# Patient Record
Sex: MALE | Race: BLACK OR AFRICAN AMERICAN | Employment: OTHER | URBAN - METROPOLITAN AREA
[De-identification: names, ages, dates, MRNs, and addresses within clinical notes are randomized per-mention and may not be internally consistent; named-entity substitution may affect disease eponyms.]

---

## 2018-04-16 ENCOUNTER — IP HISTORICAL/CONVERTED ENCOUNTER (OUTPATIENT)
Dept: OTHER | Age: 63
End: 2018-04-16

## 2020-06-20 ENCOUNTER — IP HISTORICAL/CONVERTED ENCOUNTER (OUTPATIENT)
Dept: OTHER | Age: 65
End: 2020-06-20

## 2020-12-12 ENCOUNTER — HOSPITAL ENCOUNTER (OUTPATIENT)
Age: 65
Setting detail: OBSERVATION
Discharge: HOME HEALTH | End: 2020-12-14
Attending: EMERGENCY MEDICINE | Admitting: HOSPITALIST
Payer: MEDICARE

## 2020-12-12 ENCOUNTER — APPOINTMENT (OUTPATIENT)
Dept: CT IMAGING | Age: 65
End: 2020-12-12
Attending: EMERGENCY MEDICINE
Payer: MEDICARE

## 2020-12-12 ENCOUNTER — APPOINTMENT (OUTPATIENT)
Dept: GENERAL RADIOLOGY | Age: 65
End: 2020-12-12
Attending: EMERGENCY MEDICINE
Payer: MEDICARE

## 2020-12-12 DIAGNOSIS — R07.9 ACUTE CHEST PAIN: Primary | ICD-10-CM

## 2020-12-12 PROBLEM — I10 HTN (HYPERTENSION): Status: ACTIVE | Noted: 2020-12-12

## 2020-12-12 PROBLEM — I25.10 CAD (CORONARY ARTERY DISEASE): Status: ACTIVE | Noted: 2020-12-12

## 2020-12-12 LAB
ALBUMIN SERPL-MCNC: 3.2 G/DL (ref 3.5–5)
ALBUMIN/GLOB SERPL: 0.7 {RATIO} (ref 1.1–2.2)
ALP SERPL-CCNC: 100 U/L (ref 45–117)
ALT SERPL-CCNC: 38 U/L (ref 12–78)
ANION GAP SERPL CALC-SCNC: 8 MMOL/L (ref 5–15)
AST SERPL W P-5'-P-CCNC: 19 U/L (ref 15–37)
BASOPHILS # BLD: 0.1 K/UL (ref 0–0.2)
BASOPHILS NFR BLD: 1 % (ref 0–2.5)
BILIRUB SERPL-MCNC: 0.2 MG/DL (ref 0.2–1)
BNP SERPL-MCNC: 520 PG/ML
BUN SERPL-MCNC: 17 MG/DL (ref 6–20)
BUN/CREAT SERPL: 15 (ref 12–20)
CA-I BLD-MCNC: 8.6 MG/DL (ref 8.5–10.1)
CHLORIDE SERPL-SCNC: 104 MMOL/L (ref 97–108)
CO2 SERPL-SCNC: 27 MMOL/L (ref 21–32)
CREAT SERPL-MCNC: 1.1 MG/DL (ref 0.7–1.3)
EOSINOPHIL # BLD: 0.3 K/UL (ref 0–0.7)
EOSINOPHIL NFR BLD: 6 % (ref 0.9–2.9)
ERYTHROCYTE [DISTWIDTH] IN BLOOD BY AUTOMATED COUNT: 15.7 % (ref 11.5–14.5)
GLOBULIN SER CALC-MCNC: 4.3 G/DL (ref 2–4)
GLUCOSE SERPL-MCNC: 113 MG/DL (ref 65–100)
HCT VFR BLD AUTO: 32.8 % (ref 41–53)
HGB BLD-MCNC: 10.4 G/DL (ref 13.5–17.5)
INR PPP: 1.1 (ref 0.9–1.1)
LYMPHOCYTES # BLD: 1.7 K/UL (ref 1–4.8)
LYMPHOCYTES NFR BLD: 31 % (ref 20.5–51.1)
MAGNESIUM SERPL-MCNC: 1.7 MG/DL (ref 1.6–2.4)
MCH RBC QN AUTO: 25.4 PG (ref 31–34)
MCHC RBC AUTO-ENTMCNC: 31.6 G/DL (ref 31–36)
MCV RBC AUTO: 80.4 FL (ref 80–100)
MONOCYTES # BLD: 0.6 K/UL (ref 0.2–2.4)
MONOCYTES NFR BLD: 11 % (ref 1.7–9.3)
NEUTS SEG # BLD: 2.7 K/UL (ref 1.8–7.7)
NEUTS SEG NFR BLD: 51 % (ref 42–75)
NRBC # BLD: 0 K/UL
NRBC BLD-RTO: 10 PER 100 WBC
PLATELET # BLD AUTO: 273 K/UL
PMV BLD AUTO: 7.9 FL (ref 6.5–11.5)
POTASSIUM SERPL-SCNC: 3.4 MMOL/L (ref 3.5–5.1)
PROT SERPL-MCNC: 7.5 G/DL (ref 6.4–8.2)
PROTHROMBIN TIME: 10.4 SEC (ref 9–11.1)
RBC # BLD AUTO: 4.08 M/UL (ref 4.5–5.9)
SODIUM SERPL-SCNC: 139 MMOL/L (ref 136–145)
TROPONIN I SERPL-MCNC: <0.05 NG/ML
TROPONIN I SERPL-MCNC: <0.05 NG/ML
WBC # BLD AUTO: 5.4 K/UL (ref 4.4–11.3)

## 2020-12-12 PROCEDURE — 80053 COMPREHEN METABOLIC PANEL: CPT

## 2020-12-12 PROCEDURE — 99218 HC RM OBSERVATION: CPT

## 2020-12-12 PROCEDURE — 71045 X-RAY EXAM CHEST 1 VIEW: CPT

## 2020-12-12 PROCEDURE — 99284 EMERGENCY DEPT VISIT MOD MDM: CPT

## 2020-12-12 PROCEDURE — 83880 ASSAY OF NATRIURETIC PEPTIDE: CPT

## 2020-12-12 PROCEDURE — 83036 HEMOGLOBIN GLYCOSYLATED A1C: CPT

## 2020-12-12 PROCEDURE — 83735 ASSAY OF MAGNESIUM: CPT

## 2020-12-12 PROCEDURE — 36415 COLL VENOUS BLD VENIPUNCTURE: CPT

## 2020-12-12 PROCEDURE — 96374 THER/PROPH/DIAG INJ IV PUSH: CPT

## 2020-12-12 PROCEDURE — 85610 PROTHROMBIN TIME: CPT

## 2020-12-12 PROCEDURE — 84484 ASSAY OF TROPONIN QUANT: CPT

## 2020-12-12 PROCEDURE — 99285 EMERGENCY DEPT VISIT HI MDM: CPT

## 2020-12-12 PROCEDURE — 74011250636 HC RX REV CODE- 250/636: Performed by: PHYSICIAN ASSISTANT

## 2020-12-12 PROCEDURE — 93005 ELECTROCARDIOGRAM TRACING: CPT

## 2020-12-12 PROCEDURE — 74011250637 HC RX REV CODE- 250/637: Performed by: EMERGENCY MEDICINE

## 2020-12-12 PROCEDURE — 85025 COMPLETE CBC W/AUTO DIFF WBC: CPT

## 2020-12-12 RX ORDER — POLYETHYLENE GLYCOL 3350 17 G/17G
17 POWDER, FOR SOLUTION ORAL DAILY PRN
Status: CANCELLED | OUTPATIENT
Start: 2020-12-12

## 2020-12-12 RX ORDER — SODIUM CHLORIDE 0.9 % (FLUSH) 0.9 %
5-40 SYRINGE (ML) INJECTION EVERY 8 HOURS
Status: CANCELLED | OUTPATIENT
Start: 2020-12-12

## 2020-12-12 RX ORDER — GUAIFENESIN 100 MG/5ML
243 LIQUID (ML) ORAL
Status: COMPLETED | OUTPATIENT
Start: 2020-12-12 | End: 2020-12-12

## 2020-12-12 RX ORDER — HYDRALAZINE HYDROCHLORIDE 20 MG/ML
10 INJECTION INTRAMUSCULAR; INTRAVENOUS
Status: DISCONTINUED | OUTPATIENT
Start: 2020-12-12 | End: 2020-12-14 | Stop reason: HOSPADM

## 2020-12-12 RX ORDER — METFORMIN HYDROCHLORIDE 1000 MG/1
1000 TABLET ORAL 2 TIMES DAILY WITH MEALS
COMMUNITY
End: 2021-09-14 | Stop reason: ALTCHOICE

## 2020-12-12 RX ORDER — TAMSULOSIN HYDROCHLORIDE 0.4 MG/1
0.4 CAPSULE ORAL
COMMUNITY
End: 2021-09-14 | Stop reason: ALTCHOICE

## 2020-12-12 RX ORDER — PROMETHAZINE HYDROCHLORIDE 25 MG/1
12.5 TABLET ORAL
Status: CANCELLED | OUTPATIENT
Start: 2020-12-12

## 2020-12-12 RX ORDER — GLIPIZIDE 10 MG/1
10 TABLET ORAL 2 TIMES DAILY
COMMUNITY
End: 2020-12-14

## 2020-12-12 RX ORDER — MAGNESIUM SULFATE 100 %
4 CRYSTALS MISCELLANEOUS AS NEEDED
Status: DISCONTINUED | OUTPATIENT
Start: 2020-12-12 | End: 2020-12-14 | Stop reason: HOSPADM

## 2020-12-12 RX ORDER — NIFEDIPINE 30 MG/1
30 TABLET, EXTENDED RELEASE ORAL DAILY
Status: DISCONTINUED | OUTPATIENT
Start: 2020-12-13 | End: 2020-12-13

## 2020-12-12 RX ORDER — ACETAMINOPHEN 325 MG/1
650 TABLET ORAL
Status: CANCELLED | OUTPATIENT
Start: 2020-12-12

## 2020-12-12 RX ORDER — ONDANSETRON 4 MG/1
4 TABLET, FILM COATED ORAL
Status: DISCONTINUED | OUTPATIENT
Start: 2020-12-12 | End: 2020-12-12

## 2020-12-12 RX ORDER — ENOXAPARIN SODIUM 100 MG/ML
40 INJECTION SUBCUTANEOUS DAILY
Status: CANCELLED | OUTPATIENT
Start: 2020-12-13

## 2020-12-12 RX ORDER — ISOSORBIDE MONONITRATE 60 MG/1
60 TABLET, EXTENDED RELEASE ORAL DAILY
COMMUNITY
End: 2022-07-11

## 2020-12-12 RX ORDER — ATORVASTATIN CALCIUM 20 MG/1
20 TABLET, FILM COATED ORAL DAILY
COMMUNITY
End: 2021-09-14 | Stop reason: ALTCHOICE

## 2020-12-12 RX ORDER — PANTOPRAZOLE SODIUM 40 MG/1
40 TABLET, DELAYED RELEASE ORAL DAILY
COMMUNITY
End: 2021-04-09 | Stop reason: ALTCHOICE

## 2020-12-12 RX ORDER — CYCLOBENZAPRINE HCL 10 MG
10 TABLET ORAL
Status: DISCONTINUED | OUTPATIENT
Start: 2020-12-12 | End: 2020-12-12

## 2020-12-12 RX ORDER — TRAZODONE HYDROCHLORIDE 50 MG/1
50 TABLET ORAL 2 TIMES DAILY
COMMUNITY
End: 2021-04-09 | Stop reason: ALTCHOICE

## 2020-12-12 RX ORDER — DEXTROSE 50 % IN WATER (D50W) INTRAVENOUS SYRINGE
25-50 AS NEEDED
Status: DISCONTINUED | OUTPATIENT
Start: 2020-12-12 | End: 2020-12-14 | Stop reason: HOSPADM

## 2020-12-12 RX ORDER — INSULIN LISPRO 100 [IU]/ML
INJECTION, SOLUTION INTRAVENOUS; SUBCUTANEOUS
Status: DISCONTINUED | OUTPATIENT
Start: 2020-12-13 | End: 2020-12-14 | Stop reason: HOSPADM

## 2020-12-12 RX ORDER — ACETAMINOPHEN 650 MG/1
650 SUPPOSITORY RECTAL
Status: CANCELLED | OUTPATIENT
Start: 2020-12-12

## 2020-12-12 RX ORDER — ONDANSETRON 2 MG/ML
4 INJECTION INTRAMUSCULAR; INTRAVENOUS
Status: CANCELLED | OUTPATIENT
Start: 2020-12-12

## 2020-12-12 RX ORDER — SODIUM CHLORIDE 0.9 % (FLUSH) 0.9 %
5-40 SYRINGE (ML) INJECTION AS NEEDED
Status: CANCELLED | OUTPATIENT
Start: 2020-12-12

## 2020-12-12 RX ORDER — ACETAMINOPHEN 500 MG
1000 TABLET ORAL
Status: DISCONTINUED | OUTPATIENT
Start: 2020-12-12 | End: 2020-12-12

## 2020-12-12 RX ORDER — FUROSEMIDE 40 MG/1
20 TABLET ORAL DAILY
COMMUNITY
End: 2021-04-09 | Stop reason: ALTCHOICE

## 2020-12-12 RX ADMIN — ASPIRIN 243 MG: 81 TABLET, CHEWABLE ORAL at 15:25

## 2020-12-12 RX ADMIN — HYDRALAZINE HYDROCHLORIDE 10 MG: 20 INJECTION INTRAMUSCULAR; INTRAVENOUS at 18:27

## 2020-12-12 NOTE — H&P
History and Physical    Subjective:     Genoveva Ray is a 77 y.o. male  has a past medical history of CAD (coronary artery disease) and MI (myocardial infarction) ([facility] [address] Patient has an extensive cardiac and coronary artery disease history presented to emergency department with chest pain started 2 hours prior to arrival and was normal described like a burning sensation heartburn with some nausea and dizziness in addition to shortness of breath. Was evaluated in his room reporting that pain had subsided somewhat now but based upon patient's risk factors would be appropriate to admit for observation to rule out myocardial infarction. Stay is 1 midnight. Past Medical History:   Diagnosis Date    CAD (coronary artery disease)     MI (myocardial infarction) ([facility] [address]       History reviewed. No pertinent surgical history. Family History   Problem Relation Age of Onset    Hypertension Mother     Hypertension Father       Social History     Tobacco Use    Smoking status: Not on file   Substance Use Topics    Alcohol use: Not on file       Prior to Admission medications    Medication Sig Start Date End Date Taking? Authorizing Provider   isosorbide mononitrate ER (IMDUR) 60 mg CR tablet Take 60 mg by mouth daily. Yes Prince, MD Imani   atorvastatin (LIPITOR) 20 mg tablet Take 20 mg by mouth daily. Yes Imani Morrison MD   furosemide (Lasix) 40 mg tablet Take 40 mg by mouth daily. Yes Imani Morrison MD   pantoprazole (Protonix) 40 mg tablet Take 40 mg by mouth daily. Yes Imani Morrison MD   traZODone (DESYREL) 50 mg tablet Take 50 mg by mouth two (2) times a day. Yes Imani Morrison MD   metFORMIN (GLUCOPHAGE) 1,000 mg tablet Take 1,000 mg by mouth two (2) times daily (with meals). Yes Imani Morrison MD   glipiZIDE (GLUCOTROL) 10 mg tablet Take 10 mg by mouth two (2) times a day. Yes Imani Morrison MD   tamsulosin (Flomax) 0.4 mg capsule Take 0.4 mg by mouth nightly.    Yes Imani Morrison MD     No Known Allergies       REVIEW OF SYSTEMS:       Total of 12 systems reviewed as follows:       POSITIVE= underlined text  Negative = text not underlined  General:  fever, chills, sweats, generalized weakness, weight loss/gain,      loss of appetite   Eyes:    blurred vision, eye pain, loss of vision, double vision  ENT:    rhinorrhea, pharyngitis   Respiratory:  cough, sputum production, SOB, [place], wheezing, pleuritic pain   Cardiology:   chest pain, palpitations, orthopnea, PND, edema, syncope   Gastrointestinal:  abdominal pain , N/V, diarrhea, dysphagia, constipation, bleeding   Genitourinary:  frequency, urgency, dysuria, hematuria, incontinence   Muskuloskeletal :  arthralgia, myalgia, back pain  Hematology: easy bruising, nose or gum bleeding, lymphadenopathy   Dermatological: rash, ulceration, pruritis, color change / jaundice  Endocrine:   hot flashes or polydipsia   Neurological:  headache, dizziness, confusion, focal weakness, paresthesia,     Speech difficulties, memory loss, gait difficulty  Psychological: Feelings of anxiety, depression, agitation         Objective:   VITALS:    Visit Vitals  BP (!) 168/89   Pulse 81   Temp 98.2 °F (36.8 °C)   Resp 20   Ht 5' 10\" (1.778 m)   Wt 111.1 kg (245 lb)   SpO2 91%   BMI 35.15 kg/m²       PHYSICAL EXAM:    General:    Alert, cooperative, no distress, appears stated age. HEENT: Atraumatic, anicteric sclerae, pink conjunctivae     No oral ulcers, mucosa moist, throat clear, dentition fair  Neck:  Supple, symmetrical,  thyroid: non tender  Lungs:   Clear to auscultation bilaterally. No Wheezing or Rhonchi. No rales. Chest wall:  No tenderness  No Accessory muscle use. Heart:   Regular  rhythm,  No  murmur   No edema  Abdomen:   Soft, non-tender. Not distended. Bowel sounds normal  Extremities: No cyanosis. No clubbing,      Skin turgor normal, Capillary refill normal, Radial dial pulse 2+  Skin:     Not pale. Not Jaundiced  No rashes   Psych:  Good insight.   Not depressed. Not anxious or agitated. Neurologic: EOMs intact. No facial asymmetry. No aphasia or slurred speech. Symmetrical strength,   Sensation grossly intact.  Alert and oriented X 4.     _______________________________________________________________________  Care Plan discussed with:    Comments   Patient X    Family      RN X    Care Manager                    Consultant:      _______________________________________________________________________  Expected  Disposition:   Home with Family X   HH/PT/OT/RN    SNF/LTC    JOSE    ________________________________________________________________________  TOTAL TIME:  48 Minutes    Critical Care Provided     Minutes non procedure based      Comments    X Reviewed previous records   >50% of visit spent in counseling and coordination of care X Discussion with patient and/or family and questions answered       ________________________________________________________________________    Labs:  Recent Results (from the past 24 hour(s))   METABOLIC PANEL, BASIC    Collection Time: 12/13/20  8:50 AM   Result Value Ref Range    Sodium 139 136 - 145 mmol/L    Potassium 3.5 3.5 - 5.1 mmol/L    Chloride 102 97 - 108 mmol/L    CO2 25 21 - 32 mmol/L    Anion gap 12 5 - 15 mmol/L    Glucose 153 (H) 65 - 100 mg/dL    BUN 13 6 - 20 mg/dL    Creatinine 0.85 0.70 - 1.30 mg/dL    BUN/Creatinine ratio 15 12 - 20      GFR est AA >60 >60 ml/min/1.73m2    GFR est non-AA >60 >60 ml/min/1.73m2    Calcium 9.4 8.5 - 10.1 mg/dL   CBC WITH AUTOMATED DIFF    Collection Time: 12/13/20  8:50 AM   Result Value Ref Range    WBC 4.8 4.4 - 11.3 K/uL    RBC 4.40 (L) 4.50 - 5.90 M/uL    HGB 11.1 (L) 13.5 - 17.5 g/dL    HCT 35.1 (L) 41 - 53 %    MCV 79.8 (L) 80 - 100 FL    MCH 25.1 (L) 31 - 34 PG    MCHC 31.5 31.0 - 36.0 g/dL    RDW 15.9 (H) 11.5 - 14.5 %    PLATELET 542 K/uL    MPV 7.6 6.5 - 11.5 FL    NRBC 10.0  WBC    ABSOLUTE NRBC 0.00 K/uL    NEUTROPHILS 53 42 - 75 %    LYMPHOCYTES 30 20.5 - 51.1 %    MONOCYTES 11 (H) 1.7 - 9.3 %    EOSINOPHILS 5 (H) 0.9 - 2.9 %    BASOPHILS 1 0.0 - 2.5 %    ABS. NEUTROPHILS 2.6 1.8 - 7.7 K/UL    ABS. LYMPHOCYTES 1.4 1.0 - 4.8 K/UL    ABS. MONOCYTES 0.5 0.2 - 2.4 K/UL    ABS. EOSINOPHILS 0.2 0.0 - 0.7 K/UL    ABS. BASOPHILS 0.1 0.0 - 0.2 K/UL   MAGNESIUM    Collection Time: 12/13/20  8:50 AM   Result Value Ref Range    Magnesium 1.8 1.6 - 2.4 mg/dL   TSH 3RD GENERATION    Collection Time: 12/13/20  8:50 AM   Result Value Ref Range    TSH 1.31 0.36 - 3.74 uIU/mL   TROPONIN I    Collection Time: 12/13/20  8:50 AM   Result Value Ref Range    Troponin-I, Qt. <0.05 <0.05 ng/mL   GLUCOSE, POC    Collection Time: 12/13/20 11:49 AM   Result Value Ref Range    Glucose (POC) 206 (H) 65 - 100 mg/dL    Performed by [address] [place] [address], POC    Collection Time: 12/13/20  3:43 PM   Result Value Ref Range    Glucose (POC) 177 (H) 65 - 100 mg/dL    Performed by Ramón Miller    TROPONIN I    Collection Time: 12/13/20  4:00 PM   Result Value Ref Range    Troponin-I, Qt. <0.05 <0.05 ng/mL   GLUCOSE, POC    Collection Time: 12/13/20  9:13 PM   Result Value Ref Range    Glucose (POC) 169 (H) 65 - 100 mg/dL    Performed by Kori Stephens        Imaging:  No results found.      Assessment & Plan:       Chest pain  -Patient has history of previous coronary artery disease and CABG and MI  -Initial 2 troponins negative no EKG changes  -We will admit for observation status with trending of troponins with EKG as needed chest pain  -EKG showed no acute changes    Coronary artery disease  -Continue home medication  -New medical management    Hypertension  -Pressure mildly elevated upon admission 170/80  -Resume home medication  -Continue to monitor  -Telemetry    Diabetes mellitus  -Hold Metformin  -Glipizide  -Insulin sliding scale      Code Status: Full    Prophylaxis: SCD and Lovenox 40 mg subcu daily     Electronically Signed : Bebeto Mena, PhD, PA-C, [facility] Medicine [facility]

## 2020-12-12 NOTE — ED PROVIDER NOTES
EMERGENCY DEPARTMENT HISTORY AND PHYSICAL EXAM      Date: 12/12/2020  Patient Name: Melva Corona      History of Presenting Illness     Chief Complaint   Patient presents with    Chest Pain       History Provided By: Patient    HPI: Melva Corona, 77 y.o. male with a past medical history significant hypertension, myocardial infarction and Coronary artery disease presents to the ED with cc of chest pain 2 hours prior to arrival patient described as burning sensation like heartburn associated dizziness nausea shortness of breath    There are no other complaints, changes, or physical findings at this time. PCP: None    Current Facility-Administered Medications   Medication Dose Route Frequency Provider Last Rate Last Dose    acetaminophen (TYLENOL) tablet 1,000 mg  1,000 mg Oral NOW Christin Cobb MD        ondansetron hcl (ZOFRAN) tablet 4 mg  4 mg Oral NOW Christin Cobb MD        cyclobenzaprine (FLEXERIL) tablet 10 mg  10 mg Oral NOW Christin Cobb MD        aspirin chewable tablet 243 mg  243 mg Oral NOW Christin Cobb MD           Past History     Past Medical History:  No past medical history on file. Past Surgical History:  No past surgical history on file. Family History:  No family history on file. Social History:  Social History     Tobacco Use    Smoking status: Not on file   Substance Use Topics    Alcohol use: Not on file    Drug use: Not on file       Allergies: Allergies not on file      Review of Systems     Review of Systems   Constitutional: Negative for chills and fever. HENT: Negative for rhinorrhea and sore throat. Eyes: Negative for discharge and visual disturbance. Respiratory: Positive for shortness of breath. Cardiovascular: Positive for chest pain. Gastrointestinal: Positive for nausea. Negative for abdominal pain. Endocrine: Negative for polydipsia and polyuria. Genitourinary: Negative for dysuria and urgency. Musculoskeletal: Negative for back pain and neck pain. Neurological: Positive for dizziness. Negative for weakness and numbness. Psychiatric/Behavioral: Negative. Physical Exam     Physical Exam  Vitals signs and nursing note reviewed. Constitutional:       Appearance: He is well-developed. He is obese. HENT:      Head: Normocephalic and atraumatic. Nose: Nose normal.      Mouth/Throat:      Mouth: Mucous membranes are moist.      Pharynx: Oropharynx is clear. Eyes:      Extraocular Movements: Extraocular movements intact. Conjunctiva/sclera: Conjunctivae normal.      Pupils: Pupils are equal, round, and reactive to light. Neck:      Musculoskeletal: Normal range of motion and neck supple. Cardiovascular:      Rate and Rhythm: Normal rate and regular rhythm. Heart sounds: Murmur present. Pulmonary:      Effort: Pulmonary effort is normal.      Breath sounds: Normal breath sounds. Abdominal:      General: Bowel sounds are normal.      Palpations: Abdomen is soft. Musculoskeletal: Normal range of motion. Skin:     General: Skin is dry. Capillary Refill: Capillary refill takes less than 2 seconds. Neurological:      General: No focal deficit present. Mental Status: He is alert and oriented to person, place, and time. Psychiatric:         Mood and Affect: Mood normal.         Behavior: Behavior normal.         Lab and Diagnostic Study Results     Labs -   No results found for this or any previous visit (from the past 12 hour(s)). Radiologic Studies -   [unfilled]  CT Results  (Last 48 hours)    None        CXR Results  (Last 48 hours)    None          Medical Decision Making and ED Course   - I am the first and primary provider for this patient AND AM THE PRIMARY PROVIDER OF RECORD. - I reviewed the vital signs, available nursing notes, past medical history, past surgical history, family history and social history. - Initial assessment performed. The patients presenting problems have been discussed, and the staff are in agreement with the care plan formulated and outlined with them. I have encouraged them to ask questions as they arise throughout their visit. Vital Signs-Reviewed the patient's vital signs. Patient Vitals for the past 12 hrs:   Temp Pulse Resp BP SpO2   12/12/20 1453 98.7 °F (37.1 °C) 82 16 (!) 170/80 99 %       Records Reviewed: Nursing Notes    The patient presents with chest pain with a differential diagnosis of  ACS, acute MI, pulmonary edema/CHF and pnuemonia    ED Course:       ED Course as of Dec 12 1804   Sat Dec 12, 2020   1458 Patient c-collar removed and patient was on backboard patient complaining of headache    [SB]   1553 EKG sinus rhythm rate 86 IN interval 147 QT interval 463 normal axis left ventricular hypertrophy    [SB]      ED Course User Index  [SB] Jacquelin Dasilva MD         Provider Notes (Medical Decision Making): MDM           Consultations:       Consultations: - NONE        Procedures and Critical Care       Performed by: Rajendra Downey MD  PROCEDURES:  Procedures               Rajendra Downey MD        Disposition     Disposition: Condition stable and improved  Admitted to Floor Medical Floor the case was discussed with the admitting physician assistant Aniyah Craig        Remove if not discharged  DISCHARGE PLAN:  1. There are no discharge medications for this patient. 2.   Follow-up Information    None       3. Return to ED if worse   4. There are no discharge medications for this patient. Diagnosis     Clinical Impression: No diagnosis found. Attestations:    Rajendra Downey MD    Please note that this dictation was completed with [employer] the computer voice recognition software. Quite often unanticipated grammatical, syntax, homophones, and other interpretive errors are inadvertently transcribed by the computer software. Please disregard these errors. Please excuse any errors that have escaped final proofreading. Thank you.

## 2020-12-12 NOTE — ED TRIAGE NOTES
Started having chest pain and tightness about 2 hours ago. Pain subsided after about an hour. Stated had heart attack with bi-pass in 2018. Denies SOB.  EKG done and given to Dr Sakshi Quinn

## 2020-12-13 LAB
ANION GAP SERPL CALC-SCNC: 12 MMOL/L (ref 5–15)
BASOPHILS # BLD: 0.1 K/UL (ref 0–0.2)
BASOPHILS NFR BLD: 1 % (ref 0–2.5)
BUN SERPL-MCNC: 13 MG/DL (ref 6–20)
BUN/CREAT SERPL: 15 (ref 12–20)
CA-I BLD-MCNC: 9.4 MG/DL (ref 8.5–10.1)
CHLORIDE SERPL-SCNC: 102 MMOL/L (ref 97–108)
CO2 SERPL-SCNC: 25 MMOL/L (ref 21–32)
CREAT SERPL-MCNC: 0.85 MG/DL (ref 0.7–1.3)
EOSINOPHIL # BLD: 0.2 K/UL (ref 0–0.7)
EOSINOPHIL NFR BLD: 5 % (ref 0.9–2.9)
ERYTHROCYTE [DISTWIDTH] IN BLOOD BY AUTOMATED COUNT: 15.9 % (ref 11.5–14.5)
EST. AVERAGE GLUCOSE BLD GHB EST-MCNC: 174 MG/DL
GLUCOSE BLD STRIP.AUTO-MCNC: 138 MG/DL (ref 65–100)
GLUCOSE BLD STRIP.AUTO-MCNC: 169 MG/DL (ref 65–100)
GLUCOSE BLD STRIP.AUTO-MCNC: 177 MG/DL (ref 65–100)
GLUCOSE BLD STRIP.AUTO-MCNC: 206 MG/DL (ref 65–100)
GLUCOSE SERPL-MCNC: 153 MG/DL (ref 65–100)
HBA1C MFR BLD: 7.7 % (ref 4–5.6)
HCT VFR BLD AUTO: 35.1 % (ref 41–53)
HGB BLD-MCNC: 11.1 G/DL (ref 13.5–17.5)
LYMPHOCYTES # BLD: 1.4 K/UL (ref 1–4.8)
LYMPHOCYTES NFR BLD: 30 % (ref 20.5–51.1)
MAGNESIUM SERPL-MCNC: 1.8 MG/DL (ref 1.6–2.4)
MCH RBC QN AUTO: 25.1 PG (ref 31–34)
MCHC RBC AUTO-ENTMCNC: 31.5 G/DL (ref 31–36)
MCV RBC AUTO: 79.8 FL (ref 80–100)
MONOCYTES # BLD: 0.5 K/UL (ref 0.2–2.4)
MONOCYTES NFR BLD: 11 % (ref 1.7–9.3)
NEUTS SEG # BLD: 2.6 K/UL (ref 1.8–7.7)
NEUTS SEG NFR BLD: 53 % (ref 42–75)
NRBC # BLD: 0 K/UL
NRBC BLD-RTO: 10 PER 100 WBC
PERFORMED BY, TECHID: ABNORMAL
PLATELET # BLD AUTO: 276 K/UL
PMV BLD AUTO: 7.6 FL (ref 6.5–11.5)
POTASSIUM SERPL-SCNC: 3.5 MMOL/L (ref 3.5–5.1)
RBC # BLD AUTO: 4.4 M/UL (ref 4.5–5.9)
SODIUM SERPL-SCNC: 139 MMOL/L (ref 136–145)
TROPONIN I SERPL-MCNC: <0.05 NG/ML
TSH SERPL DL<=0.05 MIU/L-ACNC: 1.31 UIU/ML (ref 0.36–3.74)
WBC # BLD AUTO: 4.8 K/UL (ref 4.4–11.3)

## 2020-12-13 PROCEDURE — 99218 HC RM OBSERVATION: CPT

## 2020-12-13 PROCEDURE — 84443 ASSAY THYROID STIM HORMONE: CPT

## 2020-12-13 PROCEDURE — 36415 COLL VENOUS BLD VENIPUNCTURE: CPT

## 2020-12-13 PROCEDURE — 74011250637 HC RX REV CODE- 250/637: Performed by: INTERNAL MEDICINE

## 2020-12-13 PROCEDURE — 82962 GLUCOSE BLOOD TEST: CPT

## 2020-12-13 PROCEDURE — 74011250637 HC RX REV CODE- 250/637: Performed by: PHYSICIAN ASSISTANT

## 2020-12-13 PROCEDURE — 80061 LIPID PANEL: CPT

## 2020-12-13 PROCEDURE — 80048 BASIC METABOLIC PNL TOTAL CA: CPT

## 2020-12-13 PROCEDURE — 93005 ELECTROCARDIOGRAM TRACING: CPT

## 2020-12-13 PROCEDURE — 74011636637 HC RX REV CODE- 636/637: Performed by: PHYSICIAN ASSISTANT

## 2020-12-13 PROCEDURE — 74011250637 HC RX REV CODE- 250/637: Performed by: HOSPITALIST

## 2020-12-13 PROCEDURE — 96375 TX/PRO/DX INJ NEW DRUG ADDON: CPT

## 2020-12-13 PROCEDURE — 96376 TX/PRO/DX INJ SAME DRUG ADON: CPT

## 2020-12-13 PROCEDURE — 74011250636 HC RX REV CODE- 250/636: Performed by: PHYSICIAN ASSISTANT

## 2020-12-13 PROCEDURE — 85025 COMPLETE CBC W/AUTO DIFF WBC: CPT

## 2020-12-13 PROCEDURE — 84484 ASSAY OF TROPONIN QUANT: CPT

## 2020-12-13 PROCEDURE — 74011250636 HC RX REV CODE- 250/636: Performed by: INTERNAL MEDICINE

## 2020-12-13 PROCEDURE — 83735 ASSAY OF MAGNESIUM: CPT

## 2020-12-13 RX ORDER — METFORMIN HYDROCHLORIDE 500 MG/1
1000 TABLET ORAL 2 TIMES DAILY WITH MEALS
Status: DISCONTINUED | OUTPATIENT
Start: 2020-12-13 | End: 2020-12-14 | Stop reason: HOSPADM

## 2020-12-13 RX ORDER — FUROSEMIDE 40 MG/1
40 TABLET ORAL DAILY
Status: DISCONTINUED | OUTPATIENT
Start: 2020-12-13 | End: 2020-12-14 | Stop reason: HOSPADM

## 2020-12-13 RX ORDER — LISINOPRIL 10 MG/1
10 TABLET ORAL DAILY
Status: DISCONTINUED | OUTPATIENT
Start: 2020-12-13 | End: 2020-12-13

## 2020-12-13 RX ORDER — TAMSULOSIN HYDROCHLORIDE 0.4 MG/1
0.4 CAPSULE ORAL
Status: DISCONTINUED | OUTPATIENT
Start: 2020-12-13 | End: 2020-12-14 | Stop reason: HOSPADM

## 2020-12-13 RX ORDER — LISINOPRIL 10 MG/1
10 TABLET ORAL 2 TIMES DAILY
Status: DISCONTINUED | OUTPATIENT
Start: 2020-12-13 | End: 2020-12-14

## 2020-12-13 RX ORDER — NITROGLYCERIN 0.4 MG/1
0.4 TABLET SUBLINGUAL AS NEEDED
Status: DISCONTINUED | OUTPATIENT
Start: 2020-12-13 | End: 2020-12-14 | Stop reason: HOSPADM

## 2020-12-13 RX ORDER — MORPHINE SULFATE 2 MG/ML
2 INJECTION, SOLUTION INTRAMUSCULAR; INTRAVENOUS ONCE
Status: COMPLETED | OUTPATIENT
Start: 2020-12-13 | End: 2020-12-13

## 2020-12-13 RX ORDER — ASPIRIN 81 MG/1
81 TABLET ORAL DAILY
Status: DISCONTINUED | OUTPATIENT
Start: 2020-12-13 | End: 2020-12-14 | Stop reason: HOSPADM

## 2020-12-13 RX ORDER — ISOSORBIDE MONONITRATE 60 MG/1
60 TABLET, EXTENDED RELEASE ORAL DAILY
Status: DISCONTINUED | OUTPATIENT
Start: 2020-12-13 | End: 2020-12-14 | Stop reason: HOSPADM

## 2020-12-13 RX ORDER — PANTOPRAZOLE SODIUM 40 MG/1
40 TABLET, DELAYED RELEASE ORAL DAILY
Status: DISCONTINUED | OUTPATIENT
Start: 2020-12-13 | End: 2020-12-14 | Stop reason: HOSPADM

## 2020-12-13 RX ORDER — ATORVASTATIN CALCIUM 20 MG/1
20 TABLET, FILM COATED ORAL DAILY
Status: DISCONTINUED | OUTPATIENT
Start: 2020-12-13 | End: 2020-12-14 | Stop reason: HOSPADM

## 2020-12-13 RX ORDER — LANOLIN ALCOHOL/MO/W.PET/CERES
400 CREAM (GRAM) TOPICAL 2 TIMES DAILY
Status: DISCONTINUED | OUTPATIENT
Start: 2020-12-13 | End: 2020-12-14 | Stop reason: HOSPADM

## 2020-12-13 RX ORDER — TRAZODONE HYDROCHLORIDE 50 MG/1
50 TABLET ORAL 2 TIMES DAILY
Status: DISCONTINUED | OUTPATIENT
Start: 2020-12-13 | End: 2020-12-14 | Stop reason: HOSPADM

## 2020-12-13 RX ORDER — POTASSIUM CHLORIDE 20 MEQ/1
20 TABLET, EXTENDED RELEASE ORAL 2 TIMES DAILY
Status: DISCONTINUED | OUTPATIENT
Start: 2020-12-13 | End: 2020-12-14 | Stop reason: HOSPADM

## 2020-12-13 RX ORDER — GLIPIZIDE 5 MG/1
10 TABLET ORAL 2 TIMES DAILY
Status: DISCONTINUED | OUTPATIENT
Start: 2020-12-13 | End: 2020-12-14 | Stop reason: HOSPADM

## 2020-12-13 RX ADMIN — ATORVASTATIN CALCIUM 20 MG: 20 TABLET, FILM COATED ORAL at 08:33

## 2020-12-13 RX ADMIN — INSULIN LISPRO 2 UNITS: 100 INJECTION, SOLUTION INTRAVENOUS; SUBCUTANEOUS at 17:22

## 2020-12-13 RX ADMIN — TAMSULOSIN HYDROCHLORIDE 0.4 MG: 0.4 CAPSULE ORAL at 21:36

## 2020-12-13 RX ADMIN — ASPIRIN 81 MG: 81 TABLET, COATED ORAL at 13:42

## 2020-12-13 RX ADMIN — NITROGLYCERIN 0.4 MG: 0.4 TABLET, ORALLY DISINTEGRATING SUBLINGUAL at 04:30

## 2020-12-13 RX ADMIN — NITROGLYCERIN 1 INCH: 20 OINTMENT TOPICAL at 05:05

## 2020-12-13 RX ADMIN — METFORMIN HYDROCHLORIDE 1000 MG: 500 TABLET ORAL at 17:21

## 2020-12-13 RX ADMIN — MAGNESIUM OXIDE 400 MG: 400 TABLET ORAL at 17:21

## 2020-12-13 RX ADMIN — MORPHINE SULFATE 2 MG: 2 INJECTION, SOLUTION INTRAMUSCULAR; INTRAVENOUS at 05:05

## 2020-12-13 RX ADMIN — LISINOPRIL 10 MG: 10 TABLET ORAL at 21:36

## 2020-12-13 RX ADMIN — PANTOPRAZOLE SODIUM 40 MG: 40 TABLET, DELAYED RELEASE ORAL at 08:33

## 2020-12-13 RX ADMIN — HYDRALAZINE HYDROCHLORIDE 10 MG: 20 INJECTION INTRAMUSCULAR; INTRAVENOUS at 15:56

## 2020-12-13 RX ADMIN — INSULIN LISPRO 3 UNITS: 100 INJECTION, SOLUTION INTRAVENOUS; SUBCUTANEOUS at 12:17

## 2020-12-13 RX ADMIN — HYDRALAZINE HYDROCHLORIDE 10 MG: 20 INJECTION INTRAMUSCULAR; INTRAVENOUS at 03:03

## 2020-12-13 RX ADMIN — TRAZODONE HYDROCHLORIDE 50 MG: 50 TABLET ORAL at 17:21

## 2020-12-13 RX ADMIN — FUROSEMIDE 40 MG: 40 TABLET ORAL at 08:33

## 2020-12-13 RX ADMIN — NITROGLYCERIN 0.4 MG: 0.4 TABLET, ORALLY DISINTEGRATING SUBLINGUAL at 04:35

## 2020-12-13 RX ADMIN — NITROGLYCERIN 0.4 MG: 0.4 TABLET, ORALLY DISINTEGRATING SUBLINGUAL at 04:25

## 2020-12-13 RX ADMIN — TRAZODONE HYDROCHLORIDE 50 MG: 50 TABLET ORAL at 08:33

## 2020-12-13 RX ADMIN — MORPHINE SULFATE 2 MG: 2 INJECTION, SOLUTION INTRAMUSCULAR; INTRAVENOUS at 04:26

## 2020-12-13 RX ADMIN — ISOSORBIDE MONONITRATE 60 MG: 60 TABLET, EXTENDED RELEASE ORAL at 08:33

## 2020-12-13 RX ADMIN — LISINOPRIL 10 MG: 10 TABLET ORAL at 13:42

## 2020-12-13 RX ADMIN — POTASSIUM CHLORIDE 20 MEQ: 1500 TABLET, EXTENDED RELEASE ORAL at 17:22

## 2020-12-13 NOTE — ROUTINE PROCESS
Pt alert and oriented x 4. B/p 20/100. Asymptomatic. Hydralazine 10mg iv push given. Repeat b/p 166/89. Call to Physician assistant erika, no new orders. Offers  No c/o pain or discomfort.

## 2020-12-13 NOTE — PROGRESS NOTES
Progress Note  Date:12/13/2020       Room:213/01  Patient Zakiya Oneil     YOB: 1955     Age:65 y.o. Letty Logan is a 77 y.o. male  has a past medical history of CAD (coronary artery disease) and MI (myocardial infarction) ([facility] [address] follows with Ford Medina NP and follows with pcp he states in same clinic but doesn't remember the name.     Patient has an extensive cardiac and coronary artery disease history presented to emergency department with chest pain started 2 hours prior to arrival and was normal described like a burning sensation heartburn with some nausea and dizziness in addition to shortness of breath.   Was evaluated in his room reporting that pain had subsided somewhat now but based upon patient's risk factors would be appropriate to admit for observation to rule out myocardial infarction. Patient seen and evaluated resting calmly in bed no acute distress it was noted around 4 AM patient had similar chest pain stat troponins were done was found to be negative and blood pressure continues to be elevated. Patient states his blood pressure has been slowly increasing he is not a good historian and states he has had a stress test and echo and does follow with [facility] Mino pinzon NP and states he has follow-up with PCP on December 16 and followed up with cardiology in October. We will monitor patient closely as patient still has continued elevated blood pressure and we will cycle serial troponins since most recent chest pain early this morning    Review of Systems   Constitutional: Negative for chills, diaphoresis, fatigue and fever. HENT: Negative for congestion, ear pain, postnasal drip, sinus pain and sore throat. Eyes: Negative for pain, discharge and redness. Respiratory: Negative for cough, shortness of breath and wheezing. Cardiovascular: Negative for chest pain and palpitations.    Gastrointestinal: Negative for abdominal pain, constipation, diarrhea, nausea and vomiting. Genitourinary: Negative for dysuria, flank pain, frequency and urgency. Musculoskeletal: Negative for arthralgias and myalgias. Neurological: Negative for dizziness, weakness and headaches. Psychiatric/Behavioral: Negative for agitation and hallucinations. The patient is not nervous/anxious. Objective           Vitals Last 24 Hours:  Patient Vitals for the past 24 hrs:   Temp Pulse Resp BP SpO2   12/13/20 1146 97 °F (36.1 °C) 81 18 (!) 163/89 95 %   12/13/20 0700 98.1 °F (36.7 °C) 77 20 (!) 175/88    12/13/20 0647 98.1 °F (36.7 °C) 77 20 (!) 175/88 95 %   12/13/20 0440  90  (!) 155/85    12/13/20 0435  79  (!) 156/88    12/13/20 0430  82  (!) 163/86    12/13/20 0425  80  (!) 163/87    12/13/20 0400  75      12/13/20 0336  81  (!) 154/84    12/13/20 0303 97.9 °F (36.6 °C) 75 16 (!) 203/107 99 %   12/12/20 2355  81      12/12/20 2340 98.4 °F (36.9 °C) 76 16 (!) 156/90 98 %   12/12/20 2122 98.5 °F (36.9 °C) 85 16 (!) 177/78 99 %   12/12/20 2000  84      12/12/20 1846  83 16 (!) 157/106 99 %   12/12/20 1827  77  (!) 178/103    12/12/20 1821  76 18 (!) 178/103 97 %   12/12/20 1730  78 21 (!) 185/104 96 %   12/12/20 1630  76 22 (!) 172/99 95 %   12/12/20 1530  90 17 (!) 162/93 92 %   12/12/20 1453 98.7 °F (37.1 °C) 82 16 (!) 170/80 99 %        I/O (24Hr): No intake or output data in the 24 hours ending 12/13/20 1221    Physical Exam:  General: Alert, cooperative, no distress, appears stated age. Head:  Normocephalic, without obvious abnormality, atraumatic. Eyes:  Conjunctivae/corneas clear. Pupils equal, round, reactive to light. Extraocular movements intact. Lungs:  Clear to auscultation bilaterally. no wheeze, rales, crackles, rhonchi   Chest wall: No tenderness or deformity. Heart:  Regular rate and rhythm, S1, S2 normal, no murmur, click, rub or gallop. Abdomen:  Soft, non-tender.  Bowel sounds normal. No masses,  No organomegaly. Extremities: Extremities normal, atraumatic, no cyanosis or edema. Pulses: 2+ and symmetric all extremities. Skin: Skin color, texture, turgor normal. No rashes or lesions  Neurologic: Awake, Alert, oriented. No obvious gross sensory or motor deficits      Medications           Current Facility-Administered Medications   Medication Dose Route Frequency    atorvastatin (LIPITOR) tablet 20 mg  20 mg Oral DAILY    furosemide (LASIX) tablet 40 mg  40 mg Oral DAILY    isosorbide mononitrate ER (IMDUR) tablet 60 mg  60 mg Oral DAILY    [Held by provider] glipiZIDE (GLUCOTROL) tablet 10 mg  10 mg Oral BID    pantoprazole (PROTONIX) tablet 40 mg  40 mg Oral DAILY    tamsulosin (FLOMAX) capsule 0.4 mg  0.4 mg Oral QHS    traZODone (DESYREL) tablet 50 mg  50 mg Oral BID    nitroglycerin (NITROSTAT) tablet 0.4 mg  0.4 mg SubLINGual PRN    metFORMIN (GLUCOPHAGE) tablet 1,000 mg  1,000 mg Oral BID WITH MEALS    lisinopriL (PRINIVIL, ZESTRIL) tablet 10 mg  10 mg Oral DAILY    magnesium oxide (MAG-OX) tablet 400 mg  400 mg Oral BID    potassium chloride (K-DUR, KLOR-CON) SR tablet 20 mEq  20 mEq Oral BID    hydrALAZINE (APRESOLINE) 20 mg/mL injection 10 mg  10 mg IntraVENous Q6H PRN    glucose chewable tablet 16 g  4 Tab Oral PRN    dextrose (D50W) injection syrg 12.5-25 g  25-50 mL IntraVENous PRN    glucagon (GLUCAGEN) injection 1 mg  1 mg IntraMUSCular PRN    insulin lispro (HUMALOG) injection   SubCUTAneous AC&HS         Allergies         Patient has no known allergies.        Labs/Imaging/Diagnostics      Labs:  Recent Results (from the past 48 hour(s))   CBC WITH AUTOMATED DIFF    Collection Time: 12/12/20  3:14 PM   Result Value Ref Range    WBC 5.4 4.4 - 11.3 K/uL    RBC 4.08 (L) 4.50 - 5.90 M/uL    HGB 10.4 (L) 13.5 - 17.5 g/dL    HCT 32.8 (L) 41 - 53 %    MCV 80.4 80 - 100 FL    MCH 25.4 (L) 31 - 34 PG    MCHC 31.6 31.0 - 36.0 g/dL    RDW 15.7 (H) 11.5 - 14.5 %    PLATELET 111 K/uL    MPV 7.9 6.5 - 11.5 FL    NRBC 10.0  WBC    ABSOLUTE NRBC 0.00 K/uL    NEUTROPHILS 51 42 - 75 %    LYMPHOCYTES 31 20.5 - 51.1 %    MONOCYTES 11 (H) 1.7 - 9.3 %    EOSINOPHILS 6 (H) 0.9 - 2.9 %    BASOPHILS 1 0.0 - 2.5 %    ABS. NEUTROPHILS 2.7 1.8 - 7.7 K/UL    ABS. LYMPHOCYTES 1.7 1.0 - 4.8 K/UL    ABS. MONOCYTES 0.6 0.2 - 2.4 K/UL    ABS. EOSINOPHILS 0.3 0.0 - 0.7 K/UL    ABS. BASOPHILS 0.1 0.0 - 0.2 K/UL   PROTHROMBIN TIME + INR    Collection Time: 12/12/20  3:14 PM   Result Value Ref Range    Prothrombin time 10.4 9.0 - 11.1 sec    INR 1.1 0.9 - 1.1     METABOLIC PANEL, COMPREHENSIVE    Collection Time: 12/12/20  3:14 PM   Result Value Ref Range    Sodium 139 136 - 145 mmol/L    Potassium 3.4 (L) 3.5 - 5.1 mmol/L    Chloride 104 97 - 108 mmol/L    CO2 27 21 - 32 mmol/L    Anion gap 8 5 - 15 mmol/L    Glucose 113 (H) 65 - 100 mg/dL    BUN 17 6 - 20 mg/dL    Creatinine 1.10 0.70 - 1.30 mg/dL    BUN/Creatinine ratio 15 12 - 20      GFR est AA >60 >60 ml/min/1.73m2    GFR est non-AA >60 >60 ml/min/1.73m2    Calcium 8.6 8.5 - 10.1 mg/dL    Bilirubin, total 0.2 0.2 - 1.0 mg/dL    AST (SGOT) 19 15 - 37 U/L    ALT (SGPT) 38 12 - 78 U/L    Alk.  phosphatase 100 45 - 117 U/L    Protein, total 7.5 6.4 - 8.2 g/dL    Albumin 3.2 (L) 3.5 - 5.0 g/dL    Globulin 4.3 (H) 2.0 - 4.0 g/dL    A-G Ratio 0.7 (L) 1.1 - 2.2     TROPONIN I    Collection Time: 12/12/20  3:14 PM   Result Value Ref Range    Troponin-I, Qt. <0.05 <0.05 ng/mL   MAGNESIUM    Collection Time: 12/12/20  3:14 PM   Result Value Ref Range    Magnesium 1.7 1.6 - 2.4 mg/dL   BNP    Collection Time: 12/12/20  3:14 PM   Result Value Ref Range    NT pro- (H) <125 pg/mL   TROPONIN I    Collection Time: 12/12/20  5:25 PM   Result Value Ref Range    Troponin-I, Qt. <0.05 <0.05 ng/mL   TROPONIN I    Collection Time: 12/13/20  4:05 AM   Result Value Ref Range    Troponin-I, Qt. <0.05 <0.05 ng/mL   GLUCOSE, POC    Collection Time: 12/13/20  6:50 AM   Result Value Ref Range    Glucose (POC) 138 (H) 65 - 100 mg/dL    Performed by [facility]    METABOLIC PANEL, BASIC    Collection Time: 12/13/20  8:50 AM   Result Value Ref Range    Sodium 139 136 - 145 mmol/L    Potassium 3.5 3.5 - 5.1 mmol/L    Chloride 102 97 - 108 mmol/L    CO2 25 21 - 32 mmol/L    Anion gap 12 5 - 15 mmol/L    Glucose 153 (H) 65 - 100 mg/dL    BUN 13 6 - 20 mg/dL    Creatinine 0.85 0.70 - 1.30 mg/dL    BUN/Creatinine ratio 15 12 - 20      GFR est AA >60 >60 ml/min/1.73m2    GFR est non-AA >60 >60 ml/min/1.73m2    Calcium 9.4 8.5 - 10.1 mg/dL   CBC WITH AUTOMATED DIFF    Collection Time: 12/13/20  8:50 AM   Result Value Ref Range    WBC 4.8 4.4 - 11.3 K/uL    RBC 4.40 (L) 4.50 - 5.90 M/uL    HGB 11.1 (L) 13.5 - 17.5 g/dL    HCT 35.1 (L) 41 - 53 %    MCV 79.8 (L) 80 - 100 FL    MCH 25.1 (L) 31 - 34 PG    MCHC 31.5 31.0 - 36.0 g/dL    RDW 15.9 (H) 11.5 - 14.5 %    PLATELET 446 K/uL    MPV 7.6 6.5 - 11.5 FL    NRBC 10.0  WBC    ABSOLUTE NRBC 0.00 K/uL    NEUTROPHILS 53 42 - 75 %    LYMPHOCYTES 30 20.5 - 51.1 %    MONOCYTES 11 (H) 1.7 - 9.3 %    EOSINOPHILS 5 (H) 0.9 - 2.9 %    BASOPHILS 1 0.0 - 2.5 %    ABS. NEUTROPHILS 2.6 1.8 - 7.7 K/UL    ABS. LYMPHOCYTES 1.4 1.0 - 4.8 K/UL    ABS. MONOCYTES 0.5 0.2 - 2.4 K/UL    ABS. EOSINOPHILS 0.2 0.0 - 0.7 K/UL    ABS. BASOPHILS 0.1 0.0 - 0.2 K/UL   MAGNESIUM    Collection Time: 12/13/20  8:50 AM   Result Value Ref Range    Magnesium 1.8 1.6 - 2.4 mg/dL   TSH 3RD GENERATION    Collection Time: 12/13/20  8:50 AM   Result Value Ref Range    TSH 1.31 0.36 - 3.74 uIU/mL   TROPONIN I    Collection Time: 12/13/20  8:50 AM   Result Value Ref Range    Troponin-I, Qt. <0.05 <0.05 ng/mL   GLUCOSE, POC    Collection Time: 12/13/20 11:49 AM   Result Value Ref Range    Glucose (POC) 206 (H) 65 - 100 mg/dL    Performed by Marielle Shipman         Imaging:  Xr Chest Port    Result Date: 12/12/2020  IMPRESSION: Lordotic positioning.  Stable enlargement of cardiopericardial silhouette with tortuous aorta. Midline sternotomy wires appear intact. No evidence of pulmonary edema, air space pneumonia, or pleural effusion. No evidence of pneumothorax. Assessment//Plan           Problem List:  Hospital Problems  Never Reviewed          Codes Class Noted POA    * (Principal) Chest pain ICD-10-CM: R07.9  ICD-9-CM: 786.50  12/12/2020 Unknown        CAD (coronary artery disease) ICD-10-CM: I25.10  ICD-9-CM: 414.00  12/12/2020 Unknown        HTN (hypertension) ICD-10-CM: I10  ICD-9-CM: 401.9  12/12/2020 Unknown              Chest pain:  -Patient has history of previous coronary artery disease and CABG and MI and follows with Bebeto pinzon NP cardiology  -EKG denies any acute changes  -Troponins have all been negative a <t 0.05  -Early this morning around 4 AM telemedicine was notified about continued chest pain patient received several nitroglycerin sublingual with final resolution of pain. Patient also was noted at the time with elevated blood pressure. -BNP slightly elevated at 520  -We will get cardiology evaluation in a.m.  -Obtain TSH and lipid profile and A1c  -Repeat 2D echo in a.m.  -Continue aspirin 81 mg daily      Coronary artery disease  -She is a poor historian but noted to have history of MI and CABG  -Medications reconciled and only shows Lasix 40 mg and isosorbide and no beta-blockers noted  -Continue current home medications and follow-up recommendations by cardiology     Hypertension  -Blood pressure elevated at 170/80 on arrival  -Monitor every 4 hours  -Resume home medication of Lasix and isosorbide  -12/13: Blood pressure continues to be elevated this morning at 175/88 and home medications started but continues to be elevated at 163/89.   So we will start lisinopril 10 mg oral daily and uptitrate      Diabetes mellitus  -On Metformin and glipizide high-dose twice daily  -Placed on insulin sliding scale with Accu-Cheks before meals and at bedtime  -Restart Metformin 1000 mg oral twice daily but hold glipizide dosing to prevent hypoglycemia  -Obtain hemoglobin A1c    Hypomagnesemia:  -Slightly low 1.7 and will give magnesium oxide 400 mg oral twice daily  -Maintain magnesium greater than 2    Hypokalemia:  -Low at 3.4 and will add 20 mEq KCl oral twice daily  -Continue above 4  -Repeat labs in a.m.    BPH:  -Continue Flomax 0.4 mg oral at bedtime    GERD  -Continue home pantoprazole     Prophylaxis: SCD and Lovenox 40 mg subcu daily         Full code    Spent 30 minutes evaluting and coordinating patient care of which >50% was spent coordinating and counseling.      Electronically signed by Nayeli Aviles MD on 12/13/2020 at 12:21 PM

## 2020-12-13 NOTE — PROGRESS NOTES
[facility]      Patient developed 7/10 chest pain. He received 1 SLNTG and pain went down to a 4. Stat ECG was without ST changes. Plan:  Repeat Troponin. His last one was 10 hours ago. Symptomatic care for now. Will try  Morphine and Nitropaste.         Juancarlos Dowd MD

## 2020-12-13 NOTE — ROUTINE PROCESS
At 0400, patient c/o mid-sternal chest pain, non-radiating, 7/10, patient described as \"like a lump. \"  Notified Dr. Hernandez Gonzalez, new order for stat troponin, with MD to enter further orders. Per MD order, administered morphine and first dose of sublingual nitro at 0425. Total sublingual nitro x3, chest pain improved from 6/10 to 4/10. Notified Dr. Hernandez Gonzalez. New orders for stat EKG, morphine x1 dose and nitro paste and to call MD if pain persists 20 minutes after medications administered. Nitro paste and morphine administered at 0510, will con't to monitor.

## 2020-12-13 NOTE — PROGRESS NOTES
Problem: Falls - Risk of  Goal: *Absence of Falls  Description: Document Adrian Hilario Fall Risk and appropriate interventions in the flowsheet.   Outcome: Progressing Towards Goal  Note: Fall Risk Interventions:            Medication Interventions: Teach patient to arise slowly                   Problem: Patient Education: Go to Patient Education Activity  Goal: Patient/Family Education  Outcome: Progressing Towards Goal     Problem: Patient Education: Go to Patient Education Activity  Goal: Patient/Family Education  Outcome: Progressing Towards Goal     Problem: Unstable angina/NSTEMI: Day of Admission/Day 1  Goal: Activity/Safety  Outcome: Progressing Towards Goal  Goal: Diagnostic Test/Procedures  Outcome: Progressing Towards Goal  Goal: Nutrition/Diet  Outcome: Progressing Towards Goal  Goal: Discharge Planning  Outcome: Progressing Towards Goal  Goal: Treatments/Interventions/Procedures  Outcome: Progressing Towards Goal

## 2020-12-14 ENCOUNTER — APPOINTMENT (OUTPATIENT)
Dept: VASCULAR SURGERY | Age: 65
End: 2020-12-14
Attending: HOSPITALIST
Payer: MEDICARE

## 2020-12-14 VITALS
BODY MASS INDEX: 35.07 KG/M2 | DIASTOLIC BLOOD PRESSURE: 92 MMHG | WEIGHT: 245 LBS | HEART RATE: 79 BPM | HEIGHT: 70 IN | TEMPERATURE: 97.7 F | OXYGEN SATURATION: 95 % | SYSTOLIC BLOOD PRESSURE: 172 MMHG | RESPIRATION RATE: 20 BRPM

## 2020-12-14 LAB
ANION GAP SERPL CALC-SCNC: 10 MMOL/L (ref 5–15)
ATRIAL RATE: 75 BPM
ATRIAL RATE: 86 BPM
ATRIAL RATE: 86 BPM
BUN SERPL-MCNC: 18 MG/DL (ref 6–20)
BUN/CREAT SERPL: 17 (ref 12–20)
CA-I BLD-MCNC: 9 MG/DL (ref 8.5–10.1)
CALCULATED P AXIS, ECG09: 15 DEGREES
CALCULATED P AXIS, ECG09: 23 DEGREES
CALCULATED P AXIS, ECG09: 45 DEGREES
CALCULATED R AXIS, ECG10: 22 DEGREES
CALCULATED R AXIS, ECG10: 6 DEGREES
CALCULATED R AXIS, ECG10: 6 DEGREES
CALCULATED T AXIS, ECG11: 58 DEGREES
CALCULATED T AXIS, ECG11: 58 DEGREES
CALCULATED T AXIS, ECG11: 59 DEGREES
CHLORIDE SERPL-SCNC: 104 MMOL/L (ref 97–108)
CHOLEST SERPL-MCNC: 205 MG/DL
CO2 SERPL-SCNC: 25 MMOL/L (ref 21–32)
CREAT SERPL-MCNC: 1.06 MG/DL (ref 0.7–1.3)
DIAGNOSIS, 93000: NORMAL
ECHO AO ROOT DIAM: 4.2 CM
ECHO AV AREA PEAK VELOCITY: 2.03 CM2
ECHO AV AREA VTI: 2.48 CM2
ECHO AV AREA VTI: 2.52 CM2
ECHO AV AREA/BSA PEAK VELOCITY: 0.9 CM2/M2
ECHO AV MEAN GRADIENT: 11.99 MMHG
ECHO AV MEAN VELOCITY: 162.66 CM/S
ECHO AV PEAK GRADIENT: 21.48 MMHG
ECHO AV PEAK VELOCITY: 231.32 CM/S
ECHO AV VTI: 47.3 CM
ECHO LA VOL 2C: 59.91 ML (ref 18–58)
ECHO LA VOL 4C: 64.45 ML (ref 18–58)
ECHO LA VOL BP: 67.87 ML (ref 18–58)
ECHO LA VOL BP: 81.44 ML (ref 18–58)
ECHO LA VOLUME INDEX A2C: 26.33 ML/M2 (ref 16–28)
ECHO LA VOLUME INDEX A4C: 28.33 ML/M2 (ref 16–28)
ECHO LV EDV A2C: 124.73 ML
ECHO LV EDV BP: 99.28 ML (ref 67–155)
ECHO LV EDV INDEX BP: 43.6 ML/M2
ECHO LV EDV NDEX A2C: 54.8 ML/M2
ECHO LV EJECTION FRACTION A2C: 77 PERCENT
ECHO LV EJECTION FRACTION A4C: 50 PERCENT
ECHO LV EJECTION FRACTION BIPLANE: 65.8 PERCENT (ref 55–100)
ECHO LV ESV A2C: 22.76 ML
ECHO LV ESV BP: 33.99 ML (ref 22–58)
ECHO LV ESV INDEX A2C: 10 ML/M2
ECHO LV ESV INDEX BP: 14.9 ML/M2
ECHO LV INTERNAL DIMENSION DIASTOLIC: 5.12 CM (ref 4.2–5.9)
ECHO LV INTERNAL DIMENSION SYSTOLIC: 3.9 CM
ECHO LV IVSD: 1.56 CM (ref 0.6–1)
ECHO LV MASS 2D: 332.7 G (ref 88–224)
ECHO LV MASS INDEX 2D: 146.2 G/M2 (ref 49–115)
ECHO LV POSTERIOR WALL DIASTOLIC: 1.43 CM (ref 0.6–1)
ECHO LVOT DIAM: 2.62 CM
ECHO LVOT PEAK GRADIENT: 3.41 MMHG
ECHO LVOT PEAK VELOCITY: 92.37 CM/S
ECHO LVOT SV: 74.6 ML
ECHO LVOT SV: 74.6 ML
ECHO LVOT VTI: 22.17 CM
ECHO MV A VELOCITY: 122.22 CM/S
ECHO MV AREA PHT: 6.19 CM2
ECHO MV E DECELERATION TIME (DT): 122.49 MS
ECHO MV E VELOCITY: 66.19 CM/S
ECHO MV E/A RATIO: 0.54
ECHO MV PRESSURE HALF TIME (PHT): 35.52 MS
ECHO RA AREA 4C: 13 CM2
ECHO RV INTERNAL DIMENSION: 2.52 CM
GLUCOSE BLD STRIP.AUTO-MCNC: 125 MG/DL (ref 65–100)
GLUCOSE BLD STRIP.AUTO-MCNC: 150 MG/DL (ref 65–100)
GLUCOSE SERPL-MCNC: 162 MG/DL (ref 65–100)
HDLC SERPL-MCNC: 93 MG/DL
HDLC SERPL: 2.2 {RATIO} (ref 0–5)
LDLC SERPL CALC-MCNC: 97 MG/DL (ref 0–100)
LIPID PROFILE,FLP: ABNORMAL
LVOT MG: 1.98 MMHG
MAGNESIUM SERPL-MCNC: 2 MG/DL (ref 1.6–2.4)
P-R INTERVAL, ECG05: 147 MS
P-R INTERVAL, ECG05: 161 MS
P-R INTERVAL, ECG05: 166 MS
PERFORMED BY, TECHID: ABNORMAL
PERFORMED BY, TECHID: ABNORMAL
POTASSIUM SERPL-SCNC: 3.4 MMOL/L (ref 3.5–5.1)
Q-T INTERVAL, ECG07: 387 MS
Q-T INTERVAL, ECG07: 401 MS
Q-T INTERVAL, ECG07: 420 MS
QRS DURATION, ECG06: 101 MS
QRS DURATION, ECG06: 106 MS
QRS DURATION, ECG06: 99 MS
QTC CALCULATION (BEZET), ECG08: 463 MS
QTC CALCULATION (BEZET), ECG08: 472 MS
QTC CALCULATION (BEZET), ECG08: 473 MS
SODIUM SERPL-SCNC: 139 MMOL/L (ref 136–145)
TRIGL SERPL-MCNC: 75 MG/DL (ref ?–150)
VENTRICULAR RATE, ECG03: 76 BPM
VENTRICULAR RATE, ECG03: 83 BPM
VENTRICULAR RATE, ECG03: 86 BPM
VLDLC SERPL CALC-MCNC: 15 MG/DL

## 2020-12-14 PROCEDURE — 74011250637 HC RX REV CODE- 250/637: Performed by: PHYSICIAN ASSISTANT

## 2020-12-14 PROCEDURE — 83735 ASSAY OF MAGNESIUM: CPT

## 2020-12-14 PROCEDURE — 36415 COLL VENOUS BLD VENIPUNCTURE: CPT

## 2020-12-14 PROCEDURE — 93306 TTE W/DOPPLER COMPLETE: CPT

## 2020-12-14 PROCEDURE — 74011250637 HC RX REV CODE- 250/637: Performed by: HOSPITALIST

## 2020-12-14 PROCEDURE — 82962 GLUCOSE BLOOD TEST: CPT

## 2020-12-14 PROCEDURE — 74011250637 HC RX REV CODE- 250/637: Performed by: NURSE PRACTITIONER

## 2020-12-14 PROCEDURE — 99218 HC RM OBSERVATION: CPT

## 2020-12-14 PROCEDURE — 74011636637 HC RX REV CODE- 636/637: Performed by: PHYSICIAN ASSISTANT

## 2020-12-14 PROCEDURE — 80048 BASIC METABOLIC PNL TOTAL CA: CPT

## 2020-12-14 RX ORDER — POTASSIUM CHLORIDE 20 MEQ/1
20 TABLET, EXTENDED RELEASE ORAL DAILY
Qty: 30 TAB | Refills: 0 | Status: SHIPPED | OUTPATIENT
Start: 2020-12-14 | End: 2021-04-09 | Stop reason: ALTCHOICE

## 2020-12-14 RX ORDER — NIFEDIPINE 30 MG/1
30 TABLET, EXTENDED RELEASE ORAL DAILY
Status: DISCONTINUED | OUTPATIENT
Start: 2020-12-14 | End: 2020-12-14 | Stop reason: HOSPADM

## 2020-12-14 RX ORDER — LISINOPRIL 10 MG/1
10 TABLET ORAL DAILY
Status: DISCONTINUED | OUTPATIENT
Start: 2020-12-15 | End: 2020-12-14 | Stop reason: HOSPADM

## 2020-12-14 RX ORDER — METOPROLOL SUCCINATE 50 MG/1
50 TABLET, EXTENDED RELEASE ORAL
Qty: 60 TAB | Refills: 0 | Status: SHIPPED | OUTPATIENT
Start: 2020-12-14 | End: 2021-09-14 | Stop reason: ALTCHOICE

## 2020-12-14 RX ORDER — LISINOPRIL 20 MG/1
20 TABLET ORAL 2 TIMES DAILY
Status: DISCONTINUED | OUTPATIENT
Start: 2020-12-14 | End: 2020-12-14

## 2020-12-14 RX ORDER — LANOLIN ALCOHOL/MO/W.PET/CERES
400 CREAM (GRAM) TOPICAL DAILY
Qty: 30 TAB | Refills: 0 | Status: SHIPPED | OUTPATIENT
Start: 2020-12-14 | End: 2021-04-09 | Stop reason: ALTCHOICE

## 2020-12-14 RX ORDER — LISINOPRIL 20 MG/1
20 TABLET ORAL
Status: COMPLETED | OUTPATIENT
Start: 2020-12-14 | End: 2020-12-14

## 2020-12-14 RX ORDER — ASPIRIN 81 MG/1
81 TABLET ORAL DAILY
Qty: 30 TAB | Refills: 0 | Status: SHIPPED | OUTPATIENT
Start: 2020-12-15 | End: 2021-01-14

## 2020-12-14 RX ORDER — METOPROLOL SUCCINATE 50 MG/1
50 TABLET, EXTENDED RELEASE ORAL
Status: DISCONTINUED | OUTPATIENT
Start: 2020-12-14 | End: 2020-12-14 | Stop reason: HOSPADM

## 2020-12-14 RX ORDER — LISINOPRIL 10 MG/1
10 TABLET ORAL DAILY
Qty: 30 TAB | Refills: 0 | Status: SHIPPED | OUTPATIENT
Start: 2020-12-15 | End: 2021-01-14

## 2020-12-14 RX ORDER — NIFEDIPINE 30 MG/1
30 TABLET, EXTENDED RELEASE ORAL DAILY
Qty: 30 TAB | Refills: 0 | Status: SHIPPED | OUTPATIENT
Start: 2020-12-15 | End: 2022-06-30 | Stop reason: SDUPTHER

## 2020-12-14 RX ADMIN — METOPROLOL SUCCINATE 50 MG: 50 TABLET, EXTENDED RELEASE ORAL at 12:05

## 2020-12-14 RX ADMIN — PANTOPRAZOLE SODIUM 40 MG: 40 TABLET, DELAYED RELEASE ORAL at 08:45

## 2020-12-14 RX ADMIN — ASPIRIN 81 MG: 81 TABLET, COATED ORAL at 08:45

## 2020-12-14 RX ADMIN — LISINOPRIL 20 MG: 20 TABLET ORAL at 10:37

## 2020-12-14 RX ADMIN — FUROSEMIDE 40 MG: 40 TABLET ORAL at 08:45

## 2020-12-14 RX ADMIN — TRAZODONE HYDROCHLORIDE 50 MG: 50 TABLET ORAL at 08:45

## 2020-12-14 RX ADMIN — ISOSORBIDE MONONITRATE 60 MG: 60 TABLET, EXTENDED RELEASE ORAL at 08:45

## 2020-12-14 RX ADMIN — NIFEDIPINE 30 MG: 30 TABLET, EXTENDED RELEASE ORAL at 12:05

## 2020-12-14 RX ADMIN — INSULIN LISPRO 2 UNITS: 100 INJECTION, SOLUTION INTRAVENOUS; SUBCUTANEOUS at 08:44

## 2020-12-14 RX ADMIN — MAGNESIUM OXIDE 400 MG: 400 TABLET ORAL at 08:45

## 2020-12-14 RX ADMIN — METFORMIN HYDROCHLORIDE 1000 MG: 500 TABLET ORAL at 08:45

## 2020-12-14 RX ADMIN — ATORVASTATIN CALCIUM 20 MG: 20 TABLET, FILM COATED ORAL at 08:45

## 2020-12-14 RX ADMIN — POTASSIUM CHLORIDE 20 MEQ: 1500 TABLET, EXTENDED RELEASE ORAL at 08:45

## 2020-12-14 NOTE — PROGRESS NOTES
Problem: Falls - Risk of  Goal: *Absence of Falls  Description: Document Edmonia Morning Fall Risk and appropriate interventions in the flowsheet.   Outcome: Progressing Towards Goal  Note: Fall Risk Interventions:            Medication Interventions: Teach patient to arise slowly                   Problem: Patient Education: Go to Patient Education Activity  Goal: Patient/Family Education  Outcome: Progressing Towards Goal     Problem: Patient Education: Go to Patient Education Activity  Goal: Patient/Family Education  Outcome: Progressing Towards Goal     Problem: Unstable angina/NSTEMI: Day of Admission/Day 1  Goal: Activity/Safety  Outcome: Progressing Towards Goal  Goal: Diagnostic Test/Procedures  Outcome: Progressing Towards Goal  Goal: Nutrition/Diet  Outcome: Progressing Towards Goal  Goal: Discharge Planning  Outcome: Progressing Towards Goal  Goal: Treatments/Interventions/Procedures  Outcome: Progressing Towards Goal     Problem: Patient Education: Go to Patient Education Activity  Goal: Patient/Family Education  Outcome: Progressing Towards Goal

## 2020-12-14 NOTE — PROGRESS NOTES
Problem: Falls - Risk of  Goal: *Absence of Falls  Description: Document [place] Fall Risk and appropriate interventions in the flowsheet. Outcome: Progressing Towards Goal  Note: Fall Risk Interventions:            Medication Interventions: Teach patient to arise slowly                   Problem: Patient Education: Go to Patient Education Activity  Goal: Patient/Family Education  Outcome: Progressing Towards Goal     Problem: Patient Education: Go to Patient Education Activity  Goal: Patient/Family Education  Outcome: Progressing Towards Goal     Problem: Unstable angina/NSTEMI: Day of Admission/Day 1  Goal: Activity/Safety  Outcome: Progressing Towards Goal  Goal: Diagnostic Test/Procedures  Outcome: Progressing Towards Goal  Goal: Nutrition/Diet  Outcome: Progressing Towards Goal  Goal: Discharge Planning  Outcome: Progressing Towards Goal  Goal: Treatments/Interventions/Procedures  Outcome: Progressing Towards Goal     Problem: Unstable angina/NSTEMI: Day of Admission/Day 1  Goal: Activity/Safety  Outcome: Progressing Towards Goal  Goal: Diagnostic Test/Procedures  Outcome: Progressing Towards Goal  Goal: Nutrition/Diet  Outcome: Progressing Towards Goal  Goal: Discharge Planning  Outcome: Progressing Towards Goal  Goal: Treatments/Interventions/Procedures  Outcome: Progressing Towards Goal     Problem: Diabetes Self-Management  Goal: *Disease process and treatment process  Description: Define diabetes and identify own type of diabetes; list 3 options for treating diabetes. Outcome: Progressing Towards Goal  Goal: *Incorporating nutritional management into lifestyle  Description: Describe effect of type, amount and timing of food on blood glucose; list 3 methods for planning meals. Outcome: Progressing Towards Goal  Goal: *Incorporating physical activity into lifestyle  Description: State effect of exercise on blood glucose levels.   Outcome: Progressing Towards Goal  Goal: *Developing strategies to promote health/change behavior  Description: Define the ABC's of diabetes; identify appropriate screenings, schedule and personal plan for screenings. Outcome: Progressing Towards Goal  Goal: *Using medications safely  Description: State effect of diabetes medications on diabetes; name diabetes medication taking, action and side effects. Outcome: Progressing Towards Goal  Goal: *Monitoring blood glucose, interpreting and using results  Description: Identify recommended blood glucose targets  and personal targets. Outcome: Progressing Towards Goal  Goal: *Prevention, detection, treatment of acute complications  Description: List symptoms of hyper- and hypoglycemia; describe how to treat low blood sugar and actions for lowering  high blood glucose level. Outcome: Progressing Towards Goal  Goal: *Prevention, detection and treatment of chronic complications  Description: Define the natural course of diabetes and describe the relationship of blood glucose levels to long term complications of diabetes.   Outcome: Progressing Towards Goal  Goal: *Developing strategies to address psychosocial issues  Description: Describe feelings about living with diabetes; identify support needed and support network  Outcome: Progressing Towards Goal  Goal: *Insulin pump training  Outcome: Progressing Towards Goal  Goal: *Sick day guidelines  Outcome: Progressing Towards Goal  Goal: *Patient Specific Goal (EDIT GOAL, INSERT TEXT)  Outcome: Progressing Towards Goal     Problem: Patient Education: Go to Patient Education Activity  Goal: Patient/Family Education  Outcome: Progressing Towards Goal

## 2020-12-14 NOTE — CONSULTS
CONSULTATION    REASON FOR CONSULT:  Chest Pain in established [address] Patient    REQUESTING PROVIDER:  Dr. Saintclair Jacobus [facility] HM Team)    CHIEF COMPLAINT:    Chief Complaint   Patient presents with    Chest Pain         HISTORY OF PRESENT ILLNESS:  Angel Alicia is a 77year-old male with past medical history significant for HTN, MI, CAD s/p CABG 2018, DM, GI hemorrhage, and HLD who presented to ED for evaluation of Chest Pain. Patient reported pain started approximately 2 hours prior to arrival to ED mid- sternal burning in nature nonradiating with associated Nausea, SOB, and dizziness with no specific aggravating or alleviating factors that spontaneously resolved on its own. Patient noted to have significantly elevated BP in ED, but otherwise ischemic evaluation was negative. He has ruled out for ACS. He was last seen in office 11/18/2020 and had elevated BP 170s/100s that day and Imdur was increased to 60mg daily. This am patient is agitated and wants to go home. Given attitude and demeanor this am towards providers and medical regimen question compliance with medications @ home. He has no complaints this am.     Records from hospital admission course thus far reviewed. Telemetry Review: SR with occasional PAC      PAST MEDICAL HISTORY:    Past Medical History:   Diagnosis Date    CAD (coronary artery disease)     MI (myocardial infarction) ([facility] [address]          HOME MEDICATIONS:    Prior to Admission Medications   Prescriptions Last Dose Informant Patient Reported? Taking?   atorvastatin (LIPITOR) 20 mg tablet   Yes Yes   Sig: Take 20 mg by mouth daily. furosemide (Lasix) 40 mg tablet   Yes Yes   Sig: Take 40 mg by mouth daily. glipiZIDE (GLUCOTROL) 10 mg tablet   Yes Yes   Sig: Take 10 mg by mouth two (2) times a day. isosorbide mononitrate ER (IMDUR) 60 mg CR tablet   Yes Yes   Sig: Take 60 mg by mouth daily.    metFORMIN (GLUCOPHAGE) 1,000 mg tablet   Yes Yes   Sig: Take 1,000 mg by mouth two (2) times daily (with meals). pantoprazole (Protonix) 40 mg tablet   Yes Yes   Sig: Take 40 mg by mouth daily. tamsulosin (Flomax) 0.4 mg capsule   Yes Yes   Sig: Take 0.4 mg by mouth nightly. traZODone (DESYREL) 50 mg tablet   Yes Yes   Sig: Take 50 mg by mouth two (2) times a day. Facility-Administered Medications: None         ALLERGIES:  No Known Allergies      FAMILY HISTORY:    Family History   Problem Relation Age of Onset    Hypertension Mother     Hypertension Father          SOCIAL HISTORY:    Tobacco: denies  Drugs: denies  ETOH: denies      REVIEW OF SYSTEMS:  Complete review of systems performed, pertinents noted above, all other systems are negative. Patient Vitals for the past 24 hrs:   Temp Pulse Resp BP SpO2   12/14/20 0930    (!) 170/74    12/14/20 0821 98.4 °F (36.9 °C) 85 20 (!) 191/92 95 %   12/14/20 0537 98.2 °F (36.8 °C) 81 20 (!) 168/89 91 %   12/14/20 0400  67      12/14/20 0050 98.5 °F (36.9 °C) 87 20 (!) 163/88 95 %   12/14/20 0000  65      12/13/20 2000  83      12/13/20 1958 97.2 °F (36.2 °C) 92 18 (!) 172/93 92 %   12/13/20 1618    (!) 166/89    12/13/20 1555 97.5 °F (36.4 °C) 79 18 (!) 206/100 95 %   12/13/20 1538 97.5 °F (36.4 °C) 81 18 (!) 195/103 97 %   12/13/20 1146 97 °F (36.1 °C) 81 18 (!) 163/89 95 %       PHYSICAL EXAMINATION:    General: Well nourished, NAD, A&O  HEENT: Normocephalic, PERRL, no drainage, glasses on. Neck: Supple, Trachea midline, No JVD  RESP: CTA bilaterally with symmetrical chest movement. No SOB or distress. On RA. Cardiovascular: RRR no MRG  PVS: No rubor, cyanosis, no edema, Radial, DP, PT pulses equal bilaterally  ABD: obese , soft NT, Normoactive BS  Derm: Warm/Dry/Intact with no lesions, normal turgor  Neuro: A&O PPTS, cranial nerves II- XII grossly intact via interaction with patient. No focal deficits  PSYCH: In appropriate affect/reaction to circumstances. Agitated.        Electrocardiogram performed earlier reviewed, it shows SR, LVH, PAC, no acute ischemia noted    Recent labs results and imaging reviewed.       Recent Results (from the past 24 hour(s))   GLUCOSE, POC    Collection Time: 12/13/20 11:49 AM   Result Value Ref Range    Glucose (POC) 206 (H) 65 - 100 mg/dL    Performed by [facility], POC    Collection Time: 12/13/20  3:43 PM   Result Value Ref Range    Glucose (POC) 177 (H) 65 - 100 mg/dL    Performed by Juan Carlos Almaguer    TROPONIN I    Collection Time: 12/13/20  4:00 PM   Result Value Ref Range    Troponin-I, Qt. <0.05 <0.05 ng/mL   GLUCOSE, POC    Collection Time: 12/13/20  9:13 PM   Result Value Ref Range    Glucose (POC) 169 (H) 65 - 100 mg/dL    Performed by Barbara Mir, POC    Collection Time: 12/14/20  8:17 AM   Result Value Ref Range    Glucose (POC) 150 (H) 65 - 100 mg/dL    Performed by Cassie Pabon    ECHO ADULT COMPLETE    Collection Time: 12/14/20  8:59 AM   Result Value Ref Range    LV ED Vol A2C 124.73 mL    IVSd 1.56 (A) 0.6 - 1.0 cm    LVIDd 5.12 4.2 - 5.9 cm    LVIDs 3.90 cm    LVOT d 2.62 cm    LVPWd 1.43 (A) 0.6 - 1.0 cm    LVOT SV 74.6 mL    LVOT SV 74.6 mL    BP EF 65.8 55 - 100 percent    LV Ejection Fraction MOD 2C 77 percent    LV Ejection Fraction MOD 4C 50 percent    LV ED Vol BP 99.28 67 - 155 mL    LV ES Vol A2C 22.76 mL    LV ES Vol BP 33.99 22 - 58 mL    LVOT Peak Gradient 3.41 mmHg    Left Ventricular Outflow Tract Mean Gradient 1.98 mmHg    LVOT Peak Velocity 92.37 cm/s    LVOT VTI 22.17 cm    RVIDd 2.52 cm    LA Volume 67.87 18 - 58 mL    LA Volume 81.44 18 - 58 mL    LA Vol 2C 59.91 (A) 18 - 58 mL    LA Vol 4C 64.45 (A) 18 - 58 mL    Aortic Valve Area by Continuity of Peak Velocity 2.03 cm2    Aortic Valve Area by Continuity of VTI 2.52 cm2    Aortic Valve Area by Continuity of VTI 2.48 cm2    AoV PG 21.48 mmHg    Aortic Valve Systolic Mean Gradient 45.80 mmHg    Aortic Valve Systolic Peak Velocity 244.92 cm/s    Aortic valve mean velocity 162.66 cm/s    AoV VTI 47.30 cm    MV A Jos 122.22 cm/s    Mitral Valve E Wave Deceleration Time 122.49 ms    MV E Jos 66.19 cm/s    MV E/A 0.54     Mitral Valve Pressure Half-time 35.52 ms    MVA (PHT) 6.19 cm2    Ao Root D 4.20 cm    LV Mass .7 88 - 224 g    LV Mass AL Index 146.2 49 - 115 g/m2    Right Atrial Area 4C 13.0 cm2    LVES Vol Index BP 14.9 mL/m2    LVED Vol Index BP 43.6 mL/m2    LA Vol Index 26.33 16 - 28 ml/m2    LA Vol Index 28.33 16 - 28 ml/m2    LVED Vol Index A2C 54.8 mL/m2    LVES Vol Index A2C 10.0 mL/m2    MIRNA/BSA Pk Jos 0.9 RE7/K3   METABOLIC PANEL, BASIC    Collection Time: 12/14/20 10:25 AM   Result Value Ref Range    Sodium 139 136 - 145 mmol/L    Potassium 3.4 (L) 3.5 - 5.1 mmol/L    Chloride 104 97 - 108 mmol/L    CO2 25 21 - 32 mmol/L    Anion gap 10 5 - 15 mmol/L    Glucose 162 (H) 65 - 100 mg/dL    BUN 18 6 - 20 mg/dL    Creatinine 1.06 0.70 - 1.30 mg/dL    BUN/Creatinine ratio 17 12 - 20      GFR est AA >60 >60 ml/min/1.73m2    GFR est non-AA >60 >60 ml/min/1.73m2    Calcium 9.0 8.5 - 10.1 mg/dL   MAGNESIUM    Collection Time: 12/14/20 10:25 AM   Result Value Ref Range    Magnesium 2.0 1.6 - 2.4 mg/dL       XR Results (maximum last 3): Results from [place] encounter on 12/12/20   XR CHEST PORT    Impression IMPRESSION: Lordotic positioning. Stable enlargement of cardiopericardial  silhouette with tortuous aorta. Midline sternotomy wires appear intact. No  evidence of pulmonary edema, air space pneumonia, or pleural effusion. No  evidence of pneumothorax.            Current Facility-Administered Medications:     lisinopriL (PRINIVIL, ZESTRIL) tablet 20 mg, 20 mg, Oral, BID, Ty Childress MD    atorvastatin (LIPITOR) tablet 20 mg, 20 mg, Oral, DAILY, [place] Papa, PA, 20 mg at 12/14/20 0845    furosemide (LASIX) tablet 40 mg, 40 mg, Oral, DAILY, Daphney Papa, PA, 40 mg at 12/14/20 0845    isosorbide mononitrate ER (IMDUR) tablet 60 mg, 60 mg, Oral, DAILY, ALEX Tineo, 60 mg at 12/14/20 0845    [Held by provider] glipiZIDE (GLUCOTROL) tablet 10 mg, 10 mg, Oral, BID, ALEX Tineo    pantoprazole (PROTONIX) tablet 40 mg, 40 mg, Oral, DAILY, Diane Martell, [address], 40 mg at 12/14/20 0845    tamsulosin (FLOMAX) capsule 0.4 mg, 0.4 mg, Oral, QHS, ALEX Tineo, 0.4 mg at 12/13/20 2136    traZODone (DESYREL) tablet 50 mg, 50 mg, Oral, BID, ALEX Tineo, 50 mg at 12/14/20 0845    nitroglycerin (NITROSTAT) tablet 0.4 mg, 0.4 mg, SubLINGual, PRN, Ludmila Barrett MD, 0.4 mg at 12/13/20 0435    metFORMIN (GLUCOPHAGE) tablet 1,000 mg, 1,000 mg, Oral, BID WITH MEALS, Ty Childress MD, 1,000 mg at 12/14/20 0845    magnesium oxide (MAG-OX) tablet 400 mg, 400 mg, Oral, BID, Ty Childress MD, 400 mg at 12/14/20 0845    potassium chloride (K-DUR, KLOR-CON) SR tablet 20 mEq, 20 mEq, Oral, BID, Ty Childress MD, 20 mEq at 12/14/20 0845    aspirin delayed-release tablet 81 mg, 81 mg, Oral, DAILY, Ty Childress MD, 81 mg at 12/14/20 0845    hydrALAZINE (APRESOLINE) 20 mg/mL injection 10 mg, 10 mg, IntraVENous, Q6H PRN, ALEX Crawford, 10 mg at 12/13/20 1556    glucose chewable tablet 16 g, 4 Tab, Oral, PRN, ALEX Tineo    dextrose (D50W) injection syrg 12.5-25 g, 25-50 mL, IntraVENous, PRN, ALEX Crawford    glucagon (GLUCAGEN) injection 1 mg, 1 mg, IntraMUSCular, PRN, ALEX Tineo    insulin lispro (HUMALOG) injection, , SubCUTAneous, AC&HS, Diane Martell, [address], 2 Units at 12/14/20 9297          Case discussed with collaborating physician Dr. Jaspal Palomino and our impression and recommendations are as follows:   1. Chest Pain - ACS has been ruled by EKG and Troponin criteria. Likely related to elevated BP. Would continue Lasix 40mg PO daily, Imdur 60mg PO daily, and restart home Metoprolol Succinate 50mg PO BID. Will add Nifedipine 30mg PO daily to regimen.  Can continue Lisinopril started by [facility] team, but @ 10mg PO daily. Patient to keep appointment this Wednesday 12/16/2020 @ 12pm with Dr. Jim Swain in [facility]. 2. HTN - BP above goal. Plan as above. 3. HLD - continue statin dosing. 4. CAD - recent NST negative. Continue ASA/BB/Statin. Thank you for involving us in the care of this patient. Please do not hesitate to call if additional questions arise.  If after hours please call [phone]

## 2020-12-14 NOTE — DISCHARGE INSTRUCTIONS
Patient Education        Chest Pain: Care Instructions  Your Care Instructions     There are many things that can cause chest pain. Some are not serious and will get better on their own in a few days. But some kinds of chest pain need more testing and treatment. Your doctor may have recommended a follow-up visit in the next 8 to 12 hours. If you are not getting better, you may need more tests or treatment. Even though your doctor has released you, you still need to watch for any problems. The doctor carefully checked you, but sometimes problems can develop later. If you have new symptoms or if your symptoms do not get better, get medical care right away. If you have worse or different chest pain or pressure that lasts more than 5 minutes or you passed out (lost consciousness), call 911 or seek other emergency help right away. A medical visit is only one step in your treatment. Even if you feel better, you still need to do what your doctor recommends, such as going to all suggested follow-up appointments and taking medicines exactly as directed. This will help you recover and help prevent future problems. How can you care for yourself at home? · Rest until you feel better. · Take your medicine exactly as prescribed. Call your doctor if you think you are having a problem with your medicine. · Do not drive after taking a prescription pain medicine. When should you call for help? Call 911 if:     · You passed out (lost consciousness).     · You have severe difficulty breathing.     · You have symptoms of a heart attack. These may include:  ? Chest pain or pressure, or a strange feeling in your chest.  ? Sweating. ? Shortness of breath. ? Nausea or vomiting. ? Pain, pressure, or a strange feeling in your back, neck, jaw, or upper belly or in one or both shoulders or arms. ? Lightheadedness or sudden weakness. ? A fast or irregular heartbeat.   After you call 911, the  may tell you to chew 1 adult-strength or 2 to 4 low-dose aspirin. Wait for an ambulance. Do not try to drive yourself. Call your doctor today if:     · You have any trouble breathing.     · Your chest pain gets worse.     · You are dizzy or lightheaded, or you feel like you may faint.     · You are not getting better as expected.     · You are having new or different chest pain. Where can you learn more? Go to http://keon-luis.info/  Enter A120 in the search box to learn more about \"Chest Pain: Care Instructions. \"  Current as of: June 26, 2019               Content Version: 12.6  © 0776-7794 [employer]. Care instructions adapted under license by [employer] (which disclaims liability or warranty for this information). If you have questions about a medical condition or this instruction, always ask your healthcare professional. [address] any warranty or liability for your use of this information. Patient Education        High Blood Pressure: Care Instructions  Overview     It's normal for blood pressure to go up and down throughout the day. But if it stays up, you have high blood pressure. Another name for high blood pressure is hypertension. Despite what a lot of people think, high blood pressure usually doesn't cause headaches or make you feel dizzy or lightheaded. It usually has no symptoms. But it does increase your risk of stroke, heart attack, and other problems. You and your doctor will talk about your risks of these problems based on your blood pressure. Your doctor will give you a goal for your blood pressure. Your goal will be based on your health and your age. Lifestyle changes, such as eating healthy and being active, are always important to help lower blood pressure. You might also take medicine to reach your blood pressure goal.  Follow-up care is a key part of your treatment and safety.  Be sure to make and go to all appointments, and call your doctor if you are having problems. It's also a good idea to know your test results and keep a list of the medicines you take. How can you care for yourself at home? Medical treatment  · If you stop taking your medicine, your blood pressure will go back up. You may take one or more types of medicine to lower your blood pressure. Be safe with medicines. Take your medicine exactly as prescribed. Call your doctor if you think you are having a problem with your medicine. · Talk to your doctor before you start taking aspirin every day. Aspirin can help certain people lower their risk of a heart attack or stroke. But taking aspirin isn't right for everyone, because it can cause serious bleeding. · See your doctor regularly. You may need to see the doctor more often at first or until your blood pressure comes down. · If you are taking blood pressure medicine, talk to your doctor before you take decongestants or anti-inflammatory medicine, such as ibuprofen. Some of these medicines can raise blood pressure. · Learn how to check your blood pressure at home. Lifestyle changes  · Stay at a healthy weight. This is especially important if you put on weight around the waist. Losing even 10 pounds can help you lower your blood pressure. · If your doctor recommends it, get more exercise. Walking is a good choice. Bit by bit, increase the amount you walk every day. Try for at least 30 minutes on most days of the week. You also may want to swim, bike, or do other activities. · Avoid or limit alcohol. Talk to your doctor about whether you can drink any alcohol. · Try to limit how much sodium you eat to less than 2,300 milligrams (mg) a day. Your doctor may ask you to try to eat less than 1,500 mg a day. · Eat plenty of fruits (such as bananas and oranges), vegetables, legumes, whole grains, and low-fat dairy products. · Lower the amount of saturated fat in your diet.  Saturated fat is found in animal products such as milk, cheese, and meat. Limiting these foods may help you lose weight and also lower your risk for heart disease. · Do not smoke. Smoking increases your risk for heart attack and stroke. If you need help quitting, talk to your doctor about stop-smoking programs and medicines. These can increase your chances of quitting for good. When should you call for help? Call  911 anytime you think you may need emergency care. This may mean having symptoms that suggest that your blood pressure is causing a serious heart or blood vessel problem. Your blood pressure may be over 180/120. For example, call 911 if:    · You have symptoms of a heart attack. These may include:  ? Chest pain or pressure, or a strange feeling in the chest.  ? Sweating. ? Shortness of breath. ? Nausea or vomiting. ? Pain, pressure, or a strange feeling in the back, neck, jaw, or upper belly or in one or both shoulders or arms. ? Lightheadedness or sudden weakness. ? A fast or irregular heartbeat.     · You have symptoms of a stroke. These may include:  ? Sudden numbness, tingling, weakness, or loss of movement in your face, arm, or leg, especially on only one side of your body. ? Sudden vision changes. ? Sudden trouble speaking. ? Sudden confusion or trouble understanding simple statements. ? Sudden problems with walking or balance. ? A sudden, severe headache that is different from past headaches.     · You have severe back or belly pain. Do not wait until your blood pressure comes down on its own. Get help right away. Call your doctor now or seek immediate care if:    · Your blood pressure is much higher than normal (such as 180/120 or higher), but you don't have symptoms.     · You think high blood pressure is causing symptoms, such as:  ? Severe headache.  ? Blurry vision.    Watch closely for changes in your health, and be sure to contact your doctor if:    · Your blood pressure measures higher than your doctor recommends at least 2 times. That means the top number is higher or the bottom number is higher, or both.     · You think you may be having side effects from your blood pressure medicine. Where can you learn more? Go to http://www.gray.com/  Enter [id number] in the search box to learn more about \"High Blood Pressure: Care Instructions. \"  Current as of: December 16, 2019               Content Version: 12.6  © 5210-9007 [employer]. Care instructions adapted under license by [employer] (which disclaims liability or warranty for this information). If you have questions about a medical condition or this instruction, always ask your healthcare professional. [address] any warranty or liability for your use of this information. Patient Education        Low Sodium Diet (2,000 Milligram): Care Instructions  Your Care Instructions     Too much sodium causes your body to hold on to extra water. This can raise your blood pressure and force your heart and kidneys to work harder. In very serious cases, this could cause you to be put in the hospital. It might even be life-threatening. By limiting sodium, you will feel better and lower your risk of serious problems. The most common source of sodium is salt. People get most of the salt in their diet from canned, prepared, and packaged foods. Fast food and restaurant meals also are very high in sodium. Your doctor will probably limit your sodium to less than 2,000 milligrams (mg) a day. This limit counts all the sodium in prepared and packaged foods and any salt you add to your food. Follow-up care is a key part of your treatment and safety. Be sure to make and go to all appointments, and call your doctor if you are having problems. It's also a good idea to know your test results and keep a list of the medicines you take. How can you care for yourself at home?   Read food labels  · Read labels on cans and food packages. The labels tell you how much sodium is in each serving. Make sure that you look at the serving size. If you eat more than the serving size, you have eaten more sodium. · Food labels also tell you the Percent Daily Value for sodium. Choose products with low Percent Daily Values for sodium. · Be aware that sodium can come in forms other than salt, including monosodium glutamate (MSG), sodium citrate, and sodium bicarbonate (baking soda). MSG is often added to Asian food. When you eat out, you can sometimes ask for food without MSG or added salt. Buy low-sodium foods  · Buy foods that are labeled \"unsalted\" (no salt added), \"sodium-free\" (less than 5 mg of sodium per serving), or \"low-sodium\" (less than 140 mg of sodium per serving). Foods labeled \"reduced-sodium\" and \"light sodium\" may still have too much sodium. Be sure to read the label to see how much sodium you are getting. · Buy fresh vegetables, or frozen vegetables without added sauces. Buy low-sodium versions of canned vegetables, soups, and other canned goods. Prepare low-sodium meals  · Cut back on the amount of salt you use in cooking. This will help you adjust to the taste. Do not add salt after cooking. One teaspoon of salt has about 2,300 mg of sodium. · Take the salt shaker off the table. · Flavor your food with garlic, lemon juice, onion, vinegar, herbs, and spices. Do not use soy sauce, lite soy sauce, steak sauce, onion salt, garlic salt, celery salt, mustard, or ketchup on your food. · Use low-sodium salad dressings, sauces, and ketchup. Or make your own salad dressings and sauces without adding salt. · Use less salt (or none) when recipes call for it. You can often use half the salt a recipe calls for without losing flavor. Other foods such as rice, pasta, and grains do not need added salt. · Rinse canned vegetables, and cook them in fresh water. This removes some--but not all--of the salt.   · Avoid water that is naturally high in sodium or that has been treated with water softeners, which add sodium. Call your local water company to find out the sodium content of your water supply. If you buy bottled water, read the label and choose a sodium-free brand. Avoid high-sodium foods  · Avoid eating:  ? Smoked, cured, salted, and canned meat, fish, and poultry. ? Ham, bhatia, hot dogs, and luncheon meats. ? Regular, hard, and processed cheese and regular peanut butter. ? Crackers with salted tops, and other salted snack foods such as pretzels, chips, and salted popcorn. ? Frozen prepared meals, unless labeled low-sodium. ? Canned and dried soups, broths, and bouillon, unless labeled sodium-free or low-sodium. ? Canned vegetables, unless labeled sodium-free or low-sodium. ? Western Jana fries, pizza, tacos, and other fast foods. ? Pickles, olives, ketchup, and other condiments, especially soy sauce, unless labeled sodium-free or low-sodium. Where can you learn more? Go to http://www.gray.com/  Enter V843 in the search box to learn more about \"Low Sodium Diet (2,000 Milligram): Care Instructions. \"  Current as of: August 22, 2019               Content Version: 12.6  © 0987-2524 [employer]. Care instructions adapted under license by [employer] (which disclaims liability or warranty for this information). If you have questions about a medical condition or this instruction, always ask your healthcare professional. [place] [address] any warranty or liability for your use of this information.

## 2020-12-14 NOTE — ROUTINE PROCESS
Pt alert and oriented x 3. Discharge instructions reviewed and uderstood. Iv removed, no redness or swelling noted. Tele also removed. No c/o pain or discomfort. Discharged home no services.

## 2020-12-14 NOTE — DISCHARGE SUMMARY
Physician Discharge Summary       Patient: Pete Rubio MRN: [id number]     YOB: 1955  Age: 77 y.o. Sex: male    PCP: None    Allergies: Patient has no known allergies. Admit date: 12/12/2020  Admitting Provider: Roma Mcadams MD    Discharge date: 12/14/2020  Discharging Provider: Roma Mcadams MD    * Admission Diagnoses:   Chest pain [R07.9]      * Discharge Diagnoses:    Hospital Problems as of 12/14/2020 Never Reviewed          Codes Class Noted - Resolved POA    * (Principal) Chest pain ICD-10-CM: R07.9  ICD-9-CM: 786.50  12/12/2020 - Present Unknown        CAD (coronary artery disease) ICD-10-CM: I25.10  ICD-9-CM: 414.00  12/12/2020 - Present Unknown        HTN (hypertension) ICD-10-CM: I10  ICD-9-CM: 401.9  12/12/2020 - Present Unknown                * Hospital Course:   Minor Poser a 77 y. o. male  has a past medical history of CAD (coronary artery disease) and MI (myocardial infarction) ([facility] [address]  follows with Mohan Carrasquillo NP and follows with pcp he states in same clinic but doesn't remember the name.     Patient has an extensive cardiac and coronary artery disease history presented to emergency department with chest pain started 2 hours prior to arrival and was normal described like a burning sensation heartburn with some nausea and dizziness in addition to shortness of breath.   Was evaluated in his room reporting that pain had subsided somewhat now but based upon patient's risk factors would be appropriate to admit for observation to rule out myocardial infarction.       Chest pain:  -Patient has history of previous coronary artery disease and CABG and MI and follows with Beverly pinzon NP cardiology  -EKG denies any acute changes  -Troponins have all been negative a <t 0.05  -Early this morning around 4 AM telemedicine was notified about continued chest pain patient received several nitroglycerin sublingual with final resolution of pain.   Patient also was noted at the time with elevated blood pressure. -BNP slightly elevated at 520  -Cardiology evaluation appreciated and patient has follow up already scheduled on 12/16/2020 at 12pm with Dr. Vazquez Montes   -TSH in normal range. a1c and lipid panel is pending   - 2D echo preserved and mild aortic stenosis noted.  offical report pending.   -Continue aspirin 81 mg daily        Coronary artery disease  -She is a poor historian but noted to have history of MI and CABG  -Medications reconciled and only shows Lasix 40 mg and isosorbide and no beta-blockers noted  -Continue current home medications and follow-up recommendations by cardiology     Hypertension  -Blood pressure elevated at 170/80 on arrival  -Monitor every 4 hours  -Resume home medication of Lasix and isosorbide  -1 cardiology evaluated patient and patient was noted to previously be on metoprolol succinate 50 mg oral twice daily and will recommend to restart that continue Lasix 40 mg and current isosorbide 60 mg dose and to start lisinopril 10 mg and nifedipine of 30 mg and will arrange for home health to monitor closely        Diabetes mellitus  -On Metformin and glipizide high-dose twice daily  -Placed on insulin sliding scale with Accu-Cheks before meals and at bedtime  -Restart Metformin 1000 mg oral twice daily but hold glipizide dosing to prevent hypoglycemia  -Obtain hemoglobin A1c still pending  -Discharge will continue patient on his home dose of Metformin but add low normal glucose levels on Accu-Cheks so we will hold and discontinue glipizide on discharge     Hypomagnesemia:  -Slightly low 1.7 and will give magnesium oxide 400 mg oral twice daily  -Maintain magnesium greater than 2  -Discharge will continue patient on magnesium oxide 400 mg oral daily     Hypokalemia:  -Low at 3.4 and will add 20 mEq KCl oral twice daily  -Repeat potassium on 12/14 low at 3.4 and will continue 20 mEq oral KCl daily     BPH:  -Continue Flomax 0.4 mg oral at bedtime     GERD  -Continue home pantoprazole    * Procedures:   * No surgery found *        Consults:   Cardiology:  Case discussed with collaborating physician Dr. Jonel Nunez and our impression and recommendations are as follows:   1. Chest Pain - ACS has been ruled by EKG and Troponin criteria. Likely related to elevated BP. Would continue Lasix 40mg PO daily, Imdur 60mg PO daily, and restart home Metoprolol Succinate 50mg PO BID. Will add Nifedipine 30mg PO daily to regimen. Can continue Lisinopril started by  team, but @ 10mg PO daily. Patient to keep appointment this Wednesday 12/16/2020 @ 12pm with Dr. Jonel Nunez in [facility]. 2. HTN - BP above goal. Plan as above. 3. HLD - continue statin dosing. 4. CAD - recent NST negative. Continue ASA/BB/Statin.      Thank you for involving us in the care of this patient. Please do not hesitate to call if additional questions arise. If after hours please call [phone]       Vitals Last 24 Hours:  Patient Vitals for the past 24 hrs:   Temp Pulse Resp BP SpO2   12/14/20 1140 97.7 °F (36.5 °C) 79 20 (!) 172/92 95 %   12/14/20 0930    (!) 170/74    12/14/20 0821 98.4 °F (36.9 °C) 85 20 (!) 191/92 95 %   12/14/20 0537 98.2 °F (36.8 °C) 81 20 (!) 168/89 91 %   12/14/20 0400  67      12/14/20 0050 98.5 °F (36.9 °C) 87 20 (!) 163/88 95 %   12/14/20 0000  65      12/13/20 2000  83      12/13/20 1958 97.2 °F (36.2 °C) 92 18 (!) 172/93 92 %   12/13/20 1618    (!) 166/89    12/13/20 1555 97.5 °F (36.4 °C) 79 18 (!) 206/100 95 %   12/13/20 1538 97.5 °F (36.4 °C) 81 18 (!) 195/103 97 %        Discharge Exam:  General: Alert, cooperative, no distress, appears stated age. Head:  Normocephalic, without obvious abnormality, atraumatic. Eyes:  Conjunctivae/corneas clear. Pupils equal, round, reactive to light. Extraocular movements intact. Lungs:  Clear to auscultation bilaterally. no wheeze, rales, crackles, rhonchi   Chest wall: No tenderness or deformity. Heart:  Regular rate and rhythm, S1, S2 normal, no murmur, click, rub or gallop. Abdomen:  Soft, non-tender. Bowel sounds normal. No masses,  No organomegaly. Extremities: Extremities normal, atraumatic, no cyanosis or edema. Pulses: 2+ and symmetric all extremities. Skin: Skin color, texture, turgor normal. No rashes or lesions  Neurologic: Awake, Alert, oriented.  No obvious gross sensory or motor deficits    Labs:  Recent Results (from the past 24 hour(s))   GLUCOSE, POC    Collection Time: 12/13/20  3:43 PM   Result Value Ref Range    Glucose (POC) 177 (H) 65 - 100 mg/dL    Performed by [facility]    TROPONIN I    Collection Time: 12/13/20  4:00 PM   Result Value Ref Range    Troponin-I, Qt. <0.05 <0.05 ng/mL   GLUCOSE, POC    Collection Time: 12/13/20  9:13 PM   Result Value Ref Range    Glucose (POC) 169 (H) 65 - 100 mg/dL    Performed by Barbara Mir, POC    Collection Time: 12/14/20  8:17 AM   Result Value Ref Range    Glucose (POC) 150 (H) 65 - 100 mg/dL    Performed by Raman Pennington    ECHO ADULT COMPLETE    Collection Time: 12/14/20  8:59 AM   Result Value Ref Range    LV ED Vol A2C 124.73 mL    IVSd 1.56 (A) 0.6 - 1.0 cm    LVIDd 5.12 4.2 - 5.9 cm    LVIDs 3.90 cm    LVOT d 2.62 cm    LVPWd 1.43 (A) 0.6 - 1.0 cm    LVOT SV 74.6 mL    LVOT SV 74.6 mL    BP EF 65.8 55 - 100 percent    LV Ejection Fraction MOD 2C 77 percent    LV Ejection Fraction MOD 4C 50 percent    LV ED Vol BP 99.28 67 - 155 mL    LV ES Vol A2C 22.76 mL    LV ES Vol BP 33.99 22 - 58 mL    LVOT Peak Gradient 3.41 mmHg    Left Ventricular Outflow Tract Mean Gradient 1.98 mmHg    LVOT Peak Velocity 92.37 cm/s    LVOT VTI 22.17 cm    RVIDd 2.52 cm    LA Volume 67.87 18 - 58 mL    LA Volume 81.44 18 - 58 mL    LA Vol 2C 59.91 (A) 18 - 58 mL    LA Vol 4C 64.45 (A) 18 - 58 mL    Aortic Valve Area by Continuity of Peak Velocity 2.03 cm2    Aortic Valve Area by Continuity of VTI 2.52 cm2    Aortic Valve Area by Continuity of VTI 2.48 cm2    AoV PG 21.48 mmHg    Aortic Valve Systolic Mean Gradient 70.78 mmHg    Aortic Valve Systolic Peak Velocity 163.54 cm/s    Aortic valve mean velocity 162.66 cm/s    AoV VTI 47.30 cm    MV A Jos 122.22 cm/s    Mitral Valve E Wave Deceleration Time 122.49 ms    MV E Jos 66.19 cm/s    MV E/A 0.54     Mitral Valve Pressure Half-time 35.52 ms    MVA (PHT) 6.19 cm2    Ao Root D 4.20 cm    LV Mass .7 88 - 224 g    LV Mass AL Index 146.2 49 - 115 g/m2    Right Atrial Area 4C 13.0 cm2    LVES Vol Index BP 14.9 mL/m2    LVED Vol Index BP 43.6 mL/m2    LA Vol Index 26.33 16 - 28 ml/m2    LA Vol Index 28.33 16 - 28 ml/m2    LVED Vol Index A2C 54.8 mL/m2    LVES Vol Index A2C 10.0 mL/m2    MIRNA/BSA Pk Jos 0.9 UI5/W4   METABOLIC PANEL, BASIC    Collection Time: 12/14/20 10:25 AM   Result Value Ref Range    Sodium 139 136 - 145 mmol/L    Potassium 3.4 (L) 3.5 - 5.1 mmol/L    Chloride 104 97 - 108 mmol/L    CO2 25 21 - 32 mmol/L    Anion gap 10 5 - 15 mmol/L    Glucose 162 (H) 65 - 100 mg/dL    BUN 18 6 - 20 mg/dL    Creatinine 1.06 0.70 - 1.30 mg/dL    BUN/Creatinine ratio 17 12 - 20      GFR est AA >60 >60 ml/min/1.73m2    GFR est non-AA >60 >60 ml/min/1.73m2    Calcium 9.0 8.5 - 10.1 mg/dL   MAGNESIUM    Collection Time: 12/14/20 10:25 AM   Result Value Ref Range    Magnesium 2.0 1.6 - 2.4 mg/dL   GLUCOSE, POC    Collection Time: 12/14/20 11:45 AM   Result Value Ref Range    Glucose (POC) 125 (H) 65 - 100 mg/dL    Performed by Kimber Fung          Imaging:  Xr Chest Port    Result Date: 12/12/2020  IMPRESSION: Lordotic positioning. Stable enlargement of cardiopericardial silhouette with tortuous aorta. Midline sternotomy wires appear intact. No evidence of pulmonary edema, air space pneumonia, or pleural effusion. No evidence of pneumothorax.          * Discharge Condition: good  * [facility] to help educate patient and monitor patient blood pressure Discharge Medications:  Current Discharge Medication List      START taking these medications    Details   aspirin delayed-release 81 mg tablet Take 1 Tab by mouth daily for 30 days. Qty: 30 Tab, Refills: 0      lisinopriL (PRINIVIL, ZESTRIL) 10 mg tablet Take 1 Tab by mouth daily for 30 days. Qty: 30 Tab, Refills: 0      magnesium oxide (MAG-OX) 400 mg tablet Take 1 Tab by mouth daily. Qty: 30 Tab, Refills: 0      metoprolol succinate (TOPROL-XL) 50 mg XL tablet Take 1 Tab by mouth two (2) times daily (after meals). Qty: 60 Tab, Refills: 0      NIFEdipine ER (PROCARDIA XL) 30 mg ER tablet Take 1 Tab by mouth daily. Qty: 30 Tab, Refills: 0      potassium chloride (K-DUR, KLOR-CON) 20 mEq tablet Take 1 Tab by mouth daily. Qty: 30 Tab, Refills: 0         CONTINUE these medications which have NOT CHANGED    Details   isosorbide mononitrate ER (IMDUR) 60 mg CR tablet Take 60 mg by mouth daily. atorvastatin (LIPITOR) 20 mg tablet Take 20 mg by mouth daily. furosemide (Lasix) 40 mg tablet Take 40 mg by mouth daily. pantoprazole (Protonix) 40 mg tablet Take 40 mg by mouth daily. traZODone (DESYREL) 50 mg tablet Take 50 mg by mouth two (2) times a day. metFORMIN (GLUCOPHAGE) 1,000 mg tablet Take 1,000 mg by mouth two (2) times daily (with meals). tamsulosin (Flomax) 0.4 mg capsule Take 0.4 mg by mouth nightly. STOP taking these medications       glipiZIDE (GLUCOTROL) 10 mg tablet Comments:   Reason for Stopping:                 * Follow-up Care/Patient Instructions:   Activity: Activity as tolerated  Diet: Cardiac Diet and Diabetic Diet      Follow-up Information     Follow up With Specialties Details Why Contact Info    Marisa Mi MD  On 12/16/2020 at 12 pm  [facility]   [facility], [address] [place] [address] Ave, NP Physician Assistant In 1 week  [id number] [facility]  [phone]          Spent 30 minutes evaluting and coordinating patient care discharging home with home home health of which >50% was spent coordinating and counseling.      Signed:  Nell Alvarado MD  12/14/2020  12:16 PM

## 2021-02-17 ENCOUNTER — HOSPITAL ENCOUNTER (INPATIENT)
Age: 66
LOS: 5 days | Discharge: OTHER INSTITUTION | DRG: 871 | End: 2021-02-22
Attending: EMERGENCY MEDICINE | Admitting: INTERNAL MEDICINE
Payer: MEDICARE

## 2021-02-17 ENCOUNTER — APPOINTMENT (OUTPATIENT)
Dept: CT IMAGING | Age: 66
DRG: 871 | End: 2021-02-17
Attending: EMERGENCY MEDICINE
Payer: MEDICARE

## 2021-02-17 ENCOUNTER — APPOINTMENT (OUTPATIENT)
Dept: GENERAL RADIOLOGY | Age: 66
DRG: 871 | End: 2021-02-17
Attending: EMERGENCY MEDICINE
Payer: MEDICARE

## 2021-02-17 DIAGNOSIS — J12.82 PNEUMONIA DUE TO COVID-19 VIRUS: Primary | ICD-10-CM

## 2021-02-17 DIAGNOSIS — R06.89 ACUTE RESPIRATORY INSUFFICIENCY: ICD-10-CM

## 2021-02-17 DIAGNOSIS — U07.1 PNEUMONIA DUE TO COVID-19 VIRUS: Primary | ICD-10-CM

## 2021-02-17 LAB
ALBUMIN SERPL-MCNC: 3 G/DL (ref 3.5–5)
ALBUMIN/GLOB SERPL: 0.6 {RATIO} (ref 1.1–2.2)
ALP SERPL-CCNC: 105 U/L (ref 45–117)
ALT SERPL-CCNC: 25 U/L (ref 12–78)
ANION GAP SERPL CALC-SCNC: 14 MMOL/L (ref 5–15)
ARTERIAL PATENCY WRIST A: ABNORMAL
AST SERPL W P-5'-P-CCNC: 23 U/L (ref 15–37)
BASE DEFICIT BLDA-SCNC: 2.1 MMOL/L (ref 0–2)
BASOPHILS # BLD: 0 K/UL (ref 0–0.2)
BASOPHILS NFR BLD: 0 % (ref 0–2.5)
BDY SITE: ABNORMAL
BILIRUB SERPL-MCNC: 0.4 MG/DL (ref 0.2–1)
BUN SERPL-MCNC: 18 MG/DL (ref 6–20)
BUN/CREAT SERPL: 12 (ref 12–20)
CA-I BLD-MCNC: 8.8 MG/DL (ref 8.5–10.1)
CHLORIDE SERPL-SCNC: 98 MMOL/L (ref 97–108)
CO2 SERPL-SCNC: 24 MMOL/L (ref 21–32)
COHGB MFR BLD: 0.1 % (ref 0–5)
COVID-19 RAPID TEST, COVR: DETECTED
CREAT SERPL-MCNC: 1.48 MG/DL (ref 0.7–1.3)
EOSINOPHIL # BLD: 0 K/UL (ref 0–0.7)
EOSINOPHIL NFR BLD: 0 % (ref 0.9–2.9)
ERYTHROCYTE [DISTWIDTH] IN BLOOD BY AUTOMATED COUNT: 15.3 % (ref 11.5–14.5)
FIO2 ON VENT: 36 %
FLUAV AG NPH QL IA: NEGATIVE
FLUBV AG NOSE QL IA: NEGATIVE
GAS FLOW.O2 O2 DELIVERY SYS: 4 L/MIN
GLOBULIN SER CALC-MCNC: 4.9 G/DL (ref 2–4)
GLUCOSE BLD STRIP.AUTO-MCNC: 485 MG/DL (ref 65–100)
GLUCOSE SERPL-MCNC: 441 MG/DL (ref 65–100)
HCO3 BLDA-SCNC: 21 MMOL/L (ref 22–26)
HCT VFR BLD AUTO: 36.3 % (ref 41–53)
HGB BLD OXIMETRY-MCNC: 11.3 G/DL (ref 13.5–17.5)
HGB BLD-MCNC: 11.6 G/DL (ref 13.5–17.5)
LACTATE SERPL-SCNC: 2.5 MMOL/L (ref 0.4–2)
LDH SERPL L TO P-CCNC: 361 U/L (ref 85–241)
LYMPHOCYTES # BLD: 0.8 K/UL (ref 1–4.8)
LYMPHOCYTES NFR BLD: 15 % (ref 20.5–51.1)
MCH RBC QN AUTO: 26.6 PG (ref 31–34)
MCHC RBC AUTO-ENTMCNC: 32.1 G/DL (ref 31–36)
MCV RBC AUTO: 83 FL (ref 80–100)
METHGB MFR BLD: 0.3 % (ref 0–5)
MONOCYTES # BLD: 0.4 K/UL (ref 0.2–2.4)
MONOCYTES NFR BLD: 7 % (ref 1.7–9.3)
NEUTS SEG # BLD: 4.1 K/UL (ref 1.8–7.7)
NEUTS SEG NFR BLD: 78 % (ref 42–75)
NRBC # BLD: 0 K/UL
NRBC BLD-RTO: 0.1 PER 100 WBC
OXYHGB MFR BLD: 94.2 % (ref 95–100)
PCO2 BLDA: 32 MMHG (ref 35–45)
PERFORMED BY, TECHID: ABNORMAL
PH BLDA: 7.44 [PH] (ref 7.35–7.45)
PLATELET # BLD AUTO: 250 K/UL
PMV BLD AUTO: 9 FL (ref 6.5–11.5)
PO2 BLDA: 69 MMHG (ref 70–100)
POTASSIUM SERPL-SCNC: 3.4 MMOL/L (ref 3.5–5.1)
PROT SERPL-MCNC: 7.9 G/DL (ref 6.4–8.2)
RBC # BLD AUTO: 4.37 M/UL (ref 4.5–5.9)
SAO2% DEVICE SAO2% SENSOR NAME: ABNORMAL
SARS-COV-2, COV2: NORMAL
SODIUM SERPL-SCNC: 136 MMOL/L (ref 136–145)
SPECIMEN SITE: ABNORMAL
SPECIMEN SOURCE: ABNORMAL
WBC # BLD AUTO: 5.3 K/UL (ref 4.4–11.3)

## 2021-02-17 PROCEDURE — 96374 THER/PROPH/DIAG INJ IV PUSH: CPT

## 2021-02-17 PROCEDURE — 74011250636 HC RX REV CODE- 250/636: Performed by: NURSE PRACTITIONER

## 2021-02-17 PROCEDURE — 70450 CT HEAD/BRAIN W/O DYE: CPT

## 2021-02-17 PROCEDURE — 87635 SARS-COV-2 COVID-19 AMP PRB: CPT

## 2021-02-17 PROCEDURE — 83605 ASSAY OF LACTIC ACID: CPT

## 2021-02-17 PROCEDURE — 74011250636 HC RX REV CODE- 250/636: Performed by: EMERGENCY MEDICINE

## 2021-02-17 PROCEDURE — 82962 GLUCOSE BLOOD TEST: CPT

## 2021-02-17 PROCEDURE — 71250 CT THORAX DX C-: CPT

## 2021-02-17 PROCEDURE — 87804 INFLUENZA ASSAY W/OPTIC: CPT

## 2021-02-17 PROCEDURE — 71045 X-RAY EXAM CHEST 1 VIEW: CPT

## 2021-02-17 PROCEDURE — 82728 ASSAY OF FERRITIN: CPT

## 2021-02-17 PROCEDURE — 74011000250 HC RX REV CODE- 250: Performed by: NURSE PRACTITIONER

## 2021-02-17 PROCEDURE — 74011636637 HC RX REV CODE- 636/637: Performed by: NURSE PRACTITIONER

## 2021-02-17 PROCEDURE — 36415 COLL VENOUS BLD VENIPUNCTURE: CPT

## 2021-02-17 PROCEDURE — 96375 TX/PRO/DX INJ NEW DRUG ADDON: CPT

## 2021-02-17 PROCEDURE — 87040 BLOOD CULTURE FOR BACTERIA: CPT

## 2021-02-17 PROCEDURE — 82803 BLOOD GASES ANY COMBINATION: CPT

## 2021-02-17 PROCEDURE — 96361 HYDRATE IV INFUSION ADD-ON: CPT

## 2021-02-17 PROCEDURE — XW033E5 INTRODUCTION OF REMDESIVIR ANTI-INFECTIVE INTO PERIPHERAL VEIN, PERCUTANEOUS APPROACH, NEW TECHNOLOGY GROUP 5: ICD-10-PCS | Performed by: NURSE PRACTITIONER

## 2021-02-17 PROCEDURE — 80053 COMPREHEN METABOLIC PANEL: CPT

## 2021-02-17 PROCEDURE — 86141 C-REACTIVE PROTEIN HS: CPT

## 2021-02-17 PROCEDURE — 65270000029 HC RM PRIVATE

## 2021-02-17 PROCEDURE — 74011250637 HC RX REV CODE- 250/637: Performed by: NURSE PRACTITIONER

## 2021-02-17 PROCEDURE — 83615 LACTATE (LD) (LDH) ENZYME: CPT

## 2021-02-17 PROCEDURE — 93005 ELECTROCARDIOGRAM TRACING: CPT

## 2021-02-17 PROCEDURE — 36600 WITHDRAWAL OF ARTERIAL BLOOD: CPT

## 2021-02-17 PROCEDURE — 74011000258 HC RX REV CODE- 258: Performed by: NURSE PRACTITIONER

## 2021-02-17 PROCEDURE — 94640 AIRWAY INHALATION TREATMENT: CPT

## 2021-02-17 PROCEDURE — 3E0333Z INTRODUCTION OF ANTI-INFLAMMATORY INTO PERIPHERAL VEIN, PERCUTANEOUS APPROACH: ICD-10-PCS | Performed by: NURSE PRACTITIONER

## 2021-02-17 PROCEDURE — 74011250637 HC RX REV CODE- 250/637: Performed by: EMERGENCY MEDICINE

## 2021-02-17 PROCEDURE — 85025 COMPLETE CBC W/AUTO DIFF WBC: CPT

## 2021-02-17 PROCEDURE — 99285 EMERGENCY DEPT VISIT HI MDM: CPT

## 2021-02-17 RX ORDER — SODIUM CHLORIDE 9 MG/ML
100 INJECTION, SOLUTION INTRAVENOUS CONTINUOUS
Status: DISCONTINUED | OUTPATIENT
Start: 2021-02-17 | End: 2021-02-18

## 2021-02-17 RX ORDER — ALBUTEROL SULFATE 90 UG/1
2 AEROSOL, METERED RESPIRATORY (INHALATION)
Status: DISCONTINUED | OUTPATIENT
Start: 2021-02-17 | End: 2021-02-22 | Stop reason: HOSPADM

## 2021-02-17 RX ORDER — MAGNESIUM SULFATE 100 %
4 CRYSTALS MISCELLANEOUS AS NEEDED
Status: DISCONTINUED | OUTPATIENT
Start: 2021-02-17 | End: 2021-02-22 | Stop reason: HOSPADM

## 2021-02-17 RX ORDER — ONDANSETRON 2 MG/ML
4 INJECTION INTRAMUSCULAR; INTRAVENOUS
Status: DISCONTINUED | OUTPATIENT
Start: 2021-02-17 | End: 2021-02-22 | Stop reason: HOSPADM

## 2021-02-17 RX ORDER — PANTOPRAZOLE SODIUM 40 MG/1
40 TABLET, DELAYED RELEASE ORAL
Status: DISCONTINUED | OUTPATIENT
Start: 2021-02-18 | End: 2021-02-22 | Stop reason: HOSPADM

## 2021-02-17 RX ORDER — POTASSIUM CHLORIDE 750 MG/1
30 TABLET, FILM COATED, EXTENDED RELEASE ORAL
Status: COMPLETED | OUTPATIENT
Start: 2021-02-17 | End: 2021-02-17

## 2021-02-17 RX ORDER — CALCIUM CARB/MAGNESIUM CARB 311-232MG
5 TABLET ORAL
Status: DISCONTINUED | OUTPATIENT
Start: 2021-02-17 | End: 2021-02-17

## 2021-02-17 RX ORDER — TAMSULOSIN HYDROCHLORIDE 0.4 MG/1
0.4 CAPSULE ORAL
Status: DISCONTINUED | OUTPATIENT
Start: 2021-02-17 | End: 2021-02-22 | Stop reason: HOSPADM

## 2021-02-17 RX ORDER — INSULIN LISPRO 100 [IU]/ML
INJECTION, SOLUTION INTRAVENOUS; SUBCUTANEOUS
Status: DISCONTINUED | OUTPATIENT
Start: 2021-02-17 | End: 2021-02-22 | Stop reason: HOSPADM

## 2021-02-17 RX ORDER — INSULIN LISPRO 100 [IU]/ML
8 INJECTION, SOLUTION INTRAVENOUS; SUBCUTANEOUS ONCE
Status: COMPLETED | OUTPATIENT
Start: 2021-02-17 | End: 2021-02-17

## 2021-02-17 RX ORDER — DEXTROSE 50 % IN WATER (D50W) INTRAVENOUS SYRINGE
25-50 AS NEEDED
Status: DISCONTINUED | OUTPATIENT
Start: 2021-02-17 | End: 2021-02-22 | Stop reason: HOSPADM

## 2021-02-17 RX ORDER — HEPARIN SODIUM 5000 [USP'U]/ML
5000 INJECTION, SOLUTION INTRAVENOUS; SUBCUTANEOUS EVERY 12 HOURS
Status: DISCONTINUED | OUTPATIENT
Start: 2021-02-17 | End: 2021-02-22 | Stop reason: HOSPADM

## 2021-02-17 RX ORDER — TRAZODONE HYDROCHLORIDE 50 MG/1
50 TABLET ORAL 2 TIMES DAILY
Status: DISCONTINUED | OUTPATIENT
Start: 2021-02-17 | End: 2021-02-22 | Stop reason: HOSPADM

## 2021-02-17 RX ORDER — ATORVASTATIN CALCIUM 20 MG/1
20 TABLET, FILM COATED ORAL DAILY
Status: DISCONTINUED | OUTPATIENT
Start: 2021-02-18 | End: 2021-02-22 | Stop reason: HOSPADM

## 2021-02-17 RX ORDER — BUDESONIDE AND FORMOTEROL FUMARATE DIHYDRATE 160; 4.5 UG/1; UG/1
2 AEROSOL RESPIRATORY (INHALATION)
Status: DISCONTINUED | OUTPATIENT
Start: 2021-02-17 | End: 2021-02-22 | Stop reason: HOSPADM

## 2021-02-17 RX ORDER — GUAIFENESIN 600 MG/1
600 TABLET, EXTENDED RELEASE ORAL EVERY 12 HOURS
Status: DISCONTINUED | OUTPATIENT
Start: 2021-02-17 | End: 2021-02-22 | Stop reason: HOSPADM

## 2021-02-17 RX ORDER — LEVOFLOXACIN 5 MG/ML
500 INJECTION, SOLUTION INTRAVENOUS
Status: COMPLETED | OUTPATIENT
Start: 2021-02-17 | End: 2021-02-17

## 2021-02-17 RX ORDER — CALCIUM CARB/MAGNESIUM CARB 311-232MG
5 TABLET ORAL
Status: DISCONTINUED | OUTPATIENT
Start: 2021-02-17 | End: 2021-02-22 | Stop reason: HOSPADM

## 2021-02-17 RX ORDER — ACETAMINOPHEN 325 MG/1
650 TABLET ORAL
Status: DISCONTINUED | OUTPATIENT
Start: 2021-02-17 | End: 2021-02-22 | Stop reason: HOSPADM

## 2021-02-17 RX ORDER — ACETAMINOPHEN 500 MG
1000 TABLET ORAL ONCE
Status: COMPLETED | OUTPATIENT
Start: 2021-02-17 | End: 2021-02-17

## 2021-02-17 RX ORDER — SODIUM CHLORIDE 9 MG/ML
125 INJECTION, SOLUTION INTRAVENOUS ONCE
Status: COMPLETED | OUTPATIENT
Start: 2021-02-17 | End: 2021-02-17

## 2021-02-17 RX ORDER — IBUPROFEN 800 MG/1
800 TABLET ORAL ONCE
Status: COMPLETED | OUTPATIENT
Start: 2021-02-17 | End: 2021-02-17

## 2021-02-17 RX ORDER — LANOLIN ALCOHOL/MO/W.PET/CERES
400 CREAM (GRAM) TOPICAL DAILY
Status: DISCONTINUED | OUTPATIENT
Start: 2021-02-18 | End: 2021-02-22 | Stop reason: HOSPADM

## 2021-02-17 RX ORDER — LISINOPRIL 10 MG/1
10 TABLET ORAL DAILY
COMMUNITY
End: 2021-03-15

## 2021-02-17 RX ORDER — DEXAMETHASONE SODIUM PHOSPHATE 4 MG/ML
6 INJECTION, SOLUTION INTRA-ARTICULAR; INTRALESIONAL; INTRAMUSCULAR; INTRAVENOUS; SOFT TISSUE EVERY 24 HOURS
Status: DISCONTINUED | OUTPATIENT
Start: 2021-02-18 | End: 2021-02-18

## 2021-02-17 RX ADMIN — HEPARIN SODIUM 5000 UNITS: 5000 INJECTION INTRAVENOUS; SUBCUTANEOUS at 21:45

## 2021-02-17 RX ADMIN — GUAIFENESIN 600 MG: 600 TABLET, EXTENDED RELEASE ORAL at 21:45

## 2021-02-17 RX ADMIN — METHYLPREDNISOLONE SODIUM SUCCINATE 125 MG: 125 INJECTION, POWDER, FOR SOLUTION INTRAMUSCULAR; INTRAVENOUS at 15:20

## 2021-02-17 RX ADMIN — Medication 5 MG: at 21:44

## 2021-02-17 RX ADMIN — TAMSULOSIN HYDROCHLORIDE 0.4 MG: 0.4 CAPSULE ORAL at 21:45

## 2021-02-17 RX ADMIN — POTASSIUM CHLORIDE 30 MEQ: 750 TABLET, EXTENDED RELEASE ORAL at 18:00

## 2021-02-17 RX ADMIN — BUDESONIDE AND FORMOTEROL FUMARATE DIHYDRATE 2 PUFF: 160; 4.5 AEROSOL RESPIRATORY (INHALATION) at 20:59

## 2021-02-17 RX ADMIN — SODIUM CHLORIDE 100 ML/HR: 9 INJECTION, SOLUTION INTRAVENOUS at 16:48

## 2021-02-17 RX ADMIN — IBUPROFEN 800 MG: 800 TABLET, FILM COATED ORAL at 15:18

## 2021-02-17 RX ADMIN — REMDESIVIR 200 MG: 100 INJECTION, POWDER, LYOPHILIZED, FOR SOLUTION INTRAVENOUS at 18:05

## 2021-02-17 RX ADMIN — ACETAMINOPHEN 1000 MG: 500 TABLET ORAL at 15:18

## 2021-02-17 RX ADMIN — TRAZODONE HYDROCHLORIDE 50 MG: 50 TABLET ORAL at 17:04

## 2021-02-17 RX ADMIN — INSULIN LISPRO 4 UNITS: 100 INJECTION, SOLUTION INTRAVENOUS; SUBCUTANEOUS at 22:00

## 2021-02-17 RX ADMIN — INSULIN LISPRO 8 UNITS: 100 INJECTION, SOLUTION INTRAVENOUS; SUBCUTANEOUS at 22:25

## 2021-02-17 RX ADMIN — SODIUM CHLORIDE 125 ML/HR: 9 INJECTION, SOLUTION INTRAVENOUS at 15:20

## 2021-02-17 RX ADMIN — LEVOFLOXACIN 500 MG: 5 INJECTION, SOLUTION INTRAVENOUS at 16:47

## 2021-02-17 RX ADMIN — AZITHROMYCIN DIHYDRATE 500 MG: 500 INJECTION, POWDER, LYOPHILIZED, FOR SOLUTION INTRAVENOUS at 15:17

## 2021-02-17 NOTE — ED NOTES
Per PCU, pt is to be transferred up after nightshift. Unable to give report at this time, receiving nurse will arrive shortly.

## 2021-02-17 NOTE — ED TRIAGE NOTES
.  Chief Complaint   Patient presents with    Cough    Headache     Pt presents with slight headache, cough and feeling weak since the weekend. Pt ambulatory to triage w/o difficulty. Pt 84% on room air. Pt VS otherwise stable.  Pt A&Ox4

## 2021-02-17 NOTE — H&P
Hospitalist           History and Physical Note: 2/17/2021      Subjective:     Patient is a 77year old male with history of hypertension, Diabetes type 2, CAD status post CABG  In 2018 who presents to the ER today with complains of SOB associated with fatigue and loss of taste which started about 5 days ago. Chest CT showed  patchy pulmonary airspace disease consistent with Covid-19 pneumonia. Rapid Covid-19 test found to be positive. CT of chest also showed a left renal hyperdense mass which was not seen on comparison ultrasound 4/17/2018. He was hypoxic on room with oxygen saturation of 84% requiring 4L of O2 vis NC. He was also note to by hypotensive with blood pressure of 110/57 and febrile with a temperature of 100.9 . Other significant labs included creatinine of 1.48 and lactic acid level of 2.5. When I saw him he was in no acute distress but did report fatigue with SOB on exertion. He will be admitted for further management.     Problem List:  Patient Active Problem List   Diagnosis Code    Chest pain R07.9    CAD (coronary artery disease) I25.10    HTN (hypertension) I10    Pneumonia due to COVID-19 virus U07.1, J12.82         Medications reviewed  Current Facility-Administered Medications   Medication Dose Route Frequency    levoFLOXacin (LEVAQUIN) 500 mg in D5W IVPB  500 mg IntraVENous NOW    azithromycin (ZITHROMAX) 500 mg in 0.9% sodium chloride 250 mL (VIAL-MATE)  500 mg IntraVENous Q24H    [START ON 2/18/2021] dexamethasone (DECADRON) 4 mg/mL injection 6 mg  6 mg IntraVENous Q24H    0.9% sodium chloride infusion  100 mL/hr IntraVENous CONTINUOUS    [START ON 2/18/2021] cefTRIAXone (ROCEPHIN) 1 g in 0.9% sodium chloride (MBP/ADV) 50 mL MBP  1 g IntraVENous Q24H    [START ON 2/18/2021] atorvastatin (LIPITOR) tablet 20 mg  20 mg Oral DAILY    [START ON 2/18/2021] magnesium oxide (MAG-OX) tablet 400 mg  400 mg Oral DAILY    [START ON 2/18/2021] pantoprazole (PROTONIX) tablet 40 mg  40 mg Oral ACB    tamsulosin (FLOMAX) capsule 0.4 mg  0.4 mg Oral QHS    traZODone (DESYREL) tablet 50 mg  50 mg Oral BID    heparin (porcine) injection 5,000 Units  5,000 Units SubCUTAneous Q12H    budesonide-formoteroL (SYMBICORT) 160-4.5 mcg/actuation HFA inhaler 2 Puff  2 Puff Inhalation BID RT    albuterol (PROVENTIL HFA, VENTOLIN HFA, PROAIR HFA) inhaler 2 Puff  2 Puff Inhalation Q6H PRN    acetaminophen (TYLENOL) tablet 650 mg  650 mg Oral Q6H PRN    ondansetron (ZOFRAN) injection 4 mg  4 mg IntraVENous Q8H PRN    guaiFENesin ER (MUCINEX) tablet 600 mg  600 mg Oral Q12H    glucose chewable tablet 16 g  4 Tab Oral PRN    dextrose (D50W) injection syrg 12.5-25 g  25-50 mL IntraVENous PRN    glucagon (GLUCAGEN) injection 1 mg  1 mg IntraMUSCular PRN    insulin lispro (HUMALOG) injection   SubCUTAneous AC&HS    melatonin (rapid dissolve) tablet 5 mg  5 mg Oral QHS     Current Outpatient Medications   Medication Sig    lisinopriL (PRINIVIL, ZESTRIL) 10 mg tablet Take 10 mg by mouth daily.  magnesium oxide (MAG-OX) 400 mg tablet Take 1 Tab by mouth daily.  metoprolol succinate (TOPROL-XL) 50 mg XL tablet Take 1 Tab by mouth two (2) times daily (after meals).  NIFEdipine ER (PROCARDIA XL) 30 mg ER tablet Take 1 Tab by mouth daily.  potassium chloride (K-DUR, KLOR-CON) 20 mEq tablet Take 1 Tab by mouth daily.  isosorbide mononitrate ER (IMDUR) 60 mg CR tablet Take 60 mg by mouth daily.  atorvastatin (LIPITOR) 20 mg tablet Take 20 mg by mouth daily.  furosemide (Lasix) 40 mg tablet Take 40 mg by mouth daily.  metFORMIN (GLUCOPHAGE) 1,000 mg tablet Take 1,000 mg by mouth two (2) times daily (with meals).  tamsulosin (Flomax) 0.4 mg capsule Take 0.4 mg by mouth nightly.  pantoprazole (Protonix) 40 mg tablet Take 40 mg by mouth daily.  traZODone (DESYREL) 50 mg tablet Take 50 mg by mouth two (2) times a day.        Review of Systems:   Review of Systems   Constitutional: Negative for chills, diaphoresis, fever, malaise/fatigue and weight loss. HENT: Negative for ear discharge, ear pain, hearing loss, nosebleeds, sinus pain and tinnitus. Respiratory: Positive for cough and shortness of breath. Negative for hemoptysis, sputum production and wheezing. Cardiovascular: Negative for chest pain, palpitations, orthopnea, claudication and leg swelling. Gastrointestinal: Negative for abdominal pain, constipation, diarrhea, heartburn, nausea and vomiting. Genitourinary: Negative for dysuria, flank pain, frequency, hematuria and urgency. Musculoskeletal: Negative for back pain, falls, joint pain, myalgias and neck pain. Neurological: Negative for dizziness, tingling, focal weakness, seizures, weakness and headaches. Psychiatric/Behavioral: Negative for depression, hallucinations, memory loss, substance abuse and suicidal ideas. The patient is not nervous/anxious. Objective:   Physical Exam  Constitutional:       General: He is not in acute distress. Appearance: Normal appearance. He is normal weight. HENT:      Head: Normocephalic and atraumatic. Mouth/Throat:      Mouth: Mucous membranes are moist.      Pharynx: Oropharynx is clear. Eyes:      Pupils: Pupils are equal, round, and reactive to light. Neck:      Musculoskeletal: No neck rigidity. Cardiovascular:      Rate and Rhythm: Normal rate and regular rhythm. Heart sounds: No murmur. No friction rub. No gallop. Pulmonary:      Effort: Pulmonary effort is normal. No respiratory distress. Breath sounds: Wheezing present. No rales. Abdominal:      General: Bowel sounds are normal. There is no distension. Palpations: Abdomen is soft. Tenderness: There is no abdominal tenderness. There is no rebound. Musculoskeletal: Normal range of motion. General: No swelling, tenderness, deformity or signs of injury.    Skin:     General: Skin is warm and dry.      Coloration: Skin is not jaundiced. Findings: No bruising, erythema or rash. Neurological:      General: No focal deficit present. Mental Status: He is alert and oriented to person, place, and time. Sensory: No sensory deficit. Motor: No weakness. Gait: Gait normal.   Psychiatric:         Mood and Affect: Mood normal.         Behavior: Behavior normal.         Thought Content: Thought content normal.          Visit Vitals  /77   Pulse 77   Temp 100.1 °F (37.8 °C)   Resp 21   Ht 5' 10\" (1.778 m)   Wt 108.9 kg (240 lb)   SpO2 95%   BMI 34.44 kg/m²          Data Review:       Recent Days:  Recent Labs     02/17/21  1452   WBC 5.3   HGB 11.6*   HCT 36.3*        Recent Labs     02/17/21  1452      K 3.4*   CL 98   CO2 24   *   BUN 18   CREA 1.48*   CA 8.8   ALB 3.0*   TBILI 0.4   ALT 25       Surgical History  CABG -2018    Family History   Problem Relation Age of Onset    Hypertension Mother     Hypertension Father         Past Medical History:   Diagnosis Date    CAD (coronary artery disease)     MI [facility]myocardial infarction) ([facility] [address]         Social History     Tobacco Use    Smoking status: Former Smoker   Substance Use Topics    Alcohol use: Yes    Drug use: Not on file      Assessment/Plan     1. Acute Hypoxic Respiratory Failure  Secondary to Covid-19 Pneumonia  Currently on O2 at 4 liters via NC  CT chest shows bilateral airspace opacities  Continue supplemental O2 and wean off as tolerated  Start Decadron, Zithromax, ProAir, Dulerala    2. Sepsis  Secondary to Covid-19  He mets sepsis criteria with hypotension and Fever  Will get blood cultures  Test for influenza  Start Zithromax, Ceftriaxone and IV fluids    3. Covid-19 pneumonitis  Rapid Covid-19 test is positive today  Continue supplemental oxygen, Decadron, and Zithromax, start Remdesir  Wean off oxygen as tolerated    4.  CAD with history of CABG  Status post CABG in 2018  Due to hypotension will hold Imdur and Metoprolol for now. Consider restarting if blood pressures improve. 5.  Diabetes type 2  Recent A1c was 7.7%  Hold Metformin and start SSI    6. Acute Kidney Injury  Creatinine is 1.48 ad prior was normal  Likely secondary to sepsis, dehydration and Lasix and Lisinopril  Will hold lasix and Lisinopril for now. Start IV fluids for gentle rehydration    7. Left renal hyperdense mass  Incidentally noted on chest CT. This will need to be investigated once respiratory function is stable  He will require outpatient follow up with Oncology for further workup.     CBC, BMP in am  Heparin for DVT prophylaxis  Full code    Mable Bustillo NP

## 2021-02-17 NOTE — ED NOTES
Pt unable to collect urine specimen at this time. Offered pt gown, pt refused and states he may change into one later.

## 2021-02-18 LAB
ALBUMIN SERPL-MCNC: 2.5 G/DL (ref 3.5–5)
ALBUMIN/GLOB SERPL: 0.6 {RATIO} (ref 1.1–2.2)
ALP SERPL-CCNC: 89 U/L (ref 45–117)
ALT SERPL-CCNC: 25 U/L (ref 12–78)
ANION GAP SERPL CALC-SCNC: 11 MMOL/L (ref 5–15)
ANION GAP SERPL CALC-SCNC: 12 MMOL/L (ref 5–15)
AST SERPL W P-5'-P-CCNC: 22 U/L (ref 15–37)
ATRIAL RATE: 79 BPM
BASOPHILS # BLD: 0 K/UL (ref 0–0.2)
BASOPHILS NFR BLD: 0 % (ref 0–2.5)
BILIRUB SERPL-MCNC: 0.3 MG/DL (ref 0.2–1)
BUN SERPL-MCNC: 26 MG/DL (ref 6–20)
BUN SERPL-MCNC: 27 MG/DL (ref 6–20)
BUN/CREAT SERPL: 20 (ref 12–20)
BUN/CREAT SERPL: 21 (ref 12–20)
CA-I BLD-MCNC: 8.1 MG/DL (ref 8.5–10.1)
CA-I BLD-MCNC: 8.2 MG/DL (ref 8.5–10.1)
CALCULATED P AXIS, ECG09: 32 DEGREES
CALCULATED R AXIS, ECG10: 3 DEGREES
CALCULATED T AXIS, ECG11: 116 DEGREES
CHLORIDE SERPL-SCNC: 104 MMOL/L (ref 97–108)
CHLORIDE SERPL-SCNC: 105 MMOL/L (ref 97–108)
CO2 SERPL-SCNC: 23 MMOL/L (ref 21–32)
CO2 SERPL-SCNC: 23 MMOL/L (ref 21–32)
CREAT SERPL-MCNC: 1.26 MG/DL (ref 0.7–1.3)
CREAT SERPL-MCNC: 1.3 MG/DL (ref 0.7–1.3)
CRP SERPL HS-MCNC: >9.5 MG/L
DIAGNOSIS, 93000: NORMAL
EOSINOPHIL # BLD: 0 K/UL (ref 0–0.7)
EOSINOPHIL NFR BLD: 0 % (ref 0.9–2.9)
ERYTHROCYTE [DISTWIDTH] IN BLOOD BY AUTOMATED COUNT: 15.4 % (ref 11.5–14.5)
FERRITIN SERPL-MCNC: 351 NG/ML (ref 26–388)
GLOBULIN SER CALC-MCNC: 4.4 G/DL (ref 2–4)
GLUCOSE BLD STRIP.AUTO-MCNC: 334 MG/DL (ref 65–100)
GLUCOSE BLD STRIP.AUTO-MCNC: 360 MG/DL (ref 65–100)
GLUCOSE BLD STRIP.AUTO-MCNC: 371 MG/DL (ref 65–100)
GLUCOSE BLD STRIP.AUTO-MCNC: 591 MG/DL (ref 65–100)
GLUCOSE SERPL-MCNC: 418 MG/DL (ref 65–100)
GLUCOSE SERPL-MCNC: 421 MG/DL (ref 65–100)
HCT VFR BLD AUTO: 32.8 % (ref 41–53)
HGB BLD-MCNC: 10.4 G/DL (ref 13.5–17.5)
LYMPHOCYTES # BLD: 0.8 K/UL (ref 1–4.8)
LYMPHOCYTES NFR BLD: 20 % (ref 20.5–51.1)
MCH RBC QN AUTO: 26.8 PG (ref 31–34)
MCHC RBC AUTO-ENTMCNC: 31.7 G/DL (ref 31–36)
MCV RBC AUTO: 84.4 FL (ref 80–100)
MONOCYTES # BLD: 0.4 K/UL (ref 0.2–2.4)
MONOCYTES NFR BLD: 10 % (ref 1.7–9.3)
NEUTS SEG # BLD: 2.9 K/UL (ref 1.8–7.7)
NEUTS SEG NFR BLD: 70 % (ref 42–75)
NRBC # BLD: 0 K/UL
NRBC BLD-RTO: 0 PER 100 WBC
P-R INTERVAL, ECG05: 156 MS
PERFORMED BY, TECHID: ABNORMAL
PLATELET # BLD AUTO: 225 K/UL
PMV BLD AUTO: 8.6 FL (ref 6.5–11.5)
POTASSIUM SERPL-SCNC: 4 MMOL/L (ref 3.5–5.1)
POTASSIUM SERPL-SCNC: 4.1 MMOL/L (ref 3.5–5.1)
PROT SERPL-MCNC: 6.9 G/DL (ref 6.4–8.2)
Q-T INTERVAL, ECG07: 370 MS
QRS DURATION, ECG06: 111 MS
QTC CALCULATION (BEZET), ECG08: 422 MS
RBC # BLD AUTO: 3.88 M/UL (ref 4.5–5.9)
SODIUM SERPL-SCNC: 139 MMOL/L (ref 136–145)
SODIUM SERPL-SCNC: 139 MMOL/L (ref 136–145)
VENTRICULAR RATE, ECG03: 78 BPM
WBC # BLD AUTO: 4.1 K/UL (ref 4.4–11.3)

## 2021-02-18 PROCEDURE — 36415 COLL VENOUS BLD VENIPUNCTURE: CPT

## 2021-02-18 PROCEDURE — 94640 AIRWAY INHALATION TREATMENT: CPT

## 2021-02-18 PROCEDURE — 74011636637 HC RX REV CODE- 636/637: Performed by: NURSE PRACTITIONER

## 2021-02-18 PROCEDURE — 94760 N-INVAS EAR/PLS OXIMETRY 1: CPT

## 2021-02-18 PROCEDURE — 74011250637 HC RX REV CODE- 250/637: Performed by: NURSE PRACTITIONER

## 2021-02-18 PROCEDURE — 80053 COMPREHEN METABOLIC PANEL: CPT

## 2021-02-18 PROCEDURE — 80048 BASIC METABOLIC PNL TOTAL CA: CPT

## 2021-02-18 PROCEDURE — 74011250636 HC RX REV CODE- 250/636: Performed by: EMERGENCY MEDICINE

## 2021-02-18 PROCEDURE — 74011250637 HC RX REV CODE- 250/637: Performed by: INTERNAL MEDICINE

## 2021-02-18 PROCEDURE — 85025 COMPLETE CBC W/AUTO DIFF WBC: CPT

## 2021-02-18 PROCEDURE — 74011000258 HC RX REV CODE- 258: Performed by: NURSE PRACTITIONER

## 2021-02-18 PROCEDURE — 74011636637 HC RX REV CODE- 636/637: Performed by: INTERNAL MEDICINE

## 2021-02-18 PROCEDURE — 82962 GLUCOSE BLOOD TEST: CPT

## 2021-02-18 PROCEDURE — 74011250636 HC RX REV CODE- 250/636: Performed by: NURSE PRACTITIONER

## 2021-02-18 PROCEDURE — 77010033678 HC OXYGEN DAILY

## 2021-02-18 PROCEDURE — 74011000250 HC RX REV CODE- 250: Performed by: NURSE PRACTITIONER

## 2021-02-18 PROCEDURE — 65270000029 HC RM PRIVATE

## 2021-02-18 RX ORDER — DEXAMETHASONE SODIUM PHOSPHATE 100 MG/10ML
6 INJECTION INTRAMUSCULAR; INTRAVENOUS EVERY 24 HOURS
Status: DISCONTINUED | OUTPATIENT
Start: 2021-02-19 | End: 2021-02-22 | Stop reason: HOSPADM

## 2021-02-18 RX ORDER — INSULIN GLARGINE 100 [IU]/ML
15 INJECTION, SOLUTION SUBCUTANEOUS 2 TIMES DAILY
Status: DISCONTINUED | OUTPATIENT
Start: 2021-02-18 | End: 2021-02-22 | Stop reason: HOSPADM

## 2021-02-18 RX ORDER — INSULIN LISPRO 100 [IU]/ML
5 INJECTION, SOLUTION INTRAVENOUS; SUBCUTANEOUS
Status: DISCONTINUED | OUTPATIENT
Start: 2021-02-18 | End: 2021-02-22 | Stop reason: HOSPADM

## 2021-02-18 RX ORDER — AMLODIPINE BESYLATE 10 MG/1
10 TABLET ORAL DAILY
Status: DISCONTINUED | OUTPATIENT
Start: 2021-02-18 | End: 2021-02-22 | Stop reason: HOSPADM

## 2021-02-18 RX ADMIN — REMDESIVIR 100 MG: 100 INJECTION, POWDER, LYOPHILIZED, FOR SOLUTION INTRAVENOUS at 11:01

## 2021-02-18 RX ADMIN — ATORVASTATIN CALCIUM 20 MG: 20 TABLET, FILM COATED ORAL at 09:07

## 2021-02-18 RX ADMIN — BUDESONIDE AND FORMOTEROL FUMARATE DIHYDRATE 2 PUFF: 160; 4.5 AEROSOL RESPIRATORY (INHALATION) at 07:52

## 2021-02-18 RX ADMIN — GUAIFENESIN 600 MG: 600 TABLET, EXTENDED RELEASE ORAL at 21:08

## 2021-02-18 RX ADMIN — TAMSULOSIN HYDROCHLORIDE 0.4 MG: 0.4 CAPSULE ORAL at 21:08

## 2021-02-18 RX ADMIN — INSULIN GLARGINE 15 UNITS: 100 INJECTION, SOLUTION SUBCUTANEOUS at 21:30

## 2021-02-18 RX ADMIN — TRAZODONE HYDROCHLORIDE 50 MG: 50 TABLET ORAL at 09:07

## 2021-02-18 RX ADMIN — DEXAMETHASONE SODIUM PHOSPHATE 6 MG: 4 INJECTION INTRA-ARTICULAR; INTRALESIONAL; INTRAMUSCULAR; INTRAVENOUS; SOFT TISSUE at 09:08

## 2021-02-18 RX ADMIN — INSULIN LISPRO 5 UNITS: 100 INJECTION, SOLUTION INTRAVENOUS; SUBCUTANEOUS at 16:23

## 2021-02-18 RX ADMIN — INSULIN LISPRO 4 UNITS: 100 INJECTION, SOLUTION INTRAVENOUS; SUBCUTANEOUS at 21:31

## 2021-02-18 RX ADMIN — AZITHROMYCIN DIHYDRATE 500 MG: 500 INJECTION, POWDER, LYOPHILIZED, FOR SOLUTION INTRAVENOUS at 14:07

## 2021-02-18 RX ADMIN — BUDESONIDE AND FORMOTEROL FUMARATE DIHYDRATE 2 PUFF: 160; 4.5 AEROSOL RESPIRATORY (INHALATION) at 20:35

## 2021-02-18 RX ADMIN — HEPARIN SODIUM 5000 UNITS: 5000 INJECTION INTRAVENOUS; SUBCUTANEOUS at 09:07

## 2021-02-18 RX ADMIN — TRAZODONE HYDROCHLORIDE 50 MG: 50 TABLET ORAL at 17:06

## 2021-02-18 RX ADMIN — INSULIN LISPRO 5 UNITS: 100 INJECTION, SOLUTION INTRAVENOUS; SUBCUTANEOUS at 12:06

## 2021-02-18 RX ADMIN — SODIUM CHLORIDE 100 ML/HR: 9 INJECTION, SOLUTION INTRAVENOUS at 03:30

## 2021-02-18 RX ADMIN — GUAIFENESIN 600 MG: 600 TABLET, EXTENDED RELEASE ORAL at 09:07

## 2021-02-18 RX ADMIN — PANTOPRAZOLE SODIUM 40 MG: 40 TABLET, DELAYED RELEASE ORAL at 09:12

## 2021-02-18 RX ADMIN — INSULIN LISPRO 7 UNITS: 100 INJECTION, SOLUTION INTRAVENOUS; SUBCUTANEOUS at 16:24

## 2021-02-18 RX ADMIN — INSULIN LISPRO 7 UNITS: 100 INJECTION, SOLUTION INTRAVENOUS; SUBCUTANEOUS at 09:07

## 2021-02-18 RX ADMIN — MAGNESIUM OXIDE 400 MG: 400 TABLET ORAL at 09:07

## 2021-02-18 RX ADMIN — Medication 5 MG: at 21:08

## 2021-02-18 RX ADMIN — AMLODIPINE BESYLATE 10 MG: 10 TABLET ORAL at 09:07

## 2021-02-18 RX ADMIN — HEPARIN SODIUM 5000 UNITS: 5000 INJECTION INTRAVENOUS; SUBCUTANEOUS at 21:08

## 2021-02-18 RX ADMIN — CEFTRIAXONE SODIUM 1 G: 1 INJECTION, POWDER, FOR SOLUTION INTRAMUSCULAR; INTRAVENOUS at 09:06

## 2021-02-18 RX ADMIN — INSULIN LISPRO 7 UNITS: 100 INJECTION, SOLUTION INTRAVENOUS; SUBCUTANEOUS at 12:06

## 2021-02-18 RX ADMIN — INSULIN LISPRO 5 UNITS: 100 INJECTION, SOLUTION INTRAVENOUS; SUBCUTANEOUS at 09:11

## 2021-02-18 RX ADMIN — INSULIN GLARGINE 15 UNITS: 100 INJECTION, SOLUTION SUBCUTANEOUS at 09:06

## 2021-02-18 NOTE — PROGRESS NOTES
Received via W/C from ER to room 219. Alert and oriented X3. Resp non-labored. Moved self to bed. O2 on at 4 l/m via N/C. Droplet Isolation maintained due to COVID. Non-productive cough noted. IV NS infusing at 100 cc/hr in (r) forearm without infiltration at site. Admission assessment completed. Oriented to room and surroundings. Explained Isolation precautions. No diabetic reactions noted. Denies C/O at present. No acute distress noted.

## 2021-02-18 NOTE — ROUTINE PROCESS
Bedside shift change report given to deandra tejeda (oncoming nurse) by shania Chamberlain (offgoing nurse). Report included the following information Kardex.

## 2021-02-18 NOTE — ED NOTES
Attempted to contact PCU nurse for report, nurse unavailable at this time. report will be given to oncoming shift.

## 2021-02-18 NOTE — PROGRESS NOTES
Visit attempted for patient in 2 acute care [place] [id number] during rounds. Per current covid-19 isolation protocol in effect, I provided silent support and prayer outside patient room. I called Mr. Cecilia Cristina in his patient room in attempt to provide support but he did not answer the phone. No family members were present. Chaplains will follow up if further referrals are requested. Chaplain Dora Meier M.Div.    can be reached by calling the  at   [facility] [phone]

## 2021-02-18 NOTE — PROGRESS NOTES
Hospitalist Progress Note         Kavita Vidales MD          Daily Progress Note: 2/18/2021      Subjective: The patient is seen for follow  up. 24-year-old gentleman with history of type 2 diabetes coronary artery disease status post CABG in 2018 admitted for shortness of breath and positive COVID-19. Required 4 L nasal oxygen on admission. He is seen in follow-up. Reports significant improvement in breathing overnight.     Problem List:  Problem List as of 2/18/2021 Never Reviewed          Codes Class Noted - Resolved    Pneumonia due to COVID-19 virus ICD-10-CM: U07.1, J12.82  ICD-9-CM: 480.8  2/17/2021 - Present        Chest pain ICD-10-CM: R07.9  ICD-9-CM: 786.50  12/12/2020 - Present        CAD (coronary artery disease) ICD-10-CM: I25.10  ICD-9-CM: 414.00  12/12/2020 - Present        HTN (hypertension) ICD-10-CM: I10  ICD-9-CM: 401.9  12/12/2020 - Present              Medications reviewed  Current Facility-Administered Medications   Medication Dose Route Frequency    insulin glargine (LANTUS) injection 15 Units  15 Units SubCUTAneous BID    insulin lispro (HUMALOG) injection 5 Units  5 Units SubCUTAneous TIDAC    amLODIPine (NORVASC) tablet 10 mg  10 mg Oral DAILY    [START ON 2/19/2021] dexamethasone (DECADRON) 10 mg/mL injection 6 mg  6 mg IntraVENous Q24H    azithromycin (ZITHROMAX) 500 mg in 0.9% sodium chloride 250 mL (VIAL-MATE)  500 mg IntraVENous Q24H    0.9% sodium chloride infusion  100 mL/hr IntraVENous CONTINUOUS    cefTRIAXone (ROCEPHIN) 1 g in 0.9% sodium chloride (MBP/ADV) 50 mL MBP  1 g IntraVENous Q24H    atorvastatin (LIPITOR) tablet 20 mg  20 mg Oral DAILY    magnesium oxide (MAG-OX) tablet 400 mg  400 mg Oral DAILY    pantoprazole (PROTONIX) tablet 40 mg  40 mg Oral ACB    tamsulosin (FLOMAX) capsule 0.4 mg  0.4 mg Oral QHS    traZODone (DESYREL) tablet 50 mg  50 mg Oral BID    heparin (porcine) injection 5,000 Units  5,000 Units SubCUTAneous Q12H    budesonide-formoteroL (SYMBICORT) 160-4.5 mcg/actuation HFA inhaler 2 Puff  2 Puff Inhalation BID RT    albuterol (PROVENTIL HFA, VENTOLIN HFA, PROAIR HFA) inhaler 2 Puff  2 Puff Inhalation Q6H PRN    acetaminophen (TYLENOL) tablet 650 mg  650 mg Oral Q6H PRN    ondansetron (ZOFRAN) injection 4 mg  4 mg IntraVENous Q8H PRN    guaiFENesin ER (MUCINEX) tablet 600 mg  600 mg Oral Q12H    glucose chewable tablet 16 g  4 Tab Oral PRN    dextrose (D50W) injection syrg 12.5-25 g  25-50 mL IntraVENous PRN    glucagon (GLUCAGEN) injection 1 mg  1 mg IntraMUSCular PRN    insulin lispro (HUMALOG) injection   SubCUTAneous AC&HS    melatonin (rapid dissolve) tablet 5 mg  5 mg Oral QHS    remdesivir 100 mg in 0.9% sodium chloride 250 mL IVPB  100 mg IntraVENous Q24H       Review of Systems:   A comprehensive review of systems was negative except for that written in the HPI. Objective:   Physical Exam:     Visit Vitals  BP (!) 171/80   Pulse 68   Temp 97.9 °F (36.6 °C)   Resp 18   Ht 5' 10\" (1.778 m)   Wt 108.9 kg (240 lb)   SpO2 93%   BMI 34.44 kg/m²    O2 Flow Rate (L/min): 4 l/min O2 Device: Nasal cannula    Temp (24hrs), Av.6 °F (37 °C), Min:97.5 °F (36.4 °C), Max:100.9 °F (38.3 °C)    No intake/output data recorded.  1901 -  0700  In: 627 [I.V.:935]  Out: -     General:  Alert, cooperative, no distress, appears stated age. Lungs:   Clear to auscultation bilaterally. Chest wall:  No tenderness or deformity. Heart:  Regular rate and rhythm, S1, S2 normal, no murmur, click, rub or gallop. Abdomen:   Soft, non-tender. Bowel sounds normal. No masses,  No organomegaly. Extremities: Extremities normal, atraumatic, no cyanosis or edema. Pulses: 2+ and symmetric all extremities. Skin: Skin color, texture, turgor normal. No rashes or lesions   Neurologic: CNII-XII intact.  No gross sensory or motor deficits     Data Review:       Recent Days:  Recent Labs 02/18/21  0527 02/17/21  1452   WBC 4.1* 5.3   HGB 10.4* 11.6*   HCT 32.8* 36.3*    250     Recent Labs     02/18/21  0527 02/17/21  1452     139 136   K 4.1  4.0 3.4*     104 98   CO2 23  23 24   *  418* 441*   BUN 26*  27* 18   CREA 1.30  1.26 1.48*   CA 8.2*  8.1* 8.8   ALB 2.5* 3.0*   TBILI 0.3 0.4   ALT 25 25     Recent Labs     02/17/21  1558   PH 7.44   PCO2 32*   PO2 69*   HCO3 21*   FIO2 36.0       24 Hour Results:  Recent Results (from the past 24 hour(s))   CBC WITH AUTOMATED DIFF    Collection Time: 02/17/21  2:52 PM   Result Value Ref Range    WBC 5.3 4.4 - 11.3 K/uL    RBC 4.37 (L) 4.50 - 5.90 M/uL    HGB 11.6 (L) 13.5 - 17.5 g/dL    HCT 36.3 (L) 41 - 53 %    MCV 83.0 80 - 100 FL    MCH 26.6 (L) 31 - 34 PG    MCHC 32.1 31.0 - 36.0 g/dL    RDW 15.3 (H) 11.5 - 14.5 %    PLATELET 073 K/uL    MPV 9.0 6.5 - 11.5 FL    NRBC 0.1  WBC    ABSOLUTE NRBC 0.00 K/uL    NEUTROPHILS 78 (H) 42 - 75 %    LYMPHOCYTES 15 (L) 20.5 - 51.1 %    MONOCYTES 7 1.7 - 9.3 %    EOSINOPHILS 0 (L) 0.9 - 2.9 %    BASOPHILS 0 0.0 - 2.5 %    ABS. NEUTROPHILS 4.1 1.8 - 7.7 K/UL    ABS. LYMPHOCYTES 0.8 (L) 1.0 - 4.8 K/UL    ABS. MONOCYTES 0.4 0.2 - 2.4 K/UL    ABS. EOSINOPHILS 0.0 0.0 - 0.7 K/UL    ABS. BASOPHILS 0.0 0.0 - 0.2 K/UL   METABOLIC PANEL, COMPREHENSIVE    Collection Time: 02/17/21  2:52 PM   Result Value Ref Range    Sodium 136 136 - 145 mmol/L    Potassium 3.4 (L) 3.5 - 5.1 mmol/L    Chloride 98 97 - 108 mmol/L    CO2 24 21 - 32 mmol/L    Anion gap 14 5 - 15 mmol/L    Glucose 441 (H) 65 - 100 mg/dL    BUN 18 6 - 20 mg/dL    Creatinine 1.48 (H) 0.70 - 1.30 mg/dL    BUN/Creatinine ratio 12 12 - 20      GFR est AA 57 (L) >60 ml/min/1.73m2    GFR est non-AA 47 (L) >60 ml/min/1.73m2    Calcium 8.8 8.5 - 10.1 mg/dL    Bilirubin, total 0.4 0.2 - 1.0 mg/dL    AST (SGOT) 23 15 - 37 U/L    ALT (SGPT) 25 12 - 78 U/L    Alk.  phosphatase 105 45 - 117 U/L    Protein, total 7.9 6.4 - 8.2 g/dL Albumin 3.0 (L) 3.5 - 5.0 g/dL    Globulin 4.9 (H) 2.0 - 4.0 g/dL    A-G Ratio 0.6 (L) 1.1 - 2.2     LACTIC ACID    Collection Time: 02/17/21  2:52 PM   Result Value Ref Range    Lactic acid 2.5 (HH) 0.4 - 2.0 mmol/L   CULTURE, BLOOD    Collection Time: 02/17/21  2:52 PM    Specimen: Blood   Result Value Ref Range    Special Requests: No Special Requests      Culture result: No growth after 16 hours     LD    Collection Time: 02/17/21  2:52 PM   Result Value Ref Range     (H) 85 - 241 U/L   SARS-COV-2    Collection Time: 02/17/21  3:16 PM   Result Value Ref Range    SARS-CoV-2 Nasopharyngeal     COVID-19 RAPID TEST    Collection Time: 02/17/21  3:16 PM   Result Value Ref Range    Specimen source Nasopharyngeal      COVID-19 rapid test DETECTED (A) Not Detected     EKG, 12 LEAD, INITIAL    Collection Time: 02/17/21  3:24 PM   Result Value Ref Range    Ventricular Rate 78 BPM    Atrial Rate 79 BPM    P-R Interval 156 ms    QRS Duration 111 ms    Q-T Interval 370 ms    QTC Calculation (Bezet) 422 ms    Calculated P Axis 32 degrees    Calculated R Axis 3 degrees    Calculated T Axis 116 degrees    Diagnosis       Sinus rhythm  Atrial premature complexes  Abnormal R-wave progression, early transition  LVH with IVCD and secondary repol abnrm    Confirmed by [facility] [place] [id number] ([id number]) on 2/18/2021 9:53:19 AM     BLOOD GAS, ARTERIAL    Collection Time: 02/17/21  3:58 PM   Result Value Ref Range    pH 7.44 7.35 - 7.45      PCO2 32 (L) 35 - 45 mmHg    PO2 69 (L) 70 - 100 mmHg    BICARBONATE 21 (L) 22 - 26 mmol/L    BASE DEFICIT 2.1 (H) 0 - 2 mmol/L    O2 METHOD Nasal Cannula      O2 FLOW RATE 4 L/min    FIO2 36.0 %    Sample source Arterial      SITE Right Radial      AQUILES'S TEST PASS      Carboxy-Hgb 0.1 0 - 5 %    Methemoglobin 0.3 0 - 5 %    tHb 11.3 (L) 13.5 - 17.5 g/dL    Oxyhemoglobin 94.2 (L) 95 - 100 %   INFLUENZA A & B AG (RAPID TEST)    Collection Time: 02/17/21  6:45 PM   Result Value Ref Range    Influenza A Antigen Negative Negative      Influenza B Antigen Negative Negative     CULTURE, BLOOD    Collection Time: 02/17/21  8:35 PM    Specimen: Blood   Result Value Ref Range    Special Requests: No Special Requests      Culture result: No growth after 10 hours     GLUCOSE, POC    Collection Time: 02/17/21  9:11 PM   Result Value Ref Range    Glucose (POC) 485 (H) 65 - 100 mg/dL    [facility]    CBC WITH AUTOMATED DIFF    Collection Time: 02/18/21  5:27 AM   Result Value Ref Range    WBC 4.1 (L) 4.4 - 11.3 K/uL    RBC 3.88 (L) 4.50 - 5.90 M/uL    HGB 10.4 (L) 13.5 - 17.5 g/dL    HCT 32.8 (L) 41 - 53 %    MCV 84.4 80 - 100 FL    MCH 26.8 (L) 31 - 34 PG    MCHC 31.7 31.0 - 36.0 g/dL    RDW 15.4 (H) 11.5 - 14.5 %    PLATELET 132 K/uL    MPV 8.6 6.5 - 11.5 FL    NRBC 0.0  WBC    ABSOLUTE NRBC 0.00 K/uL    NEUTROPHILS 70 42 - 75 %    LYMPHOCYTES 20 (L) 20.5 - 51.1 %    MONOCYTES 10 (H) 1.7 - 9.3 %    EOSINOPHILS 0 (L) 0.9 - 2.9 %    BASOPHILS 0 0.0 - 2.5 %    ABS. NEUTROPHILS 2.9 1.8 - 7.7 K/UL    ABS. LYMPHOCYTES 0.8 (L) 1.0 - 4.8 K/UL    ABS. MONOCYTES 0.4 0.2 - 2.4 K/UL    ABS. EOSINOPHILS 0.0 0.0 - 0.7 K/UL    ABS.  BASOPHILS 0.0 0.0 - 0.2 K/UL   METABOLIC PANEL, BASIC    Collection Time: 02/18/21  5:27 AM   Result Value Ref Range    Sodium 139 136 - 145 mmol/L    Potassium 4.0 3.5 - 5.1 mmol/L    Chloride 104 97 - 108 mmol/L    CO2 23 21 - 32 mmol/L    Anion gap 12 5 - 15 mmol/L    Glucose 418 (H) 65 - 100 mg/dL    BUN 27 (H) 6 - 20 mg/dL    Creatinine 1.26 0.70 - 1.30 mg/dL    BUN/Creatinine ratio 21 (H) 12 - 20      GFR est AA >60 >60 ml/min/1.73m2    GFR est non-AA 57 (L) >60 ml/min/1.73m2    Calcium 8.1 (L) 8.5 - 86.3 mg/dL   METABOLIC PANEL, COMPREHENSIVE    Collection Time: 02/18/21  5:27 AM   Result Value Ref Range    Sodium 139 136 - 145 mmol/L    Potassium 4.1 3.5 - 5.1 mmol/L    Chloride 105 97 - 108 mmol/L    CO2 23 21 - 32 mmol/L    Anion gap 11 5 - 15 mmol/L    Glucose 421 (H) 65 - 100 mg/dL    BUN 26 (H) 6 - 20 mg/dL    Creatinine 1.30 0.70 - 1.30 mg/dL    BUN/Creatinine ratio 20 12 - 20      GFR est AA >60 >60 ml/min/1.73m2    GFR est non-AA 55 (L) >60 ml/min/1.73m2    Calcium 8.2 (L) 8.5 - 10.1 mg/dL    Bilirubin, total 0.3 0.2 - 1.0 mg/dL    AST (SGOT) 22 15 - 37 U/L    ALT (SGPT) 25 12 - 78 U/L    Alk. phosphatase 89 45 - 117 U/L    Protein, total 6.9 6.4 - 8.2 g/dL    Albumin 2.5 (L) 3.5 - 5.0 g/dL    Globulin 4.4 (H) 2.0 - 4.0 g/dL    A-G Ratio 0.6 (L) 1.1 - 2.2     GLUCOSE, POC    Collection Time: 02/18/21  7:26 AM   Result Value Ref Range    Glucose (POC) 591 (H) 65 - 100 mg/dL    Performed by Rae Palomo            Assessment/     1. Acute Hypoxic Respiratory Failure  Secondary to Covid-19 Pneumonia  Currently on O2 at 4 liters via NC  CT chest shows bilateral airspace opacities  Continue supplemental O2 and wean off as tolerated  Start Decadron, Zithromax, ProAir, Dulera     2. Sepsis  Secondary to Covid-19  He mets sepsis criteria with hypotension and Fever  Will get blood culture  Continue Zithromax, Ceftriaxone and discontinue IV fluids     3. Covid-19 pneumonitis  Rapid Covid-19 test is positive today  Continue supplemental oxygen, Decadron, and Zithromax, start Remdesir  Wean off oxygen as tolerated     4. CAD with history of CABG  Status post CABG in 2018  Due to hypotension will hold Imdur and Metoprolol for now. Consider restarting if blood pressures improve.     5.  Diabetes type 2  Recent A1c was 7.7%  Hold Metformin and start SSI     6. Acute Kidney Injury  Creatinine is 1.48 ad prior was normal  Likely secondary to sepsis, dehydration and Lasix and Lisinopril  Will hold lasix and Lisinopril for now. Start IV fluids for gentle rehydration    7. Left renal hyperdense mass  Incidentally noted on chest CT.   This will need to be investigated once respiratory function is stable  He will require outpatient follow up with Oncology for further workup.       Plan:      Care Plan discussed with: Patient/Family    Total time spent with patient: 30 minutes.     Mendoza Tobias MD

## 2021-02-18 NOTE — ED NOTES
Patient care and report given to Renea Mckoy LPN. The patient is ambulatory, COVID positive, on O2 via NC at 4 lpm, with NS @ 100 ml/hr. The patient is alert and oriented X 4 with even rise and fall of the chest noted. The patient is refusing to wear a hospital gown and is therefore still in his personal clothing.

## 2021-02-19 LAB
ALBUMIN SERPL-MCNC: 2.4 G/DL (ref 3.5–5)
ALBUMIN/GLOB SERPL: 0.6 {RATIO} (ref 1.1–2.2)
ALP SERPL-CCNC: 86 U/L (ref 45–117)
ALT SERPL-CCNC: 23 U/L (ref 12–78)
ANION GAP SERPL CALC-SCNC: 11 MMOL/L (ref 5–15)
AST SERPL W P-5'-P-CCNC: 25 U/L (ref 15–37)
BASOPHILS # BLD: 0 K/UL (ref 0–0.2)
BASOPHILS NFR BLD: 0 % (ref 0–2.5)
BILIRUB SERPL-MCNC: 0.2 MG/DL (ref 0.2–1)
BUN SERPL-MCNC: 19 MG/DL (ref 6–20)
BUN/CREAT SERPL: 18 (ref 12–20)
CA-I BLD-MCNC: 8.3 MG/DL (ref 8.5–10.1)
CHLORIDE SERPL-SCNC: 107 MMOL/L (ref 97–108)
CO2 SERPL-SCNC: 23 MMOL/L (ref 21–32)
CREAT SERPL-MCNC: 1.04 MG/DL (ref 0.7–1.3)
EOSINOPHIL # BLD: 0 K/UL (ref 0–0.7)
EOSINOPHIL NFR BLD: 0 % (ref 0.9–2.9)
ERYTHROCYTE [DISTWIDTH] IN BLOOD BY AUTOMATED COUNT: 15.3 % (ref 11.5–14.5)
GLOBULIN SER CALC-MCNC: 4.3 G/DL (ref 2–4)
GLUCOSE BLD STRIP.AUTO-MCNC: 239 MG/DL (ref 65–100)
GLUCOSE BLD STRIP.AUTO-MCNC: 287 MG/DL (ref 65–100)
GLUCOSE BLD STRIP.AUTO-MCNC: 287 MG/DL (ref 65–100)
GLUCOSE BLD STRIP.AUTO-MCNC: 302 MG/DL (ref 65–100)
GLUCOSE SERPL-MCNC: 270 MG/DL (ref 65–100)
HCT VFR BLD AUTO: 34.2 % (ref 41–53)
HGB BLD-MCNC: 11 G/DL (ref 13.5–17.5)
LYMPHOCYTES # BLD: 1.3 K/UL (ref 1–4.8)
LYMPHOCYTES NFR BLD: 17 % (ref 20.5–51.1)
MCH RBC QN AUTO: 26.8 PG (ref 31–34)
MCHC RBC AUTO-ENTMCNC: 32.2 G/DL (ref 31–36)
MCV RBC AUTO: 83.4 FL (ref 80–100)
MONOCYTES # BLD: 0.4 K/UL (ref 0.2–2.4)
MONOCYTES NFR BLD: 6 % (ref 1.7–9.3)
NEUTS SEG # BLD: 5.7 K/UL (ref 1.8–7.7)
NEUTS SEG NFR BLD: 77 % (ref 42–75)
NRBC # BLD: 0 K/UL
NRBC BLD-RTO: 0.1 PER 100 WBC
PERFORMED BY, TECHID: ABNORMAL
PLATELET # BLD AUTO: 248 K/UL
PMV BLD AUTO: 8.8 FL (ref 6.5–11.5)
POTASSIUM SERPL-SCNC: 3.5 MMOL/L (ref 3.5–5.1)
PROT SERPL-MCNC: 6.7 G/DL (ref 6.4–8.2)
RBC # BLD AUTO: 4.1 M/UL (ref 4.5–5.9)
SODIUM SERPL-SCNC: 141 MMOL/L (ref 136–145)
WBC # BLD AUTO: 7.4 K/UL (ref 4.4–11.3)

## 2021-02-19 PROCEDURE — 94760 N-INVAS EAR/PLS OXIMETRY 1: CPT

## 2021-02-19 PROCEDURE — 80053 COMPREHEN METABOLIC PANEL: CPT

## 2021-02-19 PROCEDURE — 74011250637 HC RX REV CODE- 250/637: Performed by: NURSE PRACTITIONER

## 2021-02-19 PROCEDURE — 85025 COMPLETE CBC W/AUTO DIFF WBC: CPT

## 2021-02-19 PROCEDURE — 74011636637 HC RX REV CODE- 636/637: Performed by: INTERNAL MEDICINE

## 2021-02-19 PROCEDURE — 94640 AIRWAY INHALATION TREATMENT: CPT

## 2021-02-19 PROCEDURE — 65270000029 HC RM PRIVATE

## 2021-02-19 PROCEDURE — 74011250636 HC RX REV CODE- 250/636: Performed by: INTERNAL MEDICINE

## 2021-02-19 PROCEDURE — 74011250636 HC RX REV CODE- 250/636: Performed by: EMERGENCY MEDICINE

## 2021-02-19 PROCEDURE — 82962 GLUCOSE BLOOD TEST: CPT

## 2021-02-19 PROCEDURE — 77010033678 HC OXYGEN DAILY

## 2021-02-19 PROCEDURE — 74011000250 HC RX REV CODE- 250: Performed by: NURSE PRACTITIONER

## 2021-02-19 PROCEDURE — 74011636637 HC RX REV CODE- 636/637: Performed by: NURSE PRACTITIONER

## 2021-02-19 PROCEDURE — 74011250637 HC RX REV CODE- 250/637: Performed by: INTERNAL MEDICINE

## 2021-02-19 PROCEDURE — 74011250636 HC RX REV CODE- 250/636: Performed by: NURSE PRACTITIONER

## 2021-02-19 PROCEDURE — 74011000258 HC RX REV CODE- 258: Performed by: NURSE PRACTITIONER

## 2021-02-19 RX ORDER — LISINOPRIL 20 MG/1
20 TABLET ORAL DAILY
Status: DISCONTINUED | OUTPATIENT
Start: 2021-02-19 | End: 2021-02-22 | Stop reason: HOSPADM

## 2021-02-19 RX ORDER — AMLODIPINE BESYLATE 10 MG/1
10 TABLET ORAL DAILY
Status: DISCONTINUED | OUTPATIENT
Start: 2021-02-19 | End: 2021-02-19

## 2021-02-19 RX ADMIN — Medication 5 MG: at 21:39

## 2021-02-19 RX ADMIN — INSULIN GLARGINE 15 UNITS: 100 INJECTION, SOLUTION SUBCUTANEOUS at 08:56

## 2021-02-19 RX ADMIN — INSULIN LISPRO 5 UNITS: 100 INJECTION, SOLUTION INTRAVENOUS; SUBCUTANEOUS at 16:40

## 2021-02-19 RX ADMIN — TRAZODONE HYDROCHLORIDE 50 MG: 50 TABLET ORAL at 08:56

## 2021-02-19 RX ADMIN — INSULIN LISPRO 5 UNITS: 100 INJECTION, SOLUTION INTRAVENOUS; SUBCUTANEOUS at 13:20

## 2021-02-19 RX ADMIN — HEPARIN SODIUM 5000 UNITS: 5000 INJECTION INTRAVENOUS; SUBCUTANEOUS at 21:38

## 2021-02-19 RX ADMIN — INSULIN LISPRO 2 UNITS: 100 INJECTION, SOLUTION INTRAVENOUS; SUBCUTANEOUS at 21:51

## 2021-02-19 RX ADMIN — MAGNESIUM OXIDE 400 MG: 400 TABLET ORAL at 08:56

## 2021-02-19 RX ADMIN — LISINOPRIL 20 MG: 20 TABLET ORAL at 08:56

## 2021-02-19 RX ADMIN — GUAIFENESIN 600 MG: 600 TABLET, EXTENDED RELEASE ORAL at 21:39

## 2021-02-19 RX ADMIN — HEPARIN SODIUM 5000 UNITS: 5000 INJECTION INTRAVENOUS; SUBCUTANEOUS at 08:57

## 2021-02-19 RX ADMIN — AMLODIPINE BESYLATE 10 MG: 10 TABLET ORAL at 08:56

## 2021-02-19 RX ADMIN — CEFTRIAXONE SODIUM 1 G: 1 INJECTION, POWDER, FOR SOLUTION INTRAMUSCULAR; INTRAVENOUS at 08:55

## 2021-02-19 RX ADMIN — BUDESONIDE AND FORMOTEROL FUMARATE DIHYDRATE 2 PUFF: 160; 4.5 AEROSOL RESPIRATORY (INHALATION) at 20:14

## 2021-02-19 RX ADMIN — INSULIN LISPRO 5 UNITS: 100 INJECTION, SOLUTION INTRAVENOUS; SUBCUTANEOUS at 08:58

## 2021-02-19 RX ADMIN — PANTOPRAZOLE SODIUM 40 MG: 40 TABLET, DELAYED RELEASE ORAL at 09:01

## 2021-02-19 RX ADMIN — INSULIN LISPRO 5 UNITS: 100 INJECTION, SOLUTION INTRAVENOUS; SUBCUTANEOUS at 08:55

## 2021-02-19 RX ADMIN — TRAZODONE HYDROCHLORIDE 50 MG: 50 TABLET ORAL at 17:12

## 2021-02-19 RX ADMIN — INSULIN LISPRO 7 UNITS: 100 INJECTION, SOLUTION INTRAVENOUS; SUBCUTANEOUS at 13:26

## 2021-02-19 RX ADMIN — INSULIN GLARGINE 15 UNITS: 100 INJECTION, SOLUTION SUBCUTANEOUS at 21:51

## 2021-02-19 RX ADMIN — TAMSULOSIN HYDROCHLORIDE 0.4 MG: 0.4 CAPSULE ORAL at 21:39

## 2021-02-19 RX ADMIN — DEXAMETHASONE SODIUM PHOSPHATE 6 MG: 10 INJECTION INTRAMUSCULAR; INTRAVENOUS at 08:57

## 2021-02-19 RX ADMIN — INSULIN LISPRO 5 UNITS: 100 INJECTION, SOLUTION INTRAVENOUS; SUBCUTANEOUS at 16:39

## 2021-02-19 RX ADMIN — AZITHROMYCIN DIHYDRATE 500 MG: 500 INJECTION, POWDER, LYOPHILIZED, FOR SOLUTION INTRAVENOUS at 13:59

## 2021-02-19 RX ADMIN — ATORVASTATIN CALCIUM 20 MG: 20 TABLET, FILM COATED ORAL at 08:57

## 2021-02-19 RX ADMIN — GUAIFENESIN 600 MG: 600 TABLET, EXTENDED RELEASE ORAL at 08:57

## 2021-02-19 RX ADMIN — REMDESIVIR 100 MG: 100 INJECTION, POWDER, LYOPHILIZED, FOR SOLUTION INTRAVENOUS at 13:20

## 2021-02-19 RX ADMIN — BUDESONIDE AND FORMOTEROL FUMARATE DIHYDRATE 2 PUFF: 160; 4.5 AEROSOL RESPIRATORY (INHALATION) at 08:01

## 2021-02-19 NOTE — PROGRESS NOTES
0700: Assumed care of the patient    0740: VS and BS    0845: Patient resting in bed    1000: pt in bed, sleeping    1145: Nurse spoke with julio about speaking with the patient

## 2021-02-19 NOTE — PROGRESS NOTES
Spiritual Care Assessment/Progress Note  [facility]      NAME: Kang Ruiz      MRN: [id number]  AGE: 77 y.o. SEX: male  [place] Affiliation:    Language: English     2/19/2021     Total Time (in minutes): 6     Spiritual Assessment begun in [facility] [address] through conversation with:         [x]Patient        [] Family    [] Friend(s)        Reason for Consult: Initial/Spiritual assessment, patient floor     Spiritual beliefs: (Please include comment if needed)     [] Identifies with a sri tradition:         [] Supported by a sri community:            [] Claims no spiritual orientation:           [] Seeking spiritual identity:                [] Adheres to an individual form of spirituality:           [x] Not able to assess:                           Identified resources for coping:      [] Prayer                               [] Music                  [] Guided Imagery     [] Family/friends                 [] Pet visits     [] Devotional reading                         [x] Unknown     [] Other:                                               Interventions offered during this visit: (See comments for more details)    Patient Interventions: Other (comment)(Attempt)           Plan of Care:     [] Support spiritual and/or cultural needs    [] Support AMD and/or advance care planning process      [] Support grieving process   [] Coordinate Rites and/or Rituals    [] Coordination with community clergy   [] No spiritual needs identified at this time   [] Detailed Plan of Care below (See Comments)  [] Make referral to Music Therapy  [] Make referral to Pet Therapy     [] Make referral to Addiction services  [] Make referral to [facility]  [] Make referral to Spiritual Care Partner  [] No future visits requested        [x] Follow up upon further referrals     Comments:  During rounding on 2 Acute Care unitt, nurse suggested pt may benefit from pastoral care.   thus called pt, Erlin Venita Justingabriel, for initial spiritual assessment. Pt did not answer his phone. Spiritual care is still available to support upon further referrals. Visited by: Jaqui Pederson.    can be reached by calling the  at [facility]  [phone]

## 2021-02-19 NOTE — PROGRESS NOTES
Problem: Falls - Risk of  Goal: *Absence of Falls  Description: Document Corey Belle Fall Risk and appropriate interventions in the flowsheet.   Outcome: Progressing Towards Goal  Note: Fall Risk Interventions:  Mobility Interventions: Utilize walker, cane, or other assistive device         Medication Interventions: Teach patient to arise slowly                   Problem: Patient Education: Go to Patient Education Activity  Goal: Patient/Family Education  Outcome: Progressing Towards Goal

## 2021-02-19 NOTE — PROGRESS NOTES
Hospitalist Progress Note         Aria Naidu MD          Daily Progress Note: 2/19/2021      Subjective: The patient is seen for follow  up. 13-year-old gentleman with history of type 2 diabetes coronary artery disease status post CABG in 2018 admitted for shortness of breath and positive COVID-19. Required 4 L nasal oxygen on admission. He is seen in follow-up. Reports significant improvement in breathing overnight.   Remains on 4 L nasal oxygen    Problem List:  Problem List as of 2/19/2021 Never Reviewed          Codes Class Noted - Resolved    Pneumonia due to COVID-19 virus ICD-10-CM: U07.1, J12.82  ICD-9-CM: 480.8  2/17/2021 - Present        Chest pain ICD-10-CM: R07.9  ICD-9-CM: 786.50  12/12/2020 - Present        CAD (coronary artery disease) ICD-10-CM: I25.10  ICD-9-CM: 414.00  12/12/2020 - Present        HTN (hypertension) ICD-10-CM: I10  ICD-9-CM: 401.9  12/12/2020 - Present              Medications reviewed  Current Facility-Administered Medications   Medication Dose Route Frequency    lisinopriL (PRINIVIL, ZESTRIL) tablet 20 mg  20 mg Oral DAILY    amLODIPine (NORVASC) tablet 10 mg  10 mg Oral DAILY    insulin glargine (LANTUS) injection 15 Units  15 Units SubCUTAneous BID    insulin lispro (HUMALOG) injection 5 Units  5 Units SubCUTAneous TIDAC    amLODIPine (NORVASC) tablet 10 mg  10 mg Oral DAILY    dexamethasone (DECADRON) 10 mg/mL injection 6 mg  6 mg IntraVENous Q24H    azithromycin (ZITHROMAX) 500 mg in 0.9% sodium chloride 250 mL (VIAL-MATE)  500 mg IntraVENous Q24H    cefTRIAXone (ROCEPHIN) 1 g in 0.9% sodium chloride (MBP/ADV) 50 mL MBP  1 g IntraVENous Q24H    atorvastatin (LIPITOR) tablet 20 mg  20 mg Oral DAILY    magnesium oxide (MAG-OX) tablet 400 mg  400 mg Oral DAILY    pantoprazole (PROTONIX) tablet 40 mg  40 mg Oral ACB    tamsulosin (FLOMAX) capsule 0.4 mg  0.4 mg Oral QHS    traZODone (DESYREL) tablet 50 mg  50 mg Oral BID    heparin (porcine) injection 5,000 Units  5,000 Units SubCUTAneous Q12H    budesonide-formoteroL (SYMBICORT) 160-4.5 mcg/actuation HFA inhaler 2 Puff  2 Puff Inhalation BID RT    albuterol (PROVENTIL HFA, VENTOLIN HFA, PROAIR HFA) inhaler 2 Puff  2 Puff Inhalation Q6H PRN    acetaminophen (TYLENOL) tablet 650 mg  650 mg Oral Q6H PRN    ondansetron (ZOFRAN) injection 4 mg  4 mg IntraVENous Q8H PRN    guaiFENesin ER (MUCINEX) tablet 600 mg  600 mg Oral Q12H    glucose chewable tablet 16 g  4 Tab Oral PRN    dextrose (D50W) injection syrg 12.5-25 g  25-50 mL IntraVENous PRN    glucagon (GLUCAGEN) injection 1 mg  1 mg IntraMUSCular PRN    insulin lispro (HUMALOG) injection   SubCUTAneous AC&HS    melatonin (rapid dissolve) tablet 5 mg  5 mg Oral QHS    remdesivir 100 mg in 0.9% sodium chloride 250 mL IVPB  100 mg IntraVENous Q24H       Review of Systems:   A comprehensive review of systems was negative except for that written in the HPI. Objective:   Physical Exam:     Visit Vitals  BP (!) 195/90   Pulse 70   Temp 98.3 °F (36.8 °C)   Resp 20   Ht 5' 10\" (1.778 m)   Wt 108.9 kg (240 lb)   SpO2 93%   BMI 34.44 kg/m²    O2 Flow Rate (L/min): 4 l/min O2 Device: Nasal cannula    Temp (24hrs), Av.4 °F (36.9 °C), Min:98.3 °F (36.8 °C), Max:98.7 °F (37.1 °C)    No intake/output data recorded.  1901 -  0700  In: 2319 [P.O.:720; I.V.:1589]  Out: 1     General:  Alert, cooperative, no distress, appears stated age. Lungs:   Clear to auscultation bilaterally. Chest wall:  No tenderness or deformity. Heart:  Regular rate and rhythm, S1, S2 normal, no murmur, click, rub or gallop. Abdomen:   Soft, non-tender. Bowel sounds normal. No masses,  No organomegaly. Extremities: Extremities normal, atraumatic, no cyanosis or edema. Pulses: 2+ and symmetric all extremities. Skin: Skin color, texture, turgor normal. No rashes or lesions   Neurologic: CNII-XII intact.  No gross sensory or motor deficits Data Review:       Recent Days:  Recent Labs     02/19/21  0522 02/18/21  0527 02/17/21  1452   WBC 7.4 4.1* 5.3   HGB 11.0* 10.4* 11.6*   HCT 34.2* 32.8* 36.3*    225 250     Recent Labs     02/19/21  0522 02/18/21  0527 02/17/21  1452    139  139 136   K 3.5 4.1  4.0 3.4*    105  104 98   CO2 23 23  23 24   * 421*  418* 441*   BUN 19 26*  27* 18   CREA 1.04 1.30  1.26 1.48*   CA 8.3* 8.2*  8.1* 8.8   ALB 2.4* 2.5* 3.0*   TBILI 0.2 0.3 0.4   ALT 23 25 25     Recent Labs     02/17/21  1558   PH 7.44   PCO2 32*   PO2 69*   HCO3 21*   FIO2 36.0       24 Hour Results:  Recent Results (from the past 24 hour(s))   GLUCOSE, POC    Collection Time: 02/18/21 11:42 AM   Result Value Ref Range    Glucose (POC) 371 (H) 65 - 100 mg/dL    Performed by [facility] [place], POC    Collection Time: 02/18/21  3:20 PM   Result Value Ref Range    Glucose (POC) 360 (H) 65 - 100 mg/dL    Performed by [facility] [place], POC    Collection Time: 02/18/21  9:17 PM   Result Value Ref Range    Glucose (POC) 334 (H) 65 - 100 mg/dL    Performed by MIRZA PENA    METABOLIC PANEL, COMPREHENSIVE    Collection Time: 02/19/21  5:22 AM   Result Value Ref Range    Sodium 141 136 - 145 mmol/L    Potassium 3.5 3.5 - 5.1 mmol/L    Chloride 107 97 - 108 mmol/L    CO2 23 21 - 32 mmol/L    Anion gap 11 5 - 15 mmol/L    Glucose 270 (H) 65 - 100 mg/dL    BUN 19 6 - 20 mg/dL    Creatinine 1.04 0.70 - 1.30 mg/dL    BUN/Creatinine ratio 18 12 - 20      GFR est AA >60 >60 ml/min/1.73m2    GFR est non-AA >60 >60 ml/min/1.73m2    Calcium 8.3 (L) 8.5 - 10.1 mg/dL    Bilirubin, total 0.2 0.2 - 1.0 mg/dL    AST (SGOT) 25 15 - 37 U/L    ALT (SGPT) 23 12 - 78 U/L    Alk.  phosphatase 86 45 - 117 U/L    Protein, total 6.7 6.4 - 8.2 g/dL    Albumin 2.4 (L) 3.5 - 5.0 g/dL    Globulin 4.3 (H) 2.0 - 4.0 g/dL    A-G Ratio 0.6 (L) 1.1 - 2.2     CBC WITH AUTOMATED DIFF    Collection Time: 02/19/21  5:22 AM   Result Value Ref Range    WBC 7.4 4.4 - 11.3 K/uL    RBC 4.10 (L) 4.50 - 5.90 M/uL    HGB 11.0 (L) 13.5 - 17.5 g/dL    HCT 34.2 (L) 41 - 53 %    MCV 83.4 80 - 100 FL    MCH 26.8 (L) 31 - 34 PG    MCHC 32.2 31.0 - 36.0 g/dL    RDW 15.3 (H) 11.5 - 14.5 %    PLATELET 156 K/uL    MPV 8.8 6.5 - 11.5 FL    NRBC 0.1  WBC    ABSOLUTE NRBC 0.00 K/uL    NEUTROPHILS 77 (H) 42 - 75 %    LYMPHOCYTES 17 (L) 20.5 - 51.1 %    MONOCYTES 6 1.7 - 9.3 %    EOSINOPHILS 0 (L) 0.9 - 2.9 %    BASOPHILS 0 0.0 - 2.5 %    ABS. NEUTROPHILS 5.7 1.8 - 7.7 K/UL    ABS. LYMPHOCYTES 1.3 1.0 - 4.8 K/UL    ABS. MONOCYTES 0.4 0.2 - 2.4 K/UL    ABS. EOSINOPHILS 0.0 0.0 - 0.7 K/UL    ABS. BASOPHILS 0.0 0.0 - 0.2 K/UL   GLUCOSE, POC    Collection Time: 02/19/21  7:34 AM   Result Value Ref Range    Glucose (POC) 287 (H) 65 - 100 mg/dL    Performed by [facility]            Assessment/     1. Acute Hypoxic Respiratory Failure  Secondary to Covid-19 Pneumonia  Currently on O2 at 4 liters via NC  CT chest shows bilateral airspace opacities  Continue supplemental O2 and wean off as tolerated  Continue Decadron, Zithromax, ProAir, Dulera     2. Sepsis  Secondary to Covid-19  He met sepsis criteria with hypotension and Fever  Will get blood culture  Continue Zithromax, Ceftriaxone and discontinue IV fluids     3. Covid-19 pneumonitis  Rapid Covid-19 test is positive today  Continue supplemental oxygen, Decadron, and Zithromax, start Remdesir  Wean off oxygen as tolerated     4. CAD with history of CABG  Status post CABG in 2018  Due to hypotension will hold Imdur and Metoprolol for now. Consider restarting if blood pressures improve.     5.  Diabetes type 2  Recent A1c was 7.7%  Hold Metformin and start SSI     6. Acute Kidney Injury  Creatinine is 1.48 ad prior was normal  Likely secondary to sepsis, dehydration and Lasix and Lisinopril  Will hold lasix and Lisinopril for now. Start IV fluids for gentle rehydration    7.   Left renal hyperdense mass  Incidentally noted on chest CT. This will need to be investigated once respiratory function is stable  He will require outpatient follow up with Oncology for further workup.       Plan:      Care Plan discussed with: Patient/Family    Total time spent with patient: 30 minutes.     Mal Woods MD

## 2021-02-19 NOTE — ROUTINE PROCESS
Received resting quietly in bed watching TV. Resp non-labored. Droplet Isolation maintained due to COVID. O2 on at 4 L/M. Saline Lock intact in (L) forearm without infiltration at site. Use cane for unsteady gait. Ambulate to bathroom ad carroll. Denies C/O at present. No acute resp distress noted.

## 2021-02-20 LAB
ALBUMIN SERPL-MCNC: 2.3 G/DL (ref 3.5–5)
ALBUMIN/GLOB SERPL: 0.5 {RATIO} (ref 1.1–2.2)
ALP SERPL-CCNC: 91 U/L (ref 45–117)
ALT SERPL-CCNC: 23 U/L (ref 12–78)
ANION GAP SERPL CALC-SCNC: 12 MMOL/L (ref 5–15)
AST SERPL W P-5'-P-CCNC: 25 U/L (ref 15–37)
BILIRUB SERPL-MCNC: 0.2 MG/DL (ref 0.2–1)
BUN SERPL-MCNC: 16 MG/DL (ref 6–20)
BUN/CREAT SERPL: 16 (ref 12–20)
CA-I BLD-MCNC: 8.4 MG/DL (ref 8.5–10.1)
CHLORIDE SERPL-SCNC: 107 MMOL/L (ref 97–108)
CO2 SERPL-SCNC: 23 MMOL/L (ref 21–32)
CREAT SERPL-MCNC: 0.98 MG/DL (ref 0.7–1.3)
ERYTHROCYTE [DISTWIDTH] IN BLOOD BY AUTOMATED COUNT: 15.6 % (ref 11.5–14.5)
GLOBULIN SER CALC-MCNC: 4.5 G/DL (ref 2–4)
GLUCOSE BLD STRIP.AUTO-MCNC: 134 MG/DL (ref 65–100)
GLUCOSE BLD STRIP.AUTO-MCNC: 206 MG/DL (ref 65–100)
GLUCOSE BLD STRIP.AUTO-MCNC: 213 MG/DL (ref 65–100)
GLUCOSE BLD STRIP.AUTO-MCNC: 234 MG/DL (ref 65–100)
GLUCOSE SERPL-MCNC: 208 MG/DL (ref 65–100)
HCT VFR BLD AUTO: 35.4 % (ref 41–53)
HGB BLD-MCNC: 11.5 G/DL (ref 13.5–17.5)
MCH RBC QN AUTO: 26.9 PG (ref 31–34)
MCHC RBC AUTO-ENTMCNC: 32.5 G/DL (ref 31–36)
MCV RBC AUTO: 82.9 FL (ref 80–100)
NRBC # BLD: 0 K/UL
NRBC BLD-RTO: 0 PER 100 WBC
PERFORMED BY, TECHID: ABNORMAL
PLATELET # BLD AUTO: 268 K/UL
PMV BLD AUTO: 9 FL (ref 6.5–11.5)
POTASSIUM SERPL-SCNC: 3.3 MMOL/L (ref 3.5–5.1)
PROT SERPL-MCNC: 6.8 G/DL (ref 6.4–8.2)
RBC # BLD AUTO: 4.27 M/UL (ref 4.5–5.9)
SODIUM SERPL-SCNC: 142 MMOL/L (ref 136–145)
WBC # BLD AUTO: 6.9 K/UL (ref 4.4–11.3)

## 2021-02-20 PROCEDURE — 74011250637 HC RX REV CODE- 250/637: Performed by: INTERNAL MEDICINE

## 2021-02-20 PROCEDURE — 74011250636 HC RX REV CODE- 250/636: Performed by: NURSE PRACTITIONER

## 2021-02-20 PROCEDURE — 74011250636 HC RX REV CODE- 250/636: Performed by: INTERNAL MEDICINE

## 2021-02-20 PROCEDURE — 65270000029 HC RM PRIVATE

## 2021-02-20 PROCEDURE — 74011636637 HC RX REV CODE- 636/637: Performed by: NURSE PRACTITIONER

## 2021-02-20 PROCEDURE — 36415 COLL VENOUS BLD VENIPUNCTURE: CPT

## 2021-02-20 PROCEDURE — 74011636637 HC RX REV CODE- 636/637: Performed by: INTERNAL MEDICINE

## 2021-02-20 PROCEDURE — 94640 AIRWAY INHALATION TREATMENT: CPT

## 2021-02-20 PROCEDURE — 94760 N-INVAS EAR/PLS OXIMETRY 1: CPT

## 2021-02-20 PROCEDURE — 82962 GLUCOSE BLOOD TEST: CPT

## 2021-02-20 PROCEDURE — 74011000250 HC RX REV CODE- 250: Performed by: NURSE PRACTITIONER

## 2021-02-20 PROCEDURE — 74011250636 HC RX REV CODE- 250/636: Performed by: EMERGENCY MEDICINE

## 2021-02-20 PROCEDURE — 74011000258 HC RX REV CODE- 258: Performed by: NURSE PRACTITIONER

## 2021-02-20 PROCEDURE — 74011250637 HC RX REV CODE- 250/637: Performed by: NURSE PRACTITIONER

## 2021-02-20 PROCEDURE — 77010033678 HC OXYGEN DAILY

## 2021-02-20 PROCEDURE — 80053 COMPREHEN METABOLIC PANEL: CPT

## 2021-02-20 PROCEDURE — 85027 COMPLETE CBC AUTOMATED: CPT

## 2021-02-20 RX ORDER — POTASSIUM CHLORIDE 20 MEQ/1
40 TABLET, EXTENDED RELEASE ORAL 2 TIMES DAILY
Status: COMPLETED | OUTPATIENT
Start: 2021-02-20 | End: 2021-02-20

## 2021-02-20 RX ORDER — METOPROLOL TARTRATE 25 MG/1
25 TABLET, FILM COATED ORAL EVERY 12 HOURS
Status: DISCONTINUED | OUTPATIENT
Start: 2021-02-20 | End: 2021-02-22 | Stop reason: HOSPADM

## 2021-02-20 RX ADMIN — METOPROLOL TARTRATE 25 MG: 25 TABLET, FILM COATED ORAL at 21:21

## 2021-02-20 RX ADMIN — HEPARIN SODIUM 5000 UNITS: 5000 INJECTION INTRAVENOUS; SUBCUTANEOUS at 21:21

## 2021-02-20 RX ADMIN — AMLODIPINE BESYLATE 10 MG: 10 TABLET ORAL at 08:53

## 2021-02-20 RX ADMIN — METOPROLOL TARTRATE 25 MG: 25 TABLET, FILM COATED ORAL at 10:22

## 2021-02-20 RX ADMIN — GUAIFENESIN 600 MG: 600 TABLET, EXTENDED RELEASE ORAL at 08:53

## 2021-02-20 RX ADMIN — INSULIN LISPRO 5 UNITS: 100 INJECTION, SOLUTION INTRAVENOUS; SUBCUTANEOUS at 08:52

## 2021-02-20 RX ADMIN — BUDESONIDE AND FORMOTEROL FUMARATE DIHYDRATE 2 PUFF: 160; 4.5 AEROSOL RESPIRATORY (INHALATION) at 07:51

## 2021-02-20 RX ADMIN — POTASSIUM CHLORIDE 40 MEQ: 1500 TABLET, EXTENDED RELEASE ORAL at 17:18

## 2021-02-20 RX ADMIN — HEPARIN SODIUM 5000 UNITS: 5000 INJECTION INTRAVENOUS; SUBCUTANEOUS at 08:54

## 2021-02-20 RX ADMIN — DEXAMETHASONE SODIUM PHOSPHATE 6 MG: 10 INJECTION INTRAMUSCULAR; INTRAVENOUS at 08:54

## 2021-02-20 RX ADMIN — INSULIN LISPRO 3 UNITS: 100 INJECTION, SOLUTION INTRAVENOUS; SUBCUTANEOUS at 12:20

## 2021-02-20 RX ADMIN — INSULIN LISPRO 3 UNITS: 100 INJECTION, SOLUTION INTRAVENOUS; SUBCUTANEOUS at 08:53

## 2021-02-20 RX ADMIN — INSULIN LISPRO 5 UNITS: 100 INJECTION, SOLUTION INTRAVENOUS; SUBCUTANEOUS at 16:21

## 2021-02-20 RX ADMIN — AZITHROMYCIN DIHYDRATE 500 MG: 500 INJECTION, POWDER, LYOPHILIZED, FOR SOLUTION INTRAVENOUS at 14:11

## 2021-02-20 RX ADMIN — INSULIN GLARGINE 15 UNITS: 100 INJECTION, SOLUTION SUBCUTANEOUS at 08:52

## 2021-02-20 RX ADMIN — INSULIN LISPRO 3 UNITS: 100 INJECTION, SOLUTION INTRAVENOUS; SUBCUTANEOUS at 16:21

## 2021-02-20 RX ADMIN — GUAIFENESIN 600 MG: 600 TABLET, EXTENDED RELEASE ORAL at 21:21

## 2021-02-20 RX ADMIN — POTASSIUM CHLORIDE 40 MEQ: 1500 TABLET, EXTENDED RELEASE ORAL at 08:53

## 2021-02-20 RX ADMIN — INSULIN LISPRO 5 UNITS: 100 INJECTION, SOLUTION INTRAVENOUS; SUBCUTANEOUS at 12:19

## 2021-02-20 RX ADMIN — REMDESIVIR 100 MG: 100 INJECTION, POWDER, LYOPHILIZED, FOR SOLUTION INTRAVENOUS at 11:19

## 2021-02-20 RX ADMIN — Medication 5 MG: at 21:21

## 2021-02-20 RX ADMIN — PANTOPRAZOLE SODIUM 40 MG: 40 TABLET, DELAYED RELEASE ORAL at 09:32

## 2021-02-20 RX ADMIN — TAMSULOSIN HYDROCHLORIDE 0.4 MG: 0.4 CAPSULE ORAL at 21:21

## 2021-02-20 RX ADMIN — LISINOPRIL 20 MG: 20 TABLET ORAL at 08:53

## 2021-02-20 RX ADMIN — ATORVASTATIN CALCIUM 20 MG: 20 TABLET, FILM COATED ORAL at 08:53

## 2021-02-20 RX ADMIN — MAGNESIUM OXIDE 400 MG: 400 TABLET ORAL at 08:53

## 2021-02-20 RX ADMIN — CEFTRIAXONE SODIUM 1 G: 1 INJECTION, POWDER, FOR SOLUTION INTRAMUSCULAR; INTRAVENOUS at 08:54

## 2021-02-20 RX ADMIN — INSULIN GLARGINE 15 UNITS: 100 INJECTION, SOLUTION SUBCUTANEOUS at 21:21

## 2021-02-20 RX ADMIN — TRAZODONE HYDROCHLORIDE 50 MG: 50 TABLET ORAL at 08:53

## 2021-02-20 RX ADMIN — TRAZODONE HYDROCHLORIDE 50 MG: 50 TABLET ORAL at 17:19

## 2021-02-20 RX ADMIN — BUDESONIDE AND FORMOTEROL FUMARATE DIHYDRATE 2 PUFF: 160; 4.5 AEROSOL RESPIRATORY (INHALATION) at 19:23

## 2021-02-20 NOTE — ROUTINE PROCESS
Lying in bed watching television. No c/o pain. Non-prod cough noted. No dyspnea noted. @ 2020 O2 increased to 5LPM/nc per Kevin RT. Ham/cheese sandwich given @ bedtime for snack.

## 2021-02-20 NOTE — PROGRESS NOTES
Hospitalist Progress Note         Charline Valenzuela MD          Daily Progress Note: 2/20/2021      Subjective: The patient is seen for follow  up. 47-year-old gentleman with history of type 2 diabetes coronary artery disease status post CABG in 2018 admitted for shortness of breath and positive COVID-19. Required 4 L nasal oxygen on admission. He is seen in follow-up. Reports significant improvement in breathing overnight.   Remains on 5 L nasal oxygen    Problem List:  Problem List as of 2/20/2021 Never Reviewed          Codes Class Noted - Resolved    Pneumonia due to COVID-19 virus ICD-10-CM: U07.1, J12.82  ICD-9-CM: 480.8  2/17/2021 - Present        Chest pain ICD-10-CM: R07.9  ICD-9-CM: 786.50  12/12/2020 - Present        CAD (coronary artery disease) ICD-10-CM: I25.10  ICD-9-CM: 414.00  12/12/2020 - Present        HTN (hypertension) ICD-10-CM: I10  ICD-9-CM: 401.9  12/12/2020 - Present              Medications reviewed  Current Facility-Administered Medications   Medication Dose Route Frequency    potassium chloride (K-DUR, KLOR-CON) SR tablet 40 mEq  40 mEq Oral BID    metoprolol tartrate (LOPRESSOR) tablet 25 mg  25 mg Oral Q12H    lisinopriL (PRINIVIL, ZESTRIL) tablet 20 mg  20 mg Oral DAILY    insulin glargine (LANTUS) injection 15 Units  15 Units SubCUTAneous BID    insulin lispro (HUMALOG) injection 5 Units  5 Units SubCUTAneous TIDAC    amLODIPine (NORVASC) tablet 10 mg  10 mg Oral DAILY    dexamethasone (DECADRON) 10 mg/mL injection 6 mg  6 mg IntraVENous Q24H    azithromycin (ZITHROMAX) 500 mg in 0.9% sodium chloride 250 mL (VIAL-MATE)  500 mg IntraVENous Q24H    cefTRIAXone (ROCEPHIN) 1 g in 0.9% sodium chloride (MBP/ADV) 50 mL MBP  1 g IntraVENous Q24H    atorvastatin (LIPITOR) tablet 20 mg  20 mg Oral DAILY    magnesium oxide (MAG-OX) tablet 400 mg  400 mg Oral DAILY    pantoprazole (PROTONIX) tablet 40 mg  40 mg Oral ACB    tamsulosin (FLOMAX) capsule 0.4 mg  0.4 mg Oral QHS    traZODone (DESYREL) tablet 50 mg  50 mg Oral BID    heparin (porcine) injection 5,000 Units  5,000 Units SubCUTAneous Q12H    budesonide-formoteroL (SYMBICORT) 160-4.5 mcg/actuation HFA inhaler 2 Puff  2 Puff Inhalation BID RT    albuterol (PROVENTIL HFA, VENTOLIN HFA, PROAIR HFA) inhaler 2 Puff  2 Puff Inhalation Q6H PRN    acetaminophen (TYLENOL) tablet 650 mg  650 mg Oral Q6H PRN    ondansetron (ZOFRAN) injection 4 mg  4 mg IntraVENous Q8H PRN    guaiFENesin ER (MUCINEX) tablet 600 mg  600 mg Oral Q12H    glucose chewable tablet 16 g  4 Tab Oral PRN    dextrose (D50W) injection syrg 12.5-25 g  25-50 mL IntraVENous PRN    glucagon (GLUCAGEN) injection 1 mg  1 mg IntraMUSCular PRN    insulin lispro (HUMALOG) injection   SubCUTAneous AC&HS    melatonin (rapid dissolve) tablet 5 mg  5 mg Oral QHS    remdesivir 100 mg in 0.9% sodium chloride 250 mL IVPB  100 mg IntraVENous Q24H       Review of Systems:   A comprehensive review of systems was negative except for that written in the HPI. Objective:   Physical Exam:     Visit Vitals  BP (!) 161/84   Pulse 82   Temp 97.8 °F (36.6 °C)   Resp 18   Ht 5' 10\" (1.778 m)   Wt 108.9 kg (240 lb)   SpO2 90%   BMI 34.44 kg/m²    O2 Flow Rate (L/min): 12 l/min O2 Device: Hi flow nasal cannula    Temp (24hrs), Av.1 °F (36.7 °C), Min:97.8 °F (36.6 °C), Max:98.7 °F (37.1 °C)    No intake/output data recorded. No intake/output data recorded. General:  Alert, cooperative, no distress, appears stated age. Lungs:   Clear to auscultation bilaterally. Chest wall:  No tenderness or deformity. Heart:  Regular rate and rhythm, S1, S2 normal, no murmur, click, rub or gallop. Abdomen:   Soft, non-tender. Bowel sounds normal. No masses,  No organomegaly. Extremities: Extremities normal, atraumatic, no cyanosis or edema. Pulses: 2+ and symmetric all extremities.    Skin: Skin color, texture, turgor normal. No rashes or lesions Neurologic: CNII-XII intact. No gross sensory or motor deficits     Data Review:       Recent Days:  Recent Labs     02/20/21  0621 02/19/21  0522 02/18/21  0527   WBC 6.9 7.4 4.1*   HGB 11.5* 11.0* 10.4*   HCT 35.4* 34.2* 32.8*    248 225     Recent Labs     02/20/21  0621 02/19/21  0522 02/18/21  0527    141 139  139   K 3.3* 3.5 4.1  4.0    107 105  104   CO2 23 23 23  23   * 270* 421*  418*   BUN 16 19 26*  27*   CREA 0.98 1.04 1.30  1.26   CA 8.4* 8.3* 8.2*  8.1*   ALB 2.3* 2.4* 2.5*   TBILI 0.2 0.2 0.3   ALT 23 23 25     Recent Labs     02/17/21  1558   PH 7.44   PCO2 32*   PO2 69*   HCO3 21*   FIO2 36.0       24 Hour Results:  Recent Results (from the past 24 hour(s))   GLUCOSE, POC    Collection Time: 02/19/21 11:59 AM   Result Value Ref Range    Glucose (POC) 302 (H) 65 - 100 mg/dL    Performed by Mala Cesar    GLUCOSE, POC    Collection Time: 02/19/21  4:13 PM   Result Value Ref Range    Glucose (POC) 287 (H) 65 - 100 mg/dL    Performed by [facility] [address], POC    Collection Time: 02/19/21  9:46 PM   Result Value Ref Range    Glucose (POC) 239 (H) 65 - 100 mg/dL    Performed by Meme Rocha    METABOLIC PANEL, COMPREHENSIVE    Collection Time: 02/20/21  6:21 AM   Result Value Ref Range    Sodium 142 136 - 145 mmol/L    Potassium 3.3 (L) 3.5 - 5.1 mmol/L    Chloride 107 97 - 108 mmol/L    CO2 23 21 - 32 mmol/L    Anion gap 12 5 - 15 mmol/L    Glucose 208 (H) 65 - 100 mg/dL    BUN 16 6 - 20 mg/dL    Creatinine 0.98 0.70 - 1.30 mg/dL    BUN/Creatinine ratio 16 12 - 20      GFR est AA >60 >60 ml/min/1.73m2    GFR est non-AA >60 >60 ml/min/1.73m2    Calcium 8.4 (L) 8.5 - 10.1 mg/dL    Bilirubin, total 0.2 0.2 - 1.0 mg/dL    AST (SGOT) 25 15 - 37 U/L    ALT (SGPT) 23 12 - 78 U/L    Alk.  phosphatase 91 45 - 117 U/L    Protein, total 6.8 6.4 - 8.2 g/dL    Albumin 2.3 (L) 3.5 - 5.0 g/dL    Globulin 4.5 (H) 2.0 - 4.0 g/dL    A-G Ratio 0.5 (L) 1.1 - 2.2     CBC W/O DIFF    Collection Time: 02/20/21  6:21 AM   Result Value Ref Range    WBC 6.9 4.4 - 11.3 K/uL    RBC 4.27 (L) 4.50 - 5.90 M/uL    HGB 11.5 (L) 13.5 - 17.5 g/dL    HCT 35.4 (L) 41 - 53 %    MCV 82.9 80 - 100 FL    MCH 26.9 (L) 31 - 34 PG    MCHC 32.5 31.0 - 36.0 g/dL    RDW 15.6 (H) 11.5 - 14.5 %    PLATELET 947 K/uL    MPV 9.0 6.5 - 11.5 FL    NRBC 0.0  WBC    ABSOLUTE NRBC 0.00 K/uL   GLUCOSE, POC    Collection Time: 02/20/21  8:00 AM   Result Value Ref Range    Glucose (POC) 206 (H) 65 - 100 mg/dL    Performed by Vincenzo Spence            Assessment/     1. Acute Hypoxic Respiratory Failure  Secondary to Covid-19 Pneumonia  Currently on O2 at 5 liters via NC  CT chest shows bilateral airspace opacities  Continue supplemental O2 and wean off as tolerated  Continue Decadron, Zithromax, ProAir, Dulera     2. Sepsis  Secondary to Covid-19  He met sepsis criteria with hypotension and Fever  Will get blood culture  Continue Zithromax, Ceftriaxone and discontinue IV fluids     3. Covid-19 pneumonitis  Rapid Covid-19 test is positive today  Continue supplemental oxygen, Decadron, and Zithromax, start Remdesir  Wean off oxygen as tolerated     4. CAD with history of CABG  Status post CABG in 2018  Due to hypotension will hold Imdur and Metoprolol for now. Consider restarting if blood pressures improve.     5.  Diabetes type 2  Recent A1c was 7.7%  Hold Metformin and start SSI     6. Acute Kidney Injury  Creatinine is 1.48 ad prior was normal  Likely secondary to sepsis, dehydration and Lasix and Lisinopril  Will hold lasix and Lisinopril for now. Start IV fluids for gentle rehydration    7. Left renal hyperdense mass  Incidentally noted on chest CT. This will need to be investigated once respiratory function is stable  He will require outpatient follow up with Oncology for further workup.           Care Plan discussed with: Patient/Family    Total time spent with patient: 30 minutes.     Ann Juan MD

## 2021-02-20 NOTE — ROUTINE PROCESS
Watching television. States I'm going to tell the doctor I want to go home today. O2 in use. Non-prod cough noted

## 2021-02-21 ENCOUNTER — APPOINTMENT (OUTPATIENT)
Dept: CT IMAGING | Age: 66
DRG: 871 | End: 2021-02-21
Attending: HOSPITALIST
Payer: MEDICARE

## 2021-02-21 LAB
ALBUMIN SERPL-MCNC: 2.3 G/DL (ref 3.5–5)
ALBUMIN/GLOB SERPL: 0.5 {RATIO} (ref 1.1–2.2)
ALP SERPL-CCNC: 96 U/L (ref 45–117)
ALT SERPL-CCNC: 23 U/L (ref 12–78)
ANION GAP SERPL CALC-SCNC: 11 MMOL/L (ref 5–15)
ARTERIAL PATENCY WRIST A: ABNORMAL
AST SERPL W P-5'-P-CCNC: 27 U/L (ref 15–37)
BASE EXCESS BLDA CALC-SCNC: 1.1 MMOL/L (ref 0–2)
BDY SITE: ABNORMAL
BILIRUB SERPL-MCNC: 0.4 MG/DL (ref 0.2–1)
BUN SERPL-MCNC: 15 MG/DL (ref 6–20)
BUN/CREAT SERPL: 17 (ref 12–20)
CA-I BLD-MCNC: 8.4 MG/DL (ref 8.5–10.1)
CHLORIDE SERPL-SCNC: 107 MMOL/L (ref 97–108)
CO2 SERPL-SCNC: 23 MMOL/L (ref 21–32)
COHGB MFR BLD: 0.2 % (ref 0–5)
CREAT SERPL-MCNC: 0.9 MG/DL (ref 0.7–1.3)
D DIMER PPP FEU-MCNC: 0.59 MG/L FEU (ref 0.19–0.5)
ERYTHROCYTE [DISTWIDTH] IN BLOOD BY AUTOMATED COUNT: 15.4 % (ref 11.5–14.5)
FIO2 ON VENT: 60 %
GAS FLOW.O2 O2 DELIVERY SYS: 12 L/MIN
GLOBULIN SER CALC-MCNC: 4.3 G/DL (ref 2–4)
GLUCOSE BLD STRIP.AUTO-MCNC: 127 MG/DL (ref 65–100)
GLUCOSE BLD STRIP.AUTO-MCNC: 134 MG/DL (ref 65–100)
GLUCOSE BLD STRIP.AUTO-MCNC: 145 MG/DL (ref 65–100)
GLUCOSE BLD STRIP.AUTO-MCNC: 162 MG/DL (ref 65–100)
GLUCOSE SERPL-MCNC: 123 MG/DL (ref 65–100)
HCO3 BLDA-SCNC: 24 MMOL/L (ref 22–26)
HCT VFR BLD AUTO: 36 % (ref 41–53)
HGB BLD OXIMETRY-MCNC: 12.9 G/DL (ref 13.5–17.5)
HGB BLD-MCNC: 11.6 G/DL (ref 13.5–17.5)
MAGNESIUM SERPL-MCNC: 1.8 MG/DL (ref 1.6–2.4)
MCH RBC QN AUTO: 26.6 PG (ref 31–34)
MCHC RBC AUTO-ENTMCNC: 32.1 G/DL (ref 31–36)
MCV RBC AUTO: 83 FL (ref 80–100)
METHGB MFR BLD: 0.3 % (ref 0–5)
NRBC # BLD: 0 K/UL
NRBC BLD-RTO: 0.1 PER 100 WBC
OXYHGB MFR BLD: 87.1 % (ref 95–100)
PCO2 BLDA: 32 MMHG (ref 35–45)
PERFORMED BY, TECHID: ABNORMAL
PH BLDA: 7.49 [PH] (ref 7.35–7.45)
PLATELET # BLD AUTO: 273 K/UL
PMV BLD AUTO: 8.9 FL (ref 6.5–11.5)
PO2 BLDA: 50 MMHG (ref 70–100)
POTASSIUM SERPL-SCNC: 3.4 MMOL/L (ref 3.5–5.1)
PROT SERPL-MCNC: 6.6 G/DL (ref 6.4–8.2)
RBC # BLD AUTO: 4.34 M/UL (ref 4.5–5.9)
SAO2% DEVICE SAO2% SENSOR NAME: ABNORMAL
SODIUM SERPL-SCNC: 141 MMOL/L (ref 136–145)
SPECIMEN SITE: ABNORMAL
WBC # BLD AUTO: 6.7 K/UL (ref 4.4–11.3)

## 2021-02-21 PROCEDURE — 74011250636 HC RX REV CODE- 250/636: Performed by: EMERGENCY MEDICINE

## 2021-02-21 PROCEDURE — 74011250637 HC RX REV CODE- 250/637: Performed by: INTERNAL MEDICINE

## 2021-02-21 PROCEDURE — 74011250636 HC RX REV CODE- 250/636: Performed by: NURSE PRACTITIONER

## 2021-02-21 PROCEDURE — 94762 N-INVAS EAR/PLS OXIMTRY CONT: CPT

## 2021-02-21 PROCEDURE — 74011636637 HC RX REV CODE- 636/637: Performed by: NURSE PRACTITIONER

## 2021-02-21 PROCEDURE — 74011000250 HC RX REV CODE- 250: Performed by: NURSE PRACTITIONER

## 2021-02-21 PROCEDURE — 85027 COMPLETE CBC AUTOMATED: CPT

## 2021-02-21 PROCEDURE — 85379 FIBRIN DEGRADATION QUANT: CPT

## 2021-02-21 PROCEDURE — 74011000258 HC RX REV CODE- 258: Performed by: NURSE PRACTITIONER

## 2021-02-21 PROCEDURE — 74011250637 HC RX REV CODE- 250/637: Performed by: NURSE PRACTITIONER

## 2021-02-21 PROCEDURE — 74011250636 HC RX REV CODE- 250/636: Performed by: INTERNAL MEDICINE

## 2021-02-21 PROCEDURE — 94640 AIRWAY INHALATION TREATMENT: CPT

## 2021-02-21 PROCEDURE — 36600 WITHDRAWAL OF ARTERIAL BLOOD: CPT

## 2021-02-21 PROCEDURE — 82962 GLUCOSE BLOOD TEST: CPT

## 2021-02-21 PROCEDURE — 65270000029 HC RM PRIVATE

## 2021-02-21 PROCEDURE — 83735 ASSAY OF MAGNESIUM: CPT

## 2021-02-21 PROCEDURE — 74011636637 HC RX REV CODE- 636/637: Performed by: INTERNAL MEDICINE

## 2021-02-21 PROCEDURE — 80053 COMPREHEN METABOLIC PANEL: CPT

## 2021-02-21 PROCEDURE — 36415 COLL VENOUS BLD VENIPUNCTURE: CPT

## 2021-02-21 PROCEDURE — 77010033678 HC OXYGEN DAILY

## 2021-02-21 PROCEDURE — 71250 CT THORAX DX C-: CPT

## 2021-02-21 PROCEDURE — 94760 N-INVAS EAR/PLS OXIMETRY 1: CPT

## 2021-02-21 PROCEDURE — 82803 BLOOD GASES ANY COMBINATION: CPT

## 2021-02-21 RX ORDER — FUROSEMIDE 10 MG/ML
40 INJECTION INTRAMUSCULAR; INTRAVENOUS ONCE
Status: COMPLETED | OUTPATIENT
Start: 2021-02-21 | End: 2021-02-21

## 2021-02-21 RX ORDER — LORAZEPAM 2 MG/ML
1 INJECTION INTRAMUSCULAR
Status: DISCONTINUED | OUTPATIENT
Start: 2021-02-21 | End: 2021-02-22 | Stop reason: HOSPADM

## 2021-02-21 RX ADMIN — FUROSEMIDE 40 MG: 10 INJECTION, SOLUTION INTRAMUSCULAR; INTRAVENOUS at 17:36

## 2021-02-21 RX ADMIN — AMLODIPINE BESYLATE 10 MG: 10 TABLET ORAL at 08:33

## 2021-02-21 RX ADMIN — LISINOPRIL 20 MG: 20 TABLET ORAL at 08:33

## 2021-02-21 RX ADMIN — BUDESONIDE AND FORMOTEROL FUMARATE DIHYDRATE 2 PUFF: 160; 4.5 AEROSOL RESPIRATORY (INHALATION) at 19:22

## 2021-02-21 RX ADMIN — METOPROLOL TARTRATE 25 MG: 25 TABLET, FILM COATED ORAL at 08:33

## 2021-02-21 RX ADMIN — GUAIFENESIN 600 MG: 600 TABLET, EXTENDED RELEASE ORAL at 21:39

## 2021-02-21 RX ADMIN — CEFTRIAXONE SODIUM 1 G: 1 INJECTION, POWDER, FOR SOLUTION INTRAMUSCULAR; INTRAVENOUS at 08:34

## 2021-02-21 RX ADMIN — METOPROLOL TARTRATE 25 MG: 25 TABLET, FILM COATED ORAL at 21:39

## 2021-02-21 RX ADMIN — INSULIN GLARGINE 15 UNITS: 100 INJECTION, SOLUTION SUBCUTANEOUS at 08:34

## 2021-02-21 RX ADMIN — REMDESIVIR 100 MG: 100 INJECTION, POWDER, LYOPHILIZED, FOR SOLUTION INTRAVENOUS at 10:14

## 2021-02-21 RX ADMIN — HEPARIN SODIUM 5000 UNITS: 5000 INJECTION INTRAVENOUS; SUBCUTANEOUS at 21:39

## 2021-02-21 RX ADMIN — INSULIN LISPRO 5 UNITS: 100 INJECTION, SOLUTION INTRAVENOUS; SUBCUTANEOUS at 16:23

## 2021-02-21 RX ADMIN — BUDESONIDE AND FORMOTEROL FUMARATE DIHYDRATE 2 PUFF: 160; 4.5 AEROSOL RESPIRATORY (INHALATION) at 08:07

## 2021-02-21 RX ADMIN — HEPARIN SODIUM 5000 UNITS: 5000 INJECTION INTRAVENOUS; SUBCUTANEOUS at 08:34

## 2021-02-21 RX ADMIN — INSULIN GLARGINE 15 UNITS: 100 INJECTION, SOLUTION SUBCUTANEOUS at 21:39

## 2021-02-21 RX ADMIN — PANTOPRAZOLE SODIUM 40 MG: 40 TABLET, DELAYED RELEASE ORAL at 08:34

## 2021-02-21 RX ADMIN — TRAZODONE HYDROCHLORIDE 50 MG: 50 TABLET ORAL at 17:24

## 2021-02-21 RX ADMIN — INSULIN LISPRO 5 UNITS: 100 INJECTION, SOLUTION INTRAVENOUS; SUBCUTANEOUS at 11:32

## 2021-02-21 RX ADMIN — TAMSULOSIN HYDROCHLORIDE 0.4 MG: 0.4 CAPSULE ORAL at 21:39

## 2021-02-21 RX ADMIN — AZITHROMYCIN DIHYDRATE 500 MG: 500 INJECTION, POWDER, LYOPHILIZED, FOR SOLUTION INTRAVENOUS at 13:58

## 2021-02-21 RX ADMIN — INSULIN LISPRO 5 UNITS: 100 INJECTION, SOLUTION INTRAVENOUS; SUBCUTANEOUS at 08:45

## 2021-02-21 RX ADMIN — MAGNESIUM OXIDE 400 MG: 400 TABLET ORAL at 08:33

## 2021-02-21 RX ADMIN — DEXAMETHASONE SODIUM PHOSPHATE 6 MG: 10 INJECTION INTRAMUSCULAR; INTRAVENOUS at 08:34

## 2021-02-21 RX ADMIN — ALBUTEROL SULFATE 2 PUFF: 108 AEROSOL, METERED RESPIRATORY (INHALATION) at 10:12

## 2021-02-21 RX ADMIN — TRAZODONE HYDROCHLORIDE 50 MG: 50 TABLET ORAL at 08:33

## 2021-02-21 RX ADMIN — Medication 5 MG: at 21:39

## 2021-02-21 RX ADMIN — GUAIFENESIN 600 MG: 600 TABLET, EXTENDED RELEASE ORAL at 08:33

## 2021-02-21 RX ADMIN — INSULIN LISPRO 2 UNITS: 100 INJECTION, SOLUTION INTRAVENOUS; SUBCUTANEOUS at 11:33

## 2021-02-21 RX ADMIN — INSULIN LISPRO 2 UNITS: 100 INJECTION, SOLUTION INTRAVENOUS; SUBCUTANEOUS at 16:23

## 2021-02-21 RX ADMIN — ATORVASTATIN CALCIUM 20 MG: 20 TABLET, FILM COATED ORAL at 08:33

## 2021-02-21 NOTE — PROGRESS NOTES
Report received from the offgoing nurse. The pt is resting in bed with no distress noted at this time. He have his O2 on. No other c/o at this time.

## 2021-02-21 NOTE — ROUTINE PROCESS
Pt up RT in for ABG done. States not much appetite this morning. No c/o. MA intact for AB. Short of breath on exertion.

## 2021-02-21 NOTE — PROGRESS NOTES
Results for Yves Owens (MRN [id number]) as of 2/21/2021 09:39   Ref.  Range 2/21/2021 08:40   pH Latest Ref Range: 7.35 - 7.45   7.49 (H)   PCO2 Latest Ref Range: 35 - 45 mmHg 32 (L)   PO2 Latest Ref Range: 70 - 100 mmHg 50 (LL)   BICARBONATE Latest Ref Range: 22 - 26 mmol/L 24   BASE EXCESS Latest Ref Range: 0 - 2 mmol/L 1.1   tHb Latest Ref Range: 13.5 - 17.5 g/dL 12.9 (L)   Oxyhemoglobin Latest Ref Range: 95 - 100 % 87.1 (L)   Sample source Latest Units:   Arterial   SITE Latest Units:   Left Radial   AQUILES'S TEST Latest Units:   PASS   FIO2 Latest Units: % 60.0

## 2021-02-21 NOTE — PROGRESS NOTES
Hospitalist Progress Note         Irina Hoang MD          Daily Progress Note: 2/21/2021      Subjective: The patient is seen for follow  up. 69-year-old gentleman with history of type 2 diabetes coronary artery disease status post CABG in 2018 admitted for shortness of breath and positive COVID-19. Required 4 L nasal oxygen on admission. He is seen in follow-up. Initially reported improvement in sob but was requiring 5L NC but on 2/20 patient had to be increased to 12L NC and ABG done today  Shows continued low PO2. Patient moved to negative pressure room and increased to 15L NC and plan for cpap. Denies any increasing cough or sob. Awake, alert and oriented no acute distress.      Problem List:  Problem List as of 2/21/2021 Never Reviewed          Codes Class Noted - Resolved    * (Principal) Pneumonia due to COVID-19 virus ICD-10-CM: U07.1, J12.82  ICD-9-CM: 480.8  2/17/2021 - Present        Chest pain ICD-10-CM: R07.9  ICD-9-CM: 786.50  12/12/2020 - Present        CAD (coronary artery disease) ICD-10-CM: I25.10  ICD-9-CM: 414.00  12/12/2020 - Present        HTN (hypertension) ICD-10-CM: I10  ICD-9-CM: 401.9  12/12/2020 - Present              Medications reviewed  Current Facility-Administered Medications   Medication Dose Route Frequency    metoprolol tartrate (LOPRESSOR) tablet 25 mg  25 mg Oral Q12H    lisinopriL (PRINIVIL, ZESTRIL) tablet 20 mg  20 mg Oral DAILY    insulin glargine (LANTUS) injection 15 Units  15 Units SubCUTAneous BID    insulin lispro (HUMALOG) injection 5 Units  5 Units SubCUTAneous TIDAC    amLODIPine (NORVASC) tablet 10 mg  10 mg Oral DAILY    dexamethasone (DECADRON) 10 mg/mL injection 6 mg  6 mg IntraVENous Q24H    azithromycin (ZITHROMAX) 500 mg in 0.9% sodium chloride 250 mL (VIAL-MATE)  500 mg IntraVENous Q24H    cefTRIAXone (ROCEPHIN) 1 g in 0.9% sodium chloride (MBP/ADV) 50 mL MBP  1 g IntraVENous Q24H    atorvastatin (LIPITOR) tablet 20 mg  20 mg Oral DAILY    magnesium oxide (MAG-OX) tablet 400 mg  400 mg Oral DAILY    pantoprazole (PROTONIX) tablet 40 mg  40 mg Oral ACB    tamsulosin (FLOMAX) capsule 0.4 mg  0.4 mg Oral QHS    traZODone (DESYREL) tablet 50 mg  50 mg Oral BID    heparin (porcine) injection 5,000 Units  5,000 Units SubCUTAneous Q12H    budesonide-formoteroL (SYMBICORT) 160-4.5 mcg/actuation HFA inhaler 2 Puff  2 Puff Inhalation BID RT    albuterol (PROVENTIL HFA, VENTOLIN HFA, PROAIR HFA) inhaler 2 Puff  2 Puff Inhalation Q6H PRN    acetaminophen (TYLENOL) tablet 650 mg  650 mg Oral Q6H PRN    ondansetron (ZOFRAN) injection 4 mg  4 mg IntraVENous Q8H PRN    guaiFENesin ER (MUCINEX) tablet 600 mg  600 mg Oral Q12H    glucose chewable tablet 16 g  4 Tab Oral PRN    dextrose (D50W) injection syrg 12.5-25 g  25-50 mL IntraVENous PRN    glucagon (GLUCAGEN) injection 1 mg  1 mg IntraMUSCular PRN    insulin lispro (HUMALOG) injection   SubCUTAneous AC&HS    melatonin (rapid dissolve) tablet 5 mg  5 mg Oral QHS       Review of Systems:   A comprehensive review of systems was negative except for that written in the HPI. Objective:   Physical Exam:     Visit Vitals  BP (!) 147/58   Pulse 75   Temp 98 °F (36.7 °C)   Resp 18   Ht 5' 10\" (1.778 m)   Wt 108.9 kg (240 lb)   SpO2 95%   BMI 34.44 kg/m²    O2 Flow Rate (L/min): 15 l/min O2 Device: Hi flow nasal cannula    Temp (24hrs), Av.2 °F (36.8 °C), Min:98 °F (36.7 °C), Max:98.6 °F (37 °C)    No intake/output data recorded. No intake/output data recorded. General:  Alert, cooperative, no distress, appears stated age. Lungs:   Clear to auscultation bilaterally. Chest wall:  No tenderness or deformity. Heart:  Regular rate and rhythm, S1, S2 normal, no murmur, click, rub or gallop. Abdomen:   Soft, non-tender. Bowel sounds normal. No masses,  No organomegaly. Extremities: Extremities normal, atraumatic, no cyanosis or edema.    Pulses: 2+ and symmetric all extremities. Skin: Skin color, texture, turgor normal. No rashes or lesions   Neurologic: CNII-XII intact. No gross sensory or motor deficits     Data Review:       Recent Days:  Recent Labs     02/21/21  0607 02/20/21 0621 02/19/21  0522   WBC 6.7 6.9 7.4   HGB 11.6* 11.5* 11.0*   HCT 36.0* 35.4* 34.2*    268 248     Recent Labs     02/21/21  0607 02/20/21 0621 02/19/21  0522    142 141   K 3.4* 3.3* 3.5    107 107   CO2 23 23 23   * 208* 270*   BUN 15 16 19   CREA 0.90 0.98 1.04   CA 8.4* 8.4* 8.3*   MG 1.8  --   --    ALB 2.3* 2.3* 2.4*   TBILI 0.4 0.2 0.2   ALT 23 23 23     Recent Labs     02/21/21  0840   PH 7.49*   PCO2 32*   PO2 50*   HCO3 24   FIO2 60.0       24 Hour Results:  Recent Results (from the past 24 hour(s))   GLUCOSE, POC    Collection Time: 02/20/21  9:18 PM   Result Value Ref Range    Glucose (POC) 134 (H) 65 - 100 mg/dL    Performed by [facility]    METABOLIC PANEL, COMPREHENSIVE    Collection Time: 02/21/21  6:07 AM   Result Value Ref Range    Sodium 141 136 - 145 mmol/L    Potassium 3.4 (L) 3.5 - 5.1 mmol/L    Chloride 107 97 - 108 mmol/L    CO2 23 21 - 32 mmol/L    Anion gap 11 5 - 15 mmol/L    Glucose 123 (H) 65 - 100 mg/dL    BUN 15 6 - 20 mg/dL    Creatinine 0.90 0.70 - 1.30 mg/dL    BUN/Creatinine ratio 17 12 - 20      GFR est AA >60 >60 ml/min/1.73m2    GFR est non-AA >60 >60 ml/min/1.73m2    Calcium 8.4 (L) 8.5 - 10.1 mg/dL    Bilirubin, total 0.4 0.2 - 1.0 mg/dL    AST (SGOT) 27 15 - 37 U/L    ALT (SGPT) 23 12 - 78 U/L    Alk.  phosphatase 96 45 - 117 U/L    Protein, total 6.6 6.4 - 8.2 g/dL    Albumin 2.3 (L) 3.5 - 5.0 g/dL    Globulin 4.3 (H) 2.0 - 4.0 g/dL    A-G Ratio 0.5 (L) 1.1 - 2.2     CBC W/O DIFF    Collection Time: 02/21/21  6:07 AM   Result Value Ref Range    WBC 6.7 4.4 - 11.3 K/uL    RBC 4.34 (L) 4.50 - 5.90 M/uL    HGB 11.6 (L) 13.5 - 17.5 g/dL    HCT 36.0 (L) 41 - 53 %    MCV 83.0 80 - 100 FL    MCH 26.6 (L) 31 - 34 PG MCHC 32.1 31.0 - 36.0 g/dL    RDW 15.4 (H) 11.5 - 14.5 %    PLATELET 585 K/uL    MPV 8.9 6.5 - 11.5 FL    NRBC 0.1  WBC    ABSOLUTE NRBC 0.00 K/uL   MAGNESIUM    Collection Time: 02/21/21  6:07 AM   Result Value Ref Range    Magnesium 1.8 1.6 - 2.4 mg/dL   D DIMER    Collection Time: 02/21/21  6:07 AM   Result Value Ref Range    D-dimer 0.59 (H) 0.19 - 0.50 mg/L FEU   GLUCOSE, POC    Collection Time: 02/21/21  7:31 AM   Result Value Ref Range    Glucose (POC) 127 (H) 65 - 100 mg/dL    Performed by [employer]    BLOOD GAS, ARTERIAL    Collection Time: 02/21/21  8:40 AM   Result Value Ref Range    pH 7.49 (H) 7.35 - 7.45      PCO2 32 (L) 35 - 45 mmHg    PO2 50 (LL) 70 - 100 mmHg    BICARBONATE 24 22 - 26 mmol/L    BASE EXCESS 1.1 0 - 2 mmol/L    O2 METHOD High Flow Nasal Cannula      O2 FLOW RATE 12 L/min    FIO2 60.0 %    Sample source Arterial      SITE Left Radial      AQUILES'S TEST PASS      Carboxy-Hgb 0.2 0 - 5 %    Methemoglobin 0.3 0 - 5 %    tHb 12.9 (L) 13.5 - 17.5 g/dL    Oxyhemoglobin 87.1 (L) 95 - 100 %   GLUCOSE, POC    Collection Time: 02/21/21 11:06 AM   Result Value Ref Range    Glucose (POC) 162 (H) 65 - 100 mg/dL    Performed by [employer]    GLUCOSE, POC    Collection Time: 02/21/21  3:57 PM   Result Value Ref Range    Glucose (POC) 145 (H) 65 - 100 mg/dL    Performed by [employer]            Assessment/     1. Acute Hypoxic Respiratory Failure  - Secondary to Covid-19 Pneumonia  - Was  on O2 at 5 liters via NC but increased to 12 and with still low po2 increased to 15L NC and plan for cpap   - CT chest shows bilateral airspace opacities  - Continue supplemental O2 and wean off as tolerated  - Continue Decadron, Zithromax, ProAir, Dulera     2. Sepsis  - Secondary to Covid-19  - He met sepsis criteria with hypotension and Fever  - blood culture prelim NG x 4  Days   - Continue Zithromax, Ceftriaxone and discontinue IV fluids     3.  Covid-19 pneumonitis  - Rapid Covid-19 test is positive - Continue supplemental oxygen, Decadron, and Zithromax, start Remdesir  - Wean off oxygen as tolerated     4. CAD with history of CABG  - Status post CABG in 2018  - Due to hypotension will hold Imdur and Metoprolol for now. - Consider restarting if blood pressures improve.     5.  Diabetes type 2  - Recent A1c was 7.7%  - Hold Metformin and start SSI     6. Acute Kidney Injury  - Creatinine is 1.48 ad prior was normal  - Likely secondary to sepsis, dehydration and Lasix and Lisinopril  - Will hold lasix and Lisinopril for now. - Start IV fluids for gentle rehydration    7. Left renal hyperdense mass  - Incidentally noted on chest CT.  - This will need to be investigated once respiratory function is stable  - He will require outpatient follow up with Oncology for further workup.     DVT PROPHYLAxis   - heparin BID       Total time spent with patient: 40 minutes.     James Green MD

## 2021-02-21 NOTE — ROUTINE PROCESS
Been up to void. O2 on per nc 15L high marie. Pulse ox continuous 95% lying in bed. Denies n/v. States not much appetite.

## 2021-02-22 ENCOUNTER — APPOINTMENT (OUTPATIENT)
Dept: GENERAL RADIOLOGY | Age: 66
DRG: 177 | End: 2021-02-22
Attending: NURSE PRACTITIONER
Payer: MEDICARE

## 2021-02-22 ENCOUNTER — HOSPITAL ENCOUNTER (INPATIENT)
Age: 66
LOS: 21 days | Discharge: HOME HEALTH | DRG: 177 | End: 2021-03-15
Attending: INTERNAL MEDICINE | Admitting: HOSPITALIST
Payer: MEDICARE

## 2021-02-22 VITALS
TEMPERATURE: 97 F | SYSTOLIC BLOOD PRESSURE: 148 MMHG | RESPIRATION RATE: 18 BRPM | DIASTOLIC BLOOD PRESSURE: 77 MMHG | BODY MASS INDEX: 34.36 KG/M2 | OXYGEN SATURATION: 93 % | HEIGHT: 70 IN | WEIGHT: 240 LBS | HEART RATE: 69 BPM

## 2021-02-22 DIAGNOSIS — K59.00 CONSTIPATION, UNSPECIFIED CONSTIPATION TYPE: ICD-10-CM

## 2021-02-22 DIAGNOSIS — R53.81 PHYSICAL DECONDITIONING: ICD-10-CM

## 2021-02-22 DIAGNOSIS — J12.82 PNEUMONIA DUE TO COVID-19 VIRUS: ICD-10-CM

## 2021-02-22 DIAGNOSIS — R73.9 STEROID-INDUCED HYPERGLYCEMIA: ICD-10-CM

## 2021-02-22 DIAGNOSIS — T38.0X5A STEROID-INDUCED HYPERGLYCEMIA: ICD-10-CM

## 2021-02-22 DIAGNOSIS — U07.1 PNEUMONIA DUE TO COVID-19 VIRUS: ICD-10-CM

## 2021-02-22 DIAGNOSIS — E11.8 TYPE II DIABETES MELLITUS WITH COMPLICATION (HCC): ICD-10-CM

## 2021-02-22 PROBLEM — J96.91 RESPIRATORY FAILURE WITH HYPOXIA (HCC): Status: ACTIVE | Noted: 2021-02-22

## 2021-02-22 LAB
ANION GAP SERPL CALC-SCNC: 7 MMOL/L (ref 5–15)
ARTERIAL PATENCY WRIST A: ABNORMAL
ARTERIAL PATENCY WRIST A: YES
BASE EXCESS BLDA CALC-SCNC: 0.4 MMOL/L
BASE EXCESS BLDA CALC-SCNC: 1.2 MMOL/L (ref 0–2)
BDY SITE: ABNORMAL
BDY SITE: ABNORMAL
BUN SERPL-MCNC: 15 MG/DL (ref 6–20)
BUN/CREAT SERPL: 16 (ref 12–20)
CALCIUM SERPL-MCNC: 8.5 MG/DL (ref 8.5–10.1)
CHLORIDE SERPL-SCNC: 109 MMOL/L (ref 97–108)
CO2 SERPL-SCNC: 25 MMOL/L (ref 21–32)
COHGB MFR BLD: 0.2 % (ref 0–5)
CREAT SERPL-MCNC: 0.94 MG/DL (ref 0.7–1.3)
D DIMER PPP FEU-MCNC: 0.46 MG/L FEU (ref 0–0.65)
FIO2 ON VENT: 95 %
GAS FLOW.O2 O2 DELIVERY SYS: 15 L/MIN
GLUCOSE BLD STRIP.AUTO-MCNC: 143 MG/DL (ref 65–100)
GLUCOSE BLD STRIP.AUTO-MCNC: 290 MG/DL (ref 65–100)
GLUCOSE BLD STRIP.AUTO-MCNC: 295 MG/DL (ref 65–100)
GLUCOSE SERPL-MCNC: 116 MG/DL (ref 65–100)
HCO3 BLDA-SCNC: 22 MMOL/L (ref 22–26)
HCO3 BLDA-SCNC: 24 MMOL/L (ref 22–26)
HGB BLD OXIMETRY-MCNC: 11.7 G/DL (ref 13.5–17.5)
METHGB MFR BLD: 0.3 % (ref 0–5)
OXYHGB MFR BLD: 88.6 % (ref 95–100)
PCO2 BLDA: 28 MMHG (ref 35–45)
PCO2 BLDA: 31 MMHG (ref 35–45)
PH BLDA: 7.5 [PH] (ref 7.35–7.45)
PH BLDA: 7.51 [PH] (ref 7.35–7.45)
PO2 BLDA: 53 MMHG (ref 70–100)
PO2 BLDA: 53 MMHG (ref 80–100)
POTASSIUM SERPL-SCNC: 3.3 MMOL/L (ref 3.5–5.1)
PROCALCITONIN SERPL-MCNC: 0.06 NG/ML
SAO2 % BLD: 91 % (ref 92–97)
SAO2% DEVICE SAO2% SENSOR NAME: ABNORMAL
SAO2% DEVICE SAO2% SENSOR NAME: ABNORMAL
SERVICE CMNT-IMP: ABNORMAL
SODIUM SERPL-SCNC: 141 MMOL/L (ref 136–145)
SPECIMEN SITE: ABNORMAL
SPECIMEN SITE: ABNORMAL

## 2021-02-22 PROCEDURE — 82803 BLOOD GASES ANY COMBINATION: CPT

## 2021-02-22 PROCEDURE — 71045 X-RAY EXAM CHEST 1 VIEW: CPT

## 2021-02-22 PROCEDURE — 82962 GLUCOSE BLOOD TEST: CPT

## 2021-02-22 PROCEDURE — 74011250636 HC RX REV CODE- 250/636: Performed by: NURSE PRACTITIONER

## 2021-02-22 PROCEDURE — 84145 PROCALCITONIN (PCT): CPT

## 2021-02-22 PROCEDURE — 85379 FIBRIN DEGRADATION QUANT: CPT

## 2021-02-22 PROCEDURE — 77010033711 HC HIGH FLOW OXYGEN

## 2021-02-22 PROCEDURE — 5A09357 ASSISTANCE WITH RESPIRATORY VENTILATION, LESS THAN 24 CONSECUTIVE HOURS, CONTINUOUS POSITIVE AIRWAY PRESSURE: ICD-10-PCS | Performed by: INTERNAL MEDICINE

## 2021-02-22 PROCEDURE — 36600 WITHDRAWAL OF ARTERIAL BLOOD: CPT

## 2021-02-22 PROCEDURE — 65610000006 HC RM INTENSIVE CARE

## 2021-02-22 PROCEDURE — 36415 COLL VENOUS BLD VENIPUNCTURE: CPT

## 2021-02-22 PROCEDURE — 87899 AGENT NOS ASSAY W/OPTIC: CPT

## 2021-02-22 PROCEDURE — 87449 NOS EACH ORGANISM AG IA: CPT

## 2021-02-22 PROCEDURE — 87205 SMEAR GRAM STAIN: CPT

## 2021-02-22 PROCEDURE — 80048 BASIC METABOLIC PNL TOTAL CA: CPT

## 2021-02-22 PROCEDURE — 74011250637 HC RX REV CODE- 250/637: Performed by: NURSE PRACTITIONER

## 2021-02-22 PROCEDURE — 74011636637 HC RX REV CODE- 636/637: Performed by: NURSE PRACTITIONER

## 2021-02-22 RX ORDER — MELATONIN
2000 DAILY
Status: DISCONTINUED | OUTPATIENT
Start: 2021-02-22 | End: 2021-03-15 | Stop reason: HOSPADM

## 2021-02-22 RX ORDER — POLYETHYLENE GLYCOL 3350 17 G/17G
17 POWDER, FOR SOLUTION ORAL DAILY PRN
Status: DISCONTINUED | OUTPATIENT
Start: 2021-02-22 | End: 2021-03-15 | Stop reason: HOSPADM

## 2021-02-22 RX ORDER — MAGNESIUM SULFATE 100 %
4 CRYSTALS MISCELLANEOUS AS NEEDED
Status: DISCONTINUED | OUTPATIENT
Start: 2021-02-22 | End: 2021-02-27

## 2021-02-22 RX ORDER — SODIUM CHLORIDE 0.9 % (FLUSH) 0.9 %
5-40 SYRINGE (ML) INJECTION EVERY 8 HOURS
Status: DISCONTINUED | OUTPATIENT
Start: 2021-02-22 | End: 2021-03-15 | Stop reason: HOSPADM

## 2021-02-22 RX ORDER — POTASSIUM CHLORIDE 750 MG/1
40 TABLET, FILM COATED, EXTENDED RELEASE ORAL ONCE
Status: COMPLETED | OUTPATIENT
Start: 2021-02-22 | End: 2021-02-22

## 2021-02-22 RX ORDER — TAMSULOSIN HYDROCHLORIDE 0.4 MG/1
0.4 CAPSULE ORAL
Status: DISCONTINUED | OUTPATIENT
Start: 2021-02-22 | End: 2021-03-15 | Stop reason: HOSPADM

## 2021-02-22 RX ORDER — SODIUM CHLORIDE 0.9 % (FLUSH) 0.9 %
5-40 SYRINGE (ML) INJECTION AS NEEDED
Status: DISCONTINUED | OUTPATIENT
Start: 2021-02-22 | End: 2021-03-15 | Stop reason: HOSPADM

## 2021-02-22 RX ORDER — INSULIN LISPRO 100 [IU]/ML
INJECTION, SOLUTION INTRAVENOUS; SUBCUTANEOUS
Status: DISCONTINUED | OUTPATIENT
Start: 2021-02-22 | End: 2021-02-27

## 2021-02-22 RX ORDER — ACETAMINOPHEN 650 MG/1
650 SUPPOSITORY RECTAL
Status: DISCONTINUED | OUTPATIENT
Start: 2021-02-22 | End: 2021-03-15 | Stop reason: HOSPADM

## 2021-02-22 RX ORDER — HYDRALAZINE HYDROCHLORIDE 20 MG/ML
10-20 INJECTION INTRAMUSCULAR; INTRAVENOUS
Status: DISCONTINUED | OUTPATIENT
Start: 2021-02-22 | End: 2021-03-15 | Stop reason: HOSPADM

## 2021-02-22 RX ORDER — ACETAMINOPHEN 325 MG/1
650 TABLET ORAL
Status: DISCONTINUED | OUTPATIENT
Start: 2021-02-22 | End: 2021-03-15 | Stop reason: HOSPADM

## 2021-02-22 RX ORDER — PROMETHAZINE HYDROCHLORIDE 25 MG/1
12.5 TABLET ORAL
Status: DISCONTINUED | OUTPATIENT
Start: 2021-02-22 | End: 2021-03-15 | Stop reason: HOSPADM

## 2021-02-22 RX ORDER — GUAIFENESIN/DEXTROMETHORPHAN 100-10MG/5
5 SYRUP ORAL
Status: DISCONTINUED | OUTPATIENT
Start: 2021-02-22 | End: 2021-03-15 | Stop reason: HOSPADM

## 2021-02-22 RX ORDER — ENOXAPARIN SODIUM 100 MG/ML
40 INJECTION SUBCUTANEOUS EVERY 12 HOURS
Status: DISCONTINUED | OUTPATIENT
Start: 2021-02-22 | End: 2021-03-15 | Stop reason: HOSPADM

## 2021-02-22 RX ORDER — PANTOPRAZOLE SODIUM 40 MG/1
40 TABLET, DELAYED RELEASE ORAL
Status: DISCONTINUED | OUTPATIENT
Start: 2021-02-22 | End: 2021-03-15 | Stop reason: HOSPADM

## 2021-02-22 RX ORDER — DEXAMETHASONE SODIUM PHOSPHATE 4 MG/ML
6 INJECTION, SOLUTION INTRA-ARTICULAR; INTRALESIONAL; INTRAMUSCULAR; INTRAVENOUS; SOFT TISSUE EVERY 24 HOURS
Status: COMPLETED | OUTPATIENT
Start: 2021-02-22 | End: 2021-02-27

## 2021-02-22 RX ORDER — ZINC SULFATE 50(220)MG
1 CAPSULE ORAL DAILY
Status: COMPLETED | OUTPATIENT
Start: 2021-02-22 | End: 2021-03-03

## 2021-02-22 RX ORDER — DEXTROSE 50 % IN WATER (D50W) INTRAVENOUS SYRINGE
25-50 AS NEEDED
Status: DISCONTINUED | OUTPATIENT
Start: 2021-02-22 | End: 2021-03-06 | Stop reason: SDUPTHER

## 2021-02-22 RX ORDER — ONDANSETRON 2 MG/ML
4 INJECTION INTRAMUSCULAR; INTRAVENOUS
Status: DISCONTINUED | OUTPATIENT
Start: 2021-02-22 | End: 2021-03-15 | Stop reason: HOSPADM

## 2021-02-22 RX ORDER — ASCORBIC ACID 500 MG
1000 TABLET ORAL DAILY
Status: DISCONTINUED | OUTPATIENT
Start: 2021-02-22 | End: 2021-03-15 | Stop reason: HOSPADM

## 2021-02-22 RX ADMIN — Medication 10 ML: at 13:20

## 2021-02-22 RX ADMIN — HYDRALAZINE HYDROCHLORIDE 20 MG: 20 INJECTION INTRAMUSCULAR; INTRAVENOUS at 13:20

## 2021-02-22 RX ADMIN — INSULIN LISPRO 5 UNITS: 100 INJECTION, SOLUTION INTRAVENOUS; SUBCUTANEOUS at 17:26

## 2021-02-22 RX ADMIN — POTASSIUM CHLORIDE 40 MEQ: 750 TABLET, FILM COATED, EXTENDED RELEASE ORAL at 12:34

## 2021-02-22 RX ADMIN — DEXAMETHASONE SODIUM PHOSPHATE 6 MG: 4 INJECTION, SOLUTION INTRA-ARTICULAR; INTRALESIONAL; INTRAMUSCULAR; INTRAVENOUS; SOFT TISSUE at 09:13

## 2021-02-22 RX ADMIN — Medication 1 CAPSULE: at 09:13

## 2021-02-22 RX ADMIN — OXYCODONE HYDROCHLORIDE AND ACETAMINOPHEN 1000 MG: 500 TABLET ORAL at 09:13

## 2021-02-22 RX ADMIN — GUAIFENESIN AND DEXTROMETHORPHAN 5 ML: 100; 10 SYRUP ORAL at 21:09

## 2021-02-22 RX ADMIN — Medication 10 ML: at 07:05

## 2021-02-22 RX ADMIN — ENOXAPARIN SODIUM 40 MG: 100 INJECTION SUBCUTANEOUS at 19:01

## 2021-02-22 RX ADMIN — PANTOPRAZOLE SODIUM 40 MG: 40 TABLET, DELAYED RELEASE ORAL at 12:34

## 2021-02-22 RX ADMIN — Medication 10 ML: at 21:15

## 2021-02-22 RX ADMIN — TAMSULOSIN HYDROCHLORIDE 0.4 MG: 0.4 CAPSULE ORAL at 21:09

## 2021-02-22 RX ADMIN — Medication 2000 UNITS: at 09:13

## 2021-02-22 RX ADMIN — INSULIN LISPRO 2 UNITS: 100 INJECTION, SOLUTION INTRAVENOUS; SUBCUTANEOUS at 12:40

## 2021-02-22 RX ADMIN — LORAZEPAM 1 MG: 2 INJECTION INTRAMUSCULAR; INTRAVENOUS at 00:08

## 2021-02-22 RX ADMIN — ENOXAPARIN SODIUM 40 MG: 100 INJECTION SUBCUTANEOUS at 09:14

## 2021-02-22 RX ADMIN — INSULIN LISPRO 5 UNITS: 100 INJECTION, SOLUTION INTRAVENOUS; SUBCUTANEOUS at 21:21

## 2021-02-22 NOTE — PROGRESS NOTES
PATIENT MOVED FROM ROOM 225 TO ER 6 AT THIS TIME. PATIENT PLACED ON MONITOR AND CPAP (WITH ADJUSTMENTS).

## 2021-02-22 NOTE — PROGRESS NOTES
Attempts have been made to reach a family member to inform of the pt being transferred to another facility with no one answering the numbers that was called.

## 2021-02-22 NOTE — CONSULTS
[facility]    ICU TEAM Consult Note    Name: Shonna Vee   : 1955   MRN: [id number]   Date: 2021      Assessment:     ICU Problems:    1. Acute Hypoxic respiratory failure secondary to COVID-19 pneumonia  a. Currently on high flow nasal cannula 70L/ min 100% FiO2, did not tolerate BiPap at outside facility. STAT ABG. Chest x-ray. 2. COVID-19 pneumonitis  a. Trend inflammatory markers. Decadron, vitamin C, zinc.   3. CAD  a. CABG in 2018, takes imdur and metoprolol at home. On hold due to hypotension at outside facility, now stable. 4. DM type 2  a. Continue SSI  5. VARINDER  a. Resolving. Creat 0.90    Imaging:  CT Results  (Last 48 hours)               21 1327  CT CHEST WO CONT Final result    Impression:  Diffuse bilateral worsening of groundglass opacities, consistent   with progression of known viral pneumonia. Narrative:  Dose Reduction:    All CT scans at this facility are performed using dose reduction optimization   techniques as appropriate to a performed exam including the following: Automated   exposure control, adjustments of the mA and/or kV according to patient size, or   use of iterative reconstruction technique. Findings: Unenhanced images were obtained, compared with 2021. Bilateral   groundglass airspace opacities are noted, right greater than left, with   bilateral worsening compared with the prior exam, consistent with moderately   severe viral pneumonia. No evidence of pleural or pericardial effusion. No   pneumothorax. Bilateral gynecomastia, right greater than left. Subtle changes of   hepatic steatosis. Colonic diverticula. Hyperdense cortical lesion, posterior   mid left kidney, not well characterized. Prominent left ventricle. Calcified   aortic valve. Bilateral native coronary calcification. Sternotomy for CABG. Calcified and tortuous thoracic aorta. Lipoma of right periscapular musculature.    Subcentimeter mediastinal nodes, likely reactive. ICU Comprehensive Plan of Care:     Plans for this Shift:       1. COVID Treatment:  a. Immunomodulator--Steroids -- Yes    b. COVID-19 Specific Anticoagulation -- Yes    c. Vitamin C -- Yes    d. Zinc -- Yes    2. SBP Goal of: > 90 mmHg  3. MAP Goal of: 65-85 mmHg  4. None - For above SBP/MAP goals  5. IVFs:   6. Transfusion Trigger (Hgb): <7 g/dL  7. Respiratory Goals:  a. N/A  8. Pulmonary toilet: Incentive Spirometry   9. SpO2 Goal: > 92%  10. Keep K>4; Mg>2   11. PT/OT: Will consult when appropriate   12. Goals of Care Discussion with family Yes   15. Plan of Care/Code Status: Full Code  14. Appreciate Consultants Input   15. Discussed Care Plan with Bedside RN  16. Documentation of Current Medications  17. Rest of Plan Below:    F - Feeding:  Pending   A - Analgesia: Acetaminophen  S - Sedation: N/A  T - DVT Prophylaxis: Lovenox   H - Head of Bed: > 30 Degrees  U - Ulcer Prophylaxis: Pepcid (famotidine)   G - Glycemic Control: Insulin  S - Spontaneous Breathing Trial: N/A  B - Bowel Regimen: MiraLax  I - Indwelling Catheter:   Tubes: None  Lines: Peripheral IV  Drains: None  D - De-escalation of Antibiotics: Cefepime  Vancomycin    Subjective:   Progress Note: 2/22/2021      Reason for ICU Admission: Respiratory Failure     HPI:Patient is a 77year old male with history of hypertension, Diabetes type 2, CAD status post CABG  In 2018 who presents to the ER 2/17/2020 at [facility] with complaints of SOB associated with fatigue and loss of taste which started about 5 days prior to ED visit. Chest CT showed  patchy pulmonary airspace disease consistent with Covid-19 pneumonia. Rapid Covid-19 test found to be positive. CT of chest also showed a left renal hyperdense mass which was not seen on comparison ultrasound 4/17/2018. He was hypoxic on room with oxygen saturation of 84% requiring 4L of O2 vis NC. Since admission he has had increase in oxygen needs and required 15 L/min mid flow.  The facility he was at does not have ICU care and he was brought to [facility] for possible need for intubation as he could not tolerate BiPap. POD:  * No surgery found *    S/P:       Active Problem List:     Problem List  Never Reviewed          Codes Class    Respiratory failure with hypoxia (HCC) ICD-10-CM: J96.91  ICD-9-CM: 518.81         Pneumonia due to COVID-19 virus ICD-10-CM: U07.1, J12.82  ICD-9-CM: 480.8         Chest pain ICD-10-CM: R07.9  ICD-9-CM: 786.50         CAD (coronary artery disease) ICD-10-CM: I25.10  ICD-9-CM: 414.00         HTN (hypertension) ICD-10-CM: I10  ICD-9-CM: 401.9               Past Medical History:      has a past medical history of CAD (coronary artery disease) and MI (myocardial infarction) ([facility] [address] Past Surgical History:      has no past surgical history on file. Home Medications:     Prior to Admission medications    Medication Sig Start Date End Date Taking? Authorizing Provider   lisinopriL (PRINIVIL, ZESTRIL) 10 mg tablet Take 10 mg by mouth daily. Imani Morrison MD   magnesium oxide (MAG-OX) 400 mg tablet Take 1 Tab by mouth daily. 12/14/20   Lara Childress MD   metoprolol succinate (TOPROL-XL) 50 mg XL tablet Take 1 Tab by mouth two (2) times daily (after meals). 12/14/20   Lara Childress MD   NIFEdipine ER (PROCARDIA XL) 30 mg ER tablet Take 1 Tab by mouth daily. 12/15/20   Jewell Guadarrama MD   potassium chloride (K-DUR, KLOR-CON) 20 mEq tablet Take 1 Tab by mouth daily. 12/14/20   Lara Childress MD   isosorbide mononitrate ER (IMDUR) 60 mg CR tablet Take 60 mg by mouth daily. Imani Morrison MD   atorvastatin (LIPITOR) 20 mg tablet Take 20 mg by mouth daily. Imani Morrison MD   furosemide (Lasix) 40 mg tablet Take 40 mg by mouth daily. Imani Morrison MD   pantoprazole (Protonix) 40 mg tablet Take 40 mg by mouth daily. Imani Morrison MD   traZODone (DESYREL) 50 mg tablet Take 50 mg by mouth two (2) times a day.     Other, MD Imani   metFORMIN (GLUCOPHAGE) 1,000 mg tablet Take 1,000 mg by mouth two (2) times daily (with meals). Imani Morrison MD   tamsulosin (Flomax) 0.4 mg capsule Take 0.4 mg by mouth nightly. Imani Morrison MD       Allergies/Social/Family History:     No Known Allergies   Social History     Tobacco Use    Smoking status: Former Smoker    Smokeless tobacco: Former User   Substance Use Topics    Alcohol use: Never     Frequency: Never      Family History   Problem Relation Age of Onset    Hypertension Mother     Hypertension Father        Review of Systems:     Pertinent items are noted in HPI. Objective:   Vital Signs:  Visit Vitals  BP (!) 166/72   Pulse 79   Temp 97.9 °F (36.6 °C)   Resp 18   Wt 100.8 kg (222 lb 4.8 oz)   SpO2 92%   BMI 31.90 kg/m²    O2 Flow Rate (L/min): 70 l/min O2 Device: Hi flow nasal cannula Temp (24hrs), Av °F (36.7 °C), Min:97 °F (36.1 °C), Max:98.6 °F (37 °C)           Intake/Output:   No intake or output data in the 24 hours ending 21 9962    Physical Exam:    General:  Alert, cooperative, well noursished, well developed, appears stated age   Eyes:  Sclera anicteric. Pupils equally round and reactive to light. Mouth/Throat: Mucous membranes normal, oral pharynx clear   Neck: Supple   Lungs:   Clear to auscultation bilaterally, good effort   CV:  Regular rate and rhythm,no murmur, click, rub or gallop   Abdomen:   Soft, non-tender.  bowel sounds normal. non-distended   Extremities: No cyanosis or edema   Skin: Skin color, texture, turgor normal. no acute rash or lesions   Lymph nodes: Cervical and supraclavicular normal   Musculoskeletal: No swelling or deformity   Lines/Devices:  Intact, no erythema, drainage or tenderness   Psych: Alert and oriented, normal mood affect given the setting       LABS AND  DATA: Personally reviewed  Recent Labs     21  0607 21  0621   WBC 6.7 6.9   HGB 11.6* 11.5*   HCT 36.0* 35.4*    268     Recent Labs     21  0607 21  1589  142   K 3.4* 3.3*    107   CO2 23 23   BUN 15 16   CREA 0.90 0.98   * 208*   CA 8.4* 8.4*   MG 1.8  --      Recent Labs     02/21/21  0607 02/20/21  0621   AP 96 91   TP 6.6 6.8   ALB 2.3* 2.3*   GLOB 4.3* 4.5*     No results for input(s): INR, PTP, APTT, INREXT in the last 72 hours. No results for input(s): PHI, PCO2I, PO2I, FIO2I in the last 72 hours. No results for input(s): CPK, CKMB, TROIQ, BNPP in the last 72 hours. Hemodynamics:   PAP:   CO:     Wedge:   CI:     CVP:    SVR:       PVR:       Ventilator Settings:  Mode Rate Tidal Volume Pressure FiO2 PEEP            100 %       Peak airway pressure:      Minute ventilation:          MEDS: Reviewed    Chest X-Ray:  CXR Results  (Last 48 hours)    None            ECHO:      Multidisciplinary Rounds Completed:  Pending    ABCDEF Bundle/Checklist Completed:  Yes    SPECIAL EQUIPMENT  High Flow nasal cannula    DISPOSITION  Stay in ICU    CRITICAL CARE CONSULTANT NOTE  I had a face to face encounter with the patient, reviewed and interpreted patient data including clinical events, labs, images, vital signs, I/O's, and examined patient. I have discussed the case and the plan and management of the patient's care with the consulting services, the bedside nurses and the respiratory therapist.      NOTE OF PERSONAL INVOLVEMENT IN CARE   This patient has a high probability of imminent, clinically significant deterioration, which requires the highest level of preparedness to intervene urgently. I participated in the decision-making and personally managed or directed the management of the following life and organ supporting interventions that required my frequent assessment to treat or prevent imminent deterioration. I personally spent 60 minutes of critical care time. This is time spent at this critically ill patient's bedside actively involved in patient care as well as the coordination of care and discussions with the patient's family. This does not include any procedural time which has been billed separately.       Merlinda Hughs [facility]     Critical Care Medicine  [facility]

## 2021-02-22 NOTE — PROGRESS NOTES
0730: Bedside and Verbal shift change report given to Henrry Tena, [facility], RN (oncoming nurse) by Lucero Ardon RN (offgoing nurse). Report included the following information SBAR, Kardex, Procedure Summary, Intake/Output, MAR, Recent Results, Cardiac Rhythm NSR with BBB and Alarm Parameters . Pt prone for about 50 minutes. Could not tolerate any longer. Vitals stable, placed back in supine position. RT notified of ordered ABG. 1930: Bedside and Verbal shift change report given to Lucero Ardon, MAYUR (oncoming nurse) by Henrry Tena, student nurse & MAYUR Trejo (offgoing nurse). Report included the following information SBAR, Kardex, Intake/Output, MAR, Recent Results, Cardiac Rhythm NSR and Alarm Parameters .

## 2021-02-22 NOTE — PROGRESS NOTES
Pt was transferred to [facility] via [id number] [facility].

## 2021-02-22 NOTE — PROGRESS NOTES
Pt was seen via tele hospitalist.Pt will be transferred to another facility to higher level of care.

## 2021-02-22 NOTE — PROGRESS NOTES
0618: TRANSFER - IN REPORT:    Verbal report received from Zoya(name) on Codie Erps  being received from [facility](unit) for routine progression of care      Report consisted of patients Situation, Background, Assessment and   Recommendations(SBAR). Information from the following report(s) SBAR, Kardex, ED Summary, Intake/Output, MAR, Accordion, Recent Results, Cardiac Rhythm NSR and Alarm Parameters  was reviewed with the receiving nurse. Opportunity for questions and clarification was provided. Assessment completed upon patients arrival to unit and care assumed. Primary Nurse Alfa Cody and MAYUR Carmona performed a dual skin assessment on this patient No impairment noted  Yuriy score is 16          Pt arrives on NRB. O2 sats 84-88%. Lung sounds coarse, diminished bilaterally. RT at bedside. Placed pt on HFNC. 70L, 100%. O2 sats 94-96%. Pt is A/Ox4. INMAN purposefully. PERRLA 3 bilaterally. HR 70s, /72.

## 2021-02-22 NOTE — ROUTINE PROCESS
Bedside shift change report given to [address] (oncoming nurse) by Stephanie Orellana (offgoing nurse). Report included the following information SBAR.

## 2021-02-22 NOTE — PROGRESS NOTES
Henrry  NP was made aware of the pt's refusal to wear the bipap at this time. See will place orders. l

## 2021-02-22 NOTE — ROUTINE PROCESS
Attempted to place patient on CPAP of 10. Patient refused stating that there was \"too much air\". Pressure was lowered but still too much to tolerate and patient was placed back on 15L HFNC. Nurse aware.

## 2021-02-22 NOTE — PROGRESS NOTES
Report received from the offgoing nurse. The pt is resting in bed with 15 liters of hi marie O2 on. His O2 sats are hanging in the upper 80's to the lower 90's. The pt states he doesn't feel well at this time.

## 2021-02-22 NOTE — PROGRESS NOTES
KELLI wild was called to [address] [place] [address]. She is in contact with nursing & RT. The pt is wanting to take the BIPAP off. He is being explained to.

## 2021-02-22 NOTE — PROGRESS NOTES
[facility]    ICU TEAM Progress Note    Name: Charlotte Mills   : 1955   MRN: [id number]   Date: 2021      Assessment:  ICU Problems:     1. Acute Hypoxic respiratory failure secondary to COVID-19 pneumonia  a. Currently on high flow nasal cannula 70L/ min 100% FiO2, did not tolerate BiPap at outside facility. b. Prone as tolerated  c. Wean supplemental O2 as tolerated to keep sats >92%  d. Pulmonary hygiene   e.   2. COVID-19 pneumonitis  a. Trend inflammatory markers. Decadron, vitamin C, zinc.   3. CAD  a. CABG in 2018, takes imdur and metoprolol at home. On hold due to hypotension at outside facility, now stable. 4. DM type 2  a. Continue SSI  5. VARINDER  a. Resolving. Creat 0.90   Reason for ICU Admission: Hypoxia    Brief HPI:    - Patient is a 77year old male with history of hypertension, Diabetes type 2, CAD status post CABG  In 2018 who presents to the ER 2020 at [facility] with complaints of SOB associated with fatigue and loss of taste which started about 5 days prior to ED visit. Chest CT showed  patchy pulmonary airspace disease consistent with Covid-19 pneumonia. Rapid Covid-19 test found to be positive. CT of chest also showed a left renal hyperdense mass which was not seen on comparison ultrasound 2018. He was hypoxic on room with oxygen saturation of 84% requiring 4L of O2 vis NC. Since admission he has had increase in oxygen needs and required 15 L/min mid flow. The facility he was at does not have ICU care and he was brought to [facility] for possible need for intubation as he could not tolerate BiPap. POD:  * No surgery found *    S/P:       Plan:     1. COVID Treatment:  a. Immunomodulator--Steroids -- Yes    b. COVID-19 Specific Anticoagulation -- Yes    c. Vitamin C -- Yes    d. Zinc -- Yes    2. SBP Goal of: > 90 mmHg  3. MAP Goal of: 65-85 mmHg  4. None - For above SBP/MAP goals  5. IVFs:   6. Transfusion Trigger (Hgb): <7 g/dL  7.  Respiratory Goals:  a. N/A  8. Pulmonary toilet: Incentive Spirometry   9. SpO2 Goal: > 92%  10. Keep K>4; Mg>2   11. PT/OT: Will consult when appropriate   12. Goals of Care Discussion with family Yes   15. Plan of Care/Code Status: Full Code  14. Appreciate Consultants Input   15. Discussed Care Plan with Bedside RN  16. Documentation of Current Medications  17. Rest of Plan Below:     F - Feeding:  Po intake, 100% meal intake   A - Analgesia: Acetaminophen  S - Sedation: N/A  T - DVT Prophylaxis: Lovenox   H - Head of Bed: > 30 Degrees  U - Ulcer Prophylaxis: Pepcid (famotidine)   G - Glycemic Control: Insulin. Lantus started this am   S - Spontaneous Breathing Trial: N/A  B - Bowel Regimen: MiraLax  I - Indwelling Catheter:              Tubes: None  Lines: Peripheral IV  Drains: None  D - De-escalation of Antibiotics: hold for now, sputum cultures pending     Subjective:   Overnight Events:   2021    -Prone this am and as tolerated   -Hydralizine prn for sbp >160  -Resume PTA meds for HTN     Objective:     Visit Vitals  /68   Pulse 72   Temp 98.4 °F (36.9 °C)   Resp (!) 34   Wt 100.8 kg (222 lb 4.8 oz)   SpO2 (!) 87%   BMI 31.90 kg/m²    O2 Flow Rate (L/min): 70 l/min O2 Device: Hi flow nasal cannula Temp (24hrs), Av °F (36.7 °C), Min:97 °F (36.1 °C), Max:98.4 °F (36.9 °C)           Hemodynamics:   PAP:   CO:     Wedge:   CI:     CVP:    SVR:       PVR:       Ventilator Settings:  Mode Rate Tidal Volume Pressure FiO2 PEEP            100 %       Peak airway pressure:      Minute ventilation:          Intake/Output:   No intake or output data in the 24 hours ending 21 1138    Physical Exam:  General:  Alert, cooperative, well noursished, well developed, appears stated age   Eyes:  Sclera anicteric. Pupils equally round and reactive to light.    Mouth/Throat: Mucous membranes normal, oral pharynx clear   Neck: Supple   Lungs:   Clear to auscultation bilaterally, good effort   CV:  Regular rate and rhythm,no murmur, click, rub or gallop   Abdomen:   Soft, non-tender. bowel sounds normal. non-distended   Extremities: No cyanosis or edema   Skin: Skin color, texture, turgor normal. no acute rash or lesions   Lymph nodes: Cervical and supraclavicular normal   Musculoskeletal: No swelling or deformity   Lines/Devices:  Intact, no erythema, drainage or tenderness   Psych: Alert and oriented, normal mood affect given the setting       Labs & Data: Reviewed    Medications: Reviewed    Chest X-Ray:    TTE:    Multidisciplinary Rounds Completed: Yes    ABCDEF Bundle/Checklist Completed: Yes    SPECIAL EQUIPMENT: None    DISPOSITION: Stay in ICU    CRITICAL CARE CONSULTANT NOTE  I had a face to face encounter with the patient, reviewed and interpreted patient data including clinical events, labs, images, vital signs, I/O's, and examined patient. I have discussed the case and the plan and management of the patient's care with the consulting services, the bedside nurses and the respiratory therapist.      NOTE OF PERSONAL INVOLVEMENT IN CARE   This patient has a high probability of imminent, clinically significant deterioration, which requires the highest level of preparedness to intervene urgently. I participated in the decision-making and personally managed or directed the management of the following life and organ supporting interventions that required my frequent assessment to treat or prevent imminent deterioration. I personally spent 45 minutes of critical care time. This is time spent at this critically ill patient's bedside actively involved in patient care as well as the coordination of care and discussions with the patient's family. This does not include any procedural time which has been billed separately.     Chitra Barger NP    [facility]  2/22/2021

## 2021-02-22 NOTE — DISCHARGE SUMMARY
Telemedicine Discharge Summary    64 yo admitted with Covid pneumonia 5 days ago was maintained on 4-5L NC until today.  Patient's abg on 15L nc shows marked hypoxia.  He is able to keep his PaO2 53 only with a RR of 29.    He did finally agree to try the CPAP, but he would benefit by a higher level of care and potentially heated high flow and full covid medication treatment.       Results for SELENE LAIRD (MRN [id number]) as of 2/22/2021 02:23   Ref. Range 2/22/2021 01:08   pH Latest Ref Range: 7.35 - 7.45   7.50 (H)   PCO2 Latest Ref Range: 35 - 45 mmHg 31 (L)   PO2 Latest Ref Range: 70 - 100 mmHg 53 (L)   BICARBONATE Latest Ref Range: 22 - 26 mmol/L 24   BASE EXCESS Latest Ref Range: 0 - 2 mmol/L 1.2   tHb Latest Ref Range: 13.5 - 17.5 g/dL 11.7 (L)   Oxyhemoglobin Latest Ref Range: 95 - 100 % 88.6 (L)   Sample source Latest Units:   Arterial   SITE Latest Units:   Right Radial   AQUILES'S TEST Latest Units:   PASS   FIO2 Latest Units: % 95.0   O2 FLOW RATE Latest Units: L/min 15   O2 METHOD Latest Units:   High Flow Nasal Cannula   Carboxy-Hgb Latest Ref Range: 0 - 5 % 0.2   Methemoglobin Latest Ref Range: 0 - 5 % 0.3     The patient was seen via interactive audio and visual telemedicine with the assistance of the bedside nurse.  The reason for transfer was explained and he agrees to transfer.    Margot ERWIN MD

## 2021-02-22 NOTE — PROGRESS NOTES
In to assess the pt. His O2 sats is ranging in the lower 90's. He is refusing to go on the bipap at this time. He is also pulling his O2 off at times. He have been explained to importance of having his O2 on.

## 2021-02-23 LAB
ANION GAP SERPL CALC-SCNC: 7 MMOL/L (ref 5–15)
APTT PPP: 29.1 SEC (ref 22.1–31)
ARTERIAL PATENCY WRIST A: YES
BACTERIA SPEC CULT: NORMAL
BACTERIA SPEC CULT: NORMAL
BASE EXCESS BLDA CALC-SCNC: 0.7 MMOL/L
BDY SITE: ABNORMAL
BUN SERPL-MCNC: 13 MG/DL (ref 6–20)
BUN/CREAT SERPL: 17 (ref 12–20)
CALCIUM SERPL-MCNC: 8.5 MG/DL (ref 8.5–10.1)
CHLORIDE SERPL-SCNC: 111 MMOL/L (ref 97–108)
CO2 SERPL-SCNC: 21 MMOL/L (ref 21–32)
CREAT SERPL-MCNC: 0.76 MG/DL (ref 0.7–1.3)
D DIMER PPP FEU-MCNC: 0.36 MG/L FEU (ref 0–0.65)
ERYTHROCYTE [DISTWIDTH] IN BLOOD BY AUTOMATED COUNT: 14.6 % (ref 11.5–14.5)
FERRITIN SERPL-MCNC: 302 NG/ML (ref 26–388)
FIBRINOGEN PPP-MCNC: 574 MG/DL (ref 200–475)
FIO2 ON VENT: 100 %
FLUID CULTURE, SPNG2: NORMAL
GAS FLOW.O2 O2 DELIVERY SYS: 70 L/MIN
GLUCOSE BLD STRIP.AUTO-MCNC: 204 MG/DL (ref 65–100)
GLUCOSE BLD STRIP.AUTO-MCNC: 270 MG/DL (ref 65–100)
GLUCOSE BLD STRIP.AUTO-MCNC: 288 MG/DL (ref 65–100)
GLUCOSE BLD STRIP.AUTO-MCNC: 350 MG/DL (ref 65–100)
GLUCOSE SERPL-MCNC: 204 MG/DL (ref 65–100)
HCO3 BLDA-SCNC: 21 MMOL/L (ref 22–26)
HCT VFR BLD AUTO: 34.9 % (ref 36.6–50.3)
HGB BLD-MCNC: 11 G/DL (ref 12.1–17)
INR PPP: 1.2 (ref 0.9–1.1)
L PNEUMO1 AG UR QL IA: NEGATIVE
LACTATE SERPL-SCNC: 1.4 MMOL/L (ref 0.4–2)
LDH SERPL L TO P-CCNC: 428 U/L (ref 85–241)
MAGNESIUM SERPL-MCNC: 2.2 MG/DL (ref 1.6–2.4)
MCH RBC QN AUTO: 26.1 PG (ref 26–34)
MCHC RBC AUTO-ENTMCNC: 31.5 G/DL (ref 30–36.5)
MCV RBC AUTO: 82.9 FL (ref 80–99)
NRBC # BLD: 0 K/UL (ref 0–0.01)
NRBC BLD-RTO: 0 PER 100 WBC
ORGANISM ID, SPNG3: NORMAL
PCO2 BLDA: 25 MMHG (ref 35–45)
PH BLDA: 7.55 [PH] (ref 7.35–7.45)
PHOSPHATE SERPL-MCNC: 2 MG/DL (ref 2.6–4.7)
PLATELET # BLD AUTO: 296 K/UL (ref 150–400)
PLEASE NOTE, SPNG4: NORMAL
PMV BLD AUTO: 10.3 FL (ref 8.9–12.9)
PO2 BLDA: 50 MMHG (ref 80–100)
POTASSIUM SERPL-SCNC: 3.6 MMOL/L (ref 3.5–5.1)
PROCALCITONIN SERPL-MCNC: 0.14 NG/ML
PROTHROMBIN TIME: 12.4 SEC (ref 9–11.1)
RBC # BLD AUTO: 4.21 M/UL (ref 4.1–5.7)
S PNEUM AG SPEC QL LA: NEGATIVE
SAO2 % BLD: 91 % (ref 92–97)
SAO2% DEVICE SAO2% SENSOR NAME: ABNORMAL
SERVICE CMNT-IMP: ABNORMAL
SODIUM SERPL-SCNC: 139 MMOL/L (ref 136–145)
SPECIAL REQUESTS,SREQ: NORMAL
SPECIAL REQUESTS,SREQ: NORMAL
SPECIMEN SITE: ABNORMAL
SPECIMEN SOURCE: NORMAL
SPECIMEN SOURCE: NORMAL
SPECIMEN, SPNG1: NORMAL
THERAPEUTIC RANGE,PTTT: NORMAL SECS (ref 58–77)
WBC # BLD AUTO: 7.8 K/UL (ref 4.1–11.1)

## 2021-02-23 PROCEDURE — 36415 COLL VENOUS BLD VENIPUNCTURE: CPT

## 2021-02-23 PROCEDURE — 85027 COMPLETE CBC AUTOMATED: CPT

## 2021-02-23 PROCEDURE — 83615 LACTATE (LD) (LDH) ENZYME: CPT

## 2021-02-23 PROCEDURE — 36600 WITHDRAWAL OF ARTERIAL BLOOD: CPT

## 2021-02-23 PROCEDURE — 80048 BASIC METABOLIC PNL TOTAL CA: CPT

## 2021-02-23 PROCEDURE — 77010033711 HC HIGH FLOW OXYGEN

## 2021-02-23 PROCEDURE — 74011250636 HC RX REV CODE- 250/636: Performed by: NURSE PRACTITIONER

## 2021-02-23 PROCEDURE — 74011636637 HC RX REV CODE- 636/637: Performed by: NURSE PRACTITIONER

## 2021-02-23 PROCEDURE — 74011250637 HC RX REV CODE- 250/637: Performed by: NURSE PRACTITIONER

## 2021-02-23 PROCEDURE — 84145 PROCALCITONIN (PCT): CPT

## 2021-02-23 PROCEDURE — 83735 ASSAY OF MAGNESIUM: CPT

## 2021-02-23 PROCEDURE — 85610 PROTHROMBIN TIME: CPT

## 2021-02-23 PROCEDURE — 82962 GLUCOSE BLOOD TEST: CPT

## 2021-02-23 PROCEDURE — 85730 THROMBOPLASTIN TIME PARTIAL: CPT

## 2021-02-23 PROCEDURE — 85379 FIBRIN DEGRADATION QUANT: CPT

## 2021-02-23 PROCEDURE — 84100 ASSAY OF PHOSPHORUS: CPT

## 2021-02-23 PROCEDURE — 83605 ASSAY OF LACTIC ACID: CPT

## 2021-02-23 PROCEDURE — 82803 BLOOD GASES ANY COMBINATION: CPT

## 2021-02-23 PROCEDURE — 82728 ASSAY OF FERRITIN: CPT

## 2021-02-23 PROCEDURE — 85384 FIBRINOGEN ACTIVITY: CPT

## 2021-02-23 PROCEDURE — 65610000006 HC RM INTENSIVE CARE

## 2021-02-23 RX ORDER — POTASSIUM CHLORIDE 750 MG/1
40 TABLET, FILM COATED, EXTENDED RELEASE ORAL ONCE
Status: COMPLETED | OUTPATIENT
Start: 2021-02-23 | End: 2021-02-23

## 2021-02-23 RX ORDER — INSULIN LISPRO 100 [IU]/ML
9 INJECTION, SOLUTION INTRAVENOUS; SUBCUTANEOUS ONCE
Status: COMPLETED | OUTPATIENT
Start: 2021-02-23 | End: 2021-02-23

## 2021-02-23 RX ORDER — DEXMEDETOMIDINE HYDROCHLORIDE 4 UG/ML
.1-1.5 INJECTION, SOLUTION INTRAVENOUS
Status: DISCONTINUED | OUTPATIENT
Start: 2021-02-23 | End: 2021-03-02

## 2021-02-23 RX ORDER — LISINOPRIL 10 MG/1
10 TABLET ORAL DAILY
Status: DISCONTINUED | OUTPATIENT
Start: 2021-02-23 | End: 2021-03-07

## 2021-02-23 RX ORDER — INSULIN GLARGINE 100 [IU]/ML
10 INJECTION, SOLUTION SUBCUTANEOUS DAILY
Status: DISCONTINUED | OUTPATIENT
Start: 2021-02-23 | End: 2021-02-24

## 2021-02-23 RX ORDER — INSULIN GLARGINE 100 [IU]/ML
10 INJECTION, SOLUTION SUBCUTANEOUS
Status: DISCONTINUED | OUTPATIENT
Start: 2021-02-23 | End: 2021-02-23

## 2021-02-23 RX ORDER — LORAZEPAM 2 MG/ML
2 INJECTION INTRAMUSCULAR ONCE
Status: COMPLETED | OUTPATIENT
Start: 2021-02-23 | End: 2021-02-23

## 2021-02-23 RX ADMIN — ENOXAPARIN SODIUM 40 MG: 100 INJECTION SUBCUTANEOUS at 06:18

## 2021-02-23 RX ADMIN — INSULIN LISPRO 5 UNITS: 100 INJECTION, SOLUTION INTRAVENOUS; SUBCUTANEOUS at 22:33

## 2021-02-23 RX ADMIN — DEXAMETHASONE SODIUM PHOSPHATE 6 MG: 4 INJECTION, SOLUTION INTRA-ARTICULAR; INTRALESIONAL; INTRAMUSCULAR; INTRAVENOUS; SOFT TISSUE at 06:21

## 2021-02-23 RX ADMIN — HYDRALAZINE HYDROCHLORIDE 20 MG: 20 INJECTION INTRAMUSCULAR; INTRAVENOUS at 06:18

## 2021-02-23 RX ADMIN — ENOXAPARIN SODIUM 40 MG: 100 INJECTION SUBCUTANEOUS at 18:44

## 2021-02-23 RX ADMIN — INSULIN LISPRO 5 UNITS: 100 INJECTION, SOLUTION INTRAVENOUS; SUBCUTANEOUS at 11:28

## 2021-02-23 RX ADMIN — Medication 1 CAPSULE: at 09:01

## 2021-02-23 RX ADMIN — PANTOPRAZOLE SODIUM 40 MG: 40 TABLET, DELAYED RELEASE ORAL at 06:54

## 2021-02-23 RX ADMIN — Medication 10 ML: at 21:24

## 2021-02-23 RX ADMIN — INSULIN LISPRO 9 UNITS: 100 INJECTION, SOLUTION INTRAVENOUS; SUBCUTANEOUS at 17:31

## 2021-02-23 RX ADMIN — TAMSULOSIN HYDROCHLORIDE 0.4 MG: 0.4 CAPSULE ORAL at 21:24

## 2021-02-23 RX ADMIN — Medication 10 ML: at 06:21

## 2021-02-23 RX ADMIN — Medication 2000 UNITS: at 09:01

## 2021-02-23 RX ADMIN — LISINOPRIL 10 MG: 10 TABLET ORAL at 11:28

## 2021-02-23 RX ADMIN — POTASSIUM CHLORIDE 40 MEQ: 750 TABLET, FILM COATED, EXTENDED RELEASE ORAL at 11:28

## 2021-02-23 RX ADMIN — OXYCODONE HYDROCHLORIDE AND ACETAMINOPHEN 1000 MG: 500 TABLET ORAL at 09:01

## 2021-02-23 RX ADMIN — INSULIN LISPRO 3 UNITS: 100 INJECTION, SOLUTION INTRAVENOUS; SUBCUTANEOUS at 06:54

## 2021-02-23 RX ADMIN — HYDRALAZINE HYDROCHLORIDE 20 MG: 20 INJECTION INTRAMUSCULAR; INTRAVENOUS at 16:29

## 2021-02-23 RX ADMIN — Medication 10 ML: at 14:07

## 2021-02-23 RX ADMIN — LORAZEPAM 2 MG: 2 INJECTION INTRAMUSCULAR; INTRAVENOUS at 12:49

## 2021-02-23 RX ADMIN — INSULIN GLARGINE 10 UNITS: 100 INJECTION, SOLUTION SUBCUTANEOUS at 11:29

## 2021-02-23 NOTE — PROGRESS NOTES
SORIN  Patient presented to [facility] ED with c/o headache cough and weakness. RA sat 86%, placed on NC. Admit with acute respiratory failure r/t COVID 19  RUR 18%  Disposition TBD           Plan for utilizing home health:  TBD       PCP: First and Last name:     Name of Practice:    Are you a current patient: Yes/No:    Approximate date of last visit:    Can you participate in a virtual visit with your PCP:                     Current Advanced Directive/Advance Care Plan: Not on file     Healthcare Decision Maker:   Click here to complete [place] including selection of the Healthcare Decision Maker Relationship (ie \"Primary\")                         Patient admitted to ICU from [facility] for a higher level of care. Patient on isolation r/t COVID 19.  CM attempted to contact patient in his room , he did not answer the phone, left a VM message for his sister Joan Kemp [phone] to complete initial assessment and will await her call back.  Maryanne Tillman RN,Care Management

## 2021-02-23 NOTE — PROGRESS NOTES
0730: Bedside and Verbal shift change report given to cha Murcia nurse & MAYUR Trejo (oncoming nurse) by Chavo Mendoza RN (offgoing nurse). Report included the following information SBAR, Kardex, Intake/Output, MAR, Recent Results, Cardiac Rhythm NSR and Alarm Parameters . 1930: Bedside and Verbal shift change report given to MAYUR Viera (oncoming nurse) by cha [place] nurse & MAYUR Trejo (offgoing nurse). Report included the following information SBAR, Kardex, Intake/Output, MAR, Recent Results, Cardiac Rhythm NSR and Alarm Parameters .

## 2021-02-23 NOTE — PROGRESS NOTES
1930: Bedside and Verbal shift change report given to Nelli Cook RN (oncoming nurse) by See Singh RN (offgoing nurse). Report included the following information SBAR, Kardex, Intake/Output, MAR, Accordion, Recent Results, Cardiac Rhythm NSR, Sinus Arrhythmia  and Alarm Parameters . 0350: Pt calling out stating he \"cannot do this anymore. \" Pt states \"I want to go home to die. I don't want any of this anymore. \" Pt is A/Ox4. Intensivist aware. Pt is willing to try to rest for a few more hours. 0400: pt continues to refuse medication, lab draws. Pt educated on current status. Pt refusing to turn and/or prone. 0500: Pt still refusing medication, lab draws. Pt continues to refuse turn and/or prone, repositioning. 4430: pt wishes to be FULL code. Intensivist aware and has discussed code status with pt as well as pt condition. 0730: Bedside and Verbal shift change report given to MAYUR Hubbard (oncoming nurse) by Nelli Cook RN (offgoing nurse). Report included the following information SBAR, Kardex, Intake/Output, MAR, Accordion, Recent Results, Cardiac Rhythm NSR, Sinus Arrhythmia  and Alarm Parameters .

## 2021-02-24 LAB
ANION GAP SERPL CALC-SCNC: 6 MMOL/L (ref 5–15)
BACTERIA SPEC CULT: ABNORMAL
BACTERIA SPEC CULT: ABNORMAL
BUN SERPL-MCNC: 13 MG/DL (ref 6–20)
BUN/CREAT SERPL: 16 (ref 12–20)
CALCIUM SERPL-MCNC: 8.5 MG/DL (ref 8.5–10.1)
CHLORIDE SERPL-SCNC: 111 MMOL/L (ref 97–108)
CO2 SERPL-SCNC: 23 MMOL/L (ref 21–32)
CREAT SERPL-MCNC: 0.79 MG/DL (ref 0.7–1.3)
D DIMER PPP FEU-MCNC: 0.34 MG/L FEU (ref 0–0.65)
ERYTHROCYTE [DISTWIDTH] IN BLOOD BY AUTOMATED COUNT: 14.5 % (ref 11.5–14.5)
GLUCOSE BLD STRIP.AUTO-MCNC: 140 MG/DL (ref 65–100)
GLUCOSE BLD STRIP.AUTO-MCNC: 203 MG/DL (ref 65–100)
GLUCOSE BLD STRIP.AUTO-MCNC: 219 MG/DL (ref 65–100)
GLUCOSE BLD STRIP.AUTO-MCNC: 260 MG/DL (ref 65–100)
GLUCOSE SERPL-MCNC: 157 MG/DL (ref 65–100)
GRAM STN SPEC: ABNORMAL
HCT VFR BLD AUTO: 33.2 % (ref 36.6–50.3)
HGB BLD-MCNC: 10.3 G/DL (ref 12.1–17)
MAGNESIUM SERPL-MCNC: 2.1 MG/DL (ref 1.6–2.4)
MCH RBC QN AUTO: 26.5 PG (ref 26–34)
MCHC RBC AUTO-ENTMCNC: 31 G/DL (ref 30–36.5)
MCV RBC AUTO: 85.6 FL (ref 80–99)
NRBC # BLD: 0 K/UL (ref 0–0.01)
NRBC BLD-RTO: 0 PER 100 WBC
PHOSPHATE SERPL-MCNC: 2.6 MG/DL (ref 2.6–4.7)
PLATELET # BLD AUTO: 292 K/UL (ref 150–400)
PMV BLD AUTO: 10.3 FL (ref 8.9–12.9)
POTASSIUM SERPL-SCNC: 4 MMOL/L (ref 3.5–5.1)
RBC # BLD AUTO: 3.88 M/UL (ref 4.1–5.7)
SERVICE CMNT-IMP: ABNORMAL
SODIUM SERPL-SCNC: 140 MMOL/L (ref 136–145)
WBC # BLD AUTO: 8.7 K/UL (ref 4.1–11.1)

## 2021-02-24 PROCEDURE — 36415 COLL VENOUS BLD VENIPUNCTURE: CPT

## 2021-02-24 PROCEDURE — 74011000250 HC RX REV CODE- 250: Performed by: INTERNAL MEDICINE

## 2021-02-24 PROCEDURE — 74011250636 HC RX REV CODE- 250/636: Performed by: NURSE PRACTITIONER

## 2021-02-24 PROCEDURE — 77010033678 HC OXYGEN DAILY

## 2021-02-24 PROCEDURE — 85379 FIBRIN DEGRADATION QUANT: CPT

## 2021-02-24 PROCEDURE — 94762 N-INVAS EAR/PLS OXIMTRY CONT: CPT

## 2021-02-24 PROCEDURE — 77010033711 HC HIGH FLOW OXYGEN

## 2021-02-24 PROCEDURE — 74011636637 HC RX REV CODE- 636/637: Performed by: NURSE PRACTITIONER

## 2021-02-24 PROCEDURE — 74011250637 HC RX REV CODE- 250/637: Performed by: NURSE PRACTITIONER

## 2021-02-24 PROCEDURE — 65610000006 HC RM INTENSIVE CARE

## 2021-02-24 PROCEDURE — 82962 GLUCOSE BLOOD TEST: CPT

## 2021-02-24 PROCEDURE — 83735 ASSAY OF MAGNESIUM: CPT

## 2021-02-24 PROCEDURE — 85027 COMPLETE CBC AUTOMATED: CPT

## 2021-02-24 PROCEDURE — 94760 N-INVAS EAR/PLS OXIMETRY 1: CPT

## 2021-02-24 PROCEDURE — 84100 ASSAY OF PHOSPHORUS: CPT

## 2021-02-24 PROCEDURE — 80048 BASIC METABOLIC PNL TOTAL CA: CPT

## 2021-02-24 RX ORDER — INSULIN GLARGINE 100 [IU]/ML
5 INJECTION, SOLUTION SUBCUTANEOUS ONCE
Status: COMPLETED | OUTPATIENT
Start: 2021-02-24 | End: 2021-02-24

## 2021-02-24 RX ORDER — INSULIN GLARGINE 100 [IU]/ML
15 INJECTION, SOLUTION SUBCUTANEOUS DAILY
Status: DISCONTINUED | OUTPATIENT
Start: 2021-02-25 | End: 2021-02-25

## 2021-02-24 RX ADMIN — INSULIN LISPRO 3 UNITS: 100 INJECTION, SOLUTION INTRAVENOUS; SUBCUTANEOUS at 10:57

## 2021-02-24 RX ADMIN — TAMSULOSIN HYDROCHLORIDE 0.4 MG: 0.4 CAPSULE ORAL at 21:33

## 2021-02-24 RX ADMIN — DEXAMETHASONE SODIUM PHOSPHATE 6 MG: 4 INJECTION, SOLUTION INTRA-ARTICULAR; INTRALESIONAL; INTRAMUSCULAR; INTRAVENOUS; SOFT TISSUE at 07:01

## 2021-02-24 RX ADMIN — DEXMEDETOMIDINE HYDROCHLORIDE 0.4 MCG/KG/HR: 400 INJECTION INTRAVENOUS at 01:17

## 2021-02-24 RX ADMIN — INSULIN GLARGINE 5 UNITS: 100 INJECTION, SOLUTION SUBCUTANEOUS at 13:05

## 2021-02-24 RX ADMIN — HYDRALAZINE HYDROCHLORIDE 20 MG: 20 INJECTION INTRAMUSCULAR; INTRAVENOUS at 10:57

## 2021-02-24 RX ADMIN — OXYCODONE HYDROCHLORIDE AND ACETAMINOPHEN 1000 MG: 500 TABLET ORAL at 08:49

## 2021-02-24 RX ADMIN — PANTOPRAZOLE SODIUM 40 MG: 40 TABLET, DELAYED RELEASE ORAL at 07:02

## 2021-02-24 RX ADMIN — INSULIN LISPRO 2 UNITS: 100 INJECTION, SOLUTION INTRAVENOUS; SUBCUTANEOUS at 07:09

## 2021-02-24 RX ADMIN — Medication 10 ML: at 21:33

## 2021-02-24 RX ADMIN — ENOXAPARIN SODIUM 40 MG: 100 INJECTION SUBCUTANEOUS at 18:11

## 2021-02-24 RX ADMIN — DEXMEDETOMIDINE HYDROCHLORIDE 0.7 MCG/KG/HR: 400 INJECTION INTRAVENOUS at 18:22

## 2021-02-24 RX ADMIN — Medication 2000 UNITS: at 08:49

## 2021-02-24 RX ADMIN — ENOXAPARIN SODIUM 40 MG: 100 INJECTION SUBCUTANEOUS at 07:02

## 2021-02-24 RX ADMIN — INSULIN LISPRO 5 UNITS: 100 INJECTION, SOLUTION INTRAVENOUS; SUBCUTANEOUS at 21:33

## 2021-02-24 RX ADMIN — LISINOPRIL 10 MG: 10 TABLET ORAL at 08:49

## 2021-02-24 RX ADMIN — INSULIN GLARGINE 10 UNITS: 100 INJECTION, SOLUTION SUBCUTANEOUS at 08:49

## 2021-02-24 RX ADMIN — Medication 1 CAPSULE: at 08:49

## 2021-02-24 RX ADMIN — HYDRALAZINE HYDROCHLORIDE 20 MG: 20 INJECTION INTRAMUSCULAR; INTRAVENOUS at 17:22

## 2021-02-24 RX ADMIN — Medication 10 ML: at 07:02

## 2021-02-24 RX ADMIN — DEXMEDETOMIDINE HYDROCHLORIDE 0.7 MCG/KG/HR: 400 INJECTION INTRAVENOUS at 13:05

## 2021-02-24 RX ADMIN — INSULIN LISPRO 3 UNITS: 100 INJECTION, SOLUTION INTRAVENOUS; SUBCUTANEOUS at 17:23

## 2021-02-24 RX ADMIN — DEXMEDETOMIDINE HYDROCHLORIDE 0.7 MCG/KG/HR: 400 INJECTION INTRAVENOUS at 07:00

## 2021-02-24 RX ADMIN — Medication 10 ML: at 13:06

## 2021-02-24 NOTE — PROGRESS NOTES
[facility]    ICU TEAM Progress Note    Name: Jc Wong   : 1955   MRN: [id number]   Date: 2021      Assessment:  ICU Problems:     1. Acute Hypoxic respiratory failure secondary to COVID-19 pneumonia  a. Currently on high flow nasal cannula 70L/ min 100% O2  b. Prone as tolerated  c. Wean supplemental O2 as tolerated to keep sats >92%  d. Pulmonary hygiene   e.   2. COVID-19 pneumonitis  a. Trend inflammatory markers. Decadron, vitamin C, zinc.   3. CAD  a. CABG in 2018, takes imdur and metoprolol at home. On hold due to hypotension at outside facility, now stable. 4. DM type 2  a. Continue SSI  5. VARINDER  a. Resolving. Creat 0.79       Reason for ICU Admission: Hypoxia    Brief HPI:    - Patient is a 77year old male with history of hypertension, Diabetes type 2, CAD status post CABG  In 2018 who presents to the ER 2020 at [facility] with complaints of SOB associated with fatigue and loss of taste which started about 5 days prior to ED visit. Chest CT showed  patchy pulmonary airspace disease consistent with Covid-19 pneumonia. Rapid Covid-19 test found to be positive. CT of chest also showed a left renal hyperdense mass which was not seen on comparison ultrasound 2018. He was hypoxic on room with oxygen saturation of 84% requiring 4L of O2 vis NC. Since admission he has had increase in oxygen needs and required 15 L/min mid flow. The facility he was at does not have ICU care and he was brought to [facility] for possible need for intubation as he could not tolerate BiPap. POD:  * No surgery found *    S/P:       Plan:     1. COVID Treatment:  a. Immunomodulator--Steroids -- Yes    b. COVID-19 Specific Anticoagulation -- Yes    c. Vitamin C -- Yes    d. Zinc -- Yes    2. SBP Goal of: > 90 mmHg  3. MAP Goal of: 65-85 mmHg  4. None - For above SBP/MAP goals  5. IVFs:   6. Transfusion Trigger (Hgb): <7 g/dL  7. Respiratory Goals:  a. N/A  8.  Pulmonary toilet: Incentive Spirometry 9. SpO2 Goal: > 92%  10. Keep K>4; Mg>2   11. PT/OT: Will consult when appropriate   12. Goals of Care Discussion with family Yes   15. Plan of Care/Code Status: Full Code  14. Appreciate Consultants Input   15. Discussed Care Plan with Bedside RN  16. Documentation of Current Medications  17. Rest of Plan Below:     F - Feeding:  Po intake, adequate, reg diet   A - Analgesia: Acetaminophen  S - Sedation: N/A  T - DVT Prophylaxis: Lovenox   H - Head of Bed: > 30 Degrees  U - Ulcer Prophylaxis: Pepcid (famotidine)   G - Glycemic Control: Insulin. Lantus started this am   S - Spontaneous Breathing Trial: N/A  B - Bowel Regimen: MiraLax  I - Indwelling Catheter:              Tubes: None  Lines: Peripheral IV  Drains: None  D - De-escalation of Antibiotics: hold for now, sputum cultures pending     Subjective:   Overnight Events:   2021    -Prone this am and as tolerated   -Hydralizine prn for sbp >160  -Resume PTA meds for HTN     Objective:     Visit Vitals  BP (!) 110/59   Pulse 66   Temp 98.3 °F (36.8 °C)   Resp 18   Wt 101.7 kg (224 lb 3.3 oz)   SpO2 95%   BMI 32.17 kg/m²    O2 Flow Rate (L/min): 70 l/min O2 Device: Hi flow nasal cannula Temp (24hrs), Av.9 °F (36.6 °C), Min:97.5 °F (36.4 °C), Max:98.3 °F (36.8 °C)           Hemodynamics:   PAP:   CO:     Wedge:   CI:     CVP:    SVR:       PVR:       Ventilator Settings:  Mode Rate Tidal Volume Pressure FiO2 PEEP            100 %       Peak airway pressure:      Minute ventilation:          Intake/Output:     Intake/Output Summary (Last 24 hours) at 2021 1311  Last data filed at 2021 1200  Gross per 24 hour   Intake 265.99 ml   Output 1350 ml   Net -1084.01 ml       Physical Exam:  General:  Alert, well noursished, well developed, appears stated age   Eyes:  Sclera anicteric. Pupils equally round and reactive to light.    Mouth/Throat: Mucous membranes normal, oral pharynx clear   Neck: Supple   Lungs:   Clear to auscultation bilaterally, good effort   CV:  Regular rate and rhythm,no murmur, click, rub or gallop   Abdomen:   Soft, non-tender. bowel sounds normal. non-distended   Extremities: No cyanosis or edema   Skin: Skin color, texture, turgor normal. no acute rash or lesions   Lymph nodes: Cervical and supraclavicular normal   Musculoskeletal: No swelling or deformity   Lines/Devices:  Intact, no erythema, drainage or tenderness   Psych: Alert and oriented, normal mood affect given the setting       Labs & Data: Reviewed    Medications: Reviewed    Chest X-Ray:    TTE:    Multidisciplinary Rounds Completed: Yes    ABCDEF Bundle/Checklist Completed: Yes    SPECIAL EQUIPMENT: None    DISPOSITION: Stay in ICU    CRITICAL CARE CONSULTANT NOTE  I had a face to face encounter with the patient, reviewed and interpreted patient data including clinical events, labs, images, vital signs, I/O's, and examined patient. I have discussed the case and the plan and management of the patient's care with the consulting services, the bedside nurses and the respiratory therapist.      NOTE OF PERSONAL INVOLVEMENT IN CARE   This patient has a high probability of imminent, clinically significant deterioration, which requires the highest level of preparedness to intervene urgently. I participated in the decision-making and personally managed or directed the management of the following life and organ supporting interventions that required my frequent assessment to treat or prevent imminent deterioration. I personally spent 45 minutes of critical care time. This is time spent at this critically ill patient's bedside actively involved in patient care as well as the coordination of care and discussions with the patient's family. This does not include any procedural time which has been billed separately.     Lu Woodward NP    [facility]  2/24/2021

## 2021-02-24 NOTE — PROGRESS NOTES
1930- Bedside and Verbal shift change report given to Cat RN (oncoming nurse) by Neil RN and Alvin Haywood RN (offgoing nurse). Report included the following information SBAR, Kardex, ED Summary, Procedure Summary, Intake/Output, Accordion, Recent Results, Med Rec Status, Cardiac Rhythm NSR and Alarm Parameters . 0730- Bedside and Verbal shift change report given to [address] (oncoming nurse) by Cat RN (offgoing nurse). Report included the following information SBAR, Kardex, ED Summary, Procedure Summary, Intake/Output, MAR, Accordion, Recent Results, Cardiac Rhythm NSR- Camilo (50s) with PACs and Alarm Parameters . Shift Summary:  Pt restless; pulling hi-marie off every 15-30 minutes in room from start of shift until 2300. The oxygen need was re-explained to the patient and NP was notified. Precedex will be restarted if needed for restlessness and agitation, but pt is currently resting and able to tolerate hi-flow at this time (2330). 0100- pt stood up out of bed and was pulling everything off wanting to go home. Agreed to be ok with wanting to have something to help with anxiety. Precedex started- see MAR    Pt continues to be restless and pull hi-marie off but states he is less anxious with the Precedex; currently running at 0.7mcg.

## 2021-02-24 NOTE — PROGRESS NOTES
SORIN  Patient transferred to [facility] [place] from [facility] for higher level of care r/t COVID 19  RUR 18%  Disposition: TBD  Care manager spoke with patient's sister Mirta Plummer [phone] to introduce self and explain role. Per Rachel Granado she is anamaria's MPOA, no paperwork has been provided. Per sister patient was living independently to include driving. He has a PCP, she could not spell his name for this CM, will follow up with patient when able. He uses the local [facility] as his pharmacy. He has no previous HH or equipment needs. Care management will follow for transitions of care.  Prudence Escudero RN,Care Management

## 2021-02-24 NOTE — PROGRESS NOTES
[facility]    ICU TEAM Progress Note    Name: Luis Jeong   : 1955   MRN: [id number]   Date: 2021      Assessment:  ICU Problems:     1. Acute Hypoxic respiratory failure secondary to COVID-19 pneumonia  a. Currently on high flow nasal cannula 70L/ min 100% FiO2, did not tolerate BiPap at outside facility. b. Prone as tolerated  c. Wean supplemental O2 as tolerated to keep sats >92%  d. Pulmonary hygiene   e.   2. COVID-19 pneumonitis  a. Trend inflammatory markers. Decadron, vitamin C, zinc.   3. CAD  a. CABG in 2018, takes imdur and metoprolol at home. On hold due to hypotension at outside facility, now stable. 4. DM type 2  a. Continue SSI  5. VARINDER  a. Resolving. Creat 0.79       Reason for ICU Admission: Hypoxia    Brief HPI:    - Patient is a 77year old male with history of hypertension, Diabetes type 2, CAD status post CABG  In 2018 who presents to the ER 2020 at [facility] with complaints of SOB associated with fatigue and loss of taste which started about 5 days prior to ED visit. Chest CT showed  patchy pulmonary airspace disease consistent with Covid-19 pneumonia. Rapid Covid-19 test found to be positive. CT of chest also showed a left renal hyperdense mass which was not seen on comparison ultrasound 2018. He was hypoxic on room with oxygen saturation of 84% requiring 4L of O2 vis NC. Since admission he has had increase in oxygen needs and required 15 L/min mid flow. The facility he was at does not have ICU care and he was brought to [facility] [place] for possible need for intubation as he could not tolerate BiPap. POD:  * No surgery found *    S/P:       Plan:     1. COVID Treatment:  a. Immunomodulator--Steroids -- Yes    b. COVID-19 Specific Anticoagulation -- Yes    c. Vitamin C -- Yes    d. Zinc -- Yes    2. SBP Goal of: > 90 mmHg  3. MAP Goal of: 65-85 mmHg  4. None - For above SBP/MAP goals  5. IVFs:   6. Transfusion Trigger (Hgb): <7 g/dL  7.  Respiratory Goals:  a. N/A  8. Pulmonary toilet: Incentive Spirometry   9. SpO2 Goal: > 92%  10. Keep K>4; Mg>2   11. PT/OT: Will consult when appropriate   12. Goals of Care Discussion with family Yes   15. Plan of Care/Code Status: Full Code  14. Appreciate Consultants Input   15. Discussed Care Plan with Bedside RN  16. Documentation of Current Medications  17. Rest of Plan Below:     F - Feeding:  Po intake, adequate, reg diet   A - Analgesia: Acetaminophen  S - Sedation: N/A  T - DVT Prophylaxis: Lovenox   H - Head of Bed: > 30 Degrees  U - Ulcer Prophylaxis: Pepcid (famotidine)   G - Glycemic Control: Insulin. Lantus started this am   S - Spontaneous Breathing Trial: N/A  B - Bowel Regimen: MiraLax  I - Indwelling Catheter:              Tubes: None  Lines: Peripheral IV  Drains: None  D - De-escalation of Antibiotics: hold for now, sputum cultures pending     Subjective:   Overnight Events:   2021    -Prone this am and as tolerated   -Hydralizine prn for sbp >160  -Resume PTA meds for HTN     Objective:     Visit Vitals  BP (!) 162/81   Pulse 60   Temp 98 °F (36.7 °C)   Resp 22   Wt 101.7 kg (224 lb 3.3 oz)   SpO2 95%   BMI 32.17 kg/m²    O2 Flow Rate (L/min): 70 l/min O2 Device: Heated, Hi flow nasal cannula Temp (24hrs), Av.7 °F (36.5 °C), Min:97.5 °F (36.4 °C), Max:98 °F (36.7 °C)           Hemodynamics:   PAP:   CO:     Wedge:   CI:     CVP:    SVR:       PVR:       Ventilator Settings:  Mode Rate Tidal Volume Pressure FiO2 PEEP            100 %       Peak airway pressure:      Minute ventilation:          Intake/Output:     Intake/Output Summary (Last 24 hours) at 2021 0819  Last data filed at 2021 0800  Gross per 24 hour   Intake 195.59 ml   Output 1175 ml   Net -979.41 ml       Physical Exam:  General:  Alert, well noursished, well developed, appears stated age   Eyes:  Sclera anicteric. Pupils equally round and reactive to light.    Mouth/Throat: Mucous membranes normal, oral pharynx clear Neck: Supple   Lungs:   Clear to auscultation bilaterally, good effort   CV:  Regular rate and rhythm,no murmur, click, rub or gallop   Abdomen:   Soft, non-tender. bowel sounds normal. non-distended   Extremities: No cyanosis or edema   Skin: Skin color, texture, turgor normal. no acute rash or lesions   Lymph nodes: Cervical and supraclavicular normal   Musculoskeletal: No swelling or deformity   Lines/Devices:  Intact, no erythema, drainage or tenderness   Psych: Alert and oriented, normal mood affect given the setting       Labs & Data: Reviewed    Medications: Reviewed    Chest X-Ray:    TTE:    Multidisciplinary Rounds Completed: Yes    ABCDEF Bundle/Checklist Completed: Yes    SPECIAL EQUIPMENT: None    DISPOSITION: Stay in ICU    CRITICAL CARE CONSULTANT NOTE  I had a face to face encounter with the patient, reviewed and interpreted patient data including clinical events, labs, images, vital signs, I/O's, and examined patient. I have discussed the case and the plan and management of the patient's care with the consulting services, the bedside nurses and the respiratory therapist.      NOTE OF PERSONAL INVOLVEMENT IN CARE   This patient has a high probability of imminent, clinically significant deterioration, which requires the highest level of preparedness to intervene urgently. I participated in the decision-making and personally managed or directed the management of the following life and organ supporting interventions that required my frequent assessment to treat or prevent imminent deterioration. I personally spent 45 minutes of critical care time. This is time spent at this critically ill patient's bedside actively involved in patient care as well as the coordination of care and discussions with the patient's family. This does not include any procedural time which has been billed separately.     Jenae Powers NP    [facility]  2/24/2021

## 2021-02-24 NOTE — BH NOTES
Advance Care Planning     Advance Care Planning Activator (Inpatient)  Conversation Note      Date of ACP Conversation: 02/24/21     Conversation Conducted with:   Clemencia Lomas, patient's sister  ACP Activator: Rukhsana Gu RN  Health Care Decision Maker:    Current Designated Health Care Decision Maker:     Primary Decision Maker: Cam Hoang - [phone]  Click here to complete [address] including selection of the Healthcare Decision Maker Relationship (ie \"Primary\")  Care Preferences    Ventilation: \"If you were in your present state of health and suddenly became very ill and were unable to breathe on your own, what would your preference be about the use of a ventilator (breathing machine) if it were available to you? \"  Sister is unsure    \"If your health worsens and it becomes clear that your chance of recovery is unlikely, what would your preference be about the use of a ventilator (breathing machine) if it were available to you? \" Sister is unsure        Resuscitation  \"CPR works best to restart the heart when there is a sudden event, like a heart attack, in someone who is otherwise healthy. Unfortunately, CPR does not typically restart the heart for people who have serious health conditions or who are very sick. \"    \"In the event your heart stopped as a result of an underlying serious health condition, would you want attempts to be made to restart your heart Sister is unsure    [] Yes  [x] No   Educated Patient / Decision Maker regarding differences between Advance Directives and portable DNR orders.     Length of ACP Conversation in minutes:  10    Conversation Outcomes:  [] ACP discussion completed  [] Existing advance directive reviewed with patient; no changes to patient's previously recorded wishes     [] New Advance Directive completed   [] Portable Do Not Resuscitate prepared for Provider review and signature  [] POLST/POST/MOLST/MOST prepared for Provider review and signature      Follow-up plan:    [x] Schedule follow-up conversation to continue planning  [] Referred individual to Provider for additional questions/concerns   [] Advised patient/agent/surrogate to review completed ACP document and update if needed with changes in condition, patient preferences or care setting     [] This note routed to one or more involved healthcare providers

## 2021-02-24 NOTE — PROGRESS NOTES
0730: Bedside and Verbal shift change report given to Gil Ledezma RN (oncoming nurse) by Daniel Garcia RN (offgoing nurse). Report included the following information SBAR, Kardex, ED Summary, Intake/Output, MAR, Accordion, Recent Results, Med Rec Status, Cardiac Rhythm Sinus Magdy Jersey and Alarm Parameters .

## 2021-02-25 LAB
ANION GAP SERPL CALC-SCNC: 9 MMOL/L (ref 5–15)
ARTERIAL PATENCY WRIST A: YES
BASE EXCESS BLDA CALC-SCNC: 0.8 MMOL/L
BDY SITE: ABNORMAL
BUN SERPL-MCNC: 20 MG/DL (ref 6–20)
BUN/CREAT SERPL: 20 (ref 12–20)
CALCIUM SERPL-MCNC: 9 MG/DL (ref 8.5–10.1)
CHLORIDE SERPL-SCNC: 107 MMOL/L (ref 97–108)
CO2 SERPL-SCNC: 22 MMOL/L (ref 21–32)
CREAT SERPL-MCNC: 1.01 MG/DL (ref 0.7–1.3)
ERYTHROCYTE [DISTWIDTH] IN BLOOD BY AUTOMATED COUNT: 14.5 % (ref 11.5–14.5)
GLUCOSE BLD STRIP.AUTO-MCNC: 193 MG/DL (ref 65–100)
GLUCOSE BLD STRIP.AUTO-MCNC: 247 MG/DL (ref 65–100)
GLUCOSE BLD STRIP.AUTO-MCNC: 270 MG/DL (ref 65–100)
GLUCOSE BLD STRIP.AUTO-MCNC: 369 MG/DL (ref 65–100)
GLUCOSE SERPL-MCNC: 263 MG/DL (ref 65–100)
HCO3 BLDA-SCNC: 23 MMOL/L (ref 22–26)
HCT VFR BLD AUTO: 38.2 % (ref 36.6–50.3)
HGB BLD-MCNC: 11.7 G/DL (ref 12.1–17)
MAGNESIUM SERPL-MCNC: 2.3 MG/DL (ref 1.6–2.4)
MCH RBC QN AUTO: 25.9 PG (ref 26–34)
MCHC RBC AUTO-ENTMCNC: 30.6 G/DL (ref 30–36.5)
MCV RBC AUTO: 84.7 FL (ref 80–99)
NRBC # BLD: 0 K/UL (ref 0–0.01)
NRBC BLD-RTO: 0 PER 100 WBC
PCO2 BLDA: 28 MMHG (ref 35–45)
PH BLDA: 7.52 [PH] (ref 7.35–7.45)
PHOSPHATE SERPL-MCNC: 3.4 MG/DL (ref 2.6–4.7)
PLATELET # BLD AUTO: 322 K/UL (ref 150–400)
PMV BLD AUTO: 10.2 FL (ref 8.9–12.9)
PO2 BLDA: 57 MMHG (ref 80–100)
POTASSIUM SERPL-SCNC: 4.1 MMOL/L (ref 3.5–5.1)
RBC # BLD AUTO: 4.51 M/UL (ref 4.1–5.7)
SAO2 % BLD: 93 % (ref 92–97)
SAO2% DEVICE SAO2% SENSOR NAME: ABNORMAL
SERVICE CMNT-IMP: ABNORMAL
SODIUM SERPL-SCNC: 138 MMOL/L (ref 136–145)
SPECIMEN SITE: ABNORMAL
WBC # BLD AUTO: 7.4 K/UL (ref 4.1–11.1)

## 2021-02-25 PROCEDURE — 74011250637 HC RX REV CODE- 250/637: Performed by: NURSE PRACTITIONER

## 2021-02-25 PROCEDURE — 74011000250 HC RX REV CODE- 250: Performed by: INTERNAL MEDICINE

## 2021-02-25 PROCEDURE — 83735 ASSAY OF MAGNESIUM: CPT

## 2021-02-25 PROCEDURE — 94760 N-INVAS EAR/PLS OXIMETRY 1: CPT

## 2021-02-25 PROCEDURE — 84100 ASSAY OF PHOSPHORUS: CPT

## 2021-02-25 PROCEDURE — 36600 WITHDRAWAL OF ARTERIAL BLOOD: CPT

## 2021-02-25 PROCEDURE — 85027 COMPLETE CBC AUTOMATED: CPT

## 2021-02-25 PROCEDURE — 65610000006 HC RM INTENSIVE CARE

## 2021-02-25 PROCEDURE — 36415 COLL VENOUS BLD VENIPUNCTURE: CPT

## 2021-02-25 PROCEDURE — 82962 GLUCOSE BLOOD TEST: CPT

## 2021-02-25 PROCEDURE — 77010033711 HC HIGH FLOW OXYGEN

## 2021-02-25 PROCEDURE — 80048 BASIC METABOLIC PNL TOTAL CA: CPT

## 2021-02-25 PROCEDURE — 74011250636 HC RX REV CODE- 250/636: Performed by: NURSE PRACTITIONER

## 2021-02-25 PROCEDURE — 82803 BLOOD GASES ANY COMBINATION: CPT

## 2021-02-25 PROCEDURE — 74011636637 HC RX REV CODE- 636/637: Performed by: NURSE PRACTITIONER

## 2021-02-25 RX ORDER — LANOLIN ALCOHOL/MO/W.PET/CERES
5 CREAM (GRAM) TOPICAL ONCE
Status: COMPLETED | OUTPATIENT
Start: 2021-02-25 | End: 2021-02-25

## 2021-02-25 RX ORDER — INSULIN GLARGINE 100 [IU]/ML
15 INJECTION, SOLUTION SUBCUTANEOUS EVERY 12 HOURS
Status: DISCONTINUED | OUTPATIENT
Start: 2021-02-25 | End: 2021-02-27

## 2021-02-25 RX ADMIN — INSULIN GLARGINE 15 UNITS: 100 INJECTION, SOLUTION SUBCUTANEOUS at 08:47

## 2021-02-25 RX ADMIN — ENOXAPARIN SODIUM 40 MG: 100 INJECTION SUBCUTANEOUS at 19:15

## 2021-02-25 RX ADMIN — Medication 10 ML: at 07:07

## 2021-02-25 RX ADMIN — INSULIN GLARGINE 15 UNITS: 100 INJECTION, SOLUTION SUBCUTANEOUS at 20:50

## 2021-02-25 RX ADMIN — DEXAMETHASONE SODIUM PHOSPHATE 6 MG: 4 INJECTION, SOLUTION INTRA-ARTICULAR; INTRALESIONAL; INTRAMUSCULAR; INTRAVENOUS; SOFT TISSUE at 06:44

## 2021-02-25 RX ADMIN — INSULIN LISPRO 5 UNITS: 100 INJECTION, SOLUTION INTRAVENOUS; SUBCUTANEOUS at 17:16

## 2021-02-25 RX ADMIN — DEXMEDETOMIDINE HYDROCHLORIDE 0.6 MCG/KG/HR: 400 INJECTION INTRAVENOUS at 06:44

## 2021-02-25 RX ADMIN — ENOXAPARIN SODIUM 40 MG: 100 INJECTION SUBCUTANEOUS at 06:43

## 2021-02-25 RX ADMIN — DEXMEDETOMIDINE HYDROCHLORIDE 0.6 MCG/KG/HR: 400 INJECTION INTRAVENOUS at 14:17

## 2021-02-25 RX ADMIN — DEXMEDETOMIDINE HYDROCHLORIDE 0.6 MCG/KG/HR: 400 INJECTION INTRAVENOUS at 00:24

## 2021-02-25 RX ADMIN — Medication 4.5 MG: at 21:44

## 2021-02-25 RX ADMIN — Medication 2000 UNITS: at 08:46

## 2021-02-25 RX ADMIN — DEXMEDETOMIDINE HYDROCHLORIDE 0.6 MCG/KG/HR: 400 INJECTION INTRAVENOUS at 21:43

## 2021-02-25 RX ADMIN — Medication 10 ML: at 14:17

## 2021-02-25 RX ADMIN — LISINOPRIL 10 MG: 10 TABLET ORAL at 08:47

## 2021-02-25 RX ADMIN — Medication 10 ML: at 21:44

## 2021-02-25 RX ADMIN — OXYCODONE HYDROCHLORIDE AND ACETAMINOPHEN 1000 MG: 500 TABLET ORAL at 08:47

## 2021-02-25 RX ADMIN — PANTOPRAZOLE SODIUM 40 MG: 40 TABLET, DELAYED RELEASE ORAL at 06:44

## 2021-02-25 RX ADMIN — INSULIN LISPRO 12 UNITS: 100 INJECTION, SOLUTION INTRAVENOUS; SUBCUTANEOUS at 12:24

## 2021-02-25 RX ADMIN — TAMSULOSIN HYDROCHLORIDE 0.4 MG: 0.4 CAPSULE ORAL at 21:44

## 2021-02-25 RX ADMIN — Medication 1 CAPSULE: at 08:46

## 2021-02-25 RX ADMIN — INSULIN LISPRO 3 UNITS: 100 INJECTION, SOLUTION INTRAVENOUS; SUBCUTANEOUS at 07:07

## 2021-02-25 NOTE — PROGRESS NOTES
1930- Bedside and Verbal shift change report given to Cat RN (oncoming nurse) by Tiffanie Oneil (offgoing nurse). Report included the following information SBAR, Kardex, ED Summary, Procedure Summary, Intake/Output, MAR, Accordion, Recent Results, Med Rec Status, Cardiac Rhythm NSR- Sinus Camilo and Alarm Parameters . 0730- Bedside and Verbal shift change report given to Tiffanie Oneil (oncoming nurse) by Cat RN (offgoing nurse). Report included the following information SBAR, Kardex, ED Summary, Procedure Summary, Intake/Output, MAR, Accordion, Recent Results, Med Rec Status, Cardiac Rhythm Sinus Adolfo Bue with PACs and Alarm Parameters . Shift Summary-  Pt had a restful night. Stated he had some much needed sleep. Pt family was updated x2 on pt condition.  Precedex currently at 0.6mcg,

## 2021-02-25 NOTE — PROGRESS NOTES
0730: Bedside and Verbal shift change report given to Shira Barbosa RN (oncoming nurse) by Patric Mascorro RN (offgoing nurse). Report included the following information SBAR, Kardex, ED Summary, Intake/Output, MAR, Accordion, Recent Results, Med Rec Status, Cardiac Rhythm Sinus Rock Graciela and Alarm Parameters .

## 2021-02-25 NOTE — PROGRESS NOTES
[facility]    ICU TEAM Progress Note    Name: Kristina Bennett   : 1955   MRN: [id number]   Date: 2021      Assessment:  ICU Problems:     1. Acute Hypoxic respiratory failure secondary to COVID-19 pneumonia  a. Currently on high flow nasal cannula 80L/ min 100% O2  b. Prone as tolerated  c. Wean supplemental O2 as tolerated to keep sats >92%  d. Pulmonary hygiene   e.   2. COVID-19 pneumonitis  a. Trend inflammatory markers. Decadron, vitamin C, zinc.   b. Lovenox prophylactically   3. CAD  a. CABG in 2018, takes imdur and metoprolol at home. On hold due to hypotension at outside facility, now stable. 4. DM type 2  a. Continue SSI  b. Diabetic diet   c. Increased long acting insulin   5. VARINDER  a. Resolving. Reason for ICU Admission: Hypoxia    Brief HPI:    - Patient is a 77year old male with history of hypertension, Diabetes type 2, CAD status post CABG  In 2018 who presents to the ER 2020 at [facility] with complaints of SOB associated with fatigue and loss of taste which started about 5 days prior to ED visit. Chest CT showed  patchy pulmonary airspace disease consistent with Covid-19 pneumonia. Rapid Covid-19 test found to be positive. CT of chest also showed a left renal hyperdense mass which was not seen on comparison ultrasound 2018. He was hypoxic on room with oxygen saturation of 84% requiring 4L of O2 vis NC. Since admission he has had increase in oxygen needs and required 15 L/min mid flow. The facility he was at does not have ICU care and he was brought to [facility] [place] for possible need for intubation as he could not tolerate BiPap. POD:  * No surgery found *    S/P:   No AE overnight   Cont high flow O2 and precedex for agitation   Increase long acting insulin secondary to hyperglycemia, change diet to CC from reg    Plan:     1. COVID Treatment:  a. Immunomodulator--Steroids -- Yes    b. COVID-19 Specific Anticoagulation -- Yes    c. Vitamin C -- Yes    d.  Zinc -- Yes    2. SBP Goal of: > 90 mmHg  3. MAP Goal of: 65-85 mmHg  4. None - For above SBP/MAP goals  5. IVFs:   6. Transfusion Trigger (Hgb): <7 g/dL  7. Respiratory Goals:  a. N/A  8. Pulmonary toilet: Incentive Spirometry   9. SpO2 Goal: > 92%  10. Keep K>4; Mg>2   11. PT/OT: Will consult when appropriate   12. Goals of Care Discussion with family Yes   15. Plan of Care/Code Status: Full Code  14. Appreciate Consultants Input   15. Discussed Care Plan with Bedside RN  16. Documentation of Current Medications  17. Rest of Plan Below:     F - Feeding:  Po intake, adequate, CC  A - Analgesia: Acetaminophen  S - Sedation: N/A  T - DVT Prophylaxis: Lovenox   H - Head of Bed: > 30 Degrees  U - Ulcer Prophylaxis: Pepcid (famotidine)   G - Glycemic Control: Insulin.  Lantus started this am   S - Spontaneous Breathing Trial: N/A  B - Bowel Regimen: MiraLax  I - Indwelling Catheter:              Tubes: None  Lines: Peripheral IV  Drains: None  D - De-escalation of Antibiotics: hold for now, sputum cultures pending     Subjective:   Overnight Events:   2021    -Prone this am and as tolerated   -Hydralizine prn for sbp >160  -Resume PTA meds for HTN     Objective:     Visit Vitals  BP (!) 111/58   Pulse 66   Temp 97.9 °F (36.6 °C)   Resp 26   Wt 101.7 kg (224 lb 3.3 oz)   SpO2 92%   BMI 32.17 kg/m²    O2 Flow Rate (L/min): 80 l/min O2 Device: Hi flow nasal cannula Temp (24hrs), Av.9 °F (36.6 °C), Min:97.7 °F (36.5 °C), Max:98.3 °F (36.8 °C)           Hemodynamics:   PAP:   CO:     Wedge:   CI:     CVP:    SVR:       PVR:       Ventilator Settings:  Mode Rate Tidal Volume Pressure FiO2 PEEP            100 %       Peak airway pressure:      Minute ventilation:          Intake/Output:     Intake/Output Summary (Last 24 hours) at 2021 1034  Last data filed at 2021 0800  Gross per 24 hour   Intake 396.36 ml   Output 1940 ml   Net -1543.64 ml       Physical Exam:  General:  Alert, well noursished, well developed, appears stated age   Eyes:  Sclera anicteric. Pupils equally round and reactive to light. Mouth/Throat: Mucous membranes normal, oral pharynx clear   Neck: Supple   Lungs:   Clear to auscultation bilaterally, good effort   CV:  Regular rate and rhythm,no murmur, click, rub or gallop   Abdomen:   Soft, non-tender. bowel sounds normal. non-distended   Extremities: No cyanosis or edema   Skin: Skin color, texture, turgor normal. no acute rash or lesions   Lymph nodes: Cervical and supraclavicular normal   Musculoskeletal: No swelling or deformity   Lines/Devices:  Intact, no erythema, drainage or tenderness   Psych: Alert and oriented, normal mood affect given the setting       Labs & Data: Reviewed    Medications: Reviewed    Chest X-Ray:    TTE:    Multidisciplinary Rounds Completed: Yes    ABCDEF Bundle/Checklist Completed: Yes    SPECIAL EQUIPMENT: None    DISPOSITION: Stay in ICU    CRITICAL CARE CONSULTANT NOTE  I had a face to face encounter with the patient, reviewed and interpreted patient data including clinical events, labs, images, vital signs, I/O's, and examined patient. I have discussed the case and the plan and management of the patient's care with the consulting services, the bedside nurses and the respiratory therapist.      NOTE OF PERSONAL INVOLVEMENT IN CARE   This patient has a high probability of imminent, clinically significant deterioration, which requires the highest level of preparedness to intervene urgently. I participated in the decision-making and personally managed or directed the management of the following life and organ supporting interventions that required my frequent assessment to treat or prevent imminent deterioration. I personally spent 45 minutes of critical care time. This is time spent at this critically ill patient's bedside actively involved in patient care as well as the coordination of care and discussions with the patient's family.   This does not include any procedural time which has been billed separately.     Amy Galicia NP    [facility]  2/25/2021

## 2021-02-26 ENCOUNTER — APPOINTMENT (OUTPATIENT)
Dept: VASCULAR SURGERY | Age: 66
DRG: 177 | End: 2021-02-26
Attending: NURSE PRACTITIONER
Payer: MEDICARE

## 2021-02-26 LAB
ANION GAP SERPL CALC-SCNC: 6 MMOL/L (ref 5–15)
BASOPHILS # BLD: 0 K/UL (ref 0–0.1)
BASOPHILS NFR BLD: 0 % (ref 0–1)
BUN SERPL-MCNC: 21 MG/DL (ref 6–20)
BUN/CREAT SERPL: 23 (ref 12–20)
CALCIUM SERPL-MCNC: 9 MG/DL (ref 8.5–10.1)
CHLORIDE SERPL-SCNC: 107 MMOL/L (ref 97–108)
CO2 SERPL-SCNC: 23 MMOL/L (ref 21–32)
CREAT SERPL-MCNC: 0.91 MG/DL (ref 0.7–1.3)
DIFFERENTIAL METHOD BLD: ABNORMAL
EOSINOPHIL # BLD: 0.3 K/UL (ref 0–0.4)
EOSINOPHIL NFR BLD: 4 % (ref 0–7)
ERYTHROCYTE [DISTWIDTH] IN BLOOD BY AUTOMATED COUNT: 14.3 % (ref 11.5–14.5)
GLUCOSE BLD STRIP.AUTO-MCNC: 266 MG/DL (ref 65–100)
GLUCOSE BLD STRIP.AUTO-MCNC: 272 MG/DL (ref 65–100)
GLUCOSE BLD STRIP.AUTO-MCNC: 329 MG/DL (ref 65–100)
GLUCOSE BLD STRIP.AUTO-MCNC: 376 MG/DL (ref 65–100)
GLUCOSE SERPL-MCNC: 258 MG/DL (ref 65–100)
HCT VFR BLD AUTO: 36 % (ref 36.6–50.3)
HGB BLD-MCNC: 11.1 G/DL (ref 12.1–17)
IMM GRANULOCYTES # BLD AUTO: 0.1 K/UL (ref 0–0.04)
IMM GRANULOCYTES NFR BLD AUTO: 1 % (ref 0–0.5)
LEFT ABI: 0.92
LEFT ANTERIOR TIBIAL: 111 MMHG
LEFT ARM BP: 140 MMHG
LEFT POSTERIOR TIBIAL: 129 MMHG
LEFT TBI: 0.38
LEFT TOE PRESSURE: 53 MMHG
LYMPHOCYTES # BLD: 0.7 K/UL (ref 0.8–3.5)
LYMPHOCYTES NFR BLD: 10 % (ref 12–49)
MAGNESIUM SERPL-MCNC: 2.2 MG/DL (ref 1.6–2.4)
MCH RBC QN AUTO: 26.2 PG (ref 26–34)
MCHC RBC AUTO-ENTMCNC: 30.8 G/DL (ref 30–36.5)
MCV RBC AUTO: 84.9 FL (ref 80–99)
MONOCYTES # BLD: 0.6 K/UL (ref 0–1)
MONOCYTES NFR BLD: 8 % (ref 5–13)
NEUTS SEG # BLD: 5.4 K/UL (ref 1.8–8)
NEUTS SEG NFR BLD: 77 % (ref 32–75)
NRBC # BLD: 0 K/UL (ref 0–0.01)
NRBC BLD-RTO: 0 PER 100 WBC
PHOSPHATE SERPL-MCNC: 3.3 MG/DL (ref 2.6–4.7)
PLATELET # BLD AUTO: 274 K/UL (ref 150–400)
PMV BLD AUTO: 10.1 FL (ref 8.9–12.9)
POTASSIUM SERPL-SCNC: 4.2 MMOL/L (ref 3.5–5.1)
RBC # BLD AUTO: 4.24 M/UL (ref 4.1–5.7)
RBC MORPH BLD: ABNORMAL
RIGHT ABI: 1.41
RIGHT ANTERIOR TIBIAL: 188 MMHG
RIGHT POSTERIOR TIBIAL: 198 MMHG
RIGHT TBI: 0.83
RIGHT TOE PRESSURE: 116 MMHG
SERVICE CMNT-IMP: ABNORMAL
SODIUM SERPL-SCNC: 136 MMOL/L (ref 136–145)
WBC # BLD AUTO: 7.1 K/UL (ref 4.1–11.1)

## 2021-02-26 PROCEDURE — 74011250637 HC RX REV CODE- 250/637: Performed by: NURSE PRACTITIONER

## 2021-02-26 PROCEDURE — 93922 UPR/L XTREMITY ART 2 LEVELS: CPT

## 2021-02-26 PROCEDURE — 85025 COMPLETE CBC W/AUTO DIFF WBC: CPT

## 2021-02-26 PROCEDURE — 74011250636 HC RX REV CODE- 250/636: Performed by: NURSE PRACTITIONER

## 2021-02-26 PROCEDURE — 83735 ASSAY OF MAGNESIUM: CPT

## 2021-02-26 PROCEDURE — 74011636637 HC RX REV CODE- 636/637: Performed by: NURSE PRACTITIONER

## 2021-02-26 PROCEDURE — 77010033711 HC HIGH FLOW OXYGEN

## 2021-02-26 PROCEDURE — 94760 N-INVAS EAR/PLS OXIMETRY 1: CPT

## 2021-02-26 PROCEDURE — 74011000250 HC RX REV CODE- 250: Performed by: INTERNAL MEDICINE

## 2021-02-26 PROCEDURE — 84100 ASSAY OF PHOSPHORUS: CPT

## 2021-02-26 PROCEDURE — 65610000006 HC RM INTENSIVE CARE

## 2021-02-26 PROCEDURE — 36415 COLL VENOUS BLD VENIPUNCTURE: CPT

## 2021-02-26 PROCEDURE — 94762 N-INVAS EAR/PLS OXIMTRY CONT: CPT

## 2021-02-26 PROCEDURE — 77010033678 HC OXYGEN DAILY

## 2021-02-26 PROCEDURE — 82962 GLUCOSE BLOOD TEST: CPT

## 2021-02-26 PROCEDURE — 80048 BASIC METABOLIC PNL TOTAL CA: CPT

## 2021-02-26 PROCEDURE — 77030027138 HC INCENT SPIROMETER -A

## 2021-02-26 RX ORDER — INSULIN LISPRO 100 [IU]/ML
10 INJECTION, SOLUTION INTRAVENOUS; SUBCUTANEOUS ONCE
Status: COMPLETED | OUTPATIENT
Start: 2021-02-26 | End: 2021-02-26

## 2021-02-26 RX ORDER — NIFEDIPINE 30 MG/1
30 TABLET, EXTENDED RELEASE ORAL DAILY
Status: DISCONTINUED | OUTPATIENT
Start: 2021-02-26 | End: 2021-03-15 | Stop reason: HOSPADM

## 2021-02-26 RX ADMIN — Medication 10 ML: at 06:33

## 2021-02-26 RX ADMIN — INSULIN LISPRO 7 UNITS: 100 INJECTION, SOLUTION INTRAVENOUS; SUBCUTANEOUS at 16:49

## 2021-02-26 RX ADMIN — NIFEDIPINE 30 MG: 30 TABLET, FILM COATED, EXTENDED RELEASE ORAL at 16:22

## 2021-02-26 RX ADMIN — DEXMEDETOMIDINE HYDROCHLORIDE 0.4 MCG/KG/HR: 400 INJECTION INTRAVENOUS at 12:18

## 2021-02-26 RX ADMIN — HYDRALAZINE HYDROCHLORIDE 10 MG: 20 INJECTION INTRAMUSCULAR; INTRAVENOUS at 15:06

## 2021-02-26 RX ADMIN — PANTOPRAZOLE SODIUM 40 MG: 40 TABLET, DELAYED RELEASE ORAL at 06:37

## 2021-02-26 RX ADMIN — Medication 10 ML: at 13:53

## 2021-02-26 RX ADMIN — TAMSULOSIN HYDROCHLORIDE 0.4 MG: 0.4 CAPSULE ORAL at 21:07

## 2021-02-26 RX ADMIN — LISINOPRIL 10 MG: 10 TABLET ORAL at 08:18

## 2021-02-26 RX ADMIN — ENOXAPARIN SODIUM 40 MG: 100 INJECTION SUBCUTANEOUS at 06:37

## 2021-02-26 RX ADMIN — DEXAMETHASONE SODIUM PHOSPHATE 6 MG: 4 INJECTION, SOLUTION INTRA-ARTICULAR; INTRALESIONAL; INTRAMUSCULAR; INTRAVENOUS; SOFT TISSUE at 06:36

## 2021-02-26 RX ADMIN — Medication 1 CAPSULE: at 08:18

## 2021-02-26 RX ADMIN — INSULIN LISPRO 5 UNITS: 100 INJECTION, SOLUTION INTRAVENOUS; SUBCUTANEOUS at 06:48

## 2021-02-26 RX ADMIN — INSULIN LISPRO 10 UNITS: 100 INJECTION, SOLUTION INTRAVENOUS; SUBCUTANEOUS at 12:14

## 2021-02-26 RX ADMIN — OXYCODONE HYDROCHLORIDE AND ACETAMINOPHEN 1000 MG: 500 TABLET ORAL at 08:18

## 2021-02-26 RX ADMIN — DEXMEDETOMIDINE HYDROCHLORIDE 0.4 MCG/KG/HR: 400 INJECTION INTRAVENOUS at 23:02

## 2021-02-26 RX ADMIN — Medication 10 ML: at 21:07

## 2021-02-26 RX ADMIN — ENOXAPARIN SODIUM 40 MG: 100 INJECTION SUBCUTANEOUS at 18:05

## 2021-02-26 RX ADMIN — Medication 2000 UNITS: at 08:18

## 2021-02-26 RX ADMIN — INSULIN GLARGINE 15 UNITS: 100 INJECTION, SOLUTION SUBCUTANEOUS at 21:06

## 2021-02-26 RX ADMIN — DEXMEDETOMIDINE HYDROCHLORIDE 0.5 MCG/KG/HR: 400 INJECTION INTRAVENOUS at 04:04

## 2021-02-26 RX ADMIN — INSULIN LISPRO 3 UNITS: 100 INJECTION, SOLUTION INTRAVENOUS; SUBCUTANEOUS at 21:09

## 2021-02-26 RX ADMIN — INSULIN GLARGINE 15 UNITS: 100 INJECTION, SOLUTION SUBCUTANEOUS at 08:18

## 2021-02-26 NOTE — PROGRESS NOTES
[facility]    ICU TEAM Progress Note    Name: Kang Ruiz   : 1955   MRN: [id number]   Date: 2021      Assessment:  ICU Problems:     1. Acute Hypoxic respiratory failure secondary to COVID-19 pneumonia  a. Currently on high flow nasal cannula 80L/ min 100% O2  b. Prone as tolerated  c. Wean supplemental O2 as tolerated to keep sats >92%  d. Pulmonary hygiene   e.   2. COVID-19 pneumonitis  a. Trend inflammatory markers. Decadron, vitamin C, zinc.   b. Lovenox prophylactically   3. CAD  a. CABG in 2018, takes imdur and metoprolol at home. On hold due to hypotension at outside facility, now stable. 4. DM type 2  a. Continue SSI  b. Diabetic diet   c. Increased long acting insulin   5. VARINDER  a. Resolving. Reason for ICU Admission: Hypoxia    Brief HPI:    - Patient is a 77year old male with history of hypertension, Diabetes type 2, CAD status post CABG  In 2018 who presents to the ER 2020 at [facility] with complaints of SOB associated with fatigue and loss of taste which started about 5 days prior to ED visit. Chest CT showed  patchy pulmonary airspace disease consistent with Covid-19 pneumonia. Rapid Covid-19 test found to be positive. CT of chest also showed a left renal hyperdense mass which was not seen on comparison ultrasound 2018. He was hypoxic on room with oxygen saturation of 84% requiring 4L of O2 vis NC. Since admission he has had increase in oxygen needs and required 15 L/min mid flow. The facility he was at does not have ICU care and he was brought to [facility] for possible need for intubation as he could not tolerate BiPap. POD:  * No surgery found *    S/P:   No AE overnight   Cont high flow O2 and precedex for agitation   Increase long acting insulin secondary to hyperglycemia, change diet to CC from reg  Add home procardia and lisinopril for HTN     Plan:     1. COVID Treatment:  a.  Immunomodulator--Steroids -- Yes    b. COVID-19 Specific Anticoagulation -- Yes    c. Vitamin C -- Yes    d. Zinc -- Yes    2. SBP Goal of: > 90 mmHg  3. MAP Goal of: 65-85 mmHg  4. None - For above SBP/MAP goals  5. IVFs:   6. Transfusion Trigger (Hgb): <7 g/dL  7. Respiratory Goals:  a. N/A  8. Pulmonary toilet: Incentive Spirometry   9. SpO2 Goal: > 92%  10. Keep K>4; Mg>2   11. PT/OT: Will consult when appropriate   12. Goals of Care Discussion with family Yes   13. Plan of Care/Code Status: Full Code  14. Appreciate Consultants Input   15. Discussed Care Plan with Bedside RN  16. Documentation of Current Medications  17. Rest of Plan Below:     F - Feeding:  Po intake, adequate, CC  A - Analgesia: Acetaminophen  S - Sedation: N/A  T - DVT Prophylaxis: Lovenox   H - Head of Bed: > 30 Degrees  U - Ulcer Prophylaxis: Pepcid (famotidine)   G - Glycemic Control: Insulin. Lantus started this am   S - Spontaneous Breathing Trial: N/A  B - Bowel Regimen: MiraLax  I - Indwelling Catheter:              Tubes: None  Lines: Peripheral IV  Drains: None  D - De-escalation of Antibiotics: hold for now, sputum cultures pending     Subjective:   Overnight Events:   2021    -Prone this am and as tolerated   -Hydralizine prn for sbp >160  -Resume PTA meds for HTN     Objective:     Visit Vitals  BP (!) 159/73   Pulse (!) 59   Temp 98.4 °F (36.9 °C)   Resp 28   Wt 103.4 kg (227 lb 15.3 oz)   SpO2 93%   BMI 32.71 kg/m²    O2 Flow Rate (L/min): 60 l/min O2 Device: Hi flow nasal cannula Temp (24hrs), Av.8 °F (37.1 °C), Min:97.9 °F (36.6 °C), Max:100 °F (37.8 °C)           Hemodynamics:   PAP:   CO:     Wedge:   CI:     CVP:    SVR:       PVR:       Ventilator Settings:  Mode Rate Tidal Volume Pressure FiO2 PEEP            90 %       Peak airway pressure:      Minute ventilation:          Intake/Output:     Intake/Output Summary (Last 24 hours) at 2021 1150  Last data filed at 2021 0900  Gross per 24 hour   Intake 599.5 ml   Output 795 ml   Net -195.5 ml       Physical Exam:  General:   Alert, well noursished, well developed, appears stated age   Eyes:  Sclera anicteric. Pupils equally round and reactive to light. Mouth/Throat: Mucous membranes normal, oral pharynx clear   Neck: Supple   Lungs:   Clear to auscultation bilaterally, good effort   CV:  Regular rate and rhythm,no murmur, click, rub or gallop   Abdomen:   Soft, non-tender. bowel sounds normal. non-distended   Extremities: No cyanosis or edema   Skin: Skin color, texture, turgor normal. no acute rash or lesions   Lymph nodes: Cervical and supraclavicular normal   Musculoskeletal: No swelling or deformity   Lines/Devices:  Intact, no erythema, drainage or tenderness   Psych: Alert and oriented, normal mood affect given the setting       Labs & Data: Reviewed    Medications: Reviewed    Chest X-Ray:    TTE:    Multidisciplinary Rounds Completed: Yes    ABCDEF Bundle/Checklist Completed: Yes    SPECIAL EQUIPMENT: None    DISPOSITION: Stay in ICU    CRITICAL CARE CONSULTANT NOTE  I had a face to face encounter with the patient, reviewed and interpreted patient data including clinical events, labs, images, vital signs, I/O's, and examined patient. I have discussed the case and the plan and management of the patient's care with the consulting services, the bedside nurses and the respiratory therapist.      NOTE OF PERSONAL INVOLVEMENT IN CARE   This patient has a high probability of imminent, clinically significant deterioration, which requires the highest level of preparedness to intervene urgently. I participated in the decision-making and personally managed or directed the management of the following life and organ supporting interventions that required my frequent assessment to treat or prevent imminent deterioration. I personally spent 45 minutes of critical care time.   This is time spent at this critically ill patient's bedside actively involved in patient care as well as the coordination of care and discussions with the patient's family. This does not include any procedural time which has been billed separately.     Babs Goldmann, NP    [facility]  2/26/2021

## 2021-02-26 NOTE — PROGRESS NOTES
1930: Bedside and Verbal shift change report given to [address] (oncoming nurse) by Alfa Cody (offgoing nurse). Report included the following information SBAR, Kardex, ED Summary, Intake/Output, MAR, Recent Results, Cardiac Rhythm SB and Alarm Parameters . 2100: Pt requesting medicine to help him sleep, Caitlin NP notified, melatonin 5mg ordered. Pt also on Precedex gtt at 0.6.    0730: Bedside and Verbal shift change report given to Anju Cassidy (oncoming nurse) by [address] (offgoing nurse). Report included the following information SBAR, Kardex, ED Summary, Intake/Output, MAR, Recent Results, Cardiac Rhythm SR/SB and Alarm Parameters .

## 2021-02-26 NOTE — PROGRESS NOTES
Bedside shift change report given to John Bhagat RN  (oncoming nurse) by Leesa Diez RN  (offgoing nurse). Report included the following information SBAR, Intake/Output, MAR, Cardiac Rhythm NSR - SB and Dual Neuro Assessment. 1015: Patient placed on 60L HI Flow, FIO2 90%, O2 saturation 96%, patient resting. Dopplers performed on L foot sd it id cold to touch this morning. Shift Summary: Uneventful day, patient resting quietly in the room, cooperative, calm. BP in 160- 180, requiring PRN hydralazine, restarted Procardia. Incentive spirometer use initiated. Ankle - brachial index completed per Np's order as patient's left leg was very cold to touch upon morning assessment. Bedside shift change report given to Carla Landa  (oncoming nurse) by John Bhagat RN  (offgoing nurse). Report included the following information SBAR, Intake/Output, MAR, Cardiac Rhythm NSR- SB  and Dual Neuro Assessment.

## 2021-02-26 NOTE — PROGRESS NOTES
Spiritual Care Assessment/Progress Note  [facility]      NAME: Masha Cooper      MRN: [id number]  AGE: 77 y.o. SEX: male  Baptism Affiliation:    Language: English     2/26/2021           Spiritual Assessment begun in [facility] 7S1 INTENSIVE CARE through conversation with:         []Patient        [] Family    [] Friend(s)        Reason for Consult: Initial/Spiritual assessment, critical care     Spiritual beliefs: (Please include comment if needed)     [] Identifies with a sri tradition:         [] Supported by a sri community:            [] Claims no spiritual orientation:           [] Seeking spiritual identity:                [] Adheres to an individual form of spirituality:           [x] Not able to assess:                           Identified resources for coping:      [] Prayer                               [] Music                  [] Guided Imagery     [] Family/friends                 [] Pet visits     [] Devotional reading                         [] Unknown     [] Other:                                              Interventions offered during this visit: (See comments for more details)                Plan of Care:     [] Support spiritual and/or cultural needs    [] Support AMD and/or advance care planning process      [] Support grieving process   [] Coordinate Rites and/or Rituals    [] Coordination with community clergy   [] No spiritual needs identified at this time   [] Detailed Plan of Care below (See Comments)  [] Make referral to Music Therapy  [] Make referral to Pet Therapy     [] Make referral to Addiction services  [] Make referral to [facility]  [] Make referral to Spiritual Care Partner  [] No future visits requested        [] Follow up upon further referrals     Comments: It was noted that Mr Candance Phoenix in [facility] was on COVID+ isolation;  did not enter patient's room. Consulted with patient's nurse regarding patient and family status.  Chaplains continue to be available for patient/family support by phone. : Rev. Ronan Vaughn.  Gloria Mallory; [facility], to contact [phone] [address] call: [phone]

## 2021-02-26 NOTE — PROGRESS NOTES
SORIN  Patient transferred from [facility] for higher level of care. COVID positive  RUR 18%  Disposition TBD  Patient remains in the ICU on COVID precautions on Hi Flow oxygen and a precedex gtt. Patient is being self proned. Care management is continuing to follow.   Zack Sagastume RN,Care Management

## 2021-02-27 LAB
ANION GAP SERPL CALC-SCNC: 7 MMOL/L (ref 5–15)
BASOPHILS # BLD: 0 K/UL (ref 0–0.1)
BASOPHILS NFR BLD: 0 % (ref 0–1)
BUN SERPL-MCNC: 20 MG/DL (ref 6–20)
BUN/CREAT SERPL: 29 (ref 12–20)
CALCIUM SERPL-MCNC: 9 MG/DL (ref 8.5–10.1)
CHLORIDE SERPL-SCNC: 105 MMOL/L (ref 97–108)
CO2 SERPL-SCNC: 24 MMOL/L (ref 21–32)
CREAT SERPL-MCNC: 0.68 MG/DL (ref 0.7–1.3)
DIFFERENTIAL METHOD BLD: ABNORMAL
EOSINOPHIL # BLD: 0.2 K/UL (ref 0–0.4)
EOSINOPHIL NFR BLD: 3 % (ref 0–7)
ERYTHROCYTE [DISTWIDTH] IN BLOOD BY AUTOMATED COUNT: 14 % (ref 11.5–14.5)
GLUCOSE BLD STRIP.AUTO-MCNC: 193 MG/DL (ref 65–100)
GLUCOSE BLD STRIP.AUTO-MCNC: 302 MG/DL (ref 65–100)
GLUCOSE BLD STRIP.AUTO-MCNC: 362 MG/DL (ref 65–100)
GLUCOSE BLD STRIP.AUTO-MCNC: 420 MG/DL (ref 65–100)
GLUCOSE SERPL-MCNC: 225 MG/DL (ref 65–100)
HCT VFR BLD AUTO: 34.4 % (ref 36.6–50.3)
HGB BLD-MCNC: 10.7 G/DL (ref 12.1–17)
IMM GRANULOCYTES # BLD AUTO: 0 K/UL (ref 0–0.04)
IMM GRANULOCYTES NFR BLD AUTO: 1 % (ref 0–0.5)
LYMPHOCYTES # BLD: 0.9 K/UL (ref 0.8–3.5)
LYMPHOCYTES NFR BLD: 12 % (ref 12–49)
MAGNESIUM SERPL-MCNC: 2.2 MG/DL (ref 1.6–2.4)
MCH RBC QN AUTO: 26.2 PG (ref 26–34)
MCHC RBC AUTO-ENTMCNC: 31.1 G/DL (ref 30–36.5)
MCV RBC AUTO: 84.3 FL (ref 80–99)
MONOCYTES # BLD: 0.6 K/UL (ref 0–1)
MONOCYTES NFR BLD: 9 % (ref 5–13)
NEUTS SEG # BLD: 5.7 K/UL (ref 1.8–8)
NEUTS SEG NFR BLD: 75 % (ref 32–75)
NRBC # BLD: 0 K/UL (ref 0–0.01)
NRBC BLD-RTO: 0 PER 100 WBC
PHOSPHATE SERPL-MCNC: 2.8 MG/DL (ref 2.6–4.7)
PLATELET # BLD AUTO: 260 K/UL (ref 150–400)
PMV BLD AUTO: 10.3 FL (ref 8.9–12.9)
POTASSIUM SERPL-SCNC: 4.2 MMOL/L (ref 3.5–5.1)
RBC # BLD AUTO: 4.08 M/UL (ref 4.1–5.7)
SERVICE CMNT-IMP: ABNORMAL
SODIUM SERPL-SCNC: 136 MMOL/L (ref 136–145)
WBC # BLD AUTO: 7.4 K/UL (ref 4.1–11.1)

## 2021-02-27 PROCEDURE — 74011000250 HC RX REV CODE- 250: Performed by: INTERNAL MEDICINE

## 2021-02-27 PROCEDURE — 74011250637 HC RX REV CODE- 250/637: Performed by: NURSE PRACTITIONER

## 2021-02-27 PROCEDURE — 94760 N-INVAS EAR/PLS OXIMETRY 1: CPT

## 2021-02-27 PROCEDURE — 85025 COMPLETE CBC W/AUTO DIFF WBC: CPT

## 2021-02-27 PROCEDURE — 65610000006 HC RM INTENSIVE CARE

## 2021-02-27 PROCEDURE — 74011636637 HC RX REV CODE- 636/637: Performed by: NURSE PRACTITIONER

## 2021-02-27 PROCEDURE — 77010033711 HC HIGH FLOW OXYGEN

## 2021-02-27 PROCEDURE — 82962 GLUCOSE BLOOD TEST: CPT

## 2021-02-27 PROCEDURE — 84100 ASSAY OF PHOSPHORUS: CPT

## 2021-02-27 PROCEDURE — 83735 ASSAY OF MAGNESIUM: CPT

## 2021-02-27 PROCEDURE — 36415 COLL VENOUS BLD VENIPUNCTURE: CPT

## 2021-02-27 PROCEDURE — 80048 BASIC METABOLIC PNL TOTAL CA: CPT

## 2021-02-27 PROCEDURE — 74011250636 HC RX REV CODE- 250/636: Performed by: NURSE PRACTITIONER

## 2021-02-27 RX ORDER — INSULIN GLARGINE 100 [IU]/ML
5 INJECTION, SOLUTION SUBCUTANEOUS
Status: COMPLETED | OUTPATIENT
Start: 2021-02-27 | End: 2021-02-27

## 2021-02-27 RX ORDER — INSULIN GLARGINE 100 [IU]/ML
20 INJECTION, SOLUTION SUBCUTANEOUS EVERY 12 HOURS
Status: DISCONTINUED | OUTPATIENT
Start: 2021-02-27 | End: 2021-02-28

## 2021-02-27 RX ORDER — DEXTROSE 50 % IN WATER (D50W) INTRAVENOUS SYRINGE
12.5-25 AS NEEDED
Status: DISCONTINUED | OUTPATIENT
Start: 2021-02-27 | End: 2021-03-15 | Stop reason: HOSPADM

## 2021-02-27 RX ORDER — INSULIN LISPRO 100 [IU]/ML
5 INJECTION, SOLUTION INTRAVENOUS; SUBCUTANEOUS ONCE
Status: COMPLETED | OUTPATIENT
Start: 2021-02-27 | End: 2021-02-27

## 2021-02-27 RX ORDER — MAGNESIUM SULFATE 100 %
4 CRYSTALS MISCELLANEOUS AS NEEDED
Status: DISCONTINUED | OUTPATIENT
Start: 2021-02-27 | End: 2021-03-15 | Stop reason: HOSPADM

## 2021-02-27 RX ORDER — INSULIN LISPRO 100 [IU]/ML
INJECTION, SOLUTION INTRAVENOUS; SUBCUTANEOUS EVERY 4 HOURS
Status: DISCONTINUED | OUTPATIENT
Start: 2021-02-27 | End: 2021-03-02

## 2021-02-27 RX ADMIN — INSULIN GLARGINE 15 UNITS: 100 INJECTION, SOLUTION SUBCUTANEOUS at 09:02

## 2021-02-27 RX ADMIN — PANTOPRAZOLE SODIUM 40 MG: 40 TABLET, DELAYED RELEASE ORAL at 06:42

## 2021-02-27 RX ADMIN — TAMSULOSIN HYDROCHLORIDE 0.4 MG: 0.4 CAPSULE ORAL at 21:21

## 2021-02-27 RX ADMIN — INSULIN LISPRO 2 UNITS: 100 INJECTION, SOLUTION INTRAVENOUS; SUBCUTANEOUS at 06:44

## 2021-02-27 RX ADMIN — LISINOPRIL 10 MG: 10 TABLET ORAL at 09:02

## 2021-02-27 RX ADMIN — INSULIN GLARGINE 5 UNITS: 100 INJECTION, SOLUTION SUBCUTANEOUS at 12:30

## 2021-02-27 RX ADMIN — ACETAMINOPHEN 650 MG: 325 TABLET ORAL at 15:30

## 2021-02-27 RX ADMIN — OXYCODONE HYDROCHLORIDE AND ACETAMINOPHEN 1000 MG: 500 TABLET ORAL at 09:02

## 2021-02-27 RX ADMIN — INSULIN LISPRO 10 UNITS: 100 INJECTION, SOLUTION INTRAVENOUS; SUBCUTANEOUS at 12:29

## 2021-02-27 RX ADMIN — DEXAMETHASONE SODIUM PHOSPHATE 6 MG: 4 INJECTION, SOLUTION INTRA-ARTICULAR; INTRALESIONAL; INTRAMUSCULAR; INTRAVENOUS; SOFT TISSUE at 06:42

## 2021-02-27 RX ADMIN — Medication 10 ML: at 15:28

## 2021-02-27 RX ADMIN — INSULIN LISPRO 5 UNITS: 100 INJECTION, SOLUTION INTRAVENOUS; SUBCUTANEOUS at 17:02

## 2021-02-27 RX ADMIN — INSULIN LISPRO 10 UNITS: 100 INJECTION, SOLUTION INTRAVENOUS; SUBCUTANEOUS at 17:03

## 2021-02-27 RX ADMIN — DEXMEDETOMIDINE HYDROCHLORIDE 0.4 MCG/KG/HR: 400 INJECTION INTRAVENOUS at 18:35

## 2021-02-27 RX ADMIN — Medication 2000 UNITS: at 09:02

## 2021-02-27 RX ADMIN — NIFEDIPINE 30 MG: 30 TABLET, FILM COATED, EXTENDED RELEASE ORAL at 09:02

## 2021-02-27 RX ADMIN — Medication 1 CAPSULE: at 09:02

## 2021-02-27 RX ADMIN — INSULIN LISPRO 10 UNITS: 100 INJECTION, SOLUTION INTRAVENOUS; SUBCUTANEOUS at 20:10

## 2021-02-27 RX ADMIN — INSULIN GLARGINE 20 UNITS: 100 INJECTION, SOLUTION SUBCUTANEOUS at 21:21

## 2021-02-27 RX ADMIN — Medication 10 ML: at 21:21

## 2021-02-27 RX ADMIN — ENOXAPARIN SODIUM 40 MG: 100 INJECTION SUBCUTANEOUS at 18:31

## 2021-02-27 RX ADMIN — ENOXAPARIN SODIUM 40 MG: 100 INJECTION SUBCUTANEOUS at 06:42

## 2021-02-27 RX ADMIN — Medication 10 ML: at 06:43

## 2021-02-27 NOTE — PROGRESS NOTES
Bedside shift change report given to Marisa Ruff RN  (oncoming nurse) by Don Monteiro RN  (offgoing nurse). Report included the following information SBAR, Intake/Output, MAR, Cardiac Rhythm NSR- SB and Dual Neuro Assessment. Primary Nurse Chin Wallace, RN and Alexandra Fischer RN , RN performed a dual skin assessment on this patient No impairment noted  Yuriy score is 17    - 4th toe R foot black  - dry cracked skin to bilateral feet. - sacrum red blanchable.      1835: Restarted Precedex as patient again became very anxious about COVID recovery and being in the hospital.

## 2021-02-27 NOTE — PROGRESS NOTES
1930: Bedside and Verbal shift change report given to [facility] (oncoming nurse) by Marlen Lala (offgoing nurse). Report included the following information SBAR, Kardex, ED Summary, Intake/Output, MAR, Recent Results, Cardiac Rhythm SR/SB and Alarm Parameters . 0730: Bedside and Verbal shift change report given to Marlen Lala (oncoming nurse) by [facility] (offgoing nurse). Report included the following information SBAR, Kardex, ED Summary, Intake/Output, MAR, Recent Results, Cardiac Rhythm SB and Alarm Parameters .

## 2021-02-27 NOTE — PROGRESS NOTES
[facility]    ICU TEAM Progress Note    Name: Chiki Villalta   : 1955   MRN: [id number]   Date: 2021        Subjective:     Reason for ICU Admission:    Patient is a 77year old male with history of hypertension, Diabetes type 2, CAD status post CABG  In 2018 who presents to the ER 2020 at [facility] complaints of SOB associated with fatigue and loss of taste which started about 5 days prior to ED visit. Chest CT showed  patchy pulmonary airspace disease consistent with Covid-19 pneumonia. Rapid Covid-19 test found to be positive. CT of chest also showed a left renal hyperdense mass which was not seen on comparison ultrasound 2018. He was hypoxic on room with oxygen saturation of 84% requiring 4L of O2 vis NC. Since admission he has had increase in oxygen needs and required 15 L/min mid flow. The facility he was at does not have ICU care and he was brought to [facility] for possible need for intubation as he could not tolerate BiPap.     Overnight Events:   -Tolerating HHFNC with weaning of oxygen this am  -Sitting up in chair this am, tolerating PO intake in NAD  -Remains on Precedex gtt, low dose for anxiety    Objective:   Vital Signs:  Visit Vitals  BP (!) 98/46   Pulse 65   Temp 97.6 °F (36.4 °C)   Resp 28   Wt 103.6 kg (228 lb 6.3 oz)   SpO2 97%   BMI 32.77 kg/m²    O2 Flow Rate (L/min): 50 l/min O2 Device: Hi flow nasal cannula Temp (24hrs), Av °F (36.7 °C), Min:97.6 °F (36.4 °C), Max:98.5 °F (36.9 °C)           Intake/Output:     Intake/Output Summary (Last 24 hours) at 2021 1041  Last data filed at 2021 1000  Gross per 24 hour   Intake 946.28 ml   Output 1390 ml   Net -443.72 ml       Physical Exam:    General:  Awake and interactive No acute distress. Eyes:  Sclera anicteric. Pupils equal, round, reactive to light. Mouth/Throat: Pink moist mucous membranes   Neck: Supple. [address] in place   Lungs:   Clear to auscultation bilaterally, good effort. Cardiovascular:  Regular rate and rhythm, no murmur, click, rub, or gallop. Abdomen:   Soft, non-tender, bowel sounds normal, non-distended. Extremities: No cyanosis or edema. Skin: No acute rash or lesions. Lymph Nodes: Cervical and supraclavicular normal.   Musculoskeletal:  No swelling or deformity. Lines/Devices:  Intact, no erythema, drainage, or tenderness. Psychiatric: Calm, cooperative on [address], interactive follows commands, anxious at times       LABS AND  DATA: Personally reviewed  Recent Labs     02/27/21 0329 02/26/21 0359   WBC 7.4 7.1   HGB 10.7* 11.1*   HCT 34.4* 36.0*    274     Recent Labs     02/27/21 0329 02/26/21 0359    136   K 4.2 4.2    107   CO2 24 23   BUN 20 21*   CREA 0.68* 0.91   * 258*   CA 9.0 9.0   MG 2.2 2.2   PHOS 2.8 3.3     No results for input(s): AP, TBIL, TP, ALB, GLOB, AML, LPSE in the last 72 hours. No lab exists for component: SGOT, GPT, AMYP  No results for input(s): INR, PTP, APTT, INREXT in the last 72 hours. No results for input(s): PHI, PCO2I, PO2I, FIO2I in the last 72 hours. No results for input(s): CPK, CKMB, TROIQ, BNPP in the last 72 hours. MEDS: Reviewed    Chest X-Ray:  CXR Results  (Last 48 hours)    None        ABCDEF Bundle/Checklist Completed:  YES-See Plan    Active Problem List:     Problem List  Never Reviewed          Codes Class    Respiratory failure with hypoxia ([facility] [address] ICD-10-CM: J96.91  ICD-9-CM: 518.81         Pneumonia due to COVID-19 virus ICD-10-CM: U07.1, J12.82  ICD-9-CM: 480.8         Chest pain ICD-10-CM: R07.9  ICD-9-CM: 786.50         CAD (coronary artery disease) ICD-10-CM: I25.10  ICD-9-CM: 414.00         HTN (hypertension) ICD-10-CM: I10  ICD-9-CM: 401.9             ICU Assessment/ Comprehensive Plan of Care:     1. Acute Hypoxic respiratory failure secondary to COVID-19 pneumonia  a. Currently on high flow nasal cannula 80L/ min 100% O2  b. Prone as tolerated  c.  Wean supplemental O2 as tolerated to keep sats >92%  d. Pulmonary hygiene   e.  Precedex gtt for anxiety control  f.   2. COVID-19 pneumonitis  a. Trend inflammatory markers. Decadron, vitamin C, zinc.   b. Lovenox prophylactically   c.   3. CAD  a. CABG in 2018, takes imdur and metoprolol at home. On hold due to hypotension at outside facility, now stable. b. On procardia  c.   4. DM type 2  a. Continue SSI, change to resistant scale Q4hr  b. Diabetic diet   c. Increased long acting insulin, 15units-->20 units BID    5. VARINDER, resolved  -strict I/O  -Trend lytes    6. Hypokalemia/ Hypomagnesemia  -Replace K and Mag to keep K > 4, Mag > 2.0    7. Hyperglycemia, possibly steroid induced.   -Last dose of Decadron was 02/27  -Hold Lantus dosing for now as blood sugars are significantly lower today   -Monitor closely  -SSI coverage increased to high scale Q4h      F - Feeding: TF  A - Analgesia: acetaminophen  S - Sedation: GOAL RASS 0  T - DVT Prophylaxis: SCD's or Sequential Compression Device , enoxaparin  H - Head of Bed: > 30 Degrees  U - Ulcer Prophylaxis: pantoprazole  G - Glycemic Control: Insulin  S - Spontaneous Breathing Trial: NA  B - Bowel Regimen: docusate  I - Indwelling Catheter:              T/L/D  Tubes: None  Lines: Peripheral IV  Drains: None  D - De-escalation of Antibiotics:      DISPOSITION/COMMUNICATION  Discussed Plan of Care/Code Status: Full Code  Stay in ICU    CRITICAL CARE CONSULTANT NOTE  I had a face to face encounter with the patient, reviewed and interpreted patient data including clinical events, labs, images, vital signs, I/O's, and examined patient.   I have discussed the case and the plan and management of the patient's care with the consulting services, the bedside nurses and the respiratory therapist.      NOTE OF PERSONAL INVOLVEMENT IN CARE    I participated in the decision-making and personally managed or directed the management of the following life and organ supporting interventions that required my frequent assessment to treat or prevent imminent deterioration. I personally spent 45 minutes of critical care time. This is time spent at this critically ill patient's bedside actively involved in patient care as well as the coordination of care and discussions with the patient's family. This does not include any procedural time which has been billed separately.     Ulisses Christiansen [facility]-BC  Intensivist Nurse Practitioner  [facility]  2/27/2021

## 2021-02-28 LAB
ANION GAP SERPL CALC-SCNC: 7 MMOL/L (ref 5–15)
BASOPHILS # BLD: 0 K/UL (ref 0–0.1)
BASOPHILS NFR BLD: 0 % (ref 0–1)
BUN SERPL-MCNC: 24 MG/DL (ref 6–20)
BUN/CREAT SERPL: 30 (ref 12–20)
CALCIUM SERPL-MCNC: 9.1 MG/DL (ref 8.5–10.1)
CHLORIDE SERPL-SCNC: 109 MMOL/L (ref 97–108)
CO2 SERPL-SCNC: 24 MMOL/L (ref 21–32)
CREAT SERPL-MCNC: 0.79 MG/DL (ref 0.7–1.3)
DIFFERENTIAL METHOD BLD: ABNORMAL
EOSINOPHIL # BLD: 0.2 K/UL (ref 0–0.4)
EOSINOPHIL NFR BLD: 2 % (ref 0–7)
ERYTHROCYTE [DISTWIDTH] IN BLOOD BY AUTOMATED COUNT: 14.1 % (ref 11.5–14.5)
GLUCOSE BLD STRIP.AUTO-MCNC: 160 MG/DL (ref 65–100)
GLUCOSE BLD STRIP.AUTO-MCNC: 178 MG/DL (ref 65–100)
GLUCOSE BLD STRIP.AUTO-MCNC: 190 MG/DL (ref 65–100)
GLUCOSE BLD STRIP.AUTO-MCNC: 215 MG/DL (ref 65–100)
GLUCOSE BLD STRIP.AUTO-MCNC: 70 MG/DL (ref 65–100)
GLUCOSE BLD STRIP.AUTO-MCNC: 96 MG/DL (ref 65–100)
GLUCOSE BLD STRIP.AUTO-MCNC: 99 MG/DL (ref 65–100)
GLUCOSE SERPL-MCNC: 78 MG/DL (ref 65–100)
HCT VFR BLD AUTO: 35.5 % (ref 36.6–50.3)
HGB BLD-MCNC: 11.1 G/DL (ref 12.1–17)
IMM GRANULOCYTES # BLD AUTO: 0 K/UL (ref 0–0.04)
IMM GRANULOCYTES NFR BLD AUTO: 0 % (ref 0–0.5)
LYMPHOCYTES # BLD: 1.4 K/UL (ref 0.8–3.5)
LYMPHOCYTES NFR BLD: 15 % (ref 12–49)
MAGNESIUM SERPL-MCNC: 2.3 MG/DL (ref 1.6–2.4)
MCH RBC QN AUTO: 26.2 PG (ref 26–34)
MCHC RBC AUTO-ENTMCNC: 31.3 G/DL (ref 30–36.5)
MCV RBC AUTO: 83.9 FL (ref 80–99)
MONOCYTES # BLD: 0.7 K/UL (ref 0–1)
MONOCYTES NFR BLD: 8 % (ref 5–13)
NEUTS SEG # BLD: 6.8 K/UL (ref 1.8–8)
NEUTS SEG NFR BLD: 75 % (ref 32–75)
NRBC # BLD: 0 K/UL (ref 0–0.01)
NRBC BLD-RTO: 0 PER 100 WBC
PHOSPHATE SERPL-MCNC: 3.1 MG/DL (ref 2.6–4.7)
PLATELET # BLD AUTO: 284 K/UL (ref 150–400)
PMV BLD AUTO: 10.2 FL (ref 8.9–12.9)
POTASSIUM SERPL-SCNC: 4.1 MMOL/L (ref 3.5–5.1)
RBC # BLD AUTO: 4.23 M/UL (ref 4.1–5.7)
SERVICE CMNT-IMP: ABNORMAL
SERVICE CMNT-IMP: NORMAL
SODIUM SERPL-SCNC: 140 MMOL/L (ref 136–145)
WBC # BLD AUTO: 9.2 K/UL (ref 4.1–11.1)

## 2021-02-28 PROCEDURE — 74011250636 HC RX REV CODE- 250/636: Performed by: NURSE PRACTITIONER

## 2021-02-28 PROCEDURE — 85025 COMPLETE CBC W/AUTO DIFF WBC: CPT

## 2021-02-28 PROCEDURE — 74011636637 HC RX REV CODE- 636/637: Performed by: NURSE PRACTITIONER

## 2021-02-28 PROCEDURE — 77010033711 HC HIGH FLOW OXYGEN

## 2021-02-28 PROCEDURE — 82962 GLUCOSE BLOOD TEST: CPT

## 2021-02-28 PROCEDURE — 83735 ASSAY OF MAGNESIUM: CPT

## 2021-02-28 PROCEDURE — 74011000250 HC RX REV CODE- 250: Performed by: INTERNAL MEDICINE

## 2021-02-28 PROCEDURE — 74011250637 HC RX REV CODE- 250/637: Performed by: NURSE PRACTITIONER

## 2021-02-28 PROCEDURE — 36415 COLL VENOUS BLD VENIPUNCTURE: CPT

## 2021-02-28 PROCEDURE — 65610000006 HC RM INTENSIVE CARE

## 2021-02-28 PROCEDURE — 84100 ASSAY OF PHOSPHORUS: CPT

## 2021-02-28 PROCEDURE — 80048 BASIC METABOLIC PNL TOTAL CA: CPT

## 2021-02-28 PROCEDURE — 74011000250 HC RX REV CODE- 250: Performed by: NURSE PRACTITIONER

## 2021-02-28 PROCEDURE — 94760 N-INVAS EAR/PLS OXIMETRY 1: CPT

## 2021-02-28 RX ORDER — INSULIN GLARGINE 100 [IU]/ML
5 INJECTION, SOLUTION SUBCUTANEOUS EVERY 12 HOURS
Status: DISCONTINUED | OUTPATIENT
Start: 2021-02-28 | End: 2021-03-06

## 2021-02-28 RX ORDER — INSULIN GLARGINE 100 [IU]/ML
15 INJECTION, SOLUTION SUBCUTANEOUS
Status: DISCONTINUED | OUTPATIENT
Start: 2021-02-28 | End: 2021-02-28

## 2021-02-28 RX ORDER — IPRATROPIUM BROMIDE AND ALBUTEROL SULFATE 2.5; .5 MG/3ML; MG/3ML
3 SOLUTION RESPIRATORY (INHALATION)
Status: DISCONTINUED | OUTPATIENT
Start: 2021-02-28 | End: 2021-02-28

## 2021-02-28 RX ORDER — INSULIN GLARGINE 100 [IU]/ML
20 INJECTION, SOLUTION SUBCUTANEOUS DAILY
Status: DISCONTINUED | OUTPATIENT
Start: 2021-03-01 | End: 2021-02-28

## 2021-02-28 RX ORDER — ALBUTEROL SULFATE 90 UG/1
1 AEROSOL, METERED RESPIRATORY (INHALATION)
Status: DISCONTINUED | OUTPATIENT
Start: 2021-02-28 | End: 2021-03-15 | Stop reason: HOSPADM

## 2021-02-28 RX ADMIN — LIDOCAINE HYDROCHLORIDE 40 ML: 20 SOLUTION ORAL; TOPICAL at 19:07

## 2021-02-28 RX ADMIN — INSULIN GLARGINE 5 UNITS: 100 INJECTION, SOLUTION SUBCUTANEOUS at 20:15

## 2021-02-28 RX ADMIN — Medication 10 ML: at 22:00

## 2021-02-28 RX ADMIN — Medication 1 CAPSULE: at 09:06

## 2021-02-28 RX ADMIN — Medication 2000 UNITS: at 09:06

## 2021-02-28 RX ADMIN — SODIUM CHLORIDE, POTASSIUM CHLORIDE, SODIUM LACTATE AND CALCIUM CHLORIDE 500 ML: 600; 310; 30; 20 INJECTION, SOLUTION INTRAVENOUS at 11:45

## 2021-02-28 RX ADMIN — PANTOPRAZOLE SODIUM 40 MG: 40 TABLET, DELAYED RELEASE ORAL at 06:37

## 2021-02-28 RX ADMIN — DEXMEDETOMIDINE HYDROCHLORIDE 0.4 MCG/KG/HR: 400 INJECTION INTRAVENOUS at 01:26

## 2021-02-28 RX ADMIN — ONDANSETRON 4 MG: 2 INJECTION INTRAMUSCULAR; INTRAVENOUS at 16:12

## 2021-02-28 RX ADMIN — OXYCODONE HYDROCHLORIDE AND ACETAMINOPHEN 1000 MG: 500 TABLET ORAL at 09:07

## 2021-02-28 RX ADMIN — INSULIN LISPRO 3 UNITS: 100 INJECTION, SOLUTION INTRAVENOUS; SUBCUTANEOUS at 11:22

## 2021-02-28 RX ADMIN — DEXMEDETOMIDINE HYDROCHLORIDE 0.1 MCG/KG/HR: 400 INJECTION INTRAVENOUS at 11:46

## 2021-02-28 RX ADMIN — Medication 10 ML: at 06:37

## 2021-02-28 RX ADMIN — SODIUM CHLORIDE, POTASSIUM CHLORIDE, SODIUM LACTATE AND CALCIUM CHLORIDE 500 ML: 600; 310; 30; 20 INJECTION, SOLUTION INTRAVENOUS at 18:23

## 2021-02-28 RX ADMIN — INSULIN GLARGINE 20 UNITS: 100 INJECTION, SOLUTION SUBCUTANEOUS at 09:11

## 2021-02-28 RX ADMIN — INSULIN LISPRO 3 UNITS: 100 INJECTION, SOLUTION INTRAVENOUS; SUBCUTANEOUS at 16:23

## 2021-02-28 RX ADMIN — LISINOPRIL 10 MG: 10 TABLET ORAL at 09:07

## 2021-02-28 RX ADMIN — INSULIN LISPRO 4 UNITS: 100 INJECTION, SOLUTION INTRAVENOUS; SUBCUTANEOUS at 00:13

## 2021-02-28 RX ADMIN — INSULIN LISPRO 3 UNITS: 100 INJECTION, SOLUTION INTRAVENOUS; SUBCUTANEOUS at 20:21

## 2021-02-28 RX ADMIN — NIFEDIPINE 30 MG: 30 TABLET, FILM COATED, EXTENDED RELEASE ORAL at 09:07

## 2021-02-28 RX ADMIN — TAMSULOSIN HYDROCHLORIDE 0.4 MG: 0.4 CAPSULE ORAL at 21:17

## 2021-02-28 RX ADMIN — ENOXAPARIN SODIUM 40 MG: 100 INJECTION SUBCUTANEOUS at 06:37

## 2021-02-28 RX ADMIN — ENOXAPARIN SODIUM 40 MG: 100 INJECTION SUBCUTANEOUS at 18:07

## 2021-02-28 NOTE — PROGRESS NOTES
1930: Bedside and Verbal shift change [facility] by Mauro Chavez (offgoing nurse). Report included the following information SBAR, Kardex, ED Summary, Intake/Output, MAR, Recent Results, Cardiac Rhythm SR/SB and Alarm Parameters . 0455: BG 70, pt given 4oz juice, Rich NP notified. Will recheck at 0600. Also notified of soft BPs w/ MAPs <65 in last hour (93/50 and 105/51), will continue to monitor. 0530: O2 sats 85-88%, RRT notified, FiO2 increased to 100%. 0600: BG 96 on re-check. 0730: Bedside and Verbal shift change report given to [address] (oncoming nurse) by [address] (offgoing nurse). Report included the following information SBAR, Kardex, ED Summary, Intake/Output, MAR, Recent Results, Cardiac Rhythm SR/SB and Alarm Parameters .

## 2021-02-28 NOTE — PROGRESS NOTES
0730: Verbal shift change report given to Santosh Ruelas RN (oncoming nurse) by Mendel Kansas, RN (offgoing nurse). Report included the following information SBAR, Kardex, Intake/Output, MAR, Accordion, Recent Results, Med Rec Status, Cardiac Rhythm Sinus Lorna Boss and Alarm Parameters . 1130: Notified NP of low BP (MAP of 61-63).  Verbal orders received for 500cc bolus of LR.     1815: Notified NP of pt being tachycardic in the 120s-130s; also notified of low UOP for shift (150cc); pt bladder scanned and only reading 204cc on scanner; per NP give another 500cc bolus of LR    1830: Pt complains he \"feels worse\", states still has stomach pain; anxious; tachycardiac; dyspneic; NP outside of room informed of continued stomach pain-ordered GI Cocktail; precedex restarted for agitation;

## 2021-03-01 LAB
ANION GAP SERPL CALC-SCNC: 7 MMOL/L (ref 5–15)
BASOPHILS # BLD: 0 K/UL (ref 0–0.1)
BASOPHILS NFR BLD: 0 % (ref 0–1)
BUN SERPL-MCNC: 19 MG/DL (ref 6–20)
BUN/CREAT SERPL: 23 (ref 12–20)
CALCIUM SERPL-MCNC: 8.7 MG/DL (ref 8.5–10.1)
CHLORIDE SERPL-SCNC: 108 MMOL/L (ref 97–108)
CO2 SERPL-SCNC: 24 MMOL/L (ref 21–32)
CREAT SERPL-MCNC: 0.81 MG/DL (ref 0.7–1.3)
DIFFERENTIAL METHOD BLD: ABNORMAL
EOSINOPHIL # BLD: 0.3 K/UL (ref 0–0.4)
EOSINOPHIL NFR BLD: 3 % (ref 0–7)
ERYTHROCYTE [DISTWIDTH] IN BLOOD BY AUTOMATED COUNT: 14.1 % (ref 11.5–14.5)
GLUCOSE BLD STRIP.AUTO-MCNC: 172 MG/DL (ref 65–100)
GLUCOSE BLD STRIP.AUTO-MCNC: 192 MG/DL (ref 65–100)
GLUCOSE BLD STRIP.AUTO-MCNC: 228 MG/DL (ref 65–100)
GLUCOSE BLD STRIP.AUTO-MCNC: 74 MG/DL (ref 65–100)
GLUCOSE BLD STRIP.AUTO-MCNC: 88 MG/DL (ref 65–100)
GLUCOSE BLD STRIP.AUTO-MCNC: 96 MG/DL (ref 65–100)
GLUCOSE SERPL-MCNC: 95 MG/DL (ref 65–100)
HCT VFR BLD AUTO: 33 % (ref 36.6–50.3)
HGB BLD-MCNC: 10 G/DL (ref 12.1–17)
IMM GRANULOCYTES # BLD AUTO: 0 K/UL (ref 0–0.04)
IMM GRANULOCYTES NFR BLD AUTO: 0 % (ref 0–0.5)
LYMPHOCYTES # BLD: 1.3 K/UL (ref 0.8–3.5)
LYMPHOCYTES NFR BLD: 14 % (ref 12–49)
MAGNESIUM SERPL-MCNC: 2.2 MG/DL (ref 1.6–2.4)
MCH RBC QN AUTO: 26.2 PG (ref 26–34)
MCHC RBC AUTO-ENTMCNC: 30.3 G/DL (ref 30–36.5)
MCV RBC AUTO: 86.4 FL (ref 80–99)
MONOCYTES # BLD: 1 K/UL (ref 0–1)
MONOCYTES NFR BLD: 10 % (ref 5–13)
NEUTS SEG # BLD: 7 K/UL (ref 1.8–8)
NEUTS SEG NFR BLD: 73 % (ref 32–75)
NRBC # BLD: 0 K/UL (ref 0–0.01)
NRBC BLD-RTO: 0 PER 100 WBC
PHOSPHATE SERPL-MCNC: 2.8 MG/DL (ref 2.6–4.7)
PLATELET # BLD AUTO: 277 K/UL (ref 150–400)
PMV BLD AUTO: 10.4 FL (ref 8.9–12.9)
POTASSIUM SERPL-SCNC: 3.9 MMOL/L (ref 3.5–5.1)
RBC # BLD AUTO: 3.82 M/UL (ref 4.1–5.7)
SERVICE CMNT-IMP: ABNORMAL
SERVICE CMNT-IMP: NORMAL
SODIUM SERPL-SCNC: 139 MMOL/L (ref 136–145)
WBC # BLD AUTO: 9.5 K/UL (ref 4.1–11.1)

## 2021-03-01 PROCEDURE — 77010033711 HC HIGH FLOW OXYGEN

## 2021-03-01 PROCEDURE — 74011000250 HC RX REV CODE- 250: Performed by: INTERNAL MEDICINE

## 2021-03-01 PROCEDURE — 85025 COMPLETE CBC W/AUTO DIFF WBC: CPT

## 2021-03-01 PROCEDURE — 94760 N-INVAS EAR/PLS OXIMETRY 1: CPT

## 2021-03-01 PROCEDURE — 77030040831 HC BAG URINE DRNG MDII -A

## 2021-03-01 PROCEDURE — 36415 COLL VENOUS BLD VENIPUNCTURE: CPT

## 2021-03-01 PROCEDURE — 74011250636 HC RX REV CODE- 250/636: Performed by: NURSE PRACTITIONER

## 2021-03-01 PROCEDURE — 74011250637 HC RX REV CODE- 250/637: Performed by: NURSE PRACTITIONER

## 2021-03-01 PROCEDURE — 99222 1ST HOSP IP/OBS MODERATE 55: CPT | Performed by: INTERNAL MEDICINE

## 2021-03-01 PROCEDURE — 84100 ASSAY OF PHOSPHORUS: CPT

## 2021-03-01 PROCEDURE — 83735 ASSAY OF MAGNESIUM: CPT

## 2021-03-01 PROCEDURE — 82962 GLUCOSE BLOOD TEST: CPT

## 2021-03-01 PROCEDURE — 74011250637 HC RX REV CODE- 250/637: Performed by: INTERNAL MEDICINE

## 2021-03-01 PROCEDURE — 65610000006 HC RM INTENSIVE CARE

## 2021-03-01 PROCEDURE — 80048 BASIC METABOLIC PNL TOTAL CA: CPT

## 2021-03-01 PROCEDURE — 74011636637 HC RX REV CODE- 636/637: Performed by: NURSE PRACTITIONER

## 2021-03-01 RX ORDER — LANOLIN ALCOHOL/MO/W.PET/CERES
6 CREAM (GRAM) TOPICAL
Status: DISCONTINUED | OUTPATIENT
Start: 2021-03-01 | End: 2021-03-15 | Stop reason: HOSPADM

## 2021-03-01 RX ORDER — POTASSIUM CHLORIDE 750 MG/1
20 TABLET, FILM COATED, EXTENDED RELEASE ORAL DAILY
Status: COMPLETED | OUTPATIENT
Start: 2021-03-01 | End: 2021-03-01

## 2021-03-01 RX ORDER — AMOXICILLIN 250 MG
1 CAPSULE ORAL DAILY
Status: DISCONTINUED | OUTPATIENT
Start: 2021-03-01 | End: 2021-03-15 | Stop reason: HOSPADM

## 2021-03-01 RX ADMIN — PANTOPRAZOLE SODIUM 40 MG: 40 TABLET, DELAYED RELEASE ORAL at 06:30

## 2021-03-01 RX ADMIN — Medication 10 ML: at 06:28

## 2021-03-01 RX ADMIN — ENOXAPARIN SODIUM 40 MG: 100 INJECTION SUBCUTANEOUS at 18:41

## 2021-03-01 RX ADMIN — ENOXAPARIN SODIUM 40 MG: 100 INJECTION SUBCUTANEOUS at 06:29

## 2021-03-01 RX ADMIN — INSULIN LISPRO 3 UNITS: 100 INJECTION, SOLUTION INTRAVENOUS; SUBCUTANEOUS at 21:08

## 2021-03-01 RX ADMIN — Medication 1 CAPSULE: at 08:42

## 2021-03-01 RX ADMIN — NIFEDIPINE 30 MG: 30 TABLET, FILM COATED, EXTENDED RELEASE ORAL at 08:42

## 2021-03-01 RX ADMIN — LISINOPRIL 10 MG: 10 TABLET ORAL at 08:42

## 2021-03-01 RX ADMIN — Medication 6 MG: at 21:09

## 2021-03-01 RX ADMIN — TAMSULOSIN HYDROCHLORIDE 0.4 MG: 0.4 CAPSULE ORAL at 21:09

## 2021-03-01 RX ADMIN — Medication 10 ML: at 22:00

## 2021-03-01 RX ADMIN — INSULIN GLARGINE 5 UNITS: 100 INJECTION, SOLUTION SUBCUTANEOUS at 21:09

## 2021-03-01 RX ADMIN — Medication 2000 UNITS: at 08:42

## 2021-03-01 RX ADMIN — DOCUSATE SODIUM 50 MG AND SENNOSIDES 8.6 MG 1 TABLET: 8.6; 5 TABLET, FILM COATED ORAL at 14:49

## 2021-03-01 RX ADMIN — INSULIN LISPRO 3 UNITS: 100 INJECTION, SOLUTION INTRAVENOUS; SUBCUTANEOUS at 12:58

## 2021-03-01 RX ADMIN — POTASSIUM CHLORIDE 20 MEQ: 750 TABLET, FILM COATED, EXTENDED RELEASE ORAL at 08:43

## 2021-03-01 RX ADMIN — INSULIN LISPRO 4 UNITS: 100 INJECTION, SOLUTION INTRAVENOUS; SUBCUTANEOUS at 16:51

## 2021-03-01 RX ADMIN — DEXMEDETOMIDINE HYDROCHLORIDE 0.1 MCG/KG/HR: 400 INJECTION INTRAVENOUS at 18:41

## 2021-03-01 RX ADMIN — HYDRALAZINE HYDROCHLORIDE 20 MG: 20 INJECTION INTRAMUSCULAR; INTRAVENOUS at 21:09

## 2021-03-01 RX ADMIN — Medication 10 ML: at 16:43

## 2021-03-01 RX ADMIN — OXYCODONE HYDROCHLORIDE AND ACETAMINOPHEN 1000 MG: 500 TABLET ORAL at 08:42

## 2021-03-01 NOTE — PROGRESS NOTES
[facility]    ICU TEAM Progress Note    Name: Missael Castro   : 1955   MRN: [id number]   Date: 3/1/2021        Subjective:     Reason for ICU Admission:    Patient is a 77year old male with history of hypertension, Diabetes type 2, CAD status post CABG  In 2018 who presents to the ER 2020 at [facility] complaints of SOB associated with fatigue and loss of taste which started about 5 days prior to ED visit. Chest CT showed  patchy pulmonary airspace disease consistent with Covid-19 pneumonia. Rapid Covid-19 test found to be positive. CT of chest also showed a left renal hyperdense mass which was not seen on comparison ultrasound 2018. He was hypoxic on room with oxygen saturation of 84% requiring 4L of O2 vis NC. Since admission he has had increase in oxygen needs and required 15 L/min mid flow. The facility he was at does not have ICU care and he was brought to [facility] for possible need for intubation as he could not tolerate BiPap.     Overnight Events:   -Tolerating HHFNC, no acute changes this am  -Attempt to wean off Precedex gtt  -Continue mobility, IS and C and DB    Objective:   Vital Signs:  Visit Vitals  /62   Pulse 73   Temp 98.5 °F (36.9 °C)   Resp 26   Wt 103.5 kg (228 lb 2.8 oz)   SpO2 92%   BMI 32.74 kg/m²    O2 Flow Rate (L/min): 50 l/min O2 Device: Hi flow nasal cannula, Heated Temp (24hrs), Av.3 °F (36.8 °C), Min:97.6 °F (36.4 °C), Max:98.9 °F (37.2 °C)           Intake/Output:     Intake/Output Summary (Last 24 hours) at 3/1/2021 0831  Last data filed at 3/1/2021 0600  Gross per 24 hour   Intake 535.29 ml   Output 515 ml   Net 20.29 ml       Physical Exam:    General:  Awake and interactive No acute distress. Eyes:  Sclera anicteric. Pupils equal, round, reactive to light. Mouth/Throat: Pink moist mucous membranes   Neck: Supple. [address] in place   Lungs:   Clear to auscultation bilaterally, good effort.    Cardiovascular:  Regular rate and rhythm, no murmur, click, rub, or gallop. Abdomen:   Soft, non-tender, bowel sounds normal, non-distended. Extremities: No cyanosis or edema. Skin: No acute rash or lesions. Lymph Nodes: Cervical and supraclavicular normal.   Musculoskeletal:  No swelling or deformity. Lines/Devices:  Intact, no erythema, drainage, or tenderness. Psychiatric: Calm, cooperative on [address], interactive follows commands, anxious at times       LABS AND  DATA: Personally reviewed  Recent Labs     03/01/21 0427 02/28/21  0334   WBC 9.5 9.2   HGB 10.0* 11.1*   HCT 33.0* 35.5*    284     Recent Labs     03/01/21 0427 02/28/21  0334    140   K 3.9 4.1    109*   CO2 24 24   BUN 19 24*   CREA 0.81 0.79   GLU 95 78   CA 8.7 9.1   MG 2.2 2.3   PHOS 2.8 3.1     No results for input(s): AP, TBIL, TP, ALB, GLOB, AML, LPSE in the last 72 hours. No lab exists for component: SGOT, GPT, AMYP  No results for input(s): INR, PTP, APTT, INREXT, INREXT in the last 72 hours. No results for input(s): PHI, PCO2I, PO2I, FIO2I in the last 72 hours. No results for input(s): CPK, CKMB, TROIQ, BNPP in the last 72 hours. MEDS: Reviewed    Chest X-Ray:  CXR Results  (Last 48 hours)    None        ABCDEF Bundle/Checklist Completed:  YES-See Plan    Active Problem List:     Problem List  Never Reviewed          Codes Class    Respiratory failure with hypoxia ([facility] [address] ICD-10-CM: J96.91  ICD-9-CM: 518.81         Pneumonia due to COVID-19 virus ICD-10-CM: U07.1, J12.82  ICD-9-CM: 480.8         Chest pain ICD-10-CM: R07.9  ICD-9-CM: 786.50         CAD (coronary artery disease) ICD-10-CM: I25.10  ICD-9-CM: 414.00         HTN (hypertension) ICD-10-CM: I10  ICD-9-CM: 401.9             ICU Assessment/ Comprehensive Plan of Care:     1. Acute Hypoxic respiratory failure secondary to COVID-19 pneumonia  a. Currently on high flow nasal cannula 80L/ min 100% O2  b. Prone as tolerated  c. Wean supplemental O2 as tolerated to keep sats >92%  d.  Pulmonary hygiene   e.  Precedex gtt for anxiety control, wean to off  F. Melatonin at HS for sleep    2. COVID-19 pneumonitis  a. Trend inflammatory markers. Decadron (completed), vitamin C, zinc.   b. Lovenox prophylactically   c.   3. CAD  a. CABG in 2018, takes imdur and metoprolol at home. On hold due to hypotension at outside facility, now stable. b. On procardia  c.   4. DM type 2  a. Continue SSI, change to resistant scale Q4hr  b. Diabetic diet   c. Increased long acting insulin, 15units-->20 units BID    5. VARINDER, resolved  -strict I/O  -Trend lytes    6. Hypokalemia/ Hypomagnesemia  -Replace K and Mag to keep K > 4, Mag > 2.0    7. Hyperglycemia, possibly steroid induced.   -Last dose of Decadron was 02/27  -Hold Lantus dosing for now as blood sugars are significantly lower today   -Monitor closely  -SSI coverage increased to high scale Q4h      F - Feeding: TF  A - Analgesia: acetaminophen  S - Sedation: GOAL RASS 0  T - DVT Prophylaxis: SCD's or Sequential Compression Device , enoxaparin  H - Head of Bed: > 30 Degrees  U - Ulcer Prophylaxis: pantoprazole  G - Glycemic Control: Insulin  S - Spontaneous Breathing Trial: NA  B - Bowel Regimen: docusate  I - Indwelling Catheter:              T/L/D  Tubes: None  Lines: Peripheral IV  Drains: None  D - De-escalation of Antibiotics:      DISPOSITION/COMMUNICATION  Discussed Plan of Care/Code Status: Full Code  Stay in ICU    CRITICAL CARE CONSULTANT NOTE  I had a face to face encounter with the patient, reviewed and interpreted patient data including clinical events, labs, images, vital signs, I/O's, and examined patient.   I have discussed the case and the plan and management of the patient's care with the consulting services, the bedside nurses and the respiratory therapist.      NOTE OF PERSONAL INVOLVEMENT IN CARE    I participated in the decision-making and personally managed or directed the management of the following life and organ supporting interventions that required my frequent assessment to treat or prevent imminent deterioration. I personally spent 45 minutes of critical care time. This is time spent at this critically ill patient's bedside actively involved in patient care as well as the coordination of care and discussions with the patient's family. This does not include any procedural time which has been billed separately.     NEIL Lopez-BC  Intensivist Nurse Practitioner  [facility]  3/1/2021

## 2021-03-01 NOTE — CONSULTS
Palliative Medicine Consult  Nik: [phone])    Patient Name: Shonna Vee  YOB: 1955    Date of Initial Consult: 3/1/2021  Reason for Consult: care decisions  Requesting Provider: Hazel Francis   Primary Care Physician: None     SUMMARY:   Shonna Vee is a 77 y.o. with a past history of CAD s/p CABG 2018, DM type 2, High BMI,  who was admitted on 2/22/2021 from [facility]  with a diagnosis of M[place] pneumonia/ acute respiratory failure (admitted to OSH 2/17) . Current medical issues leading to Palliative Medicine involvement include: Acute respiratory failure due to COVID19 pneumonia , currently in ICU on hi flow (did not tolerate BIPAP), low dose precedex for agitation. Chest CT also revealed Left renal hyperdense mass not see on comparison ultrasound in 4/2018. Patient is , has no biologic children. Has a step daughter Hill Bravo)   Lives alone, independent/ driving prior to admission  He has 2 sisters, 2 brothers. He would trust his sister David Marques with medical decision making if needed  In his free time, enjoys watching TV, fishing. Retired from multiple different jobs, including [employer]. PALLIATIVE DIAGNOSES:   1. Acute respiratory failure, COVID19 pneumonia  2. Fatigue  3. Acute grief, change in health status. 4. Deconditioning  5. Constipation  6. Left renal mass found incidentally on CT scan       PLAN:   1. Palliative Medicine introduced to patient. 1. [place] of recent events. 2. Patient feeling hopeful today, he's doing a little better. He's sad he is facing this horrible COVID and knows his underlying health issues of HTN, DM and High cholesterol put him at increased risk of not doing well. Very much wants to return to previous level of independence. We talked about how slow the recovery from [place] can be.    3. Education provided about poor outcomes of COVID if patients end up needing ventilator, we are hoping he doesn't need that, can make progress with current supports/ hi flow. Talked about option of choosing to avoid ventilator, instead focus on comfort if things get worse. Right now patient is just focused on hope that he will continue to get better. 4. Goals clear to pursue aggressive interventions. 5. Encouraged calls with family, time up in chair as possible,  Good nutritional intake (appetite is good)  6. Patient appreciative of visit/conversation. Made plan to check back again tomorrow. 2. Will start bowel regimen for constipation. 3. Will plan to call his sister Hammad Grandchild to let her know our team is following along, available for support  4. Initial consult note routed to primary continuity provider and/or primary health care team members  5. Communicated plan of care with: Palliative IDTM[facility] Team     GOALS OF CARE / TREATMENT PREFERENCES:     GOALS OF CARE:  Patient/Health Care Proxy Stated Goals: Rehabilitation    TREATMENT PREFERENCES:   Code Status: Full Code    Advance Care Planning:  [] The [facility] Interdisciplinary Team has updated the ACP Navigator with Health Care Decision Maker and Patient Capacity      Primary Decision Maker: Myriam Siu - Sister - [phone]    Secondary Decision Maker: Francisco Gallego - Daughter - [phone]  Advance Care Planning 2/24/2021   Confirm Advance Directive Yes, not on file       Medical Interventions: Full interventions     Other Instructions: Other:    As far as possible, the palliative care team has discussed with patient / health care proxy about goals of care / treatment preferences for patient. HISTORY:     History obtained from: chart    CHIEF COMPLAINT: SOB, fatigue    HPI/SUBJECTIVE:    The patient is:   [] Verbal and participatory  [] Non-participatory due to:     77 admitted with fatigue SOB, loss of taste for 5 days.   Hypoxia on RA at time of admission    Clinical Pain Assessment (nonverbal scale for severity on nonverbal patients):   Clinical Pain Assessment  Severity: 0          Duration: for how long has pt been experiencing pain (e.g., 2 days, 1 month, years)  Frequency: how often pain is an issue (e.g., several times per day, once every few days, constant)     FUNCTIONAL ASSESSMENT:     Palliative Performance Scale (PPS):  PPS: 30       PSYCHOSOCIAL/SPIRITUAL SCREENING:     Palliative IDT has assessed this patient for cultural preferences / practices and a referral made as appropriate to needs (Cultural Services, Patient Advocacy, Ethics, etc.)    Any spiritual / Evangelical concerns:  [] Yes /  [x] No    Caregiver Burnout:  [] Yes /  [x] No /  [] No Caregiver Present      Anticipatory grief assessment:   [x] Normal  / [] Maladaptive       ESAS Anxiety: Anxiety: 0    ESAS Depression: Depression: 2        REVIEW OF SYSTEMS:     Positive and pertinent negative findings in ROS are noted above in HPI. The following systems were [x] reviewed / [] unable to be reviewed as noted in HPI  Other findings are noted below. Systems: constitutional, ears/nose/mouth/throat, respiratory, gastrointestinal, genitourinary, musculoskeletal, integumentary, neurologic, psychiatric, endocrine. Positive findings noted below. Modified ESAS Completed by: provider   Fatigue: 8 Drowsiness: 0   Depression: 2 Pain: 0   Anxiety: 0 Nausea: 0   Anorexia: 0 Dyspnea: 3           Stool Occurrence(s): 1        PHYSICAL EXAM:     From RN flowsheet:  Wt Readings from Last 3 Encounters:   03/01/21 228 lb 2.8 oz (103.5 kg)   02/17/21 240 lb (108.9 kg)   12/12/20 245 lb (111.1 kg)     Blood pressure (!) 145/72, pulse 79, temperature 98.7 °F (37.1 °C), resp. rate 21, weight 228 lb 2.8 oz (103.5 kg), SpO2 94 %.     Pain Scale 1: Numeric (0 - 10)  Pain Intensity 1: 0  Pain Onset 1: today  Pain Location 1: Abdomen  Pain Orientation 1: Lower  Pain Description 1: Aching  Pain Intervention(s) 1: Medication (see MAR), Repositioned  Last bowel movement, if known:     Constitutional: awake, alert on hi flow cannula in ICU NAD  Eyes: pupils equal, anicteric  No respiratory distress  abd soft NT  Affect: appropriately sad at times talking about illness, normal eye contact  Pleasant, engaging in conversation  Insight intact     HISTORY:     Active Problems:    Respiratory failure with hypoxia ([address] (2/22/2021)      Past Medical History:   Diagnosis Date    CAD (coronary artery disease)     MI (myocardial infarction) ([facility] [address]       No past surgical history on file. Family History   Problem Relation Age of Onset    Hypertension Mother     Hypertension Father       History reviewed, no pertinent family history.   Social History     Tobacco Use    Smoking status: Former Smoker    Smokeless tobacco: Former User   Substance Use Topics    Alcohol use: Never     Frequency: Never     No Known Allergies   Current Facility-Administered Medications   Medication Dose Route Frequency    melatonin tablet 6 mg  6 mg Oral QHS    senna-docusate (PERICOLACE) 8.6-50 mg per tablet 1 Tab  1 Tab Oral DAILY    insulin glargine (LANTUS) injection 5 Units  5 Units SubCUTAneous Q12H    albuterol (PROVENTIL HFA, VENTOLIN HFA, PROAIR HFA) inhaler 1 Puff  1 Puff Inhalation Q6H PRN    And    ipratropium (ATROVENT HFA) 17 mcg inhaler  1 Puff Inhalation Q6H PRN    insulin lispro (HUMALOG) injection   SubCUTAneous Q4H    glucose chewable tablet 16 g  4 Tab Oral PRN    dextrose (D50W) injection syrg 12.5-25 g  12.5-25 g IntraVENous PRN    glucagon (GLUCAGEN) injection 1 mg  1 mg IntraMUSCular PRN    NIFEdipine ER (PROCARDIA XL) tablet 30 mg  30 mg Oral DAILY    dexmedeTOMidine in 0.9 % NaCl (PRECEDEX) 400 mcg/100 mL (4 mcg/mL) infusion soln  0.1-1.5 mcg/kg/hr IntraVENous TITRATE    lisinopriL (PRINIVIL, ZESTRIL) tablet 10 mg  10 mg Oral DAILY    sodium chloride (NS) flush 5-40 mL  5-40 mL IntraVENous Q8H    sodium chloride (NS) flush 5-40 mL  5-40 mL IntraVENous PRN    acetaminophen (TYLENOL) tablet 650 mg  650 mg Oral Q6H PRN Or    acetaminophen (TYLENOL) suppository 650 mg  650 mg Rectal Q6H PRN    polyethylene glycol (MIRALAX) packet 17 g  17 g Oral DAILY PRN    promethazine (PHENERGAN) tablet 12.5 mg  12.5 mg Oral Q6H PRN    Or    ondansetron (ZOFRAN) injection 4 mg  4 mg IntraVENous Q6H PRN    enoxaparin (LOVENOX) injection 40 mg  40 mg SubCUTAneous Q12H    guaiFENesin-dextromethorphan (ROBITUSSIN DM) 100-10 mg/5 mL syrup 5 mL  5 mL Oral Q4H PRN    cholecalciferol (VITAMIN D3) (1000 Units /25 mcg) tablet 2,000 Units  2,000 Units Oral DAILY    ascorbic acid (vitamin C) (VITAMIN C) tablet 1,000 mg  1,000 mg Oral DAILY    zinc sulfate (ZINCATE) 220 (50) mg capsule 1 Cap  1 Cap Oral DAILY    dextrose (D50W) injection syrg 12.5-25 g  25-50 mL IntraVENous PRN    pantoprazole (PROTONIX) tablet 40 mg  40 mg Oral ACB    tamsulosin (FLOMAX) capsule 0.4 mg  0.4 mg Oral QHS    hydrALAZINE (APRESOLINE) 20 mg/mL injection 10-20 mg  10-20 mg IntraVENous Q6H PRN          LAB AND IMAGING FINDINGS:     Lab Results   Component Value Date/Time    WBC 9.5 03/01/2021 04:27 AM    HGB 10.0 (L) 03/01/2021 04:27 AM    PLATELET 615 15/04/7424 04:27 AM     Lab Results   Component Value Date/Time    Sodium 139 03/01/2021 04:27 AM    Potassium 3.9 03/01/2021 04:27 AM    Chloride 108 03/01/2021 04:27 AM    CO2 24 03/01/2021 04:27 AM    BUN 19 03/01/2021 04:27 AM    Creatinine 0.81 03/01/2021 04:27 AM    Calcium 8.7 03/01/2021 04:27 AM    Magnesium 2.2 03/01/2021 04:27 AM    Phosphorus 2.8 03/01/2021 04:27 AM      Lab Results   Component Value Date/Time    Alk.  phosphatase 96 02/21/2021 06:07 AM    Protein, total 6.6 02/21/2021 06:07 AM    Albumin 2.3 (L) 02/21/2021 06:07 AM    Globulin 4.3 (H) 02/21/2021 06:07 AM     Lab Results   Component Value Date/Time    INR 1.2 (H) 02/23/2021 06:15 AM    Prothrombin time 12.4 (H) 02/23/2021 06:15 AM    aPTT 29.1 02/23/2021 06:15 AM      Lab Results   Component Value Date/Time    Ferritin 302 02/23/2021 06:15 AM      Lab Results   Component Value Date/Time    pH 7.52 (H) 02/25/2021 05:22 AM    PCO2 28 (L) 02/25/2021 05:22 AM    PO2 57 (L) 02/25/2021 05:22 AM     No components found for: GLPOC   No results found for: CPK, CKMB             Total time: 50min  Counseling / coordination time, spent as noted above: 25  > 50% counseling / coordination?:   yes  Prolonged service was provided for  []30 min   []75 min in face to face time in the presence of the patient, spent as noted above. Time Start:   Time End:   Note: this can only be billed with 15072 (initial) or 55613 (follow up). If multiple start / stop times, list each separately.

## 2021-03-01 NOTE — PROGRESS NOTES
0800: blood sugar 74. NP ordered to hold Lantus this am.     0930: patient ambulated to chair with minimal assistance. 1545: Patient up to bedside commode. Oxygen dropped to 70%, and eventually recovered. Shift summary: precedex weaned off. Patient more anxious. RN talked to NP who stated to turn it back on. Patient out of bed twice today with no assistance. BM this afternoon.

## 2021-03-01 NOTE — PROGRESS NOTES
1930: Bedside and Verbal shift change report given to [address] (oncoming nurse) by Migue Wang RN (offgoing nurse). Report included the following information SBAR, Kardex, ED Summary, Intake/Output, MAR, Recent Results, Cardiac Rhythm SR/ST and Alarm Parameters . 0730: Bedside and Verbal shift change report given to Dee Reyes (oncoming nurse) by [address] (offgoing nurse). Report included the following information SBAR, Kardex, ED Summary, Intake/Output, MAR, Recent Results, Cardiac Rhythm SR and Alarm Parameters .

## 2021-03-02 LAB
ANION GAP SERPL CALC-SCNC: 6 MMOL/L (ref 5–15)
BUN SERPL-MCNC: 15 MG/DL (ref 6–20)
BUN/CREAT SERPL: 19 (ref 12–20)
CALCIUM SERPL-MCNC: 8.5 MG/DL (ref 8.5–10.1)
CHLORIDE SERPL-SCNC: 110 MMOL/L (ref 97–108)
CO2 SERPL-SCNC: 24 MMOL/L (ref 21–32)
CREAT SERPL-MCNC: 0.8 MG/DL (ref 0.7–1.3)
ERYTHROCYTE [DISTWIDTH] IN BLOOD BY AUTOMATED COUNT: 14.1 % (ref 11.5–14.5)
GLUCOSE BLD STRIP.AUTO-MCNC: 101 MG/DL (ref 65–100)
GLUCOSE BLD STRIP.AUTO-MCNC: 123 MG/DL (ref 65–100)
GLUCOSE BLD STRIP.AUTO-MCNC: 127 MG/DL (ref 65–100)
GLUCOSE BLD STRIP.AUTO-MCNC: 149 MG/DL (ref 65–100)
GLUCOSE BLD STRIP.AUTO-MCNC: 154 MG/DL (ref 65–100)
GLUCOSE BLD STRIP.AUTO-MCNC: 160 MG/DL (ref 65–100)
GLUCOSE SERPL-MCNC: 105 MG/DL (ref 65–100)
HCT VFR BLD AUTO: 31.9 % (ref 36.6–50.3)
HGB BLD-MCNC: 9.7 G/DL (ref 12.1–17)
MAGNESIUM SERPL-MCNC: 2 MG/DL (ref 1.6–2.4)
MCH RBC QN AUTO: 26.6 PG (ref 26–34)
MCHC RBC AUTO-ENTMCNC: 30.4 G/DL (ref 30–36.5)
MCV RBC AUTO: 87.6 FL (ref 80–99)
NRBC # BLD: 0 K/UL (ref 0–0.01)
NRBC BLD-RTO: 0 PER 100 WBC
PHOSPHATE SERPL-MCNC: 2.9 MG/DL (ref 2.6–4.7)
PLATELET # BLD AUTO: 243 K/UL (ref 150–400)
PMV BLD AUTO: 9.9 FL (ref 8.9–12.9)
POTASSIUM SERPL-SCNC: 4 MMOL/L (ref 3.5–5.1)
RBC # BLD AUTO: 3.64 M/UL (ref 4.1–5.7)
SERVICE CMNT-IMP: ABNORMAL
SODIUM SERPL-SCNC: 140 MMOL/L (ref 136–145)
WBC # BLD AUTO: 10.2 K/UL (ref 4.1–11.1)

## 2021-03-02 PROCEDURE — 74011250637 HC RX REV CODE- 250/637: Performed by: NURSE PRACTITIONER

## 2021-03-02 PROCEDURE — 83735 ASSAY OF MAGNESIUM: CPT

## 2021-03-02 PROCEDURE — 65610000006 HC RM INTENSIVE CARE

## 2021-03-02 PROCEDURE — 82962 GLUCOSE BLOOD TEST: CPT

## 2021-03-02 PROCEDURE — 84100 ASSAY OF PHOSPHORUS: CPT

## 2021-03-02 PROCEDURE — 99231 SBSQ HOSP IP/OBS SF/LOW 25: CPT | Performed by: INTERNAL MEDICINE

## 2021-03-02 PROCEDURE — 74011250637 HC RX REV CODE- 250/637: Performed by: INTERNAL MEDICINE

## 2021-03-02 PROCEDURE — 36415 COLL VENOUS BLD VENIPUNCTURE: CPT

## 2021-03-02 PROCEDURE — 85027 COMPLETE CBC AUTOMATED: CPT

## 2021-03-02 PROCEDURE — 74011250636 HC RX REV CODE- 250/636: Performed by: NURSE PRACTITIONER

## 2021-03-02 PROCEDURE — 80048 BASIC METABOLIC PNL TOTAL CA: CPT

## 2021-03-02 PROCEDURE — 74011636637 HC RX REV CODE- 636/637: Performed by: NURSE PRACTITIONER

## 2021-03-02 PROCEDURE — 74011000250 HC RX REV CODE- 250: Performed by: INTERNAL MEDICINE

## 2021-03-02 RX ORDER — TRAZODONE HYDROCHLORIDE 50 MG/1
50 TABLET ORAL 2 TIMES DAILY
Status: DISCONTINUED | OUTPATIENT
Start: 2021-03-02 | End: 2021-03-06

## 2021-03-02 RX ORDER — INSULIN LISPRO 100 [IU]/ML
INJECTION, SOLUTION INTRAVENOUS; SUBCUTANEOUS
Status: DISCONTINUED | OUTPATIENT
Start: 2021-03-02 | End: 2021-03-15 | Stop reason: HOSPADM

## 2021-03-02 RX ORDER — FUROSEMIDE 10 MG/ML
40 INJECTION INTRAMUSCULAR; INTRAVENOUS ONCE
Status: COMPLETED | OUTPATIENT
Start: 2021-03-02 | End: 2021-03-02

## 2021-03-02 RX ADMIN — TRAZODONE HYDROCHLORIDE 50 MG: 50 TABLET ORAL at 11:49

## 2021-03-02 RX ADMIN — TRAZODONE HYDROCHLORIDE 50 MG: 50 TABLET ORAL at 21:01

## 2021-03-02 RX ADMIN — Medication 10 ML: at 17:17

## 2021-03-02 RX ADMIN — INSULIN LISPRO 3 UNITS: 100 INJECTION, SOLUTION INTRAVENOUS; SUBCUTANEOUS at 21:21

## 2021-03-02 RX ADMIN — INSULIN LISPRO 3 UNITS: 100 INJECTION, SOLUTION INTRAVENOUS; SUBCUTANEOUS at 17:23

## 2021-03-02 RX ADMIN — FUROSEMIDE 40 MG: 10 INJECTION, SOLUTION INTRAVENOUS at 11:49

## 2021-03-02 RX ADMIN — Medication 10 ML: at 07:10

## 2021-03-02 RX ADMIN — PANTOPRAZOLE SODIUM 40 MG: 40 TABLET, DELAYED RELEASE ORAL at 07:09

## 2021-03-02 RX ADMIN — INSULIN GLARGINE 5 UNITS: 100 INJECTION, SOLUTION SUBCUTANEOUS at 08:57

## 2021-03-02 RX ADMIN — DEXMEDETOMIDINE HYDROCHLORIDE 0.3 MCG/KG/HR: 400 INJECTION INTRAVENOUS at 07:09

## 2021-03-02 RX ADMIN — Medication 1 CAPSULE: at 08:57

## 2021-03-02 RX ADMIN — INSULIN GLARGINE 5 UNITS: 100 INJECTION, SOLUTION SUBCUTANEOUS at 21:21

## 2021-03-02 RX ADMIN — ENOXAPARIN SODIUM 40 MG: 100 INJECTION SUBCUTANEOUS at 18:27

## 2021-03-02 RX ADMIN — ENOXAPARIN SODIUM 40 MG: 100 INJECTION SUBCUTANEOUS at 07:09

## 2021-03-02 RX ADMIN — OXYCODONE HYDROCHLORIDE AND ACETAMINOPHEN 1000 MG: 500 TABLET ORAL at 08:57

## 2021-03-02 RX ADMIN — Medication 2000 UNITS: at 08:57

## 2021-03-02 RX ADMIN — LISINOPRIL 10 MG: 10 TABLET ORAL at 08:57

## 2021-03-02 RX ADMIN — Medication 6 MG: at 21:00

## 2021-03-02 RX ADMIN — DOCUSATE SODIUM 50 MG AND SENNOSIDES 8.6 MG 1 TABLET: 8.6; 5 TABLET, FILM COATED ORAL at 08:57

## 2021-03-02 RX ADMIN — NIFEDIPINE 30 MG: 30 TABLET, FILM COATED, EXTENDED RELEASE ORAL at 08:57

## 2021-03-02 RX ADMIN — TAMSULOSIN HYDROCHLORIDE 0.4 MG: 0.4 CAPSULE ORAL at 21:00

## 2021-03-02 NOTE — PROGRESS NOTES
[facility]    ICU TEAM Progress Note    Name: Shonna Vee   : 1955   MRN: [id number]   Date: 3/2/2021      Subjective:   Progress Note: 3/2/2021      Mr Anson Garcia is a 76 yo male w a PMH of HTN, DM2, CAD s/p CABG in 2018 who presented to the ED at [facility] on  w COVID-19 pneumonia. He required 4L/min NC on admission to achieve sats >90%. He was treated with decadron and azithromycin and ceftriaxone. On , his oxygen needs had increased to 15L/min NC. Later on , he was tried on CPAP of 10. The patient reported an inability to tolerate this and then refused it. He was then placed on 15L HiFlow NC. He was then transferred to [facility] on  as [facility] did not have an ICU. He was placed on Hi Flow at 70L/min 100% FIO2. He was proned as tolerated in the ICU. He required precedex for hyperactive delirium. His VARINDER resolved. He underwent diuresis. Overnight/24h events:  His precedex was weaned off 3/2. His home BID trazodone was restarted 3/2. Remains on HiFlow. Refusing PT/OT. Active Problem List:     Problem List  Never Reviewed          Codes Class    Respiratory failure with hypoxia (HCC) ICD-10-CM: J96.91  ICD-9-CM: 518.81         Pneumonia due to COVID-19 virus ICD-10-CM: U07.1, J12.82  ICD-9-CM: 480.8         Chest pain ICD-10-CM: R07.9  ICD-9-CM: 786.50         CAD (coronary artery disease) ICD-10-CM: I25.10  ICD-9-CM: 414.00         HTN (hypertension) ICD-10-CM: I10  ICD-9-CM: 401.9               Past Medical History:      has a past medical history of CAD (coronary artery disease) and MI (myocardial infarction) ([facility] [address] Past Surgical History:      has no past surgical history on file. Home Medications:     Prior to Admission medications    Medication Sig Start Date End Date Taking? Authorizing Provider   lisinopriL (PRINIVIL, ZESTRIL) 10 mg tablet Take 10 mg by mouth daily. Other, MD Imani   magnesium oxide (MAG-OX) 400 mg tablet Take 1 Tab by mouth daily.  20 Marcia Tena MD   metoprolol succinate (TOPROL-XL) 50 mg XL tablet Take 1 Tab by mouth two (2) times daily (after meals). 20   Aleah Childress MD   NIFEdipine ER (PROCARDIA XL) 30 mg ER tablet Take 1 Tab by mouth daily. 12/15/20   Marcia Tena MD   potassium chloride (K-DUR, KLOR-CON) 20 mEq tablet Take 1 Tab by mouth daily. 20   Aleah Childress MD   isosorbide mononitrate ER (IMDUR) 60 mg CR tablet Take 60 mg by mouth daily. Imani Morrison MD   atorvastatin (LIPITOR) 20 mg tablet Take 20 mg by mouth daily. Imani Morrison MD   furosemide (Lasix) 40 mg tablet Take 40 mg by mouth daily. Imani Morrison MD   pantoprazole (Protonix) 40 mg tablet Take 40 mg by mouth daily. Imani Morrison MD   traZODone (DESYREL) 50 mg tablet Take 50 mg by mouth two (2) times a day. Imani Morrison MD   metFORMIN (GLUCOPHAGE) 1,000 mg tablet Take 1,000 mg by mouth two (2) times daily (with meals). Imani Morrison MD   tamsulosin (Flomax) 0.4 mg capsule Take 0.4 mg by mouth nightly.     Imani Morrison MD       Allergies/Social/Family History:     No Known Allergies   Social History     Tobacco Use    Smoking status: Former Smoker    Smokeless tobacco: Former User   Substance Use Topics    Alcohol use: Never     Frequency: Never      Family History   Problem Relation Age of Onset    Hypertension Mother     Hypertension Father        Review of Systems:     Per HPI    Objective:   Vital Signs:  Visit Vitals  BP (!) 141/69   Pulse 65   Temp 98.3 °F (36.8 °C)   Resp 24   Wt 113.4 kg (250 lb)   SpO2 96%   BMI 35.87 kg/m²    O2 Flow Rate (L/min): 30 l/min O2 Device: Hi flow nasal cannula, Heated Temp (24hrs), Av.7 °F (37.1 °C), Min:98.3 °F (36.8 °C), Max:99 °F (37.2 °C)           Intake/Output:     Intake/Output Summary (Last 24 hours) at 3/2/2021 0907  Last data filed at 3/2/2021 0700  Gross per 24 hour   Intake 327.09 ml   Output 880 ml   Net -552.91 ml       Physical Exam:    General:  alert, no distress, appears stated age  Eye:PERRLA  Neurologic: CAM ICU positive; non focal  Neck:Supple, no JVD  Lungs:  clear to auscultation bilaterally; diminished bases  Heart:  regular rate and rhythm, S1, S2 normal, no murmur, click, rub or gallop  Abdomen:  soft, non-tender. Bowel sounds normal. No masses,  no organomegaly  Skin:  no rash or abnormalities    LABS AND  DATA: Personally reviewed  Recent Labs     03/02/21 0422 03/01/21 0427   WBC 10.2 9.5   HGB 9.7* 10.0*   HCT 31.9* 33.0*    277     Recent Labs     03/02/21 0422 03/01/21 0427    139   K 4.0 3.9   * 108   CO2 24 24   BUN 15 19   CREA 0.80 0.81   * 95   CA 8.5 8.7   MG 2.0 2.2   PHOS 2.9 2.8     No results for input(s): AP, TBIL, TP, ALB, GLOB, AML, LPSE in the last 72 hours. No lab exists for component: SGOT, GPT, AMYP  No results for input(s): INR, PTP, APTT, INREXT in the last 72 hours. No results for input(s): PHI, PCO2I, PO2I, FIO2I in the last 72 hours. No results for input(s): CPK, CKMB, TROIQ, BNPP in the last 72 hours. Ventilator Settings:  Mode Rate Tidal Volume Pressure FiO2 PEEP            80 %       Peak airway pressure:      Minute ventilation:        MEDS: Reviewed    Chest X-Ray: None last 24h      Assessment:     Acute Hypoxic Respiratory Failure Secondary to COVID-19 Pneumonia: FIO2 decreased to 80% today. Goal sats >92%  - Continue steroids  - PT/OT  - No acute indication for abx  - DVT ppx with lovenox   - Continue vitamin C  - Will evaluate tomorrow for diuresis pending further improvement    HTN: Continue nifedipine, lisinopril    Delirium c/b chronic anxiety:  Weaned off precedex drip this am  - Restarted home trazodone BID    DM2 c/b Hyperglycemia: Continue lantus; SSI    BPH: Continue tamsulosin     Deconditioning: PT/OT    Multidisciplinary Rounds Completed:   Yes    ABCDEF Bundle/Checklist  Pain Medications: None  Target RASS: 0 - Alert & Calm - Spontaneously pays attention to caregiver  Sedation Medications: None  CAM-ICU:  Positive  Mobility: Poor  PT/OT: PT consulted and on board and OT consulted and on board   Restraints: None needed at this time  Discussed Plan of Care (goals of care): Yes  Addressed Code Status: Full Code    CARDIOVASCULAR  Cardiac Gtts: None  SBP Goal of: > 90 mmHg  MAP Goal of: > 65 mmHg  Transfusion Trigger (Hgb): <7 g/dL    RESPIRATORY  Vent Goals:   Chlorhexidine   DVT Prophylaxis (if no, list reason): Lovenox   SPO2 Goal: > 92%  Pulmonary toilet: Incentive Spirometry     GI/  [place] Catheter Present: No  GI Prophylaxis: Protonix (pantoprazole)   Nutrition: Yes PO diet  IVFs: N  Bowel Regimen: Y - Senna/colace  Insulin: N    ANTIBIOTICS  Antibiotics:  N    T/L/D  N    SPECIAL EQUIPMENT  N    DISPOSITION  ICU - will consider step-down tomorrow    CRITICAL CARE CONSULTANT NOTE  I had a face to face encounter with the patient, reviewed and interpreted patient data including clinical events, labs, images, vital signs, I/O's, and examined patient.   I have discussed the case and the plan and management of the patient's care with the consulting services, the bedside nurses and the respiratory therapist.      Billing level 1316 Audrey Santiago NP  [facility] Critical Care  3/2/2021

## 2021-03-02 NOTE — CONSULTS
Palliative Medicine Consult  [place]: [phone] ([phone])    Patient Name: Galo Vegas  YOB: 1955    Date of Initial Consult: 3/1/2021  Reason for Consult: care decisions  Requesting Provider: Harriett Homans   Primary Care Physician: None     SUMMARY:   Galo Vegas is a 77 y.o. with a past history of CAD s/p CABG 2018, DM type 2, High BMI,  who was admitted on 2/22/2021 from [facility]  with a diagnosis of Krista Babe pneumonia/ acute respiratory failure (admitted to OSH 2/17) . Current medical issues leading to Palliative Medicine involvement include: Acute respiratory failure due to COVID19 pneumonia , currently in ICU on hi flow (did not tolerate BIPAP), low dose precedex for agitation. Chest CT also revealed Left renal hyperdense mass not see on comparison ultrasound in 4/2018. Patient is , has no biologic children. Has a step daughter Duane Holts)   Lives alone, independent/ driving prior to admission  He has 2 sisters, 2 brothers. He would trust his sister Jayden Hartley [phone] with medical decision making if needed  In his free time, enjoys watching TV, fishing. Retired from multiple different jobs, including [employer]. PALLIATIVE DIAGNOSES:   1. Acute respiratory failure, COVID19 pneumonia  2. Fatigue  3. Acute grief, change in health status. 4. Deconditioning  5. Constipation  6. Left renal mass found incidentally on CT scan       PLAN:     1. Discussed with bedside nurse. 2. Patient making some slow progress with weaning. 3. Patient a bit discouraged sounding today. Asking me how long he will have to be in the hospital -- explained the slow recovery from [place] and how it takes time for lungs to heal and to build back strength, expect he may still be in hospital for another week or two. He's surprised and frustrated, was hoping he was going home soon. Explain that he is still on high level of oxygen, weaning slowly. He is making progress, but it will take time. 4. He is ok with me calling his sister to provide update. -- I left her a message on [place]. GOALS OF CARE / TREATMENT PREFERENCES:     GOALS OF CARE:  Patient/Health Care Proxy Stated Goals: Rehabilitation    TREATMENT PREFERENCES:   Code Status: Full Code    Advance Care Planning:  [] [facility] Interdisciplinary Team has updated the ACP Navigator with Health Care Decision Maker and Patient Capacity      Primary Decision Maker: Patrick Alaniz - Sister - [phone]    Secondary Decision Maker: Iglesia Samaniego - Daughter - [phone]  Advance Care Planning 2/24/2021   Confirm Advance Directive Yes, not on file       Medical Interventions: Full interventions     Other Instructions: Other:    As far as possible, the palliative care team has discussed with patient / health care proxy about goals of care / treatment preferences for patient. HISTORY:     History obtained from: chart    CHIEF COMPLAINT: SOB, fatigue    HPI/SUBJECTIVE:    The patient is:   [] Verbal and participatory  [] Non-participatory due to:     77 admitted with fatigue SOB, loss of taste for 5 days. Hypoxia on RA at time of admission    3/2  BM yesterday per chart review  Tired, a bit discouraged today. Bored.   Slept ok last night  Clinical Pain Assessment (nonverbal scale for severity on nonverbal patients):   Clinical Pain Assessment  Severity: 0          Duration: for how long has pt been experiencing pain (e.g., 2 days, 1 month, years)  Frequency: how often pain is an issue (e.g., several times per day, once every few days, constant)     FUNCTIONAL ASSESSMENT:     Palliative Performance Scale (PPS):  PPS: 30       PSYCHOSOCIAL/SPIRITUAL SCREENING:     Palliative IDT has assessed this patient for cultural preferences / practices and a referral made as appropriate to needs (Cultural Services, Patient Advocacy, Ethics, etc.)    Any spiritual / Yazidi concerns:  [] Yes /  [x] No    Caregiver Burnout:  [] Yes /  [x] No / [] No Caregiver Present      Anticipatory grief assessment:   [x] Normal  / [] Maladaptive       ESAS Anxiety: Anxiety: 0    ESAS Depression: Depression: 2        REVIEW OF SYSTEMS:     Positive and pertinent negative findings in ROS are noted above in HPI. The following systems were [x] reviewed / [] unable to be reviewed as noted in HPI  Other findings are noted below. Systems: constitutional, ears/nose/mouth/throat, respiratory, gastrointestinal, genitourinary, musculoskeletal, integumentary, neurologic, psychiatric, endocrine. Positive findings noted below. Modified ESAS Completed by: provider   Fatigue: 8 Drowsiness: 0   Depression: 2 Pain: 0   Anxiety: 0 Nausea: 0   Anorexia: 0 Dyspnea: 3           Stool Occurrence(s): 1        PHYSICAL EXAM:     From RN flowsheet:  Wt Readings from Last 3 Encounters:   03/02/21 250 lb (113.4 kg)   02/17/21 240 lb (108.9 kg)   12/12/20 245 lb (111.1 kg)     Blood pressure (!) 141/72, pulse 60, temperature 98.3 °F (36.8 °C), resp. rate 26, weight 250 lb (113.4 kg), SpO2 94 %. Pain Scale 1: Numeric (0 - 10)  Pain Intensity 1: 0  Pain Onset 1: today  Pain Location 1: Abdomen  Pain Orientation 1: Lower  Pain Description 1: Aching  Pain Intervention(s) 1: Medication (see MAR), Repositioned  Last bowel movement, if known:     Constitutional: awake, alert on hi flow cannula in ICU NAD  Eyes: pupils equal, anicteric  No respiratory distress  abd soft NT  Affect: appropriately sad at times talking about illness, normal eye contact  Pleasant, engaging in conversation  Insight intact     HISTORY:     Active Problems:    Respiratory failure with hypoxia ([address] (2/22/2021)      Past Medical History:   Diagnosis Date    CAD (coronary artery disease)     MI (myocardial infarction) ([address]       No past surgical history on file. Family History   Problem Relation Age of Onset    Hypertension Mother     Hypertension Father       History reviewed, no pertinent family history.   Social History     Tobacco Use    Smoking status: Former Smoker    Smokeless tobacco: Former User   Substance Use Topics    Alcohol use: Never     Frequency: Never     No Known Allergies   Current Facility-Administered Medications   Medication Dose Route Frequency    melatonin tablet 6 mg  6 mg Oral QHS    senna-docusate (PERICOLACE) 8.6-50 mg per tablet 1 Tab  1 Tab Oral DAILY    insulin glargine (LANTUS) injection 5 Units  5 Units SubCUTAneous Q12H    albuterol (PROVENTIL HFA, VENTOLIN HFA, PROAIR HFA) inhaler 1 Puff  1 Puff Inhalation Q6H PRN    And    ipratropium (ATROVENT HFA) 17 mcg inhaler  1 Puff Inhalation Q6H PRN    insulin lispro (HUMALOG) injection   SubCUTAneous Q4H    glucose chewable tablet 16 g  4 Tab Oral PRN    dextrose (D50W) injection syrg 12.5-25 g  12.5-25 g IntraVENous PRN    glucagon (GLUCAGEN) injection 1 mg  1 mg IntraMUSCular PRN    NIFEdipine ER (PROCARDIA XL) tablet 30 mg  30 mg Oral DAILY    dexmedeTOMidine in 0.9 % NaCl (PRECEDEX) 400 mcg/100 mL (4 mcg/mL) infusion soln  0.1-1.5 mcg/kg/hr IntraVENous TITRATE    lisinopriL (PRINIVIL, ZESTRIL) tablet 10 mg  10 mg Oral DAILY    sodium chloride (NS) flush 5-40 mL  5-40 mL IntraVENous Q8H    sodium chloride (NS) flush 5-40 mL  5-40 mL IntraVENous PRN    acetaminophen (TYLENOL) tablet 650 mg  650 mg Oral Q6H PRN    Or    acetaminophen (TYLENOL) suppository 650 mg  650 mg Rectal Q6H PRN    polyethylene glycol (MIRALAX) packet 17 g  17 g Oral DAILY PRN    promethazine (PHENERGAN) tablet 12.5 mg  12.5 mg Oral Q6H PRN    Or    ondansetron (ZOFRAN) injection 4 mg  4 mg IntraVENous Q6H PRN    enoxaparin (LOVENOX) injection 40 mg  40 mg SubCUTAneous Q12H    guaiFENesin-dextromethorphan (ROBITUSSIN DM) 100-10 mg/5 mL syrup 5 mL  5 mL Oral Q4H PRN    cholecalciferol (VITAMIN D3) (1000 Units /25 mcg) tablet 2,000 Units  2,000 Units Oral DAILY    ascorbic acid (vitamin C) (VITAMIN C) tablet 1,000 mg  1,000 mg Oral DAILY    zinc sulfate (ZINCATE) 220 (50) mg capsule 1 Cap  1 Cap Oral DAILY    dextrose (D50W) injection syrg 12.5-25 g  25-50 mL IntraVENous PRN    pantoprazole (PROTONIX) tablet 40 mg  40 mg Oral ACB    tamsulosin (FLOMAX) capsule 0.4 mg  0.4 mg Oral QHS    hydrALAZINE (APRESOLINE) 20 mg/mL injection 10-20 mg  10-20 mg IntraVENous Q6H PRN          LAB AND IMAGING FINDINGS:     Lab Results   Component Value Date/Time    WBC 10.2 03/02/2021 04:22 AM    HGB 9.7 (L) 03/02/2021 04:22 AM    PLATELET 142 56/13/3334 04:22 AM     Lab Results   Component Value Date/Time    Sodium 140 03/02/2021 04:22 AM    Potassium 4.0 03/02/2021 04:22 AM    Chloride 110 (H) 03/02/2021 04:22 AM    CO2 24 03/02/2021 04:22 AM    BUN 15 03/02/2021 04:22 AM    Creatinine 0.80 03/02/2021 04:22 AM    Calcium 8.5 03/02/2021 04:22 AM    Magnesium 2.0 03/02/2021 04:22 AM    Phosphorus 2.9 03/02/2021 04:22 AM      Lab Results   Component Value Date/Time    Alk. phosphatase 96 02/21/2021 06:07 AM    Protein, total 6.6 02/21/2021 06:07 AM    Albumin 2.3 (L) 02/21/2021 06:07 AM    Globulin 4.3 (H) 02/21/2021 06:07 AM     Lab Results   Component Value Date/Time    INR 1.2 (H) 02/23/2021 06:15 AM    Prothrombin time 12.4 (H) 02/23/2021 06:15 AM    aPTT 29.1 02/23/2021 06:15 AM      Lab Results   Component Value Date/Time    Ferritin 302 02/23/2021 06:15 AM      Lab Results   Component Value Date/Time    pH 7.52 (H) 02/25/2021 05:22 AM    PCO2 28 (L) 02/25/2021 05:22 AM    PO2 57 (L) 02/25/2021 05:22 AM     No components found for: GLPOC   No results found for: CPK, CKMB             Total time:   Counseling / coordination time, spent as noted above:   > 50% counseling / coordination?:   yes  Prolonged service was provided for  []30 min   []75 min in face to face time in the presence of the patient, spent as noted above. Time Start:   Time End:   Note: this can only be billed with 20571 (initial) or 86897 (follow up).   If multiple start / stop times, list each separately.

## 2021-03-02 NOTE — PROGRESS NOTES
Comprehensive Nutrition Assessment    Type and Reason for Visit: RD nutrition re-screen/LOS    Nutrition Recommendations/Plan:      Encourage oral intake     Use oral supplements with medications    ? Appetite stimulant    Nutrition Assessment:   Pt admitted d/t Respiratory failure with hypoxia. PMHx: DM 2, CAD, HTN. COVID 19 PNA with subsequent hypoxic respiratory failure. Continues on HFNC. Anxiety. PC following. Attempted to see pt this afternoon however did not want to wake up-sleeping soundly. PO intake good at the beginning of hospitalization (eating close to 100%). In the past week on eating ~50% of meals on average. Oral supplements ordered. Fair intake of these as well. Anxiety and depression may be playing a role in decreased oral intake. Nursing also noted pt seems bored; frustrated with being stuck in his room. Malnutrition Assessment:  Malnutrition Status: At risk for malnutrition (specify) d/t poor PO intake   Context:  Acute illness       Nutritionally Significant Medications:   Vit C, Vit D3, Lantus, Humalog, Protonix, Pericolace, Zinc sulfate, Precedex    Estimated Daily Nutrient Needs:  Energy (kcal): 2180 (MSJ x 1.2); Weight Used for Energy Requirements: Current(103.4 kg)  Protein (g): 103 (1g/kg or 1.4g/kg IBW);  Weight Used for Protein Requirements: Current(103 kg)  Fluid (ml/day): 2180; Method Used for Fluid Requirements: 1 ml/kcal    Nutrition Related Findings:       BM: 3/1  Edema: None  Wounds:  None       Current Nutrition Therapies:   Diet: Diabetic Consistent CHO 2200 kcal  Supplements: Glucerna shake bid, Magic cup @ lunch  Additional Caloric Sources:  None  Meal intake:   Patient Vitals for the past 168 hrs:   % Diet Eaten   03/01/21 0800 50 %   02/28/21 1830 25 %   02/28/21 1234 75 %   02/28/21 1000 30 %   02/27/21 1700 70 %   02/27/21 1300 120 %   02/27/21 1000 30 %   02/26/21 1738 30 %   02/26/21 1200 30 %   02/26/21 0800 20 %   02/23/21 1840 100 %         Anthropometric Measures:  · Height:  5' 10\" (177.8 cm)  · Current Body Wt:  113.4 kg (250 lb)   · Admission Body Wt:  222 lb 3.6 oz     · Ideal Body Wt:  166#:  150.6 %    · BMI Categories:  Obese class 1 (BMI 30.0-34. 9)     Wt Readings from Last 10 Encounters:   03/02/21 113.4 kg (250 lb)   02/17/21 108.9 kg (240 lb)   12/12/20 111.1 kg (245 lb)       Nutrition Diagnosis:   · Inadequate oral intake related to impaired respiratory function as evidenced by intake 26-50%    Nutrition Interventions:   Food and/or Nutrient Delivery: Continue current diet, Continue oral nutrition supplement  Nutrition Education and Counseling: No recommendations at this time  Coordination of Nutrition Care: Continue to monitor while inpatient, Interdisciplinary rounds    Goals:  Consume 75% of meals and 1-2 supplements per day in next 5-7 days.        Nutrition Monitoring and Evaluation:   Behavioral-Environmental Outcomes: None identified  Food/Nutrient Intake Outcomes: Food and nutrient intake, Supplement intake  Physical Signs/Symptoms Outcomes: Biochemical data, Weight, Fluid status or edema, Meal time behavior    Discharge Planning:    Continue oral nutrition supplement, Continue current diet     Mulu Rajan RD CNSC  Contact: [employer]

## 2021-03-02 NOTE — PROGRESS NOTES
1930- Bedside and Verbal shift change report given to Cat RN (oncoming nurse) by Marquis Bronson RN (offgoing nurse). Report included the following information SBAR, Kardex, ED Summary, Intake/Output, MAR, Accordion, Recent Results, Med Rec Status, Cardiac Rhythm NSR- Sinus David Ripper and Alarm Parameters      2000- IVs flushed, left forearm sore and tender. IV infiltrated and DC.    2030. Daughter updated on Fathers condition via phone. Very appropriate and appreciative. 2200- Pt up to bedside commode with minimal assist.  O2 dropped into low 80s but pt tolerated well and recovered quickly. 56- Updated Sister, pt sister was appropriate and just wanted to make sure her brother was doing ok. 0730- Bedside and Verbal shift change report given to [id number] (oncoming nurse) by Cat RN (offgoing nurse). Report included the following information SBAR, Kardex, ED Summary, Procedure Summary, Intake/Output, MAR, Accordion, Recent Results, Med Rec Status, Cardiac Rhythm NSR- Camilo and Alarm Parameters .

## 2021-03-02 NOTE — PROGRESS NOTES
Physical Therapy: Defer    Orders acknowledged, chart reviewed in prep for PT eval.  Note OT just attempted eval and pt is refusing all mobility at this time. Will defer and follow up tomorrow as able and appropriate.       Jania Manriquez, PT, DPT

## 2021-03-02 NOTE — PROGRESS NOTES
SORIN  Patient transferred from [facility] for higher level of care. COVID positive  RUR 17%  Disposition will need Rehab, patient was independent PTA   Patient remains in the ICU on COVID precautions. Continues on hi flow oxygen and precedex. Care management is continuing to follow for transitions of care.    Emilee Burt RN,Care Managment

## 2021-03-02 NOTE — PROGRESS NOTES
Occupational Therapy Note:     Attempted to see pt for evaluation. Spoke with nursing and pt with poor insight into how sick he has been and the recovery expectations for COVID. Pt remains on high flow nasal canula. Offered EOB for grooming activities, BSC transfers, upper body bathing, gentle progression with sitting activities but he refused all tasks. Pt with depressed affect and poor insight into need for activity to recover and regain his strength. A&P:  Will follow up tomorrow for evaluation as able and appropriate.         Nando Curry, OT

## 2021-03-03 LAB
ANION GAP SERPL CALC-SCNC: 10 MMOL/L (ref 5–15)
BUN SERPL-MCNC: 14 MG/DL (ref 6–20)
BUN/CREAT SERPL: 19 (ref 12–20)
CALCIUM SERPL-MCNC: 8.6 MG/DL (ref 8.5–10.1)
CHLORIDE SERPL-SCNC: 108 MMOL/L (ref 97–108)
CO2 SERPL-SCNC: 22 MMOL/L (ref 21–32)
CREAT SERPL-MCNC: 0.72 MG/DL (ref 0.7–1.3)
ERYTHROCYTE [DISTWIDTH] IN BLOOD BY AUTOMATED COUNT: 14.4 % (ref 11.5–14.5)
GLUCOSE BLD STRIP.AUTO-MCNC: 131 MG/DL (ref 65–100)
GLUCOSE BLD STRIP.AUTO-MCNC: 172 MG/DL (ref 65–100)
GLUCOSE BLD STRIP.AUTO-MCNC: 173 MG/DL (ref 65–100)
GLUCOSE SERPL-MCNC: 133 MG/DL (ref 65–100)
HCT VFR BLD AUTO: 32.1 % (ref 36.6–50.3)
HGB BLD-MCNC: 9.7 G/DL (ref 12.1–17)
MAGNESIUM SERPL-MCNC: 2.3 MG/DL (ref 1.6–2.4)
MCH RBC QN AUTO: 25.9 PG (ref 26–34)
MCHC RBC AUTO-ENTMCNC: 30.2 G/DL (ref 30–36.5)
MCV RBC AUTO: 85.8 FL (ref 80–99)
NRBC # BLD: 0 K/UL (ref 0–0.01)
NRBC BLD-RTO: 0 PER 100 WBC
PHOSPHATE SERPL-MCNC: 3.3 MG/DL (ref 2.6–4.7)
PLATELET # BLD AUTO: 264 K/UL (ref 150–400)
PMV BLD AUTO: 10.6 FL (ref 8.9–12.9)
POTASSIUM SERPL-SCNC: 4.2 MMOL/L (ref 3.5–5.1)
RBC # BLD AUTO: 3.74 M/UL (ref 4.1–5.7)
SERVICE CMNT-IMP: ABNORMAL
SODIUM SERPL-SCNC: 140 MMOL/L (ref 136–145)
WBC # BLD AUTO: 9.1 K/UL (ref 4.1–11.1)

## 2021-03-03 PROCEDURE — 94760 N-INVAS EAR/PLS OXIMETRY 1: CPT

## 2021-03-03 PROCEDURE — 74011250637 HC RX REV CODE- 250/637: Performed by: NURSE PRACTITIONER

## 2021-03-03 PROCEDURE — 83735 ASSAY OF MAGNESIUM: CPT

## 2021-03-03 PROCEDURE — 74011250636 HC RX REV CODE- 250/636: Performed by: NURSE PRACTITIONER

## 2021-03-03 PROCEDURE — 84100 ASSAY OF PHOSPHORUS: CPT

## 2021-03-03 PROCEDURE — 80048 BASIC METABOLIC PNL TOTAL CA: CPT

## 2021-03-03 PROCEDURE — 97165 OT EVAL LOW COMPLEX 30 MIN: CPT

## 2021-03-03 PROCEDURE — 74011250637 HC RX REV CODE- 250/637: Performed by: INTERNAL MEDICINE

## 2021-03-03 PROCEDURE — 82962 GLUCOSE BLOOD TEST: CPT

## 2021-03-03 PROCEDURE — 65660000001 HC RM ICU INTERMED STEPDOWN

## 2021-03-03 PROCEDURE — 97535 SELF CARE MNGMENT TRAINING: CPT

## 2021-03-03 PROCEDURE — 36415 COLL VENOUS BLD VENIPUNCTURE: CPT

## 2021-03-03 PROCEDURE — 97530 THERAPEUTIC ACTIVITIES: CPT

## 2021-03-03 PROCEDURE — 74011636637 HC RX REV CODE- 636/637: Performed by: NURSE PRACTITIONER

## 2021-03-03 PROCEDURE — 74011250637 HC RX REV CODE- 250/637: Performed by: HOSPITALIST

## 2021-03-03 PROCEDURE — 85027 COMPLETE CBC AUTOMATED: CPT

## 2021-03-03 PROCEDURE — 97163 PT EVAL HIGH COMPLEX 45 MIN: CPT

## 2021-03-03 PROCEDURE — 77010033711 HC HIGH FLOW OXYGEN

## 2021-03-03 RX ORDER — FUROSEMIDE 10 MG/ML
40 INJECTION INTRAMUSCULAR; INTRAVENOUS ONCE
Status: COMPLETED | OUTPATIENT
Start: 2021-03-03 | End: 2021-03-03

## 2021-03-03 RX ORDER — METOPROLOL SUCCINATE 50 MG/1
50 TABLET, EXTENDED RELEASE ORAL
Status: DISCONTINUED | OUTPATIENT
Start: 2021-03-03 | End: 2021-03-15 | Stop reason: HOSPADM

## 2021-03-03 RX ADMIN — ENOXAPARIN SODIUM 40 MG: 100 INJECTION SUBCUTANEOUS at 09:22

## 2021-03-03 RX ADMIN — OXYCODONE HYDROCHLORIDE AND ACETAMINOPHEN 1000 MG: 500 TABLET ORAL at 09:24

## 2021-03-03 RX ADMIN — ENOXAPARIN SODIUM 40 MG: 100 INJECTION SUBCUTANEOUS at 17:35

## 2021-03-03 RX ADMIN — Medication 1 CAPSULE: at 09:24

## 2021-03-03 RX ADMIN — METOPROLOL SUCCINATE 50 MG: 50 TABLET, EXTENDED RELEASE ORAL at 17:33

## 2021-03-03 RX ADMIN — INSULIN LISPRO 3 UNITS: 100 INJECTION, SOLUTION INTRAVENOUS; SUBCUTANEOUS at 12:44

## 2021-03-03 RX ADMIN — Medication 10 ML: at 21:20

## 2021-03-03 RX ADMIN — Medication 10 ML: at 14:32

## 2021-03-03 RX ADMIN — LISINOPRIL 10 MG: 10 TABLET ORAL at 09:24

## 2021-03-03 RX ADMIN — DOCUSATE SODIUM 50 MG AND SENNOSIDES 8.6 MG 1 TABLET: 8.6; 5 TABLET, FILM COATED ORAL at 09:24

## 2021-03-03 RX ADMIN — INSULIN LISPRO 3 UNITS: 100 INJECTION, SOLUTION INTRAVENOUS; SUBCUTANEOUS at 17:34

## 2021-03-03 RX ADMIN — TRAZODONE HYDROCHLORIDE 50 MG: 50 TABLET ORAL at 21:17

## 2021-03-03 RX ADMIN — Medication 10 ML: at 09:25

## 2021-03-03 RX ADMIN — Medication 6 MG: at 21:18

## 2021-03-03 RX ADMIN — Medication 2000 UNITS: at 09:24

## 2021-03-03 RX ADMIN — PANTOPRAZOLE SODIUM 40 MG: 40 TABLET, DELAYED RELEASE ORAL at 07:30

## 2021-03-03 RX ADMIN — FUROSEMIDE 40 MG: 10 INJECTION, SOLUTION INTRAVENOUS at 12:39

## 2021-03-03 RX ADMIN — NIFEDIPINE 30 MG: 30 TABLET, FILM COATED, EXTENDED RELEASE ORAL at 09:23

## 2021-03-03 RX ADMIN — INSULIN GLARGINE 5 UNITS: 100 INJECTION, SOLUTION SUBCUTANEOUS at 21:18

## 2021-03-03 RX ADMIN — TRAZODONE HYDROCHLORIDE 50 MG: 50 TABLET ORAL at 09:24

## 2021-03-03 RX ADMIN — TAMSULOSIN HYDROCHLORIDE 0.4 MG: 0.4 CAPSULE ORAL at 21:17

## 2021-03-03 RX ADMIN — INSULIN GLARGINE 5 UNITS: 100 INJECTION, SOLUTION SUBCUTANEOUS at 09:23

## 2021-03-03 NOTE — PROGRESS NOTES
0730: Bedside, Verbal and Written shift change report given to SAMINA Paige (oncoming nurse) by Kristine Sanches, RN (offgoing nurse). Report included the following information SBAR, Kardex, [facility], Recent Results and Cardiac Rhythm NSR.       1930: Bedside, Verbal and Written shift change report given to Jc Jacobson, RN (oncoming nurse) by Ed RN (offgoing nurse).  Report included the following information SBAR, Kardex, Recent Results and Cardiac Rhythm MSR.

## 2021-03-03 NOTE — PROGRESS NOTES
Problem: Self Care Deficits Care Plan (Adult)  Goal: *Acute Goals and Plan of Care (Insert Text)  Description:   FUNCTIONAL STATUS PRIOR TO ADMISSION: Patient was modified independence walking with cane; independent and active otherwise. HOME SUPPORT: The patient lived alone. Family available to provide assistance PRN. Occupational Therapy Goals  Initiated 3/3/2021  1. Patient will perform lower body dressing while sitting with minimal assistance/contact guard assist within 7 day(s). 2.  Patient will perform bathing upper body with supervision/set-up within 7 day(s). 3.  Patient will perform 1 grooming task standing with minimal assistance/contact guard assist within 7 day(s). 4.  Patient will perform BSC toilet transfers with minimal assistance/contact guard assist within 7 day(s). 5.  Patient will perform all aspects of toileting with minimal assistance/contact guard assist within 7 day(s). 6.  Patient will participate in upper extremity therapeutic exercise/activities v. Gravity with PLB with minimal assistance/contact guard assist within 7 day(s). 7.  Patient will utilize energy conservation techniques during functional activities with verbal cues within 7 day(s). Outcome: Not Met   OCCUPATIONAL THERAPY EVALUATION  Patient: Yordan Concepcion (72 y.o. male)  Date: 3/3/2021  Primary Diagnosis: Respiratory failure with hypoxia ([facility] [address] [J96.91]        Precautions: contact, droplet plus, airborne       ASSESSMENT  Based on the objective data described below, the patient presents with decreased cardiopulmonary tolerance, sitting tolerance and therefore standing, functional reach to lower body, and emotional stability (\"there is no point\"). At this stage of recovery, patient requires external cues to participate in self care, achievable goals that are slightly challenging so he realizes he can do it but also so progressing self from supine and dependency.       Current Level of Function Impacting Discharge (ADLs/self-care): moderate assistance upper body ADLs; max assistance lower body ADLs; bed mobility modified independence    Functional Outcome Measure: The patient scored Total: 35/100 on the Barthel Index outcome measure which is indicative of 65% impaired ability to care for basic self needs/dependency on others; inferred 100% dependency on others for instrumental ADLs. Other factors to consider for discharge: has family available  to assist PRN     Patient will benefit from skilled therapy intervention to address the above noted impairments. PLAN :  Recommendations and Planned Interventions: self care training, functional mobility training, therapeutic exercise, balance training, therapeutic activities, cognitive retraining, endurance activities, patient education, home safety training and family training/education    Frequency/Duration: Patient will be followed by occupational therapy 5 times a week to address goals. Recommendation for discharge: (in order for the patient to meet his/her long term goals)  To be determined: pending medical progression off of high flow. More than likely home. This discharge recommendation:  Has not yet been discussed the attending provider and/or case management    IF patient discharges home will need the following DME: none       SUBJECTIVE:   [facility]  (in participating, eating, getting OOB)    OBJECTIVE DATA SUMMARY:   HISTORY:   Past Medical History:   Diagnosis Date    CAD (coronary artery disease)     MI (myocardial infarction) ([facility] [address]    No past surgical history on file.     Expanded or extensive additional review of patient history:     Home Situation  Home Environment: Private residence  One/Two Story Residence: One story  Living Alone: Yes  Support Systems: Family member(s)  Patient Expects to be Discharged to[de-identified] Private residence  Current DME Used/Available at Home: None  Tub or Shower Type: Tub/Shower combination    Hand dominance: Right    EXAMINATION OF PERFORMANCE DEFICITS:  Cognitive/Behavioral Status:  Neurologic State: Alert  Orientation Level: Oriented X4  Cognition: Impaired decision making  Perception: Appears intact  Perseveration: No perseveration noted  Safety/Judgement: Awareness of environment;Good awareness of safety precautions    Skin: intact    Edema: intact    Hearing:       Vision/Perceptual:                           Acuity: Impaired near vision    Corrective Lenses: Reading glasses    Range of Motion:    AROM: Within functional limits                         Strength:    Strength: Within functional limits                Coordination:  Coordination: Within functional limits  Fine Motor Skills-Upper: Left Intact; Right Intact    Gross Motor Skills-Upper: Left Intact; Right Intact    Tone & Sensation:    Tone: Normal  Sensation: Intact                      Balance:  Sitting: Intact; Without support    Functional Mobility and Transfers for ADLs:  Bed Mobility:  Supine to Sit: Independent  Scooting: Independent    Transfers:       ADL Assessment:  Feeding: Moderate assistance(encouragement to eat, \"It's no use\". ) per nurse report patient did eat some dinner    Oral Facial Hygiene/Grooming: Moderate assistance sitting EOB; setup on beside tray to brush teeth and wash face    Bathing: Total assistance    Upper Body Dressing: Supervision infer setup    Lower Body Dressing: Maximum assistance sitting EOB, functional reach to knees and back side WDL    Toileting: Maximum assistance functional reach intact      patient received sitting up in bed. Informed of OT and benefits. Patient stating there is no use \"I'm never getting out of here\". Lengthy conversation. Instruction of lungs and position in bed, informed him of my role for the past year and that yes it will take even longer to discharge from hospital if he does not eat, EOB, OOB, complete ADLs, and spirometer.  Patient setup warm wash cloth, required 3 min rest break to recover O2 2* 86%, instruction on benefits PLB technique. Noting therapist setup for brushing teeth EOB, instead of in bed, \"I can sit up no problem\" (with irritability). Patient then sitting to brush teeth with moderate assistance requiring breathing rest breaks between tasks 4 reps up to 5 mins PLB moderate cues. Sitting EOB 18 mins total. Instruction on pacing, objective data to decrease recovery time and increase ability to participate/therapy's role to assist in monitoring, education and progression. Patient agreeable to therapy moving forward. ADL Intervention and task modifications:        Handouts: COVID, resources, swallowing, cognition, energy conservation                             Cognitive Retraining  Safety/Judgement: Awareness of environment;Good awareness of safety precautions    Therapeutic Exercise: Instruction on benefits. Handout provided. Functional Measure:  Barthel Index:    Bathin  Bladder: 0  Bowels: 10  Groomin  Dressin  Feedin  Mobility: 0  Stairs: 0  Toilet Use: 5  Transfer (Bed to Chair and Back): 10  Total: 35/100        The Barthel ADL Index: Guidelines  1. The index should be used as a record of what a patient does, not as a record of what a patient could do. 2. The main aim is to establish degree of independence from any help, physical or verbal, however minor and for whatever reason. 3. The need for supervision renders the patient not independent. 4. A patient's performance should be established using the best available evidence. Asking the patient, friends/relatives and nurses are the usual sources, but direct observation and common sense are also important. However direct testing is not needed. 5. Usually the patient's performance over the preceding 24-48 hours is important, but occasionally longer periods will be relevant. 6. Middle categories imply that the patient supplies over 50 per cent of the effort.   7. Use of aids to be independent is allowed. Linda Corado., Barthel, D.W. ([id number]). Functional evaluation: the Barthel Index. [address] (14)2. JOSEPH Elizalde, Christian Adrian.Issac., [place], [address] (1999). Measuring the change indisability after inpatient rehabilitation; comparison of the responsiveness of the Barthel Index and Functional [place] Measure. Journal of Neurology, Neurosurgery, and Psychiatry, 66(4), 470-694. Thomas LLUVIA Tucker, TRACEY Orosco, & Gonzalo Tanner M.A. (2004.) Assessment of post-stroke quality of life in cost-effectiveness studies: The usefulness of the Barthel Index and the EuroQoL-5D. Quality of Life Research, 13, 072-88           Activity Tolerance:   desaturates with exertion and requires oxygen, requires frequent rest breaks and observed SOB with activity   Hi marie 30L and FiO2 70%; NP increased to 40L during session  Vitals:    03/03/21 0600 03/03/21 0700 03/03/21 0800 03/03/21 0900   BP: (!) 164/71 (!) 148/72 (!) 159/89 139/68   BP 1 Location:   Left upper arm    BP Patient Position:   At rest    Pulse: 92 94 (!) 105 98   Resp: 26 28 23 25   Temp:   98.4 °F (36.9 °C)    SpO2: (!) 88% 90% 92% 94%   Weight:       Height:             After treatment patient left in no apparent distress:    Supine in bed, Call bell within reach and Side rails x 3    COMMUNICATION/EDUCATION:   The patients plan of care was discussed with: Physical therapist and Registered nurse and NP. Home safety education was provided and the patient/caregiver indicated understanding., Patient/family have participated as able in goal setting and plan of care. and Patient/family agree to work toward stated goals and plan of care. This patients plan of care is appropriate for delegation to [facility].     Thank you for this referral.  Krystyna Mitchell  Time Calculation: 36 mins

## 2021-03-03 NOTE — H&P
[facility] Adult  Hospitalist Group     ICU Transfer/Accept Summary     [place] [address] [place]  DATE OF TRANSFER: 3/3/2021       PATIENT ID: Dasia De Souza  MRN: [id number]   YOB: 1955    PRIMARY CARE PROVIDER: None   DATE OF ADMISSION: 2/22/2021  6:06 AM    ATTENDING PHYSICIAN: Melanie Zamorano MD  CONSULTATIONS:   IP CONSULT TO PALLIATIVE CARE - PROVIDER    PROCEDURES/SURGERIES:   * No surgery found *    REASON FOR ADMISSION: <principal problem not specified>     Brief HPI and Hospital Course:     Mr. Cecilia Cristina is a 69-year-old black male who was admitted to [facility] ICU on 2/22 for severe Covid 19 pneumonia complicated by acute hypoxic respiratory failure. Patient initially presented to [facility] on 2/17 where he was admitted and was being treated with oxygen 4 L/min via nasal cannula. He was also treated with Decadron and azithromycin and ceftriaxone. On 2/21 he started becoming more hypoxic and his oxygen demand increased to 15 L/min via nasal cannula. Later on that day, he was tried on CPAP but patient was unable to tolerate it. He was transferred to [facility] on 2/22 as [facility] does not have ICU. He has been being treated with high flow oxygen with prone as tolerated. He had required Precedex drip for hyperactive delirium that has resolved and he is off the Precedex. He developed VARINDER that has resolved. He was weaned down to 50 L flow 60% and was said to be stable enough to be transferred out of the ICU as such the hospitalist service is consulted to assume care of patient. Patient is lying in bed, on oxygen via high flow nasal cannula, SPO2 upper 80s on the monitor during my visit but with change of the pulse ox to the right earlobe his SPO2 was> 96%. He denied subjective sense of respiratory distress or difficulty breathing, chest pain. He has mild cough.   He denied nausea, vomiting, abdominal pain or diarrhea. Assessment and plan   Acute hypoxic respiratory failure secondary to severe COVID-19 pneumonia  · Currently on oxygen via high flow nasal cannula 50 L/min at 60% with SPO2> 96 at rest.  · He has completed Covid specific therapy, antibiotics. · Continue vitamin C, vitamin D.  · Continue diuresis  · CXR tomorrow    Acute metabolic encephalopathy, acute hyperactive delirium  · Weaned off Precedex drip  · Continue with trazodone    Type 2 diabetes with hyperglycemia  · Monitor blood sugar before meals and at bedtime, continue Lantus, Humalog sliding scale. Hypertension, history of CAD status post CABG  · Continue lisinopril, nifedipine ER. He has intermittent tachycardia and blood pressure is elevated, resume metoprolol succinate    BPH: Continue tamsulosin  Sleep aid: Melatonin 6 mg nightly    DVT prophylaxis: Enoxaparin per Covid protocol. CODE STATUS: Full  Anticipated discharge>48 HOURS, may need REHAB. Patient lives alone. HOSPITAL PROBLEM LIST:  Patient Active Problem List   Diagnosis Code    Chest pain R07.9    CAD (coronary artery disease) I25.10    HTN (hypertension) I10    Pneumonia due to COVID-19 virus U07.1, J12.82    Respiratory failure with hypoxia (HCC) J96.91           PHYSICAL EXAMINATION:  Visit Vitals  BP (!) 179/83   Pulse (!) 116   Temp 98.4 °F (36.9 °C)   Resp 29   Ht 5' 10\" (1.778 m)   Wt 113.4 kg (250 lb)   SpO2 90%   BMI 35.87 kg/m²       General:          lying in bed, on oxygen via high flow nasal cannula, not in distress. HEENT:            High flow nasal cannula catheter in both nostrils, atraumatic, MMM            Neck:               Supple, symmetrical,  thyroid: non tender  Lungs:             Clear to auscultation bilaterally. No Wheezing or Rhonchi. No rales. Heart:              Regular  rhythm,  No  murmur   No edema  Abdomen:       Soft, non-tender. Not distended. Bowel sounds normal  Extremities:     No cyanosis.   No clubbing,  +2 distal pulses  Skin:                Not pale. Not Jaundiced  No rashes   Psych:             Not anxious or agitated. Neurologic:      Alert, moves all extremities, oriented X 3. Labs:     Recent Labs     03/03/21 0517 03/02/21 0422   WBC 9.1 10.2   HGB 9.7* 9.7*   HCT 32.1* 31.9*    243     Recent Labs     03/03/21 0517 03/02/21 0422 03/01/21 0427    140 139   K 4.2 4.0 3.9    110* 108   CO2 22 24 24   BUN 14 15 19   CREA 0.72 0.80 0.81   * 105* 95   CA 8.6 8.5 8.7   MG 2.3 2.0 2.2   PHOS 3.3 2.9 2.8     No results for input(s): ALT, AP, TBIL, TBILI, TP, ALB, GLOB, GGT, AML, LPSE in the last 72 hours. No lab exists for component: SGOT, GPT, AMYP, HLPSE  No results for input(s): INR, PTP, APTT, INREXT in the last 72 hours. No results for input(s): FE, TIBC, PSAT, FERR in the last 72 hours. No results found for: FOL, RBCF   No results for input(s): PH, PCO2, PO2 in the last 72 hours. No results for input(s): CPK, CKNDX, TROIQ in the last 72 hours. No lab exists for component: CPKMB  Lab Results   Component Value Date/Time    Cholesterol, total 205 (H) 12/13/2020 08:50 AM    HDL Cholesterol 93 12/13/2020 08:50 AM    LDL, calculated 97 12/13/2020 08:50 AM    Triglyceride 75 12/13/2020 08:50 AM    CHOL/HDL Ratio 2.2 12/13/2020 08:50 AM     Lab Results   Component Value Date/Time    Glucose (POC) 173 (H) 03/03/2021 12:41 PM    Glucose (POC) 160 (H) 03/02/2021 09:00 PM    Glucose (POC) 149 (H) 03/02/2021 05:19 PM    Glucose (POC) 127 (H) 03/02/2021 11:51 AM    Glucose (POC) 123 (H) 03/02/2021 09:00 AM     No results found for: COLOR, APPRN, SPGRU, REFSG, BALDEMAR, PROTU, GLUCU, KETU, BILU, UROU, ENRIQUETA, LEUKU, GLUKE, EPSU, BACTU, WBCU, RBCU, CASTS, UCRY        Signed:    Melchor Turner MD  Date of Service:  3/3/2021  3:58 PM

## 2021-03-03 NOTE — PROGRESS NOTES
Problem: Mobility Impaired (Adult and Pediatric)  Goal: *Acute Goals and Plan of Care (Insert Text)  Description: FUNCTIONAL STATUS PRIOR TO ADMISSION: Patient was independent and active. Occasional use of SPC. HOME SUPPORT PRIOR TO ADMISSION: Pt lives alone    Physical Therapy Goals  Initiated 3/3/2021  1. Patient will transfer from bed to chair and chair to bed with modified independence using the least restrictive device within 7 day(s). 2.  Patient will perform sit to stand with modified independence within 7 day(s). 3.  Patient will ambulate with modified independence for 10 feet with the least restrictive device within 7 day(s). 4.  Patient will tolerate static standing balance unsupported with O2 sats >90% within 7 day(s). Outcome: Not Met   PHYSICAL THERAPY EVALUATION  Patient: Radha Cardona (27 y.o. male)  Date: 3/3/2021  Primary Diagnosis: Respiratory failure with hypoxia ([facility] [address] [J96.91]        Precautions: Fall(droplet plus)    ASSESSMENT  Based on the objective data described below, the patient presents with impaired balance, decreased cardiopulmonary endurance, decreased activity tolerance and overall decline in functional mobiity s/p admission with respiratory failure and positive for COVID-19. At baseline, pt lives alone and is independent with mobility and ADLs - occasional use of SPC as needed. Pt cleared by RN and received in bed on HFNC (50 L/min, FiO2 70%). Pt pleasant and agreeable to work with therapy. He transferred supine<>sit with SBA, sit<>stand with SBA, and took a few steps along EOB with CGA. With minimal exertion, HR elevated to 120s and O2 sats dropped to mid 80s. Educated on PLB and pt able to perform return demonstration - O2 sats improved to 90% with rest.  Ended session in chair position in bed with all needs met. Recommending HH PT vs none pending progress. Current Level of Function Impacting Discharge (mobility/balance):  CGA for taking a few side steps    Functional Outcome Measure: The patient scored 35/100 on the Barthel outcome measure which is indicative of 65% impairment and dependence on others for basic mobility and self care tasks. Other factors to consider for discharge: independent baseline, on HFNC      Patient will benefit from skilled therapy intervention to address the above noted impairments. PLAN :  Recommendations and Planned Interventions: bed mobility training, transfer training, gait training, therapeutic exercises, neuromuscular re-education, patient and family training/education, and therapeutic activities      Frequency/Duration: Patient will be followed by physical therapy:  3 times a week to address goals. Recommendation for discharge: (in order for the patient to meet his/her long term goals)  HH PT vs none pending progress     This discharge recommendation:  Has not yet been discussed the attending provider and/or case management    IF patient discharges home will need the following DME: patient owns DME required for discharge         SUBJECTIVE:   Patient stated Just a little bit.  when asked if he was dizzy     OBJECTIVE DATA SUMMARY:   HISTORY:    Past Medical History:   Diagnosis Date    CAD (coronary artery disease)     MI (myocardial infarction) ([facility] [address]    No past surgical history on file.     Home Situation  Home Environment: Trailer/mobile home  # Steps to Enter: 4  Rails to Enter: Yes  Hand Rails : Bilateral  One/Two Story Residence: One story  Living Alone: Yes  Support Systems: Family member(s)  Patient Expects to be Discharged to[de-identified] Private residence  Current DME Used/Available at Home: Cane, straight  Tub or Shower Type: Tub/Shower combination    EXAMINATION/PRESENTATION/DECISION MAKING:   Critical Behavior:  Neurologic State: Alert  Orientation Level: Oriented X4  Cognition: Impaired decision making  Safety/Judgement: Awareness of environment, Good awareness of safety precautions    Range Of Motion:  AROM: Within functional limits                       Strength:    Strength: Within functional limits                    Tone & Sensation:   Tone: Normal              Sensation: Intact               Coordination:  Coordination: Within functional limits  Vision:   Acuity: Impaired near vision  Corrective Lenses: Reading glasses  Functional Mobility:  Bed Mobility:     Supine to Sit: Stand-by assistance  Sit to Supine: Stand-by assistance  Scooting: Independent  Transfers:  Sit to Stand: Stand-by assistance  Stand to Sit: Stand-by assistance                       Balance:   Sitting: Intact  Standing: Impaired; Without support  Standing - Static: Good  Standing - Dynamic : Fair  Ambulation/Gait Training:  Distance (ft): 4 Feet (ft)     Ambulation - Level of Assistance: Contact guard assistance                 Base of Support: Narrowed     Speed/Elsie: Slow    Functional Measure:  Barthel Index:    Bathin  Bladder: 0  Bowels: 10  Groomin  Dressin  Feedin  Mobility: 0  Stairs: 0  Toilet Use: 5  Transfer (Bed to Chair and Back): 10  Total: 35/100       The Barthel ADL Index: Guidelines  1. The index should be used as a record of what a patient does, not as a record of what a patient could do. 2. The main aim is to establish degree of independence from any help, physical or verbal, however minor and for whatever reason. 3. The need for supervision renders the patient not independent. 4. A patient's performance should be established using the best available evidence. Asking the patient, friends/relatives and nurses are the usual sources, but direct observation and common sense are also important. However direct testing is not needed. 5. Usually the patient's performance over the preceding 24-48 hours is important, but occasionally longer periods will be relevant. 6. Middle categories imply that the patient supplies over 50 per cent of the effort.   7. Use of aids to be independent is aniya. Bertrand Epp., Barthel, D.W. (1629). Functional evaluation: the Barthel Index. [address] [facility] (14)2. Elvina Gaucher der [place], J.J.M.F, Cem Jacobsen., John Morejon., [place], [address] (). Measuring the change indisability after inpatient rehabilitation; comparison of the responsiveness of the Barthel Index and Functional [place] Measure. Journal of Neurology, Neurosurgery, and Psychiatry, 66(4), 454-186. LLUVIA Velazquez, TRACEY Orosco, & Jah Naidu M.A. (2004.) Assessment of post-stroke quality of life in cost-effectiveness studies: The usefulness of the Barthel Index and the EuroQoL-5D. Quality of Life Research, 15, 886-61           Physical Therapy Evaluation Charge Determination   History Examination Presentation Decision-Making   HIGH Complexity :3+ comorbidities / personal factors will impact the outcome/ POC  HIGH Complexity : 4+ Standardized tests and measures addressing body structure, function, activity limitation and / or participation in recreation  HIGH Complexity : Unstable and unpredictable characteristics  Other outcome measures Barthel  HIGH       Based on the above components, the patient evaluation is determined to be of the following complexity level: HIGH     Activity Tolerance:   Fair, O2 sats in mid 80s on HFNC    After treatment patient left in no apparent distress:   Supine in bed and Call bell within reach    COMMUNICATION/EDUCATION:   The patients plan of care was discussed with: Occupational therapist and Registered nurse. Fall prevention education was provided and the patient/caregiver indicated understanding., Patient/family have participated as able in goal setting and plan of care. , and Patient/family agree to work toward stated goals and plan of care.     Thank you for this referral.  Davion Floyd, PT, DPT   Time Calculation: 18 mins

## 2021-03-03 NOTE — PROGRESS NOTES
03/02/21 1917   Oxygen Therapy   O2 Sat (%) (!) 86 %   Pulse via Oximetry 85 beats per minute   O2 Device Heated; Hi flow nasal cannula   O2 Flow Rate (L/min) 30 l/min   O2 Temperature 87.6 °F (30.9 °C)   FIO2 (%) 80 %     Patient currently talking on phone.  Instructed to take deep breathe through nasal cannula to bring saturations up

## 2021-03-03 NOTE — PROGRESS NOTES
[facility]    ICU TEAM Progress Note    Name: Sujit Lux   : 1955   MRN: [id number]   Date: 3/3/2021      Subjective:   Progress Note: 3/3/2021      Mr Anahy Ruvalcaba is a 76 yo male w a PMH of HTN, DM2, CAD s/p CABG in 2018 who presented to the ED at [facility] on  w COVID-19 pneumonia. He required 4L/min NC on admission to achieve sats >90%. He was treated with decadron and azithromycin and ceftriaxone. On , his oxygen needs had increased to 15L/min NC. Later on , he was tried on CPAP of 10. The patient reported an inability to tolerate this and then refused it. He was then placed on 15L HiFlow NC. He was then transferred to [facility] on  as [facility] did not have an ICU. He was placed on Hi Flow at 70L/min 100% FIO2. He was proned as tolerated in the ICU. He required precedex for hyperactive delirium. His AVRINDER resolved. He underwent diuresis. Overnight/24h events:  Hi Flow requirements decreasing; worked with PT/OT this am  Weaned to [employer] Flow 60%    Active Problem List:     Problem List  Never Reviewed          Codes Class    Respiratory failure with hypoxia ([facility] [address] ICD-10-CM: J96.91  ICD-9-CM: 518.81         Pneumonia due to COVID-19 virus ICD-10-CM: U07.1, J12.82  ICD-9-CM: 480.8         Chest pain ICD-10-CM: R07.9  ICD-9-CM: 786.50         CAD (coronary artery disease) ICD-10-CM: I25.10  ICD-9-CM: 414.00         HTN (hypertension) ICD-10-CM: I10  ICD-9-CM: 401.9               Past Medical History:      has a past medical history of CAD (coronary artery disease) and MI (myocardial infarction) ([facility] [address] Past Surgical History:      has no past surgical history on file. Home Medications:     Prior to Admission medications    Medication Sig Start Date End Date Taking? Authorizing Provider   lisinopriL (PRINIVIL, ZESTRIL) 10 mg tablet Take 10 mg by mouth daily. Other, MD Imani   magnesium oxide (MAG-OX) 400 mg tablet Take 1 Tab by mouth daily.  20   Ivan Molina MD metoprolol succinate (TOPROL-XL) 50 mg XL tablet Take 1 Tab by mouth two (2) times daily (after meals). 20   Javon Childress MD   NIFEdipine ER (PROCARDIA XL) 30 mg ER tablet Take 1 Tab by mouth daily. 12/15/20   Jerson Park MD   potassium chloride (K-DUR, KLOR-CON) 20 mEq tablet Take 1 Tab by mouth daily. 20   Javon Childress MD   isosorbide mononitrate ER (IMDUR) 60 mg CR tablet Take 60 mg by mouth daily. Imani Morrison MD   atorvastatin (LIPITOR) 20 mg tablet Take 20 mg by mouth daily. Imani Morrison MD   furosemide (Lasix) 40 mg tablet Take 40 mg by mouth daily. Imani Morrison MD   pantoprazole (Protonix) 40 mg tablet Take 40 mg by mouth daily. Imani Morrison MD   traZODone (DESYREL) 50 mg tablet Take 50 mg by mouth two (2) times a day. Imani Morrison MD   metFORMIN (GLUCOPHAGE) 1,000 mg tablet Take 1,000 mg by mouth two (2) times daily (with meals). Imani Morrison MD   tamsulosin (Flomax) 0.4 mg capsule Take 0.4 mg by mouth nightly.     Imani Morrison MD       Allergies/Social/Family History:     No Known Allergies   Social History     Tobacco Use    Smoking status: Former Smoker    Smokeless tobacco: Former User   Substance Use Topics    Alcohol use: Never     Frequency: Never      Family History   Problem Relation Age of Onset    Hypertension Mother     Hypertension Father        Review of Systems:     Per HPI    Objective:   Vital Signs:  Visit Vitals  BP (!) 148/72   Pulse 94   Temp 98.6 °F (37 °C)   Resp 28   Ht 5' 10\" (1.778 m)   Wt 113.4 kg (250 lb)   SpO2 90%   BMI 35.87 kg/m²    O2 Flow Rate (L/min): 30 l/min O2 Device: Heated, Hi flow nasal cannula Temp (24hrs), Av.6 °F (37 °C), Min:98.4 °F (36.9 °C), Max:98.9 °F (37.2 °C)           Intake/Output:     Intake/Output Summary (Last 24 hours) at 3/3/2021 0931  Last data filed at 3/3/2021 0600  Gross per 24 hour   Intake 510.4 ml   Output 950 ml   Net -439.6 ml       Physical Exam:    General:  alert, no distress, appears stated age  Eye:PERRLA  Neurologic: CAM ICU positive; non focal  Neck:Supple, no JVD  Lungs:  clear to auscultation bilaterally; diminished bases  Heart:  regular rate and rhythm, S1, S2 normal, no murmur, click, rub or gallop  Abdomen:  soft, non-tender. Bowel sounds normal. No masses,  no organomegaly  Skin:  no rash or abnormalities    LABS AND  DATA: Personally reviewed  Recent Labs     03/03/21  0517 03/02/21  0422   WBC 9.1 10.2   HGB 9.7* 9.7*   HCT 32.1* 31.9*    243     Recent Labs     03/03/21  0517 03/02/21  0422    140   K 4.2 4.0    110*   CO2 22 24   BUN 14 15   CREA 0.72 0.80   * 105*   CA 8.6 8.5   MG 2.3 2.0   PHOS 3.3 2.9     No results for input(s): AP, TBIL, TP, ALB, GLOB, AML, LPSE in the last 72 hours.    No lab exists for component: SGOT, GPT, AMYP  No results for input(s): INR, PTP, APTT, INREXT, INREXT in the last 72 hours.   No results for input(s): PHI, PCO2I, PO2I, FIO2I in the last 72 hours.  No results for input(s): CPK, CKMB, TROIQ, BNPP in the last 72 hours.    Hi Flow 50L 60%    MEDS: Reviewed    Chest X-Ray: None last 24h      Assessment:     Acute Hypoxic Respiratory Failure Secondary to COVID-19 Pneumonia: FIO2 decreased to 80% today. Goal sats >92%  - Continue steroids  - PT/OT  - No acute indication for abx  - DVT ppx with lovenox   - Continue vitamin C  - Diuresis goal -1L - 40mg lasix this am    HTN: Continue nifedipine, lisinopril    Delirium c/b chronic anxiety:  Weaned off precedex drip this am  - Restarted home trazodone BID    DM2 c/b Hyperglycemia: Continue lantus; SSI    BPH: Continue tamsulosin     Deconditioning: PT/OT    Multidisciplinary Rounds Completed:  Yes    ABCDEF Bundle/Checklist  Pain Medications: None  Target RASS: 0 - Alert & Calm - Spontaneously pays attention to caregiver  Sedation Medications: None  CAM-ICU:  Positive  Mobility: Poor  PT/OT: PT consulted and on board and OT consulted and on board   Restraints: None  needed at this time  Discussed Plan of Care (goals of care): Yes  Addressed Code Status: Full Code    CARDIOVASCULAR  Cardiac Gtts: None  SBP Goal of: > 90 mmHg  MAP Goal of: > 65 mmHg  Transfusion Trigger (Hgb): <7 g/dL    RESPIRATORY  Vent Goals:   Chlorhexidine   DVT Prophylaxis (if no, list reason): Lovenox   SPO2 Goal: > 92%  Pulmonary toilet: Incentive Spirometry     GI/  [place] Catheter Present: No  GI Prophylaxis: Protonix (pantoprazole)   Nutrition: Yes PO diet  IVFs: N  Bowel Regimen: Y - Senna/colace  Insulin: N    ANTIBIOTICS  Antibiotics:  N    T/L/D  N    SPECIAL EQUIPMENT  N    DISPOSITION  ICU - Step down    CRITICAL CARE CONSULTANT NOTE  I had a face to face encounter with the patient, reviewed and interpreted patient data including clinical events, labs, images, vital signs, I/O's, and examined patient.   I have discussed the case and the plan and management of the patient's care with the consulting services, the bedside nurses and the respiratory therapist.      Billing level 1316 Audrey Santiago NP  [facility]  3/3/2021

## 2021-03-04 ENCOUNTER — APPOINTMENT (OUTPATIENT)
Dept: GENERAL RADIOLOGY | Age: 66
DRG: 177 | End: 2021-03-04
Attending: HOSPITALIST
Payer: MEDICARE

## 2021-03-04 LAB
BASOPHILS # BLD: 0.1 K/UL (ref 0–0.1)
BASOPHILS NFR BLD: 1 % (ref 0–1)
DIFFERENTIAL METHOD BLD: ABNORMAL
EOSINOPHIL # BLD: 0.2 K/UL (ref 0–0.4)
EOSINOPHIL NFR BLD: 2 % (ref 0–7)
ERYTHROCYTE [DISTWIDTH] IN BLOOD BY AUTOMATED COUNT: 14.3 % (ref 11.5–14.5)
GLUCOSE BLD STRIP.AUTO-MCNC: 160 MG/DL (ref 65–100)
GLUCOSE BLD STRIP.AUTO-MCNC: 161 MG/DL (ref 65–100)
GLUCOSE BLD STRIP.AUTO-MCNC: 191 MG/DL (ref 65–100)
GLUCOSE BLD STRIP.AUTO-MCNC: 245 MG/DL (ref 65–100)
HCT VFR BLD AUTO: 32.1 % (ref 36.6–50.3)
HGB BLD-MCNC: 9.8 G/DL (ref 12.1–17)
IMM GRANULOCYTES # BLD AUTO: 0 K/UL (ref 0–0.04)
IMM GRANULOCYTES NFR BLD AUTO: 1 % (ref 0–0.5)
LYMPHOCYTES # BLD: 1.2 K/UL (ref 0.8–3.5)
LYMPHOCYTES NFR BLD: 15 % (ref 12–49)
MAGNESIUM SERPL-MCNC: 2.1 MG/DL (ref 1.6–2.4)
MCH RBC QN AUTO: 26.3 PG (ref 26–34)
MCHC RBC AUTO-ENTMCNC: 30.5 G/DL (ref 30–36.5)
MCV RBC AUTO: 86.1 FL (ref 80–99)
MONOCYTES # BLD: 0.9 K/UL (ref 0–1)
MONOCYTES NFR BLD: 12 % (ref 5–13)
NEUTS SEG # BLD: 5.4 K/UL (ref 1.8–8)
NEUTS SEG NFR BLD: 69 % (ref 32–75)
NRBC # BLD: 0 K/UL (ref 0–0.01)
NRBC BLD-RTO: 0 PER 100 WBC
PHOSPHATE SERPL-MCNC: 2.9 MG/DL (ref 2.6–4.7)
PLATELET # BLD AUTO: 265 K/UL (ref 150–400)
PMV BLD AUTO: 10.3 FL (ref 8.9–12.9)
RBC # BLD AUTO: 3.73 M/UL (ref 4.1–5.7)
SERVICE CMNT-IMP: ABNORMAL
WBC # BLD AUTO: 7.7 K/UL (ref 4.1–11.1)

## 2021-03-04 PROCEDURE — 74011636637 HC RX REV CODE- 636/637: Performed by: NURSE PRACTITIONER

## 2021-03-04 PROCEDURE — 65660000001 HC RM ICU INTERMED STEPDOWN

## 2021-03-04 PROCEDURE — 74011250637 HC RX REV CODE- 250/637: Performed by: NURSE PRACTITIONER

## 2021-03-04 PROCEDURE — 74011250636 HC RX REV CODE- 250/636: Performed by: NURSE PRACTITIONER

## 2021-03-04 PROCEDURE — 36415 COLL VENOUS BLD VENIPUNCTURE: CPT

## 2021-03-04 PROCEDURE — 71045 X-RAY EXAM CHEST 1 VIEW: CPT

## 2021-03-04 PROCEDURE — 94760 N-INVAS EAR/PLS OXIMETRY 1: CPT

## 2021-03-04 PROCEDURE — 83735 ASSAY OF MAGNESIUM: CPT

## 2021-03-04 PROCEDURE — 85025 COMPLETE CBC W/AUTO DIFF WBC: CPT

## 2021-03-04 PROCEDURE — 74011250637 HC RX REV CODE- 250/637: Performed by: HOSPITALIST

## 2021-03-04 PROCEDURE — 84100 ASSAY OF PHOSPHORUS: CPT

## 2021-03-04 PROCEDURE — 82962 GLUCOSE BLOOD TEST: CPT

## 2021-03-04 PROCEDURE — 94762 N-INVAS EAR/PLS OXIMTRY CONT: CPT

## 2021-03-04 PROCEDURE — 74011250637 HC RX REV CODE- 250/637: Performed by: INTERNAL MEDICINE

## 2021-03-04 PROCEDURE — 77010033711 HC HIGH FLOW OXYGEN

## 2021-03-04 RX ADMIN — Medication 2000 UNITS: at 09:06

## 2021-03-04 RX ADMIN — TRAZODONE HYDROCHLORIDE 50 MG: 50 TABLET ORAL at 21:47

## 2021-03-04 RX ADMIN — Medication 6 MG: at 21:47

## 2021-03-04 RX ADMIN — INSULIN LISPRO 3 UNITS: 100 INJECTION, SOLUTION INTRAVENOUS; SUBCUTANEOUS at 09:04

## 2021-03-04 RX ADMIN — TAMSULOSIN HYDROCHLORIDE 0.4 MG: 0.4 CAPSULE ORAL at 21:46

## 2021-03-04 RX ADMIN — METOPROLOL SUCCINATE 50 MG: 50 TABLET, EXTENDED RELEASE ORAL at 18:31

## 2021-03-04 RX ADMIN — TRAZODONE HYDROCHLORIDE 50 MG: 50 TABLET ORAL at 09:06

## 2021-03-04 RX ADMIN — ENOXAPARIN SODIUM 40 MG: 100 INJECTION SUBCUTANEOUS at 09:05

## 2021-03-04 RX ADMIN — PANTOPRAZOLE SODIUM 40 MG: 40 TABLET, DELAYED RELEASE ORAL at 09:06

## 2021-03-04 RX ADMIN — LISINOPRIL 10 MG: 10 TABLET ORAL at 09:06

## 2021-03-04 RX ADMIN — OXYCODONE HYDROCHLORIDE AND ACETAMINOPHEN 1000 MG: 500 TABLET ORAL at 09:06

## 2021-03-04 RX ADMIN — DOCUSATE SODIUM 50 MG AND SENNOSIDES 8.6 MG 1 TABLET: 8.6; 5 TABLET, FILM COATED ORAL at 09:06

## 2021-03-04 RX ADMIN — NIFEDIPINE 30 MG: 30 TABLET, FILM COATED, EXTENDED RELEASE ORAL at 09:06

## 2021-03-04 RX ADMIN — INSULIN GLARGINE 5 UNITS: 100 INJECTION, SOLUTION SUBCUTANEOUS at 09:00

## 2021-03-04 RX ADMIN — INSULIN LISPRO 3 UNITS: 100 INJECTION, SOLUTION INTRAVENOUS; SUBCUTANEOUS at 16:55

## 2021-03-04 RX ADMIN — INSULIN LISPRO 2 UNITS: 100 INJECTION, SOLUTION INTRAVENOUS; SUBCUTANEOUS at 21:55

## 2021-03-04 RX ADMIN — ENOXAPARIN SODIUM 40 MG: 100 INJECTION SUBCUTANEOUS at 18:31

## 2021-03-04 RX ADMIN — INSULIN LISPRO 3 UNITS: 100 INJECTION, SOLUTION INTRAVENOUS; SUBCUTANEOUS at 11:41

## 2021-03-04 RX ADMIN — METOPROLOL SUCCINATE 50 MG: 50 TABLET, EXTENDED RELEASE ORAL at 09:06

## 2021-03-04 RX ADMIN — INSULIN GLARGINE 5 UNITS: 100 INJECTION, SOLUTION SUBCUTANEOUS at 21:55

## 2021-03-04 NOTE — PROGRESS NOTES
Bedside shift change report given to Unique Mendes (oncoming nurse) by Pedro Arellano (offgoing nurse). Report included the following information SBAR.

## 2021-03-04 NOTE — PROGRESS NOTES
1930: Bedside, Verbal and Written shift change report given to Gopi Cotter RN (oncoming nurse) by Ila Zamudio RN (offgoing nurse). Report included the following information SBAR, Kardex, Intake/Output, MAR and Recent Results. 1930: Bedside, Verbal and Written shift change report given to Pérez Mi RN (oncoming nurse) by Gopi Cotter RN (offgoing nurse). Report included the following information SBAR, Kardex, Intake/Output, MAR, Recent Results, Med Rec Status and Dual Neuro Assessment.

## 2021-03-04 NOTE — PROGRESS NOTES
[facility] Adult  Hospitalist Group                                                                                          Hospitalist Progress Note  Melanie Zamorano MD  Answering service: [phone] from in house phone        Date of Service:  3/4/2021  NAME:  Dasia De Souza  :  1955  MRN:  [id number]    This documentation was facilitated by a Voice Recognition software and may contain inadvertent typographical errors. Admission Summary:      Mr. Cecilia Cristina is a 68-year-old black male who was admitted to [facility] ICU on  for severe Covid 19 pneumonia complicated by acute hypoxic respiratory failure. Patient initially presented to [facility] on  where he was admitted and was being treated with oxygen 4 L/min via nasal cannula. He was also treated with Decadron and azithromycin and ceftriaxone. On  he started becoming more hypoxic and his oxygen demand increased to 15 L/min via nasal cannula. Later on that day, he was tried on CPAP but patient was unable to tolerate it. He was transferred to [facility] on  as [facility] does not have ICU. He has been being treated with high flow oxygen with prone as tolerated. He had required Precedex drip for hyperactive delirium that has resolved and he is off the Precedex. He developed VARINDER that has resolved. Interval history / Subjective:        Doing well. Oxygen being weaned down. He denies sob,resp distress  Discussed with RN,no issues  Assessment & Plan:   Acute hypoxic respiratory failure secondary to severe COVID-19 pneumonia  · Currently on oxygen via high flow nasal cannula ,beimg weaned down   · He has completed Covid specific therapy, antibiotics. · Continue vitamin C, vitamin D.  · Continue diuresis  · CXR pending     Acute metabolic encephalopathy, acute hyperactive delirium. Resolved  · Weaned off Precedex drip  · Continue with trazodone     Type 2 diabetes with hyperglycemia  · Monitor blood sugar before meals and at bedtime, continue Lantus, Humalog sliding scale. Hypertension, history of CAD status post CABG  · Continue lisinopril, nifedipine ER. He has intermittent tachycardia and blood pressure is elevated, resume metoprolol succinate     BPH: Continue tamsulosin  Sleep aid: Melatonin 6 mg nightly     DVT prophylaxis: Enoxaparin per Covid protocol. CODE STATUS: Full  Anticipated discharge>48 HOURS, may need REHAB. Patient lives alone. Hospital Problems  Never Reviewed          Codes Class Noted POA    Respiratory failure with hypoxia [facility]) ICD-10-CM: J96.91  ICD-9-CM: 518.81  2/22/2021 Unknown                Review of Systems:          Vital Signs:    Last 24hrs VS reviewed since prior progress note. Most recent are:  Visit Vitals  /80   Pulse 83   Temp 98.2 °F (36.8 °C)   Resp 17   Ht 5' 10\" (1.778 m)   Wt 113.4 kg (250 lb)   SpO2 95%   BMI 35.87 kg/m²         Intake/Output Summary (Last 24 hours) at 3/4/2021 1308  Last data filed at 3/4/2021 0000  Gross per 24 hour   Intake --   Output 475 ml   Net -475 ml        Physical Examination:     I had a face to face encounter with this patient and independently examined them on3/4/2021 as outlined below:        Constitutional:  Pleasant gentleman,on oxygen via HFNC,not in distress   HEENT:  Atraumatic. Oral mucosa moist,. Non icteric sclera. No pallor. Resp:  On oxygen via HFNC. CTA bilaterally. No wheezing/rhonchi/rales. No accessory muscle use   Chest Wall: No deformity   CV:  Regular rhythm, normal rate, no murmurs, gallops, rubs    GI:  Soft, non distended, non tender.  normoactive bowel sounds, no hepatosplenomegaly    :  No CVA or suprapubic tenderness    Musculoskeletal:  No edema, warm, 2+ pulses throughout    Neurologic:  Mental status:AAOx3,   Cranial nerves II-XII : WNL  Motor exam:Moves all extremities symmetrically              Data Review:    Review and/or order of clinical lab test  Review and/or order of tests in the radiology section of CPT  Review and/or order of tests in the medicine section of CPT      Labs:     Recent Labs     03/04/21  0604 03/03/21  0517   WBC 7.7 9.1   HGB 9.8* 9.7*   HCT 32.1* 32.1*    264     Recent Labs     03/04/21  0604 03/03/21  0517 03/02/21  0422   NA  --  140 140   K  --  4.2 4.0   CL  --  108 110*   CO2  --  22 24   BUN  --  14 15   CREA  --  0.72 0.80   GLU  --  133* 105*   CA  --  8.6 8.5   MG 2.1 2.3 2.0   PHOS 2.9 3.3 2.9     No results for input(s): ALT, AP, TBIL, TBILI, TP, ALB, GLOB, GGT, AML, LPSE in the last 72 hours. No lab exists for component: SGOT, GPT, AMYP, HLPSE  No results for input(s): INR, PTP, APTT, INREXT in the last 72 hours. No results for input(s): FE, TIBC, PSAT, FERR in the last 72 hours. No results found for: FOL, RBCF   No results for input(s): PH, PCO2, PO2 in the last 72 hours. No results for input(s): CPK, CKNDX, TROIQ in the last 72 hours.     No lab exists for component: CPKMB  Lab Results   Component Value Date/Time    Cholesterol, total 205 (H) 12/13/2020 08:50 AM    HDL Cholesterol 93 12/13/2020 08:50 AM    LDL, calculated 97 12/13/2020 08:50 AM    Triglyceride 75 12/13/2020 08:50 AM    CHOL/HDL Ratio 2.2 12/13/2020 08:50 AM     Lab Results   Component Value Date/Time    Glucose (POC) 160 (H) 03/04/2021 11:27 AM    Glucose (POC) 161 (H) 03/04/2021 08:26 AM    Glucose (POC) 131 (H) 03/03/2021 09:19 PM    Glucose (POC) 172 (H) 03/03/2021 05:30 PM    Glucose (POC) 173 (H) 03/03/2021 12:41 PM     No results found for: COLOR, APPRN, SPGRU, REFSG, BALDEMAR, PROTU, GLUCU, KETU, BILU, UROU, ENRIQUETA, LEUKU, GLUKE, EPSU, BACTU, WBCU, RBCU, CASTS, UCRY      Medications Reviewed:     Current Facility-Administered Medications   Medication Dose Route Frequency    metoprolol succinate (TOPROL-XL) XL tablet 50 mg  50 mg Oral BIDPC    traZODone (DESYREL) tablet 50 mg  50 mg Oral BID    insulin lispro (HUMALOG) injection   SubCUTAneous AC&HS    melatonin tablet 6 mg  6 mg Oral QHS    senna-docusate (PERICOLACE) 8.6-50 mg per tablet 1 Tab  1 Tab Oral DAILY    insulin glargine (LANTUS) injection 5 Units  5 Units SubCUTAneous Q12H    albuterol (PROVENTIL HFA, VENTOLIN HFA, PROAIR HFA) inhaler 1 Puff  1 Puff Inhalation Q6H PRN    And    ipratropium (ATROVENT HFA) 17 mcg inhaler  1 Puff Inhalation Q6H PRN    glucose chewable tablet 16 g  4 Tab Oral PRN    dextrose (D50W) injection syrg 12.5-25 g  12.5-25 g IntraVENous PRN    glucagon (GLUCAGEN) injection 1 mg  1 mg IntraMUSCular PRN    NIFEdipine ER (PROCARDIA XL) tablet 30 mg  30 mg Oral DAILY    lisinopriL (PRINIVIL, ZESTRIL) tablet 10 mg  10 mg Oral DAILY    sodium chloride (NS) flush 5-40 mL  5-40 mL IntraVENous Q8H    sodium chloride (NS) flush 5-40 mL  5-40 mL IntraVENous PRN    acetaminophen (TYLENOL) tablet 650 mg  650 mg Oral Q6H PRN    Or    acetaminophen (TYLENOL) suppository 650 mg  650 mg Rectal Q6H PRN    polyethylene glycol (MIRALAX) packet 17 g  17 g Oral DAILY PRN    promethazine (PHENERGAN) tablet 12.5 mg  12.5 mg Oral Q6H PRN    Or    ondansetron (ZOFRAN) injection 4 mg  4 mg IntraVENous Q6H PRN    enoxaparin (LOVENOX) injection 40 mg  40 mg SubCUTAneous Q12H    guaiFENesin-dextromethorphan (ROBITUSSIN DM) 100-10 mg/5 mL syrup 5 mL  5 mL Oral Q4H PRN    cholecalciferol (VITAMIN D3) (1000 Units /25 mcg) tablet 2,000 Units  2,000 Units Oral DAILY    ascorbic acid (vitamin C) (VITAMIN C) tablet 1,000 mg  1,000 mg Oral DAILY    dextrose (D50W) injection syrg 12.5-25 g  25-50 mL IntraVENous PRN    pantoprazole (PROTONIX) tablet 40 mg  40 mg Oral ACB    tamsulosin (FLOMAX) capsule 0.4 mg  0.4 mg Oral QHS    hydrALAZINE (APRESOLINE) 20 mg/mL injection 10-20 mg  10-20 mg IntraVENous Q6H PRN     ______________________________________________________________________  EXPECTED LENGTH OF STAY: 5d 12h  ACTUAL LENGTH OF STAY:          10                 Paola Viramontes MD

## 2021-03-04 NOTE — PROGRESS NOTES
Transitions of Care: 18% risk of re-admission      -TBD, home health vs. Rehab pending progress with PT/OT, last notes on 3/3 indicate home health- will monitor   -Patient may require transportation assistance    -Will need PCP establishment     The CM reviewed previous CM notes, also called and spoke to the patient directly- patient on COVID-19 precautions, CM called and spoke with patient via phone-     The patient lives in a mobile home alone (one story mobile trailer), verified demographics and insurance. The patient has a sister and daughter- gave consent to speak with both, he endorses [place] in [place]. The patient endorses limited social support- CM inquired if family could check on him upon discharge, patient endorsed \"not really. \" PT/OT following the patient- last recommendation endorses HH vs. None- will monitor progress. The patient utilizes [employer] in [place], he does not have a PCP- he last saw a Cardiologist with [facility] in [place] about a month ago. The patient remains on High Flow Oxygen- 60L of Oxygen. The patient may need Oxygen upon discharge, will monitor- also monitor progress with therapy. The patient does not know if he will have transportation home upon discharge, will monitor. Patient is independent with ADLs and mobility at baseline, no DME utilized. CM will continue to follow for transitions of care.      TRANG Gale

## 2021-03-04 NOTE — PROGRESS NOTES
1930: Bedside and Verbal shift change report given to [address] (oncoming nurse) by Katharine Boogie (offgoing nurse). Report included the following information SBAR, Kardex, ED Summary, Intake/Output, MAR, Recent Results, Cardiac Rhythm SR and Alarm Parameters .

## 2021-03-05 LAB
ANION GAP SERPL CALC-SCNC: 6 MMOL/L (ref 5–15)
BNP SERPL-MCNC: 527 PG/ML
BUN SERPL-MCNC: 11 MG/DL (ref 6–20)
BUN/CREAT SERPL: 15 (ref 12–20)
CALCIUM SERPL-MCNC: 9.1 MG/DL (ref 8.5–10.1)
CHLORIDE SERPL-SCNC: 107 MMOL/L (ref 97–108)
CO2 SERPL-SCNC: 25 MMOL/L (ref 21–32)
CREAT SERPL-MCNC: 0.71 MG/DL (ref 0.7–1.3)
CRP SERPL-MCNC: 1.54 MG/DL (ref 0–0.6)
ERYTHROCYTE [DISTWIDTH] IN BLOOD BY AUTOMATED COUNT: 14.3 % (ref 11.5–14.5)
GLUCOSE BLD STRIP.AUTO-MCNC: 148 MG/DL (ref 65–100)
GLUCOSE BLD STRIP.AUTO-MCNC: 176 MG/DL (ref 65–100)
GLUCOSE BLD STRIP.AUTO-MCNC: 176 MG/DL (ref 65–100)
GLUCOSE BLD STRIP.AUTO-MCNC: 185 MG/DL (ref 65–100)
GLUCOSE SERPL-MCNC: 144 MG/DL (ref 65–100)
HCT VFR BLD AUTO: 30.9 % (ref 36.6–50.3)
HGB BLD-MCNC: 9.4 G/DL (ref 12.1–17)
MAGNESIUM SERPL-MCNC: 2.2 MG/DL (ref 1.6–2.4)
MCH RBC QN AUTO: 26.7 PG (ref 26–34)
MCHC RBC AUTO-ENTMCNC: 30.4 G/DL (ref 30–36.5)
MCV RBC AUTO: 87.8 FL (ref 80–99)
NRBC # BLD: 0 K/UL (ref 0–0.01)
NRBC BLD-RTO: 0 PER 100 WBC
PHOSPHATE SERPL-MCNC: 3.1 MG/DL (ref 2.6–4.7)
PLATELET # BLD AUTO: 264 K/UL (ref 150–400)
PMV BLD AUTO: 10.2 FL (ref 8.9–12.9)
POTASSIUM SERPL-SCNC: 3.9 MMOL/L (ref 3.5–5.1)
RBC # BLD AUTO: 3.52 M/UL (ref 4.1–5.7)
SERVICE CMNT-IMP: ABNORMAL
SODIUM SERPL-SCNC: 138 MMOL/L (ref 136–145)
WBC # BLD AUTO: 7.4 K/UL (ref 4.1–11.1)

## 2021-03-05 PROCEDURE — 74011250637 HC RX REV CODE- 250/637: Performed by: NURSE PRACTITIONER

## 2021-03-05 PROCEDURE — 80048 BASIC METABOLIC PNL TOTAL CA: CPT

## 2021-03-05 PROCEDURE — 86140 C-REACTIVE PROTEIN: CPT

## 2021-03-05 PROCEDURE — 74011636637 HC RX REV CODE- 636/637: Performed by: NURSE PRACTITIONER

## 2021-03-05 PROCEDURE — 83880 ASSAY OF NATRIURETIC PEPTIDE: CPT

## 2021-03-05 PROCEDURE — 77010033711 HC HIGH FLOW OXYGEN

## 2021-03-05 PROCEDURE — 36415 COLL VENOUS BLD VENIPUNCTURE: CPT

## 2021-03-05 PROCEDURE — 97530 THERAPEUTIC ACTIVITIES: CPT

## 2021-03-05 PROCEDURE — 74011250637 HC RX REV CODE- 250/637: Performed by: HOSPITALIST

## 2021-03-05 PROCEDURE — 74011250637 HC RX REV CODE- 250/637: Performed by: INTERNAL MEDICINE

## 2021-03-05 PROCEDURE — 74011250636 HC RX REV CODE- 250/636: Performed by: HOSPITALIST

## 2021-03-05 PROCEDURE — 85027 COMPLETE CBC AUTOMATED: CPT

## 2021-03-05 PROCEDURE — 82962 GLUCOSE BLOOD TEST: CPT

## 2021-03-05 PROCEDURE — 84100 ASSAY OF PHOSPHORUS: CPT

## 2021-03-05 PROCEDURE — 74011250636 HC RX REV CODE- 250/636: Performed by: NURSE PRACTITIONER

## 2021-03-05 PROCEDURE — 65660000001 HC RM ICU INTERMED STEPDOWN

## 2021-03-05 PROCEDURE — 83735 ASSAY OF MAGNESIUM: CPT

## 2021-03-05 PROCEDURE — 94760 N-INVAS EAR/PLS OXIMETRY 1: CPT

## 2021-03-05 RX ORDER — FUROSEMIDE 10 MG/ML
40 INJECTION INTRAMUSCULAR; INTRAVENOUS DAILY
Status: DISCONTINUED | OUTPATIENT
Start: 2021-03-05 | End: 2021-03-06

## 2021-03-05 RX ADMIN — OXYCODONE HYDROCHLORIDE AND ACETAMINOPHEN 1000 MG: 500 TABLET ORAL at 08:55

## 2021-03-05 RX ADMIN — INSULIN GLARGINE 5 UNITS: 100 INJECTION, SOLUTION SUBCUTANEOUS at 21:25

## 2021-03-05 RX ADMIN — METOPROLOL SUCCINATE 50 MG: 50 TABLET, EXTENDED RELEASE ORAL at 19:05

## 2021-03-05 RX ADMIN — DOCUSATE SODIUM 50 MG AND SENNOSIDES 8.6 MG 1 TABLET: 8.6; 5 TABLET, FILM COATED ORAL at 08:56

## 2021-03-05 RX ADMIN — FUROSEMIDE 40 MG: 10 INJECTION, SOLUTION INTRAMUSCULAR; INTRAVENOUS at 08:55

## 2021-03-05 RX ADMIN — ENOXAPARIN SODIUM 40 MG: 100 INJECTION SUBCUTANEOUS at 07:02

## 2021-03-05 RX ADMIN — INSULIN LISPRO 3 UNITS: 100 INJECTION, SOLUTION INTRAVENOUS; SUBCUTANEOUS at 07:10

## 2021-03-05 RX ADMIN — INSULIN GLARGINE 5 UNITS: 100 INJECTION, SOLUTION SUBCUTANEOUS at 08:55

## 2021-03-05 RX ADMIN — TRAZODONE HYDROCHLORIDE 50 MG: 50 TABLET ORAL at 08:55

## 2021-03-05 RX ADMIN — METOPROLOL SUCCINATE 50 MG: 50 TABLET, EXTENDED RELEASE ORAL at 08:56

## 2021-03-05 RX ADMIN — NIFEDIPINE 30 MG: 30 TABLET, FILM COATED, EXTENDED RELEASE ORAL at 08:56

## 2021-03-05 RX ADMIN — PANTOPRAZOLE SODIUM 40 MG: 40 TABLET, DELAYED RELEASE ORAL at 07:02

## 2021-03-05 RX ADMIN — Medication 2000 UNITS: at 08:56

## 2021-03-05 RX ADMIN — TAMSULOSIN HYDROCHLORIDE 0.4 MG: 0.4 CAPSULE ORAL at 21:26

## 2021-03-05 RX ADMIN — TRAZODONE HYDROCHLORIDE 50 MG: 50 TABLET ORAL at 21:26

## 2021-03-05 RX ADMIN — INSULIN LISPRO 3 UNITS: 100 INJECTION, SOLUTION INTRAVENOUS; SUBCUTANEOUS at 16:35

## 2021-03-05 RX ADMIN — ENOXAPARIN SODIUM 40 MG: 100 INJECTION SUBCUTANEOUS at 19:05

## 2021-03-05 RX ADMIN — Medication 10 ML: at 07:02

## 2021-03-05 RX ADMIN — LISINOPRIL 10 MG: 10 TABLET ORAL at 08:56

## 2021-03-05 RX ADMIN — Medication 6 MG: at 21:26

## 2021-03-05 RX ADMIN — INSULIN LISPRO 3 UNITS: 100 INJECTION, SOLUTION INTRAVENOUS; SUBCUTANEOUS at 12:06

## 2021-03-05 NOTE — PROGRESS NOTES
Bedside shift change report given to [employer] (oncoming nurse) by Silva Amaro (offgoing nurse). Report included the following information SBAR, Kardex, ED Summary, Intake/Output, MAR, Accordion, Recent Results, Med Rec Status and Cardiac Rhythm NSR.

## 2021-03-05 NOTE — PROGRESS NOTES
[address] [facility] Adult  Hospitalist Group                                                                                          Hospitalist Progress Note  Fabian Durham MD  Answering service: [phone] from in house phone        Date of Service:  3/5/2021  NAME:  Jc Wong  :  1955  MRN:  [id number]    This documentation was facilitated by a Voice Recognition software and may contain inadvertent typographical errors. Admission Summary:      Mr. Elsa Richardson is a 57-year-old black male who was admitted to [facility] ICU on  for severe Covid 19 pneumonia complicated by acute hypoxic respiratory failure. Patient initially presented to [facility] on  where he was admitted and was being treated with oxygen 4 L/min via nasal cannula. He was also treated with Decadron and azithromycin and ceftriaxone. On  he started becoming more hypoxic and his oxygen demand increased to 15 L/min via nasal cannula. Later on that day, he was tried on CPAP but patient was unable to tolerate it. He was transferred to [facility] on  as [facility] does not have ICU. He has been being treated with high flow oxygen with prone as tolerated. He had required Precedex drip for hyperactive delirium that has resolved and he is off the Precedex. He developed VARINDER that has resolved. Interval history / Subjective:        Sitting in chiar, still on oxygen via HFNC,35 L/min at 80%. Comfortable,denied sob or respiratory difficulty. .    Assessment & Plan:   Acute hypoxic respiratory failure secondary to severe COVID-19 pneumonia  · Currently on oxygen via high flow nasal cannula ,beimg weaned down   · He has completed Covid specific therapy, antibiotics. · Continue vitamin C, vitamin D.  · CXR 3/4 dncreased bilateral patchy interstitial and airspace opacities. Continue diuresis. Afebrile,not septic.      Acute metabolic encephalopathy, acute hyperactive delirium. Resolved  · Weaned off Precedex drip  · Continue with trazodone     Type 2 diabetes with hyperglycemia  · Monitor blood sugar before meals and at bedtime, continue Lantus, Humalog sliding scale. Hypertension, history of CAD status post CABG  · Continue lisinopril, nifedipine ER. He has intermittent tachycardia and blood pressure is elevated, resume metoprolol succinate     BPH: Continue tamsulosin  Sleep aid: Melatonin 6 mg nightly     DVT prophylaxis: Enoxaparin per Covid protocol. CODE STATUS: Full  Anticipated discharge>48 HOURS, may need REHAB. Patient lives alone. Hospital Problems  Never Reviewed          Codes Class Noted POA    Respiratory failure with hypoxia [facility]) ICD-10-CM: J96.91  ICD-9-CM: 518.81  2/22/2021 Unknown                Review of Systems:          Vital Signs:    Last 24hrs VS reviewed since prior progress note. Most recent are:  Visit Vitals  BP (!) 134/55 (BP 1 Location: Right arm, BP Patient Position: At rest;Lying left side)   Pulse 79   Temp 98.2 °F (36.8 °C)   Resp 20   Ht 5' 10\" (1.778 m)   Wt 113.4 kg (250 lb)   SpO2 90%   BMI 35.87 kg/m²         Intake/Output Summary (Last 24 hours) at 3/5/2021 1527  Last data filed at 3/4/2021 1616  Gross per 24 hour   Intake --   Output 400 ml   Net -400 ml        Physical Examination:     I had a face to face encounter with this patient and independently examined them on3/5/2021 as outlined below:        Constitutional:  Pleasant gentleman,on oxygen via HFNC,sitting in chair,not in distress   HEENT:  Atraumatic. Oral mucosa moist,. Non icteric sclera. No pallor. Resp:  On oxygen via HFNC. CTA bilaterally. No wheezing/rhonchi/rales. No accessory muscle use   Chest Wall: No deformity   CV:  Regular rhythm, normal rate, no murmurs, gallops, rubs    GI:  Soft, non distended, non tender.  normoactive bowel sounds, no hepatosplenomegaly    :  No CVA or suprapubic tenderness    Musculoskeletal:  No edema, warm, 2+ pulses throughout Neurologic:  Mental status:AAOx3,   Cranial nerves II-XII : WNL  Motor exam:Moves all extremities symmetrically              Data Review:    Review and/or order of clinical lab test  Review and/or order of tests in the radiology section of CPT  Review and/or order of tests in the medicine section of [facility]      Labs:     Recent Labs     03/05/21 0319 03/04/21  0604   WBC 7.4 7.7   HGB 9.4* 9.8*   HCT 30.9* 32.1*    265     Recent Labs     03/05/21 0319 03/04/21 0604 03/03/21  0517     --  140   K 3.9  --  4.2     --  108   CO2 25  --  22   BUN 11  --  14   CREA 0.71  --  0.72   *  --  133*   CA 9.1  --  8.6   MG 2.2 2.1 2.3   PHOS 3.1 2.9 3.3     No results for input(s): ALT, AP, TBIL, TBILI, TP, ALB, GLOB, GGT, AML, LPSE in the last 72 hours. No lab exists for component: SGOT, GPT, AMYP, HLPSE  No results for input(s): INR, PTP, APTT, INREXT, INREXT in the last 72 hours. No results for input(s): FE, TIBC, PSAT, FERR in the last 72 hours. No results found for: FOL, RBCF   No results for input(s): PH, PCO2, PO2 in the last 72 hours. No results for input(s): CPK, CKNDX, TROIQ in the last 72 hours.     No lab exists for component: CPKMB  Lab Results   Component Value Date/Time    Cholesterol, total 205 (H) 12/13/2020 08:50 AM    HDL Cholesterol 93 12/13/2020 08:50 AM    LDL, calculated 97 12/13/2020 08:50 AM    Triglyceride 75 12/13/2020 08:50 AM    CHOL/HDL Ratio 2.2 12/13/2020 08:50 AM     Lab Results   Component Value Date/Time    Glucose (POC) 176 (H) 03/05/2021 11:49 AM    Glucose (POC) 148 (H) 03/05/2021 07:05 AM    Glucose (POC) 245 (H) 03/04/2021 09:50 PM    Glucose (POC) 191 (H) 03/04/2021 04:46 PM    Glucose (POC) 160 (H) 03/04/2021 11:27 AM     No results found for: COLOR, APPRN, SPGRU, REFSG, BALDMEAR, PROTU, GLUCU, KETU, BILU, UROU, ENRIQUETA, LEUKU, GLUKE, EPSU, BACTU, WBCU, RBCU, CASTS, UCRY      Medications Reviewed:     Current Facility-Administered Medications   Medication Dose Route Frequency    furosemide (LASIX) injection 40 mg  40 mg IntraVENous DAILY    metoprolol succinate (TOPROL-XL) XL tablet 50 mg  50 mg Oral BIDPC    traZODone (DESYREL) tablet 50 mg  50 mg Oral BID    insulin lispro (HUMALOG) injection   SubCUTAneous AC&HS    melatonin tablet 6 mg  6 mg Oral QHS    senna-docusate (PERICOLACE) 8.6-50 mg per tablet 1 Tab  1 Tab Oral DAILY    insulin glargine (LANTUS) injection 5 Units  5 Units SubCUTAneous Q12H    albuterol (PROVENTIL HFA, VENTOLIN HFA, PROAIR HFA) inhaler 1 Puff  1 Puff Inhalation Q6H PRN    And    ipratropium (ATROVENT HFA) 17 mcg inhaler  1 Puff Inhalation Q6H PRN    glucose chewable tablet 16 g  4 Tab Oral PRN    dextrose (D50W) injection syrg 12.5-25 g  12.5-25 g IntraVENous PRN    glucagon (GLUCAGEN) injection 1 mg  1 mg IntraMUSCular PRN    NIFEdipine ER (PROCARDIA XL) tablet 30 mg  30 mg Oral DAILY    lisinopriL (PRINIVIL, ZESTRIL) tablet 10 mg  10 mg Oral DAILY    sodium chloride (NS) flush 5-40 mL  5-40 mL IntraVENous Q8H    sodium chloride (NS) flush 5-40 mL  5-40 mL IntraVENous PRN    acetaminophen (TYLENOL) tablet 650 mg  650 mg Oral Q6H PRN    Or    acetaminophen (TYLENOL) suppository 650 mg  650 mg Rectal Q6H PRN    polyethylene glycol (MIRALAX) packet 17 g  17 g Oral DAILY PRN    promethazine (PHENERGAN) tablet 12.5 mg  12.5 mg Oral Q6H PRN    Or    ondansetron (ZOFRAN) injection 4 mg  4 mg IntraVENous Q6H PRN    enoxaparin (LOVENOX) injection 40 mg  40 mg SubCUTAneous Q12H    guaiFENesin-dextromethorphan (ROBITUSSIN DM) 100-10 mg/5 mL syrup 5 mL  5 mL Oral Q4H PRN    cholecalciferol (VITAMIN D3) (1000 Units /25 mcg) tablet 2,000 Units  2,000 Units Oral DAILY    ascorbic acid (vitamin C) (VITAMIN C) tablet 1,000 mg  1,000 mg Oral DAILY    dextrose (D50W) injection syrg 12.5-25 g  25-50 mL IntraVENous PRN    pantoprazole (PROTONIX) tablet 40 mg  40 mg Oral ACB    tamsulosin (FLOMAX) capsule 0.4 mg  0.4 mg Oral QHS    hydrALAZINE (APRESOLINE) 20 mg/mL injection 10-20 mg  10-20 mg IntraVENous Q6H PRN     ______________________________________________________________________  EXPECTED LENGTH OF STAY: 5d 12h  ACTUAL LENGTH OF STAY:          11                 Rankin Lefort, MD

## 2021-03-05 NOTE — PROGRESS NOTES
Nutrition Note    Chart reviewed for brief follow-up; discussed with RN. Mr Carlyle's appetite/PO intake has improved since transferring out of the ICU. Attempted to call patient but was unable to get through. Recommend continuing with current diet and supplements. RD to follow.       Estimated Nutrition Needs:   Energy: 2180 (MSJ x 1.2)  Wt used: Current(103.4 kg)  Protein: 103 (1g/kg or 1.4g/kg IBW)  Wt used: Current(103 kg)   Fluid: 2180     Electronically signed by Dimitri Briceno RD on 3/5/2021 at 1:54 PM  Contact: [employer]

## 2021-03-05 NOTE — PROGRESS NOTES
Bedside shift change report given to Reena Bernardo RN (oncoming nurse) by Rocael Samaniego RN (offgoing nurse). Report included the following information SBAR, Kardex, MAR and Cardiac Rhythm NSR.

## 2021-03-05 NOTE — PROGRESS NOTES
Problem: Mobility Impaired (Adult and Pediatric)  Goal: *Acute Goals and Plan of Care (Insert Text)  Description: FUNCTIONAL STATUS PRIOR TO ADMISSION: Patient was independent and active. Occasional use of SPC. HOME SUPPORT PRIOR TO ADMISSION: Pt lives alone    Physical Therapy Goals  Initiated 3/3/2021  1. Patient will transfer from bed to chair and chair to bed with modified independence using the least restrictive device within 7 day(s). 2.  Patient will perform sit to stand with modified independence within 7 day(s). 3.  Patient will ambulate with modified independence for 10 feet with the least restrictive device within 7 day(s). 4.  Patient will tolerate static standing balance unsupported with O2 sats >90% within 7 day(s). Outcome: Progressing Towards Goal  PHYSICAL THERAPY TREATMENT  Patient: Cathy Funes (20 y.o. male)  Date: 3/5/2021  Diagnosis: Respiratory failure with hypoxia (HCC) [J96.91] <principal problem not specified>       Precautions: Fall(droplet plus)  Chart, physical therapy assessment, plan of care and goals were reviewed. ASSESSMENT  Patient continues with skilled PT services and is progressing towards goals. Received on HFNC and agreeable to work with therapy. Pt transferred supine>sit with SBA, sit<>stand with SBA, and took steps bed to chair with SBA and VCs for safety awareness and line management. O2 sats dropped to 88% and recovered to mid 90s with seated rest.  Pt ended session in chair with all needs met. Recommending HH PT vs none pending progress. Current Level of Function Impacting Discharge (mobility/balance): SBA    Other factors to consider for discharge: O2 requirement, lives alone          PLAN :  Patient continues to benefit from skilled intervention to address the above impairments. Continue treatment per established plan of care. to address goals.     Recommendation for discharge: (in order for the patient to meet his/her long term goals)  [facility] PT vs none pending progress     This discharge recommendation:  Has not yet been discussed the attending provider and/or case management    IF patient discharges home will need the following DME: none       SUBJECTIVE:   Patient stated I want to look out the window.     OBJECTIVE DATA SUMMARY:   Critical Behavior:  Neurologic State: Alert  Orientation Level: Oriented X4  Cognition: Follows commands  Safety/Judgement: Awareness of environment, Good awareness of safety precautions  Functional Mobility Training:  Bed Mobility:  Supine to Sit: Stand-by assistance  Sit to Supine: Stand-by assistance    Transfers:  Sit to Stand: Stand-by assistance  Stand to Sit: Stand-by assistance  Bed to Chair: Stand-by assistance(VCs for line management)    Balance:  Sitting: Intact  Standing: Intact  Standing - Static: Good  Standing - Dynamic : Good    Ambulation/Gait Training:  Distance (ft): 5 Feet (ft)  Ambulation - Level of Assistance: Stand-by assistance  Base of Support: Narrowed  Speed/Elsie: Slow    Activity Tolerance:   Fair on HFNC    After treatment patient left in no apparent distress:   Sitting in chair and Call bell within reach    COMMUNICATION/COLLABORATION:   The patients plan of care was discussed with: Registered nurse.      Hamzah Jordan, PT, DPT   Time Calculation: 23 mins

## 2021-03-06 LAB
ANION GAP SERPL CALC-SCNC: 5 MMOL/L (ref 5–15)
BUN SERPL-MCNC: 11 MG/DL (ref 6–20)
BUN/CREAT SERPL: 15 (ref 12–20)
CALCIUM SERPL-MCNC: 9 MG/DL (ref 8.5–10.1)
CHLORIDE SERPL-SCNC: 106 MMOL/L (ref 97–108)
CO2 SERPL-SCNC: 26 MMOL/L (ref 21–32)
CREAT SERPL-MCNC: 0.73 MG/DL (ref 0.7–1.3)
ERYTHROCYTE [DISTWIDTH] IN BLOOD BY AUTOMATED COUNT: 14.2 % (ref 11.5–14.5)
GLUCOSE BLD STRIP.AUTO-MCNC: 132 MG/DL (ref 65–100)
GLUCOSE BLD STRIP.AUTO-MCNC: 214 MG/DL (ref 65–100)
GLUCOSE BLD STRIP.AUTO-MCNC: 218 MG/DL (ref 65–100)
GLUCOSE BLD STRIP.AUTO-MCNC: 270 MG/DL (ref 65–100)
GLUCOSE SERPL-MCNC: 136 MG/DL (ref 65–100)
HCT VFR BLD AUTO: 31.4 % (ref 36.6–50.3)
HGB BLD-MCNC: 9.2 G/DL (ref 12.1–17)
MAGNESIUM SERPL-MCNC: 2.1 MG/DL (ref 1.6–2.4)
MCH RBC QN AUTO: 26.1 PG (ref 26–34)
MCHC RBC AUTO-ENTMCNC: 29.3 G/DL (ref 30–36.5)
MCV RBC AUTO: 89.2 FL (ref 80–99)
NRBC # BLD: 0 K/UL (ref 0–0.01)
NRBC BLD-RTO: 0 PER 100 WBC
PHOSPHATE SERPL-MCNC: 3.4 MG/DL (ref 2.6–4.7)
PLATELET # BLD AUTO: 251 K/UL (ref 150–400)
PMV BLD AUTO: 9.9 FL (ref 8.9–12.9)
POTASSIUM SERPL-SCNC: 3.9 MMOL/L (ref 3.5–5.1)
PROCALCITONIN SERPL-MCNC: 0.05 NG/ML
RBC # BLD AUTO: 3.52 M/UL (ref 4.1–5.7)
SERVICE CMNT-IMP: ABNORMAL
SODIUM SERPL-SCNC: 137 MMOL/L (ref 136–145)
WBC # BLD AUTO: 7.1 K/UL (ref 4.1–11.1)

## 2021-03-06 PROCEDURE — 74011250637 HC RX REV CODE- 250/637: Performed by: NURSE PRACTITIONER

## 2021-03-06 PROCEDURE — 85027 COMPLETE CBC AUTOMATED: CPT

## 2021-03-06 PROCEDURE — 74011250636 HC RX REV CODE- 250/636: Performed by: INTERNAL MEDICINE

## 2021-03-06 PROCEDURE — 84100 ASSAY OF PHOSPHORUS: CPT

## 2021-03-06 PROCEDURE — 80048 BASIC METABOLIC PNL TOTAL CA: CPT

## 2021-03-06 PROCEDURE — 74011636637 HC RX REV CODE- 636/637: Performed by: INTERNAL MEDICINE

## 2021-03-06 PROCEDURE — 84145 PROCALCITONIN (PCT): CPT

## 2021-03-06 PROCEDURE — 74011250637 HC RX REV CODE- 250/637: Performed by: INTERNAL MEDICINE

## 2021-03-06 PROCEDURE — 74011250636 HC RX REV CODE- 250/636: Performed by: HOSPITALIST

## 2021-03-06 PROCEDURE — 83735 ASSAY OF MAGNESIUM: CPT

## 2021-03-06 PROCEDURE — 74011250637 HC RX REV CODE- 250/637: Performed by: HOSPITALIST

## 2021-03-06 PROCEDURE — 74011636637 HC RX REV CODE- 636/637: Performed by: NURSE PRACTITIONER

## 2021-03-06 PROCEDURE — 74011250636 HC RX REV CODE- 250/636: Performed by: NURSE PRACTITIONER

## 2021-03-06 PROCEDURE — 36415 COLL VENOUS BLD VENIPUNCTURE: CPT

## 2021-03-06 PROCEDURE — 82962 GLUCOSE BLOOD TEST: CPT

## 2021-03-06 PROCEDURE — 65660000001 HC RM ICU INTERMED STEPDOWN

## 2021-03-06 RX ORDER — FUROSEMIDE 40 MG/1
40 TABLET ORAL DAILY
Status: DISCONTINUED | OUTPATIENT
Start: 2021-03-07 | End: 2021-03-11

## 2021-03-06 RX ORDER — TRAZODONE HYDROCHLORIDE 50 MG/1
50 TABLET ORAL
Status: DISCONTINUED | OUTPATIENT
Start: 2021-03-06 | End: 2021-03-07

## 2021-03-06 RX ORDER — INSULIN GLARGINE 100 [IU]/ML
10 INJECTION, SOLUTION SUBCUTANEOUS
Status: DISCONTINUED | OUTPATIENT
Start: 2021-03-06 | End: 2021-03-07

## 2021-03-06 RX ADMIN — INSULIN LISPRO 4 UNITS: 100 INJECTION, SOLUTION INTRAVENOUS; SUBCUTANEOUS at 12:56

## 2021-03-06 RX ADMIN — OXYCODONE HYDROCHLORIDE AND ACETAMINOPHEN 1000 MG: 500 TABLET ORAL at 09:26

## 2021-03-06 RX ADMIN — FUROSEMIDE 40 MG: 10 INJECTION, SOLUTION INTRAMUSCULAR; INTRAVENOUS at 09:26

## 2021-03-06 RX ADMIN — Medication 2000 UNITS: at 09:26

## 2021-03-06 RX ADMIN — INSULIN GLARGINE 10 UNITS: 100 INJECTION, SOLUTION SUBCUTANEOUS at 22:15

## 2021-03-06 RX ADMIN — PANTOPRAZOLE SODIUM 40 MG: 40 TABLET, DELAYED RELEASE ORAL at 07:29

## 2021-03-06 RX ADMIN — TAMSULOSIN HYDROCHLORIDE 0.4 MG: 0.4 CAPSULE ORAL at 22:15

## 2021-03-06 RX ADMIN — INSULIN LISPRO 4 UNITS: 100 INJECTION, SOLUTION INTRAVENOUS; SUBCUTANEOUS at 17:50

## 2021-03-06 RX ADMIN — ENOXAPARIN SODIUM 40 MG: 100 INJECTION SUBCUTANEOUS at 17:50

## 2021-03-06 RX ADMIN — METHYLPREDNISOLONE SODIUM SUCCINATE 40 MG: 40 INJECTION, POWDER, FOR SOLUTION INTRAMUSCULAR; INTRAVENOUS at 22:17

## 2021-03-06 RX ADMIN — NIFEDIPINE 30 MG: 30 TABLET, FILM COATED, EXTENDED RELEASE ORAL at 09:26

## 2021-03-06 RX ADMIN — Medication 6 MG: at 22:15

## 2021-03-06 RX ADMIN — METOPROLOL SUCCINATE 50 MG: 50 TABLET, EXTENDED RELEASE ORAL at 09:26

## 2021-03-06 RX ADMIN — METOPROLOL SUCCINATE 50 MG: 50 TABLET, EXTENDED RELEASE ORAL at 17:50

## 2021-03-06 RX ADMIN — TRAZODONE HYDROCHLORIDE 50 MG: 50 TABLET ORAL at 09:26

## 2021-03-06 RX ADMIN — DOCUSATE SODIUM 50 MG AND SENNOSIDES 8.6 MG 1 TABLET: 8.6; 5 TABLET, FILM COATED ORAL at 09:26

## 2021-03-06 RX ADMIN — INSULIN LISPRO 4 UNITS: 100 INJECTION, SOLUTION INTRAVENOUS; SUBCUTANEOUS at 22:23

## 2021-03-06 RX ADMIN — ENOXAPARIN SODIUM 40 MG: 100 INJECTION SUBCUTANEOUS at 07:30

## 2021-03-06 RX ADMIN — LISINOPRIL 10 MG: 10 TABLET ORAL at 09:26

## 2021-03-06 RX ADMIN — TRAZODONE HYDROCHLORIDE 50 MG: 50 TABLET ORAL at 22:15

## 2021-03-06 RX ADMIN — Medication 10 ML: at 22:18

## 2021-03-06 RX ADMIN — INSULIN GLARGINE 5 UNITS: 100 INJECTION, SOLUTION SUBCUTANEOUS at 09:26

## 2021-03-06 NOTE — PROGRESS NOTES
[address] [facility] Adult  Hospitalist Group                                                                                          Hospitalist Progress Note  Dusty Ley MD  Answering service: [phone] from in house phone        Date of Service:  3/6/2021  NAME:  Mindy Shone YOB: 1955  MRN:  [id number]      Admission Summary:      Mr. Johanne Estrella is a 26-year-old black male who was admitted to [facility] ICU on  for severe Covid 19 pneumonia complicated by acute hypoxic respiratory failure. Patient initially presented to [facility] on  where he was admitted and was being treated with oxygen 4 L/min via nasal cannula. He was also treated with Decadron and azithromycin and ceftriaxone. On  he started becoming more hypoxic and his oxygen demand increased to 15 L/min via nasal cannula. Later on that day, he was tried on CPAP but patient was unable to tolerate it. He was transferred to [facility] on  as [facility] does not have ICU. He has been being treated with high flow oxygen with prone as tolerated. He had required Precedex drip for hyperactive delirium that has resolved and he is off the Precedex. He developed VARINDER that has resolved. Interval history / Subjective:        No acute overnight events. Remains on 35l/m HFNC. Comfortable, denies sob or cp. NAD. Code status reviewed, pt wants to be DNR, see ACP note. Assessment & Plan:   Acute hypoxic respiratory failure secondary to severe COVID-19 pneumonia  · Currently on oxygen via high flow nasal cannula, wean as able  · Steroids  · S/p abx  · Continue vitamin C, vitamin D  · procal low 0.05     Acute metabolic encephalopathy, acute hyperactive delirium. Resolved  · Weaned off Precedex drip  · Wean trazodone as able     Type 2 diabetes with hyperglycemia  · Monitor blood sugar before meals and at bedtime, continue Lantus, Humalog sliding scale  · Monitor on steroids Hypertension, overall controlled, history of CAD status post CABG  · Continue lisinopril, nifedipine ER, metoprolol succinate     BPH: Continue tamsulosin  Sleep aid: Melatonin 6 mg nightly     DVT prophylaxis: Enoxaparin per Covid protocol  CODE STATUS: DNR  Anticipated discharge>48 HOURS, may need REHAB. Patient lives alone. Hospital Problems  Never Reviewed          Codes Class Noted POA    Respiratory failure with hypoxia [facility]) ICD-10-CM: J96.91  ICD-9-CM: 518.81  2/22/2021 Unknown                Review of Systems:    per HPI      Vital Signs:    Last 24hrs VS reviewed since prior progress note. Most recent are:  Visit Vitals  /65 (BP 1 Location: Right upper arm, BP Patient Position: At rest)   Pulse 77   Temp 98.2 °F (36.8 °C)   Resp 20   Ht 5' 10\" (1.778 m)   Wt 112.4 kg (247 lb 14.4 oz)   SpO2 94%   BMI 35.57 kg/m²       No intake or output data in the 24 hours ending 03/06/21 1458     Physical Examination:     I had a face to face encounter with this patient and independently examined them on3/6/2021 as outlined below:        Constitutional:  Pleasant gentleman,on oxygen via HFNC,sitting in chair,not in distress   HEENT:  Atraumatic. Oral mucosa moist,. Non icteric sclera. No pallor. Resp:  On oxygen via HFNC. CTA bilaterally. No wheezing/rhonchi/rales.  No accessory muscle use   Chest Wall: No deformity   CV:  Regular rhythm, normal rate    GI:  Soft, non distended, non tender     Musculoskeletal:  No edema, warm    Neurologic:  Mental status:AAO   Cranial nerves II-XII : WNL  Motor exam: INMAN            Data Review:    Review and/or order of clinical lab test  Review and/or order of tests in the radiology section of CPT  Review and/or order of tests in the medicine section of CPT      Labs:     Recent Labs     03/06/21 0524 03/05/21 0319   WBC 7.1 7.4   HGB 9.2* 9.4*   HCT 31.4* 30.9*    264     Recent Labs     03/06/21 0524 03/05/21 0319 03/04/21  0604    138  --    K 3.9 3.9  --  107  --    CO2 26 25  --    BUN 11 11  --    CREA 0.73 0.71  --    * 144*  --    CA 9.0 9.1  --    MG 2.1 2.2 2.1   PHOS 3.4 3.1 2.9     No results for input(s): ALT, AP, TBIL, TBILI, TP, ALB, GLOB, GGT, AML, LPSE in the last 72 hours. No lab exists for component: SGOT, GPT, AMYP, HLPSE  No results for input(s): INR, PTP, APTT, INREXT, INREXT in the last 72 hours. No results for input(s): FE, TIBC, PSAT, FERR in the last 72 hours. No results found for: FOL, RBCF   No results for input(s): PH, PCO2, PO2 in the last 72 hours. No results for input(s): CPK, CKNDX, TROIQ in the last 72 hours.     No lab exists for component: CPKMB  Lab Results   Component Value Date/Time    Cholesterol, total 205 (H) 12/13/2020 08:50 AM    HDL Cholesterol 93 12/13/2020 08:50 AM    LDL, calculated 97 12/13/2020 08:50 AM    Triglyceride 75 12/13/2020 08:50 AM    CHOL/HDL Ratio 2.2 12/13/2020 08:50 AM     Lab Results   Component Value Date/Time    Glucose (POC) 218 (H) 03/06/2021 12:45 PM    Glucose (POC) 132 (H) 03/06/2021 06:45 AM    Glucose (POC) 176 (H) 03/05/2021 09:11 PM    Glucose (POC) 185 (H) 03/05/2021 04:26 PM    Glucose (POC) 176 (H) 03/05/2021 11:49 AM     No results found for: COLOR, APPRN, SPGRU, REFSG, BALDEMAR, PROTU, GLUCU, KETU, BILU, UROU, ENRIQUETA, LEUKU, GLUKE, EPSU, BACTU, WBCU, RBCU, CASTS, UCRY      Medications Reviewed:     Current Facility-Administered Medications   Medication Dose Route Frequency    furosemide (LASIX) injection 40 mg  40 mg IntraVENous DAILY    metoprolol succinate (TOPROL-XL) XL tablet 50 mg  50 mg Oral BIDPC    traZODone (DESYREL) tablet 50 mg  50 mg Oral BID    insulin lispro (HUMALOG) injection   SubCUTAneous AC&HS    melatonin tablet 6 mg  6 mg Oral QHS    senna-docusate (PERICOLACE) 8.6-50 mg per tablet 1 Tab  1 Tab Oral DAILY    insulin glargine (LANTUS) injection 5 Units  5 Units SubCUTAneous Q12H    albuterol (PROVENTIL HFA, VENTOLIN HFA, PROAIR HFA) inhaler 1 Puff 1 Puff Inhalation Q6H PRN    And    ipratropium (ATROVENT HFA) 17 mcg inhaler  1 Puff Inhalation Q6H PRN    glucose chewable tablet 16 g  4 Tab Oral PRN    dextrose (D50W) injection syrg 12.5-25 g  12.5-25 g IntraVENous PRN    glucagon (GLUCAGEN) injection 1 mg  1 mg IntraMUSCular PRN    NIFEdipine ER (PROCARDIA XL) tablet 30 mg  30 mg Oral DAILY    lisinopriL (PRINIVIL, ZESTRIL) tablet 10 mg  10 mg Oral DAILY    sodium chloride (NS) flush 5-40 mL  5-40 mL IntraVENous Q8H    sodium chloride (NS) flush 5-40 mL  5-40 mL IntraVENous PRN    acetaminophen (TYLENOL) tablet 650 mg  650 mg Oral Q6H PRN    Or    acetaminophen (TYLENOL) suppository 650 mg  650 mg Rectal Q6H PRN    polyethylene glycol (MIRALAX) packet 17 g  17 g Oral DAILY PRN    promethazine (PHENERGAN) tablet 12.5 mg  12.5 mg Oral Q6H PRN    Or    ondansetron (ZOFRAN) injection 4 mg  4 mg IntraVENous Q6H PRN    enoxaparin (LOVENOX) injection 40 mg  40 mg SubCUTAneous Q12H    guaiFENesin-dextromethorphan (ROBITUSSIN DM) 100-10 mg/5 mL syrup 5 mL  5 mL Oral Q4H PRN    cholecalciferol (VITAMIN D3) (1000 Units /25 mcg) tablet 2,000 Units  2,000 Units Oral DAILY    ascorbic acid (vitamin C) (VITAMIN C) tablet 1,000 mg  1,000 mg Oral DAILY    dextrose (D50W) injection syrg 12.5-25 g  25-50 mL IntraVENous PRN    pantoprazole (PROTONIX) tablet 40 mg  40 mg Oral ACB    tamsulosin (FLOMAX) capsule 0.4 mg  0.4 mg Oral QHS    hydrALAZINE (APRESOLINE) 20 mg/mL injection 10-20 mg  10-20 mg IntraVENous Q6H PRN     ______________________________________________________________________  EXPECTED LENGTH OF STAY: 5d 12h  ACTUAL LENGTH OF STAY:          12                 Ailin Farah MD

## 2021-03-06 NOTE — PROGRESS NOTES
Patient's sister called and asked to speak to this RN, she stated that she spoke with the patient and he was upset and stated he \"made a mistake earlier\" (regarding changing his code status to DNR). This RN spoke with patient and he stated \"I made a mistake, I want everything done if it came to that\". Informed doctor of patient's wishes. 1708-Spoke with MD, code status was changed to FULL CODE.    1728-Spoke with patient's sister, Arianna Blanca, informed her of change.

## 2021-03-06 NOTE — ACP (ADVANCE CARE PLANNING)
[address] [facility] Adult  Hospitalist Group                                      Advance Care Planning Note    Name: Cathy Funes  YOB: 1955  MRN: [id number]  Admission Date: 2/22/2021  6:06 AM    Date of discussion: 3/6/2021    Active Diagnoses:    Hospital Problems  Never Reviewed          Codes Class Noted POA    Respiratory failure with hypoxia [facility]) ICD-10-CM: J96.91  ICD-9-CM: 518.81  2/22/2021 Unknown              These active diagnoses are of sufficient risk that focused discussion on advance care planning is indicated in order to allow the patient to thoughtfully consider personal goals of care, and if situations arise that prevent the ability to personally give input, to ensure appropriate representation of their personal desires for different levels and aggressiveness of care. Discussion:     Persons present and participating in discussion: Cathy GomesstSarina MD    Topics Discussed:  Code Status: [x  ] yes [  ] no     Overview of Discussion: Code status discussed given covid pt with respiratory failure on HFNC and risk of worsening. Pt clearly stated a few times that he would not want CPR or intubation/mechanical ventilation. DNR order placed. Addendum @1700: Pt states he made a mistake and now wants to be full code. Full code order placed. Time Spent:   Total time spent face-to-face in education and discussion: 10 minutes.      Sarina Bronson MD  Date of Service:  3/6/2021  9:42 AM

## 2021-03-06 NOTE — PROGRESS NOTES
Bedside shift change report given to [address] (oncoming nurse) by Olayinka Cassidy RN (offgoing nurse). Report included the following information SBAR, Kardex, MAR and Cardiac Rhythm NSR.

## 2021-03-06 NOTE — PROGRESS NOTES
Bedside and Verbal shift change report given to arvind (oncoming nurse) by Racheal Burr (offgoing nurse). Report included the following information SBAR, Kardex, Intake/Output, MAR, Recent Results and Cardiac Rhythm NSR.

## 2021-03-07 LAB
ANION GAP SERPL CALC-SCNC: 5 MMOL/L (ref 5–15)
BUN SERPL-MCNC: 16 MG/DL (ref 6–20)
BUN/CREAT SERPL: 19 (ref 12–20)
CALCIUM SERPL-MCNC: 9.2 MG/DL (ref 8.5–10.1)
CHLORIDE SERPL-SCNC: 104 MMOL/L (ref 97–108)
CO2 SERPL-SCNC: 28 MMOL/L (ref 21–32)
CREAT SERPL-MCNC: 0.86 MG/DL (ref 0.7–1.3)
CRP SERPL-MCNC: 3.63 MG/DL (ref 0–0.6)
D DIMER PPP FEU-MCNC: 0.79 MG/L FEU (ref 0–0.65)
ERYTHROCYTE [DISTWIDTH] IN BLOOD BY AUTOMATED COUNT: 14.1 % (ref 11.5–14.5)
GLUCOSE BLD STRIP.AUTO-MCNC: 250 MG/DL (ref 65–100)
GLUCOSE BLD STRIP.AUTO-MCNC: 303 MG/DL (ref 65–100)
GLUCOSE BLD STRIP.AUTO-MCNC: 314 MG/DL (ref 65–100)
GLUCOSE BLD STRIP.AUTO-MCNC: 316 MG/DL (ref 65–100)
GLUCOSE BLD STRIP.AUTO-MCNC: 316 MG/DL (ref 65–100)
GLUCOSE SERPL-MCNC: 295 MG/DL (ref 65–100)
HCT VFR BLD AUTO: 31.2 % (ref 36.6–50.3)
HGB BLD-MCNC: 9.4 G/DL (ref 12.1–17)
MAGNESIUM SERPL-MCNC: 2.3 MG/DL (ref 1.6–2.4)
MCH RBC QN AUTO: 26.3 PG (ref 26–34)
MCHC RBC AUTO-ENTMCNC: 30.1 G/DL (ref 30–36.5)
MCV RBC AUTO: 87.4 FL (ref 80–99)
NRBC # BLD: 0 K/UL (ref 0–0.01)
NRBC BLD-RTO: 0 PER 100 WBC
PHOSPHATE SERPL-MCNC: 3.4 MG/DL (ref 2.6–4.7)
PLATELET # BLD AUTO: 266 K/UL (ref 150–400)
PMV BLD AUTO: 9.9 FL (ref 8.9–12.9)
POTASSIUM SERPL-SCNC: 4.7 MMOL/L (ref 3.5–5.1)
RBC # BLD AUTO: 3.57 M/UL (ref 4.1–5.7)
SERVICE CMNT-IMP: ABNORMAL
SODIUM SERPL-SCNC: 137 MMOL/L (ref 136–145)
WBC # BLD AUTO: 7.5 K/UL (ref 4.1–11.1)

## 2021-03-07 PROCEDURE — 65660000001 HC RM ICU INTERMED STEPDOWN

## 2021-03-07 PROCEDURE — 82962 GLUCOSE BLOOD TEST: CPT

## 2021-03-07 PROCEDURE — 85027 COMPLETE CBC AUTOMATED: CPT

## 2021-03-07 PROCEDURE — 83735 ASSAY OF MAGNESIUM: CPT

## 2021-03-07 PROCEDURE — 74011636637 HC RX REV CODE- 636/637: Performed by: INTERNAL MEDICINE

## 2021-03-07 PROCEDURE — 84100 ASSAY OF PHOSPHORUS: CPT

## 2021-03-07 PROCEDURE — 74011250637 HC RX REV CODE- 250/637: Performed by: NURSE PRACTITIONER

## 2021-03-07 PROCEDURE — 74011636637 HC RX REV CODE- 636/637: Performed by: NURSE PRACTITIONER

## 2021-03-07 PROCEDURE — 74011250637 HC RX REV CODE- 250/637: Performed by: INTERNAL MEDICINE

## 2021-03-07 PROCEDURE — 77010033711 HC HIGH FLOW OXYGEN

## 2021-03-07 PROCEDURE — 85379 FIBRIN DEGRADATION QUANT: CPT

## 2021-03-07 PROCEDURE — 74011250637 HC RX REV CODE- 250/637: Performed by: HOSPITALIST

## 2021-03-07 PROCEDURE — 86140 C-REACTIVE PROTEIN: CPT

## 2021-03-07 PROCEDURE — 80048 BASIC METABOLIC PNL TOTAL CA: CPT

## 2021-03-07 PROCEDURE — 74011250636 HC RX REV CODE- 250/636: Performed by: INTERNAL MEDICINE

## 2021-03-07 PROCEDURE — 36415 COLL VENOUS BLD VENIPUNCTURE: CPT

## 2021-03-07 PROCEDURE — 74011250636 HC RX REV CODE- 250/636: Performed by: NURSE PRACTITIONER

## 2021-03-07 RX ORDER — TRAZODONE HYDROCHLORIDE 50 MG/1
25 TABLET ORAL
Status: COMPLETED | OUTPATIENT
Start: 2021-03-07 | End: 2021-03-08

## 2021-03-07 RX ORDER — LISINOPRIL 20 MG/1
20 TABLET ORAL DAILY
Status: DISCONTINUED | OUTPATIENT
Start: 2021-03-08 | End: 2021-03-10

## 2021-03-07 RX ORDER — INSULIN GLARGINE 100 [IU]/ML
20 INJECTION, SOLUTION SUBCUTANEOUS
Status: DISCONTINUED | OUTPATIENT
Start: 2021-03-07 | End: 2021-03-08

## 2021-03-07 RX ADMIN — TRAZODONE HYDROCHLORIDE 25 MG: 50 TABLET ORAL at 23:41

## 2021-03-07 RX ADMIN — ENOXAPARIN SODIUM 40 MG: 100 INJECTION SUBCUTANEOUS at 07:19

## 2021-03-07 RX ADMIN — Medication 10 ML: at 23:46

## 2021-03-07 RX ADMIN — INSULIN LISPRO 10 UNITS: 100 INJECTION, SOLUTION INTRAVENOUS; SUBCUTANEOUS at 17:42

## 2021-03-07 RX ADMIN — FUROSEMIDE 40 MG: 40 TABLET ORAL at 10:22

## 2021-03-07 RX ADMIN — DOCUSATE SODIUM 50 MG AND SENNOSIDES 8.6 MG 1 TABLET: 8.6; 5 TABLET, FILM COATED ORAL at 10:22

## 2021-03-07 RX ADMIN — Medication 2000 UNITS: at 10:22

## 2021-03-07 RX ADMIN — METOPROLOL SUCCINATE 50 MG: 50 TABLET, EXTENDED RELEASE ORAL at 10:26

## 2021-03-07 RX ADMIN — ENOXAPARIN SODIUM 40 MG: 100 INJECTION SUBCUTANEOUS at 17:42

## 2021-03-07 RX ADMIN — LISINOPRIL 10 MG: 10 TABLET ORAL at 10:22

## 2021-03-07 RX ADMIN — TAMSULOSIN HYDROCHLORIDE 0.4 MG: 0.4 CAPSULE ORAL at 23:41

## 2021-03-07 RX ADMIN — OXYCODONE HYDROCHLORIDE AND ACETAMINOPHEN 1000 MG: 500 TABLET ORAL at 10:22

## 2021-03-07 RX ADMIN — METOPROLOL SUCCINATE 50 MG: 50 TABLET, EXTENDED RELEASE ORAL at 17:42

## 2021-03-07 RX ADMIN — METHYLPREDNISOLONE SODIUM SUCCINATE 40 MG: 40 INJECTION, POWDER, FOR SOLUTION INTRAMUSCULAR; INTRAVENOUS at 10:22

## 2021-03-07 RX ADMIN — INSULIN LISPRO 10 UNITS: 100 INJECTION, SOLUTION INTRAVENOUS; SUBCUTANEOUS at 07:18

## 2021-03-07 RX ADMIN — METHYLPREDNISOLONE SODIUM SUCCINATE 40 MG: 40 INJECTION, POWDER, FOR SOLUTION INTRAMUSCULAR; INTRAVENOUS at 23:41

## 2021-03-07 RX ADMIN — Medication 6 MG: at 23:40

## 2021-03-07 RX ADMIN — INSULIN LISPRO 5 UNITS: 100 INJECTION, SOLUTION INTRAVENOUS; SUBCUTANEOUS at 23:43

## 2021-03-07 RX ADMIN — NIFEDIPINE 30 MG: 30 TABLET, FILM COATED, EXTENDED RELEASE ORAL at 10:22

## 2021-03-07 RX ADMIN — INSULIN LISPRO 7 UNITS: 100 INJECTION, SOLUTION INTRAVENOUS; SUBCUTANEOUS at 13:31

## 2021-03-07 RX ADMIN — PANTOPRAZOLE SODIUM 40 MG: 40 TABLET, DELAYED RELEASE ORAL at 07:18

## 2021-03-07 RX ADMIN — INSULIN GLARGINE 20 UNITS: 100 INJECTION, SOLUTION SUBCUTANEOUS at 23:44

## 2021-03-07 NOTE — PROGRESS NOTES
03/07/21 1102   Oxygen Therapy   O2 Sat (%) 95 %   Pulse via Oximetry 73 beats per minute   O2 Device Heated; Hi flow nasal cannula   Skin Assessment Clean, dry, & intact   O2 Flow Rate (L/min) 40 l/min  (weaned)   O2 Temperature 86 °F (30 °C)   FIO2 (%) 70 %  (weasned)   will continue to monitor patient and wean as tolerate

## 2021-03-07 NOTE — PROGRESS NOTES
Bedside and Verbal shift change report given to april (oncoming nurse) by Avelina Elias (offgoing nurse). Report included the following information SBAR, Kardex, Intake/Output, MAR, Recent Results and Cardiac Rhythm NSR.

## 2021-03-07 NOTE — PROGRESS NOTES
Bedside shift change report given to Christine Rubalcava RN (oncoming nurse) by MAYUR Medeiros (offgoing nurse). Report included the following information SBAR, Kardex, Intake/Output, MAR, Accordion and Cardiac Rhythm NSR.

## 2021-03-07 NOTE — PROGRESS NOTES
[facility] Adult  Hospitalist Group                                                                                          Hospitalist Progress Note  Audrey Harris MD  Answering service: [phone] from in house phone        Date of Service:  3/7/2021  NAME:  Marisa Tenorio  :  1955  MRN:  [id number]      Admission Summary:      Mr. Amirah Null is a 22-year-old black male who was admitted to [facility] ICU on  for severe Covid 19 pneumonia complicated by acute hypoxic respiratory failure. Patient initially presented to [facility] on  where he was admitted and was being treated with oxygen 4 L/min via nasal cannula. He was also treated with Decadron and azithromycin and ceftriaxone. On  he started becoming more hypoxic and his oxygen demand increased to 15 L/min via nasal cannula. Later on that day, he was tried on CPAP but patient was unable to tolerate it. He was transferred to [facility] on  as [facility] does not have ICU. He has been being treated with high flow oxygen with prone as tolerated. He had required Precedex drip for hyperactive delirium that has resolved and he is off the Precedex. He developed VARINDER that has resolved. Interval history / Subjective:        No acute overnight events. Increased to 45l/m overnight, now on 40l/m HFNC. Comfortable, denies sob or cp. NAD. Yesterday initially wanted to be DNR, a few hours later changed mine and continues to want full code today. Assessment & Plan:   Acute hypoxic respiratory failure secondary to severe COVID-19 pneumonia  · on oxygen via high flow nasal cannula, wean as able  · Steroids  · S/p abx  · Continue vitamin C, vitamin D  · procal low 0.05     Acute metabolic encephalopathy, acute hyperactive delirium. Resolved  · Weaned off Precedex drip  · Wean trazodone     Type 2 diabetes with hyperglycemia  · Monitor blood sugar before meals and at bedtime, continue Lantus, Humalog sliding scale  · Monitor on steroids     Hypertension, overall controlled, history of CAD status post CABG  · incr lisinopril, cont nifedipine ER, metoprolol succinate     BPH: Continue tamsulosin  Sleep aid: Melatonin 6 mg nightly     DVT prophylaxis: Enoxaparin per Covid protocol  CODE STATUS: Full  Anticipated discharge>48 HOURS, may need REHAB. Patient lives alone. Hospital Problems  Never Reviewed          Codes Class Noted POA    Respiratory failure with hypoxia [facility]) ICD-10-CM: J96.91  ICD-9-CM: 518.81  2/22/2021 Unknown                Review of Systems:    per HPI      Vital Signs:    Last 24hrs VS reviewed since prior progress note. Most recent are:  Visit Vitals  /73   Pulse 75   Temp 98.5 °F (36.9 °C)   Resp 20   Ht 5' 10\" (1.778 m)   Wt 112.4 kg (247 lb 14.4 oz)   SpO2 95%   BMI 35.57 kg/m²       No intake or output data in the 24 hours ending 03/07/21 1108     Physical Examination:     I had a face to face encounter with this patient and independently examined them on3/7/2021 as outlined below:        Constitutional:  Pleasant gentleman,on oxygen via HFNC,sitting in chair,not in distress   Resp:  On oxygen via HFNC. CTA bilaterally. No wheezing/rhonchi/rales.  No accessory muscle use   Chest Wall: No deformity   CV:  Regular rhythm, normal rate    GI:  Soft, non distended, non tender     Musculoskeletal:  No edema, warm    Neurologic:  Mental status:AAO   Cranial nerves II-XII : WNL  Motor exam: INMAN            Data Review:    Review and/or order of clinical lab test  Review and/or order of tests in the radiology section of CPT  Review and/or order of tests in the medicine section of CPT      Labs:     Recent Labs     03/07/21 0429 03/06/21 0524   WBC 7.5 7.1   HGB 9.4* 9.2*   HCT 31.2* 31.4*    251     Recent Labs     03/07/21 0429 03/06/21  0524 03/05/21  0319    137 138   K 4.7 3.9 3.9    106 107   CO2 28 26 25   BUN 16 11 11   CREA 0.86 0.73 0.71   * 136* 144*   CA 9.2 9.0 9.1   MG 2.3 2.1 2.2   PHOS 3.4 3.4 3.1     No results for input(s): ALT, AP, TBIL, TBILI, TP, ALB, GLOB, GGT, AML, LPSE in the last 72 hours. No lab exists for component: SGOT, GPT, AMYP, HLPSE  No results for input(s): INR, PTP, APTT, INREXT, INREXT in the last 72 hours. No results for input(s): FE, TIBC, PSAT, FERR in the last 72 hours. No results found for: FOL, RBCF   No results for input(s): PH, PCO2, PO2 in the last 72 hours. No results for input(s): CPK, CKNDX, TROIQ in the last 72 hours.     No lab exists for component: CPKMB  Lab Results   Component Value Date/Time    Cholesterol, total 205 (H) 12/13/2020 08:50 AM    HDL Cholesterol 93 12/13/2020 08:50 AM    LDL, calculated 97 12/13/2020 08:50 AM    Triglyceride 75 12/13/2020 08:50 AM    CHOL/HDL Ratio 2.2 12/13/2020 08:50 AM     Lab Results   Component Value Date/Time    Glucose (POC) 316 (H) 03/07/2021 06:31 AM    Glucose (POC) 270 (H) 03/06/2021 09:10 PM    Glucose (POC) 214 (H) 03/06/2021 04:37 PM    Glucose (POC) 218 (H) 03/06/2021 12:45 PM    Glucose (POC) 132 (H) 03/06/2021 06:45 AM     No results found for: COLOR, APPRN, SPGRU, REFSG, BALDEMAR, PROTU, GLUCU, KETU, BILU, UROU, ENRIQUETA, LEUKU, GLUKE, EPSU, BACTU, WBCU, RBCU, CASTS, UCRY      Medications Reviewed:     Current Facility-Administered Medications   Medication Dose Route Frequency    methylPREDNISolone (PF) (SOLU-MEDROL) injection 40 mg  40 mg IntraVENous Q12H    traZODone (DESYREL) tablet 50 mg  50 mg Oral QHS    insulin glargine (LANTUS) injection 10 Units  10 Units SubCUTAneous QHS    furosemide (LASIX) tablet 40 mg  40 mg Oral DAILY    metoprolol succinate (TOPROL-XL) XL tablet 50 mg  50 mg Oral BIDPC    insulin lispro (HUMALOG) injection   SubCUTAneous AC&HS    melatonin tablet 6 mg  6 mg Oral QHS    senna-docusate (PERICOLACE) 8.6-50 mg per tablet 1 Tab  1 Tab Oral DAILY    albuterol (PROVENTIL HFA, VENTOLIN HFA, PROAIR HFA) inhaler 1 Puff  1 Puff Inhalation Q6H PRN    And    ipratropium (ATROVENT HFA) 17 mcg inhaler  1 Puff Inhalation Q6H PRN    glucose chewable tablet 16 g  4 Tab Oral PRN    dextrose (D50W) injection syrg 12.5-25 g  12.5-25 g IntraVENous PRN    glucagon (GLUCAGEN) injection 1 mg  1 mg IntraMUSCular PRN    NIFEdipine ER (PROCARDIA XL) tablet 30 mg  30 mg Oral DAILY    lisinopriL (PRINIVIL, ZESTRIL) tablet 10 mg  10 mg Oral DAILY    sodium chloride (NS) flush 5-40 mL  5-40 mL IntraVENous Q8H    sodium chloride (NS) flush 5-40 mL  5-40 mL IntraVENous PRN    acetaminophen (TYLENOL) tablet 650 mg  650 mg Oral Q6H PRN    Or    acetaminophen (TYLENOL) suppository 650 mg  650 mg Rectal Q6H PRN    polyethylene glycol (MIRALAX) packet 17 g  17 g Oral DAILY PRN    promethazine (PHENERGAN) tablet 12.5 mg  12.5 mg Oral Q6H PRN    Or    ondansetron (ZOFRAN) injection 4 mg  4 mg IntraVENous Q6H PRN    enoxaparin (LOVENOX) injection 40 mg  40 mg SubCUTAneous Q12H    guaiFENesin-dextromethorphan (ROBITUSSIN DM) 100-10 mg/5 mL syrup 5 mL  5 mL Oral Q4H PRN    cholecalciferol (VITAMIN D3) (1000 Units /25 mcg) tablet 2,000 Units  2,000 Units Oral DAILY    ascorbic acid (vitamin C) (VITAMIN C) tablet 1,000 mg  1,000 mg Oral DAILY    pantoprazole (PROTONIX) tablet 40 mg  40 mg Oral ACB    tamsulosin (FLOMAX) capsule 0.4 mg  0.4 mg Oral QHS    hydrALAZINE (APRESOLINE) 20 mg/mL injection 10-20 mg  10-20 mg IntraVENous Q6H PRN     ______________________________________________________________________  EXPECTED LENGTH OF STAY: 5d 12h  ACTUAL LENGTH OF STAY:          Geovanna Whitehead MD

## 2021-03-07 NOTE — PROGRESS NOTES
03/07/21 1647   Oxygen Therapy   O2 Sat (%) 91 %   Pulse via Oximetry 88 beats per minute   O2 Device Heated; Hi flow nasal cannula   Skin Assessment Clean, dry, & intact   O2 Flow Rate (L/min) 35 l/min  (weaned)   O2 Temperature 86 °F (30 °C)   FIO2 (%) 60 %  (weaned)   Incentive Spirometry Treatment   Actual Volume (ml) 550 ml   Number of Attempts 3   weaned

## 2021-03-08 LAB
ANION GAP SERPL CALC-SCNC: 5 MMOL/L (ref 5–15)
BUN SERPL-MCNC: 19 MG/DL (ref 6–20)
BUN/CREAT SERPL: 18 (ref 12–20)
CALCIUM SERPL-MCNC: 9 MG/DL (ref 8.5–10.1)
CHLORIDE SERPL-SCNC: 104 MMOL/L (ref 97–108)
CO2 SERPL-SCNC: 28 MMOL/L (ref 21–32)
CREAT SERPL-MCNC: 1.06 MG/DL (ref 0.7–1.3)
CRP SERPL-MCNC: 2.56 MG/DL (ref 0–0.6)
D DIMER PPP FEU-MCNC: 0.73 MG/L FEU (ref 0–0.65)
ERYTHROCYTE [DISTWIDTH] IN BLOOD BY AUTOMATED COUNT: 14.1 % (ref 11.5–14.5)
GLUCOSE BLD STRIP.AUTO-MCNC: 280 MG/DL (ref 65–100)
GLUCOSE BLD STRIP.AUTO-MCNC: 339 MG/DL (ref 65–100)
GLUCOSE BLD STRIP.AUTO-MCNC: 387 MG/DL (ref 65–100)
GLUCOSE BLD STRIP.AUTO-MCNC: 389 MG/DL (ref 65–100)
GLUCOSE SERPL-MCNC: 297 MG/DL (ref 65–100)
HCT VFR BLD AUTO: 31.3 % (ref 36.6–50.3)
HGB BLD-MCNC: 9.5 G/DL (ref 12.1–17)
MCH RBC QN AUTO: 26.2 PG (ref 26–34)
MCHC RBC AUTO-ENTMCNC: 30.4 G/DL (ref 30–36.5)
MCV RBC AUTO: 86.5 FL (ref 80–99)
NRBC # BLD: 0 K/UL (ref 0–0.01)
NRBC BLD-RTO: 0 PER 100 WBC
PLATELET # BLD AUTO: 288 K/UL (ref 150–400)
PMV BLD AUTO: 9.8 FL (ref 8.9–12.9)
POTASSIUM SERPL-SCNC: 4.2 MMOL/L (ref 3.5–5.1)
RBC # BLD AUTO: 3.62 M/UL (ref 4.1–5.7)
SERVICE CMNT-IMP: ABNORMAL
SODIUM SERPL-SCNC: 137 MMOL/L (ref 136–145)
WBC # BLD AUTO: 10.4 K/UL (ref 4.1–11.1)

## 2021-03-08 PROCEDURE — 85027 COMPLETE CBC AUTOMATED: CPT

## 2021-03-08 PROCEDURE — 86140 C-REACTIVE PROTEIN: CPT

## 2021-03-08 PROCEDURE — 85379 FIBRIN DEGRADATION QUANT: CPT

## 2021-03-08 PROCEDURE — 74011250637 HC RX REV CODE- 250/637: Performed by: HOSPITALIST

## 2021-03-08 PROCEDURE — 74011250637 HC RX REV CODE- 250/637: Performed by: NURSE PRACTITIONER

## 2021-03-08 PROCEDURE — 74011250637 HC RX REV CODE- 250/637: Performed by: INTERNAL MEDICINE

## 2021-03-08 PROCEDURE — 74011636637 HC RX REV CODE- 636/637: Performed by: NURSE PRACTITIONER

## 2021-03-08 PROCEDURE — 65660000001 HC RM ICU INTERMED STEPDOWN

## 2021-03-08 PROCEDURE — 97530 THERAPEUTIC ACTIVITIES: CPT

## 2021-03-08 PROCEDURE — 74011636637 HC RX REV CODE- 636/637: Performed by: INTERNAL MEDICINE

## 2021-03-08 PROCEDURE — 97535 SELF CARE MNGMENT TRAINING: CPT

## 2021-03-08 PROCEDURE — 77010033711 HC HIGH FLOW OXYGEN

## 2021-03-08 PROCEDURE — 82962 GLUCOSE BLOOD TEST: CPT

## 2021-03-08 PROCEDURE — 94762 N-INVAS EAR/PLS OXIMTRY CONT: CPT

## 2021-03-08 PROCEDURE — 80048 BASIC METABOLIC PNL TOTAL CA: CPT

## 2021-03-08 PROCEDURE — 74011250636 HC RX REV CODE- 250/636: Performed by: INTERNAL MEDICINE

## 2021-03-08 PROCEDURE — 74011250636 HC RX REV CODE- 250/636: Performed by: NURSE PRACTITIONER

## 2021-03-08 PROCEDURE — 36415 COLL VENOUS BLD VENIPUNCTURE: CPT

## 2021-03-08 RX ORDER — INSULIN LISPRO 100 [IU]/ML
5 INJECTION, SOLUTION INTRAVENOUS; SUBCUTANEOUS
Status: DISCONTINUED | OUTPATIENT
Start: 2021-03-08 | End: 2021-03-10

## 2021-03-08 RX ORDER — INSULIN GLARGINE 100 [IU]/ML
25 INJECTION, SOLUTION SUBCUTANEOUS
Status: DISCONTINUED | OUTPATIENT
Start: 2021-03-08 | End: 2021-03-10

## 2021-03-08 RX ORDER — INSULIN LISPRO 100 [IU]/ML
10 INJECTION, SOLUTION INTRAVENOUS; SUBCUTANEOUS ONCE
Status: COMPLETED | OUTPATIENT
Start: 2021-03-08 | End: 2021-03-08

## 2021-03-08 RX ADMIN — INSULIN LISPRO 15 UNITS: 100 INJECTION, SOLUTION INTRAVENOUS; SUBCUTANEOUS at 17:54

## 2021-03-08 RX ADMIN — INSULIN LISPRO 10 UNITS: 100 INJECTION, SOLUTION INTRAVENOUS; SUBCUTANEOUS at 11:30

## 2021-03-08 RX ADMIN — ENOXAPARIN SODIUM 40 MG: 100 INJECTION SUBCUTANEOUS at 07:26

## 2021-03-08 RX ADMIN — INSULIN GLARGINE 25 UNITS: 100 INJECTION, SOLUTION SUBCUTANEOUS at 21:34

## 2021-03-08 RX ADMIN — NIFEDIPINE 30 MG: 30 TABLET, FILM COATED, EXTENDED RELEASE ORAL at 09:24

## 2021-03-08 RX ADMIN — DOCUSATE SODIUM 50 MG AND SENNOSIDES 8.6 MG 1 TABLET: 8.6; 5 TABLET, FILM COATED ORAL at 09:24

## 2021-03-08 RX ADMIN — INSULIN LISPRO 4 UNITS: 100 INJECTION, SOLUTION INTRAVENOUS; SUBCUTANEOUS at 21:34

## 2021-03-08 RX ADMIN — METHYLPREDNISOLONE SODIUM SUCCINATE 40 MG: 40 INJECTION, POWDER, FOR SOLUTION INTRAMUSCULAR; INTRAVENOUS at 21:33

## 2021-03-08 RX ADMIN — Medication 2000 UNITS: at 09:24

## 2021-03-08 RX ADMIN — METOPROLOL SUCCINATE 50 MG: 50 TABLET, EXTENDED RELEASE ORAL at 17:54

## 2021-03-08 RX ADMIN — LISINOPRIL 20 MG: 20 TABLET ORAL at 09:24

## 2021-03-08 RX ADMIN — PANTOPRAZOLE SODIUM 40 MG: 40 TABLET, DELAYED RELEASE ORAL at 07:27

## 2021-03-08 RX ADMIN — OXYCODONE HYDROCHLORIDE AND ACETAMINOPHEN 1000 MG: 500 TABLET ORAL at 09:24

## 2021-03-08 RX ADMIN — TAMSULOSIN HYDROCHLORIDE 0.4 MG: 0.4 CAPSULE ORAL at 21:34

## 2021-03-08 RX ADMIN — INSULIN LISPRO 5 UNITS: 100 INJECTION, SOLUTION INTRAVENOUS; SUBCUTANEOUS at 11:30

## 2021-03-08 RX ADMIN — METOPROLOL SUCCINATE 50 MG: 50 TABLET, EXTENDED RELEASE ORAL at 09:24

## 2021-03-08 RX ADMIN — Medication 6 MG: at 21:33

## 2021-03-08 RX ADMIN — TRAZODONE HYDROCHLORIDE 25 MG: 50 TABLET ORAL at 21:34

## 2021-03-08 RX ADMIN — FUROSEMIDE 40 MG: 40 TABLET ORAL at 09:24

## 2021-03-08 RX ADMIN — INSULIN LISPRO 5 UNITS: 100 INJECTION, SOLUTION INTRAVENOUS; SUBCUTANEOUS at 17:54

## 2021-03-08 RX ADMIN — METHYLPREDNISOLONE SODIUM SUCCINATE 40 MG: 40 INJECTION, POWDER, FOR SOLUTION INTRAMUSCULAR; INTRAVENOUS at 09:24

## 2021-03-08 RX ADMIN — INSULIN LISPRO 10 UNITS: 100 INJECTION, SOLUTION INTRAVENOUS; SUBCUTANEOUS at 07:26

## 2021-03-08 NOTE — PROGRESS NOTES
Problem: Mobility Impaired (Adult and Pediatric)  Goal: *Acute Goals and Plan of Care (Insert Text)  Description: FUNCTIONAL STATUS PRIOR TO ADMISSION: Patient was independent and active. Occasional use of SPC. HOME SUPPORT PRIOR TO ADMISSION: Pt lives alone    Physical Therapy Goals  Initiated 3/3/2021  1. Patient will transfer from bed to chair and chair to bed with modified independence using the least restrictive device within 7 day(s). 2.  Patient will perform sit to stand with modified independence within 7 day(s). 3.  Patient will ambulate with modified independence for 10 feet with the least restrictive device within 7 day(s). 4.  Patient will tolerate static standing balance unsupported with O2 sats >90% within 7 day(s). Outcome: Progressing Towards Goal     PHYSICAL THERAPY TREATMENT  Patient: Agatha Rivera (53 y.o. male)  Date: 3/8/2021  Diagnosis: Respiratory failure with hypoxia (HCC) [J96.91] <principal problem not specified>       Precautions: Fall(droplet plus)  Chart, physical therapy assessment, plan of care and goals were reviewed. ASSESSMENT  Patient continues with skilled PT services and is progressing towards goals. Pt received in bed, able to perform supine to sit with mild desaturation from 93% to 87%, however with deep breathing recovers well to 95%. Pt tranfsers to chair with repeated desaturation and cues for pacing/cessation of movement and deep breathing, lowest sat 83%. Pt able to tolerate recovery, then attempts standing again with good tolerance 35 seconds standing prior to needing to sit. Pt able to recover with increased time and appears to be desaturating less with focus on clearing lungs/deep breathing. Educated pt on O2 values. Will continue to follow. Current Level of Function Impacting Discharge (mobility/balance): supervision and cues for O2 management.      Other factors to consider for discharge:          PLAN :  Patient continues to benefit from skilled intervention to address the above impairments. Continue treatment per established plan of care. to address goals. Recommendation for discharge: (in order for the patient to meet his/her long term goals)  Physical therapy at least 2 days/week in the home     This discharge recommendation:  Has been made in collaboration with the attending provider and/or case management    IF patient discharges home will need the following DME: O2 if patient is not on room air       SUBJECTIVE:   Patient stated I feel so much better.     OBJECTIVE DATA SUMMARY:   Critical Behavior:  Neurologic State: Alert  Orientation Level: Oriented X4  Cognition: Appropriate decision making, Appropriate for age attention/concentration, Appropriate safety awareness, Follows commands  Safety/Judgement: Awareness of environment, Good awareness of safety precautions  Functional Mobility Training:  Bed Mobility:     Supine to Sit: Stand-by assistance  Sit to Supine: Stand-by assistance           Transfers:  Sit to Stand: Supervision  Stand to Sit: Supervision        Bed to Chair: Supervision                    Balance:  Sitting: Intact  Standing: Intact; Without support  Standing - Static: Good  Standing - Dynamic : Good  Ambulation/Gait Training:           Pain Rating:  No pain reported    Activity Tolerance:   Fair and observed SOB with activity    After treatment patient left in no apparent distress:   Sitting in chair and Call bell within reach    COMMUNICATION/COLLABORATION:   The patients plan of care was discussed with: Registered nurse.      Jemima Rosales, PT   Time Calculation: 28 mins

## 2021-03-08 NOTE — PROGRESS NOTES
Problem: Self Care Deficits Care Plan (Adult)  Goal: *Acute Goals and Plan of Care (Insert Text)  Description:   FUNCTIONAL STATUS PRIOR TO ADMISSION: Patient was modified independence walking with cane; independent and active otherwise. HOME SUPPORT: The patient lived alone. Family available to provide assistance PRN. Occupational Therapy Goals  Initiated 3/3/2021  1. Patient will perform lower body dressing while sitting with minimal assistance/contact guard assist within 7 day(s). 2.  Patient will perform bathing upper body with supervision/set-up within 7 day(s). 3.  Patient will perform 1 grooming task standing with minimal assistance/contact guard assist within 7 day(s). 4.  Patient will perform BSC toilet transfers with minimal assistance/contact guard assist within 7 day(s). 5.  Patient will perform all aspects of toileting with minimal assistance/contact guard assist within 7 day(s). 6.  Patient will participate in upper extremity therapeutic exercise/activities v. Gravity with PLB with minimal assistance/contact guard assist within 7 day(s). 7.  Patient will utilize energy conservation techniques during functional activities with verbal cues within 7 day(s). Outcome: Progressing Towards Goal   OCCUPATIONAL THERAPY TREATMENT  Patient: Radha Cardona (89 y.o. male)  Date: 3/8/2021  Diagnosis: Respiratory failure with hypoxia ([facility] [address] [J96.91] <principal problem not specified>       Precautions: Fall(droplet plus)  Chart, occupational therapy assessment, plan of care, and goals were reviewed. ASSESSMENT  Patient continues with skilled OT services and is progressing towards goals. Patient received sitting in bedside chair with PT present in room, currently on 30L HFNC, 70% FiO2. This session, emphasis on energy conservation, pacing ADL participation, and increased attention to body position and breathing.  Patient able to demonstrate tailor sitting position for each LE, although min assist required to stabilize RLE to facilitate bimanual task of doffing/donning sock. Patient with rest break between each component of lower body dressing and grooming (applying lotion to distal LEs) - SpO2 maintaining >/= 90 during task but briefly as low as 88 after completing. Note patient recovery much quicker, achieving SpO2 in the 90s within approx 1 minute. Overall, patient with increased attention to monitor and SpO2, responding appropriately with minimal cues and able to teach back education this session. Current Level of Function Impacting Discharge (ADLs): up to mod A for lower body 2/2 respiratory status and endurance    Other factors to consider for discharge: no O2 and mod I at baseline; lives alone     PLAN :  Patient continues to benefit from skilled intervention to address the above impairments. Continue treatment per established plan of care. to address goals. Recommend with staff: OOB to chair with assist x1 and toileting at [facility] as tolerated    Recommend next OT session: standing ADLs    Recommendation for discharge: (in order for the patient to meet his/her long term goals)  Anticipate home with no follow-up OT    This discharge recommendation:  Has not yet been discussed the attending provider and/or case management    IF patient discharges home will need the following DME: bedside commode, transfer bench and wheelchair for extended mobility / community access       SUBJECTIVE:   Patient stated I need to keep my numbers above 90 in reference to SpO2. OBJECTIVE DATA SUMMARY:   Cognitive/Behavioral Status:  Neurologic State: Alert  Orientation Level: Oriented X4  Cognition: Follows commands  Perception: Appears intact  Perseveration: No perseveration noted  Safety/Judgement: Awareness of environment    Functional Mobility and Transfers for ADLs:  Transfers:  Sit to Stand: Supervision   Stand to Sit: Supervision    Balance:  Sitting: Intact  Standing: Intact; Without support  Standing - Static: Good  Standing - Dynamic : Good    ADL Intervention:  Lower Body Bathing  Bathing Assistance: Moderate assistance  Lower Body : Moderate assistance(2/2 decreased tolerance, need for focused breathing)  Position Performed: Seated in chair    Lower Body Dressing Assistance  Dressing Assistance: Minimum assistance(to stabilize LE in tailor sitting position)  Leg Crossed Method Used: Yes  Position Performed: Seated in chair(educated on minimizing deep bending)  Cues: Verbal cues provided;Visual cues provided    Cognitive Retraining  Safety/Judgement: Awareness of environment    Therapeutic Exercises:   Encouraged tailor sitting stretch in am/pm while supine in bed to facilitate ease of access to lower body w/ minimal exertion and use of UEs to achieve. Pain:  No reports. Activity Tolerance:   Fair and requires rest breaks    After treatment patient left in no apparent distress:   Sitting in chair and Call bell within reach    COMMUNICATION/COLLABORATION:   The patients plan of care was discussed with: Physical therapist and Registered nurse.      Jeannette Koyanagi, OT  Time Calculation: 27 mins

## 2021-03-08 NOTE — PROGRESS NOTES
Bedside shift change report given to Mahesh Zazueta RN (oncoming nurse) by MAYUR Medeiros (offgoing nurse). Report included the following information SBAR, Kardex, Intake/Output, MAR, Accordion and Cardiac Rhythm NSR.

## 2021-03-08 NOTE — PROGRESS NOTES
RUR - 20%     Disposition: TBD.suject to change pending reccommendations and pending medical progression. Per PT recommend Home health PT - will await orders/qualifying documentation for home health SN and PT and home O2 if needed   Chart notes indicate patient to be discharged greater than 48 hours     Follow-up with PCP - has not PCP will send referral to CM specialist once a discharge date is better identified. Transport- TBD based on LOF/what is medically appropriate    Will require 2nd IMM letter prior to discharge    Cm will continue to follow and assist with SORIN as needs arise.  David Che MSW

## 2021-03-08 NOTE — PROGRESS NOTES
Bedside and Verbal shift change report given to javier (oncoming nurse) by Chandrakant Fry (offgoing nurse). Report included the following information SBAR, Kardex, Intake/Output, MAR, Recent Results and Cardiac Rhythm NSR.

## 2021-03-08 NOTE — PROGRESS NOTES
[address] [facility] Adult  Hospitalist Group                                                                                          Hospitalist Progress Note  Arelis Madera MD  Answering service: [phone] from in house phone        Date of Service:  3/8/2021  NAME:  Codie Perkins  :  1955  MRN:  [id number]      Admission Summary:      Mr. Jenn Menendez is a 47-year-old black male who was admitted to [facility] ICU on  for severe Covid 19 pneumonia complicated by acute hypoxic respiratory failure. Patient initially presented to [facility] on  where he was admitted and was being treated with oxygen 4 L/min via nasal cannula. He was also treated with Decadron and azithromycin and ceftriaxone. On  he started becoming more hypoxic and his oxygen demand increased to 15 L/min via nasal cannula. Later on that day, he was tried on CPAP but patient was unable to tolerate it. He was transferred to [facility] on  as [facility] does not have ICU. He has been being treated with high flow oxygen with prone as tolerated. He had required Precedex drip for hyperactive delirium that has resolved and he is off the Precedex. He developed VARINDER that has resolved. Interval history / Subjective:        No acute overnight events. Decreased to 30l/m HFNC overnight. Comfortable, denies sob or cp. NAD. Assessment & Plan:   Acute hypoxic respiratory failure secondary to severe COVID-19 pneumonia  · on oxygen via high flow nasal cannula, wean as able  · Steroids  · S/p abx  · Continue vitamin C, vitamin D  · procal low 0.05, trend inflammatory markers     Acute metabolic encephalopathy, acute hyperactive delirium. Resolved  · Weaned off Precedex drip  · Wean trazodone     Type 2 diabetes with hyperglycemia  · continue Lantus, add cheikh Humalog  · Cont SSI  · Monitor on steroids     Hypertension, above goal, history of CAD status post CABG  · increased lisinopril, cont nifedipine ER, metoprolol succinate     BPH: Continue tamsulosin  Sleep aid: Melatonin 6 mg nightly     DVT prophylaxis: Enoxaparin per Covid protocol  CODE STATUS: Full  Anticipated discharge>48h, likely needs SNF/IPR. Patient lives alone. Hospital Problems  Never Reviewed          Codes Class Noted POA    Respiratory failure with hypoxia [facility]) ICD-10-CM: J96.91  ICD-9-CM: 518.81  2/22/2021 Unknown                Review of Systems:    per HPI      Vital Signs:    Last 24hrs VS reviewed since prior progress note. Most recent are:  Visit Vitals  BP (!) 154/67 (BP 1 Location: Right arm, BP Patient Position: At rest)   Pulse 68   Temp 97.1 °F (36.2 °C)   Resp 22   Ht 5' 10\" (1.778 m)   Wt 112.4 kg (247 lb 14.4 oz)   SpO2 96%   BMI 35.57 kg/m²         Intake/Output Summary (Last 24 hours) at 3/8/2021 0945  Last data filed at 3/8/2021 0554  Gross per 24 hour   Intake --   Output 200 ml   Net -200 ml        Physical Examination:     I had a face to face encounter with this patient and independently examined them on3/8/2021 as outlined below:        Constitutional:  Pleasant gentleman,on oxygen via HFNC,sitting in bed,not in distress   Resp:  On oxygen via HFNC. CTA bilaterally. No wheezing/rhonchi/rales.  No accessory muscle use   Chest Wall: No deformity   CV:  Regular rhythm, normal rate    GI:  Soft, non distended, non tender     Musculoskeletal:  No edema, warm    Neurologic:  Mental status:AAO   Cranial nerves II-XII : WNL  Motor exam: INMAN            Data Review:    Review and/or order of clinical lab test  Review and/or order of tests in the radiology section of CPT  Review and/or order of tests in the medicine section of CPT      Labs:     Recent Labs     03/08/21  0001 03/07/21 0429   WBC 10.4 7.5   HGB 9.5* 9.4*   HCT 31.3* 31.2*    266     Recent Labs     03/08/21  0001 03/07/21 0429 03/06/21  0524    137 137   K 4.2 4.7 3.9    104 106   CO2 28 28 26   BUN 19 16 11   CREA 1.06 0.86 0.73 * 295* 136*   CA 9.0 9.2 9.0   MG  --  2.3 2.1   PHOS  --  3.4 3.4     No results for input(s): ALT, AP, TBIL, TBILI, TP, ALB, GLOB, GGT, AML, LPSE in the last 72 hours. No lab exists for component: SGOT, GPT, AMYP, HLPSE  No results for input(s): INR, PTP, APTT, INREXT, INREXT in the last 72 hours. No results for input(s): FE, TIBC, PSAT, FERR in the last 72 hours. No results found for: FOL, RBCF   No results for input(s): PH, PCO2, PO2 in the last 72 hours. No results for input(s): CPK, CKNDX, TROIQ in the last 72 hours.     No lab exists for component: CPKMB  Lab Results   Component Value Date/Time    Cholesterol, total 205 (H) 12/13/2020 08:50 AM    HDL Cholesterol 93 12/13/2020 08:50 AM    LDL, calculated 97 12/13/2020 08:50 AM    Triglyceride 75 12/13/2020 08:50 AM    CHOL/HDL Ratio 2.2 12/13/2020 08:50 AM     Lab Results   Component Value Date/Time    Glucose (POC) 387 (H) 03/08/2021 06:48 AM    Glucose (POC) 303 (H) 03/07/2021 11:43 PM    Glucose (POC) 314 (H) 03/07/2021 09:19 PM    Glucose (POC) 316 (H) 03/07/2021 05:41 PM    Glucose (POC) 250 (H) 03/07/2021 11:44 AM     No results found for: COLOR, APPRN, SPGRU, REFSG, BALDEMAR, PROTU, GLUCU, KETU, BILU, UROU, ENRIQUETA, LEUKU, GLUKE, EPSU, BACTU, WBCU, RBCU, CASTS, UCRY      Medications Reviewed:     Current Facility-Administered Medications   Medication Dose Route Frequency    traZODone (DESYREL) tablet 25 mg  25 mg Oral QHS    insulin glargine (LANTUS) injection 20 Units  20 Units SubCUTAneous QHS    lisinopriL (PRINIVIL, ZESTRIL) tablet 20 mg  20 mg Oral DAILY    methylPREDNISolone (PF) (SOLU-MEDROL) injection 40 mg  40 mg IntraVENous Q12H    furosemide (LASIX) tablet 40 mg  40 mg Oral DAILY    metoprolol succinate (TOPROL-XL) XL tablet 50 mg  50 mg Oral BIDPC    insulin lispro (HUMALOG) injection   SubCUTAneous AC&HS    melatonin tablet 6 mg  6 mg Oral QHS    senna-docusate (PERICOLACE) 8.6-50 mg per tablet 1 Tab  1 Tab Oral DAILY    albuterol (PROVENTIL HFA, VENTOLIN HFA, PROAIR HFA) inhaler 1 Puff  1 Puff Inhalation Q6H PRN    And    ipratropium (ATROVENT HFA) 17 mcg inhaler  1 Puff Inhalation Q6H PRN    glucose chewable tablet 16 g  4 Tab Oral PRN    dextrose (D50W) injection syrg 12.5-25 g  12.5-25 g IntraVENous PRN    glucagon (GLUCAGEN) injection 1 mg  1 mg IntraMUSCular PRN    NIFEdipine ER (PROCARDIA XL) tablet 30 mg  30 mg Oral DAILY    sodium chloride (NS) flush 5-40 mL  5-40 mL IntraVENous Q8H    sodium chloride (NS) flush 5-40 mL  5-40 mL IntraVENous PRN    acetaminophen (TYLENOL) tablet 650 mg  650 mg Oral Q6H PRN    Or    acetaminophen (TYLENOL) suppository 650 mg  650 mg Rectal Q6H PRN    polyethylene glycol (MIRALAX) packet 17 g  17 g Oral DAILY PRN    promethazine (PHENERGAN) tablet 12.5 mg  12.5 mg Oral Q6H PRN    Or    ondansetron (ZOFRAN) injection 4 mg  4 mg IntraVENous Q6H PRN    enoxaparin (LOVENOX) injection 40 mg  40 mg SubCUTAneous Q12H    guaiFENesin-dextromethorphan (ROBITUSSIN DM) 100-10 mg/5 mL syrup 5 mL  5 mL Oral Q4H PRN    cholecalciferol (VITAMIN D3) (1000 Units /25 mcg) tablet 2,000 Units  2,000 Units Oral DAILY    ascorbic acid (vitamin C) (VITAMIN C) tablet 1,000 mg  1,000 mg Oral DAILY    pantoprazole (PROTONIX) tablet 40 mg  40 mg Oral ACB    tamsulosin (FLOMAX) capsule 0.4 mg  0.4 mg Oral QHS    hydrALAZINE (APRESOLINE) 20 mg/mL injection 10-20 mg  10-20 mg IntraVENous Q6H PRN     ______________________________________________________________________  EXPECTED LENGTH OF STAY: 5d 12h  ACTUAL LENGTH OF STAY:          14                 Trent Monteiro MD

## 2021-03-09 LAB
GLUCOSE BLD STRIP.AUTO-MCNC: 176 MG/DL (ref 65–100)
GLUCOSE BLD STRIP.AUTO-MCNC: 247 MG/DL (ref 65–100)
GLUCOSE BLD STRIP.AUTO-MCNC: 337 MG/DL (ref 65–100)
GLUCOSE BLD STRIP.AUTO-MCNC: 385 MG/DL (ref 65–100)
GLUCOSE BLD STRIP.AUTO-MCNC: 386 MG/DL (ref 65–100)
SERVICE CMNT-IMP: ABNORMAL

## 2021-03-09 PROCEDURE — 74011250637 HC RX REV CODE- 250/637: Performed by: INTERNAL MEDICINE

## 2021-03-09 PROCEDURE — 74011250637 HC RX REV CODE- 250/637: Performed by: NURSE PRACTITIONER

## 2021-03-09 PROCEDURE — 74011636637 HC RX REV CODE- 636/637: Performed by: NURSE PRACTITIONER

## 2021-03-09 PROCEDURE — 74011250636 HC RX REV CODE- 250/636: Performed by: NURSE PRACTITIONER

## 2021-03-09 PROCEDURE — 74011250637 HC RX REV CODE- 250/637: Performed by: HOSPITALIST

## 2021-03-09 PROCEDURE — 65660000001 HC RM ICU INTERMED STEPDOWN

## 2021-03-09 PROCEDURE — 97530 THERAPEUTIC ACTIVITIES: CPT

## 2021-03-09 PROCEDURE — 74011250636 HC RX REV CODE- 250/636: Performed by: INTERNAL MEDICINE

## 2021-03-09 PROCEDURE — 74011636637 HC RX REV CODE- 636/637: Performed by: INTERNAL MEDICINE

## 2021-03-09 PROCEDURE — 77010033711 HC HIGH FLOW OXYGEN

## 2021-03-09 PROCEDURE — 82962 GLUCOSE BLOOD TEST: CPT

## 2021-03-09 RX ADMIN — Medication 10 ML: at 06:00

## 2021-03-09 RX ADMIN — FUROSEMIDE 40 MG: 40 TABLET ORAL at 10:17

## 2021-03-09 RX ADMIN — INSULIN LISPRO 5 UNITS: 100 INJECTION, SOLUTION INTRAVENOUS; SUBCUTANEOUS at 22:10

## 2021-03-09 RX ADMIN — ENOXAPARIN SODIUM 40 MG: 100 INJECTION SUBCUTANEOUS at 18:17

## 2021-03-09 RX ADMIN — INSULIN GLARGINE 25 UNITS: 100 INJECTION, SOLUTION SUBCUTANEOUS at 22:00

## 2021-03-09 RX ADMIN — METHYLPREDNISOLONE SODIUM SUCCINATE 80 MG: 40 INJECTION, POWDER, FOR SOLUTION INTRAMUSCULAR; INTRAVENOUS at 10:17

## 2021-03-09 RX ADMIN — Medication 6 MG: at 22:11

## 2021-03-09 RX ADMIN — INSULIN LISPRO 5 UNITS: 100 INJECTION, SOLUTION INTRAVENOUS; SUBCUTANEOUS at 17:29

## 2021-03-09 RX ADMIN — LISINOPRIL 20 MG: 20 TABLET ORAL at 10:17

## 2021-03-09 RX ADMIN — INSULIN LISPRO 3 UNITS: 100 INJECTION, SOLUTION INTRAVENOUS; SUBCUTANEOUS at 06:53

## 2021-03-09 RX ADMIN — TAMSULOSIN HYDROCHLORIDE 0.4 MG: 0.4 CAPSULE ORAL at 22:11

## 2021-03-09 RX ADMIN — INSULIN LISPRO 5 UNITS: 100 INJECTION, SOLUTION INTRAVENOUS; SUBCUTANEOUS at 06:54

## 2021-03-09 RX ADMIN — METHYLPREDNISOLONE SODIUM SUCCINATE 80 MG: 40 INJECTION, POWDER, FOR SOLUTION INTRAMUSCULAR; INTRAVENOUS at 22:11

## 2021-03-09 RX ADMIN — NIFEDIPINE 30 MG: 30 TABLET, FILM COATED, EXTENDED RELEASE ORAL at 10:17

## 2021-03-09 RX ADMIN — Medication 10 ML: at 14:02

## 2021-03-09 RX ADMIN — Medication 10 ML: at 22:12

## 2021-03-09 RX ADMIN — INSULIN LISPRO 5 UNITS: 100 INJECTION, SOLUTION INTRAVENOUS; SUBCUTANEOUS at 12:40

## 2021-03-09 RX ADMIN — METOPROLOL SUCCINATE 25 MG: 50 TABLET, EXTENDED RELEASE ORAL at 10:17

## 2021-03-09 RX ADMIN — OXYCODONE HYDROCHLORIDE AND ACETAMINOPHEN 1000 MG: 500 TABLET ORAL at 10:17

## 2021-03-09 RX ADMIN — METOPROLOL SUCCINATE 50 MG: 50 TABLET, EXTENDED RELEASE ORAL at 18:17

## 2021-03-09 RX ADMIN — DOCUSATE SODIUM 50 MG AND SENNOSIDES 8.6 MG 1 TABLET: 8.6; 5 TABLET, FILM COATED ORAL at 10:17

## 2021-03-09 RX ADMIN — INSULIN LISPRO 10 UNITS: 100 INJECTION, SOLUTION INTRAVENOUS; SUBCUTANEOUS at 17:29

## 2021-03-09 RX ADMIN — INSULIN LISPRO 4 UNITS: 100 INJECTION, SOLUTION INTRAVENOUS; SUBCUTANEOUS at 12:41

## 2021-03-09 RX ADMIN — ENOXAPARIN SODIUM 40 MG: 100 INJECTION SUBCUTANEOUS at 06:53

## 2021-03-09 RX ADMIN — PANTOPRAZOLE SODIUM 40 MG: 40 TABLET, DELAYED RELEASE ORAL at 06:53

## 2021-03-09 RX ADMIN — HYDRALAZINE HYDROCHLORIDE 20 MG: 20 INJECTION INTRAMUSCULAR; INTRAVENOUS at 18:18

## 2021-03-09 RX ADMIN — Medication 2000 UNITS: at 10:17

## 2021-03-09 NOTE — PROGRESS NOTES
Bedside shift change report given to Lorenzo RN (oncoming nurse) by Rose Marie Carrasco RN (offgoing nurse). Report included the following information SBAR, Kardex, MAR and Cardiac Rhythm NSR.

## 2021-03-09 NOTE — CONSULTS
Palliative Medicine    Patient making slow progress, goals clear for aggressive interventions.   Will sign off  Please call as needed [phone]

## 2021-03-09 NOTE — PROGRESS NOTES
Problem: Mobility Impaired (Adult and Pediatric)  Goal: *Acute Goals and Plan of Care (Insert Text)  Description: FUNCTIONAL STATUS PRIOR TO ADMISSION: Patient was independent and active. Occasional use of SPC. HOME SUPPORT PRIOR TO ADMISSION: Pt lives alone    Physical Therapy Goals  Initiated 3/3/2021  1. Patient will transfer from bed to chair and chair to bed with modified independence using the least restrictive device within 7 day(s). 2.  Patient will perform sit to stand with modified independence within 7 day(s). 3.  Patient will ambulate with modified independence for 10 feet with the least restrictive device within 7 day(s). 4.  Patient will tolerate static standing balance unsupported with O2 sats >90% within 7 day(s). Outcome: Progressing Towards Goal     PHYSICAL THERAPY TREATMENT  Patient: Shonna Vee (81 y.o. male)  Date: 3/9/2021  Diagnosis: Respiratory failure with hypoxia ([facility] [address] [J96.91] <principal problem not specified>       Precautions: Fall(droplet plus)  Chart, physical therapy assessment, plan of care and goals were reviewed. ASSESSMENT  Patient continues with skilled PT services and is progressing towards goals. Pt demos 92% O2 in bed, able to perform bed mobility without assistance. Pt sitting EOB and requires increased time today for recovery back to >88%, approximately 7 minutes with focus on breathing. Pt struggles to fully inhale, given incentive spirometer however only able to achieve approx 750 prior to exhalation. Pt stood and mild increased O2 sats of 91% for a minute prior to desaturation again. Pt appears to be able to drop diaphragm more in standing. Pt transfers to chair, titrated up to 40L/min to increase O2 as it drops to <85%. Pt recovers with focus on breathing, titrated back down to 30L. Pt demos increased volatility in O2 at this time, unable to progress mobility due to desaturation. Will continue to follow.      Current Level of Function Impacting Discharge (mobility/balance): supervision overall, limited by O2 desaturation    Other factors to consider for discharge:          PLAN :  Patient continues to benefit from skilled intervention to address the above impairments. Continue treatment per established plan of care. to address goals. Recommendation for discharge: (in order for the patient to meet his/her long term goals)  Physical therapy at least 2 days/week in the home     This discharge recommendation:  Has been made in collaboration with the attending provider and/or case management    IF patient discharges home will need the following DME: to be determined (TBD)       SUBJECTIVE:   Patient stated I sat up for 2 hours yesterday.     OBJECTIVE DATA SUMMARY:   Critical Behavior:  Neurologic State: Alert  Orientation Level: Oriented X4  Cognition: Appropriate decision making  Safety/Judgement: Awareness of environment  Functional Mobility Training:  Bed Mobility:     Supine to Sit: Modified independent              Transfers:  Sit to Stand: Supervision  Stand to Sit: Supervision        Bed to Chair: Supervision                    Balance:  Sitting: Intact  Standing: Intact; Without support  Standing - Static: Good;Fair  Standing - Dynamic : Good;Fair  Ambulation/Gait Training:  Distance (ft): 3 Feet (ft)     Ambulation - Level of Assistance: Stand-by assistance  Base of Support: Narrowed     Speed/Elsie: Slow    Pain Rating:  none    Activity Tolerance:   Fair and limited O2 tolerance    After treatment patient left in no apparent distress:   Sitting in chair and Call bell within reach    COMMUNICATION/COLLABORATION:   The patients plan of care was discussed with: Registered nurse and Case management.      Tania Rosales, PT   Time Calculation: 24 mins

## 2021-03-09 NOTE — PROGRESS NOTES
Comprehensive Nutrition Assessment    Type and Reason for Visit: Reassess    Nutrition Recommendations/Plan:      Continue Consistent Carb 2200 kcal  Encourage oral intake  Give medications with nutrition supplements. Continue Magic cup, Glucerna shakes BID    Nutrition Assessment:   Pt admitted d/t Respiratory failure with hypoxia. PMHx: DM 2, CAD, HTN. COVID 19 PNA with subsequent hypoxic respiratory failure. Continues on HFNC. Anxiety. PC following. Attempted to see pt this afternoon however did not want to wake up-sleeping soundly. PO intake good at the beginning of hospitalization (eating close to 100%). No documentation of current PO intakes. Continue oral supplements ordered. Previously pt had fair intake of these supplements. Pt did not answer phone on attempted calls. Wt down 3 lbs over last 7 days. Anxiety and depression may be playing a role in decreased oral intake. Nursing also noted pt seems bored; frustrated with being stuck in his room. Malnutrition Assessment:  Malnutrition Status: At risk for malnutrition (specify) d/t poor PO intake? Context:  Acute illness       Nutritionally Significant Medications:   Vit C, Vit D3, Lantus, Humalog, Protonix, Pericolace, Zinc sulfate, Precedex    Estimated Daily Nutrient Needs:  Energy (kcal): 2180 (MSJ x 1.2); Weight Used for Energy Requirements: Current(103.4 kg)  Protein (g): 103 (1g/kg or 1.4g/kg IBW); Weight Used for Protein Requirements: Current(103 kg)  Fluid (ml/day): 2180; Method Used for Fluid Requirements: 1 ml/kcal    Nutrition Related Findings:       BM: 3/8  Edema: None  Wounds:  None       Current Nutrition Therapies:   Diet: Diabetic Consistent CHO 2200 kcal  Supplements: Glucerna shake bid, Magic cup @ lunch  Additional Caloric Sources:  None  Meal intake:   No data found.       Anthropometric Measures:  · Height:  5' 10\" (177.8 cm)  · Current Body Wt:  113.4 kg (250 lb)   · Admission Body Wt:  222 lb 3.6 oz     · Ideal Body Wt: 166#:  150.6 %    · BMI Categories:  Obese class 1 (BMI 30.0-34. 9)     Wt Readings from Last 10 Encounters:   03/09/21 112.4 kg (247 lb 12.8 oz)   02/17/21 108.9 kg (240 lb)   12/12/20 111.1 kg (245 lb)       Nutrition Diagnosis:   · Inadequate oral intake related to impaired respiratory function as evidenced by intake 26-50%    Nutrition Interventions:   Food and/or Nutrient Delivery: Continue current diet, Continue oral nutrition supplement  Nutrition Education and Counseling: No recommendations at this time  Coordination of Nutrition Care: Continue to monitor while inpatient, Interdisciplinary rounds    Goals:  Consume 75% of meals and 1-2 supplements per day in next 5-7 days.        Nutrition Monitoring and Evaluation:   Behavioral-Environmental Outcomes: None identified  Food/Nutrient Intake Outcomes: Food and nutrient intake, Supplement intake  Physical Signs/Symptoms Outcomes: Biochemical data, Weight, Fluid status or edema, Meal time behavior    Discharge Planning:    Continue oral nutrition supplement, Continue current diet     Javier Marshall [facility] [address]: [phone]

## 2021-03-09 NOTE — ROUTINE PROCESS
Bedside and Verbal shift change report given to Qi Harden (oncoming nurse) by Shaq Rothman (offgoing nurse). Report included the following information SBAR, Kardex, Intake/Output, MAR, Recent Results and Cardiac Rhythm NSR.

## 2021-03-10 LAB
GLUCOSE BLD STRIP.AUTO-MCNC: 221 MG/DL (ref 65–100)
GLUCOSE BLD STRIP.AUTO-MCNC: 243 MG/DL (ref 65–100)
GLUCOSE BLD STRIP.AUTO-MCNC: 304 MG/DL (ref 65–100)
SERVICE CMNT-IMP: ABNORMAL

## 2021-03-10 PROCEDURE — 74011636637 HC RX REV CODE- 636/637: Performed by: NURSE PRACTITIONER

## 2021-03-10 PROCEDURE — 77010033711 HC HIGH FLOW OXYGEN

## 2021-03-10 PROCEDURE — 74011250637 HC RX REV CODE- 250/637: Performed by: NURSE PRACTITIONER

## 2021-03-10 PROCEDURE — 74011250637 HC RX REV CODE- 250/637: Performed by: INTERNAL MEDICINE

## 2021-03-10 PROCEDURE — 74011250636 HC RX REV CODE- 250/636: Performed by: NURSE PRACTITIONER

## 2021-03-10 PROCEDURE — 74011636637 HC RX REV CODE- 636/637: Performed by: INTERNAL MEDICINE

## 2021-03-10 PROCEDURE — 94760 N-INVAS EAR/PLS OXIMETRY 1: CPT

## 2021-03-10 PROCEDURE — 65660000001 HC RM ICU INTERMED STEPDOWN

## 2021-03-10 PROCEDURE — 82962 GLUCOSE BLOOD TEST: CPT

## 2021-03-10 PROCEDURE — 74011250637 HC RX REV CODE- 250/637: Performed by: HOSPITALIST

## 2021-03-10 PROCEDURE — 74011250636 HC RX REV CODE- 250/636: Performed by: INTERNAL MEDICINE

## 2021-03-10 RX ORDER — INSULIN GLARGINE 100 [IU]/ML
30 INJECTION, SOLUTION SUBCUTANEOUS
Status: DISCONTINUED | OUTPATIENT
Start: 2021-03-10 | End: 2021-03-13

## 2021-03-10 RX ORDER — LISINOPRIL 20 MG/1
40 TABLET ORAL DAILY
Status: DISCONTINUED | OUTPATIENT
Start: 2021-03-11 | End: 2021-03-15 | Stop reason: HOSPADM

## 2021-03-10 RX ORDER — INSULIN LISPRO 100 [IU]/ML
10 INJECTION, SOLUTION INTRAVENOUS; SUBCUTANEOUS
Status: DISCONTINUED | OUTPATIENT
Start: 2021-03-10 | End: 2021-03-15 | Stop reason: HOSPADM

## 2021-03-10 RX ADMIN — METHYLPREDNISOLONE SODIUM SUCCINATE 80 MG: 40 INJECTION, POWDER, FOR SOLUTION INTRAMUSCULAR; INTRAVENOUS at 22:29

## 2021-03-10 RX ADMIN — Medication 10 ML: at 13:37

## 2021-03-10 RX ADMIN — Medication 2000 UNITS: at 09:00

## 2021-03-10 RX ADMIN — METHYLPREDNISOLONE SODIUM SUCCINATE 80 MG: 40 INJECTION, POWDER, FOR SOLUTION INTRAMUSCULAR; INTRAVENOUS at 09:01

## 2021-03-10 RX ADMIN — METOPROLOL SUCCINATE 50 MG: 50 TABLET, EXTENDED RELEASE ORAL at 09:01

## 2021-03-10 RX ADMIN — PANTOPRAZOLE SODIUM 40 MG: 40 TABLET, DELAYED RELEASE ORAL at 06:49

## 2021-03-10 RX ADMIN — NIFEDIPINE 30 MG: 30 TABLET, FILM COATED, EXTENDED RELEASE ORAL at 09:01

## 2021-03-10 RX ADMIN — ENOXAPARIN SODIUM 40 MG: 100 INJECTION SUBCUTANEOUS at 18:06

## 2021-03-10 RX ADMIN — ENOXAPARIN SODIUM 40 MG: 100 INJECTION SUBCUTANEOUS at 06:48

## 2021-03-10 RX ADMIN — LISINOPRIL 20 MG: 20 TABLET ORAL at 09:01

## 2021-03-10 RX ADMIN — METOPROLOL SUCCINATE 50 MG: 50 TABLET, EXTENDED RELEASE ORAL at 17:31

## 2021-03-10 RX ADMIN — TAMSULOSIN HYDROCHLORIDE 0.4 MG: 0.4 CAPSULE ORAL at 22:28

## 2021-03-10 RX ADMIN — OXYCODONE HYDROCHLORIDE AND ACETAMINOPHEN 1000 MG: 500 TABLET ORAL at 09:00

## 2021-03-10 RX ADMIN — DOCUSATE SODIUM 50 MG AND SENNOSIDES 8.6 MG 1 TABLET: 8.6; 5 TABLET, FILM COATED ORAL at 09:01

## 2021-03-10 RX ADMIN — FUROSEMIDE 40 MG: 40 TABLET ORAL at 09:01

## 2021-03-10 RX ADMIN — Medication 10 ML: at 06:49

## 2021-03-10 RX ADMIN — INSULIN GLARGINE 30 UNITS: 100 INJECTION, SOLUTION SUBCUTANEOUS at 22:29

## 2021-03-10 RX ADMIN — INSULIN LISPRO 5 UNITS: 100 INJECTION, SOLUTION INTRAVENOUS; SUBCUTANEOUS at 06:49

## 2021-03-10 RX ADMIN — INSULIN LISPRO 10 UNITS: 100 INJECTION, SOLUTION INTRAVENOUS; SUBCUTANEOUS at 17:28

## 2021-03-10 RX ADMIN — INSULIN LISPRO 10 UNITS: 100 INJECTION, SOLUTION INTRAVENOUS; SUBCUTANEOUS at 11:56

## 2021-03-10 RX ADMIN — INSULIN LISPRO 4 UNITS: 100 INJECTION, SOLUTION INTRAVENOUS; SUBCUTANEOUS at 17:30

## 2021-03-10 RX ADMIN — INSULIN LISPRO 10 UNITS: 100 INJECTION, SOLUTION INTRAVENOUS; SUBCUTANEOUS at 06:49

## 2021-03-10 RX ADMIN — INSULIN LISPRO 4 UNITS: 100 INJECTION, SOLUTION INTRAVENOUS; SUBCUTANEOUS at 11:55

## 2021-03-10 RX ADMIN — HYDRALAZINE HYDROCHLORIDE 20 MG: 20 INJECTION INTRAMUSCULAR; INTRAVENOUS at 16:54

## 2021-03-10 RX ADMIN — Medication 6 MG: at 22:28

## 2021-03-10 RX ADMIN — INSULIN LISPRO 2 UNITS: 100 INJECTION, SOLUTION INTRAVENOUS; SUBCUTANEOUS at 22:29

## 2021-03-10 RX ADMIN — Medication 10 ML: at 22:29

## 2021-03-10 NOTE — PROGRESS NOTES
See additional CM note- CM will attempt to locate home health services for this patient[place] [facility], will discuss further with patient and send out wide-range referrals.      TRANG Caldwell

## 2021-03-10 NOTE — PROGRESS NOTES
Bedside and Verbal shift change report given to Rojean Felty (oncoming nurse) by Khushbu Maldonado (offgoing nurse).  Report included the following information SBAR, Kardex, Intake/Output, MAR and Cardiac Rhythm SR.

## 2021-03-10 NOTE — ED PROVIDER NOTES
EMERGENCY DEPARTMENT HISTORY AND PHYSICAL EXAM      Date: 2/17/2021  Patient Name: Hua Espinosa    History of Presenting Illness     Chief Complaint   Patient presents with    Cough    Headache       History Provided By: Patient    HPI: Hua Espinosa, 77 y.o. male with a past medical history significant diabetes, hypertension, hyperlipidemia, myocardial infarction, renal insufficiency and COPD presents to the ED with cc of feeling weak, dizzy,  coughing, chest congestion and dyspnea on exertion for a few days. States his breathing was getting worse. Patient denies any bleeding, no vomiting, no diarrhea, no back or abdominal pain, no headaches, or no syncope    There are no other complaints, changes, or physical findings at this time. PCP: None    No current facility-administered medications on file prior to encounter. Current Outpatient Medications on File Prior to Encounter   Medication Sig Dispense Refill    lisinopriL (PRINIVIL, ZESTRIL) 10 mg tablet Take 10 mg by mouth daily.  magnesium oxide (MAG-OX) 400 mg tablet Take 1 Tab by mouth daily. 30 Tab 0    metoprolol succinate (TOPROL-XL) 50 mg XL tablet Take 1 Tab by mouth two (2) times daily (after meals). 60 Tab 0    NIFEdipine ER (PROCARDIA XL) 30 mg ER tablet Take 1 Tab by mouth daily. 30 Tab 0    potassium chloride (K-DUR, KLOR-CON) 20 mEq tablet Take 1 Tab by mouth daily. 30 Tab 0    isosorbide mononitrate ER (IMDUR) 60 mg CR tablet Take 60 mg by mouth daily.  atorvastatin (LIPITOR) 20 mg tablet Take 20 mg by mouth daily.  furosemide (Lasix) 40 mg tablet Take 40 mg by mouth daily.  metFORMIN (GLUCOPHAGE) 1,000 mg tablet Take 1,000 mg by mouth two (2) times daily (with meals).  tamsulosin (Flomax) 0.4 mg capsule Take 0.4 mg by mouth nightly.  pantoprazole (Protonix) 40 mg tablet Take 40 mg by mouth daily.  traZODone (DESYREL) 50 mg tablet Take 50 mg by mouth two (2) times a day.          Past History Past Medical History:  Past Medical History:   Diagnosis Date    CAD (coronary artery disease)     MI (myocardial infarction) ([facility] [address]        Past Surgical History:  History reviewed. No pertinent surgical history. Family History:  Family History   Problem Relation Age of Onset    Hypertension Mother     Hypertension Father        Social History:  with children  Social History     Tobacco Use    Smoking status: Former Smoker    Smokeless tobacco: Former User   Substance Use Topics    Alcohol use: Never     Frequency: Never    Drug use: Never       Allergies:  No Known Allergies      Review of Systems     Review of Systems   Constitutional: Negative. HENT: Negative. Eyes: Negative. Respiratory: Positive for chest tightness, shortness of breath and wheezing. Cardiovascular: Negative. Gastrointestinal: Negative. Endocrine: Negative. Genitourinary: Negative. Musculoskeletal: Negative. Allergic/Immunologic: Negative. Neurological: Negative. Hematological: Negative. Psychiatric/Behavioral: Negative. Physical Exam  Vitals signs and nursing note reviewed. Constitutional:       Appearance: He is normal weight. He is ill-appearing. HENT:      Head: Normocephalic and atraumatic. Right Ear: Tympanic membrane normal.      Left Ear: Tympanic membrane normal.      Nose: Nose normal.      Mouth/Throat:      Mouth: Mucous membranes are moist.   Eyes:      Extraocular Movements: Extraocular movements intact. Pupils: Pupils are equal, round, and reactive to light. Neck:      Musculoskeletal: Normal range of motion and neck supple. Cardiovascular:      Rate and Rhythm: Normal rate and regular rhythm. Pulses: Normal pulses. Heart sounds: Normal heart sounds. Pulmonary:      Effort: Pulmonary effort is normal.      Breath sounds: Wheezing and rhonchi present. Abdominal:      General: Abdomen is flat.  Bowel sounds are normal.      Palpations: Abdomen is soft. Musculoskeletal: Normal range of motion. Right lower leg: Edema present. Left lower leg: Edema present. Skin:     General: Skin is warm and dry. Neurological:      General: No focal deficit present. Mental Status: He is alert and oriented to person, place, and time. Mental status is at baseline. Motor: Weakness present. Psychiatric:         Mood and Affect: Mood normal.         Behavior: Behavior normal.         Thought Content: Thought content normal.         Judgment: Judgment normal.         Lab and Diagnostic Study Results     Labs -     Recent Results (from the past 12 hour(s))   GLUCOSE, POC    Collection Time: 03/10/21  6:27 AM   Result Value Ref Range    Glucose (POC) 304 (H) 65 - 100 mg/dL    Performed by Monday Meena Musa        Radiologic Studies -   @lastxrresult@  CT Results  (Last 48 hours)    None        CXR Results  (Last 48 hours)    None            Medical Decision Making   - I am the first provider for this patient. - I reviewed the vital signs, available nursing notes, past medical history, past surgical history, family history and social history. - Initial assessment performed. The patients presenting problems have been discussed, and they are in agreement with the care plan formulated and outlined with them. I have encouraged them to ask questions as they arise throughout their visit. Vital Signs-Reviewed the patient's vital signs. No data found. Records Reviewed: Nursing Notes    The patient presents with altered mental status with a differential diagnosis of  cerebral hemorrhage, chronic dementia, CVA, CVA/TIA, encephalopathy, hepatic encephalopathy, hypernatremia, hyponatremia, hypoxia, insulin reaction, UTI and volume depletion      ED Course:   Patient's symptoms, examination, lab results and CT Scan of Chest & Head were noted and reviewed.  Will discuss case with Hospitalist and will patient for further management of the patient's COVID Pneumonia. Provider Notes (Medical Decision Making):   Patient's symptoms, examination, lab results and CT Scan of Chest & Head were noted and reviewed. Will discuss case with Hospitalist and will patient for further management of the patient's COVID Pneumonia. MDM       Procedures   Medical Decision Makingedical Decision Making  Performed by: Liv Drake MD  PROCEDURES:    None  Procedures       Disposition   Disposition: Admitted to Floor Medical Floor the case was discussed with the admitting physician Dr. Vera Field and Hospitalist on duty    Admitted    DISCHARGE PLAN:  1. This patient is currently admitted, however, discharge medications can only be displayed within the current admission encounter. 2.   Follow-up Information    None       3. Return to ED if worse   4. Discharge Medication List as of 2/22/2021  5:38 AM            Diagnosis     Clinical Impression: Covid-19 Pneumonia    Attestations:      ICD-10-CM ICD-9-CM    1. Pneumonia due to COVID-19 virus  U07.1 480.8     J12.82     2. Acute respiratory insufficiency  R06.89 Zofia Gray MD    Please note that this dictation was completed with [employer], the computer voice recognition software. Quite often unanticipated grammatical, syntax, homophones, and other interpretive errors are inadvertently transcribed by the computer software. Please disregard these errors. Please excuse any errors that have escaped final proofreading. Thank you.

## 2021-03-10 NOTE — PROGRESS NOTES
[facility]ist [facility]                                                                                          Hospitalist Progress Note  Josephine Bocanegra MD  Answering service: [phone] from in house phone        Date of Service:  3/10/2021  NAME:  Galo Vegas  :  1955  MRN:  [id number]      Admission Summary:      Mr. Uri Weston is a 15-year-old black male who was admitted to [facility] ICU on  for severe Covid 19 pneumonia complicated by acute hypoxic respiratory failure. Patient initially presented to [facility] on  where he was admitted and was being treated with oxygen 4 L/min via nasal cannula. He was also treated with Decadron and azithromycin and ceftriaxone. On  he started becoming more hypoxic and his oxygen demand increased to 15 L/min via nasal cannula. Later on that day, he was tried on CPAP but patient was unable to tolerate it. He was transferred to [facility] on  as [facility] does not have ICU. He has been being treated with high flow oxygen with prone as tolerated. He had required Precedex drip for hyperactive delirium that has resolved and he is off the Precedex. He developed VARINDER that has resolved. Interval history / Subjective:        No acute overnight events. Remains on 30l/m HFNC. Comfortable, denies sob or cp. NAD. Assessment & Plan:   Acute hypoxic respiratory failure secondary to severe COVID-19 pneumonia  · on oxygen via high flow nasal cannula, wean as able  · Cont high dose Steroids until able to wean o2  · S/p abx  · Continue vitamin C, vitamin D  · procal low 0.05, trend inflammatory markers     Acute metabolic encephalopathy, acute hyperactive delirium. Resolved  · Weaned off Precedex drip  · Weaned off trazodone     Type 2 diabetes with hyperglycemia d/t high dose steroids  · increase Lantus, Humalog  · Cont SSI  · Monitor closely, titrate once steroids are decreased Hypertension, above goal, history of CAD status post CABG  · Increase lisinopril, cont nifedipine ER, metoprolol succinate     BPH: Continue tamsulosin  Sleep aid: Melatonin 6 mg nightly     DVT prophylaxis: Enoxaparin per Covid protocol  CODE STATUS: Full  Anticipated discharge>48h, HH vs SNF/IPR but per CM insurance highly unlikely to cover rehab; also has no PCP, has no SNF benefits only LTC, difficult to secure home health based on no PCP to sign POC, his insurance and he [place] alone [place]    Hospital Problems  Never Reviewed          Codes Class Noted POA    Respiratory failure with hypoxia [facility]) ICD-10-CM: J96.91  ICD-9-CM: 518.81  2/22/2021 Unknown            Review of Systems:    per HPI      Vital Signs:    Last 24hrs VS reviewed since prior progress note. Most recent are:  Visit Vitals  BP (!) 166/70 (BP 1 Location: Right upper arm, BP Patient Position: At rest)   Pulse 79   Temp 98.1 °F (36.7 °C)   Resp 22   Ht 5' 10\" (1.778 m)   Wt 112.4 kg (247 lb 12.8 oz)   SpO2 100%   BMI 35.56 kg/m²         Intake/Output Summary (Last 24 hours) at 3/10/2021 1145  Last data filed at 3/10/2021 0400  Gross per 24 hour   Intake --   Output 980 ml   Net -980 ml        Physical Examination:     I had a face to face encounter with this patient and independently examined them on3/10/2021 as outlined below:        Constitutional:  Pleasant, on HFNC,sitting in bed,not in distress   Resp:  slightly decreased but grossly clear bilaterally. No wheezing/rhonchi/rales.  No accessory muscle use   Chest Wall: No deformity   CV:  Regular rhythm, normal rate    GI:  Soft, non distended, non tender     Musculoskeletal:  No edema, warm    Neurologic:  Mental status:AAO   Cranial nerves II-XII : WNL  Motor exam: INMAN            Data Review:    Review and/or order of clinical lab test  Review and/or order of tests in the radiology section of CPT  Review and/or order of tests in the medicine section of CPT      Labs:     Recent Labs     03/08/21  0001   WBC 10.4   HGB 9.5*   HCT 31.3*        Recent Labs     03/08/21  0001      K 4.2      CO2 28   BUN 19   CREA 1.06   *   CA 9.0     No results for input(s): ALT, AP, TBIL, TBILI, TP, ALB, GLOB, GGT, AML, LPSE in the last 72 hours. No lab exists for component: SGOT, GPT, AMYP, HLPSE  No results for input(s): INR, PTP, APTT, INREXT, INREXT in the last 72 hours. No results for input(s): FE, TIBC, PSAT, FERR in the last 72 hours. No results found for: FOL, RBCF   No results for input(s): PH, PCO2, PO2 in the last 72 hours. No results for input(s): CPK, CKNDX, TROIQ in the last 72 hours.     No lab exists for component: CPKMB  Lab Results   Component Value Date/Time    Cholesterol, total 205 (H) 12/13/2020 08:50 AM    HDL Cholesterol 93 12/13/2020 08:50 AM    LDL, calculated 97 12/13/2020 08:50 AM    Triglyceride 75 12/13/2020 08:50 AM    CHOL/HDL Ratio 2.2 12/13/2020 08:50 AM     Lab Results   Component Value Date/Time    Glucose (POC) 221 (H) 03/10/2021 11:36 AM    Glucose (POC) 304 (H) 03/10/2021 06:27 AM    Glucose (POC) 337 (H) 03/09/2021 09:41 PM    Glucose (POC) 385 (H) 03/09/2021 04:17 PM    Glucose (POC) 386 (H) 03/09/2021 04:12 PM     No results found for: COLOR, APPRN, SPGRU, REFSG, BALDEMAR, PROTU, GLUCU, KETU, BILU, UROU, ENRIQUETA, LEUKU, GLUKE, EPSU, BACTU, WBCU, RBCU, CASTS, UCRY      Medications Reviewed:     Current Facility-Administered Medications   Medication Dose Route Frequency    methylPREDNISolone (PF) (SOLU-MEDROL) injection 80 mg  80 mg IntraVENous Q12H    insulin glargine (LANTUS) injection 25 Units  25 Units SubCUTAneous QHS    insulin lispro (HUMALOG) injection 5 Units  5 Units SubCUTAneous TIDAC    lisinopriL (PRINIVIL, ZESTRIL) tablet 20 mg  20 mg Oral DAILY    furosemide (LASIX) tablet 40 mg  40 mg Oral DAILY    metoprolol succinate (TOPROL-XL) XL tablet 50 mg  50 mg Oral BIDPC    insulin lispro (HUMALOG) injection   SubCUTAneous AC&HS    melatonin tablet 6 mg  6 mg Oral QHS    senna-docusate (PERICOLACE) 8.6-50 mg per tablet 1 Tab  1 Tab Oral DAILY    albuterol (PROVENTIL HFA, VENTOLIN HFA, PROAIR HFA) inhaler 1 Puff  1 Puff Inhalation Q6H PRN    And    ipratropium (ATROVENT HFA) 17 mcg inhaler  1 Puff Inhalation Q6H PRN    glucose chewable tablet 16 g  4 Tab Oral PRN    dextrose (D50W) injection syrg 12.5-25 g  12.5-25 g IntraVENous PRN    glucagon (GLUCAGEN) injection 1 mg  1 mg IntraMUSCular PRN    NIFEdipine ER (PROCARDIA XL) tablet 30 mg  30 mg Oral DAILY    sodium chloride (NS) flush 5-40 mL  5-40 mL IntraVENous Q8H    sodium chloride (NS) flush 5-40 mL  5-40 mL IntraVENous PRN    acetaminophen (TYLENOL) tablet 650 mg  650 mg Oral Q6H PRN    Or    acetaminophen (TYLENOL) suppository 650 mg  650 mg Rectal Q6H PRN    polyethylene glycol (MIRALAX) packet 17 g  17 g Oral DAILY PRN    promethazine (PHENERGAN) tablet 12.5 mg  12.5 mg Oral Q6H PRN    Or    ondansetron (ZOFRAN) injection 4 mg  4 mg IntraVENous Q6H PRN    enoxaparin (LOVENOX) injection 40 mg  40 mg SubCUTAneous Q12H    guaiFENesin-dextromethorphan (ROBITUSSIN DM) 100-10 mg/5 mL syrup 5 mL  5 mL Oral Q4H PRN    cholecalciferol (VITAMIN D3) (1000 Units /25 mcg) tablet 2,000 Units  2,000 Units Oral DAILY    ascorbic acid (vitamin C) (VITAMIN C) tablet 1,000 mg  1,000 mg Oral DAILY    pantoprazole (PROTONIX) tablet 40 mg  40 mg Oral ACB    tamsulosin (FLOMAX) capsule 0.4 mg  0.4 mg Oral QHS    hydrALAZINE (APRESOLINE) 20 mg/mL injection 10-20 mg  10-20 mg IntraVENous Q6H PRN     ______________________________________________________________________  EXPECTED LENGTH OF STAY: 5d 12h  ACTUAL LENGTH OF STAY:          16                 Ezequiel Castellano MD

## 2021-03-10 NOTE — PROGRESS NOTES
RUR - 19%     Disposition: TBD subject to change pending reccomendations and pending medical progression, Per PT/OT reccommending possible home with home health and possible home O2   Chart notes indicate patient will be here > 48 hours with ?dispo SNF/IPR but based on current PT/OT notes insurance highly unlikely to cover rehab   Transport: TBD based on LOF/what is medically appropriate. Barriers: no PCP, patient has no SNF benefits only LTC, difficult to secure home health based on no PCP to sign POC, his insurance and he [place] a [place] area. Will require 2nd IMM letter prior to discharge.  TRANG Mcnally

## 2021-03-11 LAB
ALBUMIN SERPL-MCNC: 2.3 G/DL (ref 3.5–5)
ALBUMIN/GLOB SERPL: 0.5 {RATIO} (ref 1.1–2.2)
ALP SERPL-CCNC: 74 U/L (ref 45–117)
ALT SERPL-CCNC: 26 U/L (ref 12–78)
ANION GAP SERPL CALC-SCNC: 5 MMOL/L (ref 5–15)
AST SERPL-CCNC: 15 U/L (ref 15–37)
BASOPHILS # BLD: 0 K/UL (ref 0–0.1)
BASOPHILS NFR BLD: 0 % (ref 0–1)
BILIRUB SERPL-MCNC: 0.1 MG/DL (ref 0.2–1)
BNP SERPL-MCNC: 1110 PG/ML
BUN SERPL-MCNC: 22 MG/DL (ref 6–20)
BUN/CREAT SERPL: 21 (ref 12–20)
CALCIUM SERPL-MCNC: 9.3 MG/DL (ref 8.5–10.1)
CHLORIDE SERPL-SCNC: 104 MMOL/L (ref 97–108)
CO2 SERPL-SCNC: 25 MMOL/L (ref 21–32)
CREAT SERPL-MCNC: 1.06 MG/DL (ref 0.7–1.3)
CRP SERPL-MCNC: 0.69 MG/DL (ref 0–0.6)
D DIMER PPP FEU-MCNC: 0.65 MG/L FEU (ref 0–0.65)
DIFFERENTIAL METHOD BLD: ABNORMAL
EOSINOPHIL # BLD: 0 K/UL (ref 0–0.4)
EOSINOPHIL NFR BLD: 0 % (ref 0–7)
ERYTHROCYTE [DISTWIDTH] IN BLOOD BY AUTOMATED COUNT: 14.3 % (ref 11.5–14.5)
GLOBULIN SER CALC-MCNC: 5 G/DL (ref 2–4)
GLUCOSE BLD STRIP.AUTO-MCNC: 144 MG/DL (ref 65–100)
GLUCOSE BLD STRIP.AUTO-MCNC: 228 MG/DL (ref 65–100)
GLUCOSE BLD STRIP.AUTO-MCNC: 305 MG/DL (ref 65–100)
GLUCOSE BLD STRIP.AUTO-MCNC: 334 MG/DL (ref 65–100)
GLUCOSE BLD STRIP.AUTO-MCNC: 369 MG/DL (ref 65–100)
GLUCOSE SERPL-MCNC: 241 MG/DL (ref 65–100)
HCT VFR BLD AUTO: 33.4 % (ref 36.6–50.3)
HGB BLD-MCNC: 10 G/DL (ref 12.1–17)
IMM GRANULOCYTES # BLD AUTO: 0.1 K/UL (ref 0–0.04)
IMM GRANULOCYTES NFR BLD AUTO: 1 % (ref 0–0.5)
LYMPHOCYTES # BLD: 1 K/UL (ref 0.8–3.5)
LYMPHOCYTES NFR BLD: 9 % (ref 12–49)
MCH RBC QN AUTO: 26.5 PG (ref 26–34)
MCHC RBC AUTO-ENTMCNC: 29.9 G/DL (ref 30–36.5)
MCV RBC AUTO: 88.4 FL (ref 80–99)
MONOCYTES # BLD: 0.8 K/UL (ref 0–1)
MONOCYTES NFR BLD: 7 % (ref 5–13)
NEUTS SEG # BLD: 9.8 K/UL (ref 1.8–8)
NEUTS SEG NFR BLD: 83 % (ref 32–75)
NRBC # BLD: 0 K/UL (ref 0–0.01)
NRBC BLD-RTO: 0 PER 100 WBC
PLATELET # BLD AUTO: 349 K/UL (ref 150–400)
PMV BLD AUTO: 9.8 FL (ref 8.9–12.9)
POTASSIUM SERPL-SCNC: 4.3 MMOL/L (ref 3.5–5.1)
PROT SERPL-MCNC: 7.3 G/DL (ref 6.4–8.2)
RBC # BLD AUTO: 3.78 M/UL (ref 4.1–5.7)
SERVICE CMNT-IMP: ABNORMAL
SODIUM SERPL-SCNC: 134 MMOL/L (ref 136–145)
WBC # BLD AUTO: 11.7 K/UL (ref 4.1–11.1)

## 2021-03-11 PROCEDURE — 97535 SELF CARE MNGMENT TRAINING: CPT

## 2021-03-11 PROCEDURE — 74011250636 HC RX REV CODE- 250/636: Performed by: INTERNAL MEDICINE

## 2021-03-11 PROCEDURE — 85379 FIBRIN DEGRADATION QUANT: CPT

## 2021-03-11 PROCEDURE — 74011250637 HC RX REV CODE- 250/637: Performed by: HOSPITALIST

## 2021-03-11 PROCEDURE — 65660000001 HC RM ICU INTERMED STEPDOWN

## 2021-03-11 PROCEDURE — 83880 ASSAY OF NATRIURETIC PEPTIDE: CPT

## 2021-03-11 PROCEDURE — 74011250637 HC RX REV CODE- 250/637: Performed by: NURSE PRACTITIONER

## 2021-03-11 PROCEDURE — 82962 GLUCOSE BLOOD TEST: CPT

## 2021-03-11 PROCEDURE — 97110 THERAPEUTIC EXERCISES: CPT

## 2021-03-11 PROCEDURE — 80053 COMPREHEN METABOLIC PANEL: CPT

## 2021-03-11 PROCEDURE — 74011636637 HC RX REV CODE- 636/637: Performed by: NURSE PRACTITIONER

## 2021-03-11 PROCEDURE — 74011250637 HC RX REV CODE- 250/637: Performed by: INTERNAL MEDICINE

## 2021-03-11 PROCEDURE — 74011250636 HC RX REV CODE- 250/636: Performed by: NURSE PRACTITIONER

## 2021-03-11 PROCEDURE — 85025 COMPLETE CBC W/AUTO DIFF WBC: CPT

## 2021-03-11 PROCEDURE — 74011636637 HC RX REV CODE- 636/637: Performed by: INTERNAL MEDICINE

## 2021-03-11 PROCEDURE — 86140 C-REACTIVE PROTEIN: CPT

## 2021-03-11 PROCEDURE — 36415 COLL VENOUS BLD VENIPUNCTURE: CPT

## 2021-03-11 RX ORDER — FUROSEMIDE 10 MG/ML
40 INJECTION INTRAMUSCULAR; INTRAVENOUS DAILY
Status: DISCONTINUED | OUTPATIENT
Start: 2021-03-11 | End: 2021-03-14

## 2021-03-11 RX ADMIN — FUROSEMIDE 40 MG: 10 INJECTION, SOLUTION INTRAMUSCULAR; INTRAVENOUS at 16:00

## 2021-03-11 RX ADMIN — TAMSULOSIN HYDROCHLORIDE 0.4 MG: 0.4 CAPSULE ORAL at 21:05

## 2021-03-11 RX ADMIN — INSULIN LISPRO 12 UNITS: 100 INJECTION, SOLUTION INTRAVENOUS; SUBCUTANEOUS at 18:10

## 2021-03-11 RX ADMIN — METOPROLOL SUCCINATE 50 MG: 50 TABLET, EXTENDED RELEASE ORAL at 09:58

## 2021-03-11 RX ADMIN — Medication 6 MG: at 21:05

## 2021-03-11 RX ADMIN — FUROSEMIDE 40 MG: 40 TABLET ORAL at 09:58

## 2021-03-11 RX ADMIN — INSULIN LISPRO 4 UNITS: 100 INJECTION, SOLUTION INTRAVENOUS; SUBCUTANEOUS at 06:57

## 2021-03-11 RX ADMIN — NIFEDIPINE 30 MG: 30 TABLET, FILM COATED, EXTENDED RELEASE ORAL at 09:59

## 2021-03-11 RX ADMIN — PANTOPRAZOLE SODIUM 40 MG: 40 TABLET, DELAYED RELEASE ORAL at 06:56

## 2021-03-11 RX ADMIN — ENOXAPARIN SODIUM 40 MG: 100 INJECTION SUBCUTANEOUS at 18:09

## 2021-03-11 RX ADMIN — INSULIN LISPRO 10 UNITS: 100 INJECTION, SOLUTION INTRAVENOUS; SUBCUTANEOUS at 06:56

## 2021-03-11 RX ADMIN — INSULIN GLARGINE 30 UNITS: 100 INJECTION, SOLUTION SUBCUTANEOUS at 21:05

## 2021-03-11 RX ADMIN — Medication 2000 UNITS: at 09:58

## 2021-03-11 RX ADMIN — OXYCODONE HYDROCHLORIDE AND ACETAMINOPHEN 1000 MG: 500 TABLET ORAL at 09:58

## 2021-03-11 RX ADMIN — LISINOPRIL 40 MG: 20 TABLET ORAL at 09:58

## 2021-03-11 RX ADMIN — DOCUSATE SODIUM 50 MG AND SENNOSIDES 8.6 MG 1 TABLET: 8.6; 5 TABLET, FILM COATED ORAL at 09:59

## 2021-03-11 RX ADMIN — INSULIN LISPRO 3 UNITS: 100 INJECTION, SOLUTION INTRAVENOUS; SUBCUTANEOUS at 12:32

## 2021-03-11 RX ADMIN — Medication 10 ML: at 21:06

## 2021-03-11 RX ADMIN — METOPROLOL SUCCINATE 50 MG: 50 TABLET, EXTENDED RELEASE ORAL at 18:09

## 2021-03-11 RX ADMIN — METHYLPREDNISOLONE SODIUM SUCCINATE 80 MG: 40 INJECTION, POWDER, FOR SOLUTION INTRAMUSCULAR; INTRAVENOUS at 09:59

## 2021-03-11 RX ADMIN — ENOXAPARIN SODIUM 40 MG: 100 INJECTION SUBCUTANEOUS at 06:56

## 2021-03-11 RX ADMIN — METHYLPREDNISOLONE SODIUM SUCCINATE 80 MG: 40 INJECTION, POWDER, FOR SOLUTION INTRAMUSCULAR; INTRAVENOUS at 21:05

## 2021-03-11 RX ADMIN — INSULIN LISPRO 10 UNITS: 100 INJECTION, SOLUTION INTRAVENOUS; SUBCUTANEOUS at 18:09

## 2021-03-11 RX ADMIN — INSULIN LISPRO 5 UNITS: 100 INJECTION, SOLUTION INTRAVENOUS; SUBCUTANEOUS at 21:05

## 2021-03-11 RX ADMIN — INSULIN LISPRO 10 UNITS: 100 INJECTION, SOLUTION INTRAVENOUS; SUBCUTANEOUS at 12:32

## 2021-03-11 RX ADMIN — HYDRALAZINE HYDROCHLORIDE 20 MG: 20 INJECTION INTRAMUSCULAR; INTRAVENOUS at 04:23

## 2021-03-11 NOTE — PROGRESS NOTES
Transitions of Care: 22% risk of re-admission      -Patient will discharge home when stable, will need home Oxygen, continue PT    -May need transportation assistance, will monitor   -Will need assistance with establishing a PCP if patient agreeable     The CM spoke with attending MD- patient will remain IP through-the-weekend. CM inquired about home health, MD does not anticipate patient will need home health- once Oxygen status improves, anticipate patient will not require home health services. The patient remains on High Flow Oxygen, will likely require home Oxygen at discharge. CM will follow for transitions of care needs.      TRANG Conway

## 2021-03-11 NOTE — PROGRESS NOTES
03/11/21 0801   Oxygen Therapy   O2 Sat (%) 93 %   Pulse via Oximetry 92 beats per minute   O2 Device Hi flow nasal cannula  (midflow)   O2 Flow Rate (L/min) 15 l/min   Pre-Treatment   Breathing Pattern Regular   Breath Sounds Bilateral Diminished   Pulse 86   SpO2 94 %   Respirations 24   weaned to midflow 15L

## 2021-03-11 NOTE — ROUTINE PROCESS
Bedside and Verbal shift change report given to Lorenzo (oncoming nurse) by Khushbu Maldonado (offgoing nurse).  Report included the following information SBAR, Kardex, Intake/Output, MAR and Cardiac Rhythm SR.

## 2021-03-11 NOTE — PROGRESS NOTES
2000 Report received from [facility]. Patient alert, no needs expressed. Patient on high flow at 30L 50%, O2 sats > 93% all night. No reports of pain. 6325 Bedside shift change report given to ,RN by Cynthia Paulson RN. Report included the following information SBAR, Kardex, MAR, Accordion, and Recent Results.

## 2021-03-11 NOTE — PROGRESS NOTES
Problem: Mobility Impaired (Adult and Pediatric)  Goal: *Acute Goals and Plan of Care (Insert Text)  Description: FUNCTIONAL STATUS PRIOR TO ADMISSION: Patient was independent and active. Occasional use of SPC. HOME SUPPORT PRIOR TO ADMISSION: Pt lives alone    Physical Therapy Goals    Revised 3/11/2021  1. Patient will move from supine to sit and sit to supine , scoot up and down, and roll side to side in bed with modified independence within 7 day(s). 2.  Patient will transfer from bed to chair and chair to bed with modified independence using the least restrictive device within 7 day(s). 3.  Patient will perform sit to stand with modified independence within 7 day(s). 4.  Patient will ambulate with modified independence for 150 feet with the least restrictive device within 7 day(s). 5.  Patient will ascend/descend 2 stairs with one handrail(s) with modified independence within 7 day(s). Initiated 3/3/2021  1. Patient will transfer from bed to chair and chair to bed with modified independence using the least restrictive device within 7 day(s). 2.  Patient will perform sit to stand with modified independence within 7 day(s). 3.  Patient will ambulate with modified independence for 10 feet with the least restrictive device within 7 day(s). 4.  Patient will tolerate static standing balance unsupported with O2 sats >90% within 7 day(s). Outcome: Progressing Towards Goal   PHYSICAL THERAPY TREATMENT: WEEKLY REASSESSMENT  Patient: Dl Corbin (13 y.o. male)  Date: 3/11/2021  Primary Diagnosis: Respiratory failure with hypoxia ([facility] [address] [J96.91]        Precautions:   Fall(droplet plus)      ASSESSMENT  Patient continues with skilled PT services and is progressing towards goals. Patient received in chair after eating lunch, currently on 15 L O2. Began session with seated LE exercises noting SpO2 to decrease to 87% requiring rest break to recover.  Pt demonstrated ability to stand for several minutes x supervision, yet SpO2 decreased to 87% requiring to return to chair. Educated pt to perform LE exercises while seated in chair to prevent deconditioning. Continued to be limited in progressing mobility 2/2 O2 needs. Continue to anticipate HHPT upon d/c as pt does not rely on physical assistance for mobility at this time. Patient's progression toward goals since last assessment: supervision for transfers    Current Level of Function Impacting Discharge (mobility/balance): unable to ambulate 2/2 decrease in SpO2    Functional Outcome Measure: The patient scored 55/100 on the Barthel Index outcome measure which is indicative of patient is 45% dependent on others for self care. Other factors to consider for discharge: O2 needs, lives alone         PLAN :  Goals have been updated based on progression since last assessment. Patient continues to benefit from skilled intervention to address the above impairments. Recommendations and Planned Interventions: bed mobility training, transfer training, gait training, therapeutic exercises, patient and family training/education, and therapeutic activities      Frequency/Duration: Patient will be followed by physical therapy:  3 times a week to address goals. Recommendation for discharge: (in order for the patient to meet his/her long term goals)  Physical therapy at least 2 days/week in the home     This discharge recommendation:  Has been made in collaboration with the attending provider and/or case management    IF patient discharges home will need the following DME: portable oxygen, pt owns cane          SUBJECTIVE:   Patient stated I am good.     OBJECTIVE DATA SUMMARY:   HISTORY:    Past Medical History:   Diagnosis Date    CAD (coronary artery disease)     MI (myocardial infarction) ([facility] [address]    No past surgical history on file.     Personal factors and/or comorbidities impacting plan of care: PMH    Home Situation  Home Environment: Trailer/mobile home  # Steps to Enter: 4  Rails to Enter: Yes  Hand Rails : Bilateral  One/Two Story Residence: One story  Living Alone: Yes  Support Systems: Family member(s)  Patient Expects to be Discharged to[de-identified] Trailer/mobile home  Current DME Used/Available at Home: Cane, straight  Tub or Shower Type: Tub/Shower combination    EXAMINATION/PRESENTATION/DECISION MAKING:   Critical Behavior:  Neurologic State: Alert  Orientation Level: Oriented X4  Cognition: Appropriate for age attention/concentration, Follows commands  Safety/Judgement: Awareness of environment, Decreased insight into deficits, Fall prevention  Hearing: Auditory  Auditory Impairment: None  Skin:  intact  Edema: none noted  Range Of Motion:  AROM: Within functional limits           PROM: Within functional limits           Strength:    Strength: Within functional limits                    Tone & Sensation:                                  Coordination:  Coordination: Within functional limits  Vision:      Functional Mobility:  Bed Mobility:              Transfers:  Sit to Stand: Supervision  Stand to Sit: Supervision    Balance:   Sitting: Intact  Standing: Intact  Standing - Static: Good  Standing - Dynamic : Good;Fair    Therapeutic Exercises:   Pt performed B ankle pumps x 10 and B LAQ x5. Educated pt on royce     Functional Measure:  Barthel Index:    Bathin  Bladder: 10  Bowels: 10  Groomin  Dressin  Feeding: 10  Mobility: 0  Stairs: 0  Toilet Use: 5  Transfer (Bed to Chair and Back): 10  Total: 55/100       The Barthel ADL Index: Guidelines  1. The index should be used as a record of what a patient does, not as a record of what a patient could do. 2. The main aim is to establish degree of independence from any help, physical or verbal, however minor and for whatever reason. 3. The need for supervision renders the patient not independent. 4. A patient's performance should be established using the best available evidence.  Asking the patient, friends/relatives and nurses are the usual sources, but direct observation and common sense are also important. However direct testing is not needed. 5. Usually the patient's performance over the preceding 24-48 hours is important, but occasionally longer periods will be relevant. 6. Middle categories imply that the patient supplies over 50 per cent of the effort. 7. Use of aids to be independent is allowed. Cynthia Jones., Barthel, D.W. (0342). Functional evaluation: the Barthel Index. [address] [facility] (14)2. JOSEPH Rouse, Miles Escobar., Kisha Haider., [place], [address] [facility] (1999). Measuring the change indisability after inpatient rehabilitation; comparison of the responsiveness of the Barthel Index and Functional [place] Measure. Journal of Neurology, Neurosurgery, and Psychiatry, 66(4), 461-572. Mu Ramírez, N.J.A, TRACEY Orosco, & Mary Nguyễn, MErlinA. (2004.) Assessment of post-stroke quality of life in cost-effectiveness studies: The usefulness of the Barthel Index and the EuroQoL-5D. Quality of Life Research, 13, 582-62       Activity Tolerance:   Fair, desaturates with exertion and requires oxygen, and pt on 15 L O2 SpO2 decreased to 87% with seated exercises and standing    After treatment patient left in no apparent distress:   Sitting in chair and Call bell within reach    COMMUNICATION/EDUCATION:   The patients plan of care was discussed with: Occupational therapist and Registered nurse. Fall prevention education was provided and the patient/caregiver indicated understanding., Patient/family have participated as able in goal setting and plan of care. , and Patient/family agree to work toward stated goals and plan of care.     Thank you for this referral.  Apple Peace, PT, DPT   Time Calculation: 12 mins

## 2021-03-11 NOTE — PROGRESS NOTES
[address] [facility] Adult  Hospitalist Group                                                                                          Hospitalist Progress Note  Brendan Claude, MD  Answering service: [phone] from in house phone        Date of Service:  3/11/2021  NAME:  Kuldip Mercado  :  1955  MRN:  [id number]      Admission Summary:      Mr. Yocasta Jeff is a 59-year-old black male who was admitted to [facility] ICU on  for severe Covid 19 pneumonia complicated by acute hypoxic respiratory failure. Patient initially presented to [facility] on  where he was admitted and was being treated with oxygen 4 L/min via nasal cannula. He was also treated with Decadron and azithromycin and ceftriaxone. On  he started becoming more hypoxic and his oxygen demand increased to 15 L/min via nasal cannula. Later on that day, he was tried on CPAP but patient was unable to tolerate it. He was transferred to [facility] on  as [facility] does not have ICU. He has been being treated with high flow oxygen with prone as tolerated. He had required Precedex drip for hyperactive delirium that has resolved and he is off the Precedex. He developed VARINDER that has resolved. Interval history / Subjective:        Patient is seen and examined at bedside this AM. He feels ok but wants to go home. Explained that oxygen requirement is 30l and need to wean down to 4l for discharge. He understood. Discussed with nursing and PT    Assessment & Plan:   Acute hypoxic respiratory failure - slow improvement   Severe COVID-19 pneumonia  - Cont high dose Steroids until able to wean o2  - trend inflammatory markers  - S/p abx  - Continue vitamin C, vitamin D  - saturating on HFNC 30L, will try to wean down  - IS- may need home oxygen pt agreed   - pro BNP elevated.  changed to IV lasix      Acute metabolic encephalopathy- Resolved  - Weaned off Precedex drip and trazodone     Type 2 diabetes with hyperglycemia d/t high dose steroids  - c/w Lantus, Humalog  - Cont SSI  - Monitor closely, titrate once steroids are decreased    Hypertension  - c/w lisinopril, nifedipine ER, metoprolol succinate     Hx CAD status post CABG   BPH: Continue tamsulosin  Sleep aid: Melatonin 6 mg nightly     DVT prophylaxis: Enoxaparin per Covid protocol  CODE STATUS: Full  Anticipated discharge>48h, per CM insurance highly unlikely to cover rehab; also has no PCP, has no SNF benefits only LTC, difficult to secure home health based on no PCP to sign POC, his insurance and [place]    Hospital Problems  Never Reviewed          Codes Class Noted POA    Respiratory failure with hypoxia [facility]) ICD-10-CM: J96.91  ICD-9-CM: 518.81  2/22/2021 Unknown            Review of Systems:    per HPI      Vital Signs:    Last 24hrs VS reviewed since prior progress note. Most recent are:  Visit Vitals  BP (!) 151/69   Pulse 84   Temp 97.1 °F (36.2 °C)   Resp 26   Ht 5' 10\" (1.778 m)   Wt 112.5 kg (248 lb 0.3 oz)   SpO2 96%   BMI 35.59 kg/m²         Intake/Output Summary (Last 24 hours) at 3/11/2021 1538  Last data filed at 3/11/2021 4443  Gross per 24 hour   Intake 440 ml   Output 800 ml   Net -360 ml        Physical Examination:     I had a face to face encounter with this patient and independently examined them on3/11/2021 as outlined below:        Constitutional:  Pleasant, on HFNC,sitting in bed,not in distress   Resp:  slightly decreased but grossly clear bilaterally. No wheezing/rhonchi/rales.  No accessory muscle use   Chest Wall: No deformity   CV:  Regular rhythm, normal rate    GI:  Soft, non distended, non tender     Musculoskeletal:  No edema, warm    Neurologic:  Mental status:AAO   Cranial nerves II-XII : WNL  Motor exam: INMAN            Data Review:    Review and/or order of clinical lab test  Review and/or order of tests in the radiology section of CPT  Review and/or order of tests in the medicine section of CPT      Labs:     Recent Labs     03/11/21 0026   WBC 11.7*   HGB 10.0*   HCT 33.4*        Recent Labs     03/11/21 0026   *   K 4.3      CO2 25   BUN 22*   CREA 1.06   *   CA 9.3     Recent Labs     03/11/21 0026   ALT 26   AP 74   TBILI 0.1*   TP 7.3   ALB 2.3*   GLOB 5.0*     No results for input(s): INR, PTP, APTT, INREXT, INREXT in the last 72 hours. No results for input(s): FE, TIBC, PSAT, FERR in the last 72 hours. No results found for: FOL, RBCF   No results for input(s): PH, PCO2, PO2 in the last 72 hours. No results for input(s): CPK, CKNDX, TROIQ in the last 72 hours.     No lab exists for component: CPKMB  Lab Results   Component Value Date/Time    Cholesterol, total 205 (H) 12/13/2020 08:50 AM    HDL Cholesterol 93 12/13/2020 08:50 AM    LDL, calculated 97 12/13/2020 08:50 AM    Triglyceride 75 12/13/2020 08:50 AM    CHOL/HDL Ratio 2.2 12/13/2020 08:50 AM     Lab Results   Component Value Date/Time    Glucose (POC) 144 (H) 03/11/2021 11:48 AM    Glucose (POC) 228 (H) 03/11/2021 06:55 AM    Glucose (POC) 243 (H) 03/10/2021 09:53 PM    Glucose (POC) 221 (H) 03/10/2021 11:36 AM    Glucose (POC) 304 (H) 03/10/2021 06:27 AM     No results found for: COLOR, APPRN, SPGRU, REFSG, BALDEMAR, PROTU, GLUCU, KETU, BILU, UROU, ENRIQUETA, LEUKU, GLUKE, EPSU, BACTU, WBCU, RBCU, CASTS, UCRY      Medications Reviewed:     Current Facility-Administered Medications   Medication Dose Route Frequency    insulin glargine (LANTUS) injection 30 Units  30 Units SubCUTAneous QHS    insulin lispro (HUMALOG) injection 10 Units  10 Units SubCUTAneous TIDAC    lisinopriL (PRINIVIL, ZESTRIL) tablet 40 mg  40 mg Oral DAILY    methylPREDNISolone (PF) (SOLU-MEDROL) injection 80 mg  80 mg IntraVENous Q12H    furosemide (LASIX) tablet 40 mg  40 mg Oral DAILY    metoprolol succinate (TOPROL-XL) XL tablet 50 mg  50 mg Oral BIDPC    insulin lispro (HUMALOG) injection   SubCUTAneous AC&HS    melatonin tablet 6 mg  6 mg Oral QHS    senna-docusate (PERICOLACE) 8.6-50 mg per tablet 1 Tab  1 Tab Oral DAILY    albuterol (PROVENTIL HFA, VENTOLIN HFA, PROAIR HFA) inhaler 1 Puff  1 Puff Inhalation Q6H PRN    And    ipratropium (ATROVENT HFA) 17 mcg inhaler  1 Puff Inhalation Q6H PRN    glucose chewable tablet 16 g  4 Tab Oral PRN    dextrose (D50W) injection syrg 12.5-25 g  12.5-25 g IntraVENous PRN    glucagon (GLUCAGEN) injection 1 mg  1 mg IntraMUSCular PRN    NIFEdipine ER (PROCARDIA XL) tablet 30 mg  30 mg Oral DAILY    sodium chloride (NS) flush 5-40 mL  5-40 mL IntraVENous Q8H    sodium chloride (NS) flush 5-40 mL  5-40 mL IntraVENous PRN    acetaminophen (TYLENOL) tablet 650 mg  650 mg Oral Q6H PRN    Or    acetaminophen (TYLENOL) suppository 650 mg  650 mg Rectal Q6H PRN    polyethylene glycol (MIRALAX) packet 17 g  17 g Oral DAILY PRN    promethazine (PHENERGAN) tablet 12.5 mg  12.5 mg Oral Q6H PRN    Or    ondansetron (ZOFRAN) injection 4 mg  4 mg IntraVENous Q6H PRN    enoxaparin (LOVENOX) injection 40 mg  40 mg SubCUTAneous Q12H    guaiFENesin-dextromethorphan (ROBITUSSIN DM) 100-10 mg/5 mL syrup 5 mL  5 mL Oral Q4H PRN    cholecalciferol (VITAMIN D3) (1000 Units /25 mcg) tablet 2,000 Units  2,000 Units Oral DAILY    ascorbic acid (vitamin C) (VITAMIN C) tablet 1,000 mg  1,000 mg Oral DAILY    pantoprazole (PROTONIX) tablet 40 mg  40 mg Oral ACB    tamsulosin (FLOMAX) capsule 0.4 mg  0.4 mg Oral QHS    hydrALAZINE (APRESOLINE) 20 mg/mL injection 10-20 mg  10-20 mg IntraVENous Q6H PRN     ______________________________________________________________________  EXPECTED LENGTH OF STAY: 5d 12h  ACTUAL LENGTH OF STAY:          Marisol Kwan MD

## 2021-03-11 NOTE — PROGRESS NOTES
Problem: Self Care Deficits Care Plan (Adult)  Goal: *Acute Goals and Plan of Care (Insert Text)  Description:   FUNCTIONAL STATUS PRIOR TO ADMISSION: Patient was modified independence walking with cane; independent and active otherwise. HOME SUPPORT: The patient lived alone. Family available to provide assistance PRN. Occupational Therapy Goals  Initiated 3/3/2021  1. Patient will perform lower body dressing while sitting with minimal assistance/contact guard assist within 7 day(s). 2.  Patient will perform bathing upper body with supervision/set-up within 7 day(s). 3.  Patient will perform 1 grooming task standing with minimal assistance/contact guard assist within 7 day(s). 4.  Patient will perform BSC toilet transfers with minimal assistance/contact guard assist within 7 day(s). 5.  Patient will perform all aspects of toileting with minimal assistance/contact guard assist within 7 day(s). 6.  Patient will participate in upper extremity therapeutic exercise/activities v. Gravity with PLB with minimal assistance/contact guard assist within 7 day(s). 7.  Patient will utilize energy conservation techniques during functional activities with verbal cues within 7 day(s). Outcome: Progressing Towards Goal    OCCUPATIONAL THERAPY TREATMENT  Patient: Chris Bose (59 y.o. male)  Date: 3/11/2021  Diagnosis: Respiratory failure with hypoxia ([facility] [J96.91] <principal problem not specified>       Precautions: Fall(droplet plus)  Chart, occupational therapy assessment, plan of care, and goals were reviewed. ASSESSMENT  Patient continues with skilled OT services and is progressing towards goals. Patient received in chair and agreeable to therapy. Patient required SBA- setup for bathing, dressing and grooming while sitting in chair or standing in front of chair. Noted with patient in standing to complete pericare, SPO2 dropped to 86% - patient reporting quick onset fatigue.  Educated patient on correlation between decreased oxygenation, activity and fatigue as well as need for energy conservation - patient verbalized understanding. Patient SPO2 dropped to 87% briefly while brushing teeth in sitting. Patient left in chair with call bell in reach, RN aware, all needs met. Will continue to follow, anticipate no HHOT needs once medically cleared for D/C. Current Level of Function Impacting Discharge (ADLs): supervision-setup    Other factors to consider for discharge: COVID+         PLAN :  Patient continues to benefit from skilled intervention to address the above impairments. Continue treatment per established plan of care. to address goals. Recommend with staff: Recommend with nursing, ADLs with supervision/setup, once Egress Test completed then OOB to chair 3x/day and toileting via beside commode with 1 assist. Thank you for completing as able in order to maintain patient strength, endurance and independence. Recommend next OT session: full dressing routine    Recommendation for discharge: (in order for the patient to meet his/her long term goals)  No skilled occupational therapy/ follow up rehabilitation needs identified at this time. This discharge recommendation:  Has been made in collaboration with the attending provider and/or case management    IF patient discharges home will need the following DME: none       SUBJECTIVE:   Patient stated I feel fine, why can't I go home.     OBJECTIVE DATA SUMMARY:   Cognitive/Behavioral Status:  Neurologic State: Alert  Orientation Level: Oriented X4  Cognition: Appropriate for age attention/concentration; Follows commands  Perception: Appears intact  Perseveration: No perseveration noted  Safety/Judgement: Awareness of environment;Decreased insight into deficits; Fall prevention    Functional Mobility and Transfers for ADLs:  Bed Mobility:   NT this session    Transfers:  Sit to Stand: Supervision    Balance:  Sitting: Intact  Standing: Intact  Standing - Static: Good  Standing - Dynamic : Good;Fair    ADL Intervention:    Grooming  Position Performed: Seated in chair  Washing Face: Set-up  Brushing Teeth: Set-up    Upper Body Bathing  Bathing Assistance: Supervision  Position Performed: Seated in chair  Cues: Verbal cues provided    Lower Body Bathing  Perineal  : Supervision  Position Performed: Standing  Cues: Verbal cues provided  Lower Body : Supervision  Position Performed: Seated in chair  Cues: Verbal cues provided    Upper Body [address]: Minimum  assistance(2* lines only)  Cues: Physical assistance;Verbal cues provided    Lower Body Dressing Assistance  Socks: Supervision  Leg Crossed Method Used: No  Position Performed: Bending forward method;Seated in chair  Cues: Don;Doff;Verbal cues provided    Cognitive Retraining  Safety/Judgement: Awareness of environment;Decreased insight into deficits; Fall prevention    Pain:  Reporting no pain    Activity Tolerance:   Good, desaturates with exertion and requires oxygen, and requires rest breaks    After treatment patient left in no apparent distress:   Sitting in chair, Call bell within reach, and RN aware    COMMUNICATION/COLLABORATION:   The patients plan of care was discussed with: Physical therapist and Registered nurse.      Rosa Robertson OT  Time Calculation: 28 mins

## 2021-03-12 LAB
GLUCOSE BLD STRIP.AUTO-MCNC: 201 MG/DL (ref 65–100)
GLUCOSE BLD STRIP.AUTO-MCNC: 207 MG/DL (ref 65–100)
GLUCOSE BLD STRIP.AUTO-MCNC: 347 MG/DL (ref 65–100)
GLUCOSE BLD STRIP.AUTO-MCNC: 366 MG/DL (ref 65–100)
SERVICE CMNT-IMP: ABNORMAL

## 2021-03-12 PROCEDURE — 74011636637 HC RX REV CODE- 636/637: Performed by: INTERNAL MEDICINE

## 2021-03-12 PROCEDURE — 74011250637 HC RX REV CODE- 250/637: Performed by: INTERNAL MEDICINE

## 2021-03-12 PROCEDURE — 65660000001 HC RM ICU INTERMED STEPDOWN

## 2021-03-12 PROCEDURE — 74011250636 HC RX REV CODE- 250/636: Performed by: INTERNAL MEDICINE

## 2021-03-12 PROCEDURE — 74011250637 HC RX REV CODE- 250/637: Performed by: NURSE PRACTITIONER

## 2021-03-12 PROCEDURE — 94762 N-INVAS EAR/PLS OXIMTRY CONT: CPT

## 2021-03-12 PROCEDURE — 74011636637 HC RX REV CODE- 636/637: Performed by: NURSE PRACTITIONER

## 2021-03-12 PROCEDURE — 97530 THERAPEUTIC ACTIVITIES: CPT

## 2021-03-12 PROCEDURE — 74011250636 HC RX REV CODE- 250/636: Performed by: NURSE PRACTITIONER

## 2021-03-12 PROCEDURE — 82962 GLUCOSE BLOOD TEST: CPT

## 2021-03-12 PROCEDURE — 77010033711 HC HIGH FLOW OXYGEN

## 2021-03-12 PROCEDURE — 74011250637 HC RX REV CODE- 250/637: Performed by: HOSPITALIST

## 2021-03-12 RX ADMIN — DOCUSATE SODIUM 50 MG AND SENNOSIDES 8.6 MG 1 TABLET: 8.6; 5 TABLET, FILM COATED ORAL at 09:50

## 2021-03-12 RX ADMIN — Medication 10 ML: at 21:13

## 2021-03-12 RX ADMIN — METOPROLOL SUCCINATE 50 MG: 50 TABLET, EXTENDED RELEASE ORAL at 18:49

## 2021-03-12 RX ADMIN — ENOXAPARIN SODIUM 40 MG: 100 INJECTION SUBCUTANEOUS at 07:03

## 2021-03-12 RX ADMIN — OXYCODONE HYDROCHLORIDE AND ACETAMINOPHEN 1000 MG: 500 TABLET ORAL at 09:49

## 2021-03-12 RX ADMIN — Medication 6 MG: at 21:13

## 2021-03-12 RX ADMIN — INSULIN LISPRO 4 UNITS: 100 INJECTION, SOLUTION INTRAVENOUS; SUBCUTANEOUS at 07:21

## 2021-03-12 RX ADMIN — INSULIN GLARGINE 30 UNITS: 100 INJECTION, SOLUTION SUBCUTANEOUS at 21:13

## 2021-03-12 RX ADMIN — INSULIN LISPRO 5 UNITS: 100 INJECTION, SOLUTION INTRAVENOUS; SUBCUTANEOUS at 21:23

## 2021-03-12 RX ADMIN — METHYLPREDNISOLONE SODIUM SUCCINATE 80 MG: 40 INJECTION, POWDER, FOR SOLUTION INTRAMUSCULAR; INTRAVENOUS at 09:48

## 2021-03-12 RX ADMIN — INSULIN LISPRO 5 UNITS: 100 INJECTION, SOLUTION INTRAVENOUS; SUBCUTANEOUS at 16:42

## 2021-03-12 RX ADMIN — INSULIN LISPRO 10 UNITS: 100 INJECTION, SOLUTION INTRAVENOUS; SUBCUTANEOUS at 16:42

## 2021-03-12 RX ADMIN — TAMSULOSIN HYDROCHLORIDE 0.4 MG: 0.4 CAPSULE ORAL at 21:14

## 2021-03-12 RX ADMIN — NIFEDIPINE 30 MG: 30 TABLET, FILM COATED, EXTENDED RELEASE ORAL at 09:49

## 2021-03-12 RX ADMIN — ENOXAPARIN SODIUM 40 MG: 100 INJECTION SUBCUTANEOUS at 18:49

## 2021-03-12 RX ADMIN — INSULIN LISPRO 10 UNITS: 100 INJECTION, SOLUTION INTRAVENOUS; SUBCUTANEOUS at 12:05

## 2021-03-12 RX ADMIN — FUROSEMIDE 40 MG: 10 INJECTION, SOLUTION INTRAMUSCULAR; INTRAVENOUS at 09:49

## 2021-03-12 RX ADMIN — PANTOPRAZOLE SODIUM 40 MG: 40 TABLET, DELAYED RELEASE ORAL at 07:03

## 2021-03-12 RX ADMIN — Medication 2000 UNITS: at 09:50

## 2021-03-12 RX ADMIN — INSULIN LISPRO 10 UNITS: 100 INJECTION, SOLUTION INTRAVENOUS; SUBCUTANEOUS at 07:20

## 2021-03-12 RX ADMIN — METOPROLOL SUCCINATE 50 MG: 50 TABLET, EXTENDED RELEASE ORAL at 09:50

## 2021-03-12 RX ADMIN — LISINOPRIL 40 MG: 20 TABLET ORAL at 09:49

## 2021-03-12 RX ADMIN — INSULIN LISPRO 4 UNITS: 100 INJECTION, SOLUTION INTRAVENOUS; SUBCUTANEOUS at 12:05

## 2021-03-12 RX ADMIN — METHYLPREDNISOLONE SODIUM SUCCINATE 60 MG: 125 INJECTION, POWDER, FOR SOLUTION INTRAMUSCULAR; INTRAVENOUS at 21:14

## 2021-03-12 NOTE — PROGRESS NOTES
Problem: Mobility Impaired (Adult and Pediatric)  Goal: *Acute Goals and Plan of Care (Insert Text)  Description: FUNCTIONAL STATUS PRIOR TO ADMISSION: Patient was independent and active. Occasional use of SPC. HOME SUPPORT PRIOR TO ADMISSION: Pt lives alone    Physical Therapy Goals    Revised 3/11/2021  1. Patient will move from supine to sit and sit to supine , scoot up and down, and roll side to side in bed with modified independence within 7 day(s). 2.  Patient will transfer from bed to chair and chair to bed with modified independence using the least restrictive device within 7 day(s). 3.  Patient will perform sit to stand with modified independence within 7 day(s). 4.  Patient will ambulate with modified independence for 150 feet with the least restrictive device within 7 day(s). 5.  Patient will ascend/descend 2 stairs with one handrail(s) with modified independence within 7 day(s). Initiated 3/3/2021  1. Patient will transfer from bed to chair and chair to bed with modified independence using the least restrictive device within 7 day(s). 2.  Patient will perform sit to stand with modified independence within 7 day(s). 3.  Patient will ambulate with modified independence for 10 feet with the least restrictive device within 7 day(s). 4.  Patient will tolerate static standing balance unsupported with O2 sats >90% within 7 day(s). Outcome: Progressing Towards Goal   PHYSICAL THERAPY TREATMENT  Patient: Denisha Morrow (83 y.o. male)  Date: 3/12/2021  Diagnosis: Respiratory failure with hypoxia ([facility] [J96.91] <principal problem not specified>       Precautions: Fall(droplet plus)  Chart, physical therapy assessment, plan of care and goals were reviewed. ASSESSMENT  Patient continues with skilled PT services and is progressing towards goals. Patient received in bed and most agreeable to therapy.   Continues to mobilize well and able to walk in room today and maintain saturations >88% on 12 liters. Worked on standing tolerance and managed well for 5 minutes on 12 liters and lowered to 11 managing well. Educated on monitoring saturations looking at numbers on monitor. Feel he is safe to stand at bedside table and  work on standing tolerance and marching in place  while staff in room until he is consistent with stable saturations and knowing when to rest.    Discussed with patient the need for support at home to assist with everyday tasks as he likely will still be quite decompensated and not able to resume  normal activity level as he lives alone and does all his own meals and care. .     Current Level of Function Impacting Discharge (mobility/balance): supervision to independent mobility; Other factors to consider for discharge: limited community support? PLAN :  Patient continues to benefit from skilled intervention to address the above impairments. Continue treatment per established plan of care. to address goals. Recommendation for discharge: (in order for the patient to meet his/her long term goals)  Likely home without services. May need assist with meals and general tasks pending his endurance level at time of discharge. This discharge recommendation:  Has been made in collaboration with the attending provider and/or case management    IF patient discharges home will need the following DME: patient owns DME required for discharge       SUBJECTIVE:   Patient stated I feel better.     OBJECTIVE DATA SUMMARY:   Critical Behavior:  Neurologic State: Alert  Orientation Level: Oriented X4  Cognition: Follows commands  Safety/Judgement: Awareness of environment, Decreased insight into deficits, Fall prevention  Functional Mobility Training:  Bed Mobility:     Supine to Sit: Independent              Transfers:  Sit to Stand: Modified independent  Stand to Sit: Modified independent                             Balance:  Sitting: Intact  Standing: Intact  Ambulation/Gait Training:  Distance (ft): 20 Feet (ft)     Ambulation - Level of Assistance: Supervision                       Speed/Elsie: Slow                  Therapeutic Exercises:   Standing marching, heel ups  Pain Ratin    Activity Tolerance:   Fair, desaturates with exertion and requires oxygen, and requires rest breaks    After treatment patient left in no apparent distress:   Sitting in chair and Call bell within reach    COMMUNICATION/COLLABORATION:   The patients plan of care was discussed with: Registered nurse.      Komal Perez, PT   Time Calculation: 25 mins

## 2021-03-12 NOTE — PROGRESS NOTES
Transitions of Care: 22% risk of re-admission      -Patient will discharge home when medically stable, anticipate Oxygen needs    -CM attempting to locate home health services, no rehabilitation needs       The CM spoke with PT and MD, will attempt to locate home health services. The CM called patient directly- he does not want to go to any rehab facility, he lives alone, however, he endorses having friends and his sister that can check-in on him and provide additional assistance if needed. The patient feels safe returning home (anticipate discharge on Monday- monitor for home Oxygen needs), and patient does not want to go to any facility. The patient endorses having family he can call/friends. The patient likely will require transportation assistance upon discharge- will monitor. The patient also may require home Oxygen- will monitor, patient is agreeable if this is needed upon discharge. The CM discussed home health- patient is agreeable, he end[facility]- he mentioned a nurse they may have sent out to his home? CM will follow-up with Cardiology office directly. CM called [facility] in [facility]- they see patient, but he is not established with home health services through their office. The CM sent out wide-range referral to multiple agencies- difficult to secure home health [place] [employer] [place]. CM sent referrals to below agencies in [employer]: [facility] [address] and [address]     CM will continue to monitor- per MD, possible discharge on Monday.      TRANG Uriarte

## 2021-03-12 NOTE — PROGRESS NOTES
[facility]                                                                                          Hospitalist Progress Note  Sushma Madala, MD  Answering service: [phone] OR [phone] from in house phone        Date of Service:  3/12/2021  NAME:  Randy Tadeo  :  1955  MRN:  [id number]      Admission Summary:      Mr. Tadeo is a 65-year-old black male who was admitted to [facility] ICU on  for severe Covid 19 pneumonia complicated by acute hypoxic respiratory failure.  Patient initially presented to [facility] on  where he was admitted and was being treated with oxygen 4 L/min via nasal cannula.  He was also treated with Decadron and azithromycin and ceftriaxone.  On  he started becoming more hypoxic and his oxygen demand increased to 15 L/min via nasal cannula.  Later on that day, he was tried on CPAP but patient was unable to tolerate it.  He was transferred to [facility] on  as [facility] does not have ICU.  He has been being treated with high flow oxygen with prone as tolerated.  He had required Precedex drip for hyperactive delirium that has resolved and he is off the Precedex.  He developed VARINDER that has resolved.    Interval history / Subjective:        Patient is seen and examined at bedside this AM. He feels better. O2 requirement decreasing , down to 11l   Discussed with nursing     Assessment & Plan:   Acute hypoxic respiratory failure - improving   Severe COVID-19 pneumonia  - Cont high dose Steroids until able to wean o2  - trend inflammatory markers  - S/p abx  - Continue vitamin C, vitamin D  - saturating on midflow 11lNC, try to wean down   - IS- may need home oxygen pt agreed   - pro BNP elevated. C/w V lasix      Acute metabolic encephalopathy- Resolved  - Weaned off Precedex drip and trazodone     Type 2 diabetes with hyperglycemia d/t high dose steroids  - c/w Lantus, Humalog  - Cont SSI  - Monitor  closely, titrate once steroids are decreased    Hypertension  - c/w lisinopril, nifedipine ER, metoprolol succinate     Hx CAD status post CABG   BPH: Continue tamsulosin  Sleep aid: Melatonin 6 mg nightly     DVT prophylaxis: Enoxaparin per Covid protocol  CODE STATUS: Full  Anticipated discharge>48h. Possible dc on Monday. Likely home with no PT/OT needs. may need home oxygen    per CM insurance highly unlikely to cover rehab; also has no PCP, has no SNF benefits only LTC, difficult to secure home health based on no PCP to sign POC, his insurance and [place] alone [place] area    Hospital Problems  Never Reviewed          Codes Class Noted POA    Respiratory failure with hypoxia [facility]) ICD-10-CM: J96.91  ICD-9-CM: 518.81  2/22/2021 Unknown            Review of Systems:    per HPI      Vital Signs:    Last 24hrs VS reviewed since prior progress note. Most recent are:  Visit Vitals  BP (!) 168/96 (BP 1 Location: Right upper arm, BP Patient Position: At rest)   Pulse 84   Temp 98 °F (36.7 °C)   Resp 20   Ht 5' 10\" (1.778 m)   Wt 114 kg (251 lb 5.2 oz)   SpO2 95%   BMI 36.06 kg/m²         Intake/Output Summary (Last 24 hours) at 3/12/2021 1348  Last data filed at 3/11/2021 2348  Gross per 24 hour   Intake 120 ml   Output 1725 ml   Net -1605 ml        Physical Examination:     I had a face to face encounter with this patient and independently examined them on3/12/2021 as outlined below:        Constitutional:  Pleasant,sitting in chair,not in distress   Resp:  slightly decreased but grossly clear bilaterally. No wheezing/rhonchi/rales.  No accessory muscle use   Chest Wall: No deformity   CV:  Regular rhythm, normal rate    GI:  Soft, non distended, non tender     Musculoskeletal:  No edema, warm    Neurologic:  Mental status:AAO   Cranial nerves II-XII : WNL  Motor exam: INMAN            Data Review:    Review and/or order of clinical lab test  Review and/or order of tests in the radiology section of CPT  Review and/or order of tests in the medicine section of [facility]      Labs:     Recent Labs     03/11/21  0026   WBC 11.7*   HGB 10.0*   HCT 33.4*        Recent Labs     03/11/21  0026   *   K 4.3      CO2 25   BUN 22*   CREA 1.06   *   CA 9.3     Recent Labs     03/11/21  0026   ALT 26   AP 74   TBILI 0.1*   TP 7.3   ALB 2.3*   GLOB 5.0*     No results for input(s): INR, PTP, APTT, INREXT, INREXT in the last 72 hours. No results for input(s): FE, TIBC, PSAT, FERR in the last 72 hours. No results found for: FOL, RBCF   No results for input(s): PH, PCO2, PO2 in the last 72 hours. No results for input(s): CPK, CKNDX, TROIQ in the last 72 hours.     No lab exists for component: CPKMB  Lab Results   Component Value Date/Time    Cholesterol, total 205 (H) 12/13/2020 08:50 AM    HDL Cholesterol 93 12/13/2020 08:50 AM    LDL, calculated 97 12/13/2020 08:50 AM    Triglyceride 75 12/13/2020 08:50 AM    CHOL/HDL Ratio 2.2 12/13/2020 08:50 AM     Lab Results   Component Value Date/Time    Glucose (POC) 201 (H) 03/12/2021 11:51 AM    Glucose (POC) 207 (H) 03/12/2021 07:06 AM    Glucose (POC) 305 (H) 03/11/2021 09:21 PM    Glucose (POC) 334 (H) 03/11/2021 08:54 PM    Glucose (POC) 369 (H) 03/11/2021 05:03 PM     No results found for: COLOR, APPRN, SPGRU, REFSG, BALDEMAR, PROTU, GLUCU, KETU, BILU, UROU, ENRIQUETA, LEUKU, GLUKE, EPSU, BACTU, WBCU, RBCU, CASTS, UCRY      Medications Reviewed:     Current Facility-Administered Medications   Medication Dose Route Frequency    furosemide (LASIX) injection 40 mg  40 mg IntraVENous DAILY    insulin glargine (LANTUS) injection 30 Units  30 Units SubCUTAneous QHS    insulin lispro (HUMALOG) injection 10 Units  10 Units SubCUTAneous TIDAC    lisinopriL (PRINIVIL, ZESTRIL) tablet 40 mg  40 mg Oral DAILY    methylPREDNISolone (PF) (SOLU-MEDROL) injection 80 mg  80 mg IntraVENous Q12H    metoprolol succinate (TOPROL-XL) XL tablet 50 mg  50 mg Oral BIDPC    insulin lispro (HUMALOG) injection   SubCUTAneous AC&HS    melatonin tablet 6 mg  6 mg Oral QHS    senna-docusate (PERICOLACE) 8.6-50 mg per tablet 1 Tab  1 Tab Oral DAILY    albuterol (PROVENTIL HFA, VENTOLIN HFA, PROAIR HFA) inhaler 1 Puff  1 Puff Inhalation Q6H PRN    And    ipratropium (ATROVENT HFA) 17 mcg inhaler  1 Puff Inhalation Q6H PRN    glucose chewable tablet 16 g  4 Tab Oral PRN    dextrose (D50W) injection syrg 12.5-25 g  12.5-25 g IntraVENous PRN    glucagon (GLUCAGEN) injection 1 mg  1 mg IntraMUSCular PRN    NIFEdipine ER (PROCARDIA XL) tablet 30 mg  30 mg Oral DAILY    sodium chloride (NS) flush 5-40 mL  5-40 mL IntraVENous Q8H    sodium chloride (NS) flush 5-40 mL  5-40 mL IntraVENous PRN    acetaminophen (TYLENOL) tablet 650 mg  650 mg Oral Q6H PRN    Or    acetaminophen (TYLENOL) suppository 650 mg  650 mg Rectal Q6H PRN    polyethylene glycol (MIRALAX) packet 17 g  17 g Oral DAILY PRN    promethazine (PHENERGAN) tablet 12.5 mg  12.5 mg Oral Q6H PRN    Or    ondansetron (ZOFRAN) injection 4 mg  4 mg IntraVENous Q6H PRN    enoxaparin (LOVENOX) injection 40 mg  40 mg SubCUTAneous Q12H    guaiFENesin-dextromethorphan (ROBITUSSIN DM) 100-10 mg/5 mL syrup 5 mL  5 mL Oral Q4H PRN    cholecalciferol (VITAMIN D3) (1000 Units /25 mcg) tablet 2,000 Units  2,000 Units Oral DAILY    ascorbic acid (vitamin C) (VITAMIN C) tablet 1,000 mg  1,000 mg Oral DAILY    pantoprazole (PROTONIX) tablet 40 mg  40 mg Oral ACB    tamsulosin (FLOMAX) capsule 0.4 mg  0.4 mg Oral QHS    hydrALAZINE (APRESOLINE) 20 mg/mL injection 10-20 mg  10-20 mg IntraVENous Q6H PRN     ______________________________________________________________________  EXPECTED LENGTH OF STAY: 5d 12h  ACTUAL LENGTH OF STAY:          Pernell Burroughs, MD

## 2021-03-12 NOTE — PROGRESS NOTES
Bedside shift change report given to   Cristina (oncoming nurse) by Katerine Rodriguez (offgoing nurse). Report included the following information SBAR, Kardex, Intake/Output, MAR and Cardiac Rhythm NSR.

## 2021-03-13 LAB
GLUCOSE BLD STRIP.AUTO-MCNC: 179 MG/DL (ref 65–100)
GLUCOSE BLD STRIP.AUTO-MCNC: 276 MG/DL (ref 65–100)
GLUCOSE BLD STRIP.AUTO-MCNC: 333 MG/DL (ref 65–100)
GLUCOSE BLD STRIP.AUTO-MCNC: 363 MG/DL (ref 65–100)
SERVICE CMNT-IMP: ABNORMAL

## 2021-03-13 PROCEDURE — 74011250636 HC RX REV CODE- 250/636: Performed by: INTERNAL MEDICINE

## 2021-03-13 PROCEDURE — 74011250637 HC RX REV CODE- 250/637: Performed by: NURSE PRACTITIONER

## 2021-03-13 PROCEDURE — 74011250636 HC RX REV CODE- 250/636: Performed by: NURSE PRACTITIONER

## 2021-03-13 PROCEDURE — 77010033711 HC HIGH FLOW OXYGEN

## 2021-03-13 PROCEDURE — 74011250637 HC RX REV CODE- 250/637: Performed by: INTERNAL MEDICINE

## 2021-03-13 PROCEDURE — 65660000001 HC RM ICU INTERMED STEPDOWN

## 2021-03-13 PROCEDURE — 94762 N-INVAS EAR/PLS OXIMTRY CONT: CPT

## 2021-03-13 PROCEDURE — 74011250637 HC RX REV CODE- 250/637: Performed by: HOSPITALIST

## 2021-03-13 PROCEDURE — 74011636637 HC RX REV CODE- 636/637: Performed by: NURSE PRACTITIONER

## 2021-03-13 PROCEDURE — 74011636637 HC RX REV CODE- 636/637: Performed by: INTERNAL MEDICINE

## 2021-03-13 PROCEDURE — 82962 GLUCOSE BLOOD TEST: CPT

## 2021-03-13 RX ORDER — INSULIN GLARGINE 100 [IU]/ML
40 INJECTION, SOLUTION SUBCUTANEOUS
Status: DISCONTINUED | OUTPATIENT
Start: 2021-03-13 | End: 2021-03-15 | Stop reason: HOSPADM

## 2021-03-13 RX ADMIN — INSULIN LISPRO 10 UNITS: 100 INJECTION, SOLUTION INTRAVENOUS; SUBCUTANEOUS at 12:09

## 2021-03-13 RX ADMIN — OXYCODONE HYDROCHLORIDE AND ACETAMINOPHEN 1000 MG: 500 TABLET ORAL at 08:45

## 2021-03-13 RX ADMIN — TAMSULOSIN HYDROCHLORIDE 0.4 MG: 0.4 CAPSULE ORAL at 22:08

## 2021-03-13 RX ADMIN — FUROSEMIDE 40 MG: 10 INJECTION, SOLUTION INTRAMUSCULAR; INTRAVENOUS at 08:44

## 2021-03-13 RX ADMIN — PANTOPRAZOLE SODIUM 40 MG: 40 TABLET, DELAYED RELEASE ORAL at 07:07

## 2021-03-13 RX ADMIN — METHYLPREDNISOLONE SODIUM SUCCINATE 40 MG: 40 INJECTION, POWDER, FOR SOLUTION INTRAMUSCULAR; INTRAVENOUS at 22:08

## 2021-03-13 RX ADMIN — METOPROLOL SUCCINATE 50 MG: 50 TABLET, EXTENDED RELEASE ORAL at 08:45

## 2021-03-13 RX ADMIN — ENOXAPARIN SODIUM 40 MG: 100 INJECTION SUBCUTANEOUS at 07:07

## 2021-03-13 RX ADMIN — METHYLPREDNISOLONE SODIUM SUCCINATE 60 MG: 125 INJECTION, POWDER, FOR SOLUTION INTRAMUSCULAR; INTRAVENOUS at 08:44

## 2021-03-13 RX ADMIN — Medication 10 ML: at 22:00

## 2021-03-13 RX ADMIN — DOCUSATE SODIUM 50 MG AND SENNOSIDES 8.6 MG 1 TABLET: 8.6; 5 TABLET, FILM COATED ORAL at 08:44

## 2021-03-13 RX ADMIN — ENOXAPARIN SODIUM 40 MG: 100 INJECTION SUBCUTANEOUS at 18:38

## 2021-03-13 RX ADMIN — INSULIN LISPRO 10 UNITS: 100 INJECTION, SOLUTION INTRAVENOUS; SUBCUTANEOUS at 07:07

## 2021-03-13 RX ADMIN — LISINOPRIL 40 MG: 20 TABLET ORAL at 08:45

## 2021-03-13 RX ADMIN — INSULIN GLARGINE 40 UNITS: 100 INJECTION, SOLUTION SUBCUTANEOUS at 22:07

## 2021-03-13 RX ADMIN — INSULIN LISPRO 3 UNITS: 100 INJECTION, SOLUTION INTRAVENOUS; SUBCUTANEOUS at 22:08

## 2021-03-13 RX ADMIN — Medication 6 MG: at 22:08

## 2021-03-13 RX ADMIN — NIFEDIPINE 30 MG: 30 TABLET, FILM COATED, EXTENDED RELEASE ORAL at 08:44

## 2021-03-13 RX ADMIN — Medication 10 ML: at 06:00

## 2021-03-13 RX ADMIN — INSULIN LISPRO 10 UNITS: 100 INJECTION, SOLUTION INTRAVENOUS; SUBCUTANEOUS at 16:18

## 2021-03-13 RX ADMIN — INSULIN LISPRO 7 UNITS: 100 INJECTION, SOLUTION INTRAVENOUS; SUBCUTANEOUS at 16:18

## 2021-03-13 RX ADMIN — METOPROLOL SUCCINATE 50 MG: 50 TABLET, EXTENDED RELEASE ORAL at 18:37

## 2021-03-13 RX ADMIN — Medication 2000 UNITS: at 08:44

## 2021-03-13 NOTE — PROGRESS NOTES
Bedside shift change report given to Anil0 Columbus Garrison (oncoming nurse) by Yaritza Carlos RN (offgoing nurse). Report included the following information SBAR, Kardex, MAR and Cardiac Rhythm NSR.

## 2021-03-13 NOTE — PROGRESS NOTES
[facility]ist [facility]                                                                                          Hospitalist Progress Note  Alis Junior MD  Answering service: [phone] from in house phone        Date of Service:  3/13/2021  NAME:  Nichole Diaz  :  1955  MRN:  [id number]      Admission Summary:      Mr. Michel Vee is a 75-year-old black male who was admitted to [facility] ICU on  for severe Covid 19 pneumonia complicated by acute hypoxic respiratory failure. Patient initially presented to [facility] on  where he was admitted and was being treated with oxygen 4 L/min via nasal cannula. He was also treated with Decadron and azithromycin and ceftriaxone. On  he started becoming more hypoxic and his oxygen demand increased to 15 L/min via nasal cannula. Later on that day, he was tried on CPAP but patient was unable to tolerate it. He was transferred to [facility] on  as [facility] does not have ICU. He has been being treated with high flow oxygen with prone as tolerated. He had required Precedex drip for hyperactive delirium that has resolved and he is off the Precedex. He developed VARINDER that has resolved. Interval history / Subjective:        Patient is seen and examined at bedside this AM. He feels better. O2 requirement decreasing , down to 7l. Possible dc on Monday   Discussed with nursing - wean down oxygen, encourage to ambulate in room     Assessment & Plan:   Acute hypoxic respiratory failure - improving   Severe COVID-19 pneumonia  - trend inflammatory markers  - S/p abx  - Continue vitamin C, vitamin D  - c/w IV steroids - tapering down   - saturating on midflow 7lNC, try to wean down   - IS- may need home oxygen pt agreed   - pro BNP elevated.  C/w IV lasix      Acute metabolic encephalopathy- Resolved  - Weaned off Precedex drip and trazodone     Type 2 diabetes with hyperglycemia d/t high dose steroids  - BG elevated. Increased Lantus, c/w Humalog, SSI  - Monitor closely, titrate once steroids are decreased    Hypertension  - c/w lisinopril, nifedipine ER, metoprolol succinate     Hx CAD status post CABG   BPH: Continue tamsulosin  Sleep aid: Melatonin 6 mg nightly     DVT prophylaxis: Enoxaparin per Covid protocol  CODE STATUS: Full  Anticipated discharge>48h. Possible dc on Monday. Likely home with no PT/OT needs. may need home oxygen    per CM insurance highly unlikely to cover rehab; also has no PCP, has no SNF benefits only LTC, difficult to secure home health based on no PCP to sign POC, his insurance and he [place] alone [place] area    Hospital Problems  Never Reviewed          Codes Class Noted POA    Respiratory failure with hypoxia [facility]) ICD-10-CM: J96.91  ICD-9-CM: 518.81  2/22/2021 Unknown            Review of Systems:    per HPI      Vital Signs:    Last 24hrs VS reviewed since prior progress note. Most recent are:  Visit Vitals  BP (!) 187/83   Pulse 86   Temp 97.6 °F (36.4 °C)   Resp 17   Ht 5' 10\" (1.778 m)   Wt 114 kg (251 lb 5.2 oz)   SpO2 96%   BMI 36.06 kg/m²       No intake or output data in the 24 hours ending 03/13/21 1054     Physical Examination:     I had a face to face encounter with this patient and independently examined them on3/13/2021 as outlined below:        Constitutional:  Pleasant,sitting in chair,not in distress   Resp:  slightly decreased but grossly clear bilaterally. No wheezing/rhonchi/rales.  No accessory muscle use   Chest Wall: No deformity   CV:  Regular rhythm, normal rate    GI:  Soft, non distended, non tender     Musculoskeletal:  No edema, warm    Neurologic:  Mental status:AAO   Cranial nerves II-XII : WNL  Motor exam: INMAN            Data Review:    Review and/or order of clinical lab test  Review and/or order of tests in the radiology section of CPT  Review and/or order of tests in the medicine section of CPT      Labs:     Recent Labs 03/11/21 0026   WBC 11.7*   HGB 10.0*   HCT 33.4*        Recent Labs     03/11/21 0026   *   K 4.3      CO2 25   BUN 22*   CREA 1.06   *   CA 9.3     Recent Labs     03/11/21  0026   ALT 26   AP 74   TBILI 0.1*   TP 7.3   ALB 2.3*   GLOB 5.0*     No results for input(s): INR, PTP, APTT, INREXT, INREXT in the last 72 hours. No results for input(s): FE, TIBC, PSAT, FERR in the last 72 hours. No results found for: FOL, RBCF   No results for input(s): PH, PCO2, PO2 in the last 72 hours. No results for input(s): CPK, CKNDX, TROIQ in the last 72 hours.     No lab exists for component: CPKMB  Lab Results   Component Value Date/Time    Cholesterol, total 205 (H) 12/13/2020 08:50 AM    HDL Cholesterol 93 12/13/2020 08:50 AM    LDL, calculated 97 12/13/2020 08:50 AM    Triglyceride 75 12/13/2020 08:50 AM    CHOL/HDL Ratio 2.2 12/13/2020 08:50 AM     Lab Results   Component Value Date/Time    Glucose (POC) 333 (H) 03/13/2021 06:53 AM    Glucose (POC) 347 (H) 03/12/2021 09:12 PM    Glucose (POC) 366 (H) 03/12/2021 04:29 PM    Glucose (POC) 201 (H) 03/12/2021 11:51 AM    Glucose (POC) 207 (H) 03/12/2021 07:06 AM     No results found for: COLOR, APPRN, SPGRU, REFSG, BALDEMAR, PROTU, GLUCU, KETU, BILU, UROU, ENRIQUETA, LEUKU, GLUKE, EPSU, BACTU, WBCU, RBCU, CASTS, UCRY      Medications Reviewed:     Current Facility-Administered Medications   Medication Dose Route Frequency    methylPREDNISolone (PF) (SOLU-MEDROL) injection 60 mg  60 mg IntraVENous Q12H    furosemide (LASIX) injection 40 mg  40 mg IntraVENous DAILY    insulin glargine (LANTUS) injection 30 Units  30 Units SubCUTAneous QHS    insulin lispro (HUMALOG) injection 10 Units  10 Units SubCUTAneous TIDAC    lisinopriL (PRINIVIL, ZESTRIL) tablet 40 mg  40 mg Oral DAILY    metoprolol succinate (TOPROL-XL) XL tablet 50 mg  50 mg Oral BIDPC    insulin lispro (HUMALOG) injection   SubCUTAneous AC&HS    melatonin tablet 6 mg  6 mg Oral QHS    senna-docusate (PERICOLACE) 8.6-50 mg per tablet 1 Tab  1 Tab Oral DAILY    albuterol (PROVENTIL HFA, VENTOLIN HFA, PROAIR HFA) inhaler 1 Puff  1 Puff Inhalation Q6H PRN    And    ipratropium (ATROVENT HFA) 17 mcg inhaler  1 Puff Inhalation Q6H PRN    glucose chewable tablet 16 g  4 Tab Oral PRN    dextrose (D50W) injection syrg 12.5-25 g  12.5-25 g IntraVENous PRN    glucagon (GLUCAGEN) injection 1 mg  1 mg IntraMUSCular PRN    NIFEdipine ER (PROCARDIA XL) tablet 30 mg  30 mg Oral DAILY    sodium chloride (NS) flush 5-40 mL  5-40 mL IntraVENous Q8H    sodium chloride (NS) flush 5-40 mL  5-40 mL IntraVENous PRN    acetaminophen (TYLENOL) tablet 650 mg  650 mg Oral Q6H PRN    Or    acetaminophen (TYLENOL) suppository 650 mg  650 mg Rectal Q6H PRN    polyethylene glycol (MIRALAX) packet 17 g  17 g Oral DAILY PRN    promethazine (PHENERGAN) tablet 12.5 mg  12.5 mg Oral Q6H PRN    Or    ondansetron (ZOFRAN) injection 4 mg  4 mg IntraVENous Q6H PRN    enoxaparin (LOVENOX) injection 40 mg  40 mg SubCUTAneous Q12H    guaiFENesin-dextromethorphan (ROBITUSSIN DM) 100-10 mg/5 mL syrup 5 mL  5 mL Oral Q4H PRN    cholecalciferol (VITAMIN D3) (1000 Units /25 mcg) tablet 2,000 Units  2,000 Units Oral DAILY    ascorbic acid (vitamin C) (VITAMIN C) tablet 1,000 mg  1,000 mg Oral DAILY    pantoprazole (PROTONIX) tablet 40 mg  40 mg Oral ACB    tamsulosin (FLOMAX) capsule 0.4 mg  0.4 mg Oral QHS    hydrALAZINE (APRESOLINE) 20 mg/mL injection 10-20 mg  10-20 mg IntraVENous Q6H PRN     ______________________________________________________________________  EXPECTED LENGTH OF STAY: 5d 12h  ACTUAL LENGTH OF STAY:          [address] Dena Gaming MD

## 2021-03-14 LAB
ANION GAP SERPL CALC-SCNC: 6 MMOL/L (ref 5–15)
BUN SERPL-MCNC: 36 MG/DL (ref 6–20)
BUN/CREAT SERPL: 35 (ref 12–20)
CALCIUM SERPL-MCNC: 8.9 MG/DL (ref 8.5–10.1)
CHLORIDE SERPL-SCNC: 104 MMOL/L (ref 97–108)
CO2 SERPL-SCNC: 24 MMOL/L (ref 21–32)
CREAT SERPL-MCNC: 1.04 MG/DL (ref 0.7–1.3)
ERYTHROCYTE [DISTWIDTH] IN BLOOD BY AUTOMATED COUNT: 14.8 % (ref 11.5–14.5)
GLUCOSE BLD STRIP.AUTO-MCNC: 269 MG/DL (ref 65–100)
GLUCOSE BLD STRIP.AUTO-MCNC: 315 MG/DL (ref 65–100)
GLUCOSE BLD STRIP.AUTO-MCNC: 315 MG/DL (ref 65–100)
GLUCOSE BLD STRIP.AUTO-MCNC: 374 MG/DL (ref 65–100)
GLUCOSE SERPL-MCNC: 370 MG/DL (ref 65–100)
HCT VFR BLD AUTO: 34.1 % (ref 36.6–50.3)
HGB BLD-MCNC: 10.1 G/DL (ref 12.1–17)
MCH RBC QN AUTO: 26.7 PG (ref 26–34)
MCHC RBC AUTO-ENTMCNC: 29.6 G/DL (ref 30–36.5)
MCV RBC AUTO: 90.2 FL (ref 80–99)
NRBC # BLD: 0 K/UL (ref 0–0.01)
NRBC BLD-RTO: 0 PER 100 WBC
PLATELET # BLD AUTO: 351 K/UL (ref 150–400)
PMV BLD AUTO: 9.7 FL (ref 8.9–12.9)
POTASSIUM SERPL-SCNC: 4.8 MMOL/L (ref 3.5–5.1)
RBC # BLD AUTO: 3.78 M/UL (ref 4.1–5.7)
SERVICE CMNT-IMP: ABNORMAL
SODIUM SERPL-SCNC: 134 MMOL/L (ref 136–145)
WBC # BLD AUTO: 9.5 K/UL (ref 4.1–11.1)

## 2021-03-14 PROCEDURE — 74011250637 HC RX REV CODE- 250/637: Performed by: NURSE PRACTITIONER

## 2021-03-14 PROCEDURE — 83036 HEMOGLOBIN GLYCOSYLATED A1C: CPT

## 2021-03-14 PROCEDURE — 74011250637 HC RX REV CODE- 250/637: Performed by: INTERNAL MEDICINE

## 2021-03-14 PROCEDURE — 74011636637 HC RX REV CODE- 636/637: Performed by: NURSE PRACTITIONER

## 2021-03-14 PROCEDURE — 74011250637 HC RX REV CODE- 250/637: Performed by: HOSPITALIST

## 2021-03-14 PROCEDURE — 82962 GLUCOSE BLOOD TEST: CPT

## 2021-03-14 PROCEDURE — 65660000001 HC RM ICU INTERMED STEPDOWN

## 2021-03-14 PROCEDURE — 80048 BASIC METABOLIC PNL TOTAL CA: CPT

## 2021-03-14 PROCEDURE — 94762 N-INVAS EAR/PLS OXIMTRY CONT: CPT

## 2021-03-14 PROCEDURE — 74011250636 HC RX REV CODE- 250/636: Performed by: INTERNAL MEDICINE

## 2021-03-14 PROCEDURE — 77010033678 HC OXYGEN DAILY

## 2021-03-14 PROCEDURE — 85027 COMPLETE CBC AUTOMATED: CPT

## 2021-03-14 PROCEDURE — 74011636637 HC RX REV CODE- 636/637: Performed by: INTERNAL MEDICINE

## 2021-03-14 PROCEDURE — 36415 COLL VENOUS BLD VENIPUNCTURE: CPT

## 2021-03-14 PROCEDURE — 74011250636 HC RX REV CODE- 250/636: Performed by: NURSE PRACTITIONER

## 2021-03-14 RX ORDER — FUROSEMIDE 40 MG/1
40 TABLET ORAL DAILY
Status: DISCONTINUED | OUTPATIENT
Start: 2021-03-15 | End: 2021-03-15 | Stop reason: HOSPADM

## 2021-03-14 RX ADMIN — ENOXAPARIN SODIUM 40 MG: 100 INJECTION SUBCUTANEOUS at 18:57

## 2021-03-14 RX ADMIN — TAMSULOSIN HYDROCHLORIDE 0.4 MG: 0.4 CAPSULE ORAL at 22:37

## 2021-03-14 RX ADMIN — Medication 10 ML: at 22:38

## 2021-03-14 RX ADMIN — Medication 6 MG: at 22:38

## 2021-03-14 RX ADMIN — INSULIN LISPRO 10 UNITS: 100 INJECTION, SOLUTION INTRAVENOUS; SUBCUTANEOUS at 12:10

## 2021-03-14 RX ADMIN — METHYLPREDNISOLONE SODIUM SUCCINATE 40 MG: 40 INJECTION, POWDER, FOR SOLUTION INTRAMUSCULAR; INTRAVENOUS at 22:37

## 2021-03-14 RX ADMIN — NIFEDIPINE 30 MG: 30 TABLET, FILM COATED, EXTENDED RELEASE ORAL at 10:02

## 2021-03-14 RX ADMIN — METOPROLOL SUCCINATE 50 MG: 50 TABLET, EXTENDED RELEASE ORAL at 18:57

## 2021-03-14 RX ADMIN — INSULIN LISPRO 5 UNITS: 100 INJECTION, SOLUTION INTRAVENOUS; SUBCUTANEOUS at 22:37

## 2021-03-14 RX ADMIN — INSULIN LISPRO 7 UNITS: 100 INJECTION, SOLUTION INTRAVENOUS; SUBCUTANEOUS at 12:11

## 2021-03-14 RX ADMIN — INSULIN LISPRO 10 UNITS: 100 INJECTION, SOLUTION INTRAVENOUS; SUBCUTANEOUS at 16:30

## 2021-03-14 RX ADMIN — ENOXAPARIN SODIUM 40 MG: 100 INJECTION SUBCUTANEOUS at 07:45

## 2021-03-14 RX ADMIN — PANTOPRAZOLE SODIUM 40 MG: 40 TABLET, DELAYED RELEASE ORAL at 07:45

## 2021-03-14 RX ADMIN — LISINOPRIL 40 MG: 20 TABLET ORAL at 10:02

## 2021-03-14 RX ADMIN — OXYCODONE HYDROCHLORIDE AND ACETAMINOPHEN 1000 MG: 500 TABLET ORAL at 10:02

## 2021-03-14 RX ADMIN — DOCUSATE SODIUM 50 MG AND SENNOSIDES 8.6 MG 1 TABLET: 8.6; 5 TABLET, FILM COATED ORAL at 10:02

## 2021-03-14 RX ADMIN — METHYLPREDNISOLONE SODIUM SUCCINATE 40 MG: 40 INJECTION, POWDER, FOR SOLUTION INTRAMUSCULAR; INTRAVENOUS at 10:02

## 2021-03-14 RX ADMIN — INSULIN LISPRO 10 UNITS: 100 INJECTION, SOLUTION INTRAVENOUS; SUBCUTANEOUS at 07:30

## 2021-03-14 RX ADMIN — METOPROLOL SUCCINATE 50 MG: 50 TABLET, EXTENDED RELEASE ORAL at 10:02

## 2021-03-14 RX ADMIN — INSULIN GLARGINE 40 UNITS: 100 INJECTION, SOLUTION SUBCUTANEOUS at 22:37

## 2021-03-14 RX ADMIN — Medication 2000 UNITS: at 10:02

## 2021-03-14 RX ADMIN — FUROSEMIDE 40 MG: 10 INJECTION, SOLUTION INTRAMUSCULAR; INTRAVENOUS at 10:02

## 2021-03-14 NOTE — PROGRESS NOTES
[facility]  Hospitalist Group                                                                                          Hospitalist Progress Note  Ramirez Loera MD  Answering service: [phone] from in house phone        Date of Service:  3/14/2021  NAME:  Chris Bose  :  1955  MRN:  [id number]      Admission Summary:      Mr. Moe Jaeger is a 77-year-old black male who was admitted to [facility] ICU on  for severe Covid 19 pneumonia complicated by acute hypoxic respiratory failure. Patient initially presented to [facility] on  where he was admitted and was being treated with oxygen 4 L/min via nasal cannula. He was also treated with Decadron and azithromycin and ceftriaxone. On  he started becoming more hypoxic and his oxygen demand increased to 15 L/min via nasal cannula. Later on that day, he was tried on CPAP but patient was unable to tolerate it. He was transferred to [facility] on  as [facility] does not have ICU. He has been being treated with high flow oxygen with prone as tolerated. He had required Precedex drip for hyperactive delirium that has resolved and he is off the Precedex. He developed VARINDER that has resolved. Interval history / Subjective:        Patient is seen and examined at bedside this AM. He feels better. O2 requirement decreasing , down to 3l.  Possible dc tomorrow   Discussed with nursing - wean down oxygen, encourage to ambulate in room     Tried to reach sister to update discharge plan, no answer or voicemail     Assessment & Plan:   Acute hypoxic respiratory failure - improving   Severe COVID-19 pneumonia - improving   - S/p abx  - Continue vitamin C, vitamin D  - c/w IV steroids - tapering down   - saturating on midflow 3lNC, try to wean down   - IS- may need home oxygen pt agreed   - changed to po IV lasix      Acute metabolic encephalopathy- Resolved  - Weaned off Precedex drip and trazodone     Type 2 diabetes with hyperglycemia d/t high dose steroids  - BG elevated. C/w Lantus, Humalog, SSI  - Monitor closely, titrate once steroids are decreased  - will need lantus on discharge     Hypertension  - c/w lisinopril, nifedipine ER, metoprolol succinate     Hx CAD status post CABG   BPH: Continue tamsulosin  Sleep aid: Melatonin 6 mg nightly     DVT prophylaxis: Enoxaparin per Covid protocol  CODE STATUS: Full  Anticipated discharge>48h. Possible dc on Monday. Likely home with no PT/OT needs. may need home oxygen    per CM insurance highly unlikely to cover rehab; also has no PCP, has no SNF benefits only LTC, difficult to secure home health based on no PCP to sign POC, his insurance and [place] alone [place] area    Hospital Problems  Never Reviewed          Codes Class Noted POA    Respiratory failure with hypoxia [facility]) ICD-10-CM: J96.91  ICD-9-CM: 518.81  2/22/2021 Unknown            Review of Systems:    per HPI      Vital Signs:    Last 24hrs VS reviewed since prior progress note. Most recent are:  Visit Vitals  /71   Pulse 70   Temp 98.1 °F (36.7 °C)   Resp 18   Ht 5' 10\" (1.778 m)   Wt 113.3 kg (249 lb 12.5 oz)   SpO2 93%   BMI 35.84 kg/m²       No intake or output data in the 24 hours ending 03/14/21 1150     Physical Examination:     I had a face to face encounter with this patient and independently examined them on3/14/2021 as outlined below:        Constitutional:  Pleasant,sitting in chair,not in distress   Resp:  slightly decreased but grossly clear bilaterally. No wheezing/rhonchi/rales.  No accessory muscle use   Chest Wall: No deformity   CV:  Regular rhythm, normal rate    GI:  Soft, non distended, non tender     Musculoskeletal:  No edema, warm    Neurologic:  Mental status:AAO   Cranial nerves II-XII : WNL  Motor exam: INMAN            Data Review:    Review and/or order of clinical lab test  Review and/or order of tests in the radiology section of CPT  Review and/or order of tests in the medicine section of [facility]      Labs:     Recent Labs     03/14/21 0436   WBC 9.5   HGB 10.1*   HCT 34.1*        Recent Labs     03/14/21  0436   *   K 4.8      CO2 24   BUN 36*   CREA 1.04   *   CA 8.9     No results for input(s): ALT, AP, TBIL, TBILI, TP, ALB, GLOB, GGT, AML, LPSE in the last 72 hours. No lab exists for component: SGOT, GPT, AMYP, HLPSE  No results for input(s): INR, PTP, APTT, INREXT, INREXT in the last 72 hours. No results for input(s): FE, TIBC, PSAT, FERR in the last 72 hours. No results found for: FOL, RBCF   No results for input(s): PH, PCO2, PO2 in the last 72 hours. No results for input(s): CPK, CKNDX, TROIQ in the last 72 hours.     No lab exists for component: CPKMB  Lab Results   Component Value Date/Time    Cholesterol, total 205 (H) 12/13/2020 08:50 AM    HDL Cholesterol 93 12/13/2020 08:50 AM    LDL, calculated 97 12/13/2020 08:50 AM    Triglyceride 75 12/13/2020 08:50 AM    CHOL/HDL Ratio 2.2 12/13/2020 08:50 AM     Lab Results   Component Value Date/Time    Glucose (POC) 374 (H) 03/14/2021 06:28 AM    Glucose (POC) 179 (H) 03/13/2021 09:14 PM    Glucose (POC) 276 (H) 03/13/2021 04:00 PM    Glucose (POC) 363 (H) 03/13/2021 11:44 AM    Glucose (POC) 333 (H) 03/13/2021 06:53 AM     No results found for: COLOR, APPRN, SPGRU, REFSG, BALDEMAR, PROTU, GLUCU, KETU, BILU, UROU, ENRIQUETA, LEUKU, GLUKE, EPSU, BACTU, WBCU, RBCU, CASTS, UCRY      Medications Reviewed:     Current Facility-Administered Medications   Medication Dose Route Frequency    methylPREDNISolone (PF) (Solu-MEDROL) injection 40 mg  40 mg IntraVENous Q12H    insulin glargine (LANTUS) injection 40 Units  40 Units SubCUTAneous QHS    furosemide (LASIX) injection 40 mg  40 mg IntraVENous DAILY    insulin lispro (HUMALOG) injection 10 Units  10 Units SubCUTAneous TIDAC    lisinopriL (PRINIVIL, ZESTRIL) tablet 40 mg  40 mg Oral DAILY    metoprolol succinate (TOPROL-XL) XL tablet 50 mg  50 mg Oral BIDPC    insulin lispro (HUMALOG) injection   SubCUTAneous AC&HS    melatonin tablet 6 mg  6 mg Oral QHS    senna-docusate (PERICOLACE) 8.6-50 mg per tablet 1 Tab  1 Tab Oral DAILY    albuterol (PROVENTIL HFA, VENTOLIN HFA, PROAIR HFA) inhaler 1 Puff  1 Puff Inhalation Q6H PRN    And    ipratropium (ATROVENT HFA) 17 mcg inhaler  1 Puff Inhalation Q6H PRN    glucose chewable tablet 16 g  4 Tab Oral PRN    dextrose (D50W) injection syrg 12.5-25 g  12.5-25 g IntraVENous PRN    glucagon (GLUCAGEN) injection 1 mg  1 mg IntraMUSCular PRN    NIFEdipine ER (PROCARDIA XL) tablet 30 mg  30 mg Oral DAILY    sodium chloride (NS) flush 5-40 mL  5-40 mL IntraVENous Q8H    sodium chloride (NS) flush 5-40 mL  5-40 mL IntraVENous PRN    acetaminophen (TYLENOL) tablet 650 mg  650 mg Oral Q6H PRN    Or    acetaminophen (TYLENOL) suppository 650 mg  650 mg Rectal Q6H PRN    polyethylene glycol (MIRALAX) packet 17 g  17 g Oral DAILY PRN    promethazine (PHENERGAN) tablet 12.5 mg  12.5 mg Oral Q6H PRN    Or    ondansetron (ZOFRAN) injection 4 mg  4 mg IntraVENous Q6H PRN    enoxaparin (LOVENOX) injection 40 mg  40 mg SubCUTAneous Q12H    guaiFENesin-dextromethorphan (ROBITUSSIN DM) 100-10 mg/5 mL syrup 5 mL  5 mL Oral Q4H PRN    cholecalciferol (VITAMIN D3) (1000 Units /25 mcg) tablet 2,000 Units  2,000 Units Oral DAILY    ascorbic acid (vitamin C) (VITAMIN C) tablet 1,000 mg  1,000 mg Oral DAILY    pantoprazole (PROTONIX) tablet 40 mg  40 mg Oral ACB    tamsulosin (FLOMAX) capsule 0.4 mg  0.4 mg Oral QHS    hydrALAZINE (APRESOLINE) 20 mg/mL injection 10-20 mg  10-20 mg IntraVENous Q6H PRN     ______________________________________________________________________  EXPECTED LENGTH OF STAY: 5d 12h  ACTUAL LENGTH OF STAY:          [address] [facility],[address], MD

## 2021-03-14 NOTE — PROGRESS NOTES
Bedside shift change report given to [address] [place] [address] (oncoming nurse) by Adrianne Ayoub (offgoing nurse). Report included the following information SBAR, Kardex, MAR and Cardiac Rhythm NSR.

## 2021-03-14 NOTE — PROGRESS NOTES
Bedside shift change report given to Eddy Hurtado (oncoming nurse) by Fara Marina RN (offgoing nurse). Report included the following information SBAR, Kardex, MAR and Cardiac Rhythm NSR.

## 2021-03-14 NOTE — PROGRESS NOTES
Paged on call doctor regarding blood sugar of 375 spoke with Dr. Alejandro Heath who stated to give patient a total of 20units of Lispro. Bedside shift change report given to Brant Munroe (oncoming nurse) by Eleazar Sunshine (offgoing nurse). Report included the following information SBAR, Kardex, ED Summary, Intake/Output, MAR, Accordion, Recent Results, Med Rec Status and Cardiac Rhythm NSR.

## 2021-03-15 VITALS
SYSTOLIC BLOOD PRESSURE: 148 MMHG | TEMPERATURE: 98.4 F | WEIGHT: 250 LBS | HEART RATE: 86 BPM | RESPIRATION RATE: 23 BRPM | BODY MASS INDEX: 35.79 KG/M2 | HEIGHT: 70 IN | DIASTOLIC BLOOD PRESSURE: 71 MMHG | OXYGEN SATURATION: 93 %

## 2021-03-15 LAB
EST. AVERAGE GLUCOSE BLD GHB EST-MCNC: 286 MG/DL
GLUCOSE BLD STRIP.AUTO-MCNC: 163 MG/DL (ref 65–100)
GLUCOSE BLD STRIP.AUTO-MCNC: 198 MG/DL (ref 65–100)
HBA1C MFR BLD: 11.6 % (ref 4–5.6)
SERVICE CMNT-IMP: ABNORMAL
SERVICE CMNT-IMP: ABNORMAL

## 2021-03-15 PROCEDURE — 74011636637 HC RX REV CODE- 636/637: Performed by: NURSE PRACTITIONER

## 2021-03-15 PROCEDURE — 74011250637 HC RX REV CODE- 250/637: Performed by: INTERNAL MEDICINE

## 2021-03-15 PROCEDURE — 74011250636 HC RX REV CODE- 250/636: Performed by: NURSE PRACTITIONER

## 2021-03-15 PROCEDURE — 74011250637 HC RX REV CODE- 250/637: Performed by: NURSE PRACTITIONER

## 2021-03-15 PROCEDURE — 97530 THERAPEUTIC ACTIVITIES: CPT

## 2021-03-15 PROCEDURE — 74011250637 HC RX REV CODE- 250/637: Performed by: HOSPITALIST

## 2021-03-15 PROCEDURE — 74011250636 HC RX REV CODE- 250/636: Performed by: INTERNAL MEDICINE

## 2021-03-15 PROCEDURE — 82962 GLUCOSE BLOOD TEST: CPT

## 2021-03-15 PROCEDURE — 74011636637 HC RX REV CODE- 636/637: Performed by: INTERNAL MEDICINE

## 2021-03-15 PROCEDURE — 77010033678 HC OXYGEN DAILY

## 2021-03-15 PROCEDURE — 99231 SBSQ HOSP IP/OBS SF/LOW 25: CPT | Performed by: CLINICAL NURSE SPECIALIST

## 2021-03-15 RX ORDER — LANCETS 21 GAUGE
EACH MISCELLANEOUS
Qty: 50 LANCET | Refills: 0 | Status: SHIPPED | OUTPATIENT
Start: 2021-03-15 | End: 2021-09-14 | Stop reason: ALTCHOICE

## 2021-03-15 RX ORDER — PREDNISONE 10 MG/1
TABLET ORAL
Qty: 14 TAB | Refills: 0 | Status: SHIPPED | OUTPATIENT
Start: 2021-03-15 | End: 2021-03-21

## 2021-03-15 RX ORDER — ALBUTEROL SULFATE 90 UG/1
1 AEROSOL, METERED RESPIRATORY (INHALATION)
Qty: 1 INHALER | Refills: 0 | Status: SHIPPED | OUTPATIENT
Start: 2021-03-15 | End: 2021-04-09 | Stop reason: ALTCHOICE

## 2021-03-15 RX ORDER — INSULIN PUMP SYRINGE, 3 ML
EACH MISCELLANEOUS
Qty: 1 KIT | Refills: 0 | Status: SHIPPED | OUTPATIENT
Start: 2021-03-15 | End: 2021-04-09 | Stop reason: ALTCHOICE

## 2021-03-15 RX ORDER — IBUPROFEN 200 MG
CAPSULE ORAL
Qty: 50 STRIP | Refills: 0 | Status: SHIPPED | OUTPATIENT
Start: 2021-03-15 | End: 2021-09-14 | Stop reason: ALTCHOICE

## 2021-03-15 RX ORDER — INSULIN GLARGINE 100 [IU]/ML
40 INJECTION, SOLUTION SUBCUTANEOUS
Qty: 1 VIAL | Refills: 1 | Status: SHIPPED | OUTPATIENT
Start: 2021-03-15 | End: 2021-08-24

## 2021-03-15 RX ORDER — PEN NEEDLE, DIABETIC 30 GX3/16"
NEEDLE, DISPOSABLE MISCELLANEOUS
Qty: 1 PACKAGE | Refills: 0 | Status: SHIPPED | OUTPATIENT
Start: 2021-03-15 | End: 2021-09-14 | Stop reason: ALTCHOICE

## 2021-03-15 RX ORDER — LISINOPRIL 40 MG/1
40 TABLET ORAL DAILY
Qty: 30 TAB | Refills: 0 | Status: SHIPPED | OUTPATIENT
Start: 2021-03-15 | End: 2021-03-15 | Stop reason: SDUPTHER

## 2021-03-15 RX ORDER — LISINOPRIL 40 MG/1
40 TABLET ORAL DAILY
Qty: 30 TAB | Refills: 0 | Status: SHIPPED | OUTPATIENT
Start: 2021-03-15 | End: 2021-04-09 | Stop reason: ALTCHOICE

## 2021-03-15 RX ORDER — ALBUTEROL SULFATE 90 UG/1
1 AEROSOL, METERED RESPIRATORY (INHALATION)
Qty: 1 INHALER | Refills: 0 | Status: SHIPPED | OUTPATIENT
Start: 2021-03-15 | End: 2021-03-15 | Stop reason: SDUPTHER

## 2021-03-15 RX ADMIN — METOPROLOL SUCCINATE 50 MG: 50 TABLET, EXTENDED RELEASE ORAL at 10:11

## 2021-03-15 RX ADMIN — LISINOPRIL 40 MG: 20 TABLET ORAL at 10:12

## 2021-03-15 RX ADMIN — FUROSEMIDE 40 MG: 40 TABLET ORAL at 10:12

## 2021-03-15 RX ADMIN — INSULIN LISPRO 3 UNITS: 100 INJECTION, SOLUTION INTRAVENOUS; SUBCUTANEOUS at 13:17

## 2021-03-15 RX ADMIN — Medication 10 ML: at 14:00

## 2021-03-15 RX ADMIN — NIFEDIPINE 30 MG: 30 TABLET, FILM COATED, EXTENDED RELEASE ORAL at 10:12

## 2021-03-15 RX ADMIN — INSULIN LISPRO 10 UNITS: 100 INJECTION, SOLUTION INTRAVENOUS; SUBCUTANEOUS at 13:17

## 2021-03-15 RX ADMIN — PANTOPRAZOLE SODIUM 40 MG: 40 TABLET, DELAYED RELEASE ORAL at 06:48

## 2021-03-15 RX ADMIN — DOCUSATE SODIUM 50 MG AND SENNOSIDES 8.6 MG 1 TABLET: 8.6; 5 TABLET, FILM COATED ORAL at 10:12

## 2021-03-15 RX ADMIN — ENOXAPARIN SODIUM 40 MG: 100 INJECTION SUBCUTANEOUS at 06:48

## 2021-03-15 RX ADMIN — METHYLPREDNISOLONE SODIUM SUCCINATE 40 MG: 40 INJECTION, POWDER, FOR SOLUTION INTRAMUSCULAR; INTRAVENOUS at 10:12

## 2021-03-15 RX ADMIN — INSULIN LISPRO 10 UNITS: 100 INJECTION, SOLUTION INTRAVENOUS; SUBCUTANEOUS at 06:48

## 2021-03-15 RX ADMIN — OXYCODONE HYDROCHLORIDE AND ACETAMINOPHEN 1000 MG: 500 TABLET ORAL at 10:12

## 2021-03-15 RX ADMIN — INSULIN LISPRO 3 UNITS: 100 INJECTION, SOLUTION INTRAVENOUS; SUBCUTANEOUS at 06:48

## 2021-03-15 RX ADMIN — Medication 2000 UNITS: at 10:12

## 2021-03-15 RX ADMIN — Medication 10 ML: at 06:48

## 2021-03-15 NOTE — PROGRESS NOTES
I have reviewed discharge instructions with the patient. The patient verbalized understanding. IV removed. Patient left EMT. Oxygen tank went with him.

## 2021-03-15 NOTE — PROGRESS NOTES
Transitions of Care: 23% risk of re-admission      -Patient will discharge home, [facility] has accepted for home health RN/PT/OT   -New PCP establishment, requested appointment from    -Home Oxygen set-up, referral placed to [address] is a barrier, will need transportation home- his care is at [facility], however, CM cannot send him via cab due to COVID-19     The CM called into patient's room to discuss discharge- patient is eager for discharge home today. The CM inquired about support- he endorses that his sister can check-in on him upon discharge, friends as well. Patient is agreeable for home Oxygen- referral placed to [address]. The patient will need transportation home- patient confirmed discharging address on-file, 3-4 steps to enter. The patient however prefers transportation to [facility] where his vehicle currently is (has keys with him). CM explained CM will attempt to locate transport to [facility], however, patient may have to go home with [facility] and then friends/family assist in getting his vehicle- patient verbalized understanding, CM will attempt to locate- will need to discuss, COVID positive so cab is likely not an option. Patient confirmed address . CM sent referral to [address] with orders- will need Oxygen testing, CM contacted PT- will also ask RN. Medicare pt has received 2nd IM letter informing them of their right to appeal the discharge. Copy has been placed on pt bedside chart. Verbally discussed with patient. [facility] has accepted for home health services- spoke with Alison Ha, Liaison- aware of discharge today. The CM also received call from the patient's sister Graham Toth- she is aware of discharge today, confirmed that family can check-in on the patient for additional assistance if needed.  CM explained transport- may need to go home and family to assist with picking-up vehicle, family verbalized understanding. 10:27 a.m.- The CM spoke with - at this point, since patient is still having SOB, needing Oxygen- cannot send via [employer]/cab. Will ask patient if he has funds for Hospital to [facility], if not- will have to send via [facility] and family/friends will have to assist patient in obtaining his vehicle- patient also needs to get his home Oxygen delivered. The CM called the patient- discussed above, he is agreeable for [facility] ambulance home (does not have finances to pay for wheelchair Dionne Ran), and he will work with friends/family to retrieve his car from [facility]. [address] discussed calling the [facility] [address] when he gets home to ensure delivery of the additional equipment, portable tank to be delivered to [address] [place] [address] so the patient will have a tank at home while waiting for the rest of the delivery. CM contacted [facility] representative- [facility] will allow the transport of the tank, [facility] representative spoke with . [facility] established for 2:30 p.m. [facility] to deliver portable tank to the hospital prior to 2:30 pick-up. Patient will have small tank once he gets home/awaiting the rest of the delivery- [facility] to use their tank en route. 11:22 a.m.- CM spoke with sister Evita Guillermo- she is aware of above information, again confirmed that family will be available to check -in on the patient, aware he will need help getting to his car. Patient has been approved for home Oxygen- spoke with Erik Leyva, they will plan delivery between 3:30 p.m. and 4:00 p.m. to meet patient at home with Oxygen delivery. [facility] to use their tank en route so patient will have personal portable tank once [facility] completes transport for the patient while awaiting the rest of his delivery- discussed with RN.      TRANG Ordaz

## 2021-03-15 NOTE — PROGRESS NOTES
Hospital follow-up new Virtual PCP transitional care appointment has been scheduled with Dr. Rosina Davey for Tuesday, 3/23/21 at 2:00 p.m. Pending patient discharge.   Jessee Jacob, Care Management Specialist.

## 2021-03-15 NOTE — PROGRESS NOTES
2000 Report received from Jorge Solorio RN. Patient resting bed. VSS, patient on 3L O2 at 94%. 4433 Bedside shift change report given to Justin Schaffer by Maritza Martinez RN. Report included the following information SBAR, Kardex, MAR, Accordion, and Recent Results.

## 2021-03-15 NOTE — DISCHARGE INSTRUCTIONS
Please bring this form with you to show your primary care provider at your follow-up appointment. Primary care provider:  Dr. Lea Sheikh    Discharging provider:  Jael Goncalves MD    You have been admitted to the hospital with the following diagnoses:  · Respiratory failure with hypoxia ([address] [J96.91]    FOLLOW-UP CARE RECOMMENDATIONS:  Monitor blood glucose at home     APPOINTMENTS:  · Follow-up with primary care provider in  1 week      FOLLOW-UP TESTS recommended: none     PENDING TEST RESULTS:  At the time of your discharge the following test results are still pending: none   Please make sure you review these results with your outpatient follow-up provider(s). SYMPTOMS to watch for: chest pain, shortness of breath, fever, chills, nausea, vomiting, diarrhea, change in mentation, falling, weakness, bleeding. DIET/what to eat:  Diabetic Diet    ACTIVITY:  Activity as tolerated    What to do if new or unexpected symptoms occur? If you experience any of the above symptoms (or should other concerns or questions arise after discharge) please call your primary care physician. Return to the emergency room if you cannot get hold of your doctor. · It is very important that you keep your follow-up appointment(s). · Please bring discharge papers, medication list (and/or medication bottles) to your follow-up appointments for review by your outpatient provider(s). · Please check the list of medications and be sure it includes every medication (even non-prescription medications) that your provider wants you to take. · It is important that you take the medication exactly as they are prescribed. · Keep your medication in the bottles provided by the pharmacist and keep a list of the medication names, dosages, and times to be taken in your wallet. · Do not take other medications without consulting your doctor.    · If you have any questions about your medications or other instructions, please talk to your nurse or care provider before you leave the hospital.    I understand that if any problems occur once I am at home I am to contact my physician. These instructions were explained to me and I had the opportunity to ask questions. Diabetes Management:  1. Take a blood glucose reading 3  times daily:  First thing in the morning before you eat or drink anything. Then before lunch, and  dinner Make a log and bring to your next doctor appointment. If your blood sugar is over 200 consistently OR   If your blood sugar is under 100 consistently: call your doctor for a medication adjustment   Goal blood sugar is:  before meals________________    2. Take your diabetes medications (Metformin and insulin) as prescribed. Goal A1C is 7%. 3. Make a follow up appointment with your endocrinologist or primary care physician. Please see him within the next 2-4 weeks. 4. Make sure to get a DILATED eye exam every year. This checks for retinal blood vessel damage caused by long term high blood sugar. 5. Make sure to examine your feet every day looking for any wound or foot irritation as sensation can be impaired in your feet. Always wear socks and/or slippers even while at home. This will protect your feet from injury. 6. Diabetes Self Management Training:(highly encouraged)   Outpatient appointments are available with a certified diabetic educator who can  you with meal planning, insulin administration and other diabetes management strategies. Please call [phone] to schedule at a location close to your home. [employer] at [facility] is the best place to purchase glucometer supplies    Blood glucose meter ($9), test strips ($9 per 50 test strips) and lancing devices ($4 per 50)    METFORMIN IS FREE at [facility]           Leaving the Hospital After Treatment for COVID-19: Care Instructions  Overview     You are being sent home from the hospital after being treated for COVID-19.  Being in the hospital can be hard, especially if you've been in the intensive care unit (ICU). Even though you're going home, you probably don't feel well yet. Healing from COVID-19 takes time. You may feel very tired for weeks or months afterward, especially if you were on a ventilator. It will take time to get back to your old level of activity. Some people may have long-lasting health problems. But most people can look forward to feeling a little better every day. If you were on a ventilator, your throat may be sore and your voice hoarse or raspy for a while. After leaving the hospital, some people have feelings of anxiety and depression. They may have nightmares. Or in their mind they may relive events that happened in the hospital (flashbacks). You can always reach out to your doctor if you're having trouble with these symptoms. Your doctor will tell you if you need to isolate yourself at home, and when you can end isolation. Follow-up care is a key part of your treatment and safety. Be sure to make and go to all appointments, and call your doctor if you are having problems. It's also a good idea to know your test results and keep a list of the medicines you take. How can you care for yourself at home? · Get plenty of rest. It can help you feel better. · Be kind to yourself if it's taking longer than you expected to feel better. You've been through a stressful time. · Get up and walk around every hour or two while you're resting. Slowly increase your activity as you start to feel better. · Eat healthy foods. · Drink plenty of fluids. If you have kidney, heart, or liver disease and have to limit fluids, talk with your doctor before you increase the amount of fluids you drink. · Take acetaminophen (such as Tylenol) to reduce a fever. It may also help with muscle aches. Read and follow all instructions on the label.   If you are in isolation after you get home  · Wear a cloth face cover when you are around other people. It can help stop the spread of the virus when you cough or sneeze. · Limit contact with people in your home. If possible, stay in a separate bedroom and use a separate bathroom. · If you have to leave home, avoid crowds and try to stay at least 6 feet away from other people. · Avoid contact with pets and other animals. · Cover your mouth and nose with a tissue when you cough or sneeze. Then throw it in the trash right away. · Wash your hands often, especially after you cough or sneeze. Use soap and water, and scrub for at least 20 seconds. If soap and water aren't available, use an alcohol-based hand . · Don't share personal household items. These include bedding, towels, cups and glasses, and eating utensils. · [address] in the warmest water allowed for the fabric type, and dry it completely. It's okay to wash other people's laundry with yours. · Clean and disinfect your home every day. Use household  and disinfectant wipes or sprays. Take special care to clean things that you grab with your hands. These include doorknobs, remote controls, phones, and handles on your refrigerator and microwave. And don't forget countertops, tabletops, bathrooms, and computer keyboards. When should you call for help? Call 911 anytime you think you may need emergency care. For example, call if you have life-threatening symptoms, such as:    · You have severe trouble breathing. (You can't talk at all.)     · You have constant chest pain or pressure.     · You are severely dizzy or lightheaded.     · You are confused or can't think clearly.     · Your face and lips have a blue color.     · You passed out (lost consciousness) or are very hard to wake up. Call your doctor now or seek immediate medical care if:    · You have moderate trouble breathing. (You can't speak a full sentence.)     · You are coughing up blood (more than about 1 teaspoon).     · You have signs of low blood pressure.  These include feeling lightheaded; being too weak to stand; and having cold, pale, clammy skin. Watch closely for changes in your health, and be sure to contact your doctor if:    · Your symptoms get worse.     · You are not getting better as expected.     · You have new or worse symptoms of anxiety, depression, nightmares, or flashbacks. Call before you go to the doctor's office. Follow their instructions. And wear a cloth face cover. Current as of: December 18, 2020               Content Version: 12.7  © 2006-2021 [employer]. Care instructions adapted under license by [employer] (which disclaims liability or warranty for this information).  If you have questions about a medical condition or this instruction, always ask your healthcare professional. [address] any warranty or liability for your use of this information.

## 2021-03-15 NOTE — DISCHARGE SUMMARY
Discharge Summary     Patient:  Adenike Booker       MRN: [id number]       YOB: 1955       Age: 77 y.o. Date of admission:  2/22/2021    Date of discharge:  3/15/2021    Primary care provider: Dr. Casey Vega    Admitting provider:  Ramin Alfaro MD    Discharging provider:  Jordi [address].: [phone]. If unavailable, call [phone] and ask the  to page the triage hospitalist.    Consultations  · None    Procedures  · * No surgery found *    Discharge destination: home with [address]. The patient is stable for discharge. Admission diagnosis  · Respiratory failure with hypoxia ([facility] [J96.91]    Current Discharge Medication List      START taking these medications    Details   predniSONE (DELTASONE) 10 mg tablet Take 40 mg by mouth daily (with breakfast) for 2 days, THEN 20 mg daily (with breakfast) for 2 days, THEN 10 mg daily (with breakfast) for 2 days. Qty: 14 Tab, Refills: 0      insulin glargine (LANTUS) 100 unit/mL injection 40 Units by SubCUTAneous route nightly. Qty: 1 Vial, Refills: 1      albuterol (PROVENTIL HFA, VENTOLIN HFA, PROAIR HFA) 90 mcg/actuation inhaler Take 1 Puff by inhalation every six (6) hours as needed for Wheezing or Shortness of Breath. Qty: 1 Inhaler, Refills: 0      Blood-Glucose Meter monitoring kit 3-4 times a day as needed  Qty: 1 Kit, Refills: 0      Insulin Needles, Disposable, 31 gauge x 5/16\" ndle Daily at bedtime  Qty: 1 Package, Refills: 0      glucose blood VI test strips (blood glucose test) strip 3-4 times a day as needed  Qty: 50 Strip, Refills: 0      lancets 21 gauge misc Use 3-4 times a day as needed  Qty: 50 Lancet, Refills: 0         CONTINUE these medications which have CHANGED    Details   lisinopriL (PRINIVIL, ZESTRIL) 40 mg tablet Take 1 Tab by mouth daily.   Qty: 30 Tab, Refills: 0         CONTINUE these medications which have NOT CHANGED    Details   magnesium oxide (MAG-OX) 400 mg tablet Take 1 Tab by mouth daily. Qty: 30 Tab, Refills: 0      metoprolol succinate (TOPROL-XL) 50 mg XL tablet Take 1 Tab by mouth two (2) times daily (after meals). Qty: 60 Tab, Refills: 0      NIFEdipine ER (PROCARDIA XL) 30 mg ER tablet Take 1 Tab by mouth daily. Qty: 30 Tab, Refills: 0      potassium chloride (K-DUR, KLOR-CON) 20 mEq tablet Take 1 Tab by mouth daily. Qty: 30 Tab, Refills: 0      isosorbide mononitrate ER (IMDUR) 60 mg CR tablet Take 60 mg by mouth daily. atorvastatin (LIPITOR) 20 mg tablet Take 20 mg by mouth daily. furosemide (Lasix) 40 mg tablet Take 40 mg by mouth daily. pantoprazole (Protonix) 40 mg tablet Take 40 mg by mouth daily. traZODone (DESYREL) 50 mg tablet Take 50 mg by mouth two (2) times a day. metFORMIN (GLUCOPHAGE) 1,000 mg tablet Take 1,000 mg by mouth two (2) times daily (with meals). tamsulosin (Flomax) 0.4 mg capsule Take 0.4 mg by mouth nightly. Follow-up Information     Follow up With Specialties Details Why Contact Info    [facility] [address] On 3/17/2021 Home health skilled nursing, PT/OT. Call agency if you have not heard from them by 12:00 p.m. [address] [address] Oxygen. Call company once you get home to get the additional Home Oxygen equipment delivered , [facility] On 3/23/2021 Hospital follow up new PCP Virtual appointment Tuesday, 3/23/21 at 2:00 p.m. Please provide photo ID, insurance card and a listing of all medications. [address]  [address] [place] [address] Rd  [phone]      None    None (750) Patient stated that they have no PCP            Final discharge diagnoses and brief hospital course     Mr. Nicki Mcburney is a 24-year-old black male who was admitted to [facility] ICU on 2/22 for severe Covid 19 pneumonia complicated by acute hypoxic respiratory failure.  Patient initially presented to [facility] on 2/17 where he was admitted and was being treated with oxygen 4 L/min via nasal cannula.  He was also treated with Decadron and azithromycin and ceftriaxone.  On 2/21 he started becoming more hypoxic and his oxygen demand increased to 15 L/min via nasal cannula.  Later on that day, he was tried on CPAP but patient was unable to tolerate it. [place] was transferred to [facility] on 2/22 as [facility] does not have ICU. He has been being treated with high flow oxygen with prone as tolerated.  He had required Precedex drip for hyperactive delirium that has resolved and he is off the Precedex. Acute hypoxic respiratory failure - improving   Severe COVID-19 pneumonia - improving   - S/p abx  - Continue vitamin C, vitamin D  - c/w IV steroids - tapering down - discharged on po steroid taper   - saturating on 4lNC  - IS- may need home oxygen pt agreed      Acute metabolic encephalopathy- Resolved  - Weaned off Precedex drip and trazodone     Type 2 diabetes with hyperglycemia d/t high dose steroids  - A1c 11, C/w Lantus, Humalog, SSI     Hypertension  - c/w lisinopril, nifedipine ER, metoprolol succinate     Hx CAD status post CABG   BPH: Continue tamsulosin    Discharge recommendations:  PCP f/u in 1 week  Monitor Blood glucose - lantus prescribed - education provided   Monitor oxygen saturations  Home health  Covid 19 precautions    Discharge plan discussed in detail with patient and his sister on phone. They understood and agreed. Physical examination at discharge  Visit Vitals  BP (!) 155/85 (BP 1 Location: Right arm, BP Patient Position: At rest)   Pulse 86   Temp 98.5 °F (36.9 °C)   Resp 23   Ht 5' 10\" (1.778 m)   Wt 113.4 kg (250 lb)   SpO2 93%   BMI 35.87 kg/m²     Constitutional:  Pleasant,sitting in chair,not in distress   Resp:  slightly decreased but grossly clear bilaterally. No wheezing/rhonchi/rales.  No accessory muscle use   Chest Wall: No deformity   CV:  Regular rhythm, normal rate    GI:  Soft, non distended, non tender     Musculoskeletal:  No edema, warm    Neurologic:  Mental status:AAO   Cranial nerves II-XII : WNL  Motor exam: INMAN         Pertinent imaging studies:    Per EMR     Recent Labs     03/14/21 0436   WBC 9.5   HGB 10.1*   HCT 34.1*        Recent Labs     03/14/21 0436   *   K 4.8      CO2 24   BUN 36*   CREA 1.04   *   CA 8.9     No results for input(s): AP, TBIL, TP, ALB, GLOB, GGT, AML, LPSE in the last 72 hours. No lab exists for component: SGOT, GPT, AMYP, HLPSE  No results for input(s): INR, PTP, APTT, INREXT in the last 72 hours. No results for input(s): FE, TIBC, PSAT, FERR in the last 72 hours. No results for input(s): PH, PCO2, PO2 in the last 72 hours. No results for input(s): CPK, CKMB in the last 72 hours. No lab exists for component: TROPONINI  No components found for: [place]    Chronic Diagnoses:    Problem List as of 3/15/2021 Never Reviewed          Codes Class Noted - Resolved    Res[facility] failure with hypoxia ([facility] ICD-10-CM: J96.91  ICD-9-CM: 518.81  2/22/2021 - Present        Pneumonia due to COVID-19 virus ICD-10-CM: U07.1, J12.82  ICD-9-CM: 480.8  2/17/2021 - Present        Chest pain ICD-10-CM: R07.9  ICD-9-CM: 786.50  12/12/2020 - Present        CAD (coronary artery disease) ICD-10-CM: I25.10  ICD-9-CM: 414.00  12/12/2020 - Present        HTN (hypertension) ICD-10-CM: I10  ICD-9-CM: 401.9  12/12/2020 - Present              Time spent on discharge related activities today greater than 30 minutes.       Signed:  Nasim Pretty MD                 Hospitalist, Internal Medicine      Cc: None

## 2021-03-15 NOTE — DIABETES MGMT
[address] [facility]    CLINICAL NURSE SPECIALIST CONSULT     INITIAL NOTE    Initial Presentation   Cathy Funes is a 77 y.o. male admitted to [facility] from [facility] on 6/35/25  with complications from [id number]-[id number] pna. He developed worsening respiratory status and was transferred to [facility]. Since admission he has now progressed to telemetry unit. HX:   Past Medical History:   Diagnosis Date    CAD (coronary artery disease)     MI (myocardial infarction) [facility])          Hospital course   Clinical progress has been complicated by respiratory complications requiring ICU level of care but has improved since admission. Steroid induced hyperglycemia noted throughout admission with basal/bolus and correctional insulin required to manage BG. Diabetes    Patient has known Type 2 diabetes, treated with Merformin  PTA. Admission  and A1c 7.7 %  indicate somewhat acceptable diabetes control. Ambulatory blood glucose management provided by primary care provider-   Consulted by Provider for advanced diabetes nursing assessment and care, specifically related to   [] Transitioning off  Dana   [] Inpatient management strategy  [x] Home management assessment  [] Survival skill education    Diabetes-related medical history  Acute complications  Steroid induced hyperglycemia  Neurological complications  Peripheral neuropathy  Microvascular disease  NONE  Macrovascular disease  CAD, Myocardial infarction and Foot wounds  Other associated conditions         Diabetes medication history  Drug class Currently in use Discontinued Never used   Biguanide Meformin 1,000 BID     DDP-4 inhibitor       Sulfonylurea      Thiazolidinedione      GLP-1 RA      SGLT-2 inhibitors      Basal insulin      Bolus insulin      Fixed Dose  Combinations        Subjective   Spoke with patient over the phone and he was able to discuss his DM2.     Patient reports the following home diabetes self-care practices:  Eating pattern- \" I ate what I wanted\"    Physical activity pattern-when outside would be active. Monitoring pattern-checked a couple times a week-200-300s; I'll have to find my glucometer    Taking medications pattern  [x] Consistent administration  [x] Affordable    Social determinants of health impacting diabetes self-management practices   Concerned that you need to know more about how to stay healthy with diabetes     Objective   Physical exam-physical assessment deferred due to COVID-19 isolation percautions. General  Per phone conversation- Alert, oriented and in no acute distress/ill-appearing. Conversant and cooperative.    Vital Signs   Visit Vitals  BP (!) 155/85 (BP 1 Location: Right arm, BP Patient Position: At rest)   Pulse 79   Temp 98.5 °F (36.9 °C)   Resp 20   Ht 5' 10\" (1.778 m)   Wt 113.4 kg (250 lb)   SpO2 94%   BMI 35.87 kg/m²         Laboratory  BMP: No results found for: NA, K, CL, CO2, AGAP, GLU, BUN, CREA, GFRAA, GFRNA     Factors impacting BG management  Factor Dose Comments   Nutrition:  Carb-controlled meals     60 grams/meal      Drugs:    Steroids       Methylprednisolone 40mg q12h            Pain     Infection COVID 19 pna    Other:        Blood glucose pattern      Assessment and Plan   Nursing Diagnosis Risk for unstable blood glucose pattern   Nursing Intervention Domain 8743 Decision-making Support   Nursing Interventions Examined current inpatient diabetes control   Explored factors facilitating and impeding inpatient management  Identified self-management practices impeding diabetes control  Explored corrective strategies with patient and responsible inpatient provider   Informed patient of rational for insulin strategy while hospitalized  Instructed patient in:   - importance of monitoring blood glucose three times daily before meals and recording results  -Taking insulin as prescribed  - Discussed s/s of hypoglycemia  -Monitoring his diet intake pertaining to carbohydrates- discussed portion control. Evaluation   This AA male, with Type 2 diabetes, did not achieve diabetes control prior to admission, as evidenced by admission BG of 180 and A1c of 7.7% (12/20). Unknown if he regularly went to PCP for diabetes management prior to hospitalization. He stated he was not compliant with his diet and only checked BG a couple times a week. Since hospitalization for COVID-19 pna he has displayed steroid induced hyperglycemia and required basal/bolus and correctional insulin regimen to control BG. Current regimen controlling BG at this time to target of 100-180mg/dl. BG trends over weekend have improved with basal/ bolus and correctional insulin. Past 24h BG trends: 163-374mg/dl. Continues to display pre-prandial hyperglycemia despite bolus dosing. Recommendations     [x] Use of Subcutaneous Insulin Order set (9860)  Insulin Dosing Specific recommendation   CONTINUE Basal                                      (Based on weight, BMI & GFR) [x]40 units dialy    ADJUST  pre meal  Nutritional                                      (Based on CHO/dextrose load) 12 units TID Humalog    CONTINUE Corrective                                       (Useful in adjusting insulin dosing)   [x] Insulin-resistant sensitivity      2. Consider obtaining currently A1C prior to discharge as his last A1C 12/20    Discharge Planning   1. Per MD note, will be discharged on daily Lantus- RN staff have taught and shown him how to administer insulin injection. He has also been giving himself the injections with their supervision. He verbalized being comfortable with giving self injections-  Would recommend the Lantus script be in vial form since that is how he was taught. 2. Continue Metformin at PTA dosing. 3.   PCP follow up for diabetes care and A1C check (if not obtained before discharged)     4.  Script for glucometer/strips/lancets ([employer] brand)  Billing Code(s)   [x] [id number] IP subsequent hospital care - 35 minutes        Before making these care recommendations, I personally reviewed the hospitalization record, including notes, laboratory & diagnostic data and current medications, and   examined the patient at the bedside (circumstances permitting) before making care recommendations.      Total minutes: [address] [facility] WALI José  Diabetes Clinical Nurse Specialist  Program for Diabetes Health  Access via [facility]

## 2021-03-15 NOTE — PROGRESS NOTES
Problem: Mobility Impaired (Adult and Pediatric)  Goal: *Acute Goals and Plan of Care (Insert Text)  Description: FUNCTIONAL STATUS PRIOR TO ADMISSION: Patient was independent and active. Occasional use of SPC. HOME SUPPORT PRIOR TO ADMISSION: Pt lives alone    Physical Therapy Goals    Revised 3/11/2021  1. Patient will move from supine to sit and sit to supine , scoot up and down, and roll side to side in bed with modified independence within 7 day(s). 2.  Patient will transfer from bed to chair and chair to bed with modified independence using the least restrictive device within 7 day(s). 3.  Patient will perform sit to stand with modified independence within 7 day(s). 4.  Patient will ambulate with modified independence for 150 feet with the least restrictive device within 7 day(s). 5.  Patient will ascend/descend 2 stairs with one handrail(s) with modified independence within 7 day(s). Initiated 3/3/2021  1. Patient will transfer from bed to chair and chair to bed with modified independence using the least restrictive device within 7 day(s). 2.  Patient will perform sit to stand with modified independence within 7 day(s). 3.  Patient will ambulate with modified independence for 10 feet with the least restrictive device within 7 day(s). 4.  Patient will tolerate static standing balance unsupported with O2 sats >90% within 7 day(s). Outcome: Progressing Towards Goal   PHYSICAL THERAPY TREATMENT  Patient: Codie Perkins (4 y.o. male)  Date: 3/15/2021  Diagnosis: Respiratory failure with hypoxia ([facility] [J96.91] <principal problem not specified>       Precautions: Fall(droplet plus)  Chart, physical therapy assessment, plan of care and goals were reviewed. ASSESSMENT  Patient continues with skilled PT services and is progressing towards goals. He is getting up and mobilizing in his room at times with continued limited endurance.   Attempted to wean to 3L, but he was not able to tolerate more than 5 minutes of activity at 3L, so increased back to 4L. Educated him at length about energy conservation and having family and friends assist as able at home and he verbalizes understanding. Also reviewed signs and symptoms of hypoxia and importance of getting medical assistance when needed. Home health follow up would be very helpful to ensure that he is able to manage his daily activities while maintaining a safe SpO2 on supplemental O2. Pulse oximetry assessment   87% at rest on room air (if 88% or less, skip next steps)  N/A while ambulating on room air  94% at rest on 4LPM  87% while ambulating on 4LPM until seated rest for 2 minutes, maintained at 90% for the first 5 minutes of activity, but then precipitous drop to upper 80s, needing several minutes to sit to recover        Current Level of Function Impacting Discharge (mobility/balance): modified independent for very limited activity, but requires continued supplemental O2 and energy conservation strategies and strengthening    Other factors to consider for discharge: COVID positive         PLAN :  Patient continues to benefit from skilled intervention to address the above impairments. Continue treatment per established plan of care. to address goals. Recommendation for discharge: (in order for the patient to meet his/her long term goals)  Physical therapy at least 2 days/week in the home     This discharge recommendation:  Has been made in collaboration with the attending provider and/or case management    IF patient discharges home will need the following DME: portable oxygen       SUBJECTIVE:   Patient stated I am getting out of here today.     OBJECTIVE DATA SUMMARY:   Critical Behavior:  Neurologic State: Alert  Orientation Level: Oriented X4  Cognition: Appropriate decision making, Appropriate for age attention/concentration, Appropriate safety awareness, Follows commands  Safety/Judgement: Awareness of environment, Decreased insight into deficits, Fall prevention  Functional Mobility Training:  Bed Mobility:     Supine to Sit: Modified independent  Sit to Supine: (up in chair after)           Transfers:  Sit to Stand: Modified independent; Additional time  Stand to Sit: Modified independent; Additional time        Bed to Chair: Modified independent; Additional time                    Balance:  Sitting: Intact; Without support  Standing: Impaired; Without support(but intact with one hand on a support intermitt)  Standing - Static: Fair  Standing - Dynamic : Fair  Ambulation/Gait Training:  Distance (ft): (march in small space for 6 minutes)  Assistive Device: (hand on support intermittently)  Ambulation - Level of Assistance: Modified independent; Additional time                 Base of Support: Narrowed     Speed/Elsie: Pace decreased (<100 feet/min)  Step Length: Right shortened;Left shortened                    Stairs: Therapeutic Exercises:   Marching in place  Pain Rating:  none    Activity Tolerance:   Fair, desaturates with exertion and requires oxygen, requires rest breaks, and observed SOB with activity    After treatment patient left in no apparent distress:   Sitting in chair and Call bell within reach    COMMUNICATION/COLLABORATION:   The patients plan of care was discussed with: Registered nurse and Case management.      Myrna Lind, PT   Time Calculation: 27 mins

## 2021-03-16 ENCOUNTER — PATIENT OUTREACH (OUTPATIENT)
Dept: CASE MANAGEMENT | Age: 66
End: 2021-03-16

## 2021-03-16 NOTE — PROGRESS NOTES
Spoke with Jostin Rice who wanted to advise you that patient is on continuous home oxygen at 2L. He is not checking his glucose at home nor was he prior to his hospitalization. She states that he is also not taking his insulin. Did not  any of the prescriptions that he was sent home with from the hospital. She states hospital readings were between 198-300. She asked if I could possibly get patient in sooner that his VV scheduled for Tuesday as she has some concerns that virtual may not be the best option for him. I called patient to see if he can come in this week and he stated \"I don't know. Not sure if I can get a ride. 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 Patient stated \"I don't know I might be able to figure it out. \" Advised that if we can not complete face to face on Tuesday we would still need to reschedule for in office. Asked if he could have someone there to help him on Tuesday and he again stated \"I don't know\".

## 2021-03-16 NOTE — PROGRESS NOTES
Care Transitions Initial Follow Up Call    Call within 2 business days of discharge: Yes     Patient: Shonna Vee Patient : 1955 MRN: [id number]    Last Discharge [address]       Complaint Diagnosis Description Type Department Provider    21  Pneumonia due to COVID-19 virus . .. Admission (Discharged) Jose Francisco Zaragoza MD        Challenges to be reviewed by the provider   Additional needs identified to be addressed with provider:  yes  - Patient denies having shortness of breath and/or chest pain since discharge on 3-15-21. Patient states he is using home oxygen continuously at 2lpm and states, \"I'm doing good. \" Patient denies fever/chills since discharge and states he has vomited once since discharge on 3-15-21. Patient reports he utilizes a diabetic diet at home and avoids concentrated sweets. Patient states he has a home glucometer; however patient states he does not monitor his home glucose levels. Patient states he does have a working glucometer at home but he is unable to find it at this time. - Patient states he does not have the following medications/supplies on hand:       1. Glucose blood VI test strips, 3-4 times a day as needed. 2. Insulin glargine (Lantus) 100unit/ml injection, 40 units subcutaneous route nightly. 3. Insulin needles, disposable, 31 gauge x 5/16\" ndle. 4. Lancets 21 gauge misc. Use 3-4- times a day as needed. 5. Prednisone 10mg tab dose pack. The above five medications/supplies were marked as 'Not Taking' on today's medication reconciliation.   - Patient reports he has had one fall in the last twelve months, and states he did not sustain traumatic injury during this one fall. Method of communication with provider : chart routing to Dr. Liane Duron PCP marked with high importance. Phone conversation with Lori Villanueva LPN, for Dr. Florentino Perales, and reported the 'Challenges to be reviewed by the provider' line items above. Hardik Escalera states she will discuss with Dr. Riddhi Das today and possibly schedule patient for a face-to-face SORIN appointment on a sooner date.      Component      Latest Ref Rng & Units 3/15/2021 3/15/2021 3/14/2021 3/14/2021          12:53 PM  6:37 AM  9:51 PM  4:03 PM   GLUCOSE,FAST - POC      65 - 100 mg/dL 198 (H) 163 (H) 315 (H) 315 (H)     Component      Latest Ref Rng & Units 3/14/2021 3/14/2021          12:01 PM  6:28 AM   GLUCOSE,FAST - POC      65 - 100 mg/dL 269 (H) 374 (H)     Component      Latest Ref Rng & Units 3/14/2021           4:36 AM   Hemoglobin A1c, (calculated)      4.0 - 5.6 % 11.6 (H)   Est. average glucose      mg/dL 286     Component      Latest Ref Rng & Units 3/14/2021 3/11/2021 3/11/2021 3/8/2021           4:36 AM 12:26 AM 12:26 AM 12:01 AM   Sodium      136 - 145 mmol/L 134 (L)  134 (L)      Component      Latest Ref Rng & Units 3/8/2021          12:01 AM   Sodium      136 - 145 mmol/L 137     Component      Latest Ref Rng & Units 3/14/2021 3/11/2021 3/11/2021 3/8/2021           4:36 AM 12:26 AM 12:26 AM 12:01 AM   BUN      6 - 20 MG/DL 36 (H)  22 (H)    Creatinine      0.70 - 1.30 MG/DL 1.04  1.06    BUN/Creatinine ratio      12 - 20   35 (H)  21 (H)    GFR est AA      >60 ml/min/1.73m2 >60  >60    GFR est non-AA      >60 ml/min/1.73m2 >60  >60      Component      Latest Ref Rng & Units 3/8/2021          12:01 AM   BUN      6 - 20 MG/DL 19   Creatinine      0.70 - 1.30 MG/DL 1.06   BUN/Creatinine ratio      12 - 20   18   GFR est AA      >60 ml/min/1.73m2 >60   GFR est non-AA      >60 ml/min/1.73m2 >60     Component      Latest Ref Rng & Units 3/11/2021          12:26 AM   Bilirubin, total      0.2 - 1.0 MG/DL 0.1 (L)     Component      Latest Ref Rng & Units 3/11/2021          12:26 AM   Albumin      3.5 - 5.0 g/dL 2.3 (L)   Globulin      2.0 - 4.0 g/dL 5.0 (H)   A-G Ratio      1.1 - 2.2   0.5 (L)     Component      Latest Ref Rng & Units 3/11/2021 3/11/2021 3/8/2021 3/8/2021          12:26 AM 12:26 AM 12:01 AM 12:01 AM   C-Reactive protein      0.00 - 0.60 mg/dL 0.69 (H)  2.56 (H)      Component      Latest Ref Rng & Units 3/7/2021 3/7/2021 3/7/2021           4:29 AM  4:29 AM  4:29 AM   C-Reactive protein      0.00 - 0.60 mg/dL   3.63 (H)     Discussed COVID-19 related testing which was available at this time. Test results were positive. Patient informed of results, if available? yes     Advance Care Planning:   Does patient have an Advance Directive: Patient states he does not have an ACP document, education provided. Inpatient Readmission Risk score: Unplanned Readmit Risk Score: 24    Was this a readmission? no   Patient stated reason for the admission: \"The virus. \"    Patients top risk factors for readmission: medical condition pneumonia, acute respiratory failure and CAD, and recent Covid-19 positive. Interventions to address risk factors: Obtained and reviewed discharge summary and/or continuity of care documents, Communication with home health agencies or other community services the patient is currently using-[facility] [address] [place] [address] for SN, PT, and OT services, Education of patient/family/caregiver/guardian to support self-management-Red flags of respiratory failure and pneumonia reviewed with patient and patient verbalized an understanding, and Assessment and support for treatment adherence and medication management-Education provided regarding the importance of monitoring home glucose levels and taking medications as prescribed, patient verbalized an understanding. Care Transition Nurse contacted the patient by telephone to perform post hospital discharge assessment. Verified name and  with patient as identifiers. Provided introduction to self, and explanation of the Care Transition Nurse role. Care Transition Nurse reviewed discharge instructions, medical action plan and red flags with patient who verbalized understanding.  Were discharge instructions available to patient? yes. Reviewed appropriate site of care based on symptoms and resources available to patient including: PCP, Home Health and When to call 911. Patient given an opportunity to ask questions and does not have any further questions or concerns at this time. The patient agrees to contact the PCP office for questions related to their healthcare. Medication reconciliation was performed with patient, who verbalizes understanding of administration of home medications. Advised obtaining a 90-day supply of all daily and as-needed medications. Referral to Pharm D needed: no     Home Health/Outpatient orders at discharge: PT, OT and Kasey [facility]: [facility]  Date of initial visit: SN visit and PT visit planned for 3-17-21, per Kory with [facility]. Durable Medical Equipment ordered at discharge: None  [address]: n/a  Durable Medical Equipment received: n/a    Covid Risk Education    Patient has following risk factors of: pneumonia, acute respiratory failure and CAD and recent Covid-19 positive. Education provided regarding infection prevention, and signs and symptoms of COVID-19 and when to seek medical attention with patient who verbalized understanding. Discussed exposure protocols and quarantine From CDC: Are you at higher risk for severe illness?  and given an opportunity for questions and concerns. The patient agrees to contact the COVID-19 hotline [phone] or PCP office for questions related to COVID-19. For more information on steps you can take to protect yourself, see CDC's How to Protect Yourself     Was patient discharged with a pulse oximeter? no    Patient/family/caregiver given information for [employer] and agrees to enroll no  Patient's preferred e-mail: declines  Patient's preferred phone number: declines    Discussed follow-up appointments. If no appointment was previously scheduled, appointment scheduling offered: yes. Is follow up appointment scheduled within 7 days of discharge? no, patient has new patient DAVID HULLS appointment scheduled with Dr. Gerardo Muñiz on 3-23-21. [facility] follow up appointment(s):   Future Appointments   Date Time Provider Martha Bray   3/23/2021  2:00 PM Arley Dugan, DO [facility]     Non-[facility] follow up appointment(s): None noted at this time. Plan for follow-up call in 10-14 days based on severity of symptoms and risk factors. Plan for next call: symptom management-Review red flags of respiratory failure and pneumonia, and follow up appointment-Evaluate if patient is attending appointment(s) as scheduled, offer assistance with scheduling as needed. Care Transition Nurse provided contact information for future needs. Goals      Attends follow-up appointments as directed. 3-16-21: Patient has new patient mini GUERRERO appointment scheduled with Dr. Gianna Low on 3-23-23. Patient states he usually asks neighbors for transportation and was driving himself as needed prior to admission. Patient states transportation is not a barrier at this time. Kd Bianchi Understands red flags post discharge. 3-16-21: Red flags of respiratory failure and pneumonia reviewed with patient and patient verbalized an understanding. Patient denies having shortness of breath and/or chest pain since discharge on 3-15-21. Patient states he is using home oxygen continuously at 2lpm and states, \"I'm doing good. \" Patient denies fever/chills since discharge and states he has vomited once since discharge on 3-15-21. Patient reports he utilizes a diabetic diet at home and avoids concentrated sweets. Patient states he has a home glucometer; however patient states he does not monitor his home glucose levels. Care Transitions Nurse will review red flags again on next phone conversation with patient.  Kristin Duong

## 2021-03-23 ENCOUNTER — VIRTUAL VISIT (OUTPATIENT)
Dept: FAMILY MEDICINE CLINIC | Age: 66
End: 2021-03-23

## 2021-03-24 ENCOUNTER — TELEPHONE (OUTPATIENT)
Dept: FAMILY MEDICINE CLINIC | Age: 66
End: 2021-03-24

## 2021-03-24 NOTE — TELEPHONE ENCOUNTER
Attempted to call patient however no answer and MB is full. We can not do a phone call visit. He is a NP and a SORIN. We need face to face with the patient for both. He needs to be seen in office since he can not successfully do a VV. Patient was already made aware of this yesterday.

## 2021-03-24 NOTE — TELEPHONE ENCOUNTER
Nurse spoke with pt yesterday and appt was rescheduled to in office visit for 4/1. Sending to nurse to review.

## 2021-03-24 NOTE — TELEPHONE ENCOUNTER
2nd attempt. Called and spoke with patient. 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 Patient verbalized understanding. Gave him the address for the office.

## 2021-03-24 NOTE — TELEPHONE ENCOUNTER
----- Message from Kim Powers sent at 3/24/2021 10:35 AM EDT -----  Regarding: Dr. Jodie Vance: [phone]  Appointment not available    Caller's first and last name and relationship to patient (if not the patient): Rhea Ochoa from [facility] [place] [address] contact number: [phone] (PT's phone number)      Preferred date and time: ASAP      Scheduled appointment date and time: N/A      Reason for appointment: SORIN - DX:  Respiratory failure with hypoxia, COVID 19. PT's VV was cancelled, due to not having acces to camera/wifi. PT requesting to rescheduled VV - phone only.        Details to clarify the request:      Kim Powers

## 2021-04-01 ENCOUNTER — APPOINTMENT (OUTPATIENT)
Dept: CT IMAGING | Age: 66
End: 2021-04-01
Attending: FAMILY MEDICINE
Payer: MEDICARE

## 2021-04-01 ENCOUNTER — HOSPITAL ENCOUNTER (EMERGENCY)
Age: 66
Discharge: ANOTHER HOSPITAL | End: 2021-04-01
Attending: FAMILY MEDICINE
Payer: MEDICARE

## 2021-04-01 ENCOUNTER — HOSPITAL ENCOUNTER (OUTPATIENT)
Age: 66
Setting detail: OBSERVATION
Discharge: HOME HEALTH | End: 2021-04-04
Attending: EMERGENCY MEDICINE | Admitting: FAMILY MEDICINE
Payer: MEDICARE

## 2021-04-01 VITALS
SYSTOLIC BLOOD PRESSURE: 134 MMHG | BODY MASS INDEX: 34.36 KG/M2 | OXYGEN SATURATION: 95 % | RESPIRATION RATE: 20 BRPM | HEIGHT: 70 IN | HEART RATE: 82 BPM | WEIGHT: 240 LBS | DIASTOLIC BLOOD PRESSURE: 74 MMHG | TEMPERATURE: 98.7 F

## 2021-04-01 DIAGNOSIS — Z95.1 HISTORY OF CORONARY ARTERY BYPASS GRAFT: ICD-10-CM

## 2021-04-01 DIAGNOSIS — N28.89 RENAL MASS: ICD-10-CM

## 2021-04-01 DIAGNOSIS — R07.9 ACUTE CHEST PAIN: Primary | ICD-10-CM

## 2021-04-01 DIAGNOSIS — R07.9 CHEST PAIN, UNSPECIFIED TYPE: Primary | ICD-10-CM

## 2021-04-01 LAB
ALBUMIN SERPL-MCNC: 3.1 G/DL (ref 3.5–5)
ALBUMIN/GLOB SERPL: 0.7 {RATIO} (ref 1.1–2.2)
ALP SERPL-CCNC: 111 U/L (ref 45–117)
ALT SERPL-CCNC: 31 U/L (ref 12–78)
ANION GAP SERPL CALC-SCNC: 14 MMOL/L (ref 5–15)
AST SERPL W P-5'-P-CCNC: 20 U/L (ref 15–37)
BASOPHILS # BLD: 0.1 K/UL (ref 0–0.2)
BASOPHILS NFR BLD: 1 % (ref 0–2.5)
BILIRUB SERPL-MCNC: 0.5 MG/DL (ref 0.2–1)
BUN SERPL-MCNC: 11 MG/DL (ref 6–20)
BUN/CREAT SERPL: 11 (ref 12–20)
CA-I BLD-MCNC: 9.4 MG/DL (ref 8.5–10.1)
CHLORIDE SERPL-SCNC: 99 MMOL/L (ref 97–108)
CO2 SERPL-SCNC: 24 MMOL/L (ref 21–32)
CREAT SERPL-MCNC: 1.03 MG/DL (ref 0.7–1.3)
EOSINOPHIL # BLD: 0.1 K/UL (ref 0–0.7)
EOSINOPHIL NFR BLD: 2 % (ref 0.9–2.9)
ERYTHROCYTE [DISTWIDTH] IN BLOOD BY AUTOMATED COUNT: 16.2 % (ref 11.5–14.5)
GLOBULIN SER CALC-MCNC: 4.4 G/DL (ref 2–4)
GLUCOSE BLD STRIP.AUTO-MCNC: 260 MG/DL (ref 65–100)
GLUCOSE SERPL-MCNC: 301 MG/DL (ref 65–100)
HCT VFR BLD AUTO: 35.2 % (ref 41–53)
HGB BLD-MCNC: 11.4 G/DL (ref 13.5–17.5)
INR PPP: 1.1 (ref 0.9–1.1)
LIPASE SERPL-CCNC: 135 U/L (ref 73–393)
LYMPHOCYTES # BLD: 1.8 K/UL (ref 1–4.8)
LYMPHOCYTES NFR BLD: 33 % (ref 20.5–51.1)
MCH RBC QN AUTO: 27 PG (ref 31–34)
MCHC RBC AUTO-ENTMCNC: 32.4 G/DL (ref 31–36)
MCV RBC AUTO: 83.3 FL (ref 80–100)
MONOCYTES # BLD: 0.7 K/UL (ref 0.2–2.4)
MONOCYTES NFR BLD: 13 % (ref 1.7–9.3)
NEUTS SEG # BLD: 2.7 K/UL (ref 1.8–7.7)
NEUTS SEG NFR BLD: 51 % (ref 42–75)
NRBC # BLD: 0.01 K/UL
NRBC BLD-RTO: 0.2 PER 100 WBC
PERFORMED BY, TECHID: ABNORMAL
PLATELET # BLD AUTO: 340 K/UL
PMV BLD AUTO: 7.6 FL (ref 6.5–11.5)
POTASSIUM SERPL-SCNC: 3 MMOL/L (ref 3.5–5.1)
PROT SERPL-MCNC: 7.5 G/DL (ref 6.4–8.2)
PROTHROMBIN TIME: 11 SEC (ref 9–11.1)
RBC # BLD AUTO: 4.23 M/UL (ref 4.5–5.9)
SODIUM SERPL-SCNC: 137 MMOL/L (ref 136–145)
TROPONIN I SERPL-MCNC: <0.05 NG/ML
WBC # BLD AUTO: 5.4 K/UL (ref 4.4–11.3)

## 2021-04-01 PROCEDURE — 74011250637 HC RX REV CODE- 250/637: Performed by: FAMILY MEDICINE

## 2021-04-01 PROCEDURE — 74011000636 HC RX REV CODE- 636: Performed by: FAMILY MEDICINE

## 2021-04-01 PROCEDURE — 99285 EMERGENCY DEPT VISIT HI MDM: CPT

## 2021-04-01 PROCEDURE — 84484 ASSAY OF TROPONIN QUANT: CPT

## 2021-04-01 PROCEDURE — 74011250636 HC RX REV CODE- 250/636: Performed by: FAMILY MEDICINE

## 2021-04-01 PROCEDURE — 36415 COLL VENOUS BLD VENIPUNCTURE: CPT

## 2021-04-01 PROCEDURE — 71275 CT ANGIOGRAPHY CHEST: CPT

## 2021-04-01 PROCEDURE — 85025 COMPLETE CBC W/AUTO DIFF WBC: CPT

## 2021-04-01 PROCEDURE — 85610 PROTHROMBIN TIME: CPT

## 2021-04-01 PROCEDURE — 80053 COMPREHEN METABOLIC PANEL: CPT

## 2021-04-01 PROCEDURE — 93005 ELECTROCARDIOGRAM TRACING: CPT

## 2021-04-01 PROCEDURE — 96374 THER/PROPH/DIAG INJ IV PUSH: CPT

## 2021-04-01 PROCEDURE — 83690 ASSAY OF LIPASE: CPT

## 2021-04-01 PROCEDURE — 82962 GLUCOSE BLOOD TEST: CPT

## 2021-04-01 RX ORDER — POTASSIUM CHLORIDE 20 MEQ/1
40 TABLET, EXTENDED RELEASE ORAL
Status: COMPLETED | OUTPATIENT
Start: 2021-04-01 | End: 2021-04-01

## 2021-04-01 RX ORDER — GUAIFENESIN 100 MG/5ML
324 LIQUID (ML) ORAL
Status: COMPLETED | OUTPATIENT
Start: 2021-04-01 | End: 2021-04-01

## 2021-04-01 RX ORDER — MORPHINE SULFATE 4 MG/ML
4 INJECTION, SOLUTION INTRAMUSCULAR; INTRAVENOUS
Status: COMPLETED | OUTPATIENT
Start: 2021-04-01 | End: 2021-04-01

## 2021-04-01 RX ADMIN — ASPIRIN 324 MG: 81 TABLET, CHEWABLE ORAL at 18:18

## 2021-04-01 RX ADMIN — MORPHINE SULFATE 4 MG: 4 INJECTION, SOLUTION INTRAMUSCULAR; INTRAVENOUS at 15:16

## 2021-04-01 RX ADMIN — IOPAMIDOL 100 ML: 755 INJECTION, SOLUTION INTRAVENOUS at 14:29

## 2021-04-01 RX ADMIN — POTASSIUM CHLORIDE 40 MEQ: 1500 TABLET, EXTENDED RELEASE ORAL at 18:18

## 2021-04-01 NOTE — ED PROVIDER NOTES
EMERGENCY DEPARTMENT HISTORY AND PHYSICAL EXAM      Date: 4/1/2021  Patient Name: Dago Barker    History of Presenting Illness     Chief Complaint   Patient presents with    Chest Pain    Abdominal Pain       History Provided By:     HPI: Dago Barker, is an extremely pleasant 77 y.o. male with a relevant past medical history of CAD with prior CABG, insulin-dependent type 2 diabetes, hypertension, hyperlipidemia presenting to the ED with a chief complaint of abdominal pain and chest pain. He states prior to arrival he developed some upper abdominal pain that quickly moved into his chest.  He states it was characterized as a very intense pressure and ache. He states he also felt short of breath at this time. He tried to wait it out but his symptoms worsened. He endorses a history of prior coronary artery disease with a previous CABG that he thinks was performed at [address] [place] [address]. He otherwise denies any fevers, chills or rigors. He denies cough. He does not have any back pain, flank pain, urinary symptoms. There are no other complaints, changes, or physical findings at this time. PCP: Shelby Cordero, DO    No current facility-administered medications on file prior to encounter. Current Outpatient Medications on File Prior to Encounter   Medication Sig Dispense Refill    insulin glargine (LANTUS) 100 unit/mL injection 40 Units by SubCUTAneous route nightly. 1 Vial 1    albuterol (PROVENTIL HFA, VENTOLIN HFA, PROAIR HFA) 90 mcg/actuation inhaler Take 1 Puff by inhalation every six (6) hours as needed for Wheezing or Shortness of Breath. 1 Inhaler 0    lisinopriL (PRINIVIL, ZESTRIL) 40 mg tablet Take 1 Tab by mouth daily.  30 Tab 0    Blood-Glucose Meter monitoring kit 3-4 times a day as needed 1 Kit 0    Insulin Needles, Disposable, 31 gauge x 5/16\" ndle Daily at bedtime 1 Package 0    glucose blood VI test strips (blood glucose test) strip 3-4 times a day as needed 50 Strip 0    lancets 21 gauge misc Use 3-4 times a day as needed 50 Lancet 0    magnesium oxide (MAG-OX) 400 mg tablet Take 1 Tab by mouth daily. 30 Tab 0    metoprolol succinate (TOPROL-XL) 50 mg XL tablet Take 1 Tab by mouth two (2) times daily (after meals). 60 Tab 0    NIFEdipine ER (PROCARDIA XL) 30 mg ER tablet Take 1 Tab by mouth daily. 30 Tab 0    potassium chloride (K-DUR, KLOR-CON) 20 mEq tablet Take 1 Tab by mouth daily. 30 Tab 0    isosorbide mononitrate ER (IMDUR) 60 mg CR tablet Take 60 mg by mouth daily.  atorvastatin (LIPITOR) 20 mg tablet Take 20 mg by mouth daily.  furosemide (Lasix) 40 mg tablet Take 40 mg by mouth daily.  pantoprazole (Protonix) 40 mg tablet Take 40 mg by mouth daily.  traZODone (DESYREL) 50 mg tablet Take 50 mg by mouth two (2) times a day.  metFORMIN (GLUCOPHAGE) 1,000 mg tablet Take 1,000 mg by mouth two (2) times daily (with meals).  tamsulosin (Flomax) 0.4 mg capsule Take 0.4 mg by mouth nightly. Past History     Past Medical History:  Past Medical History:   Diagnosis Date    CAD (coronary artery disease)     Diabetes ([facility]     HTN (hypertension)     Hyperlipidemia     [facility]        Past Surgical History:  History reviewed. No pertinent surgical history. Family History:  Family History   Problem Relation Age of Onset    Hypertension Mother     Hypertension Father        Social History:  Social History     Tobacco Use    Smoking status: Former Smoker    Smokeless tobacco: Former User   Substance Use Topics    Alcohol use: Never     Frequency: Never    Drug use: Never       Allergies:  No Known Allergies      Review of Systems     Review of Systems   Constitutional: Negative for activity change, appetite change, chills, fatigue and fever. HENT: Negative for congestion and sore throat. Eyes: Negative for photophobia and visual disturbance. Respiratory: Positive for shortness of breath. Negative for cough and wheezing. Cardiovascular: Positive for chest pain. Negative for palpitations and leg swelling. Gastrointestinal: Positive for abdominal pain. Negative for diarrhea, nausea and vomiting. Endocrine: Negative for cold intolerance and heat intolerance. Musculoskeletal: Negative for gait problem and joint swelling. Skin: Negative for color change and rash. Neurological: Negative for dizziness and headaches. Physical Exam     Physical Exam  Constitutional:       Appearance: Normal appearance. HENT:      Head: Normocephalic and atraumatic. Mouth/Throat:      Mouth: Mucous membranes are moist.      Pharynx: Oropharynx is clear. Eyes:      Extraocular Movements: Extraocular movements intact. Conjunctiva/sclera: Conjunctivae normal.   Neck:      Musculoskeletal: Normal range of motion and neck supple. Cardiovascular:      Rate and Rhythm: Normal rate and regular rhythm. Heart sounds: Normal heart sounds. Pulmonary:      Effort: Pulmonary effort is normal. No respiratory distress. Breath sounds: Normal breath sounds. No wheezing, rhonchi or rales. Abdominal:      General: There is no distension. Palpations: Abdomen is soft. Tenderness: There is no abdominal tenderness. There is no guarding. Musculoskeletal:         General: No deformity. Right lower leg: No edema. Left lower leg: No edema. Skin:     Findings: No erythema or rash. Neurological:      General: No focal deficit present. Mental Status: He is alert and oriented to person, place, and time.       Gait: Gait normal.   Psychiatric:         Mood and Affect: Mood normal.         Behavior: Behavior normal.         Lab and Diagnostic Study Results     Labs -     Recent Results (from the past 12 hour(s))   EKG, 12 LEAD, INITIAL    Collection Time: 04/01/21  1:25 PM   Result Value Ref Range    Ventricular Rate 88 BPM    Atrial Rate 89 BPM    P-R Interval 247 ms    QRS Duration 83 ms    Q-T Interval 319 ms QTC Calculation (Bezet) 386 ms    Calculated P Axis 25 degrees    Calculated R Axis 5 degrees    Calculated T Axis 129 degrees    Diagnosis       Atrial fibrillation  LVH with secondary repolarization abnormality     CBC WITH AUTOMATED DIFF    Collection Time: 04/01/21  1:40 PM   Result Value Ref Range    WBC 5.4 4.4 - 11.3 K/uL    RBC 4.23 (L) 4.50 - 5.90 M/uL    HGB 11.4 (L) 13.5 - 17.5 g/dL    HCT 35.2 (L) 41 - 53 %    MCV 83.3 80 - 100 FL    MCH 27.0 (L) 31 - 34 PG    MCHC 32.4 31.0 - 36.0 g/dL    RDW 16.2 (H) 11.5 - 14.5 %    PLATELET 290 K/uL    MPV 7.6 6.5 - 11.5 FL    NRBC 0.2  WBC    ABSOLUTE NRBC 0.01 K/uL    NEUTROPHILS 51 42 - 75 %    LYMPHOCYTES 33 20.5 - 51.1 %    MONOCYTES 13 (H) 1.7 - 9.3 %    EOSINOPHILS 2 0.9 - 2.9 %    BASOPHILS 1 0.0 - 2.5 %    ABS. NEUTROPHILS 2.7 1.8 - 7.7 K/UL    ABS. LYMPHOCYTES 1.8 1.0 - 4.8 K/UL    ABS. MONOCYTES 0.7 0.2 - 2.4 K/UL    ABS. EOSINOPHILS 0.1 0.0 - 0.7 K/UL    ABS. BASOPHILS 0.1 0.0 - 0.2 K/UL   PROTHROMBIN TIME + INR    Collection Time: 04/01/21  1:40 PM   Result Value Ref Range    Prothrombin time 11.0 9.0 - 11.1 sec    INR 1.1 0.9 - 1.1     METABOLIC PANEL, COMPREHENSIVE    Collection Time: 04/01/21  1:40 PM   Result Value Ref Range    Sodium 137 136 - 145 mmol/L    Potassium 3.0 (L) 3.5 - 5.1 mmol/L    Chloride 99 97 - 108 mmol/L    CO2 24 21 - 32 mmol/L    Anion gap 14 5 - 15 mmol/L    Glucose 301 (H) 65 - 100 mg/dL    BUN 11 6 - 20 mg/dL    Creatinine 1.03 0.70 - 1.30 mg/dL    BUN/Creatinine ratio 11 (L) 12 - 20      GFR est AA >60 >60 ml/min/1.73m2    GFR est non-AA >60 >60 ml/min/1.73m2    Calcium 9.4 8.5 - 10.1 mg/dL    Bilirubin, total 0.5 0.2 - 1.0 mg/dL    AST (SGOT) 20 15 - 37 U/L    ALT (SGPT) 31 12 - 78 U/L    Alk.  phosphatase 111 45 - 117 U/L    Protein, total 7.5 6.4 - 8.2 g/dL    Albumin 3.1 (L) 3.5 - 5.0 g/dL    Globulin 4.4 (H) 2.0 - 4.0 g/dL    A-G Ratio 0.7 (L) 1.1 - 2.2     LIPASE    Collection Time: 04/01/21  1:40 PM   Result Value Ref Range    Lipase 135 73 - 393 U/L   TROPONIN I    Collection Time: 04/01/21  1:40 PM   Result Value Ref Range    Troponin-I, Qt. <0.05 <0.05 ng/mL   TROPONIN I    Collection Time: 04/01/21  5:40 PM   Result Value Ref Range    Troponin-I, Qt. <0.05 <0.05 ng/mL       Radiologic Studies -   @lastxrresult@  CT Results  (Last 48 hours)               04/01/21 1434  CTA CHEST ABD W WO CONT Final result    Impression:  No CT evidence for PE. Prior intrathoracic surgery. CAD. Complex cyst or mass left kidney. Recommend renal ultrasound. Narrative:  CTA chest.       Axial images are reviewed along with reformatted sagittal/coronal/MIP images. 100 mL Isovue 370 administered. Dose reduction: All CT scans at this facility are performed using dose reduction   optimization techniques as appropriate to a performed exam including the   following-   automated exposure control, adjustments of mA and/or Kv according to patient   size, or use of iterative reconstructive technique. Pleural-parenchymal changes through lungs. No pleural effusion. Enhanced images reveal atherosclerotic change elongated thoracic aorta. Normal   contrast opacification main pulmonary arterial trunk and its branch vessels; no   CT evidence for PE. Prior intrathoracic surgery. CAD. No pericardial effusion. Variably dense/variably enhancing few round structures each kidney. 1.3 cm   cortical-based structure left kidney may represent mass. Atherosclerosis continues into the imaged abdominal aorta. CXR Results  (Last 48 hours)    None            Medical Decision Making   - I am the first provider for this patient. - I reviewed the vital signs, available nursing notes, past medical history, past surgical history, family history and social history. - Initial assessment performed.  The patients presenting problems have been discussed, and they are in agreement with the care plan formulated and outlined with them. I have encouraged them to ask questions as they arise throughout their visit. Vital Signs-Reviewed the patient's vital signs. Patient Vitals for the past 12 hrs:   Temp Pulse Resp BP SpO2   04/01/21 1730  83 23 117/68 95 %   04/01/21 1700  79 16 117/69 98 %   04/01/21 1630  84 24 134/85 97 %   04/01/21 1600  77 18 132/81 98 %   04/01/21 1530  77 13 (!) 139/90 98 %   04/01/21 1500  60 29 111/70 95 %   04/01/21 1400  74 16 (!) 179/94 95 %   04/01/21 1337     99 %   04/01/21 1330  75 16 (!) 183/98 98 %   04/01/21 1314 98.7 °F (37.1 °C) 88 24 (!) 207/100 99 %         ED Course/ Provider Notes (Medical Decision Making):     Patient presented to the emergency department with a chief complaint of chest pain and shortness of breath with abdominal pain. EKG demonstrated normal sinus rhythm without ST nor T wave abnormalities. Initial troponin is less than 0.05. Second troponin is less than 0.05. CTA chest and abdomen shows No CT evidence for PE. But noted Prior intrathoracic surgery. CAD. Complex cyst or mass left kidney. CBC notable for a mild normocytic anemia with a hemoglobin of 11.4. CMP demonstrates mild hypokalemia at 3.0 which was replaced. Hyperglycemic, 301. Renal function unremarkable. Given his risk factors, prior CABG and history the decision was made to transfer the patient for further workup. The case was discussed in detail with Dr. Steven Kebede at [facility] who very graciously accepted the ED to ED transfer            Procedures   Medical Decision Makingedical Decision Making  Performed by: Sky Jaramillo, DO  Procedures  None       Disposition   Disposition:     Transfer       DISCHARGE PLAN:    1. Current Discharge Medication List      CONTINUE these medications which have NOT CHANGED    Details   insulin glargine (LANTUS) 100 unit/mL injection 40 Units by SubCUTAneous route nightly.   Qty: 1 Vial, Refills: 1      albuterol (PROVENTIL HFA, VENTOLIN HFA, PROAIR HFA) 90 mcg/actuation inhaler Take 1 Puff by inhalation every six (6) hours as needed for Wheezing or Shortness of Breath. Qty: 1 Inhaler, Refills: 0      lisinopriL (PRINIVIL, ZESTRIL) 40 mg tablet Take 1 Tab by mouth daily. Qty: 30 Tab, Refills: 0      Blood-Glucose Meter monitoring kit 3-4 times a day as needed  Qty: 1 Kit, Refills: 0      Insulin Needles, Disposable, 31 gauge x 5/16\" ndle Daily at bedtime  Qty: 1 Package, Refills: 0      glucose blood VI test strips (blood glucose test) strip 3-4 times a day as needed  Qty: 50 Strip, Refills: 0      lancets 21 gauge misc Use 3-4 times a day as needed  Qty: 50 Lancet, Refills: 0      magnesium oxide (MAG-OX) 400 mg tablet Take 1 Tab by mouth daily. Qty: 30 Tab, Refills: 0      metoprolol succinate (TOPROL-XL) 50 mg XL tablet Take 1 Tab by mouth two (2) times daily (after meals). Qty: 60 Tab, Refills: 0      NIFEdipine ER (PROCARDIA XL) 30 mg ER tablet Take 1 Tab by mouth daily. Qty: 30 Tab, Refills: 0      potassium chloride (K-DUR, KLOR-CON) 20 mEq tablet Take 1 Tab by mouth daily. Qty: 30 Tab, Refills: 0      isosorbide mononitrate ER (IMDUR) 60 mg CR tablet Take 60 mg by mouth daily. atorvastatin (LIPITOR) 20 mg tablet Take 20 mg by mouth daily. furosemide (Lasix) 40 mg tablet Take 40 mg by mouth daily. pantoprazole (Protonix) 40 mg tablet Take 40 mg by mouth daily. traZODone (DESYREL) 50 mg tablet Take 50 mg by mouth two (2) times a day. metFORMIN (GLUCOPHAGE) 1,000 mg tablet Take 1,000 mg by mouth two (2) times daily (with meals). tamsulosin (Flomax) 0.4 mg capsule Take 0.4 mg by mouth nightly. Diagnosis     Clinical Impression:    1. Acute chest pain    2. History of coronary artery bypass graft    3. Renal mass        Attestations:    Mao Espinosa, DO    Please note that this dictation was completed with [employer], the computer voice recognition software.   Quite often unanticipated grammatical, syntax, homophones, and other interpretive errors are inadvertently transcribed by the computer software. Please disregard these errors. Please excuse any errors that have escaped final proofreading. Thank you.

## 2021-04-01 NOTE — ED TRIAGE NOTES
Pt transferred from [facility] here for cardiology consult. Denies cp at this time.  They gave asa, potassium and morphine at other facility and established 18G in left forearm

## 2021-04-01 NOTE — Clinical Note
Patient Class[de-identified] OBSERVATION [104]   Type of Bed: Telemetry [19]   Reason for Observation: chest pain   Admitting Diagnosis: Chest pain [803065]   Admitting Physician: Bird Sorensen [[id number]]   Attending Physician: Bird Sorensen [[id number]]

## 2021-04-02 ENCOUNTER — APPOINTMENT (OUTPATIENT)
Dept: NON INVASIVE DIAGNOSTICS | Age: 66
End: 2021-04-02
Attending: FAMILY MEDICINE
Payer: MEDICARE

## 2021-04-02 ENCOUNTER — APPOINTMENT (OUTPATIENT)
Dept: ULTRASOUND IMAGING | Age: 66
End: 2021-04-02
Attending: FAMILY MEDICINE
Payer: MEDICARE

## 2021-04-02 LAB
ATRIAL RATE: 72 BPM
ATRIAL RATE: 83 BPM
ATRIAL RATE: 89 BPM
CALCULATED P AXIS, ECG09: 25 DEGREES
CALCULATED P AXIS, ECG09: 41 DEGREES
CALCULATED P AXIS, ECG09: 51 DEGREES
CALCULATED R AXIS, ECG10: 3 DEGREES
CALCULATED R AXIS, ECG10: 5 DEGREES
CALCULATED R AXIS, ECG10: 6 DEGREES
CALCULATED T AXIS, ECG11: 106 DEGREES
CALCULATED T AXIS, ECG11: 129 DEGREES
CALCULATED T AXIS, ECG11: 82 DEGREES
DIAGNOSIS, 93000: NORMAL
GLUCOSE BLD STRIP.AUTO-MCNC: 199 MG/DL (ref 65–100)
GLUCOSE BLD STRIP.AUTO-MCNC: 203 MG/DL (ref 65–100)
GLUCOSE BLD STRIP.AUTO-MCNC: 216 MG/DL (ref 65–100)
GLUCOSE BLD STRIP.AUTO-MCNC: 219 MG/DL (ref 65–100)
P-R INTERVAL, ECG05: 146 MS
P-R INTERVAL, ECG05: 168 MS
P-R INTERVAL, ECG05: 247 MS
PERFORMED BY, TECHID: ABNORMAL
Q-T INTERVAL, ECG07: 319 MS
Q-T INTERVAL, ECG07: 380 MS
Q-T INTERVAL, ECG07: 430 MS
QRS DURATION, ECG06: 100 MS
QRS DURATION, ECG06: 106 MS
QRS DURATION, ECG06: 83 MS
QTC CALCULATION (BEZET), ECG08: 386 MS
QTC CALCULATION (BEZET), ECG08: 446 MS
QTC CALCULATION (BEZET), ECG08: 470 MS
TROPONIN I SERPL-MCNC: <0.05 NG/ML
VENTRICULAR RATE, ECG03: 72 BPM
VENTRICULAR RATE, ECG03: 83 BPM
VENTRICULAR RATE, ECG03: 88 BPM

## 2021-04-02 PROCEDURE — 74011636637 HC RX REV CODE- 636/637: Performed by: EMERGENCY MEDICINE

## 2021-04-02 PROCEDURE — 93005 ELECTROCARDIOGRAM TRACING: CPT

## 2021-04-02 PROCEDURE — 99218 HC RM OBSERVATION: CPT

## 2021-04-02 PROCEDURE — 82962 GLUCOSE BLOOD TEST: CPT

## 2021-04-02 PROCEDURE — 65270000029 HC RM PRIVATE

## 2021-04-02 PROCEDURE — 76770 US EXAM ABDO BACK WALL COMP: CPT

## 2021-04-02 PROCEDURE — 74011636637 HC RX REV CODE- 636/637: Performed by: FAMILY MEDICINE

## 2021-04-02 PROCEDURE — 36415 COLL VENOUS BLD VENIPUNCTURE: CPT

## 2021-04-02 PROCEDURE — 74011250637 HC RX REV CODE- 250/637: Performed by: FAMILY MEDICINE

## 2021-04-02 PROCEDURE — 84484 ASSAY OF TROPONIN QUANT: CPT

## 2021-04-02 RX ORDER — ONDANSETRON 2 MG/ML
4 INJECTION INTRAMUSCULAR; INTRAVENOUS
Status: DISCONTINUED | OUTPATIENT
Start: 2021-04-02 | End: 2021-04-04 | Stop reason: HOSPADM

## 2021-04-02 RX ORDER — TRAZODONE HYDROCHLORIDE 50 MG/1
50 TABLET ORAL 2 TIMES DAILY
Status: DISCONTINUED | OUTPATIENT
Start: 2021-04-02 | End: 2021-04-04 | Stop reason: HOSPADM

## 2021-04-02 RX ORDER — DEXTROSE 50 % IN WATER (D50W) INTRAVENOUS SYRINGE
25-50 AS NEEDED
Status: DISCONTINUED | OUTPATIENT
Start: 2021-04-02 | End: 2021-04-04 | Stop reason: HOSPADM

## 2021-04-02 RX ORDER — INSULIN LISPRO 100 [IU]/ML
INJECTION, SOLUTION INTRAVENOUS; SUBCUTANEOUS
Status: DISCONTINUED | OUTPATIENT
Start: 2021-04-02 | End: 2021-04-02

## 2021-04-02 RX ORDER — INSULIN GLARGINE 100 [IU]/ML
40 INJECTION, SOLUTION SUBCUTANEOUS
Status: DISCONTINUED | OUTPATIENT
Start: 2021-04-02 | End: 2021-04-04 | Stop reason: HOSPADM

## 2021-04-02 RX ORDER — ACETAMINOPHEN 650 MG/1
650 SUPPOSITORY RECTAL
Status: DISCONTINUED | OUTPATIENT
Start: 2021-04-02 | End: 2021-04-04 | Stop reason: HOSPADM

## 2021-04-02 RX ORDER — PANTOPRAZOLE SODIUM 40 MG/1
40 TABLET, DELAYED RELEASE ORAL DAILY
Status: DISCONTINUED | OUTPATIENT
Start: 2021-04-02 | End: 2021-04-04 | Stop reason: HOSPADM

## 2021-04-02 RX ORDER — NIFEDIPINE 30 MG/1
30 TABLET, EXTENDED RELEASE ORAL DAILY
Status: DISCONTINUED | OUTPATIENT
Start: 2021-04-02 | End: 2021-04-04 | Stop reason: HOSPADM

## 2021-04-02 RX ORDER — ISOSORBIDE MONONITRATE 60 MG/1
60 TABLET, EXTENDED RELEASE ORAL DAILY
Status: DISCONTINUED | OUTPATIENT
Start: 2021-04-02 | End: 2021-04-04 | Stop reason: HOSPADM

## 2021-04-02 RX ORDER — INSULIN LISPRO 100 [IU]/ML
INJECTION, SOLUTION INTRAVENOUS; SUBCUTANEOUS EVERY 4 HOURS
Status: DISCONTINUED | OUTPATIENT
Start: 2021-04-02 | End: 2021-04-04 | Stop reason: HOSPADM

## 2021-04-02 RX ORDER — FUROSEMIDE 40 MG/1
40 TABLET ORAL DAILY
Status: DISCONTINUED | OUTPATIENT
Start: 2021-04-02 | End: 2021-04-04 | Stop reason: HOSPADM

## 2021-04-02 RX ORDER — POTASSIUM CHLORIDE 750 MG/1
40 TABLET, FILM COATED, EXTENDED RELEASE ORAL
Status: COMPLETED | OUTPATIENT
Start: 2021-04-02 | End: 2021-04-02

## 2021-04-02 RX ORDER — CLOPIDOGREL BISULFATE 75 MG/1
75 TABLET ORAL DAILY
Status: DISCONTINUED | OUTPATIENT
Start: 2021-04-02 | End: 2021-04-04 | Stop reason: HOSPADM

## 2021-04-02 RX ORDER — ATORVASTATIN CALCIUM 20 MG/1
20 TABLET, FILM COATED ORAL DAILY
Status: DISCONTINUED | OUTPATIENT
Start: 2021-04-02 | End: 2021-04-04 | Stop reason: HOSPADM

## 2021-04-02 RX ORDER — MAGNESIUM SULFATE 100 %
4 CRYSTALS MISCELLANEOUS AS NEEDED
Status: DISCONTINUED | OUTPATIENT
Start: 2021-04-02 | End: 2021-04-04 | Stop reason: HOSPADM

## 2021-04-02 RX ORDER — ACETAMINOPHEN 325 MG/1
650 TABLET ORAL
Status: DISCONTINUED | OUTPATIENT
Start: 2021-04-02 | End: 2021-04-04 | Stop reason: HOSPADM

## 2021-04-02 RX ORDER — LISINOPRIL 40 MG/1
40 TABLET ORAL DAILY
Status: DISCONTINUED | OUTPATIENT
Start: 2021-04-02 | End: 2021-04-04 | Stop reason: HOSPADM

## 2021-04-02 RX ORDER — ASPIRIN 325 MG
325 TABLET ORAL DAILY
Status: DISCONTINUED | OUTPATIENT
Start: 2021-04-02 | End: 2021-04-04 | Stop reason: HOSPADM

## 2021-04-02 RX ORDER — ALBUTEROL SULFATE 90 UG/1
1 AEROSOL, METERED RESPIRATORY (INHALATION)
Status: DISCONTINUED | OUTPATIENT
Start: 2021-04-02 | End: 2021-04-04 | Stop reason: HOSPADM

## 2021-04-02 RX ORDER — ONDANSETRON 4 MG/1
4 TABLET, ORALLY DISINTEGRATING ORAL
Status: DISCONTINUED | OUTPATIENT
Start: 2021-04-02 | End: 2021-04-04 | Stop reason: HOSPADM

## 2021-04-02 RX ORDER — POTASSIUM CHLORIDE 20 MEQ/1
20 TABLET, EXTENDED RELEASE ORAL DAILY
Status: DISCONTINUED | OUTPATIENT
Start: 2021-04-02 | End: 2021-04-04 | Stop reason: HOSPADM

## 2021-04-02 RX ORDER — METOPROLOL SUCCINATE 25 MG/1
50 TABLET, EXTENDED RELEASE ORAL
Status: DISCONTINUED | OUTPATIENT
Start: 2021-04-02 | End: 2021-04-04 | Stop reason: HOSPADM

## 2021-04-02 RX ORDER — TAMSULOSIN HYDROCHLORIDE 0.4 MG/1
0.4 CAPSULE ORAL
Status: DISCONTINUED | OUTPATIENT
Start: 2021-04-02 | End: 2021-04-04 | Stop reason: HOSPADM

## 2021-04-02 RX ORDER — POLYETHYLENE GLYCOL 3350 17 G/17G
17 POWDER, FOR SOLUTION ORAL DAILY PRN
Status: DISCONTINUED | OUTPATIENT
Start: 2021-04-02 | End: 2021-04-04 | Stop reason: HOSPADM

## 2021-04-02 RX ADMIN — CLOPIDOGREL BISULFATE 75 MG: 75 TABLET ORAL at 10:37

## 2021-04-02 RX ADMIN — FUROSEMIDE 40 MG: 40 TABLET ORAL at 10:37

## 2021-04-02 RX ADMIN — TAMSULOSIN HYDROCHLORIDE 0.4 MG: 0.4 CAPSULE ORAL at 21:33

## 2021-04-02 RX ADMIN — INSULIN LISPRO 2 UNITS: 100 INJECTION, SOLUTION INTRAVENOUS; SUBCUTANEOUS at 21:33

## 2021-04-02 RX ADMIN — TRAZODONE HYDROCHLORIDE 50 MG: 50 TABLET ORAL at 10:36

## 2021-04-02 RX ADMIN — ASPIRIN 325 MG ORAL TABLET 325 MG: 325 PILL ORAL at 10:36

## 2021-04-02 RX ADMIN — ISOSORBIDE MONONITRATE 60 MG: 60 TABLET, EXTENDED RELEASE ORAL at 10:37

## 2021-04-02 RX ADMIN — INSULIN LISPRO 3 UNITS: 100 INJECTION, SOLUTION INTRAVENOUS; SUBCUTANEOUS at 17:18

## 2021-04-02 RX ADMIN — POTASSIUM CHLORIDE 20 MEQ: 1500 TABLET, EXTENDED RELEASE ORAL at 10:34

## 2021-04-02 RX ADMIN — LISINOPRIL 40 MG: 40 TABLET ORAL at 10:37

## 2021-04-02 RX ADMIN — INSULIN GLARGINE 40 UNITS: 100 INJECTION, SOLUTION SUBCUTANEOUS at 21:34

## 2021-04-02 RX ADMIN — TRAZODONE HYDROCHLORIDE 50 MG: 50 TABLET ORAL at 21:33

## 2021-04-02 RX ADMIN — PANTOPRAZOLE SODIUM 40 MG: 40 TABLET, DELAYED RELEASE ORAL at 10:37

## 2021-04-02 RX ADMIN — NIFEDIPINE 30 MG: 30 TABLET, FILM COATED, EXTENDED RELEASE ORAL at 10:36

## 2021-04-02 RX ADMIN — POLYETHYLENE GLYCOL 3350 17 G: 17 POWDER, FOR SOLUTION ORAL at 17:18

## 2021-04-02 RX ADMIN — ATORVASTATIN CALCIUM 20 MG: 20 TABLET, FILM COATED ORAL at 10:36

## 2021-04-02 RX ADMIN — INSULIN LISPRO 4 UNITS: 100 INJECTION, SOLUTION INTRAVENOUS; SUBCUTANEOUS at 14:01

## 2021-04-02 RX ADMIN — INSULIN LISPRO 4 UNITS: 100 INJECTION, SOLUTION INTRAVENOUS; SUBCUTANEOUS at 09:27

## 2021-04-02 RX ADMIN — POTASSIUM CHLORIDE 40 MEQ: 750 TABLET, FILM COATED, EXTENDED RELEASE ORAL at 10:34

## 2021-04-02 RX ADMIN — METOPROLOL SUCCINATE 50 MG: 25 TABLET, EXTENDED RELEASE ORAL at 17:18

## 2021-04-02 NOTE — ED PROVIDER NOTES
EMERGENCY DEPARTMENT HISTORY AND PHYSICAL EXAM      Date: 4/1/2021  Patient Name: Kayla Berrios    History of Presenting Illness     Chief Complaint   Patient presents with    Chest Pain       History Provided By: Patient    HPI: Kayla Berrios, 77 y.o. male with PMHx as noted below presents the emergency department as a transfer from outside facility with chest pain. Patient reports that earlier today had onset of midsternal chest pain. Patient describes it as a constant, pressure pain that did not radiate and was not relieving or exacerbating factors. Patient notes no associated shortness of breath. Patient was taken to outside hospital where he had a negative troponin and EKG without any acute ischemic changes. Patient was given morphine, aspirin and given his significant risk factors arranges were made to transfer here for further evaluation. Pt denies any other alleviating or exacerbating factors. Additionally, pt specifically denies any recent fever, chills, headache, nausea, vomiting, abdominal pain, lightheadedness, dizziness, numbness, weakness, BLE swelling, heart palpitations, urinary sxs, diarrhea, constipation, melena, hematochezia, cough, or congestion. PCP: Sharon Mustafa, DO    Current Outpatient Medications   Medication Sig Dispense Refill    insulin glargine (LANTUS) 100 unit/mL injection 40 Units by SubCUTAneous route nightly. 1 Vial 1    albuterol (PROVENTIL HFA, VENTOLIN HFA, PROAIR HFA) 90 mcg/actuation inhaler Take 1 Puff by inhalation every six (6) hours as needed for Wheezing or Shortness of Breath. 1 Inhaler 0    lisinopriL (PRINIVIL, ZESTRIL) 40 mg tablet Take 1 Tab by mouth daily.  30 Tab 0    Blood-Glucose Meter monitoring kit 3-4 times a day as needed 1 Kit 0    Insulin Needles, Disposable, 31 gauge x 5/16\" ndle Daily at bedtime 1 Package 0    glucose blood VI test strips (blood glucose test) strip 3-4 times a day as needed 50 Strip 0    lancets 21 gauge misc Use 3-4 times a day as needed 50 Lancet 0    magnesium oxide (MAG-OX) 400 mg tablet Take 1 Tab by mouth daily. 30 Tab 0    metoprolol succinate (TOPROL-XL) 50 mg XL tablet Take 1 Tab by mouth two (2) times daily (after meals). 60 Tab 0    NIFEdipine ER (PROCARDIA XL) 30 mg ER tablet Take 1 Tab by mouth daily. 30 Tab 0    potassium chloride (K-DUR, KLOR-CON) 20 mEq tablet Take 1 Tab by mouth daily. 30 Tab 0    isosorbide mononitrate ER (IMDUR) 60 mg CR tablet Take 60 mg by mouth daily.  atorvastatin (LIPITOR) 20 mg tablet Take 20 mg by mouth daily.  furosemide (Lasix) 40 mg tablet Take 40 mg by mouth daily.  pantoprazole (Protonix) 40 mg tablet Take 40 mg by mouth daily.  traZODone (DESYREL) 50 mg tablet Take 50 mg by mouth two (2) times a day.  metFORMIN (GLUCOPHAGE) 1,000 mg tablet Take 1,000 mg by mouth two (2) times daily (with meals).  tamsulosin (Flomax) 0.4 mg capsule Take 0.4 mg by mouth nightly. Past History     Past Medical History:  Past Medical History:   Diagnosis Date    CAD (coronary artery disease)     [facility]     HTN (hypertension)     Hyperlipidemia     MI (myocardial infarction) ([facility]        Past Surgical History:  No past surgical history on file. Family History:  Family History   Problem Relation Age of Onset    Hypertension Mother     Hypertension Father        Social History:  Social History     Tobacco Use    Smoking status: Former Smoker    Smokeless tobacco: Former User   Substance Use Topics    Alcohol use: Never     Frequency: Never    Drug use: Never       Allergies:  No Known Allergies      Review of Systems   Review of Systems  Constitutional: Negative for fever, chills, and fatigue. HENT: Negative for congestion, sore throat, rhinorrhea, sneezing and neck stiffness   Eyes: Negative for discharge and redness. Respiratory: Positive for  shortness of breath. Negative wheezing   Cardiovascular: Positive for chest pain.   Negative palpitations   Gastrointestinal: Negative for nausea, vomiting, abdominal pain, constipation, diarrhea and blood in stool. Genitourinary: Negative for dysuria, urgency, frequency, hematuria, flank pain, decreased urine volume, discharge,   Musculoskeletal: Negative for myalgias or joint pain . Skin: Negative for rash or lesions . Neurological: Negative weakness, light-headedness, numbness and headaches. Physical Exam   Physical Exam    GENERAL: alert and oriented, no acute distress  EYES: PEERL, No injection, discharge or icterus. ENT: Mucous membranes pink and moist.  NECK: Supple  LUNGS: Airway patent. Non-labored respirations. Breath sounds clear with good air entry bilaterally. HEART: Regular rate and rhythm. No peripheral edema  ABDOMEN: Non-distended and non-tender, without guarding or rebound. SKIN:  warm, dry  EXTREMITIES: Without swelling, tenderness or deformity, symmetric with normal ROM  NEUROLOGICAL: Alert, oriented      Diagnostic Study Results     Labs -     Recent Results (from the past 12 hour(s))   GLUCOSE, POC    Collection Time: 04/01/21 10:57 PM   Result Value Ref Range    Glucose (POC) 260 (H) 65 - 100 mg/dL    Performed by POPE ANGELIQUE    TROPONIN I    Collection Time: 04/01/21 11:00 PM   Result Value Ref Range    Troponin-I, Qt. <0.05 <0.05 ng/mL       Radiologic Studies -   No orders to display     CT Results  (Last 48 hours)               04/01/21 1434  CTA CHEST ABD W WO CONT Final result    Impression:  No CT evidence for PE. Prior intrathoracic surgery. CAD. Complex cyst or mass left kidney. Recommend renal ultrasound. Narrative:  CTA chest.       Axial images are reviewed along with reformatted sagittal/coronal/MIP images. 100 mL Isovue 370 administered.    Dose reduction: All CT scans at this facility are performed using dose reduction   optimization techniques as appropriate to a performed exam including the   following-   automated exposure control, adjustments of mA and/or Kv according to patient   size, or use of iterative reconstructive technique. Pleural-parenchymal changes through lungs. No pleural effusion. Enhanced images reveal atherosclerotic change elongated thoracic aorta. Normal   contrast opacification main pulmonary arterial trunk and its branch vessels; no   CT evidence for PE. Prior intrathoracic surgery. CAD. No pericardial effusion. Variably dense/variably enhancing few round structures each kidney. 1.3 cm   cortical-based structure left kidney may represent mass. Atherosclerosis continues into the imaged abdominal aorta. CXR Results  (Last 48 hours)    None            Medical Decision Making     I, Craige Dance, MD am the first provider for this patient and am the attending of record for this patient encounter. I reviewed the vital signs, available nursing notes, past medical history, past surgical history, family history and social history. Vital Signs-Reviewed the patient's vital signs. Patient Vitals for the past 12 hrs:   Temp Pulse Resp BP SpO2   04/01/21 2258  77 18 116/87 94 %   04/01/21 1954 97.4 °F (36.3 °C) 97 20 120/80 93 %       Records Reviewed: Nursing Notes and Old Medical Records    Provider Notes (Medical Decision Making): On presentation, the patient is well appearing, in no acute distress with normal vital signs. Based on my history and exam the differential diagnosis for this patient includes ACS, PE, dissection, musculoskeletal pain, GERD. Reviewed work-up from outside facility, negative troponin x2, EKG showed no acute ischemic changes, CTA negative for acute findings. On presentation here patient is now pain-free. Additional troponin was negative. Given his risk factors we will plan to admit per plan from outside emergency department. ED Course:   Initial assessment performed.  The patients presenting problems have been discussed, and they are in agreement with the care plan formulated and outlined with them. I have encouraged them to ask questions as they arise throughout their visit. PROGRESS:  The patient has been re-evaluated and sx have improved. Reviewed available results with patient and have counseled them on diagnosis and care plan. They have expressed understanding, and all their questions have been answered. They agree with plan for admission. CONSULT:  Lilton Schlatter, MD spoke with the hospitalist.  Discussed HPI and PE, available diagnostic tests and clinical findings. He is in agreement with care plans as outlined and will evaluate for admission     Admit Note  Patient is being admitted to the hospitalist.  The results of their tests and reasons for their admission have been discussed with them and/or available family. They convey agreement and understanding for the need to be admitted and for their admission diagnosis. Consultation has been made with the inpatient physician specialist for hospitalization. Disposition:  admission    PLAN:  1. Admit     Diagnosis     Clinical Impression:   1. Chest pain, unspecified type        Please note that this dictation was completed with Dragon, computer voice recognition software. Quite often unanticipated grammatical, syntax, homophones, and other interpretive errors are inadvertently transcribed by the computer software. Please disregard these errors. Additionally, please excuse any errors that have escaped final proofreading.

## 2021-04-02 NOTE — ROUTINE PROCESS
TRANSFER - OUT REPORT: 
 
Verbal report given to Abby Thompson (name) on Shan Ruelas  being transferred to [facility](unit) for routine progression of care Report consisted of patients Situation, Background, Assessment and  
Recommendations(SBAR). Information from the following report(s) SBAR, ED Summary, Intake/Output, MAR and Recent Results was reviewed with the receiving nurse. Lines:  
Peripheral IV 04/01/21 Left Forearm (Active) Site Assessment Clean, dry, & intact 04/01/21 2344 Phlebitis Assessment 0 04/01/21 2344 Infiltration Assessment 0 04/01/21 2344 Dressing Status Clean, dry, & intact 04/01/21 2344 Hub Color/Line Status Green 04/01/21 2344 Opportunity for questions and clarification was provided. Patient transported with: 
 [employer]

## 2021-04-02 NOTE — PROGRESS NOTES
4 eye skin assessment performed with Candance Dials M.,RN. Skin is dry, intact, with no skin breakdown, or pressure injuries. Midsternal scar.

## 2021-04-02 NOTE — PROGRESS NOTES
Reason for Admission:                       RUR Score:                  PCP: First and Last name:   Xavi Griffith DO     Name of Practice:    Are you a current patient: Yes/No:  yes   Approximate date of last visit:  Plan to visit today   Can you participate in a virtual visit if needed:     Do you (patient/family) have any concerns for transition/discharge? Plan for utilizing home health:       Current Advanced Directive/Advance Care Plan:  Full Code      Healthcare Decision Maker:   Click here to complete [address] including selection of the Healthcare Decision Maker Relationship (ie \"Primary\")            Primary Decision Maker: Yomaira Cancer Sister - [phone]    Secondary Decision Maker: Pacheco Wilcoxer - Daughter - [phone]    Transition of Care Plan:      Resume [place]    The patient and the sister cannot remember the [address].

## 2021-04-02 NOTE — CONSULTS
CARDIOLOGY CONSULTATION      REASON FOR CONSULT: Chest pain    REQUESTING PROVIDER: Dr. Luz Pedersen:  Midsternal chest pain    HISTORY OF PRESENT ILLNESS:  Pratik Alvarez is a 77y.o. year-old male with past medical history significant for MI with CABG in 2018, hypertension, hyperlipidemia, and diabetes who was evaluated today due to chest pain. He states pain occurred yesterday morning while at rest, radiated to the upper abdomen then back to the chest. He states it resolved last night while being treated at [facility] ED. He denies any accompanying palpitations, dyspnea, or diaphoresis. He was subsequently transferred here for further work up and concern related to cardiac history. He is currently pain free, resting comfortably in bed. Records from hospital admission course thus far reviewed. Telemetry reviewed. No events overnight. Sinus arrhythmia, rate 70-80s . INPATIENT MEDICATIONS:  Home medications reviewed.     Current Facility-Administered Medications:     insulin lispro (HUMALOG) injection, , SubCUTAneous, AC&HS, Cash Lindsay MD, 4 Units at 04/02/21 1401    glucose chewable tablet 16 g, 4 Tab, Oral, PRN, Kirby Lindsay MD    glucagon (GLUCAGEN) injection 1 mg, 1 mg, IntraMUSCular, PRN, Kirby Lindsay MD    dextrose (D50W) injection syrg 12.5-25 g, 25-50 mL, IntraVENous, PRN, Kirby Lindsay MD    albuterol (PROVENTIL HFA, VENTOLIN HFA, PROAIR HFA) inhaler 1 Puff, 1 Puff, Inhalation, Q6H PRN, Cash Lindsay MD    insulin glargine (LANTUS) injection 40 Units, 40 Units, SubCUTAneous, QHS, Cash Lindsay MD    lisinopriL (PRINIVIL, ZESTRIL) tablet 40 mg, 40 mg, Oral, DAILY, Cash Lindsay MD, 40 mg at 04/02/21 1037    metoprolol succinate (TOPROL-XL) XL tablet 50 mg, 50 mg, Oral, BIDPC, Cash Lindsay MD    NIFEdipine ER (PROCARDIA XL) tablet 30 mg, 30 mg, Oral, DAILY, Cash Lindsay MD, 30 mg at 04/02/21 1036    potassium chloride (K-DUR, KLOR-CON) SR tablet 20 mEq, 20 mEq, Oral, DAILY, Cash Lindsay MD, 20 mEq at 04/02/21 1034    atorvastatin (LIPITOR) tablet 20 mg, 20 mg, Oral, DAILY, Cash Lindsay MD, 20 mg at 04/02/21 1036    furosemide (LASIX) tablet 40 mg, 40 mg, Oral, DAILY, Cash Lindsay MD, 40 mg at 04/02/21 1037    isosorbide mononitrate ER (IMDUR) tablet 60 mg, 60 mg, Oral, DAILY, Cash Lindsay MD, 60 mg at 04/02/21 1037    pantoprazole (PROTONIX) tablet 40 mg, 40 mg, Oral, DAILY, Cash Lindsay MD, 40 mg at 04/02/21 1037    tamsulosin (FLOMAX) capsule 0.4 mg, 0.4 mg, Oral, QHS, Lashonda Lindsay MD    traZODone (DESYREL) tablet 50 mg, 50 mg, Oral, BID, Cash Lindsay MD, 50 mg at 04/02/21 1036    insulin lispro (HUMALOG) injection, , SubCUTAneous, Q4H, Cash Lindsay MD, Stopped at 04/02/21 0945    glucose chewable tablet 16 g, 4 Tab, Oral, PRN, Lashonda Lindsay MD    dextrose (D50W) injection syrg 12.5-25 g, 25-50 mL, IntraVENous, PRN, Lashonda Lindsay MD    glucagon (GLUCAGEN) injection 1 mg, 1 mg, IntraMUSCular, PRN, Lashonda Lindsay MD    acetaminophen (TYLENOL) tablet 650 mg, 650 mg, Oral, Q6H PRN **OR** acetaminophen (TYLENOL) suppository 650 mg, 650 mg, Rectal, Q6H PRN, Lashonda Lindsay MD    polyethylene glycol (MIRALAX) packet 17 g, 17 g, Oral, DAILY PRN, Lashonda Lindsay MD    ondansetron (ZOFRAN ODT) tablet 4 mg, 4 mg, Oral, Q8H PRN **OR** ondansetron (ZOFRAN) injection 4 mg, 4 mg, IntraVENous, Q6H PRN, Cash Lindsay MD    aspirin tablet 325 mg, 325 mg, Oral, DAILY, Cash Lindsay MD, 325 mg at 04/02/21 1036    clopidogreL (PLAVIX) tablet 75 mg, 75 mg, Oral, DAILY, Cash Lindsay MD, 75 mg at 04/02/21 1037    Current Outpatient Medications:     insulin glargine (LANTUS) 100 unit/mL injection, 40 Units by SubCUTAneous route nightly., Disp: 1 Vial, Rfl: 1    albuterol (PROVENTIL HFA, VENTOLIN HFA, PROAIR HFA) 90 mcg/actuation inhaler, Take 1 Puff by inhalation every six (6) hours as needed for Wheezing or Shortness of Breath., Disp: 1 Inhaler, Rfl: 0    lisinopriL (PRINIVIL, ZESTRIL) 40 mg tablet, Take 1 Tab by mouth daily. , Disp: 30 Tab, Rfl: 0    Blood-Glucose Meter monitoring kit, 3-4 times a day as needed, Disp: 1 Kit, Rfl: 0    Insulin Needles, Disposable, 31 gauge x 5/16\" ndle, Daily at bedtime, Disp: 1 Package, Rfl: 0    glucose blood VI test strips (blood glucose test) strip, 3-4 times a day as needed, Disp: 50 Strip, Rfl: 0    lancets 21 gauge misc, Use 3-4 times a day as needed, Disp: 50 Lancet, Rfl: 0    magnesium oxide (MAG-OX) 400 mg tablet, Take 1 Tab by mouth daily. , Disp: 30 Tab, Rfl: 0    metoprolol succinate (TOPROL-XL) 50 mg XL tablet, Take 1 Tab by mouth two (2) times daily (after meals). , Disp: 60 Tab, Rfl: 0    NIFEdipine ER (PROCARDIA XL) 30 mg ER tablet, Take 1 Tab by mouth daily. , Disp: 30 Tab, Rfl: 0    potassium chloride (K-DUR, KLOR-CON) 20 mEq tablet, Take 1 Tab by mouth daily. , Disp: 30 Tab, Rfl: 0    isosorbide mononitrate ER (IMDUR) 60 mg CR tablet, Take 60 mg by mouth daily. , Disp: , Rfl:     atorvastatin (LIPITOR) 20 mg tablet, Take 20 mg by mouth daily. , Disp: , Rfl:     furosemide (Lasix) 40 mg tablet, Take 40 mg by mouth daily. , Disp: , Rfl:     pantoprazole (Protonix) 40 mg tablet, Take 40 mg by mouth daily. , Disp: , Rfl:     traZODone (DESYREL) 50 mg tablet, Take 50 mg by mouth two (2) times a day., Disp: , Rfl:     metFORMIN (GLUCOPHAGE) 1,000 mg tablet, Take 1,000 mg by mouth two (2) times daily (with meals). , Disp: , Rfl:     tamsulosin (Flomax) 0.4 mg capsule, Take 0.4 mg by mouth nightly., Disp: , Rfl:      ALLERGIES:  Allergies reviewed with the patient,No Known Allergies . SOCIAL HISTORY:  Notable for history of tobacco use, no heavy alcohol or illicit drug use. REVIEW OF SYSTEMS:  Complete review of systems performed, pertinents noted above, all other systems are negative.       PHYSICAL EXAMINATION:  Vital sign assessment reveal a blood pressure of  139/80  and pulse rate of 74. Cardiovascular exam has a heart with normal rate, irregular rhythm, normal S1 and S2. No murmur present. There are no rubs or gallops. Good peripheral pulses. No jugular venous distension. Respiratory exam reveals clear lung fields, no rales or rhonchi. Gastrointestinal exam has soft, nontender abdomen with normal bowel sounds. Lymphatic exam reveals no edema and no varicosities. No notable skin changes. Neurologic exam is nonfocal.      Visit Vitals  /80   Pulse 74   Temp 97.4 °F (36.3 °C)   Resp 23   SpO2 98%       Recent labs results and imaging reviewed. Notable findings include    Recent Results (from the past 12 hour(s))   EKG, 12 LEAD, SUBSEQUENT    Collection Time: 04/02/21  7:11 AM   Result Value Ref Range    Ventricular Rate 72 BPM    Atrial Rate 72 BPM    P-R Interval 168 ms    QRS Duration 106 ms    Q-T Interval 430 ms    QTC Calculation (Bezet) 470 ms    Calculated P Axis 51 degrees    Calculated R Axis 3 degrees    Calculated T Axis 106 degrees    Diagnosis       Sinus rhythm with marked sinus arrhythmia  Moderate voltage criteria for LVH, may be normal variant  Nonspecific T wave abnormality  Prolonged QT  Abnormal ECG  When compared with ECG of 01-APR-2021 13:25,  No significant change was found  Confirmed by Cristine Tucker MD, Manuella Schirmer ([id number]) on 4/2/2021 8:23:34 AM     GLUCOSE, POC    Collection Time: 04/02/21  9:23 AM   Result Value Ref Range    Glucose (POC) 219 (H) 65 - 100 mg/dL    Performed by LENI APRIL    TROPONIN I    Collection Time: 04/02/21  9:45 AM   Result Value Ref Range    Troponin-I, Qt. <0.05 <0.05 ng/mL   GLUCOSE, POC    Collection Time: 04/02/21  1:08 PM   Result Value Ref Range    Glucose (POC) 203 (H) 65 - 100 mg/dL    Performed by Julio César Lazo        Discussed case with Dr. Dejuan Pennington and our impression and recommendations are as follows:    1. Chest pain: denies currently.  Troponin has been negative x4. EKG is sinus rhythm with marked arrhythmia, nonsopecific T wave abnormality. Potassium 3.0, being repleted by primary. 8/2020 NST normal, 12/20 EF 66%, Mild aortic regurgitation w/ aortic root dilatation. Repeat echocardiogram pending. Depending on results will consider further ischemic work up. Continue to monitor telemetry. 2. Coronary artery disease: history of CABG in 2018. Continue aspirin, plavix, statin, beta blocker and isosorbide. 3. Hypertension: Blood pressure currently at goal. Continue lisinopril and nifedipine. 4. Hyperlipidemia: Continue statin. Thank you for involving us in the care of this patient. Please do not hesitate to call if additional questions arise.     ARTIS Sanchez  4/2/2021

## 2021-04-02 NOTE — PROGRESS NOTES
TRANSFER - IN REPORT:    Verbal report received from April,RN(name) on Emi Coffey  being received from ED(unit) for routine progression of care      Report consisted of patients Situation, Background, Assessment and   Recommendations(SBAR). Information from the following report(s) SBAR, Procedure Summary, Intake/Output, MAR and Recent Results was reviewed with the receiving nurse. Opportunity for questions and clarification was provided. Assessment completed upon patients arrival to unit and care assumed.

## 2021-04-02 NOTE — PROGRESS NOTES
Patient had two orders for insulin Humalog. Contacted MD and he gave orders to discontinue the insulin for 4 times daily.

## 2021-04-02 NOTE — H&P
History and Physical    NAME: Xavi Erwin   :  1955   MRN:  [id number]     Date/Time:  2021 9:46 AM    Patient PCP: Chayito Guardado, DO  ______________________________________________________________________             Subjective:     CHIEF COMPLAINT:   Chest pain      HISTORY OF PRESENT ILLNESS:       Patient is a 77y.o. year old male signal past medical history of diabetes hypertension CAD status post stent in 2018 came to the ER complaining of chest pain since yesterday morning patient was came to the ER at the other facility and transferred here patient admits sternal chest pain 6 out of 10 does not radiate and no shortness of breath because of the significant cardiac risk factor patient was recommend to be admitted for further evaluation treatment    Patient denies any fever chills cough congestion no nausea no vomiting diarrhea no constipation    Past Medical History:   Diagnosis Date    CAD (coronary artery disease)     Diabetes ([facility] [address]     HTN (hypertension)     Hyperlipidemia     MI (myocardial infarction) ([facility] [address]         No past surgical history on file. Social History     Tobacco Use    Smoking status: Former Smoker    Smokeless tobacco: Former User   Substance Use Topics    Alcohol use: Never     Frequency: Never        Family History   Problem Relation Age of Onset    Hypertension Mother     Hypertension Father        No Known Allergies     Prior to Admission medications    Medication Sig Start Date End Date Taking? Authorizing Provider   insulin glargine (LANTUS) 100 unit/mL injection 40 Units by SubCUTAneous route nightly. 3/15/21   Edwin Ferris MD   albuterol (PROVENTIL HFA, VENTOLIN HFA, PROAIR HFA) 90 mcg/actuation inhaler Take 1 Puff by inhalation every six (6) hours as needed for Wheezing or Shortness of Breath. 3/15/21   Edwin Ferris MD   lisinopriL (PRINIVIL, ZESTRIL) 40 mg tablet Take 1 Tab by mouth daily.  3/15/21   Edwin Ferris MD   Blood-Glucose Meter monitoring kit 3-4 times a day as needed 3/15/21   Sarah Walker MD   Insulin Needles, Disposable, 31 gauge x 5/16\" ndle Daily at bedtime 3/15/21   Sarah Walker MD   glucose blood VI test strips (blood glucose test) strip 3-4 times a day as needed 3/15/21   Sarah Walker MD   lancets 21 gauge misc Use 3-4 times a day as needed 3/15/21   Sarah Walker MD   magnesium oxide (MAG-OX) 400 mg tablet Take 1 Tab by mouth daily. 12/14/20   Reymundo Childress MD   metoprolol succinate (TOPROL-XL) 50 mg XL tablet Take 1 Tab by mouth two (2) times daily (after meals). 12/14/20   Reymundo Childress MD   NIFEdipine ER (PROCARDIA XL) 30 mg ER tablet Take 1 Tab by mouth daily. 12/15/20   Robert Shelley MD   potassium chloride (K-DUR, KLOR-CON) 20 mEq tablet Take 1 Tab by mouth daily. 12/14/20   Reymundo Childress MD   isosorbide mononitrate ER (IMDUR) 60 mg CR tablet Take 60 mg by mouth daily. Imani Morrison MD   atorvastatin (LIPITOR) 20 mg tablet Take 20 mg by mouth daily. Imani Morrison MD   furosemide (Lasix) 40 mg tablet Take 40 mg by mouth daily. Imani Morrison MD   pantoprazole (Protonix) 40 mg tablet Take 40 mg by mouth daily. Imani Morrison MD   traZODone (DESYREL) 50 mg tablet Take 50 mg by mouth two (2) times a day. Imani Morrison MD   metFORMIN (GLUCOPHAGE) 1,000 mg tablet Take 1,000 mg by mouth two (2) times daily (with meals). Imani Morrison MD   tamsulosin (Flomax) 0.4 mg capsule Take 0.4 mg by mouth nightly.     Imani Morrison MD         Current Facility-Administered Medications:     insulin lispro (HUMALOG) injection, , SubCUTAneous, AC&HS, Cash Lindsay MD, 4 Units at 04/02/21 7392    glucose chewable tablet 16 g, 4 Tab, Oral, PRN, Cash Lindsay MD    glucagon (GLUCAGEN) injection 1 mg, 1 mg, IntraMUSCular, PRN, Cash Lindsay MD    dextrose (D50W) injection syrg 12.5-25 g, 25-50 mL, IntraVENous, PRN, Pedro Lindsay MD    albuterol (PROVENTIL HFA, VENTOLIN HFA, PROAIR HFA) inhaler 1 Puff, 1 Puff, Inhalation, Q6H PRN, Param Lindsay MD    insulin glargine (LANTUS) injection 40 Units, 40 Units, SubCUTAneous, QHS, Cash Lindsay MD    lisinopriL (PRINIVIL, ZESTRIL) tablet 40 mg, 40 mg, Oral, DAILY, Param Lindsay MD    metoprolol succinate (TOPROL-XL) XL tablet 50 mg, 50 mg, Oral, BIDPC, Param Lindsay MD    NIFEdipine ER (PROCARDIA XL) tablet 30 mg, 30 mg, Oral, DAILY, Param Lindsay MD    potassium chloride (K-DUR, KLOR-CON) SR tablet 20 mEq, 20 mEq, Oral, DAILY, Param Lindsay MD    atorvastatin (LIPITOR) tablet 20 mg, 20 mg, Oral, DAILY, Param Lindsay MD    furosemide (LASIX) tablet 40 mg, 40 mg, Oral, DAILY, Param Lindsay MD    isosorbide mononitrate ER (IMDUR) tablet 60 mg, 60 mg, Oral, DAILY, Param Lindsay MD    pantoprazole (PROTONIX) tablet 40 mg, 40 mg, Oral, DAILY, Param Lindsay MD    tamsulosin (FLOMAX) capsule 0.4 mg, 0.4 mg, Oral, QHS, Param Lindsay MD    traZODone (DESYREL) tablet 50 mg, 50 mg, Oral, BID, Param Lindsay MD    insulin lispro (HUMALOG) injection, , SubCUTAneous, Q4H, Param Lindsay MD    glucose chewable tablet 16 g, 4 Tab, Oral, PRN, Param Lindsay MD    dextrose (D50W) injection syrg 12.5-25 g, 25-50 mL, IntraVENous, PRN, Param Lindsay MD    glucagon (GLUCAGEN) injection 1 mg, 1 mg, IntraMUSCular, PRN, Param Lindsay MD    acetaminophen (TYLENOL) tablet 650 mg, 650 mg, Oral, Q6H PRN **OR** acetaminophen (TYLENOL) suppository 650 mg, 650 mg, Rectal, Q6H PRN, Param Lindsay MD    polyethylene glycol (MIRALAX) packet 17 g, 17 g, Oral, DAILY PRN, Param Lindsay MD    ondansetron (ZOFRAN ODT) tablet 4 mg, 4 mg, Oral, Q8H PRN **OR** ondansetron (ZOFRAN) injection 4 mg, 4 mg, IntraVENous, Q6H PRN, Cash Lindsay MD    aspirin tablet 325 mg, 325 mg, Oral, DAILY, Cash Lindsay MD    clopidogreL (PLAVIX) tablet 75 mg, 75 mg, Oral, DAILY, Param Lindsay MD    Current Outpatient Medications:     insulin glargine (LANTUS) 100 unit/mL injection, 40 Units by SubCUTAneous route nightly., Disp: 1 Vial, Rfl: 1    albuterol (PROVENTIL HFA, VENTOLIN HFA, PROAIR HFA) 90 mcg/actuation inhaler, Take 1 Puff by inhalation every six (6) hours as needed for Wheezing or Shortness of Breath., Disp: 1 Inhaler, Rfl: 0    lisinopriL (PRINIVIL, ZESTRIL) 40 mg tablet, Take 1 Tab by mouth daily. , Disp: 30 Tab, Rfl: 0    Blood-Glucose Meter monitoring kit, 3-4 times a day as needed, Disp: 1 Kit, Rfl: 0    Insulin Needles, Disposable, 31 gauge x 5/16\" ndle, Daily at bedtime, Disp: 1 Package, Rfl: 0    glucose blood VI test strips (blood glucose test) strip, 3-4 times a day as needed, Disp: 50 Strip, Rfl: 0    lancets 21 gauge misc, Use 3-4 times a day as needed, Disp: 50 Lancet, Rfl: 0    magnesium oxide (MAG-OX) 400 mg tablet, Take 1 Tab by mouth daily. , Disp: 30 Tab, Rfl: 0    metoprolol succinate (TOPROL-XL) 50 mg XL tablet, Take 1 Tab by mouth two (2) times daily (after meals). , Disp: 60 Tab, Rfl: 0    NIFEdipine ER (PROCARDIA XL) 30 mg ER tablet, Take 1 Tab by mouth daily. , Disp: 30 Tab, Rfl: 0    potassium chloride (K-DUR, KLOR-CON) 20 mEq tablet, Take 1 Tab by mouth daily. , Disp: 30 Tab, Rfl: 0    isosorbide mononitrate ER (IMDUR) 60 mg CR tablet, Take 60 mg by mouth daily. , Disp: , Rfl:     atorvastatin (LIPITOR) 20 mg tablet, Take 20 mg by mouth daily. , Disp: , Rfl:     furosemide (Lasix) 40 mg tablet, Take 40 mg by mouth daily. , Disp: , Rfl:     pantoprazole (Protonix) 40 mg tablet, Take 40 mg by mouth daily. , Disp: , Rfl:     traZODone (DESYREL) 50 mg tablet, Take 50 mg by mouth two (2) times a day., Disp: , Rfl:     metFORMIN (GLUCOPHAGE) 1,000 mg tablet, Take 1,000 mg by mouth two (2) times daily (with meals). , Disp: , Rfl:     tamsulosin (Flomax) 0.4 mg capsule, Take 0.4 mg by mouth nightly., Disp: , Rfl:     LAB DATA REVIEWED:    Recent Results (from the past 24 hour(s))   EKG, 12 LEAD, INITIAL    Collection Time: 04/01/21  1:25 PM   Result Value Ref Range    Ventricular Rate 88 BPM    Atrial Rate 89 BPM    P-R Interval 247 ms    QRS Duration 83 ms    Q-T Interval 319 ms    QTC Calculation (Bezet) 386 ms    Calculated P Axis 25 degrees    Calculated R Axis 5 degrees    Calculated T Axis 129 degrees    Diagnosis       Atrial fibrillation  LVH with secondary repolarization abnormality     CBC WITH AUTOMATED DIFF    Collection Time: 04/01/21  1:40 PM   Result Value Ref Range    WBC 5.4 4.4 - 11.3 K/uL    RBC 4.23 (L) 4.50 - 5.90 M/uL    HGB 11.4 (L) 13.5 - 17.5 g/dL    HCT 35.2 (L) 41 - 53 %    MCV 83.3 80 - 100 FL    MCH 27.0 (L) 31 - 34 PG    MCHC 32.4 31.0 - 36.0 g/dL    RDW 16.2 (H) 11.5 - 14.5 %    PLATELET 729 K/uL    MPV 7.6 6.5 - 11.5 FL    NRBC 0.2  WBC    ABSOLUTE NRBC 0.01 K/uL    NEUTROPHILS 51 42 - 75 %    LYMPHOCYTES 33 20.5 - 51.1 %    MONOCYTES 13 (H) 1.7 - 9.3 %    EOSINOPHILS 2 0.9 - 2.9 %    BASOPHILS 1 0.0 - 2.5 %    ABS. NEUTROPHILS 2.7 1.8 - 7.7 K/UL    ABS. LYMPHOCYTES 1.8 1.0 - 4.8 K/UL    ABS. MONOCYTES 0.7 0.2 - 2.4 K/UL    ABS. EOSINOPHILS 0.1 0.0 - 0.7 K/UL    ABS. BASOPHILS 0.1 0.0 - 0.2 K/UL   PROTHROMBIN TIME + INR    Collection Time: 04/01/21  1:40 PM   Result Value Ref Range    Prothrombin time 11.0 9.0 - 11.1 sec    INR 1.1 0.9 - 1.1     METABOLIC PANEL, COMPREHENSIVE    Collection Time: 04/01/21  1:40 PM   Result Value Ref Range    Sodium 137 136 - 145 mmol/L    Potassium 3.0 (L) 3.5 - 5.1 mmol/L    Chloride 99 97 - 108 mmol/L    CO2 24 21 - 32 mmol/L    Anion gap 14 5 - 15 mmol/L    Glucose 301 (H) 65 - 100 mg/dL    BUN 11 6 - 20 mg/dL    Creatinine 1.03 0.70 - 1.30 mg/dL    BUN/Creatinine ratio 11 (L) 12 - 20      GFR est AA >60 >60 ml/min/1.73m2    GFR est non-AA >60 >60 ml/min/1.73m2    Calcium 9.4 8.5 - 10.1 mg/dL    Bilirubin, total 0.5 0.2 - 1.0 mg/dL    AST (SGOT) 20 15 - 37 U/L    ALT (SGPT) 31 12 - 78 U/L    Alk.  phosphatase 111 45 - 117 U/L Protein, total 7.5 6.4 - 8.2 g/dL    Albumin 3.1 (L) 3.5 - 5.0 g/dL    Globulin 4.4 (H) 2.0 - 4.0 g/dL    A-G Ratio 0.7 (L) 1.1 - 2.2     LIPASE    Collection Time: 04/01/21  1:40 PM   Result Value Ref Range    Lipase 135 73 - 393 U/L   TROPONIN I    Collection Time: 04/01/21  1:40 PM   Result Value Ref Range    Troponin-I, Qt. <0.05 <0.05 ng/mL   TROPONIN I    Collection Time: 04/01/21  5:40 PM   Result Value Ref Range    Troponin-I, Qt. <0.05 <0.05 ng/mL   GLUCOSE, POC    Collection Time: 04/01/21 10:57 PM   Result Value Ref Range    Glucose (POC) 260 (H) 65 - 100 mg/dL    Performed by POPE ANGELIQUE    TROPONIN I    Collection Time: 04/01/21 11:00 PM   Result Value Ref Range    Troponin-I, Qt. <0.05 <0.05 ng/mL   EKG, 12 LEAD, SUBSEQUENT    Collection Time: 04/02/21  7:11 AM   Result Value Ref Range    Ventricular Rate 72 BPM    Atrial Rate 72 BPM    P-R Interval 168 ms    QRS Duration 106 ms    Q-T Interval 430 ms    QTC Calculation (Bezet) 470 ms    Calculated P Axis 51 degrees    Calculated R Axis 3 degrees    Calculated T Axis 106 degrees    Diagnosis       Sinus rhythm with marked sinus arrhythmia  Moderate voltage criteria for LVH, may be normal variant  Nonspecific T wave abnormality  Prolonged QT  Abnormal ECG  When compared with ECG of 01-APR-2021 13:25,  No significant change was found  Confirmed by Selvin Virgen MD, Shaunna Oppenheim ([id number]) on 4/2/2021 8:23:34 AM     GLUCOSE, POC    Collection Time: 04/02/21  9:23 AM   Result Value Ref Range    Glucose (POC) 219 (H) 65 - 100 mg/dL    Performed by LENI APRIL        XR Results (most recent):  Results from Hospital Encounter encounter on 02/22/21   XR CHEST PORT    Narrative EXAM:  XR CHEST PORT    INDICATION: Bilateral lung opacities. COMPARISON: 2/22/2021    TECHNIQUE: Portable AP semiupright chest view at 1442 hours    FINDINGS: The cardiomediastinal contours are stable. There are increased bilateral patchy interstitial and airspace opacities.  There  is no pleural effusion or pneumothorax. The bones and upper abdomen are stable. Impression Increased bilateral patchy interstitial and airspace opacities. No orders to display        Review of Systems:  Constitutional: Negative for chills and fever. HENT: Negative. Eyes: Negative. Respiratory: Negative. Cardiovascular: Chest pain   gastrointestinal: Negative for abdominal pain and nausea. Skin: Negative. Neurological: Negative. Objective:   VITALS:    Visit Vitals  BP (!) 154/76   Pulse 72   Temp 97.4 °F (36.3 °C)   Resp 20   SpO2 94%       Physical Exam:   Constitutional: pt is oriented to person, place, and time. HENT:   Head: Normocephalic and atraumatic. Eyes: Pupils are equal, round, and reactive to light. EOM are normal.   Cardiovascular: Normal rate, regular rhythm and normal heart sounds. Pulmonary/Chest: Breath sounds normal. No wheezes. No rales. Exhibits no tenderness. Abdominal: Soft. Bowel sounds are normal. There is no abdominal tenderness. There is no rebound and no guarding. Musculoskeletal: Normal range of motion. Neurological: pt is alert and oriented to person, place, and time. Alert. Normal strength. No cranial nerve deficit or sensory deficit. Displays a negative Romberg sign.         ASSESSMENT & PLAN:    Chest pain rule out MI  CAD status post stent  Type 2 diabetes  Hypertension  Hyperlipidemia  Hypokalemia  Complex renal cyst      Current Facility-Administered Medications:     insulin lispro (HUMALOG) injection, , SubCUTAneous, AC&HS, Cash Lindsay MD, 4 Units at 04/02/21 0228    glucose chewable tablet 16 g, 4 Tab, Oral, PRN, Cash Lindsay MD    glucagon (GLUCAGEN) injection 1 mg, 1 mg, IntraMUSCular, PRN, Cash Lindsay MD    dextrose (D50W) injection syrg 12.5-25 g, 25-50 mL, IntraVENous, PRN, Derek Lindsay MD    albuterol (PROVENTIL HFA, VENTOLIN HFA, PROAIR HFA) inhaler 1 Puff, 1 Puff, Inhalation, Q6H PRN, MD Michael Wilkinson insulin glargine (LANTUS) injection 40 Units, 40 Units, SubCUTAneous, QHS, Cash Lindsay MD    lisinopriL (PRINIVIL, ZESTRIL) tablet 40 mg, 40 mg, Oral, DAILY, Tlyer Lindsay MD    metoprolol succinate (TOPROL-XL) XL tablet 50 mg, 50 mg, Oral, BIDPC, Tyler Lindsay MD    NIFEdipine ER (PROCARDIA XL) tablet 30 mg, 30 mg, Oral, DAILY, Tyler Lindsay MD    potassium chloride (K-DUR, KLOR-CON) SR tablet 20 mEq, 20 mEq, Oral, DAILY, Tyler Lindsay MD    atorvastatin (LIPITOR) tablet 20 mg, 20 mg, Oral, DAILY, Tyler Lindsay MD    furosemide (LASIX) tablet 40 mg, 40 mg, Oral, DAILY, Tyler Lindsay MD    isosorbide mononitrate ER (IMDUR) tablet 60 mg, 60 mg, Oral, DAILY, Tyler Lindsay MD    pantoprazole (PROTONIX) tablet 40 mg, 40 mg, Oral, DAILY, Tyler Lindsay MD    tamsulosin (FLOMAX) capsule 0.4 mg, 0.4 mg, Oral, QHS, Tyler Lindsay MD    traZODone (DESYREL) tablet 50 mg, 50 mg, Oral, BID, Tyler Lindsay MD    insulin lispro (HUMALOG) injection, , SubCUTAneous, Q4H, Tyler Lindsay MD    glucose chewable tablet 16 g, 4 Tab, Oral, PRN, Tyler Lindsay MD    dextrose (D50W) injection syrg 12.5-25 g, 25-50 mL, IntraVENous, PRN, Tyler Lindsay MD    glucagon (GLUCAGEN) injection 1 mg, 1 mg, IntraMUSCular, PRN, Tyler Lindsay MD    acetaminophen (TYLENOL) tablet 650 mg, 650 mg, Oral, Q6H PRN **OR** acetaminophen (TYLENOL) suppository 650 mg, 650 mg, Rectal, Q6H PRN, Tyler Lindsay MD    polyethylene glycol (MIRALAX) packet 17 g, 17 g, Oral, DAILY PRN, Tyler Lindsay MD    ondansetron (ZOFRAN ODT) tablet 4 mg, 4 mg, Oral, Q8H PRN **OR** ondansetron (ZOFRAN) injection 4 mg, 4 mg, IntraVENous, Q6H PRN, Cash Lindsay MD    aspirin tablet 325 mg, 325 mg, Oral, DAILY, Cash Lindsay MD    clopidogreL (PLAVIX) tablet 75 mg, 75 mg, Oral, DAILY, Cash Lindsay MD    Current Outpatient Medications:     insulin glargine (LANTUS) 100 unit/mL injection, 40 Units by SubCUTAneous route nightly., Disp: 1 Vial, Rfl: 1    albuterol (PROVENTIL HFA, VENTOLIN HFA, PROAIR HFA) 90 mcg/actuation inhaler, Take 1 Puff by inhalation every six (6) hours as needed for Wheezing or Shortness of Breath., Disp: 1 Inhaler, Rfl: 0    lisinopriL (PRINIVIL, ZESTRIL) 40 mg tablet, Take 1 Tab by mouth daily. , Disp: 30 Tab, Rfl: 0    Blood-Glucose Meter monitoring kit, 3-4 times a day as needed, Disp: 1 Kit, Rfl: 0    Insulin Needles, Disposable, 31 gauge x 5/16\" ndle, Daily at bedtime, Disp: 1 Package, Rfl: 0    glucose blood VI test strips (blood glucose test) strip, 3-4 times a day as needed, Disp: 50 Strip, Rfl: 0    lancets 21 gauge misc, Use 3-4 times a day as needed, Disp: 50 Lancet, Rfl: 0    magnesium oxide (MAG-OX) 400 mg tablet, Take 1 Tab by mouth daily. , Disp: 30 Tab, Rfl: 0    metoprolol succinate (TOPROL-XL) 50 mg XL tablet, Take 1 Tab by mouth two (2) times daily (after meals). , Disp: 60 Tab, Rfl: 0    NIFEdipine ER (PROCARDIA XL) 30 mg ER tablet, Take 1 Tab by mouth daily. , Disp: 30 Tab, Rfl: 0    potassium chloride (K-DUR, KLOR-CON) 20 mEq tablet, Take 1 Tab by mouth daily. , Disp: 30 Tab, Rfl: 0    isosorbide mononitrate ER (IMDUR) 60 mg CR tablet, Take 60 mg by mouth daily. , Disp: , Rfl:     atorvastatin (LIPITOR) 20 mg tablet, Take 20 mg by mouth daily. , Disp: , Rfl:     furosemide (Lasix) 40 mg tablet, Take 40 mg by mouth daily. , Disp: , Rfl:     pantoprazole (Protonix) 40 mg tablet, Take 40 mg by mouth daily. , Disp: , Rfl:     traZODone (DESYREL) 50 mg tablet, Take 50 mg by mouth two (2) times a day., Disp: , Rfl:     metFORMIN (GLUCOPHAGE) 1,000 mg tablet, Take 1,000 mg by mouth two (2) times daily (with meals). , Disp: , Rfl:     tamsulosin (Flomax) 0.4 mg capsule, Take 0.4 mg by mouth nightly., Disp: , Rfl:     We will replace potassium  Renal ultrasound for complex renal cyst  Cardiology consult  Order troponin 2D complete echo  Home medication reviewed and started  Metformin at this time  Sliding-scale insulin    ________________________________________________________________________    Signed: [address] [facility], MD

## 2021-04-03 ENCOUNTER — APPOINTMENT (OUTPATIENT)
Dept: NON INVASIVE DIAGNOSTICS | Age: 66
End: 2021-04-03
Attending: FAMILY MEDICINE
Payer: MEDICARE

## 2021-04-03 LAB
ALBUMIN SERPL-MCNC: 3.1 G/DL (ref 3.5–5)
ALBUMIN/GLOB SERPL: 0.7 {RATIO} (ref 1.1–2.2)
ALP SERPL-CCNC: 107 U/L (ref 45–117)
ALT SERPL-CCNC: 44 U/L (ref 12–78)
ANION GAP SERPL CALC-SCNC: 11 MMOL/L (ref 5–15)
AST SERPL W P-5'-P-CCNC: 22 U/L (ref 15–37)
BASOPHILS # BLD: 0 K/UL (ref 0–0.1)
BASOPHILS NFR BLD: 1 % (ref 0–1)
BILIRUB SERPL-MCNC: 0.4 MG/DL (ref 0.2–1)
BUN SERPL-MCNC: 12 MG/DL (ref 6–20)
BUN/CREAT SERPL: 15 (ref 12–20)
CA-I BLD-MCNC: 9.3 MG/DL (ref 8.5–10.1)
CHLORIDE SERPL-SCNC: 104 MMOL/L (ref 97–108)
CO2 SERPL-SCNC: 26 MMOL/L (ref 21–32)
CREAT SERPL-MCNC: 0.81 MG/DL (ref 0.7–1.3)
DIFFERENTIAL METHOD BLD: ABNORMAL
ECHO AR MAX VEL PISA: 426 CM/S
ECHO AV AREA PEAK VELOCITY: 1.51 CM2
ECHO AV AREA/BSA PEAK VELOCITY: 0.7 CM2/M2
ECHO AV PEAK GRADIENT: 15 MMHG
ECHO AV REGURGITANT PHT: 689 MS
ECHO EST RA PRESSURE: 3 MMHG
ECHO LA AREA 4C: 25.84 CM2
ECHO LV E' SEPTAL VELOCITY: 2.73 CM/S
ECHO LV EDV A2C: 145 CM3
ECHO LV EJECTION FRACTION A2C: 50 %
ECHO LV EJECTION FRACTION BIPLANE: 49.5 % (ref 55–100)
ECHO LV ESV A2C: 60.2 CM3
ECHO LV INTERNAL DIMENSION DIASTOLIC: 5.25 CM (ref 4.2–5.9)
ECHO LV INTERNAL DIMENSION SYSTOLIC: 3.92 CM
ECHO LV IVSD: 1.46 CM (ref 0.6–1)
ECHO LV MASS 2D: 290.4 G (ref 88–224)
ECHO LV MASS INDEX 2D: 128.7 G/M2 (ref 49–115)
ECHO LV POSTERIOR WALL DIASTOLIC: 1.19 CM (ref 0.6–1)
ECHO LVOT DIAM: 2 CM
ECHO LVOT PEAK GRADIENT: 4 MMHG
ECHO MV A VELOCITY: 77.1 CM/S
ECHO MV E DECELERATION TIME (DT): 215 MS
ECHO MV E VELOCITY: 35.2 CM/S
ECHO MV E/A RATIO: 0.46
ECHO MV E/E' SEPTAL: 12.89
ECHO PV PEAK INSTANTANEOUS GRADIENT SYSTOLIC: 3 MMHG
ECHO PV PEAK INSTANTANEOUS GRADIENT SYSTOLIC: 8 MMHG
ECHO PV REGURGITANT MAX VELOCITY: 139 CM/S
ECHO PV REGURGITANT MAX VELOCITY: 195 CM/S
ECHO PV REGURGITANT MAX VELOCITY: 87.5 CM/S
ECHO PV REGURGITANT MAX VELOCITY: 94 CM/S
ECHO PVEIN A DURATION: 81 MS
ECHO PVEIN A VELOCITY: 34.6 CM/S
ECHO RA AREA 4C: 8.2 CM2
ECHO RIGHT VENTRICULAR SYSTOLIC PRESSURE (RVSP): 24 MMHG
ECHO RV INTERNAL DIMENSION: 2.51 CM
ECHO TV MAX VELOCITY: 231 CM/S
ECHO TV REGURGITANT PEAK GRADIENT: 21 MMHG
EOSINOPHIL # BLD: 0.1 K/UL (ref 0–0.4)
EOSINOPHIL NFR BLD: 2 % (ref 0–7)
ERYTHROCYTE [DISTWIDTH] IN BLOOD BY AUTOMATED COUNT: 15.7 % (ref 11.5–14.5)
GLOBULIN SER CALC-MCNC: 4.3 G/DL (ref 2–4)
GLUCOSE BLD STRIP.AUTO-MCNC: 174 MG/DL (ref 65–100)
GLUCOSE BLD STRIP.AUTO-MCNC: 191 MG/DL (ref 65–100)
GLUCOSE BLD STRIP.AUTO-MCNC: 205 MG/DL (ref 65–100)
GLUCOSE BLD STRIP.AUTO-MCNC: 207 MG/DL (ref 65–100)
GLUCOSE BLD STRIP.AUTO-MCNC: 233 MG/DL (ref 65–100)
GLUCOSE BLD STRIP.AUTO-MCNC: 292 MG/DL (ref 65–100)
GLUCOSE SERPL-MCNC: 183 MG/DL (ref 65–100)
HCT VFR BLD AUTO: 36.3 % (ref 36.6–50.3)
HGB BLD-MCNC: 11.1 G/DL (ref 12.1–17)
IMM GRANULOCYTES # BLD AUTO: 0 K/UL (ref 0–0.04)
IMM GRANULOCYTES NFR BLD AUTO: 0 % (ref 0–0.5)
LYMPHOCYTES # BLD: 2 K/UL (ref 0.8–3.5)
LYMPHOCYTES NFR BLD: 39 % (ref 12–49)
MCH RBC QN AUTO: 26.7 PG (ref 26–34)
MCHC RBC AUTO-ENTMCNC: 30.6 G/DL (ref 30–36.5)
MCV RBC AUTO: 87.3 FL (ref 80–99)
MONOCYTES # BLD: 1 K/UL (ref 0–1)
MONOCYTES NFR BLD: 19 % (ref 5–13)
NEUTS SEG # BLD: 2 K/UL (ref 1.8–8)
NEUTS SEG NFR BLD: 39 % (ref 32–75)
PERFORMED BY, TECHID: ABNORMAL
PLATELET # BLD AUTO: 400 K/UL (ref 150–400)
PMV BLD AUTO: 9.8 FL (ref 8.9–12.9)
POTASSIUM SERPL-SCNC: 3.5 MMOL/L (ref 3.5–5.1)
PROT SERPL-MCNC: 7.4 G/DL (ref 6.4–8.2)
RBC # BLD AUTO: 4.16 M/UL (ref 4.1–5.7)
SODIUM SERPL-SCNC: 141 MMOL/L (ref 136–145)
WBC # BLD AUTO: 5.1 K/UL (ref 4.1–11.1)

## 2021-04-03 PROCEDURE — 85025 COMPLETE CBC W/AUTO DIFF WBC: CPT

## 2021-04-03 PROCEDURE — 80053 COMPREHEN METABOLIC PANEL: CPT

## 2021-04-03 PROCEDURE — 74011250637 HC RX REV CODE- 250/637: Performed by: FAMILY MEDICINE

## 2021-04-03 PROCEDURE — 74011636637 HC RX REV CODE- 636/637: Performed by: FAMILY MEDICINE

## 2021-04-03 PROCEDURE — 82962 GLUCOSE BLOOD TEST: CPT

## 2021-04-03 PROCEDURE — 99218 HC RM OBSERVATION: CPT

## 2021-04-03 PROCEDURE — 93306 TTE W/DOPPLER COMPLETE: CPT

## 2021-04-03 PROCEDURE — 36415 COLL VENOUS BLD VENIPUNCTURE: CPT

## 2021-04-03 PROCEDURE — 65270000029 HC RM PRIVATE

## 2021-04-03 RX ADMIN — INSULIN LISPRO 4 UNITS: 100 INJECTION, SOLUTION INTRAVENOUS; SUBCUTANEOUS at 01:50

## 2021-04-03 RX ADMIN — ATORVASTATIN CALCIUM 20 MG: 20 TABLET, FILM COATED ORAL at 09:52

## 2021-04-03 RX ADMIN — PANTOPRAZOLE SODIUM 40 MG: 40 TABLET, DELAYED RELEASE ORAL at 09:52

## 2021-04-03 RX ADMIN — ASPIRIN 325 MG ORAL TABLET 325 MG: 325 PILL ORAL at 09:52

## 2021-04-03 RX ADMIN — CLOPIDOGREL BISULFATE 75 MG: 75 TABLET ORAL at 09:52

## 2021-04-03 RX ADMIN — METOPROLOL SUCCINATE 50 MG: 25 TABLET, EXTENDED RELEASE ORAL at 09:52

## 2021-04-03 RX ADMIN — POTASSIUM CHLORIDE 20 MEQ: 1500 TABLET, EXTENDED RELEASE ORAL at 09:52

## 2021-04-03 RX ADMIN — INSULIN LISPRO 4 UNITS: 100 INJECTION, SOLUTION INTRAVENOUS; SUBCUTANEOUS at 12:34

## 2021-04-03 RX ADMIN — INSULIN LISPRO 3 UNITS: 100 INJECTION, SOLUTION INTRAVENOUS; SUBCUTANEOUS at 09:57

## 2021-04-03 RX ADMIN — NIFEDIPINE 30 MG: 30 TABLET, FILM COATED, EXTENDED RELEASE ORAL at 09:52

## 2021-04-03 RX ADMIN — INSULIN LISPRO 4 UNITS: 100 INJECTION, SOLUTION INTRAVENOUS; SUBCUTANEOUS at 18:07

## 2021-04-03 RX ADMIN — TAMSULOSIN HYDROCHLORIDE 0.4 MG: 0.4 CAPSULE ORAL at 20:28

## 2021-04-03 RX ADMIN — LISINOPRIL 40 MG: 40 TABLET ORAL at 09:52

## 2021-04-03 RX ADMIN — METOPROLOL SUCCINATE 50 MG: 25 TABLET, EXTENDED RELEASE ORAL at 18:07

## 2021-04-03 RX ADMIN — INSULIN GLARGINE 40 UNITS: 100 INJECTION, SOLUTION SUBCUTANEOUS at 20:27

## 2021-04-03 RX ADMIN — ISOSORBIDE MONONITRATE 60 MG: 60 TABLET, EXTENDED RELEASE ORAL at 09:52

## 2021-04-03 RX ADMIN — INSULIN LISPRO 2 UNITS: 100 INJECTION, SOLUTION INTRAVENOUS; SUBCUTANEOUS at 05:17

## 2021-04-03 RX ADMIN — FUROSEMIDE 40 MG: 40 TABLET ORAL at 09:52

## 2021-04-03 RX ADMIN — TRAZODONE HYDROCHLORIDE 50 MG: 50 TABLET ORAL at 20:28

## 2021-04-03 RX ADMIN — TRAZODONE HYDROCHLORIDE 50 MG: 50 TABLET ORAL at 09:56

## 2021-04-03 NOTE — PROGRESS NOTES
Spiritual Care Assessment/Progress Note  [facility]      NAME: Norma Hendricks      MRN: [id number]  AGE: 77 y.o.  SEX: male  Cheondoism Affiliation: Rastafarian   Language: English     4/3/2021     Total Time (in minutes): 9     Spiritual Assessment begun in [address] through conversation with:         [x]Patient        [] Family    [] Friend(s)        Reason for Consult: Request by patient     Spiritual beliefs: (Please include comment if needed)     [x] Identifies with a sri tradition:         [] Supported by a sri community:            [] Claims no spiritual orientation:           [] Seeking spiritual identity:                [] Adheres to an individual form of spirituality:           [] Not able to assess:                           Identified resources for coping:      [x] Prayer                               [] Music                  [] Guided Imagery     [x] Family/friends                 [] Pet visits     [] Devotional reading                         [] Unknown     [] Other:                                               Interventions offered during this visit: (See comments for more details)    Patient Interventions: Affirmation of emotions/emotional suffering, Affirmation of sri, Catharsis/review of pertinent events in supportive environment, Coping skills reviewed/reinforced, Guidance concerning next steps/process to be expected, Iconic (affirming the presence of God/Higher Power), Initial/Spiritual assessment, patient floor, Life review/legacy, Normalization of emotional/spiritual concerns, Prayer (actual)           Plan of Care:     [] Support spiritual and/or cultural needs    [] Support AMD and/or advance care planning process      [] Support grieving process   [] Coordinate Rites and/or Rituals    [] Coordination with community clergy   [] No spiritual needs identified at this time   [] Detailed Plan of Care below (See Comments)  [] Make referral to Music Therapy  [] Make referral to Pet Therapy     [] Make referral to Addiction services  [] Make referral to [facility]  [] Make referral to Spiritual Care Partner  [] No future visits requested        [x] Follow up upon further referrals     Comments:  Visited patient in the [address] [place] [address] cardiac telemetry unit for admission patient request.  Patient was alone during the visit. Patient stated he is doing well and engaged in life review and shared about his family and Zoroastrian concerns. He expressed his hope to get well and return home. I provided chaplaincy education and listened with empathy and reflection while exploring Mr. Tadeo's needs and resources. I facilitated storytelling and affirmed his familial support and sri in God. I provided prayer per his request and advised of  availability. He seemed helped by this visit as indicated by his appreciate and need for prayer offered. Chaplains will follow up if further referrals are requested. Chaplain Marilin Taylor M.Div.    can be reached by calling the  at [facility]  [phone]

## 2021-04-03 NOTE — PROGRESS NOTES
Progress Note    Patient: Zuleyka Gerber MRN: [id number]  SSN: [id number]    YOB: 1955  Age: 77 y.o. Sex: male      Admit Date: 4/1/2021    LOS: 1 day     Subjective:     No complaints. Objective:     Vitals:    04/03/21 0834 04/03/21 1034 04/03/21 1228 04/03/21 1641   BP: (!) 166/84 139/80 (!) 143/86 124/79   Pulse: 87  75 87   Resp: 20  18 20   Temp: 97.9 °F (36.6 °C)  97.7 °F (36.5 °C) 98.6 °F (37 °C)   SpO2: 100%  99% 100%   Weight:  108.9 kg (240 lb 1.3 oz)     Height:  5' 10\" (1.778 m)          Intake and Output:  Current Shift: 04/03 0701 - 04/03 1900  In: -   Out: 150 [Urine:150]  Last three shifts: 04/01 1901 - 04/03 0700  In: -   Out: 200 [Urine:200]    Physical Exam:   LUNG: clear to auscultation bilaterally  HEART: regular rate and rhythm, S1, S2 normal, no murmur, click, rub or gallop  ABDOMEN: soft, non-tender.  Bowel sounds normal. No masses,  no organomegaly  EXTREMITIES:  extremities normal, atraumatic, no cyanosis or edema    Lab/Data Review:  Recent Results (from the past 24 hour(s))   GLUCOSE, POC    Collection Time: 04/02/21  8:35 PM   Result Value Ref Range    Glucose (POC) 216 (H) 65 - 100 mg/dL    Performed by BAUTISTA Perez 51, POC    Collection Time: 04/03/21  1:44 AM   Result Value Ref Range    Glucose (POC) 292 (H) 65 - 100 mg/dL    Performed by BAUTISTA Perez 51, POC    Collection Time: 04/03/21  5:12 AM   Result Value Ref Range    Glucose (POC) 233 (H) 65 - 100 mg/dL    Performed by Darleen Lawrence    GLUCOSE, POC    Collection Time: 04/03/21  8:04 AM   Result Value Ref Range    Glucose (POC) 174 (H) 65 - 100 mg/dL    Performed by Pat Mg    METABOLIC PANEL, COMPREHENSIVE    Collection Time: 04/03/21  8:25 AM   Result Value Ref Range    Sodium 141 136 - 145 mmol/L    Potassium 3.5 3.5 - 5.1 mmol/L    Chloride 104 97 - 108 mmol/L    CO2 26 21 - 32 mmol/L    Anion gap 11 5 - 15 mmol/L    Glucose 183 (H) 65 - 100 mg/dL    BUN 12 6 - 20 mg/dL    Creatinine 0. 81 0.70 - 1.30 mg/dL    BUN/Creatinine ratio 15 12 - 20      GFR est AA >60 >60 ml/min/1.73m2    GFR est non-AA >60 >60 ml/min/1.73m2    Calcium 9.3 8.5 - 10.1 mg/dL    Bilirubin, total 0.4 0.2 - 1.0 mg/dL    AST (SGOT) 22 15 - 37 U/L    ALT (SGPT) 44 12 - 78 U/L    Alk. phosphatase 107 45 - 117 U/L    Protein, total 7.4 6.4 - 8.2 g/dL    Albumin 3.1 (L) 3.5 - 5.0 g/dL    Globulin 4.3 (H) 2.0 - 4.0 g/dL    A-G Ratio 0.7 (L) 1.1 - 2.2     CBC WITH AUTOMATED DIFF    Collection Time: 04/03/21  8:25 AM   Result Value Ref Range    WBC 5.1 4.1 - 11.1 K/uL    RBC 4.16 4.10 - 5.70 M/uL    HGB 11.1 (L) 12.1 - 17.0 g/dL    HCT 36.3 (L) 36.6 - 50.3 %    MCV 87.3 80.0 - 99.0 FL    MCH 26.7 26.0 - 34.0 PG    MCHC 30.6 30.0 - 36.5 g/dL    RDW 15.7 (H) 11.5 - 14.5 %    PLATELET 639 947 - 314 K/uL    MPV 9.8 8.9 - 12.9 FL    NEUTROPHILS 39 32 - 75 %    LYMPHOCYTES 39 12 - 49 %    MONOCYTES 19 (H) 5 - 13 %    EOSINOPHILS 2 0 - 7 %    BASOPHILS 1 0 - 1 %    IMMATURE GRANULOCYTES 0 0.0 - 0.5 %    ABS. NEUTROPHILS 2.0 1.8 - 8.0 K/UL    ABS. LYMPHOCYTES 2.0 0.8 - 3.5 K/UL    ABS. MONOCYTES 1.0 0.0 - 1.0 K/UL    ABS. EOSINOPHILS 0.1 0.0 - 0.4 K/UL    ABS. BASOPHILS 0.0 0.0 - 0.1 K/UL    ABS. IMM.  GRANS. 0.0 0.00 - 0.04 K/UL    DF AUTOMATED     ECHO ADULT COMPLETE    Collection Time: 04/03/21 11:14 AM   Result Value Ref Range    Aortic Regurgitant Pressure Half-time 689.00 ms    AR Max Jos 426.00 cm/s    Pulmonic Regurgitant End Max Velocity 195.00 cm/s    AoV PG 15.00 mmHg    Aortic Valve Area by Continuity of Peak Velocity 1.51 cm2    IVSd 1.46 (A) 0.60 - 1.00 cm    LVIDd 5.25 4.20 - 5.90 cm    LVIDs 3.92 cm    LVOT d 2.00 cm    Pulmonic Regurgitant End Max Velocity 94.00 cm/s    LVOT Peak Gradient 4.00 mmHg    LVPWd 1.19 (A) 0.60 - 1.00 cm    LV E' Septal Velocity 2.73 cm/s    LV ED Vol A2C 145.00 cm3    BP EF 49.5 55.0 - 100.0 %    LV ES Vol A2C 60.20 cm3    E/E' septal 12.89     LV Ejection Fraction MOD 2C 50.0 %    Mitral Valve E Wave Deceleration Time 215.00 ms    MV A Jos 77.10 cm/s    MV E Jos 35.20 cm/s    MV E/A 0.46     Pulmonic Regurgitant End Max Velocity 139.00 cm/s    Pulmonic Valve Systolic Peak Instantaneous Gradient 8.00 mmHg    Pulmonic Regurgitant End Max Velocity 87.50 cm/s    Pulmonic Valve Systolic Peak Instantaneous Gradient 3.00 mmHg    P Vein A Dur 81.00 ms    Pulmonary Vein \"A\" Wave Velocity 34.60 cm/s    Est. RA Pressure 3.00 mmHg    RVIDd 2.51 cm    RVSP 24.00 mmHg    Tricuspid Valve Max Velocity 231.00 cm/s    Triscuspid Valve Regurgitation Peak Gradient 21.00 mmHg    Right Atrial Area 4C 8.20 cm2    LA Area 4C 25.84 cm2    LV Mass .4 88.0 - 224.0 g    LV Mass AL Index 128.7 49.0 - 115.0 g/m2    MIRNA/BSA Pk Jos 0.7 cm2/m2   GLUCOSE, POC    Collection Time: 04/03/21 11:54 AM   Result Value Ref Range    Glucose (POC) 205 (H) 65 - 100 mg/dL    Performed by [employer] Gemma    GLUCOSE, POC    Collection Time: 04/03/21  4:42 PM   Result Value Ref Range    Glucose (POC) 207 (H) 65 - 100 mg/dL    Performed by [employer]              Assessment:     Active Problems:    Chest pain (12/12/2020)        Plan:     1. Chest pain: denies currently. Echo today reveals EF 55% with no wall motion abnormalities. Ok to discharge home from a cardiac standpoint. He can follow up with [facility]  For an nuc stress test.      2. Coronary artery disease: history of CABG in 2018. Continue aspirin, plavix, statin, beta blocker and isosorbide.      3. Hypertension: No changes in meds. 4. Hyperlipidemia: Continue statin.      Signed By: Syed Alex MD     April 3, 2021

## 2021-04-03 NOTE — ROUTINE PROCESS
Bedside and Verbal shift change report given to Tato Fry RN (oncoming nurse) by Earnestine Etienne RN (offgoing nurse). Report included the following information SBAR, Intake/Output, MAR and Recent Results.

## 2021-04-03 NOTE — PROGRESS NOTES
General Daily Progress Note          Patient Name:   Jazz Pascal       YOB: 1955       Age:  77 y.o.       Admit Date: 4/1/2021      Subjective:         Resting in the bed alert awake denies any chest pain or shortness of breath        Objective:     Visit Vitals  BP (!) 143/86 (BP 1 Location: Left upper arm, BP Patient Position: At rest)   Pulse 75   Temp 97.7 °F (36.5 °C)   Resp 18   Ht 5' 10\" (1.778 m)   Wt 108.9 kg (240 lb 1.3 oz)   SpO2 99%   BMI 34.45 kg/m²        Recent Results (from the past 24 hour(s))   EKG, 12 LEAD, INITIAL    Collection Time: 04/02/21  3:49 PM   Result Value Ref Range    Ventricular Rate 83 BPM    Atrial Rate 83 BPM    P-R Interval 146 ms    QRS Duration 100 ms    Q-T Interval 380 ms    QTC Calculation (Bezet) 446 ms    Calculated P Axis 41 degrees    Calculated R Axis 6 degrees    Calculated T Axis 82 degrees    Diagnosis       Sinus rhythm with marked sinus arrhythmia  Voltage criteria for left ventricular hypertrophy  Nonspecific T wave abnormality  Abnormal ECG  When compared with ECG of 02-APR-2021 07:11,  No significant change was found  Confirmed by [facility] ([id number] on 4/2/2021 6:03:43 PM     GLUCOSE, POC    Collection Time: 04/02/21  3:59 PM   Result Value Ref Range    Glucose (POC) 199 (H) 65 - 100 mg/dL    Performed by [address] [place] [address], POC    Collection Time: 04/02/21  8:35 PM   Result Value Ref Range    Glucose (POC) 216 (H) 65 - 100 mg/dL    Performed by Kesha Hands    GLUCOSE, POC    Collection Time: 04/03/21  1:44 AM   Result Value Ref Range    Glucose (POC) 292 (H) 65 - 100 mg/dL    Performed by Kesha Speshays    GLUCOSE, POC    Collection Time: 04/03/21  5:12 AM   Result Value Ref Range    Glucose (POC) 233 (H) 65 - 100 mg/dL    Performed by Kesha Hands    GLUCOSE, POC    Collection Time: 04/03/21  8:04 AM   Result Value Ref Range    Glucose (POC) 174 (H) 65 - 100 mg/dL    Performed by [address] [place] [address] Collection Time: 04/03/21  8:25 AM   Result Value Ref Range    Sodium 141 136 - 145 mmol/L    Potassium 3.5 3.5 - 5.1 mmol/L    Chloride 104 97 - 108 mmol/L    CO2 26 21 - 32 mmol/L    Anion gap 11 5 - 15 mmol/L    Glucose 183 (H) 65 - 100 mg/dL    BUN 12 6 - 20 mg/dL    Creatinine 0.81 0.70 - 1.30 mg/dL    BUN/Creatinine ratio 15 12 - 20      GFR est AA >60 >60 ml/min/1.73m2    GFR est non-AA >60 >60 ml/min/1.73m2    Calcium 9.3 8.5 - 10.1 mg/dL    Bilirubin, total 0.4 0.2 - 1.0 mg/dL    AST (SGOT) 22 15 - 37 U/L    ALT (SGPT) 44 12 - 78 U/L    Alk. phosphatase 107 45 - 117 U/L    Protein, total 7.4 6.4 - 8.2 g/dL    Albumin 3.1 (L) 3.5 - 5.0 g/dL    Globulin 4.3 (H) 2.0 - 4.0 g/dL    A-G Ratio 0.7 (L) 1.1 - 2.2     CBC WITH AUTOMATED DIFF    Collection Time: 04/03/21  8:25 AM   Result Value Ref Range    WBC 5.1 4.1 - 11.1 K/uL    RBC 4.16 4.10 - 5.70 M/uL    HGB 11.1 (L) 12.1 - 17.0 g/dL    HCT 36.3 (L) 36.6 - 50.3 %    MCV 87.3 80.0 - 99.0 FL    MCH 26.7 26.0 - 34.0 PG    MCHC 30.6 30.0 - 36.5 g/dL    RDW 15.7 (H) 11.5 - 14.5 %    PLATELET 038 027 - 596 K/uL    MPV 9.8 8.9 - 12.9 FL    NEUTROPHILS 39 32 - 75 %    LYMPHOCYTES 39 12 - 49 %    MONOCYTES 19 (H) 5 - 13 %    EOSINOPHILS 2 0 - 7 %    BASOPHILS 1 0 - 1 %    IMMATURE GRANULOCYTES 0 0.0 - 0.5 %    ABS. NEUTROPHILS 2.0 1.8 - 8.0 K/UL    ABS. LYMPHOCYTES 2.0 0.8 - 3.5 K/UL    ABS. MONOCYTES 1.0 0.0 - 1.0 K/UL    ABS. EOSINOPHILS 0.1 0.0 - 0.4 K/UL    ABS. BASOPHILS 0.0 0.0 - 0.1 K/UL    ABS. IMM.  GRANS. 0.0 0.00 - 0.04 K/UL    DF AUTOMATED     ECHO ADULT COMPLETE    Collection Time: 04/03/21 11:14 AM   Result Value Ref Range    Aortic Regurgitant Pressure Half-time 689.00 ms    AR Max Jos 426.00 cm/s    Pulmonic Regurgitant End Max Velocity 195.00 cm/s    AoV PG 15.00 mmHg    Aortic Valve Area by Continuity of Peak Velocity 1.51 cm2    IVSd 1.46 (A) 0.60 - 1.00 cm    LVIDd 5.25 4.20 - 5.90 cm    LVIDs 3.92 cm    LVOT d 2.00 cm    Pulmonic Regurgitant End Max Velocity 94.00 cm/s    LVOT Peak Gradient 4.00 mmHg    LVPWd 1.19 (A) 0.60 - 1.00 cm    LV E' Septal Velocity 2.73 cm/s    LV ED Vol A2C 145.00 cm3    BP EF 49.5 55.0 - 100.0 %    LV ES Vol A2C 60.20 cm3    E/E' septal 12.89     LV Ejection Fraction MOD 2C 50.0 %    Mitral Valve E Wave Deceleration Time 215.00 ms    MV A Jos 77.10 cm/s    MV E Jos 35.20 cm/s    MV E/A 0.46     Pulmonic Regurgitant End Max Velocity 139.00 cm/s    Pulmonic Valve Systolic Peak Instantaneous Gradient 8.00 mmHg    Pulmonic Regurgitant End Max Velocity 87.50 cm/s    Pulmonic Valve Systolic Peak Instantaneous Gradient 3.00 mmHg    P Vein A Dur 81.00 ms    Pulmonary Vein \"A\" Wave Velocity 34.60 cm/s    Est. RA Pressure 3.00 mmHg    RVIDd 2.51 cm    RVSP 24.00 mmHg    Tricuspid Valve Max Velocity 231.00 cm/s    Triscuspid Valve Regurgitation Peak Gradient 21.00 mmHg    Right Atrial Area 4C 8.20 cm2    LA Area 4C 25.84 cm2    LV Mass .4 88.0 - 224.0 g    LV Mass AL Index 128.7 49.0 - 115.0 g/m2    MIRNA/BSA Pk Jos 0.7 cm2/m2   GLUCOSE, POC    Collection Time: 04/03/21 11:54 AM   Result Value Ref Range    Glucose (POC) 205 (H) 65 - 100 mg/dL    Performed by ROBERT GUTIERREZ      [unfilled]      Review of Systems    Constitutional: Negative for chills and fever. HENT: Negative. Eyes: Negative. Respiratory: Negative. Cardiovascular: Negative. Gastrointestinal: Negative for abdominal pain and nausea. Skin: Negative. Neurological: Negative. Physical Exam:      Constitutional: pt is oriented to person, place, and time. HENT:   Head: Normocephalic and atraumatic. Eyes: Pupils are equal, round, and reactive to light. EOM are normal.   Cardiovascular: Normal rate, regular rhythm and normal heart sounds. Pulmonary/Chest: Breath sounds normal. No wheezes. No rales. Exhibits no tenderness. Abdominal: Soft. Bowel sounds are normal. There is no abdominal tenderness. There is no rebound and no guarding. Musculoskeletal: Normal range of motion. Neurological: pt is alert and oriented to person, place, and time. US RETROPERITONEUM COMP   Final Result   1. Bilateral renal cysts. 2. The hyperdense mass projecting off of the posterior left kidney seen on CT   scan is not adequately evaluated on ultrasound. This could be followed with pre   and postcontrast CT images or MRI if patient is able.            Recent Results (from the past 24 hour(s))   EKG, 12 LEAD, INITIAL    Collection Time: 04/02/21  3:49 PM   Result Value Ref Range    Ventricular Rate 83 BPM    Atrial Rate 83 BPM    P-R Interval 146 ms    QRS Duration 100 ms    Q-T Interval 380 ms    QTC Calculation (Bezet) 446 ms    Calculated P Axis 41 degrees    Calculated R Axis 6 degrees    Calculated T Axis 82 degrees    Diagnosis       Sinus rhythm with marked sinus arrhythmia  Voltage criteria for left ventricular hypertrophy  Nonspecific T wave abnormality  Abnormal ECG  When compared with ECG of 02-APR-2021 07:11,  No significant change was found  Confirmed by [facility] Claire[facility]ablo ([id number]) on 4/2/2021 6:03:43 PM     GLUCOSE, POC    Collection Time: 04/02/21  3:59 PM   Result Value Ref Range    Glucose (POC) 199 (H) 65 - 100 mg/dL    Performed by Nadine Robbins    GLUCOSE, POC    Collection Time: 04/02/21  8:35 PM   Result Value Ref Range    Glucose (POC) 216 (H) 65 - 100 mg/dL    Performed by Santiago Fabry    GLUCOSE, POC    Collection Time: 04/03/21  1:44 AM   Result Value Ref Range    Glucose (POC) 292 (H) 65 - 100 mg/dL    Performed by Santiago Fabry    GLUCOSE, POC    Collection Time: 04/03/21  5:12 AM   Result Value Ref Range    Glucose (POC) 233 (H) 65 - 100 mg/dL    Performed by Santiago Fabry    GLUCOSE, POC    Collection Time: 04/03/21  8:04 AM   Result Value Ref Range    Glucose (POC) 174 (H) 65 - 100 mg/dL    Performed by [facility]    METABOLIC PANEL, COMPREHENSIVE    Collection Time: 04/03/21  8:25 AM   Result Value Ref Range    Sodium 141 136 - 145 mmol/L Potassium 3.5 3.5 - 5.1 mmol/L    Chloride 104 97 - 108 mmol/L    CO2 26 21 - 32 mmol/L    Anion gap 11 5 - 15 mmol/L    Glucose 183 (H) 65 - 100 mg/dL    BUN 12 6 - 20 mg/dL    Creatinine 0.81 0.70 - 1.30 mg/dL    BUN/Creatinine ratio 15 12 - 20      GFR est AA >60 >60 ml/min/1.73m2    GFR est non-AA >60 >60 ml/min/1.73m2    Calcium 9.3 8.5 - 10.1 mg/dL    Bilirubin, total 0.4 0.2 - 1.0 mg/dL    AST (SGOT) 22 15 - 37 U/L    ALT (SGPT) 44 12 - 78 U/L    Alk. phosphatase 107 45 - 117 U/L    Protein, total 7.4 6.4 - 8.2 g/dL    Albumin 3.1 (L) 3.5 - 5.0 g/dL    Globulin 4.3 (H) 2.0 - 4.0 g/dL    A-G Ratio 0.7 (L) 1.1 - 2.2     CBC WITH AUTOMATED DIFF    Collection Time: 04/03/21  8:25 AM   Result Value Ref Range    WBC 5.1 4.1 - 11.1 K/uL    RBC 4.16 4.10 - 5.70 M/uL    HGB 11.1 (L) 12.1 - 17.0 g/dL    HCT 36.3 (L) 36.6 - 50.3 %    MCV 87.3 80.0 - 99.0 FL    MCH 26.7 26.0 - 34.0 PG    MCHC 30.6 30.0 - 36.5 g/dL    RDW 15.7 (H) 11.5 - 14.5 %    PLATELET 920 078 - 024 K/uL    MPV 9.8 8.9 - 12.9 FL    NEUTROPHILS 39 32 - 75 %    LYMPHOCYTES 39 12 - 49 %    MONOCYTES 19 (H) 5 - 13 %    EOSINOPHILS 2 0 - 7 %    BASOPHILS 1 0 - 1 %    IMMATURE GRANULOCYTES 0 0.0 - 0.5 %    ABS. NEUTROPHILS 2.0 1.8 - 8.0 K/UL    ABS. LYMPHOCYTES 2.0 0.8 - 3.5 K/UL    ABS. MONOCYTES 1.0 0.0 - 1.0 K/UL    ABS. EOSINOPHILS 0.1 0.0 - 0.4 K/UL    ABS. BASOPHILS 0.0 0.0 - 0.1 K/UL    ABS. IMM.  GRANS. 0.0 0.00 - 0.04 K/UL    DF AUTOMATED     ECHO ADULT COMPLETE    Collection Time: 04/03/21 11:14 AM   Result Value Ref Range    Aortic Regurgitant Pressure Half-time 689.00 ms    AR Max Jos 426.00 cm/s    Pulmonic Regurgitant End Max Velocity 195.00 cm/s    AoV PG 15.00 mmHg    Aortic Valve Area by Continuity of Peak Velocity 1.51 cm2    IVSd 1.46 (A) 0.60 - 1.00 cm    LVIDd 5.25 4.20 - 5.90 cm    LVIDs 3.92 cm    LVOT d 2.00 cm    Pulmonic Regurgitant End Max Velocity 94.00 cm/s    LVOT Peak Gradient 4.00 mmHg    LVPWd 1.19 (A) 0.60 - 1.00 cm    LV E' Septal Velocity 2.73 cm/s    LV ED Vol A2C 145.00 cm3    BP EF 49.5 55.0 - 100.0 %    LV ES Vol A2C 60.20 cm3    E/E' septal 12.89     LV Ejection Fraction MOD 2C 50.0 %    Mitral Valve E Wave Deceleration Time 215.00 ms    MV A Jos 77.10 cm/s    MV E Jos 35.20 cm/s    MV E/A 0.46     Pulmonic Regurgitant End Max Velocity 139.00 cm/s    Pulmonic Valve Systolic Peak Instantaneous Gradient 8.00 mmHg    Pulmonic Regurgitant End Max Velocity 87.50 cm/s    Pulmonic Valve Systolic Peak Instantaneous Gradient 3.00 mmHg    P Vein A Dur 81.00 ms    Pulmonary Vein \"A\" Wave Velocity 34.60 cm/s    Est. RA Pressure 3.00 mmHg    RVIDd 2.51 cm    RVSP 24.00 mmHg    Tricuspid Valve Max Velocity 231.00 cm/s    Triscuspid Valve Regurgitation Peak Gradient 21.00 mmHg    Right Atrial Area 4C 8.20 cm2    LA Area 4C 25.84 cm2    LV Mass .4 88.0 - 224.0 g    LV Mass AL Index 128.7 49.0 - 115.0 g/m2    MIRNA/BSA Pk Jos 0.7 cm2/m2   GLUCOSE, POC    Collection Time: 04/03/21 11:54 AM   Result Value Ref Range    Glucose (POC) 205 (H) 65 - 100 mg/dL    Performed by Joyce Arciniega        Results     ** No results found for the last 336 hours. **           Labs:     Recent Labs     04/03/21  0825 04/01/21  1340   WBC 5.1 5.4   HGB 11.1* 11.4*   HCT 36.3* 35.2*    340     Recent Labs     04/03/21  0825 04/01/21  1340    137   K 3.5 3.0*    99   CO2 26 24   BUN 12 11   CREA 0.81 1.03   * 301*   CA 9.3 9.4     Recent Labs     04/03/21  0825 04/01/21  1340   ALT 44 31    111   TBILI 0.4 0.5   TP 7.4 7.5   ALB 3.1* 3.1*   GLOB 4.3* 4.4*   LPSE  --  135     Recent Labs     04/01/21  1340   INR 1.1   PTP 11.0      No results for input(s): FE, TIBC, PSAT, FERR in the last 72 hours. No results found for: FOL, RBCF   No results for input(s): PH, PCO2, PO2 in the last 72 hours.   Recent Labs     04/02/21  0945 04/01/21  2300 04/01/21  1740   TROIQ <0.05 <0.05 <0.05     Lab Results   Component Value Date/Time Cholesterol, total 205 (H) 12/13/2020 08:50 AM    HDL Cholesterol 93 12/13/2020 08:50 AM    LDL, calculated 97 12/13/2020 08:50 AM    Triglyceride 75 12/13/2020 08:50 AM    CHOL/HDL Ratio 2.2 12/13/2020 08:50 AM     Lab Results   Component Value Date/Time    Glucose (POC) 205 (H) 04/03/2021 11:54 AM    Glucose (POC) 174 (H) 04/03/2021 08:04 AM    Glucose (POC) 233 (H) 04/03/2021 05:12 AM    Glucose (POC) 292 (H) 04/03/2021 01:44 AM    Glucose (POC) 216 (H) 04/02/2021 08:35 PM     No results found for: COLOR, APPRN, SPGRU, REFSG, BALDEMAR, PROTU, GLUCU, KETU, BILU, UROU, ENRIQUETA, LEUKU, GLUKE, EPSU, BACTU, WBCU, RBCU, CASTS, UCRY      Assessment:     Chest pain rule out MI  CAD status post stent  Type 2 diabetes  Hypertension  Hyperlipidemia  Hypokalemia  Complex renal cyst      Plan:     Continue current medication cardiology consult pending  Ct  the abdomen with and without contrast for renal cyst        Current Facility-Administered Medications:     glucose chewable tablet 16 g, 4 Tab, Oral, PRN, Cash Lindsay MD    glucagon (GLUCAGEN) injection 1 mg, 1 mg, IntraMUSCular, PRN, Cash Lindsay MD    dextrose (D50W) injection syrg 12.5-25 g, 25-50 mL, IntraVENous, PRN, Cash Lindsay MD    albuterol (PROVENTIL HFA, VENTOLIN HFA, PROAIR HFA) inhaler 1 Puff, 1 Puff, Inhalation, Q6H PRN, Cash Lindsay MD    insulin glargine (LANTUS) injection 40 Units, 40 Units, SubCUTAneous, QHS, Cash Lindsay MD, 40 Units at 04/02/21 2134    lisinopriL (PRINIVIL, ZESTRIL) tablet 40 mg, 40 mg, Oral, DAILY, Cash Lindsay MD, 40 mg at 04/03/21 2263    metoprolol succinate (TOPROL-XL) XL tablet 50 mg, 50 mg, Oral, BIDPC, Cash Lindsay MD, 50 mg at 04/03/21 1892    NIFEdipine ER (PROCARDIA XL) tablet 30 mg, 30 mg, Oral, DAILY, Cash Lindsay MD, 30 mg at 04/03/21 8405    potassium chloride (K-DUR, KLOR-CON) SR tablet 20 mEq, 20 mEq, Oral, DAILY, Cash Lindsay MD, 20 mEq at 04/03/21 0905    atorvastatin (LIPITOR) tablet 20 mg, 20 mg, Oral, DAILY, Cash Lindsay MD, 20 mg at 04/03/21 5322    furosemide (LASIX) tablet 40 mg, 40 mg, Oral, DAILY, Cash Lindsay MD, 40 mg at 04/03/21 4901    isosorbide mononitrate ER (IMDUR) tablet 60 mg, 60 mg, Oral, DAILY, Cash Lindsay MD, 60 mg at 04/03/21 2540    pantoprazole (PROTONIX) tablet 40 mg, 40 mg, Oral, DAILY, Cash Lindsay MD, 40 mg at 04/03/21 5960    tamsulosin (FLOMAX) capsule 0.4 mg, 0.4 mg, Oral, QHS, Cash Lindsay MD, 0.4 mg at 04/02/21 2133    traZODone (DESYREL) tablet 50 mg, 50 mg, Oral, BID, Cash Lindsay MD, 50 mg at 04/03/21 9793    insulin lispro (HUMALOG) injection, , SubCUTAneous, Q4H, Cash Lindsay MD, 4 Units at 04/03/21 1234    glucose chewable tablet 16 g, 4 Tab, Oral, PRN, Yodit Lindsay MD    dextrose (D50W) injection syrg 12.5-25 g, 25-50 mL, IntraVENous, PRN, Yodit Lindsay MD    glucagon (GLUCAGEN) injection 1 mg, 1 mg, IntraMUSCular, PRN, Yodit Lindsay MD    acetaminophen (TYLENOL) tablet 650 mg, 650 mg, Oral, Q6H PRN **OR** acetaminophen (TYLENOL) suppository 650 mg, 650 mg, Rectal, Q6H PRN, Yodit Lindsay MD    polyethylene glycol (MIRALAX) packet 17 g, 17 g, Oral, DAILY PRN, Cash Lindsay MD, 17 g at 04/02/21 1718    ondansetron (ZOFRAN ODT) tablet 4 mg, 4 mg, Oral, Q8H PRN **OR** ondansetron (ZOFRAN) injection 4 mg, 4 mg, IntraVENous, Q6H PRN, Cash Lindsay MD    aspirin tablet 325 mg, 325 mg, Oral, DAILY, Cash Lindsay MD, 325 mg at 04/03/21 5458    clopidogreL (PLAVIX) tablet 75 mg, 75 mg, Oral, DAILY, Cash Lindsay MD, 75 mg at 04/03/21 1562

## 2021-04-04 ENCOUNTER — APPOINTMENT (OUTPATIENT)
Dept: CT IMAGING | Age: 66
End: 2021-04-04
Attending: FAMILY MEDICINE
Payer: MEDICARE

## 2021-04-04 VITALS
TEMPERATURE: 98.2 F | RESPIRATION RATE: 20 BRPM | WEIGHT: 240.08 LBS | DIASTOLIC BLOOD PRESSURE: 69 MMHG | HEIGHT: 70 IN | BODY MASS INDEX: 34.37 KG/M2 | OXYGEN SATURATION: 95 % | SYSTOLIC BLOOD PRESSURE: 111 MMHG | HEART RATE: 77 BPM

## 2021-04-04 LAB
GLUCOSE BLD STRIP.AUTO-MCNC: 169 MG/DL (ref 65–100)
GLUCOSE BLD STRIP.AUTO-MCNC: 183 MG/DL (ref 65–100)
GLUCOSE BLD STRIP.AUTO-MCNC: 230 MG/DL (ref 65–100)
GLUCOSE BLD STRIP.AUTO-MCNC: 269 MG/DL (ref 65–100)
PERFORMED BY, TECHID: ABNORMAL

## 2021-04-04 PROCEDURE — 74011636637 HC RX REV CODE- 636/637: Performed by: FAMILY MEDICINE

## 2021-04-04 PROCEDURE — 99218 HC RM OBSERVATION: CPT

## 2021-04-04 PROCEDURE — 74011000636 HC RX REV CODE- 636: Performed by: FAMILY MEDICINE

## 2021-04-04 PROCEDURE — 74011250637 HC RX REV CODE- 250/637: Performed by: FAMILY MEDICINE

## 2021-04-04 PROCEDURE — 82962 GLUCOSE BLOOD TEST: CPT

## 2021-04-04 PROCEDURE — 74178 CT ABD&PLV WO CNTR FLWD CNTR: CPT

## 2021-04-04 RX ORDER — CLOPIDOGREL BISULFATE 75 MG/1
75 TABLET ORAL DAILY
Qty: 30 TAB | Refills: 0 | Status: SHIPPED | OUTPATIENT
Start: 2021-04-05 | End: 2021-05-25 | Stop reason: SDUPTHER

## 2021-04-04 RX ORDER — ASPIRIN 325 MG
325 TABLET ORAL DAILY
Qty: 30 TAB | Refills: 0 | Status: SHIPPED | OUTPATIENT
Start: 2021-04-05 | End: 2021-05-14 | Stop reason: ALTCHOICE

## 2021-04-04 RX ADMIN — ATORVASTATIN CALCIUM 20 MG: 20 TABLET, FILM COATED ORAL at 09:17

## 2021-04-04 RX ADMIN — IOPAMIDOL 100 ML: 755 INJECTION, SOLUTION INTRAVENOUS at 08:35

## 2021-04-04 RX ADMIN — PANTOPRAZOLE SODIUM 40 MG: 40 TABLET, DELAYED RELEASE ORAL at 09:16

## 2021-04-04 RX ADMIN — LISINOPRIL 40 MG: 40 TABLET ORAL at 09:16

## 2021-04-04 RX ADMIN — INSULIN LISPRO 2 UNITS: 100 INJECTION, SOLUTION INTRAVENOUS; SUBCUTANEOUS at 01:28

## 2021-04-04 RX ADMIN — INSULIN LISPRO 3 UNITS: 100 INJECTION, SOLUTION INTRAVENOUS; SUBCUTANEOUS at 09:16

## 2021-04-04 RX ADMIN — INSULIN LISPRO 7 UNITS: 100 INJECTION, SOLUTION INTRAVENOUS; SUBCUTANEOUS at 14:11

## 2021-04-04 RX ADMIN — FUROSEMIDE 40 MG: 40 TABLET ORAL at 09:16

## 2021-04-04 RX ADMIN — TRAZODONE HYDROCHLORIDE 50 MG: 50 TABLET ORAL at 09:16

## 2021-04-04 RX ADMIN — ISOSORBIDE MONONITRATE 60 MG: 60 TABLET, EXTENDED RELEASE ORAL at 09:16

## 2021-04-04 RX ADMIN — POTASSIUM CHLORIDE 20 MEQ: 1500 TABLET, EXTENDED RELEASE ORAL at 09:15

## 2021-04-04 RX ADMIN — NIFEDIPINE 30 MG: 30 TABLET, FILM COATED, EXTENDED RELEASE ORAL at 09:16

## 2021-04-04 RX ADMIN — CLOPIDOGREL BISULFATE 75 MG: 75 TABLET ORAL at 09:16

## 2021-04-04 RX ADMIN — ASPIRIN 325 MG ORAL TABLET 325 MG: 325 PILL ORAL at 09:15

## 2021-04-04 RX ADMIN — METOPROLOL SUCCINATE 50 MG: 25 TABLET, EXTENDED RELEASE ORAL at 09:15

## 2021-04-04 NOTE — PROGRESS NOTES
Problem: Diabetes Self-Management  Goal: *Disease process and treatment process  Description: Define diabetes and identify own type of diabetes; list 3 options for treating diabetes. Outcome: Resolved/Met  Goal: *Incorporating nutritional management into lifestyle  Description: Describe effect of type, amount and timing of food on blood glucose; list 3 methods for planning meals. Outcome: Resolved/Met  Goal: *Incorporating physical activity into lifestyle  Description: State effect of exercise on blood glucose levels. Outcome: Resolved/Met  Goal: *Developing strategies to promote health/change behavior  Description: Define the ABC's of diabetes; identify appropriate screenings, schedule and personal plan for screenings. Outcome: Resolved/Met  Goal: *Using medications safely  Description: State effect of diabetes medications on diabetes; name diabetes medication taking, action and side effects. Outcome: Resolved/Met  Goal: *Monitoring blood glucose, interpreting and using results  Description: Identify recommended blood glucose targets  and personal targets. Outcome: Resolved/Met  Goal: *Prevention, detection, treatment of acute complications  Description: List symptoms of hyper- and hypoglycemia; describe how to treat low blood sugar and actions for lowering  high blood glucose level. Outcome: Resolved/Met  Goal: *Prevention, detection and treatment of chronic complications  Description: Define the natural course of diabetes and describe the relationship of blood glucose levels to long term complications of diabetes.   Outcome: Resolved/Met  Goal: *Developing strategies to address psychosocial issues  Description: Describe feelings about living with diabetes; identify support needed and support network  Outcome: Resolved/Met  Goal: *Insulin pump training  Outcome: Resolved/Met  Goal: *Sick day guidelines  Outcome: Resolved/Met  Goal: *Patient Specific Goal (EDIT GOAL, INSERT TEXT)  Outcome: Resolved/Met Problem: Patient Education: Go to Patient Education Activity  Goal: Patient/Family Education  Outcome: Resolved/Met     Problem: Falls - Risk of  Goal: *Absence of Falls  Description: Document Malorie Guardadoher Fall Risk and appropriate interventions in the flowsheet.   Outcome: Resolved/Met  Note: Fall Risk Interventions:  Mobility Interventions: Patient to call before getting OOB         Medication Interventions: Teach patient to arise slowly                   Problem: Patient Education: Go to Patient Education Activity  Goal: Patient/Family Education  Outcome: Resolved/Met

## 2021-04-04 NOTE — PROGRESS NOTES
CM met with pt at the bedside to f/up on his home health. Pt indicated he does not remember the name of the home health agency that has been coming out to see him. Pt states the paperwork for the home health agency is at home. CM informed pt writer will provide him with writer's direct contact# so he can call CM when he gets home and provide the name of the home health agency if he would like to resume home health services. Pt indicated he does want to resume home health services. Cm met with pt at the bedside again. Cm provided pt with writer's direct contact#. Cm presented choice letter. Pt signed choice letter to resume home health services. Pt will give CM a call tomorrow. Medicare pt has received, reviewed, and signed 2nd IM letter informing them of their right to appeal the discharge. Signed copied has been placed on pt bedside chart. Cm spoke with Dr. Tara Lopez. Pt will be going home today. Cm discussed modifying dc order for home health resumption. Cm was given verbal permission to modify discharge order.

## 2021-04-04 NOTE — DISCHARGE SUMMARY
Discharge Summary       PATIENT ID: Emi Coffey  MRN: [id number]   YOB: 1955    DATE OF ADMISSION: 4/1/2021  7:42 PM    DATE OF DISCHARGE:   PRIMARY CARE PROVIDER: Bharat Peace DO     ATTENDING PHYSICIAN: Christoph Lindsay  DISCHARGING PROVIDER: Christoph Lindsay      CONSULTATIONS: IP CONSULT TO CARDIOLOGY  IP CONSULT TO CARDIOLOGY    PROCEDURES/SURGERIES: * No surgery found *    ADMITTING DIAGNOSES:    Patient Active Problem List    Diagnosis Date Noted    Respiratory failure with hypoxia ([address] 02/22/2021    Pneumonia due to COVID-19 virus 02/17/2021    Chest pain 12/12/2020    CAD (coronary artery disease) 12/12/2020    HTN (hypertension) 12/12/2020       DISCHARGE DIAGNOSES / PLAN:      Chest pain rule out MI  CAD status post stent  Type 2 diabetes  Hypertension  Hyperlipidemia  Hypokalemia  Left renal neoplasm        DISCHARGE MEDICATIONS:  Current Discharge Medication List      START taking these medications    Details   aspirin (ASPIRIN) 325 mg tablet Take 1 Tab by mouth daily. Qty: 30 Tab, Refills: 0      clopidogreL (PLAVIX) 75 mg tab Take 1 Tab by mouth daily. Qty: 30 Tab, Refills: 0         CONTINUE these medications which have NOT CHANGED    Details   insulin glargine (LANTUS) 100 unit/mL injection 40 Units by SubCUTAneous route nightly. Qty: 1 Vial, Refills: 1      albuterol (PROVENTIL HFA, VENTOLIN HFA, PROAIR HFA) 90 mcg/actuation inhaler Take 1 Puff by inhalation every six (6) hours as needed for Wheezing or Shortness of Breath. Qty: 1 Inhaler, Refills: 0      lisinopriL (PRINIVIL, ZESTRIL) 40 mg tablet Take 1 Tab by mouth daily.   Qty: 30 Tab, Refills: 0      Blood-Glucose Meter monitoring kit 3-4 times a day as needed  Qty: 1 Kit, Refills: 0      Insulin Needles, Disposable, 31 gauge x 5/16\" ndle Daily at bedtime  Qty: 1 Package, Refills: 0      glucose blood VI test strips (blood glucose test) strip 3-4 times a day as needed  Qty: 50 Strip, Refills: 0      lancets 21 gauge misc Use 3-4 times a day as needed  Qty: 50 Lancet, Refills: 0      magnesium oxide (MAG-OX) 400 mg tablet Take 1 Tab by mouth daily. Qty: 30 Tab, Refills: 0      metoprolol succinate (TOPROL-XL) 50 mg XL tablet Take 1 Tab by mouth two (2) times daily (after meals). Qty: 60 Tab, Refills: 0      NIFEdipine ER (PROCARDIA XL) 30 mg ER tablet Take 1 Tab by mouth daily. Qty: 30 Tab, Refills: 0      potassium chloride (K-DUR, KLOR-CON) 20 mEq tablet Take 1 Tab by mouth daily. Qty: 30 Tab, Refills: 0      isosorbide mononitrate ER (IMDUR) 60 mg CR tablet Take 60 mg by mouth daily. atorvastatin (LIPITOR) 20 mg tablet Take 20 mg by mouth daily. furosemide (Lasix) 40 mg tablet Take 40 mg by mouth daily. pantoprazole (Protonix) 40 mg tablet Take 40 mg by mouth daily. traZODone (DESYREL) 50 mg tablet Take 50 mg by mouth two (2) times a day. metFORMIN (GLUCOPHAGE) 1,000 mg tablet Take 1,000 mg by mouth two (2) times daily (with meals). tamsulosin (Flomax) 0.4 mg capsule Take 0.4 mg by mouth nightly. NOTIFY YOUR PHYSICIAN FOR ANY OF THE FOLLOWING:   Fever over 101 degrees for 24 hours. Chest pain, shortness of breath, fever, chills, nausea, vomiting, diarrhea, change in mentation, falling, weakness, bleeding. Severe pain or pain not relieved by medications. Or, any other signs or symptoms that you may have questions about. DISPOSITION:  x  Home With:   OT  PT  HH  RN       Long term SNF/Inpatient Rehab    Independent/assisted living    Hospice    Other:       PATIENT CONDITION AT DISCHARGE: Stable      PHYSICAL EXAMINATION AT DISCHARGE:  General:          Alert, cooperative, no distress, appears stated age. HEENT:           Atraumatic, anicteric sclerae, pink conjunctivae                          No oral ulcers, mucosa moist, throat clear, dentition fair  Neck:               Supple, symmetrical  Lungs:             Clear to auscultation bilaterally.   No Wheezing or Rhonchi. No rales. Chest wall:      No tenderness  No Accessory muscle use. Heart:              Regular  rhythm,  No  murmur   No edema  Abdomen:        Soft, non-tender. Not distended. Bowel sounds normal  Extremities:     No cyanosis. No clubbing,                            Skin turgor normal, Capillary refill normal  Skin:                Not pale. Not Jaundiced  No rashes   Psych:             Not anxious or agitated. Neurologic:      Alert, moves all extremities, answers questions appropriately and responds to commands     CT ABD PELV W WO CONT   Final Result   1. Small exophytic lesion in the midportion of the left kidney is consistent   with a neoplasm. 2. Simple left renal cortical cyst.   3. No acute findings. 4. Cholelithiasis. 5. Bibasilar focal hyperexpansion and airway disease consistent with air   trapping      US RETROPERITONEUM COMP   Final Result   1. Bilateral renal cysts. 2. The hyperdense mass projecting off of the posterior left kidney seen on CT   scan is not adequately evaluated on ultrasound. This could be followed with pre   and postcontrast CT images or MRI if patient is able.            Recent Results (from the past 24 hour(s))   GLUCOSE, POC    Collection Time: 04/03/21  4:42 PM   Result Value Ref Range    Glucose (POC) 207 (H) 65 - 100 mg/dL    Performed by [employer], POC    Collection Time: 04/03/21  7:51 PM   Result Value Ref Range    Glucose (POC) 191 (H) 65 - 100 mg/dL    Performed by [employer] [address], POC    Collection Time: 04/04/21  1:24 AM   Result Value Ref Range    Glucose (POC) 230 (H) 65 - 100 mg/dL    Performed by [address], POC    Collection Time: 04/04/21  5:21 AM   Result Value Ref Range    Glucose (POC) 169 (H) 65 - 100 mg/dL    Performed by [employer], POC    Collection Time: 04/04/21  8:03 AM   Result Value Ref Range    Glucose (POC) 183 (H) 65 - 100 mg/dL    Performed by [employer] [address], POC    Collection Time: 04/04/21 12:07 PM   Result Value Ref Range    Glucose (POC) 269 (H) 65 - 100 mg/dL    Performed by Michael:  Patient is a 77y.o. year old male signal past medical history of diabetes hypertension CAD status post stent in 2018 came to the ER complaining of chest pain since yesterday morning patient was came to the ER at the other facility and transferred here patient admits sternal chest pain 6 out of 10 does not radiate and no shortness of breath because of the significant cardiac risk factor patient was recommend to be admitted for further evaluation treatment     Patient denies any fever chills cough congestion no nausea no vomiting diarrhea no constipation    Patient was seen by the cardiology for chest pain echo done shows ejection fraction about 55% no wall motion abnormality patient can be discharged home cleared by the cardiology    Patient have a CT scan of the abdomen done shows left renal mass likely neoplasm    Discussed in patient with detail to follow-up with urology as an outpatient      Patient is going to see his primary care doctor tomorrow and also going to make appointment with urology tomorrow      Medication reconciliation done    Today patient denies any chest pain shortness of breath nausea vomiting diarrhea no constipation    Discharge home in stable condition    Signed:   Alvin Johnson MD  4/4/2021  1:58 PM

## 2021-04-04 NOTE — ROUTINE PROCESS
Bedside and Verbal shift change report given to Fiordaliza Escalona RN (oncoming nurse) by Nelson Moore RN (offgoing nurse). Report included the following information SBAR, Intake/Output, MAR and Recent Results.

## 2021-04-04 NOTE — PROGRESS NOTES
Nurse and patient verbally went over discharge instructions to home. Patient to make his appointment with cardiology and pcp. Patient dressed and all personal belongings packed to go home with him. IV catheters removed with the 2 sites looking wnl. Patient given a cab voucher to go home due to no family could come and get him and no other way home. Patient taken down by wheelchair. No other concerns voiced.

## 2021-04-05 NOTE — PROGRESS NOTES
Entry for 4/5/21    CM spoke with pt [phone]. Pt is current with  [facility]. Referral made via [place] [id number].

## 2021-04-06 ENCOUNTER — DOCUMENTATION ONLY (OUTPATIENT)
Dept: FAMILY MEDICINE CLINIC | Age: 66
End: 2021-04-06

## 2021-04-07 NOTE — PROGRESS NOTES
Physician Progress Note      PATIENT:               Boby Ruiz  CSN #:                  [id number]  :                       1955  ADMIT DATE:       2021 7:42 PM  [address] DATE:        2021 5:37 PM  RESPONDING  PROVIDER #:        Natalie Murillo MD          QUERY TEXT:    Dear Dr. Barbie Dent,    Pt admitted with chest pain rule out MI. Pt noted to have CAD. If possible, please document in progress notes and discharge summary if you are evaluating and/or treating any of the following: The medical record reflects the following:  Risk Factors: 77year old male, sternal chest pain 6 out of 10 does not radiate and no shortness of breath, PMH: CAD status post stent in 2018  Clinical Indicators:  H&P - Chest pain rule out MI  CAD status post stent   Cardiac consult - Chest pain: denies currently. Troponin has been negative x4. EKG is sinus rhythm with marked arrhythmia, nonsopecific T wave abnormality. Potassium 3.0, being repleted by primary. 2020 NST normal,  EF 66%, Mild aortic regurgitation w/ aortic root dilatation. Repeat echocardiogram pending. 4/3 Echo - Calculated LVEF is 55%. Normal cavity size, wall thickness and systolic function (ejection fraction normal). Wall motion: normal. Normal left ventricular strain. Mild (grade 1) left ventricular diastolic dysfunction.   Troponin: <0.05 x 4   EKG: Sinus rhythm with marked sinus arrhythmia  Moderate voltage criteria for LVH, may be normal variant  Nonspecific T wave abnormality  Treatment: Continue to monitor telemetry    Please call [phone] with any questions  Options provided:  -- Chest pain due to CAD with unstable angina  -- Chest pain due to GERD  -- Chest pain due to hypertension  -- Other - I will add my own diagnosis  -- Disagree - Not applicable / Not valid  -- Disagree - Clinically unable to determine / Unknown  -- Refer to Clinical Documentation Reviewer    PROVIDER RESPONSE TEXT:    This patient has CAD with unstable angina. Query created by:  Hiral Chatman on 4/6/2021 9:53 AM      Electronically signed by:  Kyle Rome MD 4/7/2021 11:30 AM

## 2021-04-08 ENCOUNTER — DOCUMENTATION ONLY (OUTPATIENT)
Dept: FAMILY MEDICINE CLINIC | Age: 66
End: 2021-04-08

## 2021-04-08 NOTE — PROGRESS NOTES
Order faxed to Rikki Schaffer in [facility]. Fax number [phone] confirmation number [id number].

## 2021-04-09 ENCOUNTER — OFFICE VISIT (OUTPATIENT)
Dept: PRIMARY CARE CLINIC | Age: 66
End: 2021-04-09
Payer: MEDICARE

## 2021-04-09 ENCOUNTER — TELEPHONE (OUTPATIENT)
Dept: UROLOGY | Age: 66
End: 2021-04-09

## 2021-04-09 ENCOUNTER — TELEPHONE (OUTPATIENT)
Dept: PRIMARY CARE CLINIC | Age: 66
End: 2021-04-09

## 2021-04-09 VITALS
TEMPERATURE: 97.6 F | BODY MASS INDEX: 30.85 KG/M2 | WEIGHT: 215 LBS | HEART RATE: 87 BPM | OXYGEN SATURATION: 98 % | SYSTOLIC BLOOD PRESSURE: 110 MMHG | RESPIRATION RATE: 18 BRPM | DIASTOLIC BLOOD PRESSURE: 79 MMHG

## 2021-04-09 DIAGNOSIS — I10 ESSENTIAL HYPERTENSION: ICD-10-CM

## 2021-04-09 DIAGNOSIS — R07.9 CHEST PAIN, UNSPECIFIED TYPE: ICD-10-CM

## 2021-04-09 DIAGNOSIS — N28.89 MASS OF LEFT KIDNEY: Primary | ICD-10-CM

## 2021-04-09 DIAGNOSIS — Z09 HOSPITAL DISCHARGE FOLLOW-UP: ICD-10-CM

## 2021-04-09 DIAGNOSIS — E11.65 UNCONTROLLED TYPE 2 DIABETES MELLITUS WITH HYPERGLYCEMIA (HCC): ICD-10-CM

## 2021-04-09 DIAGNOSIS — E78.2 MIXED HYPERLIPIDEMIA: ICD-10-CM

## 2021-04-09 DIAGNOSIS — I25.10 CORONARY ARTERY DISEASE INVOLVING NATIVE HEART WITHOUT ANGINA PECTORIS, UNSPECIFIED VESSEL OR LESION TYPE: ICD-10-CM

## 2021-04-09 DIAGNOSIS — H53.8 BLURRED VISION, BILATERAL: ICD-10-CM

## 2021-04-09 PROCEDURE — G8432 DEP SCR NOT DOC, RNG: HCPCS | Performed by: NURSE PRACTITIONER

## 2021-04-09 PROCEDURE — G8427 DOCREV CUR MEDS BY ELIG CLIN: HCPCS | Performed by: NURSE PRACTITIONER

## 2021-04-09 PROCEDURE — 2022F DILAT RTA XM EVC RTNOPTHY: CPT | Performed by: NURSE PRACTITIONER

## 2021-04-09 PROCEDURE — 3046F HEMOGLOBIN A1C LEVEL >9.0%: CPT | Performed by: NURSE PRACTITIONER

## 2021-04-09 PROCEDURE — G8536 NO DOC ELDER MAL SCRN: HCPCS | Performed by: NURSE PRACTITIONER

## 2021-04-09 PROCEDURE — 1111F DSCHRG MED/CURRENT MED MERGE: CPT | Performed by: NURSE PRACTITIONER

## 2021-04-09 PROCEDURE — 3017F COLORECTAL CA SCREEN DOC REV: CPT | Performed by: NURSE PRACTITIONER

## 2021-04-09 PROCEDURE — G8754 DIAS BP LESS 90: HCPCS | Performed by: NURSE PRACTITIONER

## 2021-04-09 PROCEDURE — 1101F PT FALLS ASSESS-DOCD LE1/YR: CPT | Performed by: NURSE PRACTITIONER

## 2021-04-09 PROCEDURE — G8752 SYS BP LESS 140: HCPCS | Performed by: NURSE PRACTITIONER

## 2021-04-09 PROCEDURE — 99204 OFFICE O/P NEW MOD 45 MIN: CPT | Performed by: NURSE PRACTITIONER

## 2021-04-09 PROCEDURE — G8417 CALC BMI ABV UP PARAM F/U: HCPCS | Performed by: NURSE PRACTITIONER

## 2021-04-09 RX ORDER — FUROSEMIDE 20 MG/1
1 TABLET ORAL DAILY
COMMUNITY
End: 2021-04-09 | Stop reason: SDUPTHER

## 2021-04-09 RX ORDER — FUROSEMIDE 20 MG/1
20 TABLET ORAL DAILY
Qty: 90 TAB | Refills: 0 | Status: SHIPPED | OUTPATIENT
Start: 2021-04-09 | End: 2022-06-30 | Stop reason: SDUPTHER

## 2021-04-09 NOTE — PROGRESS NOTES
1. Have you been to the ER, urgent care clinic since your last visit? Hospitalized since your last visit? [facility] 04/01/2021 last one    2. Have you seen or consulted any other health care providers outside of the [address] [place] [address] since your last visit? Include any pap smears or colon screening.  Nia Amador, PCP, Crescencio Dey is his Cardiologist

## 2021-04-09 NOTE — PROGRESS NOTES
Floerncio Mao is a 77 y.o. male who presents to the office today for the following:    Chief Complaint   Patient presents with    New Patient    Diabetes    Hypertension   [facility] Follow Up       Past Medical History:   Diagnosis Date    CAD (coronary artery disease)     Diabetes ([facility] [address]     HTN (hypertension)     Hyperlipidemia     MI (myocardial infarction) ([facility] [address]        Past Surgical History:   Procedure Laterality Date    HX CORONARY STENT PLACEMENT  2018    x1        Family History   Problem Relation Age of Onset    Hypertension Mother     Diabetes Mother     Hypertension Father     Heart Disease Father         Social History     Tobacco Use    Smoking status: Never Smoker    Smokeless tobacco: Never Used   Substance Use Topics    Alcohol use: Never     Frequency: Never    Drug use: Never        HPI  Patient here today as new patient with PMH of hypertension, hyperlipidemia, CAD, BPH, type 2 diabetes, GERD and obesity. States that he was previously following Rhiannon Gonzalez at [facility] and saw in the past couple of months. Also was following with cardiology in past but has not seen regularly since stent in 2018. Was hospitalized in 2/2021 with covid 19 which he recovered from. Also  recently hospitalized again on 4/1/21-4/4/21 for chest pain. States that he is feeling well today with no further chest pain since hospital release. Primary concern however is blood sugars are running high. States this am, his sugar was 400 but did eat prior to taking. Other fasting sugars have been in the 250s-300s. Reports compliance with taking metformin 1000mg twice daily and lantus 40 units nightly. Noticed sugars went up after he was hospitalized with covid 19. Does admit that he is eating things at times that he knows runs it up. Also states he was told that he needs to see a specialist about something with his kidney.      Current Outpatient Medications on File Prior to Visit   Medication Sig    aspirin (ASPIRIN) 325 mg tablet Take 1 Tab by mouth daily. (Patient taking differently: Take 325 mg by mouth daily. OTC)    clopidogreL (PLAVIX) 75 mg tab Take 1 Tab by mouth daily.  insulin glargine (LANTUS) 100 unit/mL injection 40 Units by SubCUTAneous route nightly.  Insulin Needles, Disposable, 31 gauge x 5/16\" ndle Daily at bedtime    glucose blood VI test strips (blood glucose test) strip 3-4 times a day as needed    lancets 21 gauge misc Use 3-4 times a day as needed    metoprolol succinate (TOPROL-XL) 50 mg XL tablet Take 1 Tab by mouth two (2) times daily (after meals). (Patient taking differently: Take 50 mg by mouth two (2) times daily (after meals). Dr Camila Watson)   Rocio Gee NIFEdipine ER (PROCARDIA XL) 30 mg ER tablet Take 1 Tab by mouth daily. (Patient taking differently: Take 30 mg by mouth daily. Dr Camila Watson)   Rocio Gee isosorbide mononitrate ER (IMDUR) 60 mg CR tablet Take 60 mg by mouth daily. Dr Kyler Helm atorvastatin (LIPITOR) 20 mg tablet Take 20 mg by mouth daily.  metFORMIN (GLUCOPHAGE) 1,000 mg tablet Take 1,000 mg by mouth two (2) times daily (with meals).  tamsulosin (Flomax) 0.4 mg capsule Take 0.4 mg by mouth nightly.  [DISCONTINUED] furosemide (LASIX) 20 mg tablet Take 1 Tab by mouth daily.  [DISCONTINUED] albuterol (PROVENTIL HFA, VENTOLIN HFA, PROAIR HFA) 90 mcg/actuation inhaler Take 1 Puff by inhalation every six (6) hours as needed for Wheezing or Shortness of Breath.  [DISCONTINUED] lisinopriL (PRINIVIL, ZESTRIL) 40 mg tablet Take 1 Tab by mouth daily.  [DISCONTINUED] Blood-Glucose Meter monitoring kit 3-4 times a day as needed    [DISCONTINUED] magnesium oxide (MAG-OX) 400 mg tablet Take 1 Tab by mouth daily.  [DISCONTINUED] potassium chloride (K-DUR, KLOR-CON) 20 mEq tablet Take 1 Tab by mouth daily.  [DISCONTINUED] furosemide (Lasix) 40 mg tablet Take 20 mg by mouth daily.  [DISCONTINUED] pantoprazole (Protonix) 40 mg tablet Take 40 mg by mouth daily.     [DISCONTINUED] traZODone (DESYREL) 50 mg tablet Take 50 mg by mouth two (2) times a day. No current facility-administered medications on file prior to visit. Medications Ordered Today   Medications    empagliflozin (Jardiance) 25 mg tablet     Sig: Take 1 Tab by mouth daily. Dispense:  30 Tab     Refill:  1    furosemide (LASIX) 20 mg tablet     Sig: Take 1 Tab by mouth daily. Dispense:  90 Tab     Refill:  0        Review of Systems   Constitutional: Negative. Eyes: Positive for blurred vision. Negative for double vision, photophobia, pain, discharge and redness. Respiratory: Negative. Cardiovascular: Negative. Gastrointestinal: Negative. Genitourinary: Negative. Musculoskeletal: Negative. Neurological: Negative. Visit Vitals  /79 (BP 1 Location: Left upper arm, BP Patient Position: Sitting, BP Cuff Size: Adult)   Pulse 87   Temp 97.6 °F (36.4 °C) (Tympanic)   Resp 18   Wt 215 lb (97.5 kg)   SpO2 98%   BMI 30.85 kg/m²       Physical Exam  Vitals signs and nursing note reviewed. Constitutional:       Appearance: Normal appearance. He is obese. Eyes:      Pupils: Pupils are equal, round, and reactive to light. Neck:      Vascular: No carotid bruit. Cardiovascular:      Rate and Rhythm: Normal rate and regular rhythm. Heart sounds: Murmur present. Pulmonary:      Effort: Pulmonary effort is normal.      Breath sounds: Normal breath sounds. Abdominal:      General: Bowel sounds are normal.      Palpations: Abdomen is soft. Tenderness: There is no abdominal tenderness. Musculoskeletal:      Right lower leg: No edema. Left lower leg: No edema. Lymphadenopathy:      Cervical: No cervical adenopathy. Skin:     General: Skin is warm and dry. Neurological:      Mental Status: He is alert and oriented to person, place, and time. Mental status is at baseline.       Gait: Gait normal.   Psychiatric:         Mood and Affect: Mood normal. Behavior: Behavior normal.            1. Hospital discharge follow-up    - MI DISCHARGE MEDS RECONCILED W/ CURRENT OUTPATIENT MED LIST    2. Chest pain, unspecified type  Resolved  Scheduling follow up with cardiology     3. Mass of left kidney  CT on 4/4/21 shows 1.7 x 1.7cm mass that demonstrates enhancement   US kidneys 4/2/21 did not show mass adequately  Referred to urology but he has not scheduled and new referral sent today as well as provided contact number   - REFERRAL TO UROLOGY    4. Uncontrolled type 2 diabetes mellitus with hyperglycemia (HCC)  Last A1c:  Lab Results   Component Value Date/Time    Hemoglobin A1c 11.6 (H) 03/14/2021 04:36 AM    Hemoglobin A1c 7.7 (H) 12/12/2020 03:19 PM     Home readings: Average fasting 250-300s- did not bring machine  Medication Compliance: Yes  Diabetic Eye Exam Up to date:No  Diabetic Foot Exam Up to date: No  Complications: CAD  Current Treatment: metformin 1000mg twice daily, lantus 40 units daily   Past Treatment: Same per patient    Going to add Jardiance 25mg daily as directed and reviewed side effects  Continue lantus 40 units nightly and metformin 1000mg twice daily  Check sugars 2-3 times daily and bring meter to next visit  Encourage to follow diabetic diet and get regular exercise 3-5 times weekly at least 30-40 minutes  Scheduling eye exam   Re-evaluate in 1 month  - empagliflozin (Jardiance) 25 mg tablet; Take 1 Tab by mouth daily. Dispense: 30 Tab; Refill: 1  - REFERRAL TO OPHTHALMOLOGY    5. Coronary artery disease involving native heart without angina pectoris, unspecified vessel or lesion type  H/0 cardiac stent x 1  Reviewed inpatient consult notes from [facility] Cardiology  Echo done on 4/3/21 shows LVEF  55%. Mild grade 1 LV diastolic dysfunction, mildly dilated left atrium, Mild aortic valve stenosis and regurgitation.   On Plavix 75mg daily and will continue  Send referral to establish care locally  - REFERRAL TO CARDIOLOGY    6. Blurred vision, bilateral  Has noticed worsening vision over the past year  Going to refer to ophthalmology for further eval and treatment  - REFERRAL TO OPHTHALMOLOGY    7. Essential hypertension  Blood pressure is controlled   Monitor at home and notify provider if staying above 140/90    8. Mixed hyperlipidemia  Lab Results   Component Value Date/Time    LDL, calculated 97 12/13/2020 08:50 AM   On atorvastatin as directed and continue medication    9. Obesity  Weight loss encouraged with diet and exercise as discussed above    Patient verbalizes understanding of plan of care as discussed above         Follow-up and Dispositions    · Return in about 4 weeks (around 5/7/2021) for or sooner for worsening symptoms.

## 2021-04-09 NOTE — TELEPHONE ENCOUNTER
Called [facility] and they have not seen him since Feb 4th he was D/C. I have tried to call the pt to see if he knew but no answer. ----- Message from Declan Thomas NP sent at 4/9/2021  1:16 PM EDT -----  Let home health know that we need his readings in 1 week as well as  monitor fluid levels. Decreased the lasix to 20mg daily as we have added jardiance to bring down sugars. Have given him information to call urology to follow up on kidney mass (Dr. Anna Romero) and cardiology about chest pain [facility]).

## 2021-04-09 NOTE — PROGRESS NOTES
Chief Complaint   Patient presents with    New Patient    Diabetes    Hypertension     Pt has multiple ED visit and admissions, last A1C was 11 from 02/22/2021, pt PCP is david rios that's been filling his meds, but pt stated that he wants to transfer here, pt sees Lino Matute from [facility]

## 2021-04-14 ENCOUNTER — TELEPHONE (OUTPATIENT)
Dept: PRIMARY CARE CLINIC | Age: 66
End: 2021-04-14

## 2021-04-14 NOTE — PROGRESS NOTES
Patients mother is calling to let us know a athletic permit card is being faxed to office. Caller wanted 's email or [facility] email and was advised no personal emails allowed other than Martha. Patient wanted to talk to Nevin.    [id number] Patient has been calm and compliant all night. This morning patient was a little anxious and had concerns about discharge. Patient was informed about current oxygen demand. Oxygen weaned to 14 L and patient desat to mid to high 80s. Oxygen returned to 15 L. Sats improved to high 90s with little recovery time. Patient left resting comfortably in bed at 98%. Bedside and Verbal shift change report given to Marbella Nava Rn (oncoming nurse) by Enola Apley (offgoing nurse). Report included the following information SBAR, Kardex, ED Summary, Procedure Summary, Intake/Output, MAR, Recent Results and Cardiac Rhythm NSR.

## 2021-04-14 NOTE — TELEPHONE ENCOUNTER
Sakshi, [facility] OT, left message stated that she was doing home visit with patient. He does not have any needs at this time.

## 2021-04-16 NOTE — TELEPHONE ENCOUNTER
Offered 4/27 But he said it was his Helyn Rotunda and could not come. So I put him down for 5/13.     Completed

## 2021-05-03 ENCOUNTER — TRANSCRIBE ORDER (OUTPATIENT)
Dept: SCHEDULING | Age: 66
End: 2021-05-03

## 2021-05-03 DIAGNOSIS — R07.9 CHEST PAIN: Primary | ICD-10-CM

## 2021-05-14 ENCOUNTER — OFFICE VISIT (OUTPATIENT)
Dept: PRIMARY CARE CLINIC | Age: 66
End: 2021-05-14
Payer: MEDICARE

## 2021-05-14 DIAGNOSIS — H53.8 BLURRED VISION, BILATERAL: ICD-10-CM

## 2021-05-14 DIAGNOSIS — F32.89 OTHER DEPRESSION: Primary | ICD-10-CM

## 2021-05-14 DIAGNOSIS — I10 ESSENTIAL HYPERTENSION: ICD-10-CM

## 2021-05-14 DIAGNOSIS — E66.09 CLASS 1 OBESITY DUE TO EXCESS CALORIES WITH SERIOUS COMORBIDITY AND BODY MASS INDEX (BMI) OF 30.0 TO 30.9 IN ADULT: ICD-10-CM

## 2021-05-14 DIAGNOSIS — E11.65 UNCONTROLLED TYPE 2 DIABETES MELLITUS WITH HYPERGLYCEMIA (HCC): ICD-10-CM

## 2021-05-14 DIAGNOSIS — N28.89 MASS OF LEFT KIDNEY: ICD-10-CM

## 2021-05-14 DIAGNOSIS — Z00.00 MEDICARE ANNUAL WELLNESS VISIT, SUBSEQUENT: ICD-10-CM

## 2021-05-14 DIAGNOSIS — Z13.31 SCREENING FOR DEPRESSION: ICD-10-CM

## 2021-05-14 DIAGNOSIS — Z71.89 ACP (ADVANCE CARE PLANNING): ICD-10-CM

## 2021-05-14 DIAGNOSIS — Z13.31 POSITIVE DEPRESSION SCREENING: ICD-10-CM

## 2021-05-14 DIAGNOSIS — E78.2 MIXED HYPERLIPIDEMIA: ICD-10-CM

## 2021-05-14 DIAGNOSIS — Z98.890 H/O COLONOSCOPY: ICD-10-CM

## 2021-05-14 DIAGNOSIS — I25.10 CORONARY ARTERY DISEASE INVOLVING NATIVE HEART WITHOUT ANGINA PECTORIS, UNSPECIFIED VESSEL OR LESION TYPE: ICD-10-CM

## 2021-05-14 PROCEDURE — G8432 DEP SCR NOT DOC, RNG: HCPCS | Performed by: NURSE PRACTITIONER

## 2021-05-14 PROCEDURE — 2022F DILAT RTA XM EVC RTNOPTHY: CPT | Performed by: NURSE PRACTITIONER

## 2021-05-14 PROCEDURE — G8754 DIAS BP LESS 90: HCPCS | Performed by: NURSE PRACTITIONER

## 2021-05-14 PROCEDURE — G8536 NO DOC ELDER MAL SCRN: HCPCS | Performed by: NURSE PRACTITIONER

## 2021-05-14 PROCEDURE — G8417 CALC BMI ABV UP PARAM F/U: HCPCS | Performed by: NURSE PRACTITIONER

## 2021-05-14 PROCEDURE — G8752 SYS BP LESS 140: HCPCS | Performed by: NURSE PRACTITIONER

## 2021-05-14 PROCEDURE — G8427 DOCREV CUR MEDS BY ELIG CLIN: HCPCS | Performed by: NURSE PRACTITIONER

## 2021-05-14 PROCEDURE — 3017F COLORECTAL CA SCREEN DOC REV: CPT | Performed by: NURSE PRACTITIONER

## 2021-05-14 PROCEDURE — G0439 PPPS, SUBSEQ VISIT: HCPCS | Performed by: NURSE PRACTITIONER

## 2021-05-14 PROCEDURE — 3046F HEMOGLOBIN A1C LEVEL >9.0%: CPT | Performed by: NURSE PRACTITIONER

## 2021-05-14 PROCEDURE — 1101F PT FALLS ASSESS-DOCD LE1/YR: CPT | Performed by: NURSE PRACTITIONER

## 2021-05-14 PROCEDURE — 99214 OFFICE O/P EST MOD 30 MIN: CPT | Performed by: NURSE PRACTITIONER

## 2021-05-14 RX ORDER — ASPIRIN 81 MG/1
81 TABLET ORAL DAILY
COMMUNITY
End: 2021-05-14 | Stop reason: ALTCHOICE

## 2021-05-14 RX ORDER — SERTRALINE HYDROCHLORIDE 50 MG/1
50 TABLET, FILM COATED ORAL DAILY
Qty: 30 TAB | Refills: 2 | Status: SHIPPED | OUTPATIENT
Start: 2021-05-14 | End: 2022-06-29 | Stop reason: ALTCHOICE

## 2021-05-14 NOTE — PATIENT INSTRUCTIONS
Nutrition Tips for Diabetes: After Your Visit Your Care Instructions A healthy diet is important to manage diabetes. It helps you lose weight (if you need to) and keep it off. It gives you the nutrition and energy your body needs and helps prevent heart disease. But a diet for diabetes does not mean that you have to eat special foods. You can eat what your family eats, including occasional sweets and other favorites. But you do have to pay attention to how often you eat and how much you eat of certain foods. The right plan for you will give you meals that help you keep your blood sugar at healthy levels. Try to eat a variety of foods and to spread carbohydrate throughout the day. Carbohydrate raises blood sugar higher and more quickly than any other nutrient does. Carbohydrate is found in sugar, breads and cereals, fruit, starchy vegetables such as potatoes and corn, and milk and yogurt. You may want to work with a dietitian or diabetes educator to help you plan meals and snacks. A dietitian or diabetes educator also can help you lose weight if that is one of your goals. The following tips can help you enjoy your meals and stay healthy. Follow-up care is a key part of your treatment and safety. Be sure to make and go to all appointments, and call your doctor if you are having problems. Its also a good idea to know your test results and keep a list of the medicines you take. How can you care for yourself at home? · Learn which foods have carbohydrate and how much carbohydrate to eat. A dietitian or diabetes educator can help you learn to keep track of how much carbohydrate you eat. · Spread carbohydrate throughout the day. Eat some carbohydrate at all meals, but do not eat too much at any one time. · Plan meals to include food from all the food groups. These are the food groups and some example portion sizes: ¨ Grains: 1 slice of bread (1 ounce), ½ cup of cooked cereal, and 1/3 cup of cooked pasta or rice. These have about 15 grams of carbohydrate in a serving. Choose whole grains such as whole wheat bread or crackers, oatmeal, and brown rice more often than refined grains. ¨ Fruit: 1 small fresh fruit, such as an apple or orange; ½ of a banana; ½ cup of chopped, cooked, or canned fruit; ½ cup of fruit juice; 1 cup of melon or raspberries; and 2 tablespoons of dried fruit. These have about 15 grams of carbohydrate in a serving. ¨ Dairy: 1 cup of nonfat or low-fat milk and 2/3 cup of plain yogurt. These have about 15 grams of carbohydrate in a serving. ¨ Protein foods: Beef, chicken, turkey, fish, eggs, tofu, cheese, cottage cheese, and peanut butter. A serving size of meat is 3 ounces, which is about the size of a deck of cards. Examples of meat substitute serving sizes (equal to 1 ounce of meat) are 1/4 cup of cottage cheese, 1 egg, 1 tablespoon of peanut butter, and ½ cup of tofu. These have very little or no carbohydrate per serving. ¨ Vegetables: Starchy vegetables such as ½ cup of cooked dried beans, peas, potatoes, or corn have about 15 grams of carbohydrate. Nonstarchy vegetables have very little carbohydrate, such as 1 cup of raw leafy vegetables (such as spinach), ½ cup of other vegetables (cooked or chopped), and 3/4 cup of vegetable juice. · Use the plate format to plan meals. It is a good, quick way to make sure that you have a balanced meal. It also helps you spread carbohydrate throughout the day. You divide your plate by types of foods. Put vegetables on half the plate, meat or meat substitutes on one-quarter of the plate, and a grain or starchy vegetable (such as brown rice or a potato) in the final quarter of the plate. To this you can add a small piece of fruit and 1 cup of milk or yogurt, depending on how much carbohydrate you are supposed to eat at a meal. 
· Talk to your dietitian or diabetes educator about ways to add limited amounts of sweets into your meal plan.  You can eat these foods now and then, as long as you include the amount of carbohydrate they have in your daily carbohydrate allowance. · If you drink alcohol, limit it to no more than 1 drink a day for women and 2 drinks a day for men. If you are pregnant, no amount of alcohol is known to be safe. · Protein, fat, and fiber do not raise blood sugar as much as carbohydrate does. If you eat a lot of these nutrients in a meal, your blood sugar will rise more slowly than it would otherwise. · Limit saturated fats, such as those from meat and dairy products. Try to replace it with monounsaturated fat, such as olive oil. This is a healthier choice because people who have diabetes are at higher-than-average risk of heart disease. But use a modest amount of olive oil. A tablespoon of olive oil has 14 grams of fat and 120 calories. · Exercise lowers blood sugar. If you take insulin by shots or pump, you can use less than you would if you were not exercising. Keep in mind that timing matters. If you exercise within 1 hour after a meal, your body may need less insulin for that meal than it would if you exercised 3 hours after the meal. Test your blood sugar to find out how exercise affects your need for insulin. · Exercise on most days of the week. Aim for at least 30 minutes. Exercise helps you stay at a healthy weight and helps your body use insulin. Walking is an easy way to get exercise. Gradually increase the amount you walk every day. You also may want to swim, bike, or do other activities. When you eat out · Learn to estimate the serving sizes of foods that have carbohydrate. If you measure food at home, it will be easier to estimate the amount in a serving of restaurant food. · If the meal you order has too much carbohydrate (such as potatoes, corn, or baked beans), ask to have a low-carbohydrate food instead. Ask for a salad or green vegetables.  
· If you use insulin, check your blood sugar before and after eating out to help you plan how much to eat in the future. · If you eat more carbohydrate at a meal than you had planned, take a walk or do other exercise. This will help lower your blood sugar. Where can you learn more? Go to [employer].be Enter A440 in the search box to learn more about \"Nutrition Tips for Diabetes: After Your Visit. \"  
© 8702-3596 Healthwise, [employer]. Care instructions adapted under license by [employer] (which disclaims liability or warranty for this information). This care instruction is for use with your licensed healthcare professional. If you have questions about a medical condition or this instruction, always ask your healthcare professional. Igor Chairez any warranty or liability for your use of this information. Content Version: 63.5.021435; Current as of: June 4, 2014 Nutrition Tips for Diabetes: After Your Visit Your Care Instructions A healthy diet is important to manage diabetes. It helps you lose weight (if you need to) and keep it off. It gives you the nutrition and energy your body needs and helps prevent heart disease. But a diet for diabetes does not mean that you have to eat special foods. You can eat what your family eats, including occasional sweets and other favorites. But you do have to pay attention to how often you eat and how much you eat of certain foods. The right plan for you will give you meals that help you keep your blood sugar at healthy levels. Try to eat a variety of foods and to spread carbohydrate throughout the day. Carbohydrate raises blood sugar higher and more quickly than any other nutrient does. Carbohydrate is found in sugar, breads and cereals, fruit, starchy vegetables such as potatoes and corn, and milk and yogurt. You may want to work with a dietitian or diabetes educator to help you plan meals and snacks.  A dietitian or diabetes educator also can help you lose weight if that is one of your goals. The following tips can help you enjoy your meals and stay healthy. Follow-up care is a key part of your treatment and safety. Be sure to make and go to all appointments, and call your doctor if you are having problems. Its also a good idea to know your test results and keep a list of the medicines you take. How can you care for yourself at home? · Learn which foods have carbohydrate and how much carbohydrate to eat. A dietitian or diabetes educator can help you learn to keep track of how much carbohydrate you eat. · Spread carbohydrate throughout the day. Eat some carbohydrate at all meals, but do not eat too much at any one time. · Plan meals to include food from all the food groups. These are the food groups and some example portion sizes: ¨ Grains: 1 slice of bread (1 ounce), ½ cup of cooked cereal, and 1/3 cup of cooked pasta or rice. These have about 15 grams of carbohydrate in a serving. Choose whole grains such as whole wheat bread or crackers, oatmeal, and brown rice more often than refined grains. ¨ Fruit: 1 small fresh fruit, such as an apple or orange; ½ of a banana; ½ cup of chopped, cooked, or canned fruit; ½ cup of fruit juice; 1 cup of melon or raspberries; and 2 tablespoons of dried fruit. These have about 15 grams of carbohydrate in a serving. ¨ Dairy: 1 cup of nonfat or low-fat milk and 2/3 cup of plain yogurt. These have about 15 grams of carbohydrate in a serving. ¨ Protein foods: Beef, chicken, turkey, fish, eggs, tofu, cheese, cottage cheese, and peanut butter. A serving size of meat is 3 ounces, which is about the size of a deck of cards. Examples of meat substitute serving sizes (equal to 1 ounce of meat) are 1/4 cup of cottage cheese, 1 egg, 1 tablespoon of peanut butter, and ½ cup of tofu. These have very little or no carbohydrate per serving. ¨ Vegetables: Starchy vegetables such as ½ cup of cooked dried beans, peas, potatoes, or corn have about 15 grams of carbohydrate. Nonstarchy vegetables have very little carbohydrate, such as 1 cup of raw leafy vegetables (such as spinach), ½ cup of other vegetables (cooked or chopped), and 3/4 cup of vegetable juice. · Use the plate format to plan meals. It is a good, quick way to make sure that you have a balanced meal. It also helps you spread carbohydrate throughout the day. You divide your plate by types of foods. Put vegetables on half the plate, meat or meat substitutes on one-quarter of the plate, and a grain or starchy vegetable (such as brown rice or a potato) in the final quarter of the plate. To this you can add a small piece of fruit and 1 cup of milk or yogurt, depending on how much carbohydrate you are supposed to eat at a meal. 
· Talk to your dietitian or diabetes educator about ways to add limited amounts of sweets into your meal plan. You can eat these foods now and then, as long as you include the amount of carbohydrate they have in your daily carbohydrate allowance. · If you drink alcohol, limit it to no more than 1 drink a day for women and 2 drinks a day for men. If you are pregnant, no amount of alcohol is known to be safe. · Protein, fat, and fiber do not raise blood sugar as much as carbohydrate does. If you eat a lot of these nutrients in a meal, your blood sugar will rise more slowly than it would otherwise. · Limit saturated fats, such as those from meat and dairy products. Try to replace it with monounsaturated fat, such as olive oil. This is a healthier choice because people who have diabetes are at higher-than-average risk of heart disease. But use a modest amount of olive oil. A tablespoon of olive oil has 14 grams of fat and 120 calories. · Exercise lowers blood sugar. If you take insulin by shots or pump, you can use less than you would if you were not exercising. Keep in mind that timing matters.  If you exercise within 1 hour after a meal, your body may need less insulin for that meal than it would if you exercised 3 hours after the meal. Test your blood sugar to find out how exercise affects your need for insulin. · Exercise on most days of the week. Aim for at least 30 minutes. Exercise helps you stay at a healthy weight and helps your body use insulin. Walking is an easy way to get exercise. Gradually increase the amount you walk every day. You also may want to swim, bike, or do other activities. When you eat out · Learn to estimate the serving sizes of foods that have carbohydrate. If you measure food at home, it will be easier to estimate the amount in a serving of restaurant food. · If the meal you order has too much carbohydrate (such as potatoes, corn, or baked beans), ask to have a low-carbohydrate food instead. Ask for a salad or green vegetables. · If you use insulin, check your blood sugar before and after eating out to help you plan how much to eat in the future. · If you eat more carbohydrate at a meal than you had planned, take a walk or do other exercise. This will help lower your blood sugar. Where can you learn more? Go to [employer].be Enter J526 in the search box to learn more about \"Nutrition Tips for Diabetes: After Your Visit. \"  
© 7253-2527 Healthwise, [employer]. Care instructions adapted under license by [employer] (which disclaims liability or warranty for this information). This care instruction is for use with your licensed healthcare professional. If you have questions about a medical condition or this instruction, always ask your healthcare professional. [place] [address] any warranty or liability for your use of this information. Content Version: 68.5.958233; Current as of: June 4, 2014 Learning About Carbohydrate (Carb) Counting and Eating Out When You Have Diabetes Why plan your meals? Meal planning can be a key part of managing diabetes.  Planning meals and snacks with the right balance of carbohydrate, protein, and fat can help you keep your blood sugar at the target level you set with your doctor. You don't have to eat special foods. You can eat what your family eats, including sweets once in a while. But you do have to pay attention to how often you eat and how much you eat of certain foods. You may want to work with a dietitian or a certified diabetes educator. He or she can give you tips and meal ideas and can answer your questions about meal planning. This health professional can also help you reach a healthy weight if that is one of your goals. What should you know about eating carbs? Managing the amount of carbohydrate (carbs) you eat is an important part of healthy meals when you have diabetes. Carbohydrate is found in many foods. · Learn which foods have carbs. And learn the amounts of carbs in different foods. ? Bread, cereal, pasta, and rice have about 15 grams of carbs in a serving. A serving is 1 slice of bread (1 ounce), ½ cup of cooked cereal, or 1/3 cup of cooked pasta or rice. ? Fruits have 15 grams of carbs in a serving. A serving is 1 small fresh fruit, such as an apple or orange; ½ of a banana; ½ cup of cooked or canned fruit; ½ cup of fruit juice; 1 cup of melon or raspberries; or 2 tablespoons of dried fruit. ? Milk and no-sugar-added yogurt have 15 grams of carbs in a serving. A serving is 1 cup of milk or 2/3 cup of no-sugar-added yogurt. ? Starchy vegetables have 15 grams of carbs in a serving. A serving is ½ cup of mashed potatoes or sweet potato; 1 cup winter squash; ½ of a small baked potato; ½ cup of cooked beans; or ½ cup cooked corn or green peas. · Learn how much carbs to eat each day and at each meal. A dietitian or CDE can teach you how to keep track of the amount of carbs you eat. This is called carbohydrate counting. · If you are not sure how to count carbohydrate grams, use the Plate Method to plan meals.  It is a good, quick way to make sure that you have a balanced meal. It also helps you spread carbs throughout the day. ? Divide your plate by types of foods. Put non-starchy vegetables on half the plate, meat or other protein food on one-quarter of the plate, and a grain or starchy vegetable in the final quarter of the plate. To this you can add a small piece of fruit and 1 cup of milk or yogurt, depending on how many carbs you are supposed to eat at a meal. 
· Try to eat about the same amount of carbs at each meal. Do not \"save up\" your daily allowance of carbs to eat at one meal. 
· Proteins have very little or no carbs per serving. Examples of proteins are beef, chicken, turkey, fish, eggs, tofu, cheese, cottage cheese, and peanut butter. A serving size of meat is 3 ounces, which is about the size of a deck of cards. Examples of meat substitute serving sizes (equal to 1 ounce of meat) are 1/4 cup of cottage cheese, 1 egg, 1 tablespoon of peanut butter, and ½ cup of tofu. How can you eat out and still eat healthy? · Learn to estimate the serving sizes of foods that have carbohydrate. If you measure food at home, it will be easier to estimate the amount in a serving of restaurant food. · If the meal you order has too much carbohydrate (such as potatoes, corn, or baked beans), ask to have a low-carbohydrate food instead. Ask for a salad or green vegetables. · If you use insulin, check your blood sugar before and after eating out to help you plan how much to eat in the future. · If you eat more carbohydrate at a meal than you had planned, take a walk or do other exercise. This will help lower your blood sugar. What are some tips for eating healthy? · Limit saturated fat, such as the fat from meat and dairy products. This is a healthy choice because people who have diabetes are at higher risk of heart disease. So choose lean cuts of meat and nonfat or low-fat dairy products. Use olive or canola oil instead of butter or shortening when cooking. · Don't skip meals.  Your blood sugar may drop too low if you skip meals and take insulin or certain medicines for diabetes. · Check with your doctor before you drink alcohol. Alcohol can cause your blood sugar to drop too low. Alcohol can also cause a bad reaction if you take certain diabetes medicines. Follow-up care is a key part of your treatment and safety. Be sure to make and go to all appointments, and call your doctor if you are having problems. It's also a good idea to know your test results and keep a list of the medicines you take. Where can you learn more? Go to http://www.gray.com/ Enter A473 in the search box to learn more about \"Learning About Carbohydrate (Carb) Counting and Eating Out When You Have Diabetes. \" Current as of: August 31, 2020               Content Version: 12.8 © 2006-2021 [employer]. Care instructions adapted under license by [employer] (which disclaims liability or warranty for this information). If you have questions about a medical condition or this instruction, always ask your healthcare professional. [place] [address] any warranty or liability for your use of this information. Learning About Carbohydrate (Carb) Counting and Eating Out When You Have Diabetes Why plan your meals? Meal planning can be a key part of managing diabetes. Planning meals and snacks with the right balance of carbohydrate, protein, and fat can help you keep your blood sugar at the target level you set with your doctor. You don't have to eat special foods. You can eat what your family eats, including sweets once in a while. But you do have to pay attention to how often you eat and how much you eat of certain foods. You may want to work with a dietitian or a certified diabetes educator. He or she can give you tips and meal ideas and can answer your questions about meal planning.  This health professional can also help you reach a healthy weight if that is one of your goals. What should you know about eating carbs? Managing the amount of carbohydrate (carbs) you eat is an important part of healthy meals when you have diabetes. Carbohydrate is found in many foods. · Learn which foods have carbs. And learn the amounts of carbs in different foods. ? Bread, cereal, pasta, and rice have about 15 grams of carbs in a serving. A serving is 1 slice of bread (1 ounce), ½ cup of cooked cereal, or 1/3 cup of cooked pasta or rice. ? Fruits have 15 grams of carbs in a serving. A serving is 1 small fresh fruit, such as an apple or orange; ½ of a banana; ½ cup of cooked or canned fruit; ½ cup of fruit juice; 1 cup of melon or raspberries; or 2 tablespoons of dried fruit. ? Milk and no-sugar-added yogurt have 15 grams of carbs in a serving. A serving is 1 cup of milk or 2/3 cup of no-sugar-added yogurt. ? Starchy vegetables have 15 grams of carbs in a serving. A serving is ½ cup of mashed potatoes or sweet potato; 1 cup winter squash; ½ of a small baked potato; ½ cup of cooked beans; or ½ cup cooked corn or green peas. · Learn how much carbs to eat each day and at each meal. A dietitian or CDE can teach you how to keep track of the amount of carbs you eat. This is called carbohydrate counting. · If you are not sure how to count carbohydrate grams, use the Plate Method to plan meals. It is a good, quick way to make sure that you have a balanced meal. It also helps you spread carbs throughout the day. ? Divide your plate by types of foods. Put non-starchy vegetables on half the plate, meat or other protein food on one-quarter of the plate, and a grain or starchy vegetable in the final quarter of the plate.  To this you can add a small piece of fruit and 1 cup of milk or yogurt, depending on how many carbs you are supposed to eat at a meal. 
· Try to eat about the same amount of carbs at each meal. Do not \"save up\" your daily allowance of carbs to eat at one meal. 
· Proteins have very little or no carbs per serving. Examples of proteins are beef, chicken, turkey, fish, eggs, tofu, cheese, cottage cheese, and peanut butter. A serving size of meat is 3 ounces, which is about the size of a deck of cards. Examples of meat substitute serving sizes (equal to 1 ounce of meat) are 1/4 cup of cottage cheese, 1 egg, 1 tablespoon of peanut butter, and ½ cup of tofu. How can you eat out and still eat healthy? · Learn to estimate the serving sizes of foods that have carbohydrate. If you measure food at home, it will be easier to estimate the amount in a serving of restaurant food. · If the meal you order has too much carbohydrate (such as potatoes, corn, or baked beans), ask to have a low-carbohydrate food instead. Ask for a salad or green vegetables. · If you use insulin, check your blood sugar before and after eating out to help you plan how much to eat in the future. · If you eat more carbohydrate at a meal than you had planned, take a walk or do other exercise. This will help lower your blood sugar. What are some tips for eating healthy? · Limit saturated fat, such as the fat from meat and dairy products. This is a healthy choice because people who have diabetes are at higher risk of heart disease. So choose lean cuts of meat and nonfat or low-fat dairy products. Use olive or canola oil instead of butter or shortening when cooking. · Don't skip meals. Your blood sugar may drop too low if you skip meals and take insulin or certain medicines for diabetes. · Check with your doctor before you drink alcohol. Alcohol can cause your blood sugar to drop too low. Alcohol can also cause a bad reaction if you take certain diabetes medicines. Follow-up care is a key part of your treatment and safety. Be sure to make and go to all appointments, and call your doctor if you are having problems. It's also a good idea to know your test results and keep a list of the medicines you take.  
Where can you learn more? Go to http://www.gray.com/ Enter R624 in the search box to learn more about \"Learning About Carbohydrate (Carb) Counting and Eating Out When You Have Diabetes. \" Current as of: August 31, 2020               Content Version: 12.8 © 6475-3447 [employer]. Care instructions adapted under license by [employer] (which disclaims liability or warranty for this information). If you have questions about a medical condition or this instruction, always ask your healthcare professional. [address] any warranty or liability for your use of this information. Learning About Meal Planning for Diabetes Why plan your meals? Meal planning can be a key part of managing diabetes. Planning meals and snacks with the right balance of carbohydrate, protein, and fat can help you keep your blood sugar at the target level you set with your doctor. You don't have to eat special foods. You can eat what your family eats, including sweets once in a while. But you do have to pay attention to how often you eat and how much you eat of certain foods. You may want to work with a dietitian or a certified diabetes educator. He or she can give you tips and meal ideas and can answer your questions about meal planning. This health professional can also help you reach a healthy weight if that is one of your goals. What plan is right for you? Your dietitian or diabetes educator may suggest that you start with the plate format or carbohydrate counting. The plate format The plate format is a simple way to help you manage how you eat. You plan meals by learning how much space each food should take on a plate. Using the plate format helps you spread carbohydrate throughout the day. It can make it easier to keep your blood sugar level within your target range. It also helps you see if you're eating healthy portion sizes.  
To use the plate format, you put non-starchy vegetables on half your plate. Add meat or meat substitutes on one-quarter of the plate. Put a grain or starchy vegetable (such as brown rice or a potato) on the final quarter of the plate. You can add a small piece of fruit and some low-fat or fat-free milk or yogurt, depending on your carbohydrate goal for each meal. 
Here are some tips for using the plate format: · Make sure that you are not using an oversized plate. A 9-inch plate is best. Many restaurants use larger plates. · Get used to using the plate format at home. Then you can use it when you eat out. · Write down your questions about using the plate format. Talk to your doctor, a dietitian, or a diabetes educator about your concerns. Carbohydrate counting With carbohydrate counting, you plan meals based on the amount of carbohydrate in each food. Carbohydrate raises blood sugar higher and more quickly than any other nutrient. It is found in desserts, breads and cereals, and fruit. It's also found in starchy vegetables such as potatoes and corn, grains such as rice and pasta, and milk and yogurt. Spreading carbohydrate throughout the day helps keep your blood sugar levels within your target range. Your daily amount depends on several things, including your weight, how active you are, which diabetes medicines you take, and what your goals are for your blood sugar levels. A registered dietitian or diabetes educator can help you plan how much carbohydrate to include in each meal and snack. A guideline for your daily amount of carbohydrate is: · 45 to 60 grams at each meal. That's about the same as 3 to 4 carbohydrate servings. · 15 to 20 grams at each snack. That's about the same as 1 carbohydrate serving. The Nutrition Facts label on packaged foods tells you how much carbohydrate is in a serving of the food. First, look at the serving size on the food label. Is that the amount you eat in a serving?  All of the nutrition information on a food label is based on that serving size. So if you eat more or less than that, you'll need to adjust the other numbers. Total carbohydrate is the next thing you need to look for on the label. If you count carbohydrate servings, one serving of carbohydrate is 15 grams. For foods that don't come with labels, such as fresh fruits and vegetables, you'll need a guide that lists carbohydrate in these foods. Ask your doctor, dietitian, or diabetes educator about books or other nutrition guides you can use. If you take insulin, you need to know how many grams of carbohydrate are in a meal. This lets you know how much rapid-acting insulin to take before you eat. If you use an insulin pump, you get a constant rate of insulin during the day. So the pump must be programmed at meals to give you extra insulin to cover the rise in blood sugar after meals. When you know how much carbohydrate you will eat, you can take the right amount of insulin. Or, if you always use the same amount of insulin, you need to make sure that you eat the same amount of carbohydrate at meals. If you need more help to understand carbohydrate counting and food labels, ask your doctor, dietitian, or diabetes educator. How can you plan healthy meals? Here are some tips to get started: 
· Plan your meals a week at a time. Don't forget to include snacks too. · Use cookbooks or online recipes to plan several main meals. Plan some quick meals for busy nights. You also can double some recipes that freeze well. Then you can save half for other busy nights when you don't have time to cook. · Make sure you have the ingredients you need for your recipes. If you're running low on basic items, put these items on your shopping list too. · List foods that you use to make breakfasts, lunches, and snacks. List plenty of fruits and vegetables. · Post this list on the refrigerator. Add to it as you think of more things you need.  
· Take the list to the store to do your weekly shopping. Follow-up care is a key part of your treatment and safety. Be sure to make and go to all appointments, and call your doctor if you are having problems. It's also a good idea to know your test results and keep a list of the medicines you take. Where can you learn more? Go to http://www.SureFire/ Onelia Edu in the search box to learn more about \"Learning About Meal Planning for Diabetes. \" Current as of: August 31, 2020               Content Version: 12.8 © 2006-2021 "[employer]". Care instructions adapted under license by [employer] (which disclaims liability or warranty for this information). If you have questions about a medical condition or this instruction, always ask your healthcare professional. [address] any warranty or liability for your use of this information. Learning About Meal Planning for Diabetes Why plan your meals? Meal planning can be a key part of managing diabetes. Planning meals and snacks with the right balance of carbohydrate, protein, and fat can help you keep your blood sugar at the target level you set with your doctor. You don't have to eat special foods. You can eat what your family eats, including sweets once in a while. But you do have to pay attention to how often you eat and how much you eat of certain foods. You may want to work with a dietitian or a certified diabetes educator. He or she can give you tips and meal ideas and can answer your questions about meal planning. This health professional can also help you reach a healthy weight if that is one of your goals. What plan is right for you? Your dietitian or diabetes educator may suggest that you start with the plate format or carbohydrate counting. The plate format The plate format is a simple way to help you manage how you eat.  You plan meals by learning how much space each food should take on a plate. Using the plate format helps you spread carbohydrate throughout the day. It can make it easier to keep your blood sugar level within your target range. It also helps you see if you're eating healthy portion sizes. To use the plate format, you put non-starchy vegetables on half your plate. Add meat or meat substitutes on one-quarter of the plate. Put a grain or starchy vegetable (such as brown rice or a potato) on the final quarter of the plate. You can add a small piece of fruit and some low-fat or fat-free milk or yogurt, depending on your carbohydrate goal for each meal. 
Here are some tips for using the plate format: · Make sure that you are not using an oversized plate. A 9-inch plate is best. Many restaurants use larger plates. · Get used to using the plate format at home. Then you can use it when you eat out. · Write down your questions about using the plate format. Talk to your doctor, a dietitian, or a diabetes educator about your concerns. Carbohydrate counting With carbohydrate counting, you plan meals based on the amount of carbohydrate in each food. Carbohydrate raises blood sugar higher and more quickly than any other nutrient. It is found in desserts, breads and cereals, and fruit. It's also found in starchy vegetables such as potatoes and corn, grains such as rice and pasta, and milk and yogurt. Spreading carbohydrate throughout the day helps keep your blood sugar levels within your target range. Your daily amount depends on several things, including your weight, how active you are, which diabetes medicines you take, and what your goals are for your blood sugar levels. A registered dietitian or diabetes educator can help you plan how much carbohydrate to include in each meal and snack. A guideline for your daily amount of carbohydrate is: · 45 to 60 grams at each meal. That's about the same as 3 to 4 carbohydrate servings. · 15 to 20 grams at each snack.  That's about the same as 1 carbohydrate serving. The Nutrition Facts label on packaged foods tells you how much carbohydrate is in a serving of the food. First, look at the serving size on the food label. Is that the amount you eat in a serving? All of the nutrition information on a food label is based on that serving size. So if you eat more or less than that, you'll need to adjust the other numbers. Total carbohydrate is the next thing you need to look for on the label. If you count carbohydrate servings, one serving of carbohydrate is 15 grams. For foods that don't come with labels, such as fresh fruits and vegetables, you'll need a guide that lists carbohydrate in these foods. Ask your doctor, dietitian, or diabetes educator about books or other nutrition guides you can use. If you take insulin, you need to know how many grams of carbohydrate are in a meal. This lets you know how much rapid-acting insulin to take before you eat. If you use an insulin pump, you get a constant rate of insulin during the day. So the pump must be programmed at meals to give you extra insulin to cover the rise in blood sugar after meals. When you know how much carbohydrate you will eat, you can take the right amount of insulin. Or, if you always use the same amount of insulin, you need to make sure that you eat the same amount of carbohydrate at meals. If you need more help to understand carbohydrate counting and food labels, ask your doctor, dietitian, or diabetes educator. How can you plan healthy meals? Here are some tips to get started: 
· Plan your meals a week at a time. Don't forget to include snacks too. · Use cookbooks or online recipes to plan several main meals. Plan some quick meals for busy nights. You also can double some recipes that freeze well. Then you can save half for other busy nights when you don't have time to cook. · Make sure you have the ingredients you need for your recipes.  If you're running low on basic items, put these items on your shopping list too. · List foods that you use to make breakfasts, lunches, and snacks. List plenty of fruits and vegetables. · Post this list on the refrigerator. Add to it as you think of more things you need. · Take the list to the store to do your weekly shopping. Follow-up care is a key part of your treatment and safety. Be sure to make and go to all appointments, and call your doctor if you are having problems. It's also a good idea to know your test results and keep a list of the medicines you take. Where can you learn more? Go to http://www.Best Before Media/ Kiet Suhas in the search box to learn more about \"Learning About Meal Planning for Diabetes. \" Current as of: August 31, 2020               Content Version: 12.8 © 6402-5438 [employer]. Care instructions adapted under license by [employer] (which disclaims liability or warranty for this information). If you have questions about a medical condition or this instruction, always ask your healthcare professional. [place] [address] any warranty or liability for your use of this information. Preventing Falls: Care Instructions Your Care Instructions Getting around your home safely can be a challenge if you have injuries or health problems that make it easy for you to fall. Loose rugs and furniture in walkways are among the dangers for many older people who have problems walking or who have poor eyesight. People who have conditions such as arthritis, osteoporosis, or dementia also have to be careful not to fall. You can make your home safer with a few simple measures. Follow-up care is a key part of your treatment and safety. Be sure to make and go to all appointments, and call your doctor if you are having problems. It's also a good idea to know your test results and keep a list of the medicines you take. How can you care for yourself at home?  
Taking care of yourself · You may get dizzy if you do not drink enough water. To prevent dehydration, drink plenty of fluids, enough so that your urine is light yellow or clear like water. Choose water and other caffeine-free clear liquids. If you have kidney, heart, or liver disease and have to limit fluids, talk with your doctor before you increase the amount of fluids you drink. · Exercise regularly to improve your strength, muscle tone, and balance. Walk if you can. Swimming may be a good choice if you cannot walk easily. · Have your vision and hearing checked each year or any time you notice a change. If you have trouble seeing and hearing, you might not be able to avoid objects and could lose your balance. · Know the side effects of the medicines you take. Ask your doctor or pharmacist whether the medicines you take can affect your balance. Sleeping pills or sedatives can affect your balance. · Limit the amount of alcohol you drink. Alcohol can impair your balance and other senses. · Ask your doctor whether calluses or corns on your feet need to be removed. If you wear loose-fitting shoes because of calluses or corns, you can lose your balance and fall. · Talk to your doctor if you have numbness in your feet. Preventing falls at home · Remove raised doorway thresholds, throw rugs, and clutter. Repair loose carpet or raised areas in the floor. · Move furniture and electrical cords to keep them out of walking paths. · Use nonskid floor wax, and wipe up spills right away, especially on ceramic tile floors. · If you use a walker or cane, put rubber tips on it. If you use crutches, clean the bottoms of them regularly with an abrasive pad, such as steel wool. · Keep your house well lit, especially Patricia Trina, and outside walkways. Use night-lights in areas such as hallways and bathrooms.  Add extra light switches or use remote switches (such as switches that go on or off when you clap your hands) to make it easier to turn lights on if you have to get up during the night. · Install sturdy handrails on stairways. · Move items in your cabinets so that the things you use a lot are on the lower shelves (about waist level). · Keep a cordless phone and a flashlight with new batteries by your bed. If possible, put a phone in each of the main rooms of your house, or carry a cell phone in case you fall and cannot reach a phone. Or, you can wear a device around your neck or wrist. You push a button that sends a signal for help. · Wear low-heeled shoes that fit well and give your feet good support. Use footwear with nonskid soles. Check the heels and soles of your shoes for wear. Repair or replace worn heels or soles. · Do not wear socks without shoes on wood floors. · Walk on the grass when the sidewalks are slippery. If you live in an area that gets snow and ice in the winter, sprinkle salt on slippery steps and sidewalks. Preventing falls in the bath · Install grab bars and nonskid mats inside and outside your shower or tub and near the toilet and sinks. · Use shower chairs and bath benches. · Use a hand-held shower head that will allow you to sit while showering. · Get into a tub or shower by putting the weaker leg in first. Get out of a tub or shower with your strong side first. 
· Repair loose toilet seats and consider installing a raised toilet seat to make getting on and off the toilet easier. · Keep your bathroom door unlocked while you are in the shower. Where can you learn more? Go to http://www.gagnon.com/. Enter [id number] [id number] in the search box to learn more about \"Preventing Falls: Care Instructions. \" Current as of: March 16, 2018 Content Version: 11.8 © 1538-1025 [employer]. Care instructions adapted under license by [employer] (which disclaims liability or warranty for this information).  If you have questions about a medical condition or this instruction, always ask your healthcare professional. [place] [address] any warranty or liability for your use of this information. Medicare Wellness Visit, Male The best way to live healthy is to have a lifestyle where you eat a well-balanced diet, exercise regularly, limit alcohol use, and quit all forms of tobacco/nicotine, if applicable. Regular preventive services are another way to keep healthy. Preventive services (vaccines, screening tests, monitoring & exams) can help personalize your care plan, which helps you manage your own care. Screening tests can find health problems at the earliest stages, when they are easiest to treat. [facility] follows the current, evidence-based guidelines published by the [facility] Zay Raquel ([facility]) when recommending preventive services for our patients. Because we follow these guidelines, sometimes recommendations change over time as research supports it. (For example, a prostate screening blood test is no longer routinely recommended for men with no symptoms). Of course, you and your doctor may decide to screen more often for some diseases, based on your risk and co-morbidities (chronic disease you are already diagnosed with). Preventive services for you include: - Medicare offers their members a free annual wellness visit, which is time for you and your primary care provider to discuss and plan for your preventive service needs. Take advantage of this benefit every year! 
-All adults over age 72 should receive the recommended pneumonia vaccines. Current USPSTF guidelines recommend a series of two vaccines for the best pneumonia protection.  
-All adults should have a flu vaccine yearly and tetanus vaccine every 10 years. 
-All adults age 48 and older should receive the shingles vaccines (series of two vaccines).       
-All adults age 38-68 who are overweight should have a diabetes screening test once every three years.  
-Other screening tests & preventive services for persons with diabetes include: an eye exam to screen for diabetic retinopathy, a kidney function test, a foot exam, and stricter control over your cholesterol.  
-Cardiovascular screening for adults with routine risk involves an electrocardiogram (ECG) at intervals determined by the provider.  
-Colorectal cancer screening should be done for adults age 54-65 with no increased risk factors for colorectal cancer. There are a number of acceptable methods of screening for this type of cancer. Each test has its own benefits and drawbacks. Discuss with your provider what is most appropriate for you during your annual wellness visit. The different tests include: colonoscopy (considered the best screening method), a fecal occult blood test, a fecal DNA test, and sigmoidoscopy. 
-All adults born between [facility] should be screened once for Hepatitis C. 
-An Abdominal Aortic Aneurysm (AAA) Screening is recommended for men age 73-68 who has ever smoked in their lifetime. Here is a list of your current Health Maintenance items (your personalized list of preventive services) with a due date: 
Health Maintenance Due Topic Date Due  
 Diabetic Foot Care  Never done  Albumin Urine Test  Never done  Eye Exam  Never done  COVID-19 Vaccine (1) Never done  DTaP/Tdap/Td  (1 - Tdap) Never done  Shingles Vaccine (1 of 2) Never done  Colorectal Screening  Never done  Pneumococcal Vaccine (1 of 1 - PPSV23) Never done Angelia Bowling Annual Well Visit  04/28/2021 Prostate Cancer Screening: Care Instructions Your Care Instructions Prostate cancer is the abnormal growth of cells in the prostate gland. This is an organ found just below a man's bladder. Screening can help find prostate cancer early. When it is found and treated early, the cancer may be cured. But it's not always treated.  That's because the treatments can cause serious side effects. For most men, prostate cancer won't shorten their lives, especially if they are older and the cancer is growing slowly. Prostate cancer is the second most common type of cancer in men. Most cases occur in men older than 72. The disease runs in families. And it's more common in -American men. Follow-up care is a key part of your treatment and safety. Be sure to make and go to all appointments, and call your doctor if you are having problems. It's also a good idea to know your test results and keep a list of the medicines you take. What is the screening test for prostate cancer? The main screening test for prostate cancer is the prostate-specific antigen (PSA) test. This is a blood test that measures how much PSA is in your blood. A high level may mean that you have an enlarged prostate, an infection, or cancer. Along with the PSA test, you may have a digital (finger) rectal exam. This exam checks for anything abnormal in your prostate. To do the exam, the doctor puts a lubricated, gloved finger into your rectum. If these tests suggest cancer, you may need a prostate biopsy. How is prostate cancer diagnosed? In a biopsy, the doctor takes small tissue samples from your prostate gland. Another doctor then looks at the tissue under a microscope to see if there are cancer cells, signs of infection, or other problems. The results help diagnose prostate cancer. What are the pros and cons of screening? Neither a PSA test nor a digital rectal exam can tell you for sure that you do or do not have cancer. But they can help you decide if you need more tests, such as a prostate biopsy. Screening tests may be useful because most men with prostate cancer don't have symptoms. It can be hard to know if you have cancer until it is more advanced. And then it's harder to treat. But having a PSA test can also cause harm. The test may show high levels of PSA that aren't caused by cancer.  So you could have a prostate biopsy you didn't need. Or the PSA test might be normal when there is cancer, so a cancer might not be found early. The test can also find cancers that would never have caused a problem during your lifetime. So you might have treatment that was not needed. Prostate cancer usually develops late in life and grows slowly. For many men, it does not shorten their lives. Some experts advise screening only for men who are at high risk. Talk with your doctor to see if screening is right for you. Where can you learn more? Go to http://www.gray.com/ Enter R550 in the search box to learn more about \"Prostate Cancer Screening: Care Instructions. \" Current as of: December 17, 2020               Content Version: 12.8 © 2006-2021 "[employer]. Care instructions adapted under license by [employer] (which disclaims liability or warranty for this information). If you have questions about a medical condition or this instruction, always ask your healthcare professional. [place] [address] any warranty or liability for your use of this information. Colon Cancer Screening: Care Instructions Your Care Instructions Colorectal cancer occurs in the colon or rectum. That's the lower part of your digestive system. It is the second-leading cause of cancer deaths in the [place]. It often starts with small growths called polyps in the colon or rectum. Polyps are usually found with screening tests. Depending on the type of test, any polyps found may be removed during the tests. Colorectal cancer usually does not cause symptoms at first. But regular tests can help find it early, before it spreads and becomes harder to treat. Experts advise routine tests for colon cancer for people starting at age 48. And they advise people with a higher risk of colon cancer to get tested sooner. Talk with your doctor about when you should start testing.  Discuss which tests you need. Follow-up care is a key part of your treatment and safety. Be sure to make and go to all appointments, and call your doctor if you are having problems. It's also a good idea to know your test results and keep a list of the medicines you take. What are the main screening tests for colon cancer? · Stool tests. These include the fecal immunochemical test (FIT) and the fecal occult blood test (FOBT). These tests check stool samples for signs of cancer. If your test is positive, you will need to have a colonoscopy. · Sigmoidoscopy. This test lets your doctor look at the lining of your rectum and the lowest part of your colon. Your doctor uses a lighted tube called a sigmoidoscope. This test can't find cancers or polyps in the upper part of your colon. In some cases, polyps that are found can be removed. But if your doctor finds polyps, you will need to have a colonoscopy to check the upper part of your colon. · Colonoscopy. This test lets your doctor look at the lining of your rectum and your entire colon. The doctor uses a thin, flexible tool called a colonoscope. It can also be used to remove polyps or get a tissue sample (biopsy). What tests do you need? The following guidelines are for people age 48 and over who are not at high risk for colorectal cancer. You may have at least one of these tests as directed by your doctor. · Fecal immunochemical test (FIT) or fecal occult blood test (FOBT) every year · Sigmoidoscopy every 5 years · Colonoscopy every 10 years If you are age 68 to 80, you can work with your doctor to decide if screening is a good option. If you are age 80 or older, your doctor will likely advise that screening is not helpful. Talk with your doctor about when you need to be tested. And discuss which tests are right for you. Your doctor may recommend earlier or more frequent testing if you: 
· Have had colorectal cancer before. · Have had colon polyps.  
· Have symptoms of colorectal cancer. These include blood in your stool and changes in your bowel habits. · Have a parent, brother or sister, or child with colon polyps or colorectal cancer. · Have a bowel disease. This includes ulcerative colitis and Crohn's disease. · Have a rare polyp syndrome that runs in families, such as familial adenomatous polyposis (FAP). · Have had radiation treatments to the belly or pelvis. When should you call for help? Watch closely for changes in your health, and be sure to contact your doctor if: 
  · You have any changes in your bowel habits.  
  · You have any problems. Where can you learn more? Go to http://www.gray.com/. Enter M541 in the search box to learn more about \"Colon Cancer Screening: Care Instructions. \" Current as of: March 28, 2018 Content Version: 11.8 © 2117-2476 [employer]. Care instructions adapted under license by [employer] (which disclaims liability or warranty for this information). If you have questions about a medical condition or this instruction, always ask your healthcare professional. [place] [address] any warranty or liability for your use of this information. Advance Care Planning: Care Instructions Your Care Instructions It can be hard to live with an illness that cannot be cured. But if your health is getting worse, you may want to make decisions about end-of-life care. Planning for the end of your life does not mean that you are giving up. It is a way to make sure that your wishes are met. Clearly stating your wishes can make it easier for your loved ones. Making plans while you are still able may also ease your mind and make your final days less stressful and more meaningful. Follow-up care is a key part of your treatment and safety. Be sure to make and go to all appointments, and call your doctor if you are having problems.  It's also a good idea to know your test results and keep a list of the medicines you take. What can you do to plan for the end of life? · You can bring these issues up with your doctor. You do not need to wait until your doctor starts the conversation. You might start with \"I would not be willing to live with . Richard Angry Richard Angry Richard Angry \" When you complete this sentence it helps your doctor understand your wishes. · Talk openly and honestly with your doctor. This is the best way to understand the decisions you will need to make as your health changes. Know that you can always change your mind. · Ask your doctor about commonly used life-support measures. These include tube feedings, breathing machines, and fluids given through a vein (IV). Understanding these treatments will help you decide whether you want them. · You may choose to have these life-supporting treatments for a limited time. This allows a trial period to see whether they will help you. You may also decide that you want your doctor to take only certain measures to keep you alive. It is important to spell out these conditions so that your doctor and family understand them. · Talk to your doctor about how long you are likely to live. He or she may be able to give you an idea of what usually happens with your specific illness. · Think about preparing papers that state your wishes. This way there will not be any confusion about what you want. You can change your instructions at any time. Which papers should you prepare? Advance directives are legal papers that tell doctors how you want to be cared for at the end of your life. You do not need a  to write these papers. Ask your doctor or your state health department for information on how to write your advance directives. They may have the forms for each of these types of papers. Make sure your doctor has a copy of these on file, and give a copy to a family member or close friend. · Consider a do-not-resuscitate order (DNR).  This order asks that no extra treatments be done if your heart stops or you stop breathing. Extra treatments may include cardiopulmonary resuscitation (CPR), electrical shock to restart your heart, or a machine to breathe for you. If you decide to have a DNR order, ask your doctor to explain and write it. Place the order in your home where everyone can easily see it. · Consider a living will. A living will explains your wishes about life support and other treatments at the end of your life if you become unable to speak for yourself. Living quiñones tell doctors to use or not use treatments that would keep you alive. You must have one or two witnesses or a notary present when you sign this form. · Consider a durable power of  for health care. This allows you to name a person to make decisions about your care if you are not able to. Most people ask a close friend or family member. Talk to this person about the kinds of treatments you want and those that you do not want. Make sure this person understands your wishes. These legal papers are simple to change. Tell your doctor what you want to change, and ask him or her to make a note in your medical file. Give your family updated copies of the papers. Where can you learn more? Go to http://www.gray.com/. Enter P184 in the search box to learn more about \"Advance Care Planning: Care Instructions. \" Current as of: November 17, 2016 Content Version: 11.3 © 4658-3931 [employer]. Care instructions adapted under license by [employer] (which disclaims liability or warranty for this information). If you have questions about a medical condition or this instruction, always ask your healthcare professional. [place] [address] any warranty or liability for your use of this information. [address]etta [address] What is a living will?  
A living will is a legal form you use to write down the kind of care you want at the end of your life. It is used by the health professionals who will treat you if you aren't able to decide for yourself. If you put your wishes in writing, your loved ones and others will know what kind of care you want. They won't need to guess. This can ease your mind and be helpful to others. A living will is not the same as an estate or property will. An estate will explains what you want to happen with your money and property after you die. Is a living will a legal document? A living will is a legal document. Each state has its own laws about living quiñones. If you move to another state, make sure that your living will is legal in the state where you now live. Or you might use a universal form that has been approved by many states. This kind of form can sometimes be completed and stored online. Your electronic copy will then be available wherever you have a connection to the Internet. In most cases, doctors will respect your wishes even if you have a form from a different state. · You don't need an  to complete a living will. But legal advice can be helpful if your state's laws are unclear, your health history is complicated, or your family can't agree on what should be in your living will. · You can change your living will at any time. Some people find that their wishes about end-of-life care change as their health changes. · In addition to making a living will, think about completing a medical power of  form. This form lets you name the person you want to make end-of-life treatment decisions for you (your \"health care agent\") if you're not able to. Many hospitals and nursing homes will give you the forms you need to complete a living will and a medical power of . · Your living will is used only if you can't make or communicate decisions for yourself anymore. If you become able to make decisions again, you can accept or refuse any treatment, no matter what you wrote in your living will.  
· Your state may offer an online registry. This is a place where you can store your living will online so the doctors and nurses who need to treat you can find it right away. What should you think about when creating a living will? Talk about your end-of-life wishes with your family members and your doctor. Let them know what you want. That way the people making decisions for you won't be surprised by your choices. Think about these questions as you make your living will: · Do you know enough about life support methods that might be used? If not, talk to your doctor so you know what might be done if you can't breathe on your own, your heart stops, or you're unable to swallow. · What things would you still want to be able to do after you receive life-support methods? Would you want to be able to walk? To speak? To eat on your own? To live without the help of machines? · If you have a choice, where do you want to be cared for? In your home? At a hospital or nursing home? · Do you want certain Mosque practices performed if you become very ill? · If you have a choice at the end of your life, where would you prefer to die? At home? In a hospital or nursing home? Somewhere else? · Would you prefer to be buried or cremated? · Do you want your organs to be donated after you die? What should you do with your living will? · Make sure that your family members and your health care agent have copies of your living will. · Give your doctor a copy of your living will to keep in your medical record. If you have more than one doctor, make sure that each one has a copy. · You may want to put a copy of your living will where it can be easily found. Where can you learn more? Go to http://www.gray.com/. Enter E315 in the search box to learn more about \"Learning About Living [place]roy. \" Current as of: August 8, 2016 Content Version: 11.3 © 7525-8861 [employer].  Care instructions adapted under license by [employer] (which disclaims liability or warranty for this information). If you have questions about a medical condition or this instruction, always ask your healthcare professional. [address] any warranty or liability for your use of this information. Preventing Falls: Care Instructions Your Care Instructions Getting around your home safely can be a challenge if you have injuries or health problems that make it easy for you to fall. Loose rugs and furniture in walkways are among the dangers for many older people who have problems walking or who have poor eyesight. People who have conditions such as arthritis, osteoporosis, or dementia also have to be careful not to fall. You can make your home safer with a few simple measures. Follow-up care is a key part of your treatment and safety. Be sure to make and go to all appointments, and call your doctor if you are having problems. It's also a good idea to know your test results and keep a list of the medicines you take. How can you care for yourself at home? Taking care of yourself · You may get dizzy if you do not drink enough water. To prevent dehydration, drink plenty of fluids, enough so that your urine is light yellow or clear like water. Choose water and other caffeine-free clear liquids. If you have kidney, heart, or liver disease and have to limit fluids, talk with your doctor before you increase the amount of fluids you drink. · Exercise regularly to improve your strength, muscle tone, and balance. Walk if you can. Swimming may be a good choice if you cannot walk easily. · Have your vision and hearing checked each year or any time you notice a change. If you have trouble seeing and hearing, you might not be able to avoid objects and could lose your balance. · Know the side effects of the medicines you take.  Ask your doctor or pharmacist whether the medicines you take can affect your balance. Sleeping pills or sedatives can affect your balance. · Limit the amount of alcohol you drink. Alcohol can impair your balance and other senses. · Ask your doctor whether calluses or corns on your feet need to be removed. If you wear loose-fitting shoes because of calluses or corns, you can lose your balance and fall. · Talk to your doctor if you have numbness in your feet. Preventing falls at home · Remove raised doorway thresholds, throw rugs, and clutter. Repair loose carpet or raised areas in the floor. · Move furniture and electrical cords to keep them out of walking paths. · Use nonskid floor wax, and wipe up spills right away, especially on ceramic tile floors. · If you use a walker or cane, put rubber tips on it. If you use crutches, clean the bottoms of them regularly with an abrasive pad, such as steel wool. · Keep your house well lit, especially [place], and outside walkways. Use night-lights in areas such as hallways and bathrooms. Add extra light switches or use remote switches (such as switches that go on or off when you clap your hands) to make it easier to turn lights on if you have to get up during the night. · Install sturdy handrails on stairways. · Move items in your cabinets so that the things you use a lot are on the lower shelves (about waist level). · Keep a cordless phone and a flashlight with new batteries by your bed. If possible, put a phone in each of the main rooms of your house, or carry a cell phone in case you fall and cannot reach a phone. Or, you can wear a device around your neck or wrist. You push a button that sends a signal for help. · Wear low-heeled shoes that fit well and give your feet good support. Use footwear with nonskid soles. Check the heels and soles of your shoes for wear. Repair or replace worn heels or soles. · Do not wear socks without shoes on wood floors. · Walk on the grass when the sidewalks are slippery.  If you live in an area that gets snow and ice in the winter, sprinkle salt on slippery steps and sidewalks. Preventing falls in the bath · Install grab bars and nonskid mats inside and outside your shower or tub and near the toilet and sinks. · Use shower chairs and bath benches. · Use a hand-held shower head that will allow you to sit while showering. · Get into a tub or shower by putting the weaker leg in first. Get out of a tub or shower with your strong side first. 
· Repair loose toilet seats and consider installing a raised toilet seat to make getting on and off the toilet easier. · Keep your bathroom door unlocked while you are in the shower. Where can you learn more? Go to http://keon-luis.info/. Enter [id number] [id number] in the search box to learn more about \"Preventing Falls: Care Instructions. \" Current as of: March 16, 2018 Content Version: 11.8 © 8847-1630 [employer]. Care instructions adapted under license by [employer] (which disclaims liability or warranty for this information). If you have questions about a medical condition or this instruction, always ask your healthcare professional. [place] [address] any warranty or liability for your use of this information.

## 2021-05-14 NOTE — PROGRESS NOTES
Haylie Rowan is a 74 y.o. male who presents to the office today for the following:    Chief Complaint   Patient presents with    Hypertension    Coronary Artery Disease    Diabetes       Past Medical History:   Diagnosis Date    CAD (coronary artery disease)     Diabetes [facility]     HTN (hypertension)     Hyperlipidemia     MI (myocardial infarction) ([facility]        Past Surgical History:   Procedure Laterality Date    HX CORONARY STENT PLACEMENT  2018    x1        Family History   Problem Relation Age of Onset    Hypertension Mother     Diabetes Mother     Hypertension Father     Heart Disease Father         Social History     Tobacco Use    Smoking status: Never Smoker    Smokeless tobacco: Never Used   Vaping Use    Vaping Use: Never used   Substance Use Topics    Alcohol use: Never    Drug use: Never      HPI  Patient here today for 1 month follow up with PMH of hypertension, hyperlipidemia, CAD, BPH, type 2 diabetes, GERD and obesity. States that since last visit, he did have appointment with urology but missed this yesterday. Needs to get set up again as reports he is very concerned about his kidney. Feels that his sugars are doing better and is tolerating the new medication well. Fastings are down below 200 usually which is much improved. Did get appointment with cardiology and has a stress test ordered for 5/20/21. Also has appointment  with ophthalmology for 7/2021. Has been having more depression and anxiety  symptoms especially in the last month. Is concerned about his health declining especially with having a potential mass on kidney. Has not been treated for depression or anxiety in past. States that he is not having any current suicidal thoughts but did report he has had thoughts before. No plan or attempt to harm self.         Current Outpatient Medications on File Prior to Visit   Medication Sig    glucose blood VI test strips (blood glucose test) strip 3-4 times a day as needed    lancets 21 gauge misc Use 3-4 times a day as needed    metoprolol succinate (TOPROL-XL) 50 mg XL tablet Take 1 Tab by mouth two (2) times daily (after meals). (Patient taking differently: Take 50 mg by mouth two (2) times daily (after meals). Dr Elke Chauhan)   Yuri Tesfaye NIFEdipine ER (PROCARDIA XL) 30 mg ER tablet Take 1 Tab by mouth daily. (Patient taking differently: Take 30 mg by mouth daily. Dr Elke Chauhan)   Yuri Tesfaye isosorbide mononitrate ER (IMDUR) 60 mg CR tablet Take 60 mg by mouth daily. Dr Jen Fabian metFORMIN (GLUCOPHAGE) 1,000 mg tablet Take 1,000 mg by mouth two (2) times daily (with meals).  tamsulosin (Flomax) 0.4 mg capsule Take 0.4 mg by mouth nightly.  empagliflozin (Jardiance) 25 mg tablet Take 1 Tab by mouth daily.  furosemide (LASIX) 20 mg tablet Take 1 Tab by mouth daily. (Patient taking differently: Take 10 mg by mouth daily. Take 1/2 tab every day.)    clopidogreL (PLAVIX) 75 mg tab Take 1 Tab by mouth daily.  insulin glargine (LANTUS) 100 unit/mL injection 40 Units by SubCUTAneous route nightly.  Insulin Needles, Disposable, 31 gauge x 5/16\" ndle Daily at bedtime    atorvastatin (LIPITOR) 20 mg tablet Take 20 mg by mouth daily. No current facility-administered medications on file prior to visit. Medications Ordered Today   Medications    sertraline (ZOLOFT) 50 mg tablet     Sig: Take 1 Tab by mouth daily. Dispense:  30 Tab     Refill:  2        Review of Systems   Constitutional: Positive for malaise/fatigue. Negative for chills, diaphoresis, fever and weight loss. Eyes: Positive for blurred vision. Negative for double vision, photophobia, pain, discharge and redness. Respiratory: Negative. Cardiovascular: Negative. Gastrointestinal: Negative. Genitourinary: Negative. Musculoskeletal: Negative. Neurological: Negative. Psychiatric/Behavioral: Positive for depression. Negative for hallucinations, memory loss and substance abuse.  The patient is nervous/anxious and has insomnia. Visit Vitals  /76 (BP 1 Location: Left upper arm, BP Patient Position: At rest, BP Cuff Size: Adult long)   Pulse 78   Temp (!) 45.5 °F (7.5 °C) (Skin)   Resp 20   Ht 5' 10\" (1.778 m)   Wt 223 lb 2 oz (101.2 kg)   SpO2 96%   BMI 32.02 kg/m²       Physical Exam  Vitals signs and nursing note reviewed. Constitutional:       Appearance: Normal appearance. He is obese. Eyes:      Pupils: Pupils are equal, round, and reactive to light. Neck:      Vascular: No carotid bruit. Cardiovascular:      Rate and Rhythm: Normal rate and regular rhythm. Heart sounds: Murmur present. Pulmonary:      Effort: Pulmonary effort is normal.      Breath sounds: Normal breath sounds. Abdominal:      General: Bowel sounds are normal.      Palpations: Abdomen is soft. Tenderness: There is no abdominal tenderness. Musculoskeletal:      Right lower leg: No edema. Left lower leg: No edema. Lymphadenopathy:      Cervical: No cervical adenopathy. Skin:     General: Skin is warm and dry. Neurological:      Mental Status: He is alert and oriented to person, place, and time. Mental status is at baseline. Gait: Gait normal.   Psychiatric:         Mood and Affect: Mood normal.         Behavior: Behavior normal.            1. Depression  Patient depression screen positive and reports he has had suicidal thoughts in past but none currently and never wanted to act on this. He is concerned about his health and feels this has been primary reason for depressive symptoms  We discussed treatment for depression with medication and counseling  Declines counseling but is agreeable to try medication  Will start on sertraline 50mg daily as directed and reviewed potential side effects  Advised if develops worsening moods or suicidal thoughts, notify provider immediately or seek help if not available.    Will see how he responds to medication but will plan to refer to psychiatry for further eval if not improving. 2. Mass of left kidney  CT on 4/4/21 shows 1.7 x 1.7cm mass that demonstrates enhancement   US kidneys 4/2/21 did not show mass adequately  He missed appointment on yesterday (5/13/21). Provided information for him to contact them again to schedule and he will let us know if any difficulty getting re-scheduled. 3. Uncontrolled type 2 diabetes mellitus with hyperglycemia (HCC)  Last A1c:  Lab Results   Component Value Date/Time    Hemoglobin A1c 11.6 (H) 03/14/2021 04:36 AM    Hemoglobin A1c 7.7 (H) 12/12/2020 03:19 PM     Home readings: Average fasting below 200  Medication Compliance: Yes  Diabetic Eye Exam Up to date:No  Diabetic Foot Exam Up to date: No  Complications: CAD  Current Treatment: metformin 1000mg twice daily, lantus 40 units daily, Jardiance 25mg daily  Past Treatment: Same per patient    Sugars are improved and continue medications as directed  Check sugars 2-3 times daily and bring meter to next visit  Encourage to follow diabetic diet and get regular exercise 3-5 times weekly at least 30-40 minutes  Scheduling eye exam   Re-evaluate in 2 months    4. Coronary artery disease involving native heart without angina pectoris, unspecified vessel or lesion type  H/0 cardiac stent x 1  Reviewed inpatient consult notes from [facility]  Echo done on 4/3/21 shows LVEF  55%. Mild grade 1 LV diastolic dysfunction, mildly dilated left atrium, Mild aortic valve stenosis and regurgitation. On Plavix 75mg daily and will continue  Has stress test scheduled for 5/20/21  Saw JANET uMsa NP at [facility] after referred last visit and will continue care     5. Blurred vision, bilateral  Has noticed worsening vision over the past year  Referred to ophthalmology and has appointment in 7/2021    6. Essential hypertension  Blood pressure is controlled   Monitor at home and notify provider if staying above 140/90    7.  Mixed hyperlipidemia  Lab Results   Component Value Date/Time LDL, calculated 97 12/13/2020 08:50 AM   On atorvastatin as directed and continue medication    8. Obesity  Weight loss encouraged with diet and exercise as discussed above    Patient verbalizes understanding of plan of care as discussed above       Follow-up and Dispositions    · Return in about 4 weeks (around 6/11/2021), or if symptoms worsen or fail to improve. This is the Subsequent Medicare Annual Wellness Exam, performed 12 months or more after the Initial AWV or the last Subsequent AWV    I have reviewed the patient's medical history in detail and updated the computerized patient record. Assessment/Plan   Education and counseling provided:  Are appropriate based on today's review and evaluation  End-of-Life planning (with patient's consent)  Pneumococcal Vaccine  Influenza Vaccine  Hepatitis B Vaccine  Prostate cancer screening tests (PSA, covered annually)  Colorectal cancer screening tests  Cardiovascular screening blood test  Screening for glaucoma  Medical nutrition therapy for individuals with diabetes or renal disease  Diabetes outpatient self-management training services    1. Other depression  -     sertraline (ZOLOFT) 50 mg tablet; Take 1 Tab by mouth daily. , Normal, Disp-30 Tab, R-2  2. Mass of left kidney  3. Uncontrolled type 2 diabetes mellitus with hyperglycemia ([facility]  4. Coronary artery disease involving native heart without angina pectoris, unspecified vessel or lesion type  5. Blurred vision, bilateral  6. Essential hypertension  7. Mixed hyperlipidemia  8. Class 1 obesity due to excess calories with serious comorbidity and body mass index (BMI) of 30.0 to 30.9 in adult  9. Medicare annual wellness visit, subsequent  10. ACP (advance care planning)  11. Positive depression screening  12.  Screening for depression  -     DEPRESSION SCREEN ANNUAL  13. H/O colonoscopy       Depression Risk Factor Screening     3 most recent PHQ Screens 5/14/2021   Little interest or pleasure in doing things Not at all   Feeling down, depressed, irritable, or hopeless Nearly every day   Total Score PHQ 2 3   Trouble falling or staying asleep, or sleeping too much Several days   Feeling tired or having little energy Several days   Poor appetite, weight loss, or overeating Nearly every day   Feeling bad about yourself - or that you are a failure or have let yourself or your family down Several days   Trouble concentrating on things such as school, work, reading, or watching TV Several days   Moving or speaking so slowly that other people could have noticed; or the opposite being so fidgety that others notice Several days   Thoughts of being better off dead, or hurting yourself in some way Not at all   PHQ 9 Score 11   How difficult have these problems made it for you to do your work, take care of your home and get along with others Very difficult     Patient was counseled regarding depression and anxiety symptoms for at least 15 minutes  Going to start on treatment with sertraline 25mg daily and have reviewed side effects  Declined counseling at this time   Re-evaluate in 1 month and if not improving or any worsening symptoms, referring to psychiatry for further eval and treatment. Alcohol Risk Screen    Do you average more than 1 drink per night or more than 7 drinks a week: No    In the past three months have you have had more than 4 drinks containing alcohol on one occasion: No        Functional Ability and Level of Safety    Hearing: Hearing is good. Activities of Daily Living: The home contains: handrails  Patient does total self care      Ambulation: with no difficulty     Fall Risk:  Fall Risk Assessment, last 12 mths 5/14/2021   Able to walk? Yes   Fall in past 12 months? 1   Do you feel unsteady? 0   Are you worried about falling 0   Is TUG test greater than 12 seconds? 0   Is the gait abnormal? 0   Number of falls in past 12 months 1   Fall with injury?  0      Abuse Screen:  Patient is not abused       Cognitive Screening    Has your family/caregiver stated any concerns about your memory: no     Cognitive Screening: Normal - Mini Cog Test    Health Maintenance Due     Health Maintenance Due   Topic Date Due    Foot Exam Q1  Never done    MICROALBUMIN Q1  Never done    Eye Exam Retinal or Dilated  Never done    COVID-19 Vaccine (1) Never done    DTaP/Tdap/Td series (1 - Tdap) Never done    Shingrix Vaccine Age 50> (1 of 2) Never done    Colorectal Cancer Screening Combo  Never done    Pneumococcal 65+ years (1 of 1 - PPSV23) Never done    Medicare Yearly Exam  04/28/2021       Patient Care Team   Patient Care Team:  Darrius Vieira NP as PCP - General (Nurse Practitioner)  Darrius Vieira NP as PCP - [facility] Empaneled Provider    History     Patient Active Problem List   Diagnosis Code    Chest pain R07.9    CAD (coronary artery disease) I25.10    Essential hypertension I10    Pneumonia due to COVID-19 virus U07.1, J12.82    Respiratory failure with hypoxia ([address] J96.91    Mass of left kidney N28.89    Uncontrolled type 2 diabetes mellitus with hyperglycemia ([address] E11.65    Blurred vision, bilateral H53.8    Mixed hyperlipidemia E78.2    At high risk for falls Z91.81    H/O colonoscopy Z98.890     Past Medical History:   Diagnosis Date    CAD (coronary artery disease)     Diabetes ([address]     HTN (hypertension)     Hyperlipidemia     MI (myocardial infarction) ([address]       Past Surgical History:   Procedure Laterality Date    HX CORONARY STENT PLACEMENT  2018    x1     Current Outpatient Medications   Medication Sig Dispense Refill    sertraline (ZOLOFT) 50 mg tablet Take 1 Tab by mouth daily. 30 Tab 2    glucose blood VI test strips (blood glucose test) strip 3-4 times a day as needed 50 Strip 0    lancets 21 gauge misc Use 3-4 times a day as needed 50 Lancet 0    metoprolol succinate (TOPROL-XL) 50 mg XL tablet Take 1 Tab by mouth two (2) times daily (after meals). (Patient taking differently: Take 50 mg by mouth two (2) times daily (after meals). Dr Scar Collins) 60 Tab 0    NIFEdipine ER (PROCARDIA XL) 30 mg ER tablet Take 1 Tab by mouth daily. (Patient taking differently: Take 30 mg by mouth daily. Dr Scar Collins) 30 Tab 0    isosorbide mononitrate ER (IMDUR) 60 mg CR tablet Take 60 mg by mouth daily. Dr Heidy De La Vega metFORMIN (GLUCOPHAGE) 1,000 mg tablet Take 1,000 mg by mouth two (2) times daily (with meals).  tamsulosin (Flomax) 0.4 mg capsule Take 0.4 mg by mouth nightly.  aspirin delayed-release 81 mg tablet Take  by mouth daily.  empagliflozin (Jardiance) 25 mg tablet Take 1 Tab by mouth daily. 30 Tab 1    furosemide (LASIX) 20 mg tablet Take 1 Tab by mouth daily. (Patient taking differently: Take 10 mg by mouth daily. Take 1/2 tab every day.) 90 Tab 0    clopidogreL (PLAVIX) 75 mg tab Take 1 Tab by mouth daily. 30 Tab 0    insulin glargine (LANTUS) 100 unit/mL injection 40 Units by SubCUTAneous route nightly. 1 Vial 1    Insulin Needles, Disposable, 31 gauge x 5/16\" ndle Daily at bedtime 1 Package 0    atorvastatin (LIPITOR) 20 mg tablet Take 20 mg by mouth daily.        No Known Allergies    Family History   Problem Relation Age of Onset    Hypertension Mother     Diabetes Mother     Hypertension Father     Heart Disease Father      Social History     Tobacco Use    Smoking status: Never Smoker    Smokeless tobacco: Never Used   Substance Use Topics    Alcohol use: Never         Keli Rome NP

## 2021-05-14 NOTE — PROGRESS NOTES
Chief Complaint   Patient presents with    Hypertension    Coronary Artery Disease    Diabetes     1. Have you been to the ER, urgent care clinic since your last visit? Hospitalized since your last visit? No    2. Have you seen or consulted any other health care providers outside of the [address] [place] [address] since your last visit? Include any pap smears or colon screening. No     Patient stated he was not having any problems or concerns today that everything was going smooth.

## 2021-05-20 ENCOUNTER — TELEPHONE (OUTPATIENT)
Dept: UROLOGY | Age: 66
End: 2021-05-20

## 2021-05-20 ENCOUNTER — HOSPITAL ENCOUNTER (OUTPATIENT)
Dept: NON INVASIVE DIAGNOSTICS | Age: 66
Discharge: HOME | End: 2021-05-20
Attending: NURSE PRACTITIONER
Payer: MEDICARE

## 2021-05-20 ENCOUNTER — HOSPITAL ENCOUNTER (OUTPATIENT)
Dept: NUCLEAR MEDICINE | Age: 66
Discharge: HOME | End: 2021-05-20
Attending: NURSE PRACTITIONER
Payer: MEDICARE

## 2021-05-20 DIAGNOSIS — R07.9 CHEST PAIN: ICD-10-CM

## 2021-05-20 LAB
STRESS BASELINE DIAS BP: 97 MMHG
STRESS BASELINE HR: 67 BPM
STRESS BASELINE SYS BP: 168 MMHG
STRESS ESTIMATED WORKLOAD: 1 METS
STRESS EXERCISE DUR MIN: NORMAL MIN:SEC
STRESS PEAK DIAS BP: 97 MMHG
STRESS PEAK SYS BP: 168 MMHG
STRESS PERCENT HR ACHIEVED: 53 %
STRESS POST PEAK HR: 80 BPM
STRESS RATE PRESSURE PRODUCT: NORMAL BPM*MMHG
STRESS TARGET HR: 152 BPM

## 2021-05-20 PROCEDURE — A9500 TC99M SESTAMIBI: HCPCS

## 2021-05-20 PROCEDURE — 74011000258 HC RX REV CODE- 258: Performed by: NURSE PRACTITIONER

## 2021-05-20 PROCEDURE — 74011250636 HC RX REV CODE- 250/636: Performed by: NURSE PRACTITIONER

## 2021-05-20 PROCEDURE — 93017 CV STRESS TEST TRACING ONLY: CPT

## 2021-05-20 RX ORDER — ASPIRIN 81 MG/1
81 TABLET ORAL DAILY
COMMUNITY
End: 2021-08-24

## 2021-05-20 RX ADMIN — ADENOSINE: 3 INJECTION INTRAVENOUS at 08:50

## 2021-05-20 NOTE — TELEPHONE ENCOUNTER
Tristen Ortiz from [facility] needed to make a new patient appointment with Dr. Saravanan Kaufman for a left renal mass.  She said once the nurse finds a date and time could you call her on [phone] [id number] to let her know

## 2021-05-23 VITALS
DIASTOLIC BLOOD PRESSURE: 76 MMHG | HEART RATE: 78 BPM | HEIGHT: 70 IN | WEIGHT: 223.13 LBS | RESPIRATION RATE: 20 BRPM | SYSTOLIC BLOOD PRESSURE: 135 MMHG | OXYGEN SATURATION: 96 % | BODY MASS INDEX: 31.94 KG/M2 | TEMPERATURE: 45.5 F

## 2021-05-23 PROBLEM — Z91.81 AT HIGH RISK FOR FALLS: Status: ACTIVE | Noted: 2021-05-23

## 2021-05-23 PROBLEM — Z98.890 H/O COLONOSCOPY: Status: ACTIVE | Noted: 2021-05-23

## 2021-05-23 NOTE — ACP (ADVANCE CARE PLANNING)
Advance Care Planning     General Advance Care Planning (ACP) Conversation      Date of Conversation: 5/14/2021  Conducted with: Patient with Decision Making Capacity    Healthcare Decision Maker:     Primary Decision Maker: Lulu Michel - Daughter - [phone]    Secondary Decision Maker: Gian Pierre - Sister - [phone]  Click here to complete [address] including selection of the Healthcare Decision Maker Relationship (ie \"Primary\")  Today we documented Decision Maker(s) consistent with Legal Next of Kin hierarchy.     Content/Action Overview:   Has NO ACP documents/care preferences - information provided, considering goals and options  Reviewed DNR/DNI and patient elects Full Code (Attempt Resuscitation)  Topics discussed: treatment goals, benefit/burden of treatment options, artificial nutrition, ventilation preferences, hospitalization preferences, resuscitation preferences and end of life care preferences (vegetative state/imminent death)       Length of Voluntary ACP Conversation in minutes:  <16 minutes (Non-Billable)    Jojo Kam NP

## 2021-05-25 RX ORDER — CLOPIDOGREL BISULFATE 75 MG/1
75 TABLET ORAL DAILY
Qty: 30 TABLET | Refills: 0 | Status: SHIPPED | OUTPATIENT
Start: 2021-05-25 | End: 2021-07-27

## 2021-07-14 ENCOUNTER — TELEPHONE (OUTPATIENT)
Dept: PRIMARY CARE CLINIC | Age: 66
End: 2021-07-14

## 2021-07-27 RX ORDER — CLOPIDOGREL BISULFATE 75 MG/1
TABLET ORAL
Qty: 30 TABLET | Refills: 0 | Status: SHIPPED | OUTPATIENT
Start: 2021-07-27 | End: 2021-08-24

## 2021-07-30 ENCOUNTER — OFFICE VISIT (OUTPATIENT)
Dept: UROLOGY | Age: 66
End: 2021-07-30
Payer: MEDICARE

## 2021-07-30 ENCOUNTER — TELEPHONE (OUTPATIENT)
Dept: UROLOGY | Age: 66
End: 2021-07-30

## 2021-07-30 VITALS
RESPIRATION RATE: 12 BRPM | SYSTOLIC BLOOD PRESSURE: 173 MMHG | DIASTOLIC BLOOD PRESSURE: 92 MMHG | BODY MASS INDEX: 31.92 KG/M2 | HEIGHT: 70 IN | WEIGHT: 223 LBS | OXYGEN SATURATION: 99 % | HEART RATE: 74 BPM | TEMPERATURE: 97 F

## 2021-07-30 DIAGNOSIS — N28.89 RENAL MASS: Primary | ICD-10-CM

## 2021-07-30 PROCEDURE — 99214 OFFICE O/P EST MOD 30 MIN: CPT | Performed by: UROLOGY

## 2021-07-30 RX ORDER — RANOLAZINE 500 MG/1
500 TABLET, EXTENDED RELEASE ORAL 2 TIMES DAILY
COMMUNITY
Start: 2021-06-01 | End: 2022-07-11

## 2021-07-30 NOTE — LETTER
7/30/2021    Patient: Tati Andrade   YOB: 1955   Date of Visit: 7/30/2021     Juanjo Trujillo NP  [id number] [facility]  Via In Basket    Dear Juanjo Trujillo NP,      Thank you for referring Mr. Tati Andrade to [place] [address] for evaluation. My notes for this consultation are attached. If you have questions, please do not hesitate to call me. I look forward to following your patient along with you.       Sincerely,    Rosa Polanco MD

## 2021-07-30 NOTE — PROGRESS NOTES
HISTORY OF PRESENT ILLNESS  Himanshu Barr is a 74 y.o. male. Chief Complaint   Patient presents with    New Patient    Renal Mass     He was referred by Katherine Douglas and his PCP for a renal mass. He tells me he was found to have an abnormal spot on his kidney. He has never seen Urology. CT 4/4/21: The left kidney reveals a 1.7 x 1.7 cm mass that demonstrates enhancement. The precontrast CT reveals a density of 35 Hounsfield units. This enhances to 84 Hounsfield units after IV contrast administration. There is concern for neoplasm. He denies gross hematuria. No family hx of renal cancer. Renal function WNL on available labs. He is diabetic. He has hx of CAD with stent placement. He is on ASA and Plavix. Chronic Conditions Addressed Today     1. Renal mass - Primary     Overview      CT 4/4/21: The left kidney reveals a 1.7 x 1.7 cm mass that demonstrates enhancement. The precontrast CT reveals a density of 35 Hounsfield units. This enhances to 84 Hounsfield units after IV contrast administration. There is concern for neoplasm. Patient denies the symptoms of COVID-19 per routine screening guidelines. Review of Systems   Constitutional: Positive for weight loss. Negative for chills and fever. About 10-15 lbs weight loss since April   Cardiovascular: Positive for leg swelling. Negative for chest pain and palpitations. On lasix   Gastrointestinal: Negative for abdominal pain, constipation and diarrhea. Musculoskeletal: Positive for back pain. Intermittent upper and lower   Endo/Heme/Allergies: Bruises/bleeds easily. On Plavix and ASA   Psychiatric/Behavioral: Positive for memory loss. Some issue with forgetfulness       Past Medical History:  PMHx (including negatives):  has a past medical history of Burning with urination, CAD (coronary artery disease), Diabetes ([address] HTN (hypertension), Hyperlipidemia, and MI (myocardial infarction) ([facility] He also has no past medical history of Asthma. PSurgHx:  has a past surgical history that includes hx coronary stent placement (2018) and pr cardiac surg procedure unlist (2018). PSocHx:  reports that he has never smoked. He has never used smokeless tobacco. He reports that he does not drink alcohol and does not use drugs. Physical Exam  Constitutional:       General: He is not in acute distress. Appearance: Normal appearance. HENT:      Head: Normocephalic and atraumatic. Eyes:      Extraocular Movements: Extraocular movements intact. Pupils: Pupils are equal, round, and reactive to light. Cardiovascular:      Rate and Rhythm: Normal rate and regular rhythm. Pulmonary:      Effort: Pulmonary effort is normal. No respiratory distress. Breath sounds: Normal breath sounds. No wheezing or rhonchi. Abdominal:      Tenderness: There is no abdominal tenderness. There is no right CVA tenderness, left CVA tenderness, guarding or rebound. Hernia: No hernia is present. Musculoskeletal:         General: Normal range of motion. Lymphadenopathy:      Cervical: No cervical adenopathy. Skin:     General: Skin is warm and dry. Neurological:      General: No focal deficit present. Mental Status: He is alert and oriented to person, place, and time. Psychiatric:         Mood and Affect: Mood normal.         Behavior: Behavior normal.         ASSESSMENT and PLAN  Diagnoses and all orders for this visit:    1. Renal mass  Assessment & Plan:  1.7cm left renal mass, mid kidney. Enhancement noted on scan. This likely a small renal carcinoma. We discussed management of small renal lesions. He is on plavix and is s/p CABG 2018. H/o cardiac stents before that. We discussed partial vs total nephrectomy. The patient was counseled on the risks, benefits and expected course of surgery. Surgery has risks of bleeding, infection, injury, pain, death or other consequences. Perioperative medications and antibiotic use were discussed including the potential for reactions and side effects. Some specific risks of surgery were discussed as well. We will plan a robotic left partial vs total nephrectomy depending on the location of the tumor and bleeding risk. He expressed understanding and wishes to proceed. Follow-up and Dispositions    · Return for Surgery to be scheduled.          Fred Manzano MD

## 2021-07-30 NOTE — ASSESSMENT & PLAN NOTE
1.7cm left renal mass, mid kidney. Enhancement noted on scan. This likely a small renal carcinoma. We discussed management of small renal lesions. He is on plavix and is s/p CABG 2018. H/o cardiac stents before that. We discussed partial vs total nephrectomy. The patient was counseled on the risks, benefits and expected course of surgery. Surgery has risks of bleeding, infection, injury, pain, death or other consequences. Perioperative medications and antibiotic use were discussed including the potential for reactions and side effects. Some specific risks of surgery were discussed as well. We will plan a robotic left partial vs total nephrectomy depending on the location of the tumor and bleeding risk. He expressed understanding and wishes to proceed.

## 2021-07-30 NOTE — PROGRESS NOTES
Chief Complaint   Patient presents with    New Patient    Renal Mass         1. Have you been to the ER, urgent care clinic since your last visit? Hospitalized since your last visit? No    2. Have you seen or consulted any other health care providers outside of the [address] [place] [address] since your last visit? Include any pap smears or colon screening.  No      Visit Vitals  BP (!) 173/92 (BP 1 Location: Right upper arm, BP Patient Position: Sitting, BP Cuff Size: Adult)   Pulse 74   Temp 97 °F (36.1 °C) (Temporal)   Resp 12   Ht 5' 10\" (1.778 m)   Wt 223 lb (101.2 kg)   SpO2 99%   BMI 32.00 kg/m²

## 2021-08-12 ENCOUNTER — HOSPITAL ENCOUNTER (OUTPATIENT)
Dept: PREADMISSION TESTING | Age: 66
Discharge: HOME | End: 2021-08-12
Payer: MEDICARE

## 2021-08-12 ENCOUNTER — HOSPITAL ENCOUNTER (OUTPATIENT)
Dept: GENERAL RADIOLOGY | Age: 66
Discharge: HOME | End: 2021-08-12
Attending: UROLOGY
Payer: MEDICARE

## 2021-08-12 ENCOUNTER — TELEPHONE (OUTPATIENT)
Dept: UROLOGY | Age: 66
End: 2021-08-12

## 2021-08-12 VITALS
HEART RATE: 70 BPM | RESPIRATION RATE: 18 BRPM | SYSTOLIC BLOOD PRESSURE: 136 MMHG | HEIGHT: 71 IN | TEMPERATURE: 98.2 F | WEIGHT: 218.2 LBS | BODY MASS INDEX: 30.55 KG/M2 | DIASTOLIC BLOOD PRESSURE: 70 MMHG | OXYGEN SATURATION: 98 %

## 2021-08-12 LAB
ABO + RH BLD: NORMAL
ANION GAP SERPL CALC-SCNC: 5 MMOL/L (ref 5–15)
APPEARANCE UR: CLEAR
BACTERIA URNS QL MICRO: NEGATIVE /HPF
BILIRUB UR QL: NEGATIVE
BLOOD GROUP ANTIBODIES SERPL: NEGATIVE
BUN SERPL-MCNC: 13 MG/DL (ref 6–20)
BUN/CREAT SERPL: 12 (ref 12–20)
CA-I BLD-MCNC: 8.9 MG/DL (ref 8.5–10.1)
CHLORIDE SERPL-SCNC: 104 MMOL/L (ref 97–108)
CO2 SERPL-SCNC: 26 MMOL/L (ref 21–32)
COLOR UR: ABNORMAL
CREAT SERPL-MCNC: 1.05 MG/DL (ref 0.7–1.3)
ERYTHROCYTE [DISTWIDTH] IN BLOOD BY AUTOMATED COUNT: 15.3 % (ref 11.5–14.5)
GLUCOSE SERPL-MCNC: 314 MG/DL (ref 65–100)
GLUCOSE UR STRIP.AUTO-MCNC: >300 MG/DL
HCT VFR BLD AUTO: 26.4 % (ref 36.6–50.3)
HGB BLD-MCNC: 7.9 G/DL (ref 12.1–17)
HGB UR QL STRIP: NEGATIVE
KETONES UR QL STRIP.AUTO: NEGATIVE MG/DL
LEUKOCYTE ESTERASE UR QL STRIP.AUTO: NEGATIVE
MCH RBC QN AUTO: 26.1 PG (ref 26–34)
MCHC RBC AUTO-ENTMCNC: 29.9 G/DL (ref 30–36.5)
MCV RBC AUTO: 87.1 FL (ref 80–99)
MUCOUS THREADS URNS QL MICRO: ABNORMAL /LPF
NITRITE UR QL STRIP.AUTO: NEGATIVE
NRBC # BLD: 0 K/UL (ref 0–0.01)
NRBC BLD-RTO: 0 PER 100 WBC
PH UR STRIP: 5 [PH] (ref 5–8)
PLATELET # BLD AUTO: 288 K/UL (ref 150–400)
PMV BLD AUTO: 11.1 FL (ref 8.9–12.9)
POTASSIUM SERPL-SCNC: 3.8 MMOL/L (ref 3.5–5.1)
PROT UR STRIP-MCNC: 30 MG/DL
RBC # BLD AUTO: 3.03 M/UL (ref 4.1–5.7)
RBC #/AREA URNS HPF: ABNORMAL /HPF (ref 0–5)
SARS-COV-2, COV2: NORMAL
SODIUM SERPL-SCNC: 135 MMOL/L (ref 136–145)
SP GR UR REFRACTOMETRY: 1.03 (ref 1–1.03)
SPECIMEN EXP DATE BLD: NORMAL
UROBILINOGEN UR QL STRIP.AUTO: 0.1 EU/DL (ref 0.1–1)
WBC # BLD AUTO: 6.5 K/UL (ref 4.1–11.1)
WBC URNS QL MICRO: ABNORMAL /HPF (ref 0–4)

## 2021-08-12 PROCEDURE — 87086 URINE CULTURE/COLONY COUNT: CPT

## 2021-08-12 PROCEDURE — 80048 BASIC METABOLIC PNL TOTAL CA: CPT

## 2021-08-12 PROCEDURE — 71046 X-RAY EXAM CHEST 2 VIEWS: CPT

## 2021-08-12 PROCEDURE — U0003 INFECTIOUS AGENT DETECTION BY NUCLEIC ACID (DNA OR RNA); SEVERE ACUTE RESPIRATORY SYNDROME CORONAVIRUS 2 (SARS-COV-2) (CORONAVIRUS DISEASE [COVID-19]), AMPLIFIED PROBE TECHNIQUE, MAKING USE OF HIGH THROUGHPUT TECHNOLOGIES AS DESCRIBED BY CMS-2020-01-R: HCPCS

## 2021-08-12 PROCEDURE — 87641 MR-STAPH DNA AMP PROBE: CPT

## 2021-08-12 PROCEDURE — 81001 URINALYSIS AUTO W/SCOPE: CPT

## 2021-08-12 PROCEDURE — 85027 COMPLETE CBC AUTOMATED: CPT

## 2021-08-12 PROCEDURE — 36415 COLL VENOUS BLD VENIPUNCTURE: CPT

## 2021-08-12 PROCEDURE — 86850 RBC ANTIBODY SCREEN: CPT

## 2021-08-12 NOTE — TELEPHONE ENCOUNTER
Tried to call pt to reschedule surgery and his mailbox was to full will call back Friday to try to reschedule

## 2021-08-12 NOTE — TELEPHONE ENCOUNTER
Per Dr. Bismark Rossi, patient has to be off of Plavix for 7 days. He will need to be rescheduled for surgery. Can you please call him and arrange that? PAT was completed. Labs are good for 3 weeks, so it would be ideal to get him rescheduled in that time frame. Please give the patient a specific STOP date for Plavix (7 days prior to scheduled date). He should restart his medication now and then stop on the date you provide him. PAT verified that he has to remain on ASA per Cardiology.

## 2021-08-12 NOTE — PROGRESS NOTES
1528- Patient called made aware procedure will need to be rescheduled per Dr. Mya Moncada order patient must be off Plavix seven days prior to procedure made him aware office is trying to get in touch with him to reschedule and give him instructions on his Plavix. He asked for me to speak with his friend who helps him gave him the information as well and office number. Made him aware he will need to restart his Plavix if his procedure is more than seven days out and the office will give him an exact stop date. Also educated the patient to monitor his blood sugar and adjust his diet due to elevated blood sugar.

## 2021-08-12 NOTE — ROUTINE PROCESS
1037- Patient stated during PAT visit that he was unsure of when he was supposed to start holding his Plavix. Patient's cardiologist sent cardiac clearance with instructions that patient could hold Plavix 7 days prior to his procedure per Dr. Jensen So request but to continue his Aspirin. Patient stated he was not aware of this and took his Plavix yesterday 8/11/2021. Dr. Jensen So office called spoke with Abigail Gibson, Nurse Practitioner made her aware of information above she stated she will notify Dr. Juan Browning and call back to PAT with further instructions.

## 2021-08-12 NOTE — PROGRESS NOTES
1- Dr. Soni Santos office called spoke with Misti Horta, Nurse Practitioner made her aware of patient's abnormal labs CBC RBC 3.03, HGB 7.9, HCT 26.4,  BMP Sodium 135, Glucose 314,  And UA- protein 30, Glucose >300. She ordered a repeat H and H for day of surgery once rescheduled and stated if able to reach the patient instruct him to monitor his sugars and adjust his diet prior to surgery, blood sugar check will be done day of surgery per protocol.

## 2021-08-12 NOTE — PROGRESS NOTES
PAT labs discussed with Chase Yousif RN. Plan for day of H/H and glucose. Patient will be reminded to watch his diet pre-op. Of note, some recent rectal bleeding was noted by patient. Reported no ongoing issue, but H/H is lower than 4 months ago; thus recheck on day of surgery.

## 2021-08-12 NOTE — TELEPHONE ENCOUNTER
fabio from [facility] ext [phone] called about pt  thats having a surgery on 8/16.  She said that on the clearance form he was suppose to stop taking plavix 7 days before his surgery but can continue to take aspirin but she said that he took a dose of it on 8/11 and wanted to know if that would be enough time since his surgery on monday

## 2021-08-12 NOTE — PROGRESS NOTES
[address] Donna Howell, Nurse Practitioner called back to PAT stated she spoke with Dr. Brennan Raymond regarding Plavix. Dr. Brennan Raymond stated patient must hold his Plavix seven days prior to his surgery and will have to be rescheduled. She stated she will make Nel Cliff aware and have her reschedule the patient. She also stated Nel will call the patient and instruct him that the procedure is rescheduled and how to hold and resume his Plavix. Also made Chuck Garcia aware patient stated during PAT visit that he had been recently having some rectal bleeding his last episode was this past weekend stated he was passing \"big clumps\" and it has since stopped. She stated she will make a note of this.

## 2021-08-13 ENCOUNTER — TELEPHONE (OUTPATIENT)
Dept: UROLOGY | Age: 66
End: 2021-08-13

## 2021-08-13 LAB
BACTERIA SPEC CULT: NORMAL
MRSA DNA SPEC QL NAA+PROBE: NOT DETECTED
SARS-COV-2, COV2NT: NOT DETECTED
SPECIAL REQUESTS,SREQ: NORMAL

## 2021-08-13 NOTE — TELEPHONE ENCOUNTER
Tried to lvm for pt letting him know that he would need to start taking his [address] ON 8/23 but his mailbox was to full

## 2021-08-16 NOTE — PERIOP NOTES
1230 Patient called at home , stated he was instructed by Dr. Nicolas Murphy to hold his plavix for 7 days prior to his surgery, rescheduled on 8/23/2021, patient stated he is on aspirin 81mg daily also, patient questioned if he needs to hold aspirin also. Dr. Enrique Daily office called, left a message for ESSIE Connolly re: above note. Brandi 19 called to PAT, stated pt is to continue his aspirin as usual. Patient called and made aware to continue his aspirin as usual, pt verbalized understanding.

## 2021-08-19 ENCOUNTER — HOSPITAL ENCOUNTER (OUTPATIENT)
Dept: PREADMISSION TESTING | Age: 66
Discharge: HOME | End: 2021-08-19
Payer: MEDICARE

## 2021-08-19 LAB
ABO + RH BLD: NORMAL
BLOOD GROUP ANTIBODIES SERPL: NEGATIVE
SARS-COV-2, COV2: NORMAL
SPECIMEN EXP DATE BLD: NORMAL

## 2021-08-19 PROCEDURE — 86901 BLOOD TYPING SEROLOGIC RH(D): CPT

## 2021-08-19 PROCEDURE — U0005 INFEC AGEN DETEC AMPLI PROBE: HCPCS

## 2021-08-19 NOTE — PROGRESS NOTES
Pt here to have H&H, type and screen and covid swab. No changes in history, medications and pt has not had any symptoms of covid. Pt knows to hold Plavix 7 days prior to surgery. Last day received 8/17/2021.  Pt to continue aspirin

## 2021-08-20 ENCOUNTER — HOSPITAL ENCOUNTER (OUTPATIENT)
Dept: PREADMISSION TESTING | Age: 66
Discharge: HOME | End: 2021-08-20

## 2021-08-20 ENCOUNTER — HOSPITAL ENCOUNTER (OUTPATIENT)
Dept: LAB | Age: 66
Discharge: HOME | End: 2021-08-20
Payer: MEDICARE

## 2021-08-20 LAB
HCT VFR BLD AUTO: 26.7 % (ref 36.6–50.3)
HGB BLD-MCNC: 8.1 G/DL (ref 12.1–17)
SARS-COV-2, COV2NT: NOT DETECTED

## 2021-08-20 PROCEDURE — 36415 COLL VENOUS BLD VENIPUNCTURE: CPT

## 2021-08-20 PROCEDURE — 85018 HEMOGLOBIN: CPT

## 2021-08-23 ENCOUNTER — ANESTHESIA EVENT (OUTPATIENT)
Dept: SURGERY | Age: 66
End: 2021-08-23
Payer: MEDICARE

## 2021-08-23 ENCOUNTER — ANESTHESIA (OUTPATIENT)
Dept: SURGERY | Age: 66
End: 2021-08-23
Payer: MEDICARE

## 2021-08-23 ENCOUNTER — HOSPITAL ENCOUNTER (OUTPATIENT)
Age: 66
Discharge: HOME | End: 2021-08-24
Attending: UROLOGY | Admitting: UROLOGY
Payer: MEDICARE

## 2021-08-23 DIAGNOSIS — N28.89 KIDNEY MASS: Primary | ICD-10-CM

## 2021-08-23 DIAGNOSIS — N28.89 RENAL MASS: ICD-10-CM

## 2021-08-23 LAB
APPEARANCE UR: CLEAR
BACTERIA URNS QL MICRO: NEGATIVE /HPF
BILIRUB UR QL: NEGATIVE
COLOR UR: ABNORMAL
GLUCOSE BLD STRIP.AUTO-MCNC: 210 MG/DL (ref 65–117)
GLUCOSE BLD STRIP.AUTO-MCNC: 231 MG/DL (ref 65–117)
GLUCOSE BLD STRIP.AUTO-MCNC: 279 MG/DL (ref 65–117)
GLUCOSE BLD STRIP.AUTO-MCNC: 286 MG/DL (ref 65–117)
GLUCOSE BLD STRIP.AUTO-MCNC: 300 MG/DL (ref 65–117)
GLUCOSE BLD STRIP.AUTO-MCNC: 315 MG/DL (ref 65–117)
GLUCOSE BLD STRIP.AUTO-MCNC: 411 MG/DL (ref 65–117)
GLUCOSE UR STRIP.AUTO-MCNC: >300 MG/DL
HCT VFR BLD AUTO: 25.5 % (ref 36.6–50.3)
HGB BLD-MCNC: 7.7 G/DL (ref 12.1–17)
HGB UR QL STRIP: NEGATIVE
KETONES UR QL STRIP.AUTO: NEGATIVE MG/DL
LEUKOCYTE ESTERASE UR QL STRIP.AUTO: NEGATIVE
NITRITE UR QL STRIP.AUTO: NEGATIVE
PERFORMED BY, TECHID: ABNORMAL
PH UR STRIP: 7 [PH] (ref 5–8)
POTASSIUM SERPL-SCNC: 4.4 MMOL/L (ref 3.5–5.1)
PROT UR STRIP-MCNC: NEGATIVE MG/DL
RBC #/AREA URNS HPF: ABNORMAL /HPF (ref 0–5)
SP GR UR REFRACTOMETRY: 1.02 (ref 1–1.03)
UROBILINOGEN UR QL STRIP.AUTO: 0.1 EU/DL (ref 0.1–1)
WBC URNS QL MICRO: ABNORMAL /HPF (ref 0–4)

## 2021-08-23 PROCEDURE — 88307 TISSUE EXAM BY PATHOLOGIST: CPT

## 2021-08-23 PROCEDURE — 77030040361 HC SLV COMPR DVT MDII -B: Performed by: UROLOGY

## 2021-08-23 PROCEDURE — 74011636637 HC RX REV CODE- 636/637: Performed by: PHYSICIAN ASSISTANT

## 2021-08-23 PROCEDURE — 84132 ASSAY OF SERUM POTASSIUM: CPT

## 2021-08-23 PROCEDURE — 74011636637 HC RX REV CODE- 636/637: Performed by: UROLOGY

## 2021-08-23 PROCEDURE — 77030002933 HC SUT MCRYL J&J -A: Performed by: UROLOGY

## 2021-08-23 PROCEDURE — 83036 HEMOGLOBIN GLYCOSYLATED A1C: CPT

## 2021-08-23 PROCEDURE — 77030002888 HC SUT CHRMC J&J -A: Performed by: UROLOGY

## 2021-08-23 PROCEDURE — 2709999900 HC NON-CHARGEABLE SUPPLY: Performed by: UROLOGY

## 2021-08-23 PROCEDURE — 74011636637 HC RX REV CODE- 636/637: Performed by: ANESTHESIOLOGY

## 2021-08-23 PROCEDURE — 76210000006 HC OR PH I REC 0.5 TO 1 HR: Performed by: UROLOGY

## 2021-08-23 PROCEDURE — 77030020703 HC SEAL CANN DISP INTU -B: Performed by: UROLOGY

## 2021-08-23 PROCEDURE — 88305 TISSUE EXAM BY PATHOLOGIST: CPT

## 2021-08-23 PROCEDURE — 77030031139 HC SUT VCRL2 J&J -A: Performed by: UROLOGY

## 2021-08-23 PROCEDURE — 36415 COLL VENOUS BLD VENIPUNCTURE: CPT

## 2021-08-23 PROCEDURE — 76060000036 HC ANESTHESIA 2.5 TO 3 HR: Performed by: UROLOGY

## 2021-08-23 PROCEDURE — 74011250636 HC RX REV CODE- 250/636: Performed by: NURSE ANESTHETIST, CERTIFIED REGISTERED

## 2021-08-23 PROCEDURE — 77030008606 HC TRCR ENDOSC KII AMR -B: Performed by: UROLOGY

## 2021-08-23 PROCEDURE — 82962 GLUCOSE BLOOD TEST: CPT

## 2021-08-23 PROCEDURE — 87086 URINE CULTURE/COLONY COUNT: CPT

## 2021-08-23 PROCEDURE — 81001 URINALYSIS AUTO W/SCOPE: CPT

## 2021-08-23 PROCEDURE — 85014 HEMATOCRIT: CPT

## 2021-08-23 PROCEDURE — 76998 US GUIDE INTRAOP: CPT | Performed by: UROLOGY

## 2021-08-23 PROCEDURE — 77030002896 HC SUT CLP ABSRB J&J -B: Performed by: UROLOGY

## 2021-08-23 PROCEDURE — 77030005515 HC CATH URETH FOL14 BARD -B: Performed by: UROLOGY

## 2021-08-23 PROCEDURE — 77030010507 HC ADH SKN DERMBND J&J -B: Performed by: UROLOGY

## 2021-08-23 PROCEDURE — 50543 LAPARO PARTIAL NEPHRECTOMY: CPT | Performed by: UROLOGY

## 2021-08-23 PROCEDURE — 77030035277 HC OBTRTR BLDELSS DISP INTU -B: Performed by: UROLOGY

## 2021-08-23 PROCEDURE — 77030003578 HC NDL INSUF VERES AMR -B: Performed by: UROLOGY

## 2021-08-23 PROCEDURE — 77030008684 HC TU ET CUF COVD -B: Performed by: ANESTHESIOLOGY

## 2021-08-23 PROCEDURE — 74011250636 HC RX REV CODE- 250/636: Performed by: UROLOGY

## 2021-08-23 PROCEDURE — 77030016151 HC PROTCTR LNS DFOG COVD -B: Performed by: UROLOGY

## 2021-08-23 PROCEDURE — 77030019908 HC STETH ESOPH SIMS -A: Performed by: ANESTHESIOLOGY

## 2021-08-23 PROCEDURE — 77030008756 HC TU IRR SUC STRY -B: Performed by: UROLOGY

## 2021-08-23 PROCEDURE — 74011000250 HC RX REV CODE- 250: Performed by: UROLOGY

## 2021-08-23 PROCEDURE — 76010000877 HC OR TIME 2.5 TO 3HR INTENSV - TIER 2: Performed by: UROLOGY

## 2021-08-23 PROCEDURE — 77030026438 HC STYL ET INTUB CARD -A: Performed by: ANESTHESIOLOGY

## 2021-08-23 PROCEDURE — 77030009851 HC PCH RTVR ENDOSC AMR -B: Performed by: UROLOGY

## 2021-08-23 PROCEDURE — 77030010935 HC CLP LIG ABSRB TELE -B: Performed by: UROLOGY

## 2021-08-23 PROCEDURE — 74011000250 HC RX REV CODE- 250: Performed by: NURSE ANESTHETIST, CERTIFIED REGISTERED

## 2021-08-23 PROCEDURE — 74011250637 HC RX REV CODE- 250/637: Performed by: UROLOGY

## 2021-08-23 PROCEDURE — 74011250637 HC RX REV CODE- 250/637: Performed by: PHYSICIAN ASSISTANT

## 2021-08-23 PROCEDURE — 77030018842 HC SOL IRR SOD CL 9% BAXT -A: Performed by: UROLOGY

## 2021-08-23 PROCEDURE — 77030008518 HC TBNG INSUF ENDO STRY -B: Performed by: UROLOGY

## 2021-08-23 PROCEDURE — 77030013079 HC BLNKT BAIR HGGR 3M -A: Performed by: ANESTHESIOLOGY

## 2021-08-23 DEVICE — CLIP LIG ABSRB HEM-LOK LG PUR --: Type: IMPLANTABLE DEVICE | Site: KIDNEY | Status: FUNCTIONAL

## 2021-08-23 RX ORDER — MORPHINE SULFATE 2 MG/ML
2 INJECTION, SOLUTION INTRAMUSCULAR; INTRAVENOUS
Status: DISCONTINUED | OUTPATIENT
Start: 2021-08-23 | End: 2021-08-24 | Stop reason: HOSPADM

## 2021-08-23 RX ORDER — INSULIN LISPRO 100 [IU]/ML
INJECTION, SOLUTION INTRAVENOUS; SUBCUTANEOUS
Status: DISCONTINUED | OUTPATIENT
Start: 2021-08-23 | End: 2021-08-23

## 2021-08-23 RX ORDER — HYDROCODONE BITARTRATE AND ACETAMINOPHEN 5; 325 MG/1; MG/1
1 TABLET ORAL
Status: DISCONTINUED | OUTPATIENT
Start: 2021-08-23 | End: 2021-08-24 | Stop reason: HOSPADM

## 2021-08-23 RX ORDER — SERTRALINE HYDROCHLORIDE 50 MG/1
50 TABLET, FILM COATED ORAL DAILY
Status: DISCONTINUED | OUTPATIENT
Start: 2021-08-24 | End: 2021-08-24 | Stop reason: HOSPADM

## 2021-08-23 RX ORDER — INSULIN GLARGINE 100 [IU]/ML
20 INJECTION, SOLUTION SUBCUTANEOUS
Status: DISCONTINUED | OUTPATIENT
Start: 2021-08-23 | End: 2021-08-24 | Stop reason: HOSPADM

## 2021-08-23 RX ORDER — METOPROLOL SUCCINATE 50 MG/1
50 TABLET, EXTENDED RELEASE ORAL
Status: DISCONTINUED | OUTPATIENT
Start: 2021-08-23 | End: 2021-08-24 | Stop reason: HOSPADM

## 2021-08-23 RX ORDER — ONDANSETRON 2 MG/ML
4 INJECTION INTRAMUSCULAR; INTRAVENOUS
Status: DISCONTINUED | OUTPATIENT
Start: 2021-08-23 | End: 2021-08-24 | Stop reason: HOSPADM

## 2021-08-23 RX ORDER — GLYCOPYRROLATE 0.2 MG/ML
INJECTION INTRAMUSCULAR; INTRAVENOUS AS NEEDED
Status: DISCONTINUED | OUTPATIENT
Start: 2021-08-23 | End: 2021-08-23 | Stop reason: HOSPADM

## 2021-08-23 RX ORDER — BUPIVACAINE HYDROCHLORIDE 2.5 MG/ML
INJECTION, SOLUTION EPIDURAL; INFILTRATION; INTRACAUDAL AS NEEDED
Status: DISCONTINUED | OUTPATIENT
Start: 2021-08-23 | End: 2021-08-23 | Stop reason: HOSPADM

## 2021-08-23 RX ORDER — NEOSTIGMINE METHYLSULFATE 1 MG/ML
INJECTION INTRAVENOUS AS NEEDED
Status: DISCONTINUED | OUTPATIENT
Start: 2021-08-23 | End: 2021-08-23 | Stop reason: HOSPADM

## 2021-08-23 RX ORDER — SODIUM CHLORIDE 0.9 % (FLUSH) 0.9 %
5-40 SYRINGE (ML) INJECTION EVERY 8 HOURS
Status: DISCONTINUED | OUTPATIENT
Start: 2021-08-23 | End: 2021-08-24 | Stop reason: HOSPADM

## 2021-08-23 RX ORDER — ONDANSETRON 2 MG/ML
INJECTION INTRAMUSCULAR; INTRAVENOUS AS NEEDED
Status: DISCONTINUED | OUTPATIENT
Start: 2021-08-23 | End: 2021-08-23 | Stop reason: HOSPADM

## 2021-08-23 RX ORDER — RANOLAZINE 500 MG/1
500 TABLET, EXTENDED RELEASE ORAL 2 TIMES DAILY
Status: DISCONTINUED | OUTPATIENT
Start: 2021-08-23 | End: 2021-08-24 | Stop reason: HOSPADM

## 2021-08-23 RX ORDER — METOPROLOL SUCCINATE 50 MG/1
50 TABLET, EXTENDED RELEASE ORAL
Status: DISCONTINUED | OUTPATIENT
Start: 2021-08-23 | End: 2021-08-23

## 2021-08-23 RX ORDER — DEXTROSE 50 % IN WATER (D50W) INTRAVENOUS SYRINGE
25-50 AS NEEDED
Status: DISCONTINUED | OUTPATIENT
Start: 2021-08-23 | End: 2021-08-24 | Stop reason: HOSPADM

## 2021-08-23 RX ORDER — NIFEDIPINE 30 MG/1
30 TABLET, EXTENDED RELEASE ORAL DAILY
Status: DISCONTINUED | OUTPATIENT
Start: 2021-08-24 | End: 2021-08-23

## 2021-08-23 RX ORDER — MAGNESIUM SULFATE 100 %
4 CRYSTALS MISCELLANEOUS AS NEEDED
Status: DISCONTINUED | OUTPATIENT
Start: 2021-08-23 | End: 2021-08-24 | Stop reason: HOSPADM

## 2021-08-23 RX ORDER — ISOSORBIDE MONONITRATE 30 MG/1
60 TABLET, EXTENDED RELEASE ORAL DAILY
Status: DISCONTINUED | OUTPATIENT
Start: 2021-08-24 | End: 2021-08-24 | Stop reason: HOSPADM

## 2021-08-23 RX ORDER — MIDAZOLAM HYDROCHLORIDE 1 MG/ML
INJECTION, SOLUTION INTRAMUSCULAR; INTRAVENOUS AS NEEDED
Status: DISCONTINUED | OUTPATIENT
Start: 2021-08-23 | End: 2021-08-23 | Stop reason: HOSPADM

## 2021-08-23 RX ORDER — SODIUM CHLORIDE, SODIUM LACTATE, POTASSIUM CHLORIDE, CALCIUM CHLORIDE 600; 310; 30; 20 MG/100ML; MG/100ML; MG/100ML; MG/100ML
20 INJECTION, SOLUTION INTRAVENOUS CONTINUOUS
Status: DISCONTINUED | OUTPATIENT
Start: 2021-08-23 | End: 2021-08-24 | Stop reason: HOSPADM

## 2021-08-23 RX ORDER — SERTRALINE HYDROCHLORIDE 50 MG/1
50 TABLET, FILM COATED ORAL DAILY
Status: DISCONTINUED | OUTPATIENT
Start: 2021-08-24 | End: 2021-08-23

## 2021-08-23 RX ORDER — ISOSORBIDE MONONITRATE 30 MG/1
60 TABLET, EXTENDED RELEASE ORAL DAILY
Status: DISCONTINUED | OUTPATIENT
Start: 2021-08-24 | End: 2021-08-23

## 2021-08-23 RX ORDER — FUROSEMIDE 10 MG/ML
INJECTION INTRAMUSCULAR; INTRAVENOUS AS NEEDED
Status: DISCONTINUED | OUTPATIENT
Start: 2021-08-23 | End: 2021-08-23 | Stop reason: HOSPADM

## 2021-08-23 RX ORDER — DEXAMETHASONE SODIUM PHOSPHATE 4 MG/ML
INJECTION, SOLUTION INTRA-ARTICULAR; INTRALESIONAL; INTRAMUSCULAR; INTRAVENOUS; SOFT TISSUE AS NEEDED
Status: DISCONTINUED | OUTPATIENT
Start: 2021-08-23 | End: 2021-08-23 | Stop reason: HOSPADM

## 2021-08-23 RX ORDER — SODIUM CHLORIDE, SODIUM LACTATE, POTASSIUM CHLORIDE, CALCIUM CHLORIDE 600; 310; 30; 20 MG/100ML; MG/100ML; MG/100ML; MG/100ML
INJECTION, SOLUTION INTRAVENOUS
Status: DISCONTINUED | OUTPATIENT
Start: 2021-08-23 | End: 2021-08-23 | Stop reason: HOSPADM

## 2021-08-23 RX ORDER — INSULIN LISPRO 100 [IU]/ML
5 INJECTION, SOLUTION INTRAVENOUS; SUBCUTANEOUS
Status: DISCONTINUED | OUTPATIENT
Start: 2021-08-23 | End: 2021-08-24 | Stop reason: HOSPADM

## 2021-08-23 RX ORDER — INSULIN LISPRO 100 [IU]/ML
INJECTION, SOLUTION INTRAVENOUS; SUBCUTANEOUS
Status: DISCONTINUED | OUTPATIENT
Start: 2021-08-23 | End: 2021-08-24 | Stop reason: HOSPADM

## 2021-08-23 RX ORDER — DOCUSATE SODIUM 100 MG/1
100 CAPSULE, LIQUID FILLED ORAL 2 TIMES DAILY
Status: DISCONTINUED | OUTPATIENT
Start: 2021-08-23 | End: 2021-08-24 | Stop reason: HOSPADM

## 2021-08-23 RX ORDER — PROPOFOL 10 MG/ML
INJECTION, EMULSION INTRAVENOUS AS NEEDED
Status: DISCONTINUED | OUTPATIENT
Start: 2021-08-23 | End: 2021-08-23 | Stop reason: HOSPADM

## 2021-08-23 RX ORDER — NIFEDIPINE 30 MG/1
30 TABLET, EXTENDED RELEASE ORAL DAILY
Status: DISCONTINUED | OUTPATIENT
Start: 2021-08-24 | End: 2021-08-24 | Stop reason: HOSPADM

## 2021-08-23 RX ORDER — FENTANYL CITRATE 50 UG/ML
INJECTION, SOLUTION INTRAMUSCULAR; INTRAVENOUS AS NEEDED
Status: DISCONTINUED | OUTPATIENT
Start: 2021-08-23 | End: 2021-08-23 | Stop reason: HOSPADM

## 2021-08-23 RX ORDER — SODIUM CHLORIDE 0.9 % (FLUSH) 0.9 %
5-40 SYRINGE (ML) INJECTION AS NEEDED
Status: DISCONTINUED | OUTPATIENT
Start: 2021-08-23 | End: 2021-08-24 | Stop reason: HOSPADM

## 2021-08-23 RX ORDER — ACETAMINOPHEN 325 MG/1
650 TABLET ORAL
Status: DISCONTINUED | OUTPATIENT
Start: 2021-08-23 | End: 2021-08-24 | Stop reason: HOSPADM

## 2021-08-23 RX ORDER — HYDROMORPHONE HYDROCHLORIDE 2 MG/ML
INJECTION, SOLUTION INTRAMUSCULAR; INTRAVENOUS; SUBCUTANEOUS AS NEEDED
Status: DISCONTINUED | OUTPATIENT
Start: 2021-08-23 | End: 2021-08-23 | Stop reason: HOSPADM

## 2021-08-23 RX ORDER — KETOROLAC TROMETHAMINE 30 MG/ML
INJECTION, SOLUTION INTRAMUSCULAR; INTRAVENOUS AS NEEDED
Status: DISCONTINUED | OUTPATIENT
Start: 2021-08-23 | End: 2021-08-23 | Stop reason: HOSPADM

## 2021-08-23 RX ORDER — INSULIN ASPART 100 [IU]/ML
10 INJECTION, SOLUTION INTRAVENOUS; SUBCUTANEOUS DAILY
COMMUNITY
End: 2021-09-14 | Stop reason: ALTCHOICE

## 2021-08-23 RX ORDER — ROCURONIUM BROMIDE 10 MG/ML
INJECTION, SOLUTION INTRAVENOUS AS NEEDED
Status: DISCONTINUED | OUTPATIENT
Start: 2021-08-23 | End: 2021-08-23 | Stop reason: HOSPADM

## 2021-08-23 RX ORDER — LIDOCAINE HYDROCHLORIDE 20 MG/ML
INJECTION, SOLUTION EPIDURAL; INFILTRATION; INTRACAUDAL; PERINEURAL AS NEEDED
Status: DISCONTINUED | OUTPATIENT
Start: 2021-08-23 | End: 2021-08-23 | Stop reason: HOSPADM

## 2021-08-23 RX ORDER — TAMSULOSIN HYDROCHLORIDE 0.4 MG/1
0.4 CAPSULE ORAL
Status: DISCONTINUED | OUTPATIENT
Start: 2021-08-23 | End: 2021-08-23

## 2021-08-23 RX ORDER — RANOLAZINE 500 MG/1
500 TABLET, EXTENDED RELEASE ORAL 2 TIMES DAILY
Status: DISCONTINUED | OUTPATIENT
Start: 2021-08-23 | End: 2021-08-23

## 2021-08-23 RX ORDER — POTASSIUM CHLORIDE AND SODIUM CHLORIDE 450; 150 MG/100ML; MG/100ML
INJECTION, SOLUTION INTRAVENOUS CONTINUOUS
Status: DISCONTINUED | OUTPATIENT
Start: 2021-08-23 | End: 2021-08-24 | Stop reason: HOSPADM

## 2021-08-23 RX ADMIN — POTASSIUM CHLORIDE AND SODIUM CHLORIDE: 450; 150 INJECTION, SOLUTION INTRAVENOUS at 22:55

## 2021-08-23 RX ADMIN — SODIUM CHLORIDE, POTASSIUM CHLORIDE, SODIUM LACTATE AND CALCIUM CHLORIDE: 600; 310; 30; 20 INJECTION, SOLUTION INTRAVENOUS at 08:45

## 2021-08-23 RX ADMIN — INSULIN HUMAN 10 UNITS: 100 INJECTION, SOLUTION PARENTERAL at 07:55

## 2021-08-23 RX ADMIN — KETOROLAC TROMETHAMINE 30 MG: 30 INJECTION, SOLUTION INTRAMUSCULAR at 11:07

## 2021-08-23 RX ADMIN — POTASSIUM CHLORIDE AND SODIUM CHLORIDE: 450; 150 INJECTION, SOLUTION INTRAVENOUS at 13:31

## 2021-08-23 RX ADMIN — INSULIN LISPRO 4 UNITS: 100 INJECTION, SOLUTION INTRAVENOUS; SUBCUTANEOUS at 21:43

## 2021-08-23 RX ADMIN — INSULIN LISPRO 5 UNITS: 100 INJECTION, SOLUTION INTRAVENOUS; SUBCUTANEOUS at 18:02

## 2021-08-23 RX ADMIN — PROPOFOL 200 MG: 10 INJECTION, EMULSION INTRAVENOUS at 08:56

## 2021-08-23 RX ADMIN — Medication 10 ML: at 21:46

## 2021-08-23 RX ADMIN — INSULIN LISPRO 7 UNITS: 100 INJECTION, SOLUTION INTRAVENOUS; SUBCUTANEOUS at 16:48

## 2021-08-23 RX ADMIN — CEFAZOLIN 2 G: 1 INJECTION, POWDER, FOR SOLUTION INTRAMUSCULAR; INTRAVENOUS at 16:48

## 2021-08-23 RX ADMIN — INSULIN LISPRO 3 UNITS: 100 INJECTION, SOLUTION INTRAVENOUS; SUBCUTANEOUS at 13:31

## 2021-08-23 RX ADMIN — METOPROLOL SUCCINATE 50 MG: 50 TABLET, EXTENDED RELEASE ORAL at 18:02

## 2021-08-23 RX ADMIN — CEFAZOLIN SODIUM 2 G: 1 INJECTION, POWDER, FOR SOLUTION INTRAMUSCULAR; INTRAVENOUS at 09:03

## 2021-08-23 RX ADMIN — GLYCOPYRROLATE 0.6 MG: 0.2 INJECTION, SOLUTION INTRAMUSCULAR; INTRAVENOUS at 11:06

## 2021-08-23 RX ADMIN — ROCURONIUM BROMIDE 20 MG: 50 INJECTION, SOLUTION INTRAVENOUS at 10:03

## 2021-08-23 RX ADMIN — ROCURONIUM BROMIDE 50 MG: 50 INJECTION, SOLUTION INTRAVENOUS at 08:56

## 2021-08-23 RX ADMIN — MIDAZOLAM HYDROCHLORIDE 2 MG: 2 INJECTION, SOLUTION INTRAMUSCULAR; INTRAVENOUS at 08:53

## 2021-08-23 RX ADMIN — NEOSTIGMINE METHYLSULFATE 4 MG: 1 INJECTION INTRAVENOUS at 11:06

## 2021-08-23 RX ADMIN — RANOLAZINE 500 MG: 500 TABLET, FILM COATED, EXTENDED RELEASE ORAL at 21:23

## 2021-08-23 RX ADMIN — PHENYLEPHRINE HYDROCHLORIDE 200 MCG: 10 INJECTION INTRAVENOUS at 09:40

## 2021-08-23 RX ADMIN — FUROSEMIDE 20 MG: 10 INJECTION, SOLUTION INTRAMUSCULAR; INTRAVENOUS at 09:54

## 2021-08-23 RX ADMIN — DEXAMETHASONE SODIUM PHOSPHATE 8 MG: 4 INJECTION, SOLUTION INTRA-ARTICULAR; INTRALESIONAL; INTRAMUSCULAR; INTRAVENOUS; SOFT TISSUE at 09:02

## 2021-08-23 RX ADMIN — SODIUM CHLORIDE 1000 ML: 9 INJECTION, SOLUTION INTRAVENOUS at 07:36

## 2021-08-23 RX ADMIN — INSULIN GLARGINE 20 UNITS: 100 INJECTION, SOLUTION SUBCUTANEOUS at 21:43

## 2021-08-23 RX ADMIN — PHENYLEPHRINE HYDROCHLORIDE 100 MCG: 10 INJECTION INTRAVENOUS at 09:36

## 2021-08-23 RX ADMIN — DOCUSATE SODIUM 100 MG: 100 CAPSULE, LIQUID FILLED ORAL at 21:22

## 2021-08-23 RX ADMIN — HYDROMORPHONE HYDROCHLORIDE 1 MG: 2 INJECTION INTRAMUSCULAR; INTRAVENOUS; SUBCUTANEOUS at 11:12

## 2021-08-23 RX ADMIN — ONDANSETRON 4 MG: 2 INJECTION INTRAMUSCULAR; INTRAVENOUS at 09:02

## 2021-08-23 RX ADMIN — FENTANYL CITRATE 50 MCG: 50 INJECTION, SOLUTION INTRAMUSCULAR; INTRAVENOUS at 08:53

## 2021-08-23 RX ADMIN — LIDOCAINE HYDROCHLORIDE 100 MG: 20 INJECTION, SOLUTION EPIDURAL; INFILTRATION; INTRACAUDAL; PERINEURAL at 08:56

## 2021-08-23 RX ADMIN — FENTANYL CITRATE 50 MCG: 50 INJECTION, SOLUTION INTRAMUSCULAR; INTRAVENOUS at 10:55

## 2021-08-23 NOTE — ANESTHESIA PREPROCEDURE EVALUATION
Relevant Problems   RESPIRATORY SYSTEM   (+) Pneumonia due to COVID-19 virus      CARDIOVASCULAR   (+) CAD (coronary artery disease)   (+) Essential hypertension       Anesthetic History   No history of anesthetic complications            Review of Systems / Medical History  Patient summary reviewed, nursing notes reviewed and pertinent labs reviewed    Pulmonary  Within defined limits                 Neuro/Psych   Within defined limits           Cardiovascular    Hypertension          Past MI, CAD, cardiac stents, CABG and hyperlipidemia      Comments: 4/2021 TTE    Interpretation Summary    · LV: Calculated LVEF is 55%. Normal cavity size, wall thickness and systolic function (ejection fraction normal). Wall motion: normal. Normal left ventricular strain. Mild (grade 1) left ventricular diastolic dysfunction. · LA: Mildly dilated left atrium. · AV: Mild aortic valve stenosis is present.  Mild aortic valve regurgitation is present      4/2021 ECG:    Diagnosis   Final  Sinus rhythm with marked sinus arrhythmia   Voltage criteria for left ventricular hypertrophy   Nonspecific T wave abnormality   Abnormal ECG   When compared with ECG of 02-APR-2021 07:11,   No significant change was found   [id number] on 4/2/2021 6:03:43 PM       GI/Hepatic/Renal     GERD           Endo/Other    Diabetes: poorly controlled, type 2, using insulin    Obesity, arthritis and anemia     Other Findings   Comments: COVID-19 Vaccine:   Unknown  Allergies  No Known Allergies  Ht: -  Weight: -  BMI: 30.43 kg/m²  No watch meds found  Procedure  ROBOTIC ASSISTED LAPAROSCOPIC LEFT PARTIAL VS TOTAL NEPHRECTOMY WITH INTRAOPERATIVE ULTRASOUND (Left Abdomen)  In Pre-Op, [facility] MAIN OR 09      Medical History  Diabetes ([address]  HTN (hypertension)  Hyperlipidemia  Burning with urination  CAD (coronary artery disease)  MI (myocardial infarction) (HCC)  Arthritis  GERD (gastroesophageal reflux disease)  Rectal bleeding  Renal mass, left  History of blood transfusion         Physical Exam    Airway  Mallampati: III  TM Distance: 4 - 6 cm  Neck ROM: normal range of motion   Mouth opening: Normal     Cardiovascular    Rhythm: regular  Rate: normal         Dental  No notable dental hx       Pulmonary  Breath sounds clear to auscultation               Abdominal  GI exam deferred       Other Findings   Comments: Results for Sammy Staff (MRN [id number]) as of 8/23/2021 08:27    8/12/2021 11:17  Sodium: 135 (L)  Potassium: 3.8  Chloride: 104  CO2: 26  Anion gap: 5  Glucose: 314 (H)  BUN: 13  Creatinine: 1.05  BUN/Creatinine ratio: 12  Calcium: 8.9  GFR est non-AA: >60  GFR est AA: >60    Results for Sammy Staff (MRN [id number]) as of 8/23/2021 08:27    8/20/2021 10:25  HGB: 8.1 (L)  HCT: 26.7 (L)    Component 8/19/21 1025  Crossmatch Expiration 08/26/2021,2359   ABO/Rh(D) A Positive   Antibody screen Negative            Anesthetic Plan    ASA: 4  Anesthesia type: general          Induction: Intravenous  Anesthetic plan and risks discussed with: Patient

## 2021-08-23 NOTE — H&P
UROLOGY HISTORY AND PHYSICAL  Estuardo BERNA [place], [address] [facility]    Patient: Nacho Pompa MRN: [id number]  SSN: [id number]    YOB: 1955  Age: 74 y.o. Sex: male          Date of Encounter:  August 23, 2021  ADMITTED: 8/23/2021 to Jared Cortez MD by Nato Mauro MD for Renal mass [N28.89]; Kidney mass [N28.89]  Chief Complaint:  Renal mass             History of Present Illness:  Patient is a 74 y.o. male admitted 8/23/2021 to the hospital for Renal mass [N28.89]; Kidney mass [N28.89]. He is found an incidental left renal mass concerning for renal cancer. He also has diabetes. His blood sugar was 411 today. He states he checks his blood sugar \"sometimes\". See the problem list.     Problem List  Date Reviewed: 7/30/2021        Codes Class Noted    Kidney mass ICD-10-CM: N28.89  ICD-9-CM: 593.9  8/23/2021        At high risk for falls ICD-10-CM: Z91.81  ICD-9-CM: V15.88  5/23/2021        H/O colonoscopy ICD-10-CM: Z98.890  ICD-9-CM: V45.89  5/23/2021    Overview Signed 5/23/2021  1:55 PM by Mary Leiva NP     Done [facility]             Renal mass ICD-10-CM: N28.89  ICD-9-CM: 593.9  4/9/2021    Overview Signed 5/12/2021  8:15 AM by Brendan Glass NP     CT 4/4/21: The left kidney reveals a 1.7 x 1.7 cm mass that demonstrates enhancement. The precontrast CT reveals a density of 35 Hounsfield units. This enhances to 84 Hounsfield units after IV contrast administration. There is concern for neoplasm.              Uncontrolled type 2 diabetes mellitus with hyperglycemia ([facility] [address] ICD-10-CM: E11.65  ICD-9-CM: 250.02  4/9/2021        Blurred vision, bilateral ICD-10-CM: H53.8  ICD-9-CM: 368.8  4/9/2021        Mixed hyperlipidemia ICD-10-CM: E78.2  ICD-9-CM: 272.2  4/9/2021        Respiratory failure with hypoxia (HCC) ICD-10-CM: J96.91  ICD-9-CM: 518.81  2/22/2021        Pneumonia due to COVID-19 virus ICD-10-CM: U07.1, J12.82  ICD-9-CM: 480.8, 079.89  2/17/2021        Chest pain ICD-10-CM: R07.9  ICD-9-CM: 786.50  12/12/2020        CAD (coronary artery disease) ICD-10-CM: I25.10  ICD-9-CM: 414.00  12/12/2020        Essential hypertension ICD-10-CM: I10  ICD-9-CM: 401.9  12/12/2020               Past Medical History:  No Known Allergies   Prior to Admission medications    Medication Sig Start Date End Date Taking? Authorizing Provider   insulin aspart U-100 (NovoLOG Flexpen U-100 Insulin) 100 unit/mL (3 mL) inpn 10 Units by SubCUTAneous route daily. Yes Provider, Historical   ranolazine ER (RANEXA) 500 mg SR tablet Take 500 mg by mouth two (2) times a day. 6/1/21  Yes Provider, Historical   aspirin delayed-release 81 mg tablet Take 81 mg by mouth daily. Yes Provider, Historical   sertraline (ZOLOFT) 50 mg tablet Take 1 Tab by mouth daily. 5/14/21  Yes Анна Perez NP   empagliflozin (Jardiance) 25 mg tablet Take 1 Tab by mouth daily. 4/9/21  Yes Анна Perez NP   furosemide (LASIX) 20 mg tablet Take 1 Tab by mouth daily. 4/9/21  Yes Анна Perez NP   insulin glargine (LANTUS) 100 unit/mL injection 40 Units by SubCUTAneous route nightly. Patient taking differently: 10 Units by SubCUTAneous route nightly. 3/15/21  Yes Fly Tellez MD   metoprolol succinate (TOPROL-XL) 50 mg XL tablet Take 1 Tab by mouth two (2) times daily (after meals). Patient taking differently: Take 50 mg by mouth two (2) times daily (after meals). Dr Juanpablo Huynh 12/14/20  Yes Bird Benoit MD   NIFEdipine ER (PROCARDIA XL) 30 mg ER tablet Take 1 Tab by mouth daily. Patient taking differently: Take 30 mg by mouth daily. Dr Juanpablo Huynh 12/15/20  Yes Bird Benoit MD   isosorbide mononitrate ER (IMDUR) 60 mg CR tablet Take 60 mg by mouth daily. Imani Leach MD   atorvastatin (LIPITOR) 20 mg tablet Take 20 mg by mouth daily. Yes Imani Morrison MD   metFORMIN (GLUCOPHAGE) 1,000 mg tablet Take 1,000 mg by mouth two (2) times daily (with meals).    Yes Other, MD Imani   tamsulosin (Flomax) 0.4 mg capsule Take 0.4 mg by mouth nightly. Yes Other, MD Imani   clopidogreL (PLAVIX) 75 mg tab Take 1 tablet by mouth once daily  Patient taking differently: Take 75 mg by mouth daily. 7/27/21   Ana Brown NP   Insulin Needles, Disposable, 31 gauge x 5/16\" ndle Daily at bedtime 3/15/21   Rika Greenberg MD   glucose blood VI test strips (blood glucose test) strip 3-4 times a day as needed 3/15/21   Rika Greenberg MD   lancets 21 gauge misc Use 3-4 times a day as needed 3/15/21   Rika Greenberg MD      PMHx:  has a past medical history of Arthritis, Burning with urination, CAD (coronary artery disease), Diabetes ([facility] [address] GERD (gastroesophageal reflux disease), History of blood transfusion, HTN (hypertension), Hyperlipidemia, MI (myocardial infarction) ([facility] [address] Rectal bleeding, and Renal mass, left. He also has no past medical history of Asthma. PSurgHx:  has a past surgical history that includes hx coronary stent placement (2018); pr cardiac surg procedure unlist (2018); hx heart catheterization; and hx colonoscopy. PSocHx:  reports that he has never smoked. He has never used smokeless tobacco. He reports current drug use. Drug: Marijuana. He reports that he does not drink alcohol. ROS:  Admission ROS by Nic Ness MD from 8/23/2021 were reviewed with the patient and changes (other than per HPI) include: none. Physical Exam:            Vitals[de-identified]    No data recorded. There were no vitals taken for this visit. Estimated body mass index is 30.43 kg/m² as calculated from the following:    Height as of 8/12/21: 5' 11\" (1.803 m). Weight as of 8/12/21: 218 lb 3.2 oz (99 kg). I&O's:    No intake/output data recorded.    General Well developed, in NAD   Conjunctiva/Lids Normal without gross defects   Neck Supple without obvious, masses   Respiratory Effort Breathing easily, no audible wheezing, rhonchi, stridor   CV RRR   Abdomen / Flank Soft, non tender without guarding or rebound, without obvious masses, no CVA tenderness   Neurologic Grossly normal without focal deficits           Assessment/Plan:  Left renal mass 1.7cm, enhancing. Concerning for malignancy. The patient was counseled on the risks, benefits and expected course of surgery. Surgery has risks of bleeding, infection, injury, pain, death or other consequences. Perioperative medications and antibiotic use were discussed including the potential for reactions and side effects. Some specific risks of surgery were discussed as well. Poorly controlled diabetes. He was given insulin this morning.     Plan left robotic partial nephrectomy/ intraoperative ultrasound           Signed By: Rocío Valenzuela MD  - August 23, 2021

## 2021-08-23 NOTE — ANESTHESIA POSTPROCEDURE EVALUATION
Procedure(s):  ROBOTIC ASSISTED LAPAROSCOPIC LEFT PARTIAL NEPHRECTOMY WITH INTRAOPERATIVE ULTRASOUND.     general    Anesthesia Post Evaluation      Multimodal analgesia: multimodal analgesia not used between 6 hours prior to anesthesia start to PACU discharge  Patient location during evaluation: PACU  Patient participation: complete - patient participated  Level of consciousness: awake and alert  Pain score: 2  Pain management: adequate  Airway patency: patent  Anesthetic complications: no  Cardiovascular status: acceptable, blood pressure returned to baseline and hemodynamically stable  Respiratory status: acceptable, nonlabored ventilation, spontaneous ventilation, unassisted and nasal cannula  Hydration status: acceptable  Post anesthesia nausea and vomiting:  none  Final Post Anesthesia Temperature Assessment:  Normothermia (36.0-37.5 degrees C)      INITIAL Post-op Vital signs:   Vitals Value Taken Time   /70 08/23/21 1230   Temp 36.4 °C (97.5 °F) 08/23/21 1230   Pulse 74 08/23/21 1230   Resp 18 08/23/21 1230   SpO2 95 % 08/23/21 1230

## 2021-08-23 NOTE — PROGRESS NOTES
Bedside shift change report given to Nicole Duarte RN (oncoming nurse) by Machelle Brooks RN (offgoing nurse). Report included the following information SBBAR.

## 2021-08-23 NOTE — OP NOTES
Patient: Carlos A Lua MRN: [id number]  SSN: [id number]    YOB: 1955  Age: 74 y.o. Sex: male              UROLOGY OPERATIVE NOTE    Pre-operative Diagnosis: left renal mass   Post-operative Diagnosis: same   Procedure: robotic left partial nephrectomy, intraoperative renal ultrasound    Surgeon: Randa Polanco MD  Assistant: none  Anesthesia:  Per anesthesia  Findings: left renal mass hyperechoic suspicious for AML   Estimated Blood Loss:    100cc  Drains: 16F meléndez  Specimens: left renal tumor  Complications:  none           Procedure Details: The patient was seen in the pre-operative area. The risks, benefits, complications, alternative treatment options, and expected outcomes were again discussed with the patient. The possibilities of reaction to medication, pain, infection, bleeding, major cardiovascular event, death, damage to surrounding structures were specifically addressed. Informed consent was then obtained. Upon arrival to the operative suite, the patient, procedure, and side were confirmed via a pre-operative \"time-out\". All were in agreement. The patient was carefully positioned and anesthesia was undertaken. Sterile prep and drape was accomplished. The patient was placed with a bump under the left flank and all the pressure points were padded. The patient was secured to the table. A subumbilical incision, approximately 12m in length, was made . A veress needle was inserted into the peritoneum. CO2 insufflation was started to 15 mmHg pressure. 8 mm trochars were placed in the upper quadrant and lower quadrant for a total of 4 da Kim ports. A 29PZ subumbilical port was placed. Patient was tilted in the left side up position. The da Kim X I surgical robot was docked to the patient    The colon was mobilized along the [employer] and the renal hilum was identified dissected out.     The ultrasound probe was introduced and multiplanar echography of the kidney was performed. The lesion was located at the posterior mid kidney and was hyperechoic and not smooth intraparenchymally. At least two intrarenal cysts were seen. There were no other lesions seen within the kidney. The Gerota's fascia was opened over the periphery of the lesion and dissection was continued around the mass. The area of intended resection was scored with cautery and confirmed with ultrasound. 20 mg of Lasix administered by anesthesia. The renal artery and vein were clamped with the vascular bulldogs. The tumor was sharply excised. The margin was grossly clear. 2-0 Vicryl suture was used to approximate the defect and hemostasis was excellent. The vascular clamps were released for a total warm ischemia time of 11min 51sec. The renal lesion was entrapped within a specimen bag for retrieval.    The robot was undocked from the patient. The specimen bag string was withdrawn through the subumbilical port and the remaining ports removed under direct vision. The subumbilical port was extended slightly/traction the specimen. The fascia was closed with 0 PDS suture. The subcutaneous tissues were approximated 2-0 Vicryl suture. The skin incisions were closed with 4 Monocryl subcuticular sutures and skin glue. Patient tolerated well was transported in stable edition recovery.       Jordan Bocanegra MD

## 2021-08-23 NOTE — CONSULTS
Consult    Patient: Fernande Oppenheim MRN: [id number]  SSN: [id number]    YOB: 1955  Age: 74 y.o. Sex: male      Subjective:      Fernande Oppenheim is a 74 y.o. male who is being seen for medical management status post left nephrectomy for renal mass. Patient with uncontrolled blood sugar, 411 prior to surgery. Patient also with a history of hypertension, depression and coronary artery disease. Patient is somnolent and will wake only to verbal stimuli. Past Medical History:   Diagnosis Date    Arthritis     Burning with urination     CAD (coronary artery disease)     patient stated stents placed unsure of how many     Diabetes ([address]     GERD (gastroesophageal reflux disease)     History of blood transfusion     patient stated approx 1 year ago     HTN (hypertension)     Hyperlipidemia     MI (myocardial infarction) ([address]     Rectal bleeding     patient stated this happened over the weekend.      Renal mass, left      Past Surgical History:   Procedure Laterality Date    HX COLONOSCOPY      HX CORONARY STENT PLACEMENT  2018    x1    HX HEART CATHETERIZATION      patient stated stents placed     [place] CARDIAC SURG PROCEDURE UNLIST  2018    BYPASS      Family History   Problem Relation Age of Onset   Marva Boss Hypertension Mother     Diabetes Mother     Hypertension Father     Heart Disease Father      Social History     Tobacco Use    Smoking status: Never Smoker    Smokeless tobacco: Never Used   Substance Use Topics    Alcohol use: Never      Current Facility-Administered Medications   Medication Dose Route Frequency Provider Last Rate Last Admin    lactated Ringers infusion  20 mL/hr IntraVENous CONTINUOUS Rocío Beard MD   Held at 08/23/21 0900    sodium chloride (NS) flush 5-40 mL  5-40 mL IntraVENous Q8H Jonelle Sanchez MD        sodium chloride (NS) flush 5-40 mL  5-40 mL IntraVENous PRN Jonelle Sanchez MD        ceFAZolin (ANCEF) 2 g in sterile water (preservative free) 20 mL IV syringe  2 g IntraVENous Q8H Avila Juarez  mL/hr at 08/23/21 1648 2 g at 08/23/21 1648    acetaminophen (TYLENOL) tablet 650 mg  650 mg Oral Q4H PRN Avila Juarez MD        HYDROcodone-acetaminophen [facility]) 5-325 mg per tablet 1 Tablet  1 Tablet Oral Q4H PRN Avila Juarez MD        morphine injection 2 mg  2 mg IntraVENous Q4H PRN Avila Juarez MD        ondansetron [facility]) injection 4 mg  4 mg IntraVENous Q4H PRN Avila Juarez MD        docusate sodium (COLACE) capsule 100 mg  100 mg Oral BID Avila Juarez MD        glucose chewable tablet 16 g  4 Tablet Oral PRN Avila Juarez MD        dextrose (D50W) injection syrg 12.5-25 g  25-50 mL IntraVENous PRN Avila Juarez MD        glucagon [facility]) injection 1 mg  1 mg IntraMUSCular PRN Avila Juarez MD        0.45% sodium chloride with KCl 20 mEq/L infusion   IntraVENous CONTINUOUS Avila Juarez  mL/hr at 08/23/21 1331 New Bag at 08/23/21 1331    insulin glargine (LANTUS) injection 20 Units  20 Units SubCUTAneous QHS Christ Saunders PA-C        glucose chewable tablet 16 g  4 Tablet Oral PRN Isaac Saunders PA-C        dextrose (D50W) injection syrg 12.5-25 g  25-50 mL IntraVENous PRN Isaac Saunders PA-C        glucagon (GLUCAGEN) injection 1 mg  1 mg IntraMUSCular PRN Isaac Saunders PA-C        insulin lispro (HUMALOG) injection   SubCUTAneous AC&HS Isaac Saunders PA-C        insulin lispro (HUMALOG) injection 5 Units  5 Units SubCUTAneous TIDAC Isaac Saunders PA-C        [START ON 8/24/2021] isosorbide mononitrate ER (IMDUR) tablet 60 mg  60 mg Oral DAILY Isaac Saunders PA-C        metoprolol succinate (TOPROL-XL) XL tablet 50 mg  50 mg Oral BIDPC Isaac Saunders PA-C        [START ON 8/24/2021] NIFEdipine ER (PROCARDIA XL) tablet 30 mg  30 mg Oral DAILY Isaac Saunders PA-C        ranolazine ER (RANEXA) tablet 500 mg  500 mg Oral BID Isaac Saunders PA-C        [START ON 8/24/2021] sertraline (ZOLOFT) tablet 50 mg  50 mg Oral DAILY Reasoner, Christ STUBBS PA-C            No Known Allergies    Review of Systems:  Review of Systems   Constitutional: Negative for fever and malaise/fatigue. HENT: Negative. Eyes: Negative. Respiratory: Negative. Cardiovascular: Negative for chest pain and leg swelling. Gastrointestinal: Negative for abdominal pain, nausea and vomiting. Genitourinary: Negative. Musculoskeletal: Negative. Skin: Negative. Neurological: Negative. Psychiatric/Behavioral: Negative.          Objective:     Recent Results (from the past 24 hour(s))   GLUCOSE, POC    Collection Time: 08/23/21  7:21 AM   Result Value Ref Range    Glucose (POC) 411 (H) 65 - 117 mg/dL    Performed by Gin Wang    POTASSIUM    Collection Time: 08/23/21  7:31 AM   Result Value Ref Range    Potassium 4.4 3.5 - 5.1 mmol/L   GLUCOSE, POC    Collection Time: 08/23/21  8:22 AM   Result Value Ref Range    Glucose (POC) 300 (H) 65 - 117 mg/dL    Performed by [address] [place] [address], POC    Collection Time: 08/23/21  8:48 AM   Result Value Ref Range    Glucose (POC) 231 (H) 65 - 117 mg/dL    Performed by Brandon Brooks    URINALYSIS W/MICROSCOPIC    Collection Time: 08/23/21  9:15 AM   Result Value Ref Range    Color Yellow/Straw      Appearance Clear Clear      Specific gravity 1.024 1.003 - 1.030      pH (UA) 7.0 5.0 - 8.0      Protein Negative Negative mg/dL    Glucose >300 (A) Negative mg/dL    Ketone Negative Negative mg/dL    Bilirubin Negative Negative      Blood Negative Negative      Urobilinogen 0.1 0.1 - 1.0 EU/dL    Nitrites Negative Negative      Leukocyte Esterase Negative Negative      WBC 0-4 0 - 4 /hpf    RBC 0-5 0 - 5 /hpf    Bacteria Negative Negative /hpf   GLUCOSE, POC    Collection Time: 08/23/21 10:05 AM   Result Value Ref Range    Glucose (POC) 210 (H) 65 - 117 mg/dL    Performed by Eddy Neumann, POC    Collection Time: 08/23/21 11:43 AM   Result Value Ref Range    Glucose (POC) 279 (H) 65 - 117 mg/dL    Performed by Onelia Cueva    GLUCOSE, POC    Collection Time: 08/23/21  4:36 PM   Result Value Ref Range    Glucose (POC) 315 (H) 65 - 117 mg/dL    Performed by Eric TEIXEIRA         No orders to display        Vitals:    08/23/21 1230 08/23/21 1336 08/23/21 1435 08/23/21 1638   BP: 128/70 132/73 (!) 157/85 (!) 155/87   Pulse: 74 74 75 73   Resp: 18 18 18 16   Temp: 97.5 °F (36.4 °C) 97.6 °F (36.4 °C) 97.8 °F (36.6 °C) 97.5 °F (36.4 °C)   SpO2: 95% 95% 97% 97%        Physical Exam:  Physical Exam  Vitals reviewed. Constitutional:       Appearance: He is not ill-appearing. Comments: Somnolence will awake   HENT:      Head: Normocephalic and atraumatic. Mouth/Throat:      Mouth: Mucous membranes are moist.      Pharynx: Oropharynx is clear. Eyes:      Conjunctiva/sclera: Conjunctivae normal.   Cardiovascular:      Rate and Rhythm: Normal rate and regular rhythm. Heart sounds: Normal heart sounds. Pulmonary:      Breath sounds: Normal breath sounds. Abdominal:      General: Abdomen is flat. Bowel sounds are normal.      Palpations: Abdomen is soft. Comments: Incisional scars without dehiscence   Musculoskeletal:         General: Normal range of motion. Cervical back: Normal range of motion and neck supple. Skin:     General: Skin is warm and dry. Neurological:      General: No focal deficit present. Mental Status: He is alert and oriented to person, place, and time. Mental status is at baseline. Psychiatric:         Mood and Affect: Mood normal.          Assessment:     Hospital Problems  Date Reviewed: 8/23/2021        Codes Class Noted POA    Kidney mass ICD-10-CM: N28.89  ICD-9-CM: 593.9  8/23/2021 Yes        Uncontrolled type 2 diabetes mellitus with hyperglycemia ([facility] ICD-10-CM: E11.65  ICD-9-CM: 250.02  4/9/2021 Yes              Plan:     Impression:    1. Left renal mass  2. Uncontrolled diabetes mellitus, type II  3. Essential hypertension  4. Coronary artery disease      Plan:    Left renal mass status post nephrectomy  Per urology    2. Uncontrolled diabetes mellitus, type II  Continue Lantus although patient states he is on 40 units in medication reconciliation is 10 we will start with 20 units this evening and adjust as appropriate when the patient is more alert to answer questions  Sliding scale insulin  5 units of insulin with every meal    3. Essential hypertension  Restart metoprolol  Restart nifedipine    4.   Coronary artery disease  Stable  Continue Ranexa     Total consult: 40 minutes  Signed By: Nancy Leon PA-C     August 23, 2021

## 2021-08-24 VITALS
TEMPERATURE: 98.9 F | RESPIRATION RATE: 18 BRPM | SYSTOLIC BLOOD PRESSURE: 148 MMHG | DIASTOLIC BLOOD PRESSURE: 77 MMHG | OXYGEN SATURATION: 96 % | HEART RATE: 87 BPM

## 2021-08-24 LAB
ANION GAP SERPL CALC-SCNC: 5 MMOL/L (ref 5–15)
BACTERIA SPEC CULT: NORMAL
BUN SERPL-MCNC: 17 MG/DL (ref 6–20)
BUN/CREAT SERPL: 17 (ref 12–20)
CA-I BLD-MCNC: 8.4 MG/DL (ref 8.5–10.1)
CHLORIDE SERPL-SCNC: 106 MMOL/L (ref 97–108)
CO2 SERPL-SCNC: 27 MMOL/L (ref 21–32)
CREAT SERPL-MCNC: 1.03 MG/DL (ref 0.7–1.3)
ERYTHROCYTE [DISTWIDTH] IN BLOOD BY AUTOMATED COUNT: 15.4 % (ref 11.5–14.5)
EST. AVERAGE GLUCOSE BLD GHB EST-MCNC: 329 MG/DL
GLUCOSE BLD STRIP.AUTO-MCNC: 128 MG/DL (ref 65–117)
GLUCOSE BLD STRIP.AUTO-MCNC: 165 MG/DL (ref 65–117)
GLUCOSE SERPL-MCNC: 129 MG/DL (ref 65–100)
HBA1C MFR BLD: 13.1 % (ref 4–5.6)
HCT VFR BLD AUTO: 24.9 % (ref 36.6–50.3)
HGB BLD-MCNC: 7.3 G/DL (ref 12.1–17)
MCH RBC QN AUTO: 24.7 PG (ref 26–34)
MCHC RBC AUTO-ENTMCNC: 29.3 G/DL (ref 30–36.5)
MCV RBC AUTO: 84.4 FL (ref 80–99)
NRBC # BLD: 0 K/UL (ref 0–0.01)
NRBC BLD-RTO: 0 PER 100 WBC
PERFORMED BY, TECHID: ABNORMAL
PERFORMED BY, TECHID: ABNORMAL
PLATELET # BLD AUTO: 332 K/UL (ref 150–400)
PMV BLD AUTO: 10.1 FL (ref 8.9–12.9)
POTASSIUM SERPL-SCNC: 3.9 MMOL/L (ref 3.5–5.1)
RBC # BLD AUTO: 2.95 M/UL (ref 4.1–5.7)
SODIUM SERPL-SCNC: 138 MMOL/L (ref 136–145)
SPECIAL REQUESTS,SREQ: NORMAL
WBC # BLD AUTO: 11.4 K/UL (ref 4.1–11.1)

## 2021-08-24 PROCEDURE — 99024 POSTOP FOLLOW-UP VISIT: CPT | Performed by: NURSE PRACTITIONER

## 2021-08-24 PROCEDURE — 74011250636 HC RX REV CODE- 250/636: Performed by: UROLOGY

## 2021-08-24 PROCEDURE — 74011250637 HC RX REV CODE- 250/637: Performed by: UROLOGY

## 2021-08-24 PROCEDURE — 80048 BASIC METABOLIC PNL TOTAL CA: CPT

## 2021-08-24 PROCEDURE — 74011636637 HC RX REV CODE- 636/637: Performed by: PHYSICIAN ASSISTANT

## 2021-08-24 PROCEDURE — 36415 COLL VENOUS BLD VENIPUNCTURE: CPT

## 2021-08-24 PROCEDURE — 74011000250 HC RX REV CODE- 250: Performed by: UROLOGY

## 2021-08-24 PROCEDURE — 74011250637 HC RX REV CODE- 250/637: Performed by: PHYSICIAN ASSISTANT

## 2021-08-24 PROCEDURE — 85027 COMPLETE CBC AUTOMATED: CPT

## 2021-08-24 PROCEDURE — 82962 GLUCOSE BLOOD TEST: CPT

## 2021-08-24 RX ORDER — INSULIN LISPRO 100 [IU]/ML
INJECTION, SOLUTION INTRAVENOUS; SUBCUTANEOUS
Qty: 1 VIAL | Refills: 0 | Status: SHIPPED | OUTPATIENT
Start: 2021-08-24 | End: 2021-09-14 | Stop reason: ALTCHOICE

## 2021-08-24 RX ORDER — INSULIN GLARGINE 100 [IU]/ML
INJECTION, SOLUTION SUBCUTANEOUS
Qty: 1 VIAL | Refills: 0 | Status: SHIPPED | OUTPATIENT
Start: 2021-08-24 | End: 2022-09-16 | Stop reason: SDUPTHER

## 2021-08-24 RX ORDER — FACIAL-BODY WIPES
10 EACH TOPICAL DAILY
Qty: 10 SUPPOSITORY | Refills: 0 | Status: SHIPPED | OUTPATIENT
Start: 2021-08-24 | End: 2021-09-03

## 2021-08-24 RX ORDER — DOCUSATE SODIUM 100 MG/1
100 CAPSULE, LIQUID FILLED ORAL 2 TIMES DAILY
Qty: 20 CAPSULE | Refills: 0 | Status: SHIPPED | OUTPATIENT
Start: 2021-08-24 | End: 2021-09-03

## 2021-08-24 RX ORDER — HYDROCODONE BITARTRATE AND ACETAMINOPHEN 5; 325 MG/1; MG/1
1 TABLET ORAL
Qty: 10 TABLET | Refills: 0 | Status: SHIPPED | OUTPATIENT
Start: 2021-08-24 | End: 2021-08-29

## 2021-08-24 RX ADMIN — RANOLAZINE 500 MG: 500 TABLET, FILM COATED, EXTENDED RELEASE ORAL at 09:07

## 2021-08-24 RX ADMIN — NIFEDIPINE 30 MG: 30 TABLET, FILM COATED, EXTENDED RELEASE ORAL at 09:06

## 2021-08-24 RX ADMIN — CEFAZOLIN 2 G: 1 INJECTION, POWDER, FOR SOLUTION INTRAMUSCULAR; INTRAVENOUS at 09:47

## 2021-08-24 RX ADMIN — ISOSORBIDE MONONITRATE 60 MG: 30 TABLET, EXTENDED RELEASE ORAL at 09:07

## 2021-08-24 RX ADMIN — SERTRALINE HYDROCHLORIDE 50 MG: 50 TABLET ORAL at 09:06

## 2021-08-24 RX ADMIN — INSULIN LISPRO 5 UNITS: 100 INJECTION, SOLUTION INTRAVENOUS; SUBCUTANEOUS at 11:55

## 2021-08-24 RX ADMIN — INSULIN LISPRO 3 UNITS: 100 INJECTION, SOLUTION INTRAVENOUS; SUBCUTANEOUS at 11:58

## 2021-08-24 RX ADMIN — METOPROLOL SUCCINATE 50 MG: 50 TABLET, EXTENDED RELEASE ORAL at 09:06

## 2021-08-24 RX ADMIN — CEFAZOLIN 2 G: 1 INJECTION, POWDER, FOR SOLUTION INTRAMUSCULAR; INTRAVENOUS at 00:38

## 2021-08-24 RX ADMIN — ACETAMINOPHEN 650 MG: 325 TABLET ORAL at 11:55

## 2021-08-24 RX ADMIN — DOCUSATE SODIUM 100 MG: 100 CAPSULE, LIQUID FILLED ORAL at 09:06

## 2021-08-24 RX ADMIN — Medication 10 ML: at 06:45

## 2021-08-24 NOTE — CONSULTS
Consult    Patient: Jennifer Cavazos MRN: [id number]  SSN: [id number]    YOB: 1955  Age: 74 y.o. Sex: male      Subjective:      Jennifer Cavazos is a 74 y.o. male who is being seen for medical management status post left nephrectomy for renal mass. Patient with uncontrolled blood sugar, 411 prior to surgery. Patient also with a history of hypertension, depression and coronary artery disease. Subjective - Patient seen and evaluated at bedside, no acute events overnight, patient currently has no active complaints    Past Medical History:   Diagnosis Date    Arthritis     Burning with urination     CAD (coronary artery disease)     patient stated stents placed unsure of how many     Diabetes ([address]     GERD (gastroesophageal reflux disease)     History of blood transfusion     patient stated approx 1 year ago     HTN (hypertension)     Hyperlipidemia     MI (myocardial infarction) ([address]     Rectal bleeding     patient stated this happened over the weekend.      Renal mass, left      Past Surgical History:   Procedure Laterality Date    HX COLONOSCOPY      HX CORONARY STENT PLACEMENT  2018    x1    HX HEART CATHETERIZATION      patient stated stents placed     [place] CARDIAC SURG PROCEDURE UNLIST  2018    BYPASS      Family History   Problem Relation Age of Onset   Del Henle Hypertension Mother     Diabetes Mother     Hypertension Father     Heart Disease Father      Social History     Tobacco Use    Smoking status: Never Smoker    Smokeless tobacco: Never Used   Substance Use Topics    Alcohol use: Never      Current Facility-Administered Medications   Medication Dose Route Frequency Provider Last Rate Last Admin    lactated Ringers infusion  20 mL/hr IntraVENous CONTINUOUS Nondjunior Vergara MD   Held at 08/23/21 0900    sodium chloride (NS) flush 5-40 mL  5-40 mL IntraVENous Q8H [place], Teresa Ahumada, MD   10 mL at 08/24/21 0645    sodium chloride (NS) flush 5-40 mL  5-40 mL IntraVENous PRN Victorina Hawk MD        ceFAZolin (ANCEF) 2 g in sterile water (preservative free) 20 mL IV syringe  2 g IntraVENous Q8H Victorina Hawk  mL/hr at 08/24/21 0947 2 g at 08/24/21 0947    acetaminophen (TYLENOL) tablet 650 mg  650 mg Oral Q4H PRN Victorina Hawk MD        HYDROcodone-acetaminophen [facility]) 5-325 mg per tablet 1 Tablet  1 Tablet Oral Q4H PRN Victorina Hawk MD        morphine injection 2 mg  2 mg IntraVENous Q4H PRN Victorina Hawk MD        ondansetron [facility]) injection 4 mg  4 mg IntraVENous Q4H PRN Victorina Hawk MD        docusate sodium (COLACE) capsule 100 mg  100 mg Oral BID Victorina Hawk MD   100 mg at 08/24/21 1673    glucose chewable tablet 16 g  4 Tablet Oral PRN Victorina Hawk MD        dextrose (D50W) injection syrg 12.5-25 g  25-50 mL IntraVENous PRN Victorina Hawk MD        glucagon [facility]) injection 1 mg  1 mg IntraMUSCular PRN Victorina Hawk MD        0.45% sodium chloride with KCl 20 mEq/L infusion   IntraVENous CONTINUOUS Victorina Hawk  mL/hr at 08/23/21 2255 New Bag at 08/23/21 2255    insulin glargine (LANTUS) injection 20 Units  20 Units SubCUTAneous QHS Almita Kevin PA-C   20 Units at 08/23/21 2143    glucose chewable tablet 16 g  4 Tablet Oral PRN Almita Kevin PA-C        dextrose (D50W) injection syrg 12.5-25 g  25-50 mL IntraVENous PRN Sam Saunders PA-C        glucagon (GLUCAGEN) injection 1 mg  1 mg IntraMUSCular PRN Sam Saunders PA-C        insulin lispro (HUMALOG) injection   SubCUTAneous AC&HS Almita Kevin PA-C   4 Units at 08/23/21 2143    insulin lispro (HUMALOG) injection 5 Units  5 Units SubCUTAneous TIDAC Almita Kevin PA-C   5 Units at 08/23/21 1802    isosorbide mononitrate ER (IMDUR) tablet 60 mg  60 mg Oral DAILY Almita Kevin PA-C   60 mg at 08/24/21 2442    metoprolol succinate (TOPROL-XL) XL tablet 50 mg  50 mg Oral BID oLnnie Hung PA-C   50 mg at 08/24/21 2054  NIFEdipine ER (PROCARDIA XL) tablet 30 mg  30 mg Oral DAILY Marianela Dey PA-C   30 mg at 08/24/21 9965    ranolazine ER (RANEXA) tablet 500 mg  500 mg Oral BID Marianela Dey PA-C   500 mg at 08/24/21 9582    sertraline (ZOLOFT) tablet 50 mg  50 mg Oral DAILY ReasonRiley rios MATT Galan   50 mg at 08/24/21 6673        No Known Allergies    Review of Systems:  Review of Systems   Constitutional: Negative for fever and malaise/fatigue. HENT: Negative. Eyes: Negative. Respiratory: Negative. Cardiovascular: Negative for chest pain and leg swelling. Gastrointestinal: Negative for abdominal pain, nausea and vomiting. Genitourinary: Negative. Musculoskeletal: Negative. Skin: Negative. Neurological: Negative. Psychiatric/Behavioral: Negative.          Objective:     Recent Results (from the past 24 hour(s))   GLUCOSE, POC    Collection Time: 08/23/21 11:43 AM   Result Value Ref Range    Glucose (POC) 279 (H) 65 - 117 mg/dL    Performed by Ragini Irving    HGB & HCT    Collection Time: 08/23/21  4:10 PM   Result Value Ref Range    HGB 7.7 (L) 12.1 - 17.0 g/dL    HCT 25.5 (L) 36.6 - 50.3 %   GLUCOSE, POC    Collection Time: 08/23/21  4:36 PM   Result Value Ref Range    Glucose (POC) 315 (H) 65 - 117 mg/dL    Performed by Rhiannon TEIXEIRA    GLUCOSE, POC    Collection Time: 08/23/21  9:20 PM   Result Value Ref Range    Glucose (POC) 286 (H) 65 - 117 mg/dL    Performed by Luis Fournier    METABOLIC PANEL, BASIC    Collection Time: 08/24/21  6:15 AM   Result Value Ref Range    Sodium 138 136 - 145 mmol/L    Potassium 3.9 3.5 - 5.1 mmol/L    Chloride 106 97 - 108 mmol/L    CO2 27 21 - 32 mmol/L    Anion gap 5 5 - 15 mmol/L    Glucose 129 (H) 65 - 100 mg/dL    BUN 17 6 - 20 mg/dL    Creatinine 1.03 0.70 - 1.30 mg/dL    BUN/Creatinine ratio 17 12 - 20      GFR est AA >60 >60 ml/min/1.73m2    GFR est non-AA >60 >60 ml/min/1.73m2    Calcium 8.4 (L) 8.5 - 10.1 mg/dL   CBC W/O DIFF Collection Time: 08/24/21  6:15 AM   Result Value Ref Range    WBC 11.4 (H) 4.1 - 11.1 K/uL    RBC 2.95 (L) 4.10 - 5.70 M/uL    HGB 7.3 (L) 12.1 - 17.0 g/dL    HCT 24.9 (L) 36.6 - 50.3 %    MCV 84.4 80.0 - 99.0 FL    MCH 24.7 (L) 26.0 - 34.0 PG    MCHC 29.3 (L) 30.0 - 36.5 g/dL    RDW 15.4 (H) 11.5 - 14.5 %    PLATELET 990 036 - 158 K/uL    MPV 10.1 8.9 - 12.9 FL    NRBC 0.0 0.0  WBC    ABSOLUTE NRBC 0.00 0.00 - 0.01 K/uL   GLUCOSE, POC    Collection Time: 08/24/21  6:43 AM   Result Value Ref Range    Glucose (POC) 128 (H) 65 - 117 mg/dL    Performed by Kwadwo Del Real         No orders to display        Vitals:    08/23/21 2110 08/24/21 0041 08/24/21 0456 08/24/21 0736   BP: (!) 142/71 132/75 124/63 (!) 160/74   Pulse: 74 76 78 84   Resp: 18 16 16 16   Temp: 97.9 °F (36.6 °C) 98.2 °F (36.8 °C) 98.5 °F (36.9 °C) 98.9 °F (37.2 °C)   SpO2: 97% 92% 93% 98%        Physical Exam:  Physical Exam  Vitals reviewed. Constitutional:       Appearance: He is not ill-appearing. Comments: Somnolence will awake   HENT:      Head: Normocephalic and atraumatic. Mouth/Throat:      Mouth: Mucous membranes are moist.      Pharynx: Oropharynx is clear. Eyes:      Conjunctiva/sclera: Conjunctivae normal.   Cardiovascular:      Rate and Rhythm: Normal rate and regular rhythm. Heart sounds: Normal heart sounds. Pulmonary:      Breath sounds: Normal breath sounds. Abdominal:      General: Abdomen is flat. Bowel sounds are normal.      Palpations: Abdomen is soft. Comments: Incisional scars without dehiscence   Musculoskeletal:         General: Normal range of motion. Cervical back: Normal range of motion and neck supple. Skin:     General: Skin is warm and dry. Neurological:      General: No focal deficit present. Mental Status: He is alert and oriented to person, place, and time. Mental status is at baseline.    Psychiatric:         Mood and Affect: Mood normal.          Assessment: Hospital Problems  Date Reviewed: 8/23/2021        Codes Class Noted POA    Kidney mass ICD-10-CM: N28.89  ICD-9-CM: 593.9  8/23/2021 Yes        Uncontrolled type 2 diabetes mellitus with hyperglycemia ([facility] ICD-10-CM: E11.65  ICD-9-CM: 250.02  4/9/2021 Yes              Plan:     Impression:    1. Left renal mass  2. Uncontrolled diabetes mellitus, type II  3. Essential hypertension  4. Coronary artery disease      Plan:    Left renal mass status post nephrectomy  Per urology    2. Uncontrolled diabetes mellitus, type II  Currently improved control  Patient can be discharged on lantus 20 units QHS, lispro 5 units tid with sliding scale for additional coverage    3. Essential hypertension  Restart metoprolol  Restart nifedipine    4.   Coronary artery disease  Stable  Continue Ranexa     Total consult: 35 minutes  Signed By: Alison Luna MD     August 24, 2021

## 2021-08-24 NOTE — PROGRESS NOTES
Patient given discharge instructions and verbalizes understanding. IV removed with no complications. Patient remains stable at this time. Patient discharged via wheelchair with [employer] to drive him home.

## 2021-08-24 NOTE — DISCHARGE SUMMARY
UROLOGY Progress Note         [phone]        PROVIDER DISCHARGE SUMMARY     Patient ID:    Lorenza Moreno  [id number]  19 y.o.  1955    Admit date: 8/23/2021    Discharge date : 8/24/2021    Diagnoses: Renal mass [N28.89]  Kidney mass [N28.89]    Reason for Hospitalization: surgery, or treatment for above mentioned diagnosis      Procedures: robotic left partial nephrectomy, intraoperative renal ultrasound on 8/23/21. Hospital Course: Uncomplicated hospital course following surgery. Pain is well-controlled. Patient is tolerating a diet. Patient is ambulatory. Labs are reviewed and felt to be satisfactory for discharge. Incision sites are clean, dry and intact. Follow up Care: Your appointment with us is on: 9/14/21 at 3:30    Discharge Medications:   Current Discharge Medication List      START taking these medications    Details   docusate sodium (COLACE) 100 mg capsule Take 1 Capsule by mouth two (2) times a day for 10 days. Qty: 20 Capsule, Refills: 0  Start date: 8/24/2021, End date: 9/3/2021      bisacodyL (DULCOLAX) 10 mg supp Insert 10 mg into rectum daily for 10 days. Qty: 10 Suppository, Refills: 0  Start date: 8/24/2021, End date: 9/3/2021      HYDROcodone-acetaminophen (NORCO) 5-325 mg per tablet Take 1 Tablet by mouth every six (6) hours as needed for Pain for up to 5 days. Max Daily Amount: 4 Tablets. Qty: 10 Tablet, Refills: 0  Start date: 8/24/2021, End date: 8/29/2021    Associated Diagnoses: Kidney mass; Renal mass         STOP taking these medications       aspirin delayed-release 81 mg tablet Comments:   Reason for Stopping:         clopidogreL (PLAVIX) 75 mg tab Comments:   Reason for Stopping:               Additional home meds per Hospitalist.  Hold ASA and Plavix x 4 days minimum post-op. Diet: ok to resume home diet. Do not overeat. Use your bowel regimen to facilitate regular, soft, formed bowel movements.   If you are experiencing discomfort on post-op day 1-3, stick to a bland, liquid diet until you are feeling less uncomfortable, or having bowel movements. Activity: As per your discharge instructions. No heavy lifting or strenuous exercise. No soaking in tubs or pools. Do not drive on narcotic pain medications. Disposition: home with self-care      Discharge Exam:  Physical Exam  Vitals and nursing note reviewed. Constitutional:       Appearance: Normal appearance. He is not ill-appearing. Cardiovascular:      Rate and Rhythm: Normal rate. Pulmonary:      Effort: Pulmonary effort is normal.   Abdominal:      Comments: Incision sites, clean dry/intact with Dermabond   Genitourinary:     Comments: Samaniego removed  Musculoskeletal:         General: Normal range of motion. Skin:     General: Skin is warm and dry. Neurological:      Mental Status: He is alert and oriented to person, place, and time. Visit Vitals  BP (!) 160/74   Pulse 84   Temp 98.9 °F (37.2 °C)   Resp 16   SpO2 98%       Significant Diagnostic Studies:   8/23/2021: HCT 25.5 %* (Ref range: 36.6 - 50.3 %); HGB 7.7 g/dL* (Ref range: 12.1 - 17.0 g/dL); Potassium 4.4 mmol/L (Ref range: 3.5 - 5.1 mmol/L)  8/24/2021: BUN 17 mg/dL (Ref range: 6 - 20 mg/dL); Calcium 8.4 mg/dL* (Ref range: 8.5 - 10.1 mg/dL); CO2 27 mmol/L (Ref range: 21 - 32 mmol/L); Creatinine 1.03 mg/dL (Ref range: 0.70 - 1.30 mg/dL); Glucose 129 mg/dL* (Ref range: 65 - 100 mg/dL); HCT 24.9 %* (Ref range: 36.6 - 50.3 %); HGB 7.3 g/dL* (Ref range: 12.1 - 17.0 g/dL); Potassium 3.9 mmol/L (Ref range: 3.5 - 5.1 mmol/L);  Sodium 138 mmol/L (Ref range: 136 - 145 mmol/L)  Recent Labs     08/24/21  0615 08/23/21  1610   WBC 11.4*  --    HGB 7.3* 7.7*   HCT 24.9* 25.5*     --      Recent Labs     08/24/21  0615 08/23/21  0731     --    K 3.9 4.4     --    CO2 27  --    BUN 17  --    CREA 1.03  --    *  --    CA 8.4*  --        Lab Results   Component Value Date/Time    Glucose (POC) 128 (H) 08/24/2021 06:43 AM    Glucose (POC) 286 (H) 08/23/2021 09:20 PM    Glucose (POC) 315 (H) 08/23/2021 04:36 PM    Glucose (POC) 279 (H) 08/23/2021 11:43 AM    Glucose (POC) 210 (H) 08/23/2021 10:05 AM         INPATIENT CONSULTATIONS: Hospitalist for med management and improved glycemic control. OUTPATIENT FOLLOW UP REMINDERS: none    SURGICAL DRAINS/TUBES ON DISCHARGE: none    PLAN FOR DISCHARGE. PATIENT TO FOLLOW UP AS SCHEDULED. NURSING STAFF TO PROVIDE EDUCATION MATERIALS AND DISCHARGE INSTRUCTION SET SPECIFIC TO THIS PATIENT AS REQUESTED BY ME. POST-OPERATIVE COUNSELING PROVIDED BY ME PRIOR TO DISCHARGE.       Signed:  Sky Pineda NP  8/24/2021  9:11 AM

## 2021-08-24 NOTE — DISCHARGE INSTRUCTIONS
LEAVING THE HOSPITAL AFTER YOUR NEPHRECTOMY OR PARTIAL NEPHRECTOMY  DISCHARGE INSTRUCTIONS FOR DR. العلي'S PATIENTS    Activity   Refrain from vigorous activity (running, golfing, exercising, horseback riding, bicycling) for 4-6 weeks after your surgery. Walking is fine. You may gradually increase your pace and distance as you feel able.  Do not lift anything over 10 lbs for at least 2 weeks.  Avoid sitting still in one position for prolonged periods of time (more than 45 minutes).  Do not drive while you are taking narcotic pain medications. They may make you drowsy.  Driving, in general, should be avoided for 1-2 weeks. Bathing   Do not soak in tubs, swim, or submerge yourself in water.  You may shower as soon as you get home from the hospital. Keep the incision sites clean and dry, but do not scrub the glue. It will peel off with time. Use mild soap and water to clean your sites and pat yourself dry. Work   When you may return to work is variable. It will depend on your occupation and how quickly you recover. Specific questions can be addressed at your follow up appointment. Most patients will be able to return to work within 2-4 weeks. Medication   Most patients experience only minimal discomfort. Dr. Long Looney prefers you treat this with Tylenol (avoid ibuprofen or aspirin or other NSAID's as they may cause additional bleeding).  You will be sent home with a stronger, prescription pain reliever. However, it is important to note that these medications tend to be EXTREMELY constipating. It is better to avoid them, and only take them if you are having significant pain.  You may also have several days of an antibiotic.  You can resume most of your home medications as soon as you leave the hospital except for anti-coagulants like Coumadin, aspirin, or Eloquis. Some diabetic medications are also not ok to resume (Metformin).   If you have questions about your regular medications, please call the office.  At the time of discharge, you will also have a prescription for the stool softener and the suppository you received during your hospital stay. You may take these daily until you have a bowel movement. You may continue taking the stool softener as needed to combat constipation. Food   We recommend you stick to a bland, soft diet immediately after you are discharged from the hospital.     Do not force yourself to eat. It can contribute to the abdominal bloating you may feel. Once you have had a bowel movement, you can start eating normally, as you feel comfortable.  Avoid gas producing foods (beans, flour, broccoli) that can make you more uncomfortable.  Spread out eating throughout the day with snacks and small meals. Avoid eating large meals at first.    [facility] AFTER SURGERY   Abdominal distension, constipation, or bloating. Make sure you are taking your stool softener as directed and drinking plenty of water. Avoid carbonated beverages and gas-producing foods. You can use a suppository daily. The prescription pain medicine can contribute to this. Therefore, only take it when you are having significant pain. Walking and moving around help to move the gas out of your belly.  Pain and bruising. You may have pain in your belly or the side where the kidney was removed. This is normal.  You may also have bruising or slight redness around your incision sites which is also normal.  Additionally, the type of surgery you had (laparoscopic) may cause you to have some discomfort in your shoulder. The gas used during your surgery can irritate your abdominal muscles and radiate discomfort to your shoulder.  Blood in the urine. You may have blood in your urine off and on for several weeks after a urologic procedure. The blood can make your urine look tea-colored or pink.   This is normal.      WHEN TO CALL THE DOCTOR:   You have a fever over 100.5F   You have nausea or vomiting for more than 24 hours   It becomes hard to breathe   You cannot urinate    [facility] [address], [address]

## 2021-08-24 NOTE — ROUTINE PROCESS
Bedside shift change report given to [address] rn (oncoming nurse) by Burgess Rosa garrett (offgoing nurse). Report included the following information SBAR.

## 2021-08-26 ENCOUNTER — TRANSCRIBE ORDER (OUTPATIENT)
Dept: REGISTRATION | Age: 66
End: 2021-08-26

## 2021-08-27 ENCOUNTER — TRANSCRIBE ORDER (OUTPATIENT)
Dept: REGISTRATION | Age: 66
End: 2021-08-27

## 2021-08-27 DIAGNOSIS — N28.89 URETERAL FISTULA: Primary | ICD-10-CM

## 2021-08-31 NOTE — PROGRESS NOTES
Made discharge follow up call back today 8/31/2021 @ [id number]. Patient stated that he is doing fine with no issues currently. Also has no questions at this time.

## 2021-09-13 PROBLEM — D17.71 ANGIOMYOLIPOMA OF LEFT KIDNEY: Status: ACTIVE | Noted: 2021-04-09

## 2021-09-13 PROBLEM — N28.89 KIDNEY MASS: Status: RESOLVED | Noted: 2021-08-23 | Resolved: 2021-09-13

## 2021-09-14 ENCOUNTER — OFFICE VISIT (OUTPATIENT)
Dept: UROLOGY | Age: 66
End: 2021-09-14
Payer: MEDICARE

## 2021-09-14 VITALS — HEIGHT: 70 IN | BODY MASS INDEX: 31.35 KG/M2 | WEIGHT: 219 LBS

## 2021-09-14 DIAGNOSIS — D17.71 ANGIOMYOLIPOMA OF LEFT KIDNEY: Primary | ICD-10-CM

## 2021-09-14 PROCEDURE — 99024 POSTOP FOLLOW-UP VISIT: CPT | Performed by: UROLOGY

## 2021-09-14 RX ORDER — LISINOPRIL 10 MG/1
10 TABLET ORAL DAILY
COMMUNITY
End: 2022-06-30 | Stop reason: SDUPTHER

## 2021-09-14 NOTE — PROGRESS NOTES
Chief Complaint   Patient presents with    Post OP Follow Up    Renal Mass     1. Have you been to the ER, urgent care clinic since your last visit? Hospitalized since your last visit? No    2. Have you seen or consulted any other health care providers outside of the [address] [place] [address] since your last visit? Include any pap smears or colon screening.  No    Visit Vitals  Ht 5' 10\" (1.778 m)   Wt 219 lb (99.3 kg)   BMI 31.42 kg/m²

## 2021-09-14 NOTE — LETTER
9/14/2021    Patient: Elizabeth Craig   YOB: 1955   Date of Visit: 9/14/2021     Evangelina Marin [place]  [id number] [facility]  Via In Basket    Dear Evangelina Marin NP,      Thank you for referring Mr. Elizabeth Craig to [facility] for evaluation. My notes for this consultation are attached. If you have questions, please do not hesitate to call me. I look forward to following your patient along with you.       Sincerely,    Anay Cee MD

## 2021-09-14 NOTE — PROGRESS NOTES
HISTORY OF PRESENT ILLNESS  Victorina Knight is a 74 y.o. male. Chief Complaint   Patient presents with    Post OP Follow Up    Renal Mass     Mr. Lizzy Ellison is status post robotic left partial nephrectomy on 8/23/2021. Pathology was consistent with angiomyolipoma, completely excised with chronic interstitial nephritis. He is here today for follow-up. He has recovered well. He denies postoperative pain or constipation. He feels very well. Chronic Conditions Addressed Today     1. Angiomyolipoma of left kidney - Primary     Overview      CT 4/4/21: The left kidney reveals a 1.7 x 1.7 cm mass that demonstrates enhancement. The precontrast CT reveals a density of 35 Hounsfield units. This enhances to 84 Hounsfield units after IV contrast administration. There is concern for neoplasm. He is s/p robotic left partial nephrectomy, intraoperative renal ultrasound on 8/23/21. Pathology: Angiomyolipoma, 2.0 cm, completely excised. Slight chronic interstitial nephritis. Current Assessment & Plan      He had a left partial nephrectomy. Pathology with a benign renal tumor. Also chronic interstitial nephritis. Cr normal.            Relevant Medications     lisinopriL (PRINIVIL, ZESTRIL) 10 mg tablet               Patient denies the symptoms of COVID-19 per routine screening guidelines. Review of Systems   Constitutional: Negative for chills and fever. Gastrointestinal: Negative for diarrhea, nausea and vomiting. Genitourinary: Negative for hematuria. All other systems reviewed and are negative. Past Medical History:  PMHx (including negatives):  has a past medical history of Arthritis, Burning with urination, CAD (coronary artery disease), Diabetes ([address] GERD (gastroesophageal reflux disease), History of blood transfusion, HTN (hypertension), Hyperlipidemia, MI (myocardial infarction) ([address] Rectal bleeding, and Renal mass, left. He also has no past medical history of Asthma.    PSurgHx:  has a past surgical history that includes hx coronary stent placement (2018); pr cardiac surg procedure unlist (2018); hx heart catheterization; hx colonoscopy; hx nephrectomy (Left, 08/23/2021); and hx urological (08/23/2021). PSocHx:  reports that he has never smoked. He has never used smokeless tobacco. He reports previous alcohol use. He reports current drug use. Drug: Marijuana. Physical Exam    ASSESSMENT and PLAN  Diagnoses and all orders for this visit:    1. Angiomyolipoma of left kidney  Assessment & Plan:  He had a left partial nephrectomy. Pathology with a benign renal tumor. Also chronic interstitial nephritis.  Cr normal.                 Malcolm Camarillo MD

## 2021-09-14 NOTE — ASSESSMENT & PLAN NOTE
He had a left partial nephrectomy. Pathology with a benign renal tumor. Also chronic interstitial nephritis.  Cr normal.

## 2022-03-19 PROBLEM — H53.8 BLURRED VISION, BILATERAL: Status: ACTIVE | Noted: 2021-04-09

## 2022-03-19 PROBLEM — I25.10 CAD (CORONARY ARTERY DISEASE): Status: ACTIVE | Noted: 2020-12-12

## 2022-03-19 PROBLEM — J96.91 RESPIRATORY FAILURE WITH HYPOXIA (HCC): Status: ACTIVE | Noted: 2021-02-22

## 2022-03-19 PROBLEM — R07.9 CHEST PAIN: Status: ACTIVE | Noted: 2020-12-12

## 2022-03-19 PROBLEM — D17.71 ANGIOMYOLIPOMA OF LEFT KIDNEY: Status: ACTIVE | Noted: 2021-04-09

## 2022-03-19 PROBLEM — I10 ESSENTIAL HYPERTENSION: Status: ACTIVE | Noted: 2020-12-12

## 2022-03-20 PROBLEM — J12.82 PNEUMONIA DUE TO COVID-19 VIRUS: Status: ACTIVE | Noted: 2021-02-17

## 2022-03-20 PROBLEM — Z98.890 H/O COLONOSCOPY: Status: ACTIVE | Noted: 2021-05-23

## 2022-03-20 PROBLEM — E78.2 MIXED HYPERLIPIDEMIA: Status: ACTIVE | Noted: 2021-04-09

## 2022-03-20 PROBLEM — Z91.81 AT HIGH RISK FOR FALLS: Status: ACTIVE | Noted: 2021-05-23

## 2022-03-20 PROBLEM — U07.1 PNEUMONIA DUE TO COVID-19 VIRUS: Status: ACTIVE | Noted: 2021-02-17

## 2022-03-20 PROBLEM — E11.65 UNCONTROLLED TYPE 2 DIABETES MELLITUS WITH HYPERGLYCEMIA (HCC): Status: ACTIVE | Noted: 2021-04-09

## 2022-06-15 ENCOUNTER — HOSPITAL ENCOUNTER (EMERGENCY)
Age: 67
Discharge: ANOTHER HOSPITAL | End: 2022-06-15
Attending: EMERGENCY MEDICINE
Payer: MEDICARE

## 2022-06-15 ENCOUNTER — HOSPITAL ENCOUNTER (OUTPATIENT)
Age: 67
Setting detail: OBSERVATION
Discharge: HOME | End: 2022-06-17
Attending: STUDENT IN AN ORGANIZED HEALTH CARE EDUCATION/TRAINING PROGRAM | Admitting: INTERNAL MEDICINE
Payer: MEDICARE

## 2022-06-15 ENCOUNTER — APPOINTMENT (OUTPATIENT)
Dept: GENERAL RADIOLOGY | Age: 67
End: 2022-06-15
Attending: EMERGENCY MEDICINE
Payer: MEDICARE

## 2022-06-15 VITALS
TEMPERATURE: 98.6 F | BODY MASS INDEX: 34.36 KG/M2 | DIASTOLIC BLOOD PRESSURE: 79 MMHG | WEIGHT: 240 LBS | SYSTOLIC BLOOD PRESSURE: 144 MMHG | OXYGEN SATURATION: 98 % | HEIGHT: 70 IN | HEART RATE: 93 BPM | RESPIRATION RATE: 18 BRPM

## 2022-06-15 DIAGNOSIS — I50.9 ACUTE ON CHRONIC CONGESTIVE HEART FAILURE, UNSPECIFIED HEART FAILURE TYPE (HCC): ICD-10-CM

## 2022-06-15 DIAGNOSIS — E08.10 DIABETIC KETOACIDOSIS WITHOUT COMA ASSOCIATED WITH DIABETES MELLITUS DUE TO UNDERLYING CONDITION (HCC): ICD-10-CM

## 2022-06-15 DIAGNOSIS — J18.9 COMMUNITY ACQUIRED PNEUMONIA, UNSPECIFIED LATERALITY: Primary | ICD-10-CM

## 2022-06-15 DIAGNOSIS — N17.9 AKI (ACUTE KIDNEY INJURY) (HCC): ICD-10-CM

## 2022-06-15 DIAGNOSIS — J18.9 PNEUMONIA OF BOTH UPPER LOBES DUE TO INFECTIOUS ORGANISM: Primary | ICD-10-CM

## 2022-06-15 LAB
ANION GAP SERPL CALC-SCNC: 19 MMOL/L (ref 5–15)
ANION GAP SERPL CALC-SCNC: 20 MMOL/L (ref 5–15)
BASOPHILS # BLD: 0 K/UL (ref 0–0.2)
BASOPHILS NFR BLD: 0 % (ref 0–2.5)
BNP SERPL-MCNC: 740 PG/ML
BUN SERPL-MCNC: 29 MG/DL (ref 6–20)
BUN SERPL-MCNC: 30 MG/DL (ref 6–20)
BUN/CREAT SERPL: 15 (ref 12–20)
BUN/CREAT SERPL: 18 (ref 12–20)
CA-I BLD-MCNC: 9.3 MG/DL (ref 8.5–10.1)
CA-I BLD-MCNC: 9.4 MG/DL (ref 8.5–10.1)
CHLORIDE SERPL-SCNC: 92 MMOL/L (ref 97–108)
CHLORIDE SERPL-SCNC: 98 MMOL/L (ref 97–108)
CO2 SERPL-SCNC: 16 MMOL/L (ref 21–32)
CO2 SERPL-SCNC: 18 MMOL/L (ref 21–32)
CREAT SERPL-MCNC: 1.64 MG/DL (ref 0.7–1.3)
CREAT SERPL-MCNC: 1.94 MG/DL (ref 0.7–1.3)
EOSINOPHIL # BLD: 0 K/UL (ref 0–0.7)
EOSINOPHIL NFR BLD: 0 % (ref 0.9–2.9)
ERYTHROCYTE [DISTWIDTH] IN BLOOD BY AUTOMATED COUNT: 15.5 % (ref 11.5–14.5)
FLUAV RNA SPEC QL NAA+PROBE: NOT DETECTED
FLUBV RNA SPEC QL NAA+PROBE: NOT DETECTED
GLUCOSE BLD STRIP.AUTO-MCNC: 363 MG/DL (ref 65–117)
GLUCOSE SERPL-MCNC: 416 MG/DL (ref 65–100)
GLUCOSE SERPL-MCNC: 576 MG/DL (ref 65–100)
HCT VFR BLD AUTO: 36.7 % (ref 41–53)
HGB BLD-MCNC: 11.5 G/DL (ref 13.5–17.5)
LACTATE SERPL-SCNC: 1.4 MMOL/L (ref 0.4–2)
LACTATE SERPL-SCNC: 1.5 MMOL/L (ref 0.4–2)
LYMPHOCYTES # BLD: 0.6 K/UL (ref 1–4.8)
LYMPHOCYTES NFR BLD: 4 % (ref 20.5–51.1)
MCH RBC QN AUTO: 26.1 PG (ref 31–34)
MCHC RBC AUTO-ENTMCNC: 31.5 G/DL (ref 31–36)
MCV RBC AUTO: 83 FL (ref 80–100)
MONOCYTES # BLD: 1.6 K/UL (ref 0.2–2.4)
MONOCYTES NFR BLD: 9 % (ref 1.7–9.3)
NEUTS SEG # BLD: 15.1 K/UL (ref 1.8–7.7)
NEUTS SEG NFR BLD: 87 % (ref 42–75)
NRBC # BLD: 0.02 K/UL
NRBC BLD-RTO: 0.1 PER 100 WBC
PERFORMED BY, TECHID: ABNORMAL
PLATELET # BLD AUTO: 243 K/UL (ref 150–400)
PMV BLD AUTO: 9 FL (ref 6.5–11.5)
POTASSIUM SERPL-SCNC: 3.6 MMOL/L (ref 3.5–5.1)
POTASSIUM SERPL-SCNC: 3.6 MMOL/L (ref 3.5–5.1)
RBC # BLD AUTO: 4.42 M/UL (ref 4.5–5.9)
SARS-COV-2, COV2: NOT DETECTED
SODIUM SERPL-SCNC: 130 MMOL/L (ref 136–145)
SODIUM SERPL-SCNC: 133 MMOL/L (ref 136–145)
TROPONIN-HIGH SENSITIVITY: 29 NG/L (ref 0–76)
WBC # BLD AUTO: 17.4 K/UL (ref 4.4–11.3)

## 2022-06-15 PROCEDURE — 80048 BASIC METABOLIC PNL TOTAL CA: CPT

## 2022-06-15 PROCEDURE — 71045 X-RAY EXAM CHEST 1 VIEW: CPT

## 2022-06-15 PROCEDURE — 85025 COMPLETE CBC W/AUTO DIFF WBC: CPT

## 2022-06-15 PROCEDURE — 74011000258 HC RX REV CODE- 258: Performed by: EMERGENCY MEDICINE

## 2022-06-15 PROCEDURE — 96366 THER/PROPH/DIAG IV INF ADDON: CPT

## 2022-06-15 PROCEDURE — 74011636637 HC RX REV CODE- 636/637: Performed by: EMERGENCY MEDICINE

## 2022-06-15 PROCEDURE — 84484 ASSAY OF TROPONIN QUANT: CPT

## 2022-06-15 PROCEDURE — 87636 SARSCOV2 & INF A&B AMP PRB: CPT

## 2022-06-15 PROCEDURE — 87040 BLOOD CULTURE FOR BACTERIA: CPT

## 2022-06-15 PROCEDURE — 83880 ASSAY OF NATRIURETIC PEPTIDE: CPT

## 2022-06-15 PROCEDURE — 36415 COLL VENOUS BLD VENIPUNCTURE: CPT

## 2022-06-15 PROCEDURE — 96375 TX/PRO/DX INJ NEW DRUG ADDON: CPT

## 2022-06-15 PROCEDURE — 99285 EMERGENCY DEPT VISIT HI MDM: CPT

## 2022-06-15 PROCEDURE — 74011250636 HC RX REV CODE- 250/636

## 2022-06-15 PROCEDURE — 82962 GLUCOSE BLOOD TEST: CPT

## 2022-06-15 PROCEDURE — 74011250636 HC RX REV CODE- 250/636: Performed by: EMERGENCY MEDICINE

## 2022-06-15 PROCEDURE — 96365 THER/PROPH/DIAG IV INF INIT: CPT

## 2022-06-15 PROCEDURE — 83605 ASSAY OF LACTIC ACID: CPT

## 2022-06-15 RX ORDER — ONDANSETRON 2 MG/ML
4 INJECTION INTRAMUSCULAR; INTRAVENOUS
Status: COMPLETED | OUTPATIENT
Start: 2022-06-15 | End: 2022-06-15

## 2022-06-15 RX ORDER — SODIUM CHLORIDE 9 MG/ML
150 INJECTION, SOLUTION INTRAVENOUS ONCE
Status: COMPLETED | OUTPATIENT
Start: 2022-06-15 | End: 2022-06-15

## 2022-06-15 RX ORDER — ONDANSETRON 2 MG/ML
INJECTION INTRAMUSCULAR; INTRAVENOUS
Status: COMPLETED
Start: 2022-06-15 | End: 2022-06-15

## 2022-06-15 RX ADMIN — ONDANSETRON 4 MG: 2 INJECTION INTRAMUSCULAR; INTRAVENOUS at 17:59

## 2022-06-15 RX ADMIN — PIPERACILLIN AND TAZOBACTAM 3.38 G: 3; .375 INJECTION, POWDER, LYOPHILIZED, FOR SOLUTION INTRAVENOUS at 19:24

## 2022-06-15 RX ADMIN — SODIUM CHLORIDE 150 ML/HR: 9 INJECTION, SOLUTION INTRAVENOUS at 19:23

## 2022-06-15 RX ADMIN — INSULIN HUMAN 10 UNITS: 100 INJECTION, SOLUTION PARENTERAL at 19:23

## 2022-06-15 RX ADMIN — AZITHROMYCIN MONOHYDRATE 500 MG: 500 INJECTION, POWDER, LYOPHILIZED, FOR SOLUTION INTRAVENOUS at 17:59

## 2022-06-15 NOTE — ED NOTES
[address] Transfer Center contacted to initiate transfer to [facility].

## 2022-06-15 NOTE — ROUTINE PROCESS
TRANSFER - OUT REPORT:    Verbal report given to Cody Martin RN on [facility]  being transferred to [facility],  for routine progression of care       Report consisted of patients Situation, Background, Assessment and   Recommendations(SBAR). Information from the following report(s) SBAR, ED Summary, [facility] and Recent Results was reviewed with the receiving nurse. Lines:   Peripheral IV 06/15/22 Right Hand (Active)   Site Assessment Clean, dry, & intact 06/15/22 1758   Phlebitis Assessment 0 06/15/22 1758   Infiltration Assessment 0 06/15/22 1758   Dressing Status Clean, dry, & intact 06/15/22 1758   Dressing Type Transparent 06/15/22 1758   Hub Color/Line Status Pink 06/15/22 1758       Peripheral IV 06/15/22 Left;Posterior Hand (Active)        Opportunity for questions and clarification was provided.       Patient transported with:   Monitor in NSR, IVF

## 2022-06-15 NOTE — ED PROVIDER NOTES
HPI 15-year-old male multiple medical problems coronary artery disease status post MI diabetes hypertension hyperlipidemia left renal mass status post left nephrectomy presents the ED today complaining of 1 week of respiratory congestion cough productive of small amount of yellow sputum. Sensation of fever at night though none today denies COVID exposure has COVID vaccination x2. No other complaints except mild dyspnea on exertion. Past Medical History:   Diagnosis Date    Arthritis     Burning with urination     CAD (coronary artery disease)     patient stated stents placed unsure of how many     Diabetes ([address]     GERD (gastroesophageal reflux disease)     History of blood transfusion     patient stated approx 1 year ago     HTN (hypertension)     Hyperlipidemia     MI (myocardial infarction) ([address]     Rectal bleeding     patient stated this happened over the weekend.      Renal mass, left        Past Surgical History:   Procedure Laterality Date    HX COLONOSCOPY      HX CORONARY STENT PLACEMENT  2018    x1    HX HEART CATHETERIZATION      patient stated stents placed     HX NEPHRECTOMY Left 08/23/2021    robotic left partial nephrectomy,    HX UROLOGICAL  08/23/2021     intraoperative renal ultrasound    MS CARDIAC SURG PROCEDURE UNLIST  2018    BYPASS         Family History:   Problem Relation Age of Onset    Hypertension Mother     Diabetes Mother     Hypertension Father     Heart Disease Father        Social History     Socioeconomic History    Marital status:      Spouse name: Not on file    Number of children: 0    Years of education: Not on file    Highest education level: 9th grade   Occupational History    Not on file   Tobacco Use    Smoking status: Never Smoker    Smokeless tobacco: Never Used   Vaping Use    Vaping Use: Never used   Substance and Sexual Activity    Alcohol use: Not Currently    Drug use: Yes     Types: Marijuana    Sexual activity: Not Currently   Other Topics Concern     Service Not Asked    Blood Transfusions Not Asked    Caffeine Concern Not Asked    Occupational Exposure Not Asked    Hobby Hazards Not Asked    Sleep Concern Not Asked    Stress Concern Not Asked    Weight Concern Not Asked    Special Diet Not Asked    Back Care Not Asked    Exercise Not Asked    Bike Helmet Not Asked   [address] Not Asked    Self-Exams Not Asked   Social History Narrative    Not on file     Social Determinants of Health     Financial Resource Strain:     Difficulty of Paying Living Expenses: Not on file   Food Insecurity:     Worried About Running Out of Food in the Last Year: Not on file    Randi of Food in the Last Year: Not on file   Transportation Needs:     Lack of Transportation (Medical): Not on file    Lack of Transportation (Non-Medical): Not on file   Physical Activity:     Days of Exercise per Week: Not on file    Minutes of Exercise per Session: Not on file   Stress:     Feeling of Stress : Not on file   Social Connections:     Frequency of Communication with Friends and Family: Not on file    Frequency of Social Gatherings with Friends and Family: Not on file    Attends [employer]ism Services: Not on file    Active Member of [address] [place] [address] [employer] or Organizations: Not on file    Attends Club or Organization Meetings: Not on file    Marital Status: Not on file   Intimate Partner Violence:     Fear of Current or Ex-Partner: Not on file    Emotionally Abused: Not on file    Physically Abused: Not on file    Sexually Abused: Not on file   Housing Stability:     Unable to Pay for Housing in the Last Year: Not on file    Number of [place] in the Last Year: Not on file    Unstable Housing in the Last Year: Not on file         ALLERGIES: Patient has no known allergies. Review of Systems   Constitutional: Positive for fever. Negative for appetite change, chills and diaphoresis.    HENT: Negative for ear pain, facial swelling, sinus pain and trouble swallowing. Eyes: Negative for redness and visual disturbance. Respiratory: Positive for cough. Negative for choking, chest tightness, shortness of breath, wheezing and stridor. Cardiovascular: Negative for chest pain, palpitations and leg swelling. Gastrointestinal: Negative for abdominal distention, abdominal pain, blood in stool, diarrhea, nausea and vomiting. Endocrine: Negative for polydipsia and polyuria. Genitourinary: Negative for difficulty urinating, dysuria, flank pain, frequency, hematuria and urgency. Musculoskeletal: Negative. Allergic/Immunologic: Negative. Neurological: Negative. Psychiatric/Behavioral: Negative. Vitals:    06/15/22 1621 06/15/22 1623   BP: 130/65    Pulse: 95    Resp: 18    Temp: 98.6 °F (37 °C)    SpO2: 97% 97%   Weight: 108.9 kg (240 lb)    Height: 5' 10\" (1.778 m)           Pleasant elderly AA male chronically ill with bronchitic cough  Physical Exam  Vitals and nursing note reviewed. Constitutional:       General: He is not in acute distress. Appearance: Normal appearance. He is not ill-appearing, toxic-appearing or diaphoretic. HENT:      Head: Normocephalic and atraumatic. Nose: Nose normal.      Mouth/Throat:      Mouth: Mucous membranes are moist.   Eyes:      Extraocular Movements: Extraocular movements intact. Conjunctiva/sclera: Conjunctivae normal.      Pupils: Pupils are equal, round, and reactive to light. Cardiovascular:      Rate and Rhythm: Normal rate and regular rhythm. Pulses: Normal pulses. Heart sounds: Normal heart sounds. No murmur heard. Pulmonary:      Effort: Pulmonary effort is normal. No respiratory distress. Breath sounds: Rhonchi and rales present. No wheezing. Comments: Rhonchi in right lung questional rales in the bases bilaterally  Abdominal:      General: Bowel sounds are normal. There is no distension. Palpations: Abdomen is soft. There is no mass. Tenderness: There is no abdominal tenderness. There is no right CVA tenderness, left CVA tenderness, guarding or rebound. Hernia: No hernia is present. Musculoskeletal:         General: No swelling, tenderness, deformity or signs of injury. Normal range of motion. Cervical back: Normal range of motion and neck supple. No rigidity or tenderness. Right lower leg: No edema. Left lower leg: No edema. Lymphadenopathy:      Cervical: No cervical adenopathy. Skin:     General: Skin is warm and dry. Capillary Refill: Capillary refill takes less than 2 seconds. Findings: No erythema, lesion or rash. Neurological:      General: No focal deficit present. Mental Status: He is alert and oriented to person, place, and time. Mental status is at baseline. Cranial Nerves: No cranial nerve deficit. Sensory: No sensory deficit. Psychiatric:         Mood and Affect: Mood normal.         Behavior: Behavior normal.         Thought Content: Thought content normal.          MDM chest x-ray shows changes of pulmonary edema but appears to have an infiltrate in the right upper lobe and possibly left lower lobe. Most likely combination of pneumonia and heart failure. No labs available at this time we will plan on admission for pneumonia  ED Course as of 06/15/22 1859   Wed Davidson 15, 2022   1858 Anion gap(!): 20 [KS]      ED Course User Index  [KS] Neeraj Hewitt MD     Labs returned showing a glucose of 576 with mild metabolic acidosis bicarbonate of 18 and anion gap of 20. Reluctant to give large fluid bolus in the setting of congestive heart failure by chest x-ray and BNP of 750. Troponin is negative given 10 units of insulin normal saline at 150. We will plan on transfer to [facility]. Critical care note 60 minutes: Patient with multiple chronic medical problems arrives in heart failure and DKA with active pneumonia.   Illness began 1 week ago with cough productive of purulent sputum mild dyspnea on exertion oxygen saturation at rest is 95% tachycardic no fever here in the ED though fever last night. COVID swab was negative.   Discussion with ED attending at [address] [place] [address] Dr. Ramesh Garcia accepts transfer  Procedures

## 2022-06-15 NOTE — ED TRIAGE NOTES
Pt is brought in via [facility] for Flu-like s/s-- HA, cough, fatigue x 1 wk--reports was recently exposed to the flu.

## 2022-06-16 ENCOUNTER — APPOINTMENT (OUTPATIENT)
Dept: CT IMAGING | Age: 67
End: 2022-06-16
Attending: INTERNAL MEDICINE
Payer: MEDICARE

## 2022-06-16 PROBLEM — J18.9 PNEUMONIA: Status: ACTIVE | Noted: 2022-06-16

## 2022-06-16 LAB
B-OH-BUTYR SERPL-SCNC: >4.42 MMOL/L
BASE DEFICIT BLDV-SCNC: 8.5 MMOL/L (ref 0–2)
BDY SITE: ABNORMAL
EST. AVERAGE GLUCOSE BLD GHB EST-MCNC: ABNORMAL MG/DL
FIO2 ON VENT: 21 %
GLUCOSE BLD STRIP.AUTO-MCNC: 278 MG/DL (ref 65–117)
GLUCOSE BLD STRIP.AUTO-MCNC: 278 MG/DL (ref 65–117)
GLUCOSE BLD STRIP.AUTO-MCNC: 334 MG/DL (ref 65–117)
GLUCOSE BLD STRIP.AUTO-MCNC: 375 MG/DL (ref 65–117)
GLUCOSE BLD STRIP.AUTO-MCNC: 393 MG/DL (ref 65–117)
HBA1C MFR BLD: <3.8 % (ref 4–5.6)
HCO3 BLDV-SCNC: 18 MMOL/L (ref 22–26)
PCO2 BLDV: 30.5 MMHG (ref 40–50)
PERFORMED BY, TECHID: ABNORMAL
PH BLDV: 7.33 [PH] (ref 7.31–7.41)
PO2 BLDV: 126 MMHG (ref 36–42)
SAO2 % BLDV: 100 % (ref 60–80)
SAO2% DEVICE SAO2% SENSOR NAME: ABNORMAL
SPECIMEN SITE: ABNORMAL

## 2022-06-16 PROCEDURE — 74011250637 HC RX REV CODE- 250/637: Performed by: STUDENT IN AN ORGANIZED HEALTH CARE EDUCATION/TRAINING PROGRAM

## 2022-06-16 PROCEDURE — 82803 BLOOD GASES ANY COMBINATION: CPT

## 2022-06-16 PROCEDURE — G0378 HOSPITAL OBSERVATION PER HR: HCPCS

## 2022-06-16 PROCEDURE — 74011000250 HC RX REV CODE- 250: Performed by: INTERNAL MEDICINE

## 2022-06-16 PROCEDURE — 36415 COLL VENOUS BLD VENIPUNCTURE: CPT

## 2022-06-16 PROCEDURE — 74011636637 HC RX REV CODE- 636/637: Performed by: STUDENT IN AN ORGANIZED HEALTH CARE EDUCATION/TRAINING PROGRAM

## 2022-06-16 PROCEDURE — 83036 HEMOGLOBIN GLYCOSYLATED A1C: CPT

## 2022-06-16 PROCEDURE — 74011636637 HC RX REV CODE- 636/637: Performed by: INTERNAL MEDICINE

## 2022-06-16 PROCEDURE — 71250 CT THORAX DX C-: CPT

## 2022-06-16 PROCEDURE — 82010 KETONE BODYS QUAN: CPT

## 2022-06-16 PROCEDURE — 74011250636 HC RX REV CODE- 250/636: Performed by: INTERNAL MEDICINE

## 2022-06-16 PROCEDURE — 82962 GLUCOSE BLOOD TEST: CPT

## 2022-06-16 PROCEDURE — 74011250636 HC RX REV CODE- 250/636: Performed by: STUDENT IN AN ORGANIZED HEALTH CARE EDUCATION/TRAINING PROGRAM

## 2022-06-16 PROCEDURE — 65270000029 HC RM PRIVATE

## 2022-06-16 PROCEDURE — 96372 THER/PROPH/DIAG INJ SC/IM: CPT

## 2022-06-16 PROCEDURE — 96374 THER/PROPH/DIAG INJ IV PUSH: CPT

## 2022-06-16 PROCEDURE — 74011250637 HC RX REV CODE- 250/637: Performed by: INTERNAL MEDICINE

## 2022-06-16 RX ORDER — NIFEDIPINE 30 MG/1
30 TABLET, EXTENDED RELEASE ORAL DAILY
Status: DISCONTINUED | OUTPATIENT
Start: 2022-06-16 | End: 2022-06-17 | Stop reason: HOSPADM

## 2022-06-16 RX ORDER — PREDNISONE 20 MG/1
40 TABLET ORAL
Status: DISCONTINUED | OUTPATIENT
Start: 2022-06-16 | End: 2022-06-17 | Stop reason: HOSPADM

## 2022-06-16 RX ORDER — SODIUM CHLORIDE 9 MG/ML
100 INJECTION, SOLUTION INTRAVENOUS CONTINUOUS
Status: DISPENSED | OUTPATIENT
Start: 2022-06-16 | End: 2022-06-16

## 2022-06-16 RX ORDER — INSULIN GLARGINE 100 [IU]/ML
20 INJECTION, SOLUTION SUBCUTANEOUS
Status: DISCONTINUED | OUTPATIENT
Start: 2022-06-16 | End: 2022-06-16

## 2022-06-16 RX ORDER — MAGNESIUM SULFATE 100 %
4 CRYSTALS MISCELLANEOUS AS NEEDED
Status: DISCONTINUED | OUTPATIENT
Start: 2022-06-16 | End: 2022-06-17 | Stop reason: HOSPADM

## 2022-06-16 RX ORDER — SODIUM CHLORIDE 0.9 % (FLUSH) 0.9 %
5-40 SYRINGE (ML) INJECTION AS NEEDED
Status: DISCONTINUED | OUTPATIENT
Start: 2022-06-16 | End: 2022-06-17 | Stop reason: HOSPADM

## 2022-06-16 RX ORDER — SERTRALINE HYDROCHLORIDE 50 MG/1
50 TABLET, FILM COATED ORAL DAILY
Status: DISCONTINUED | OUTPATIENT
Start: 2022-06-16 | End: 2022-06-17 | Stop reason: HOSPADM

## 2022-06-16 RX ORDER — ONDANSETRON 4 MG/1
4 TABLET, ORALLY DISINTEGRATING ORAL
Status: DISCONTINUED | OUTPATIENT
Start: 2022-06-16 | End: 2022-06-17 | Stop reason: HOSPADM

## 2022-06-16 RX ORDER — ISOSORBIDE MONONITRATE 30 MG/1
60 TABLET, EXTENDED RELEASE ORAL DAILY
Status: DISCONTINUED | OUTPATIENT
Start: 2022-06-16 | End: 2022-06-17 | Stop reason: HOSPADM

## 2022-06-16 RX ORDER — DEXTROSE MONOHYDRATE 100 MG/ML
0-250 INJECTION, SOLUTION INTRAVENOUS AS NEEDED
Status: DISCONTINUED | OUTPATIENT
Start: 2022-06-16 | End: 2022-06-17 | Stop reason: HOSPADM

## 2022-06-16 RX ORDER — ACETAMINOPHEN 650 MG/1
650 SUPPOSITORY RECTAL
Status: DISCONTINUED | OUTPATIENT
Start: 2022-06-16 | End: 2022-06-17 | Stop reason: HOSPADM

## 2022-06-16 RX ORDER — ONDANSETRON 2 MG/ML
4 INJECTION INTRAMUSCULAR; INTRAVENOUS
Status: DISCONTINUED | OUTPATIENT
Start: 2022-06-16 | End: 2022-06-17 | Stop reason: HOSPADM

## 2022-06-16 RX ORDER — POLYETHYLENE GLYCOL 3350 17 G/17G
17 POWDER, FOR SOLUTION ORAL DAILY PRN
Status: DISCONTINUED | OUTPATIENT
Start: 2022-06-16 | End: 2022-06-17 | Stop reason: HOSPADM

## 2022-06-16 RX ORDER — ACETAMINOPHEN 325 MG/1
650 TABLET ORAL
Status: DISCONTINUED | OUTPATIENT
Start: 2022-06-16 | End: 2022-06-17 | Stop reason: HOSPADM

## 2022-06-16 RX ORDER — FUROSEMIDE 40 MG/1
20 TABLET ORAL DAILY
Status: DISCONTINUED | OUTPATIENT
Start: 2022-06-16 | End: 2022-06-17 | Stop reason: HOSPADM

## 2022-06-16 RX ORDER — LISINOPRIL 10 MG/1
10 TABLET ORAL DAILY
Status: DISCONTINUED | OUTPATIENT
Start: 2022-06-16 | End: 2022-06-17 | Stop reason: HOSPADM

## 2022-06-16 RX ORDER — ENOXAPARIN SODIUM 100 MG/ML
40 INJECTION SUBCUTANEOUS DAILY
Status: DISCONTINUED | OUTPATIENT
Start: 2022-06-16 | End: 2022-06-17 | Stop reason: HOSPADM

## 2022-06-16 RX ORDER — SODIUM CHLORIDE 0.9 % (FLUSH) 0.9 %
5-40 SYRINGE (ML) INJECTION EVERY 8 HOURS
Status: DISCONTINUED | OUTPATIENT
Start: 2022-06-16 | End: 2022-06-17 | Stop reason: HOSPADM

## 2022-06-16 RX ORDER — GUAIFENESIN 600 MG/1
600 TABLET, EXTENDED RELEASE ORAL EVERY 12 HOURS
Status: DISCONTINUED | OUTPATIENT
Start: 2022-06-16 | End: 2022-06-17 | Stop reason: HOSPADM

## 2022-06-16 RX ORDER — INSULIN GLARGINE 100 [IU]/ML
0.2 INJECTION, SOLUTION SUBCUTANEOUS DAILY
Status: DISCONTINUED | OUTPATIENT
Start: 2022-06-16 | End: 2022-06-17 | Stop reason: HOSPADM

## 2022-06-16 RX ORDER — RANOLAZINE 500 MG/1
500 TABLET, EXTENDED RELEASE ORAL 2 TIMES DAILY
Status: DISCONTINUED | OUTPATIENT
Start: 2022-06-16 | End: 2022-06-17 | Stop reason: HOSPADM

## 2022-06-16 RX ORDER — INSULIN LISPRO 100 [IU]/ML
INJECTION, SOLUTION INTRAVENOUS; SUBCUTANEOUS EVERY 4 HOURS
Status: DISCONTINUED | OUTPATIENT
Start: 2022-06-16 | End: 2022-06-17 | Stop reason: HOSPADM

## 2022-06-16 RX ADMIN — SODIUM CHLORIDE, PRESERVATIVE FREE 10 ML: 5 INJECTION INTRAVENOUS at 21:48

## 2022-06-16 RX ADMIN — GUAIFENESIN 600 MG: 600 TABLET, EXTENDED RELEASE ORAL at 09:40

## 2022-06-16 RX ADMIN — INSULIN LISPRO 15 UNITS: 100 INJECTION, SOLUTION INTRAVENOUS; SUBCUTANEOUS at 02:32

## 2022-06-16 RX ADMIN — PREDNISONE 40 MG: 20 TABLET ORAL at 10:12

## 2022-06-16 RX ADMIN — LISINOPRIL 10 MG: 10 TABLET ORAL at 09:40

## 2022-06-16 RX ADMIN — RANOLAZINE 500 MG: 500 TABLET, FILM COATED, EXTENDED RELEASE ORAL at 22:53

## 2022-06-16 RX ADMIN — SERTRALINE HYDROCHLORIDE 50 MG: 50 TABLET ORAL at 10:12

## 2022-06-16 RX ADMIN — INSULIN GLARGINE 22 UNITS: 100 INJECTION, SOLUTION SUBCUTANEOUS at 09:40

## 2022-06-16 RX ADMIN — RANOLAZINE 500 MG: 500 TABLET, FILM COATED, EXTENDED RELEASE ORAL at 10:12

## 2022-06-16 RX ADMIN — CEFTRIAXONE SODIUM 1 G: 1 INJECTION, POWDER, FOR SOLUTION INTRAMUSCULAR; INTRAVENOUS at 09:39

## 2022-06-16 RX ADMIN — SODIUM CHLORIDE 100 ML/HR: 9 INJECTION, SOLUTION INTRAVENOUS at 02:32

## 2022-06-16 RX ADMIN — SODIUM CHLORIDE, PRESERVATIVE FREE 10 ML: 5 INJECTION INTRAVENOUS at 06:30

## 2022-06-16 RX ADMIN — INSULIN LISPRO 9 UNITS: 100 INJECTION, SOLUTION INTRAVENOUS; SUBCUTANEOUS at 06:29

## 2022-06-16 RX ADMIN — SODIUM CHLORIDE 1000 ML: 9 INJECTION, SOLUTION INTRAVENOUS at 00:01

## 2022-06-16 RX ADMIN — FUROSEMIDE 20 MG: 40 TABLET ORAL at 09:40

## 2022-06-16 RX ADMIN — INSULIN LISPRO 16 UNITS: 100 INJECTION, SOLUTION INTRAVENOUS; SUBCUTANEOUS at 21:45

## 2022-06-16 RX ADMIN — NIFEDIPINE 30 MG: 30 TABLET, FILM COATED, EXTENDED RELEASE ORAL at 09:40

## 2022-06-16 RX ADMIN — GUAIFENESIN 600 MG: 600 TABLET, EXTENDED RELEASE ORAL at 22:53

## 2022-06-16 RX ADMIN — ISOSORBIDE MONONITRATE 60 MG: 60 TABLET, EXTENDED RELEASE ORAL at 10:12

## 2022-06-16 RX ADMIN — ENOXAPARIN SODIUM 40 MG: 100 INJECTION SUBCUTANEOUS at 09:41

## 2022-06-16 NOTE — PROGRESS NOTES
Hospitalist Progress Note    Subjective:   Daily Progress Note: 6/16/2022 8:36 AM    Hospital Course:  Misty Simmons is a 38-year-old male with PMHx of CAD s/p PCI, diabetes, hypertension, hyperlipidemia, and daibetes mellitus who was transferred from [facility] for pneumonia and hyperglycemia. Patient reports that he has been having coughing, fever, chills, and shortness of breath. At that facility he was given Zosyn and azithromycin for pneumonia and transferred to [facility]. At the facility, he also found to have hyperglycemia with elevated anion gap and VARINDER. Patient admits to not be compliance to his home medications, and not taking insulin currently. In the ED, hemodynamically stable. Lab work revealed leukocytosis. Anion gap elevated. Creatinine improving. COVID and flu negative. Blood culture obtained. Chest x-ray showed opacities in the bases and right perihilar region, possible pneumonia. Also possible hydrostatic edema. On further questioning, patient denies orthopnea or lower extremity edema. Started on IV Zithromax, ceftriaxone, and systemic steroids. Pulmonary consult. Subjective:    Patient seen and examined at bedside. He states, \"I'm trying to get up out of here. I can do this stuff at home. \"  Pt admits to productive cough and shortness of breath with ambulation.      Current Facility-Administered Medications   Medication Dose Route Frequency    insulin lispro (HUMALOG) injection   SubCUTAneous Q4H    glucose chewable tablet 16 g  4 Tablet Oral PRN    glucagon (GLUCAGEN) injection 1 mg  1 mg IntraMUSCular PRN    dextrose 10% infusion 0-250 mL  0-250 mL IntraVENous PRN    0.9% sodium chloride infusion  100 mL/hr IntraVENous CONTINUOUS    cefTRIAXone (ROCEPHIN) 1 g in sterile water (preservative free) 10 mL IV syringe  1 g IntraVENous Q24H    isosorbide mononitrate ER (IMDUR) tablet 60 mg  60 mg Oral DAILY    furosemide (LASIX) tablet 20 mg  20 mg Oral DAILY    sertraline (ZOLOFT) tablet 50 mg  50 mg Oral DAILY    ranolazine ER (RANEXA) tablet 500 mg  500 mg Oral BID    lisinopriL (PRINIVIL, ZESTRIL) tablet 10 mg  10 mg Oral DAILY    NIFEdipine ER (PROCARDIA XL) tablet 30 mg  30 mg Oral DAILY    sodium chloride (NS) flush 5-40 mL  5-40 mL IntraVENous Q8H    sodium chloride (NS) flush 5-40 mL  5-40 mL IntraVENous PRN    acetaminophen (TYLENOL) tablet 650 mg  650 mg Oral Q6H PRN    Or    acetaminophen (TYLENOL) suppository 650 mg  650 mg Rectal Q6H PRN    polyethylene glycol (MIRALAX) packet 17 g  17 g Oral DAILY PRN    ondansetron (ZOFRAN ODT) tablet 4 mg  4 mg Oral Q8H PRN    Or    ondansetron (ZOFRAN) injection 4 mg  4 mg IntraVENous Q6H PRN    enoxaparin (LOVENOX) injection 40 mg  40 mg SubCUTAneous DAILY    [START ON 6/17/2022] azithromycin (ZITHROMAX) 500 mg in 0.9% sodium chloride 250 mL ([id number])  500 mg IntraVENous Q24H    dextrose 10% infusion 0-250 mL  0-250 mL IntraVENous PRN    insulin glargine (LANTUS) injection 22 Units  0.2 Units/kg SubCUTAneous DAILY    predniSONE (DELTASONE) tablet 40 mg  40 mg Oral DAILY WITH BREAKFAST    guaiFENesin ER (MUCINEX) tablet 600 mg  600 mg Oral Q12H     Current Outpatient Medications   Medication Sig    lisinopriL (PRINIVIL, ZESTRIL) 10 mg tablet Take  by mouth daily.  insulin glargine (LANTUS) 100 unit/mL injection Take 20 units once daily at night    ranolazine ER (RANEXA) 500 mg SR tablet Take 500 mg by mouth two (2) times a day.  sertraline (ZOLOFT) 50 mg tablet Take 1 Tab by mouth daily.  furosemide (LASIX) 20 mg tablet Take 1 Tab by mouth daily.  NIFEdipine ER (PROCARDIA XL) 30 mg ER tablet Take 1 Tab by mouth daily. (Patient taking differently: Take 30 mg by mouth daily. Dr Toi Mendez)   [place] isosorbide mononitrate ER (IMDUR) 60 mg CR tablet Take 60 mg by mouth daily.  Dr Ferdie Mendez        Review of Systems  Constitutional: + fevers, + chills, No sweats, No fatigue, No Weakness  Eyes: No redness  ENT: No nasal congestion, No sore throat, No voice change  Respiratory: + Shortness of Breath, + sputum, + cough, No wheezing  Cardiovascular: No chest pain, No palpitations, No extremity edema  Gastrointestinal: No nausea, No vomiting, No diarrhea, No abdominal pain  Genitourinary: No frequency, No dysuria, No hematuria  Integument/breast: No skin lesion(s)   Neurological: No Confusion, No headaches, No dizziness      Objective:     Visit Vitals  BP (!) 143/90   Pulse 81   Temp 98.7 °F (37.1 °C)   Resp 18   Ht 5' 10\" (1.778 m)   Wt 108.9 kg (240 lb)   SpO2 95%   BMI 34.44 kg/m²    O2 Flow Rate (L/min): 2 l/min O2 Device: Nasal cannula,None    Temp (24hrs), Av.7 °F (37.1 °C), Min:98.6 °F (37 °C), Max:98.7 °F (37.1 °C)      No intake/output data recorded. No intake/output data recorded. PHYSICAL EXAM:  Constitutional: No acute distress  Skin: Extremities and face reveal no rashes. HEENT: Sclerae anicteric. PERRL. No oral ulcers. The neck is supple and no masses. Cardiovascular: Regular rate and rhythm. Respiratory:  Symmetric chest wall expansion. Decreased air entry bases, right > left with coarse rhonchi bilaterally. On room air. No wheezing. GI: Abdomen nondistended, soft, and nontender. Normal active bowel sounds. Rectal: Deferred   Musculoskeletal: No pitting edema of the lower legs. Able to move all ext  Neurological:  Patient is alert and oriented x3.  Cranial nerves II-XII grossly intact  Psychiatric: Mood appears appropriate       Data Review    Recent Results (from the past 24 hour(s))   CULTURE, BLOOD    Collection Time: 06/15/22  5:22 PM    Specimen: Blood   Result Value Ref Range    Special Requests: No Special Requests      Culture result: No growth after 11 hours     LACTIC ACID    Collection Time: 06/15/22  5:22 PM   Result Value Ref Range    Lactic acid 1.5 0.4 - 2.0 mmol/L   CBC WITH AUTOMATED DIFF    Collection Time: 06/15/22  5:22 PM   Result Value Ref Range    WBC 17.4 (H) 4.4 - 11.3 K/uL RBC 4.42 (L) 4.50 - 5.90 M/uL    HGB 11.5 (L) 13.5 - 17.5 g/dL    HCT 36.7 (L) 41 - 53 %    MCV 83.0 80 - 100 FL    MCH 26.1 (L) 31 - 34 PG    MCHC 31.5 31.0 - 36.0 g/dL    RDW 15.5 (H) 11.5 - 14.5 %    PLATELET 229 197 - 307 K/uL    MPV 9.0 6.5 - 11.5 FL    NRBC 0.1  WBC    ABSOLUTE NRBC 0.02 K/uL    NEUTROPHILS 87 (H) 42 - 75 %    LYMPHOCYTES 4 (L) 20.5 - 51.1 %    MONOCYTES 9 1.7 - 9.3 %    EOSINOPHILS 0 (L) 0.9 - 2.9 %    BASOPHILS 0 0.0 - 2.5 %    ABS. NEUTROPHILS 15.1 (H) 1.8 - 7.7 K/UL    ABS. LYMPHOCYTES 0.6 (L) 1.0 - 4.8 K/UL    ABS. MONOCYTES 1.6 0.2 - 2.4 K/UL    ABS. EOSINOPHILS 0.0 0.0 - 0.7 K/UL    ABS.  BASOPHILS 0.0 0.0 - 0.2 K/UL   METABOLIC PANEL, BASIC    Collection Time: 06/15/22  5:22 PM   Result Value Ref Range    Sodium 130 (L) 136 - 145 mmol/L    Potassium 3.6 3.5 - 5.1 mmol/L    Chloride 92 (L) 97 - 108 mmol/L    CO2 18 (L) 21 - 32 mmol/L    Anion gap 20 (H) 5 - 15 mmol/L    Glucose 576 (H) 65 - 100 mg/dL    BUN 29 (H) 6 - 20 mg/dL    Creatinine 1.94 (H) 0.70 - 1.30 mg/dL    BUN/Creatinine ratio 15 12 - 20      GFR est AA 42 (L) >60 ml/min/1.73m2    GFR est non-AA 34 (L) >60 ml/min/1.73m2    Calcium 9.4 8.5 - 10.1 mg/dL   COVID-19 WITH INFLUENZA A/B    Collection Time: 06/15/22  5:22 PM   Result Value Ref Range    SARS-CoV-2 by PCR Not Detected Not Detected      Influenza A by PCR Not Detected Not Detected      Influenza B by PCR Not Detected Not Detected     CULTURE, BLOOD    Collection Time: 06/15/22  5:26 PM    Specimen: Blood   Result Value Ref Range    Special Requests: No Special Requests      Culture result: No growth after 11 hours     NT-PRO BNP    Collection Time: 06/15/22  5:26 PM   Result Value Ref Range    NT pro- (H) <125 pg/mL   TROPONIN-HIGH SENSITIVITY    Collection Time: 06/15/22  5:26 PM   Result Value Ref Range    Troponin-High Sensitivity 29 0 - 76 ng/L   GLUCOSE, POC    Collection Time: 06/15/22  8:39 PM   Result Value Ref Range    Glucose (POC) 363 (H) 65 - 117 mg/dL    Performed by ANDRES NAETICIA    METABOLIC PANEL, BASIC    Collection Time: 06/15/22 10:49 PM   Result Value Ref Range    Sodium 133 (L) 136 - 145 mmol/L    Potassium 3.6 3.5 - 5.1 mmol/L    Chloride 98 97 - 108 mmol/L    CO2 16 (L) 21 - 32 mmol/L    Anion gap 19 (H) 5 - 15 mmol/L    Glucose 416 (H) 65 - 100 mg/dL    BUN 30 (H) 6 - 20 mg/dL    Creatinine 1.64 (H) 0.70 - 1.30 mg/dL    BUN/Creatinine ratio 18 12 - 20      GFR est AA 51 (L) >60 ml/min/1.73m2    GFR est non-AA 42 (L) >60 ml/min/1.73m2    Calcium 9.3 8.5 - 10.1 mg/dL   LACTIC ACID    Collection Time: 06/15/22 10:49 PM   Result Value Ref Range    Lactic acid 1.4 0.4 - 2.0 mmol/L   GLUCOSE, POC    Collection Time: 06/15/22 11:57 PM   Result Value Ref Range    Glucose (POC) 375 (H) 65 - 117 mg/dL    Performed by Alondra Mckeon    VENOUS BLOOD GAS    Collection Time: 06/16/22 12:11 AM   Result Value Ref Range    VENOUS PH 7.334 7.31 - 7.41      VENOUS PCO2 30.5 (L) 40 - 50 mmHg    VENOUS PO2 126 (H) 36 - 42 mmHg    VENOUS O2 SATURATION 100 (H) 60 - 80 %    VENOUS BICARBONATE 18 (L) 22 - 26 mmol/L    VENOUS BASE DEFICIT 8.5 (H) 0 - 2 mmol/L    O2 METHOD Room air      FIO2 21.0 %    Sample source Venous      SITE Right Brachial     GLUCOSE, POC    Collection Time: 06/16/22  6:00 AM   Result Value Ref Range    Glucose (POC) 278 (H) 65 - 117 mg/dL    Performed by Alondra Mckeon        No orders to display       Active Problems:    Pneumonia (6/16/2022)        Assessment/Plan:     1. Hyperglycemia  - Anion gap elevated   - IVF infusion  - Check ketone level, to evaluate for DKA. - Lantus 22 units daily  - ISS and accu-checks     2. Diabetes  - check HbA1c  - Diabetes education     3. Community acquired pneumonia   - Blood culture and sputum culture ordered  - MRSA screen  - Start antibiotics empirically: ceftriaxone & azithromycin  - Pulmonary consult     4. Hypertension  - Continue home medications.       5. CAD s/p PCI  - No chest pain.  Continue to monitor. DVT Prophylaxis: Lovenox  Code Status: Full  POA:    Discharge Barriers:    - Closure on anion gap   - Stabilization of blood sugars   - Pulmonary consult     Care Plan discussed with: patient and nursing     Total time spent with patient: >35 minutes.

## 2022-06-16 NOTE — ED NOTES
Pt transferred to [facility] via [facility]. Pt on [email or web address] via [place] IVF infusing @ 150ml/hr. Pt NSR on cardiac monitor. Pt in no acute distress at time of transfer.

## 2022-06-16 NOTE — ED PROVIDER NOTES
[facility]  EMERGENCY DEPARTMENT ENCOUNTER NOTE    Date: 6/15/2022  Patient Name: Mary Hunt      History of Presenting Illness     Chief Complaint   Patient presents with    High Blood Sugar    Pneumonia       History Provided By: Patient    HPI: Mary Hunt, 69 y.o. male with PMH of DM, HTN, HLD, and CAD presents to the ED transferred from [facility] for pneumonia and hyperglycemia. Patient reports that he has been having coughing, fever, chills, and shortness of breath. He went to that center in which she was worked up and found to have hyperglycemia with elevated anion gap, VARINDER, and presumed to have pneumonia. At that facility he was given Zosyn and azithromycin for pneumonia and transferred to my facility. Patient is denying any chest pain, abdominal pain, nausea, vomiting, changes in his dietary habits. There are no other complaints, changes, or physical findings at this time.     PCP: Galo Gonzales NP    Current Facility-Administered Medications   Medication Dose Route Frequency Provider Last Rate Last Admin    insulin lispro (HUMALOG) injection   SubCUTAneous Q4H Sandford Kayser, MD   15 Units at 06/16/22 0232    glucose chewable tablet 16 g  4 Tablet Oral PRN Los Villa MD        glucagon (GLUCAGEN) injection 1 mg  1 mg IntraMUSCular PRN Sandford Kayser, MD        dextrose 10% infusion 0-250 mL  0-250 mL IntraVENous PRN Sandford Kayser, MD        0.9% sodium chloride infusion  100 mL/hr IntraVENous CONTINUOUS Sandford Kayser,  mL/hr at 06/16/22 0232 100 mL/hr at 06/16/22 0232    cefTRIAXone (ROCEPHIN) 1 g in sterile water (preservative free) 10 mL IV syringe  1 g IntraVENous Q24H Los Villa MD        isosorbide mononitrate ER (IMDUR) tablet 60 mg  60 mg Oral DAILY Los Villa MD        furosemide (LASIX) tablet 20 mg  20 mg Oral DAILY Los Villa MD        sertraline (ZOLOFT) tablet 50 mg  50 mg Oral DAILY Mg Paz MD        ranolazine ER (RANEXA) tablet 500 mg  500 mg Oral BID Mg Paz MD        lisinopriL (PRINIVIL, ZESTRIL) tablet 10 mg  10 mg Oral DAILY Torrey Villa MD        NIFEdipine ER (PROCARDIA XL) tablet 30 mg  30 mg Oral DAILY Torrey Villa MD        sodium chloride (NS) flush 5-40 mL  5-40 mL IntraVENous Q8H Torrey Villa MD        sodium chloride (NS) flush 5-40 mL  5-40 mL IntraVENous PRN Mg Paz MD        acetaminophen (TYLENOL) tablet 650 mg  650 mg Oral Q6H PRN Mg Paz MD        Or    acetaminophen (TYLENOL) suppository 650 mg  650 mg Rectal Q6H PRN Mg Paz MD        polyethylene glycol (MIRALAX) packet 17 g  17 g Oral DAILY PRN Mg Paz MD        ondansetron (ZOFRAN ODT) tablet 4 mg  4 mg Oral Q8H PRN Torrey Villa MD        Or    ondansetron (ZOFRAN) injection 4 mg  4 mg IntraVENous Q6H PRN Mg Paz MD        enoxaparin (LOVENOX) injection 40 mg  40 mg SubCUTAneous DAILY Torrey Villa MD       [address] [place] [address] [START ON 6/17/2022] azithromycin (ZITHROMAX) 500 mg in 0.9% sodium chloride 250 mL ([id number])  500 mg IntraVENous Q24H Mg Paz MD        dextrose 10% infusion 0-250 mL  0-250 mL IntraVENous PRN Mg Paz MD        insulin glargine (LANTUS) injection 22 Units  0.2 Units/kg SubCUTAneous DAILY Torrey Villa MD         Current Outpatient Medications   Medication Sig Dispense Refill    lisinopriL (PRINIVIL, ZESTRIL) 10 mg tablet Take  by mouth daily.  insulin glargine (LANTUS) 100 unit/mL injection Take 20 units once daily at night 1 Vial 0    ranolazine ER (RANEXA) 500 mg SR tablet Take 500 mg by mouth two (2) times a day.  sertraline (ZOLOFT) 50 mg tablet Take 1 Tab by mouth daily. 30 Tab 2    furosemide (LASIX) 20 mg tablet Take 1 Tab by mouth daily. 90 Tab 0    NIFEdipine ER (PROCARDIA XL) 30 mg ER tablet Take 1 Tab by mouth daily. (Patient taking differently: Take 30 mg by mouth daily.  Dr Renita Zamorano) [id number] [place] Tab 0    isosorbide mononitrate ER (IMDUR) 60 mg CR tablet Take 60 mg by mouth daily. Dr Ruben Raymond         Past History     Past Medical History:  Past Medical History:   Diagnosis Date    Arthritis     Burning with urination     CAD (coronary artery disease)     patient stated stents placed unsure of how many     Diabetes ([address]     GERD (gastroesophageal reflux disease)     History of blood transfusion     patient stated approx 1 year ago     HTN (hypertension)     Hyperlipidemia     MI (myocardial infarction) ([address]     Rectal bleeding     patient stated this happened over the weekend.  Renal mass, left        Past Surgical History:  Past Surgical History:   Procedure Laterality Date    HX COLONOSCOPY      HX CORONARY STENT PLACEMENT  2018    x1    HX HEART CATHETERIZATION      patient stated stents placed     HX NEPHRECTOMY Left 08/23/2021    robotic left partial nephrectomy,    HX UROLOGICAL  08/23/2021     intraoperative renal ultrasound    OK CARDIAC SURG PROCEDURE UNLIST  2018    BYPASS       Family History:  Family History   Problem Relation Age of Onset    Hypertension Mother     Diabetes Mother     Hypertension Father     Heart Disease Father        Social History:  Social History     Tobacco Use    Smoking status: Never Smoker    Smokeless tobacco: Never Used   Vaping Use    Vaping Use: Never used   Substance Use Topics    Alcohol use: Not Currently    Drug use: Yes     Types: Marijuana       Allergies:  No Known Allergies      Review of Systems     Review of Systems     A 10 point review of system was performed and was negative except as noted above in HPI    Physical Exam     Physical Exam  Vitals and nursing note reviewed. Constitutional:       General: He is not in acute distress. Appearance: He is not ill-appearing, toxic-appearing or diaphoretic. HENT:      Head: Normocephalic and atraumatic. Cardiovascular:      Rate and Rhythm: Normal rate and regular rhythm.       Heart sounds: Normal heart sounds. Pulmonary:      Effort: Pulmonary effort is normal.      Breath sounds: Rhonchi present. Abdominal:      Palpations: Abdomen is soft. Tenderness: There is no abdominal tenderness. Musculoskeletal:      Cervical back: Normal range of motion and neck supple. Right lower leg: No tenderness. No edema. Left lower leg: No tenderness. No edema. Skin:     General: Skin is warm and dry. Neurological:      Mental Status: He is alert and oriented to person, place, and time. Lab and Diagnostic Study Results     Labs -     Recent Results (from the past 12 hour(s))   LACTIC ACID    Collection Time: 06/15/22  5:22 PM   Result Value Ref Range    Lactic acid 1.5 0.4 - 2.0 mmol/L   CBC WITH AUTOMATED DIFF    Collection Time: 06/15/22  5:22 PM   Result Value Ref Range    WBC 17.4 (H) 4.4 - 11.3 K/uL    RBC 4.42 (L) 4.50 - 5.90 M/uL    HGB 11.5 (L) 13.5 - 17.5 g/dL    HCT 36.7 (L) 41 - 53 %    MCV 83.0 80 - 100 FL    MCH 26.1 (L) 31 - 34 PG    MCHC 31.5 31.0 - 36.0 g/dL    RDW 15.5 (H) 11.5 - 14.5 %    PLATELET 126 833 - 333 K/uL    MPV 9.0 6.5 - 11.5 FL    NRBC 0.1  WBC    ABSOLUTE NRBC 0.02 K/uL    NEUTROPHILS 87 (H) 42 - 75 %    LYMPHOCYTES 4 (L) 20.5 - 51.1 %    MONOCYTES 9 1.7 - 9.3 %    EOSINOPHILS 0 (L) 0.9 - 2.9 %    BASOPHILS 0 0.0 - 2.5 %    ABS. NEUTROPHILS 15.1 (H) 1.8 - 7.7 K/UL    ABS. LYMPHOCYTES 0.6 (L) 1.0 - 4.8 K/UL    ABS. MONOCYTES 1.6 0.2 - 2.4 K/UL    ABS. EOSINOPHILS 0.0 0.0 - 0.7 K/UL    ABS.  BASOPHILS 0.0 0.0 - 0.2 K/UL   METABOLIC PANEL, BASIC    Collection Time: 06/15/22  5:22 PM   Result Value Ref Range    Sodium 130 (L) 136 - 145 mmol/L    Potassium 3.6 3.5 - 5.1 mmol/L    Chloride 92 (L) 97 - 108 mmol/L    CO2 18 (L) 21 - 32 mmol/L    Anion gap 20 (H) 5 - 15 mmol/L    Glucose 576 (H) 65 - 100 mg/dL    BUN 29 (H) 6 - 20 mg/dL    Creatinine 1.94 (H) 0.70 - 1.30 mg/dL    BUN/Creatinine ratio 15 12 - 20      GFR est AA 42 (L) >60 ml/min/1.73m2    GFR est non-AA 34 (L) >60 ml/min/1.73m2    Calcium 9.4 8.5 - 10.1 mg/dL   COVID-19 WITH INFLUENZA A/B    Collection Time: 06/15/22  5:22 PM   Result Value Ref Range    SARS-CoV-2 by PCR Not Detected Not Detected      Influenza A by PCR Not Detected Not Detected      Influenza B by PCR Not Detected Not Detected     NT-PRO BNP    Collection Time: 06/15/22  5:26 PM   Result Value Ref Range    NT pro- (H) <125 pg/mL   TROPONIN-HIGH SENSITIVITY    Collection Time: 06/15/22  5:26 PM   Result Value Ref Range    Troponin-High Sensitivity 29 0 - 76 ng/L   GLUCOSE, POC    Collection Time: 06/15/22  8:39 PM   Result Value Ref Range    Glucose (POC) 363 (H) 65 - 117 mg/dL    Performed by ANDRES AtterocorERIK    METABOLIC PANEL, BASIC    Collection Time: 06/15/22 10:49 PM   Result Value Ref Range    Sodium 133 (L) 136 - 145 mmol/L    Potassium 3.6 3.5 - 5.1 mmol/L    Chloride 98 97 - 108 mmol/L    CO2 16 (L) 21 - 32 mmol/L    Anion gap 19 (H) 5 - 15 mmol/L    Glucose 416 (H) 65 - 100 mg/dL    BUN 30 (H) 6 - 20 mg/dL    Creatinine 1.64 (H) 0.70 - 1.30 mg/dL    BUN/Creatinine ratio 18 12 - 20      GFR est AA 51 (L) >60 ml/min/1.73m2    GFR est non-AA 42 (L) >60 ml/min/1.73m2    Calcium 9.3 8.5 - 10.1 mg/dL   LACTIC ACID    Collection Time: 06/15/22 10:49 PM   Result Value Ref Range    Lactic acid 1.4 0.4 - 2.0 mmol/L   GLUCOSE, POC    Collection Time: 06/15/22 11:57 PM   Result Value Ref Range    Glucose (POC) 375 (H) 65 - 117 mg/dL    Performed by Sandra Marie    VENOUS BLOOD GAS    Collection Time: 06/16/22 12:11 AM   Result Value Ref Range    VENOUS PH 7.334 7.31 - 7.41      VENOUS PCO2 30.5 (L) 40 - 50 mmHg    VENOUS PO2 126 (H) 36 - 42 mmHg    VENOUS O2 SATURATION 100 (H) 60 - 80 %    VENOUS BICARBONATE 18 (L) 22 - 26 mmol/L    VENOUS BASE DEFICIT 8.5 (H) 0 - 2 mmol/L    O2 METHOD Room air      FIO2 21.0 %    Sample source Venous      SITE Right Brachial      Performed by PENDING        Radiologic Studies - [unfilled]  CT Results  (Last 48 hours)    None        CXR Results  (Last 48 hours)               06/15/22 1715  XR CHEST PORT Final result    Impression:  Possible hydrostatic edema. Opacities in the lungs as above. Narrative:  Chest, frontal view, 6/15/2022       History: Cough. Rhonchi, right lung. Comparison: Chest 8/12/2021. Findings: Median sternotomy is noted. The cardiac silhouette is enlarged. The   lungs are underexpanded with crowding of bronchovascular structures. Hydrostatic edema is possible. There are opacities at the bases and right   perihilar region which could be atelectasis, infection, localized hydrostatic   edema. No pleural effusion or pneumothorax is identified. The osseous   structures are grossly intact. Medical Decision Making and ED Course   - I am the first and primary provider for this patient AND AM THE PRIMARY PROVIDER OF RECORD. - I reviewed the vital signs, available nursing notes, past medical history, past surgical history, family history and social history. - Initial assessment performed. The patients presenting problems have been discussed, and the staff are in agreement with the care plan formulated and outlined with them. I have encouraged them to ask questions as they arise throughout their visit. Vital Signs-Reviewed the patient's vital signs. Patient Vitals for the past 24 hrs:   Temp Pulse Resp BP SpO2   06/15/22 2202 98.7 °F (37.1 °C) 98 19 133/67 99 %       Records Reviewed: Nursing Notes    Provider Notes (Medical Decision Making):     ED Course as of 06/16/22 0306   Wed Davidson 15, 2022   2228 Patient is presented transfer from outside facility with concerns for DKA and pneumonia. Patient appears appropriate and hemodynamically stable on my evaluation. He is already got covered with antibiotics and currently is not on insulin.   We will get repeat basic metabolic panel, VBG, lactic acid for disposition. [AA] Thu Jun 16, 2022   0011 The patient work-up. Continues to have VARINDER from baseline. BUN is elevated which may contributed to anion gap. Patient does not appear to be acidotic pending VBG. [AA]   0130 VARINDER improving. Hyperglycemia improving. Anion gap is likely due to uremia given his BUN is elevated. Patient presentation does not appear to be consistent with DKA. However, in the setting of hyperglycemia, VARINDER, and pneumonia. Patient presentation warrants admission. He is already received a dose of IV antibiotics. Patient is discussed with the hospitalist and accepted to be admitted. [AA]      ED Course User Index  [AA] Lino Gilbert MD         Disposition     Disposition: Condition stable  Admitted to Tele the case was discussed with the admitting physician Dr. Tran Hadley    Admitted    Diagnosis     Clinical Impression:   1. Community acquired pneumonia, unspecified laterality    2. VARINDER (acute kidney injury) ([facility]        Attestations: Candance Graves, MD    Please note that this dictation was completed with [employer], the computer voice recognition software. Quite often unanticipated grammatical, syntax, homophones, and other interpretive errors are inadvertently transcribed by the computer software. Please disregard these errors. Please excuse any errors that have escaped final proofreading. Thank you.

## 2022-06-16 NOTE — ED NOTES
Past Medical History:   Diagnosis Date    Arthritis     Burning with urination     CAD (coronary artery disease)     patient stated stents placed unsure of how many     Diabetes ([facility] [address]     GERD (gastroesophageal reflux disease)     History of blood transfusion     patient stated approx 1 year ago     HTN (hypertension)     Hyperlipidemia     MI (myocardial infarction) ([facility] [address]     Rectal bleeding     patient stated this happened over the weekend.  Renal mass, left      Past Surgical History:   Procedure Laterality Date    HX COLONOSCOPY      HX CORONARY STENT PLACEMENT  2018    x1    HX HEART CATHETERIZATION      patient stated stents placed     HX NEPHRECTOMY Left 08/23/2021    robotic left partial nephrectomy,    HX UROLOGICAL  08/23/2021     intraoperative renal ultrasound    [place] CARDIAC SURG PROCEDURE UNLIST  2018    BYPASS     Care assumed and bedside SBAR report endorsed on 69 y.o. male with PMH of DM, HTN, HLD, and CAD presents to the ED transferred from [facility] for pneumonia and hyperglycemia. Patient reports that he has been having coughing, fever, chills, and shortness of breath. He went to that center in which she was worked up and found to have hyperglycemia with elevated anion gap, VARINDER, and presumed to have pneumonia. At that facility he was given Zosyn and azithromycin for pneumonia and transferred to this facility, alert and oriented x3, pain currently within manageable limits, IV patent, plan of care reinforced, bed in lowest position, side rails up x2, call bell within reach, will continue to monitor. 3:09 AM  Eval completed by hospitalist, POC glucose check q 4hrs, treated per sliding scale as ordered. Pt resting comfortably, sleeping but easily aroused, no sign or symptoms of pain or discomfort, VS WNL, frequent checks, bed in lowest position, side rails up x2, call bell within reach, will continue to monitor.

## 2022-06-16 NOTE — CONSULTS
PULMONARY CONSULT  [facility]    Name: Joshua Acharya MRN: [id number]   : 1955 Hospital: [address] [place] [address]   Date: 2022  Admission date: 6/15/2022 Hospital Day: 2       HPI:     Hospital Problems  Date Reviewed: 2021          Codes Class Noted POA    Pneumonia ICD-10-CM: J18.9  ICD-9-CM: 407  2022 Unknown                   [x] High complexity decision making was performed  [x] See my orders for details      Subjective/Initial History:     I was asked by Zhen Fuller MD to see Joshua Acharya  a 69 y.o.  male in consultation     Excerpts from admission 6/15/2022 or consult notes as follows:   49-year-old male came in because of shortness of breath and dyspnea he was transferred from [facility] for possible pneumonia came to the emergency room complaining of cough fever chills and dyspnea started on antibiotic. His blood sugar was elevated and also his renal function was abnormal, he has history of renal mass left nephrectomy for possible hypernephroma so admitted and pulmonary consult was called.       No Known Allergies     MAR reviewed and pertinent medications noted or modified as needed     Current Facility-Administered Medications   Medication    insulin lispro (HUMALOG) injection    glucose chewable tablet 16 g    glucagon (GLUCAGEN) injection 1 mg    dextrose 10% infusion 0-250 mL    0.9% sodium chloride infusion    cefTRIAXone (ROCEPHIN) 1 g in sterile water (preservative free) 10 mL IV syringe    isosorbide mononitrate ER (IMDUR) tablet 60 mg    furosemide (LASIX) tablet 20 mg    sertraline (ZOLOFT) tablet 50 mg    ranolazine ER (RANEXA) tablet 500 mg    lisinopriL (PRINIVIL, ZESTRIL) tablet 10 mg    NIFEdipine ER (PROCARDIA XL) tablet 30 mg    sodium chloride (NS) flush 5-40 mL    sodium chloride (NS) flush 5-40 mL    acetaminophen (TYLENOL) tablet 650 mg    Or    acetaminophen (TYLENOL) suppository 650 mg  polyethylene glycol (MIRALAX) packet 17 g    ondansetron (ZOFRAN ODT) tablet 4 mg    Or    ondansetron (ZOFRAN) injection 4 mg    enoxaparin (LOVENOX) injection 40 mg    [START ON 6/17/2022] azithromycin (ZITHROMAX) 500 mg in 0.9% sodium chloride 250 mL ([id number])    dextrose 10% infusion 0-250 mL    insulin glargine (LANTUS) injection 22 Units    predniSONE (DELTASONE) tablet 40 mg    guaiFENesin ER (MUCINEX) tablet 600 mg     Current Outpatient Medications   Medication Sig    lisinopriL (PRINIVIL, ZESTRIL) 10 mg tablet Take  by mouth daily.  insulin glargine (LANTUS) 100 unit/mL injection Take 20 units once daily at night    ranolazine ER (RANEXA) 500 mg SR tablet Take 500 mg by mouth two (2) times a day.  sertraline (ZOLOFT) 50 mg tablet Take 1 Tab by mouth daily.  furosemide (LASIX) 20 mg tablet Take 1 Tab by mouth daily.  NIFEdipine ER (PROCARDIA XL) 30 mg ER tablet Take 1 Tab by mouth daily. (Patient taking differently: Take 30 mg by mouth daily. Dr Opal Ruiz)   Martine Ou[place]ita isosorbide mononitrate ER (IMDUR) 60 mg CR tablet Take 60 mg by mouth daily. Dr Opal Ruiz      Patient PCP: Celia Gaffney NP  PMH:  has a past medical history of Arthritis, Burning with urination, CAD (coronary artery disease), Diabetes ([address] GERD (gastroesophageal reflux disease), History of blood transfusion, HTN (hypertension), Hyperlipidemia, MI (myocardial infarction) ([facility] [address] Rectal bleeding, and Renal mass, left. He has no past medical history of Asthma. PSH:   has a past surgical history that includes hx coronary stent placement (2018); pr cardiac surg procedure unlist (2018); hx heart catheterization; hx colonoscopy; hx nephrectomy (Left, 08/23/2021); and hx urological (08/23/2021). FHX: family history includes Diabetes in his mother; Heart Disease in his father; Hypertension in his father and mother. SHX:  reports that he has never smoked. He has never used smokeless tobacco. He reports previous alcohol use.  He reports current drug use. Drug: Marijuana. ROS:    Review of Systems   Constitutional: Positive for fever. HENT: Negative. Eyes: Negative. Respiratory: Positive for cough, shortness of breath and wheezing. Cardiovascular: Positive for orthopnea. Gastrointestinal: Negative. Genitourinary: Negative. Musculoskeletal: Negative. Skin: Negative. Neurological: Negative. Psychiatric/Behavioral: Negative. Objective:     Vital Signs: Telemetry:    normal sinus rhythm Intake/Output:   Visit Vitals  BP (!) 155/114   Pulse 84   Temp 98.7 °F (37.1 °C)   Resp 18   Ht 5' 10\" (1.778 m)   Wt 108.9 kg (240 lb)   SpO2 95%   BMI 34.44 kg/m²       Temp (24hrs), Av.7 °F (37.1 °C), Min:98.6 °F (37 °C), Max:98.7 °F (37.1 °C)        O2 Device: Nasal cannula,None O2 Flow Rate (L/min): 2 l/min       Wt Readings from Last 4 Encounters:   06/15/22 108.9 kg (240 lb)   06/15/22 108.9 kg (240 lb)   21 99.3 kg (219 lb)   21 99 kg (218 lb 3.2 oz)        No intake or output data in the 24 hours ending 22 1007    Last shift:      No intake/output data recorded. Last 3 shifts: No intake/output data recorded. Physical Exam:     Physical Exam  Constitutional:       Appearance: Normal appearance. HENT:      Head: Normocephalic and atraumatic. Nose: Nose normal.      Mouth/Throat:      Mouth: Mucous membranes are moist.   Eyes:      Pupils: Pupils are equal, round, and reactive to light. Cardiovascular:      Rate and Rhythm: Normal rate and regular rhythm. Pulses: Normal pulses. Heart sounds: Normal heart sounds. Pulmonary:      Effort: Pulmonary effort is normal.      Breath sounds: Wheezing present. Abdominal:      General: Abdomen is flat. Bowel sounds are normal.      Palpations: Abdomen is soft. Musculoskeletal:         General: Normal range of motion. Cervical back: Normal range of motion and neck supple. Skin:     General: Skin is warm.    Neurological: Mental Status: He is alert. Psychiatric:         Mood and Affect: Mood normal.          Labs:    Recent Labs     06/15/22  1722   WBC 17.4*   HGB 11.5*        Recent Labs     06/15/22  2249 06/15/22  1722   * 130*   K 3.6 3.6   CL 98 92*   CO2 16* 18*   * 576*   BUN 30* 29*   CREA 1.64* 1.94*   CA 9.3 9.4   LAC 1.4 1.5     Recent Labs     06/16/22  0011   FIO2 21.0     No results for input(s): CPK, CKNDX, TROIQ in the last 72 hours. No lab exists for component: CPKMB  No results found for: BNPP, BNP   Lab Results   Component Value Date/Time    Culture result: No growth after 11 hours 06/15/2022 05:26 PM    Culture result: No growth after 11 hours 06/15/2022 05:22 PM    Culture result: No Growth (<1000 cfu/mL) 08/23/2021 09:15 AM     Lab Results   Component Value Date/Time    TSH 1.31 12/13/2020 08:50 AM       Imaging:    CXR Results  (Last 48 hours)               06/15/22 1715  XR CHEST PORT Final result    Impression:  Possible hydrostatic edema. Opacities in the lungs as above. Narrative:  Chest, frontal view, 6/15/2022       History: Cough. Rhonchi, right lung. Comparison: Chest 8/12/2021. Findings: Median sternotomy is noted. The cardiac silhouette is enlarged. The   lungs are underexpanded with crowding of bronchovascular structures. Hydrostatic edema is possible. There are opacities at the bases and right   perihilar region which could be atelectasis, infection, localized hydrostatic   edema. No pleural effusion or pneumothorax is identified. The osseous   structures are grossly intact. Results from [place] encounter on 06/15/22    XR CHEST PORT    Narrative  Chest, frontal view, 6/15/2022    History: Cough. Rhonchi, right lung. Comparison: Chest 8/12/2021. Findings: Median sternotomy is noted. The cardiac silhouette is enlarged. The  lungs are underexpanded with crowding of bronchovascular structures.   Hydrostatic edema is possible. There are opacities at the bases and right  perihilar region which could be atelectasis, infection, localized hydrostatic  edema. No pleural effusion or pneumothorax is identified. The osseous  structures are grossly intact. Impression  Possible hydrostatic edema. Opacities in the lungs as above. Results from [place] encounter on 08/12/21    XR CHEST PA LAT    Narrative  Chest, 2 views, 8/12/2021    History: Preoperative study. Comparison: None. Findings: The patient is status post median sternotomy. The cardiac silhouette  is within normal limits. The lungs are adequately expanded. Chronic-appearing  reticular opacities are noted. No hydrostatic edema is present. No focal  consolidation, pleural effusions or pneumothorax is identified. Degenerative  changes are present in the thoracic spine. Impression  No acute pulmonary process. Results from Hospital Encounter encounter on 02/22/21    XR CHEST PORT    Narrative  EXAM:  XR CHEST PORT    INDICATION: Bilateral lung opacities. COMPARISON: 2/22/2021    TECHNIQUE: Portable AP semiupright chest view at 1442 hours    FINDINGS: The cardiomediastinal contours are stable. There are increased bilateral patchy interstitial and airspace opacities. There  is no pleural effusion or pneumothorax. The bones and upper abdomen are stable. Impression  Increased bilateral patchy interstitial and airspace opacities. Results from [place] encounter on 04/01/21    CT ABD PELV W WO CONT    Narrative  CT examination of the abdomen and pelvis is performed without and with IV  contrast. The exam is a volume acquisition from which axial, sagittal and  coronal images are generated. Comparison is made with a prior CT scan from  4/1/2021.     Findings:Images obtained prior to IV contrast administration reveal patchy  subsegmental airspace disease in the periphery of either lung with focal  hyperexpansion suggesting air trapping. There is mild bronchiectasis in the  posterior aspect of either lower lobe. The solid abdominal organs reveal layering gallstones within the gallbladder. There is an isodense lesion in the lateral cortex midportion left kidney and a  simple cortical cyst in the lower pole the left kidney. After IV contrast administration, there is mild diffuse hepatic steatosis. The  left kidney reveals a 1.7 x 1.7 cm mass that demonstrates enhancement. The  precontrast CT reveals a density of 35 Hounsfield units. This enhances to 84  Hounsfield units after IV contrast administration. The cyst remains  unremarkable. No free intraperitoneal fluid or gas is identified. The bowel is unopacified but  normal in caliber. There are numerous diverticula throughout the entire colon  without evidence of acute diverticulitis. There is a normal appendix in the right lower quadrant. No fluid or mass is seen  in the cul-de-sac. Bone windows are unremarkable. Impression  1. Small exophytic lesion in the midportion of the left kidney is consistent  with a neoplasm. 2. Simple left renal cortical cyst.  3. No acute findings. 4. Cholelithiasis. 5. Bibasilar focal hyperexpansion and airway disease consistent with air  trapping        IMPRESSION:   1. Acute  respiratory failure with Hypoxia   2. CAP  3. CAD s/p PCI  4. Renal Mass s/p left nephrectomy  5. Diabetes mellitus  6. Prognosis guarded       RECOMMENDATIONS/PLAN:     1. On 2 liter nasal Cannula oxygen as salvage oxygen delivery device to provide high concentration of oxygen to overcome refractory hypoxia;  2. Will get CT chest to see any metastatic disease from hx renal mass  3. Intubate and place on vent if NIV fails  4. Agree with Empiric IV antibiotics pending culture results on rocephin and Zithromax  5. Follow culture results  6. Supplemental O2 to keep sats > 93%  7. Aspiration precautions  8.  Labs to follow electrolytes, renal function and and blood counts  9. Glucose monitoring and SSI  10. Bronchial hygiene with respiratory therapy techniques, bronchodilators  11. DVT, SUP prophylaxis       This care involved high complexity medical decision making: I personally:  · Reviewed the flowsheet and previous days notes  · Reviewed and summarized records or history from previous days note or discussions with staff, family  · High Risk Drug therapy requiring intensive monitoring for toxicity: eg steroids, pressors, antibiotics  · Reviewed and/or ordered Clinical lab tests  · Reviewed images and/or ordered Radiology tests  · Reviewed the patients ECG / Telemetry  · Reviewed and/or adjusted NiPPV settings  · Called and arranged for Radiologic procedures or interventions  · performed or ordered Diagnostic endoscopies with identified risk factors.   · discussed my assessment/management with : Nursing, Hospitalist and Family for coordination of care          Fernanda Vasquez MD

## 2022-06-16 NOTE — PROGRESS NOTES
Physician Progress Note      PATIENT:               Chet Cadet  CSN #:                  [id number]  :                       1955  ADMIT DATE:       6/15/2022 9:59 PM  DISCH DATE:  RESPONDING  PROVIDER #:        RAULITO FERRELL PA-C          QUERY TEXT:    Pt admitted with Pneumonia. Noted documentation of Acute Resp Failure on  PN by ordered Pulmonary consultant. If possible, please document in progress notes and discharge summary:    The medical record reflects the following:  Risk Factors: Pneumonia, CAD, VARINDER, HTN, DM II  Clinical Indicators: : Pulmo PN \"Acute  respiratory failure with Hypoxia. \" O2 sat on RA 97%, no Hypoxia documented. H&P: \"Lung: clear to auscultation bilaterally. \" ED DR: \" Effort: Pulmonary effort is normal. Breath sounds: Rhonchi present. \"  Treatment: Pulmo Consulted, CXR, CT Chest, IV Abx, Oxygen supplement. Lab monitoring. Thank SAJAN Durán, RN, CDI Specialist  [email or web address] or [phone]  Options provided:  -- Respiratory Failure ruled out  -- Respiratory Failure confirmed present on admission  -- Respiratory Failure confirmed not present on admission  -- Other - I will add my own diagnosis  -- Disagree - Not applicable / Not valid  -- Disagree - Clinically unable to determine / Unknown  -- Refer to Clinical Documentation Reviewer    PROVIDER RESPONSE TEXT:    The diagnosis of Respiratory Failure was ruled out.     Query created by: Sajan Sams on 2022 12:44 PM      Electronically signed by:  Armand Church PA-C 2022 1:20 PM

## 2022-06-16 NOTE — ED NOTES
Pt transferred via EMS from [facility]. Pt arrived at previous facility with flu like symptoms. Pt was dx with pneumonia and dka with a blood glucose over 500. Pt was given 10 units of insulin at previous facility. Pt is alert and oriented. Pt denies pain, cp or sob. Pt is cooperative with care.

## 2022-06-16 NOTE — H&P
History and Physical    Patient: Anyi Jensen MRN: [id number]  SSN: [id number]    YOB: 1955  Age: 69 y.o. Sex: male      Subjective:      Anyi Jensen is a 69 y.o. male with PMH of CAD s/p PCI, diabetes, hypertension, hyperlipidemia. He was transferred from [facility] for pneumonia and hyperglycemia. Patient reports that he has been having coughing, fever, chills, and shortness of breath. At that facility he was given Zosyn and azithromycin for pneumonia and transferred to [facility]. At the facility, he also found to have hyperglycemia with elevated anion gap and VARINDER. Patient admits to not be compliance to his home medications, and not taking insulin currently. Lab work revealed leukocytosis. COVID and flu negative. Blood culture obtained. Chest x-ray showed opacities in the bases and right perihilar region, possible pneumonia. Also possible hydrostatic edema. On further questioning, patient denies orthopnea or lower extremity edema. Past Medical History:   Diagnosis Date    Arthritis     Burning with urination     CAD (coronary artery disease)     patient stated stents placed unsure of how many     Diabetes ([address]     GERD (gastroesophageal reflux disease)     History of blood transfusion     patient stated approx 1 year ago     HTN (hypertension)     Hyperlipidemia     MI (myocardial infarction) ([address]     Rectal bleeding     patient stated this happened over the weekend.      Renal mass, left      Past Surgical History:   Procedure Laterality Date    HX COLONOSCOPY      HX CORONARY STENT PLACEMENT  2018    x1    HX HEART CATHETERIZATION      patient stated stents placed     HX NEPHRECTOMY Left 08/23/2021    robotic left partial nephrectomy,    HX UROLOGICAL  08/23/2021     intraoperative renal ultrasound    PA CARDIAC SURG PROCEDURE UNLIST  2018    BYPASS      Family History   Problem Relation Age of Onset    Hypertension Mother     Diabetes Mother     Hypertension Father     Heart Disease Father      Social History     Tobacco Use    Smoking status: Never Smoker    Smokeless tobacco: Never Used   Substance Use Topics    Alcohol use: Not Currently      Prior to Admission medications    Medication Sig Start Date End Date Taking? Authorizing Provider   lisinopriL (PRINIVIL, ZESTRIL) 10 mg tablet Take  by mouth daily. Provider, Historical   insulin glargine (LANTUS) 100 unit/mL injection Take 20 units once daily at night 8/24/21   Cori Heaton MD   ranolazine ER (RANEXA) 500 mg SR tablet Take 500 mg by mouth two (2) times a day. 6/1/21   Provider, Historical   sertraline (ZOLOFT) 50 mg tablet Take 1 Tab by mouth daily. 5/14/21   Alison Marques NP   furosemide (LASIX) 20 mg tablet Take 1 Tab by mouth daily. 4/9/21   Alison Marques NP   NIFEdipine ER (PROCARDIA XL) 30 mg ER tablet Take 1 Tab by mouth daily. Patient taking differently: Take 30 mg by mouth daily. Dr Flor Kelley 12/15/20   [place], MD   isosorbide mononitrate ER (IMDUR) 60 mg CR tablet Take 60 mg by mouth daily.  Imani Leach MD        No Known Allergies    Review of Systems:   Constitutional: See HPI  Eyes: No visual disturbance  Ears, Nose, Mouth, Throat, and Face: No nasal congestion, No sore throat  Respiratory: See HPI  Cardiovascular: No chest pain, No lower extremity edema, No Palpitations   Gastrointestinal: No nausea, No vomiting, No diarrhea, No constipation, No abdominal pain  Genitourinary: No frequency, No dysuria, No hematuria  Integument/Breast: No rash, No skin lesion(s), No dryness  Musculoskeletal: No arthralgias, No neck pain, No back pain  Neurological: No headaches, No dizziness, No confusion,  No seizures  Behavioral/Psychiatric: No anxiety, No depression      Objective:     Vitals:    06/15/22 2202 06/15/22 2204   BP: 133/67    Pulse: 98    Resp: 19    Temp: 98.7 °F (37.1 °C)    SpO2: 99%    Weight:  108.9 kg (240 lb)   Height:  5' 10\" (1.778 m)        Physical Exam:   General: alert, cooperative, no distress  Eye: conjunctivae/corneas clear. PERRL, EOM's intact. Throat and Neck: normal and no erythema or exudates noted. No mass   Lung: clear to auscultation bilaterally  Heart: regular rate and rhythm,   Abdomen: soft, non-tender. Bowel sounds normal. No masses,  Extremities:  able to move all extremities normal, atraumatic  Skin: Normal.  Neurologic: AOx3. Motor function and sensation grossly intact. Psychiatric: non focal    Recent Results (from the past 24 hour(s))   LACTIC ACID    Collection Time: 06/15/22  5:22 PM   Result Value Ref Range    Lactic acid 1.5 0.4 - 2.0 mmol/L   CBC WITH AUTOMATED DIFF    Collection Time: 06/15/22  5:22 PM   Result Value Ref Range    WBC 17.4 (H) 4.4 - 11.3 K/uL    RBC 4.42 (L) 4.50 - 5.90 M/uL    HGB 11.5 (L) 13.5 - 17.5 g/dL    HCT 36.7 (L) 41 - 53 %    MCV 83.0 80 - 100 FL    MCH 26.1 (L) 31 - 34 PG    MCHC 31.5 31.0 - 36.0 g/dL    RDW 15.5 (H) 11.5 - 14.5 %    PLATELET 618 965 - 999 K/uL    MPV 9.0 6.5 - 11.5 FL    NRBC 0.1  WBC    ABSOLUTE NRBC 0.02 K/uL    NEUTROPHILS 87 (H) 42 - 75 %    LYMPHOCYTES 4 (L) 20.5 - 51.1 %    MONOCYTES 9 1.7 - 9.3 %    EOSINOPHILS 0 (L) 0.9 - 2.9 %    BASOPHILS 0 0.0 - 2.5 %    ABS. NEUTROPHILS 15.1 (H) 1.8 - 7.7 K/UL    ABS. LYMPHOCYTES 0.6 (L) 1.0 - 4.8 K/UL    ABS. MONOCYTES 1.6 0.2 - 2.4 K/UL    ABS. EOSINOPHILS 0.0 0.0 - 0.7 K/UL    ABS.  BASOPHILS 0.0 0.0 - 0.2 K/UL   METABOLIC PANEL, BASIC    Collection Time: 06/15/22  5:22 PM   Result Value Ref Range    Sodium 130 (L) 136 - 145 mmol/L    Potassium 3.6 3.5 - 5.1 mmol/L    Chloride 92 (L) 97 - 108 mmol/L    CO2 18 (L) 21 - 32 mmol/L    Anion gap 20 (H) 5 - 15 mmol/L    Glucose 576 (H) 65 - 100 mg/dL    BUN 29 (H) 6 - 20 mg/dL    Creatinine 1.94 (H) 0.70 - 1.30 mg/dL    BUN/Creatinine ratio 15 12 - 20      GFR est AA 42 (L) >60 ml/min/1.73m2    GFR est non-AA 34 (L) >60 ml/min/1.73m2    Calcium 9.4 8.5 - 10.1 mg/dL   COVID-19 WITH INFLUENZA A/B    Collection Time: 06/15/22  5:22 PM   Result Value Ref Range    SARS-CoV-2 by PCR Not Detected Not Detected      Influenza A by PCR Not Detected Not Detected      Influenza B by PCR Not Detected Not Detected     NT-PRO BNP    Collection Time: 06/15/22  5:26 PM   Result Value Ref Range    NT pro- (H) <125 pg/mL   TROPONIN-HIGH SENSITIVITY    Collection Time: 06/15/22  5:26 PM   Result Value Ref Range    Troponin-High Sensitivity 29 0 - 76 ng/L   GLUCOSE, POC    Collection Time: 06/15/22  8:39 PM   Result Value Ref Range    Glucose (POC) 363 (H) 65 - 117 mg/dL    Performed by ANDRES NAETICIA    METABOLIC PANEL, BASIC    Collection Time: 06/15/22 10:49 PM   Result Value Ref Range    Sodium 133 (L) 136 - 145 mmol/L    Potassium 3.6 3.5 - 5.1 mmol/L    Chloride 98 97 - 108 mmol/L    CO2 16 (L) 21 - 32 mmol/L    Anion gap 19 (H) 5 - 15 mmol/L    Glucose 416 (H) 65 - 100 mg/dL    BUN 30 (H) 6 - 20 mg/dL    Creatinine 1.64 (H) 0.70 - 1.30 mg/dL    BUN/Creatinine ratio 18 12 - 20      GFR est AA 51 (L) >60 ml/min/1.73m2    GFR est non-AA 42 (L) >60 ml/min/1.73m2    Calcium 9.3 8.5 - 10.1 mg/dL   LACTIC ACID    Collection Time: 06/15/22 10:49 PM   Result Value Ref Range    Lactic acid 1.4 0.4 - 2.0 mmol/L   GLUCOSE, POC    Collection Time: 06/15/22 11:57 PM   Result Value Ref Range    Glucose (POC) 375 (H) 65 - 117 mg/dL    Performed by Sergio Perea    VENOUS BLOOD GAS    Collection Time: 06/16/22 12:11 AM   Result Value Ref Range    VENOUS PH 7.334 7.31 - 7.41      VENOUS PCO2 30.5 (L) 40 - 50 mmHg    VENOUS PO2 126 (H) 36 - 42 mmHg    VENOUS O2 SATURATION 100 (H) 60 - 80 %    VENOUS BICARBONATE 18 (L) 22 - 26 mmol/L    VENOUS BASE DEFICIT 8.5 (H) 0 - 2 mmol/L    O2 METHOD Room air      FIO2 21.0 %    Sample source Venous      SITE Right Brachial      Performed by PENDING        XR Results (maximum last 3):   Results from [place] encounter on 06/15/22    XR CHEST PORT    Narrative  Chest, frontal view, 6/15/2022    History: Cough. Rhonchi, right lung. Comparison: Chest 8/12/2021. Findings: Median sternotomy is noted. The cardiac silhouette is enlarged. The  lungs are underexpanded with crowding of bronchovascular structures. Hydrostatic edema is possible. There are opacities at the bases and right  perihilar region which could be atelectasis, infection, localized hydrostatic  edema. No pleural effusion or pneumothorax is identified. The osseous  structures are grossly intact. Impression  Possible hydrostatic edema. Opacities in the lungs as above. Results from [place] encounter on 08/12/21    XR CHEST PA LAT    Narrative  Chest, 2 views, 8/12/2021    History: Preoperative study. Comparison: None. Findings: The patient is status post median sternotomy. The cardiac silhouette  is within normal limits. The lungs are adequately expanded. Chronic-appearing  reticular opacities are noted. No hydrostatic edema is present. No focal  consolidation, pleural effusions or pneumothorax is identified. Degenerative  changes are present in the thoracic spine. Impression  No acute pulmonary process. Results from Hospital Encounter encounter on 02/22/21    XR CHEST PORT    Narrative  EXAM:  XR CHEST PORT    INDICATION: Bilateral lung opacities. COMPARISON: 2/22/2021    TECHNIQUE: Portable AP semiupright chest view at 1442 hours    FINDINGS: The cardiomediastinal contours are stable. There are increased bilateral patchy interstitial and airspace opacities. There  is no pleural effusion or pneumothorax. The bones and upper abdomen are stable. Impression  Increased bilateral patchy interstitial and airspace opacities. CT Results (maximum last 3):   Results from [place] encounter on 04/01/21    CT ABD PELV W WO CONT    Narrative  CT examination of the abdomen and pelvis is performed without and with IV  contrast. The exam is a volume acquisition from which axial, sagittal and  coronal images are generated. Comparison is made with a prior CT scan from  4/1/2021. Findings:Images obtained prior to IV contrast administration reveal patchy  subsegmental airspace disease in the periphery of either lung with focal  hyperexpansion suggesting air trapping. There is mild bronchiectasis in the  posterior aspect of either lower lobe. The solid abdominal organs reveal layering gallstones within the gallbladder. There is an isodense lesion in the lateral cortex midportion left kidney and a  simple cortical cyst in the lower pole the left kidney. After IV contrast administration, there is mild diffuse hepatic steatosis. The  left kidney reveals a 1.7 x 1.7 cm mass that demonstrates enhancement. The  precontrast CT reveals a density of 35 Hounsfield units. This enhances to 84  Hounsfield units after IV contrast administration. The cyst remains  unremarkable. No free intraperitoneal fluid or gas is identified. The bowel is unopacified but  normal in caliber. There are numerous diverticula throughout the entire colon  without evidence of acute diverticulitis. There is a normal appendix in the right lower quadrant. No fluid or mass is seen  in the cul-de-sac. Bone windows are unremarkable. Impression  1. Small exophytic lesion in the midportion of the left kidney is consistent  with a neoplasm. 2. Simple left renal cortical cyst.  3. No acute findings. 4. Cholelithiasis. 5. Bibasilar focal hyperexpansion and airway disease consistent with air  trapping      Results from Hospital Encounter encounter on 04/01/21    CTA CHEST ABD W WO CONT    Narrative  CTA chest.    Axial images are reviewed along with reformatted sagittal/coronal/MIP images. 100 mL Isovue 370 administered.   Dose reduction: All CT scans at this facility are performed using dose reduction  optimization techniques as appropriate to a performed exam including the  following-  automated exposure control, adjustments of mA and/or Kv according to patient  size, or use of iterative reconstructive technique. Pleural-parenchymal changes through lungs. No pleural effusion. Enhanced images reveal atherosclerotic change elongated thoracic aorta. Normal  contrast opacification main pulmonary arterial trunk and its branch vessels; no  CT evidence for PE. Prior intrathoracic surgery. CAD. No pericardial effusion. Variably dense/variably enhancing few round structures each kidney. 1.3 cm  cortical-based structure left kidney may represent mass. Atherosclerosis continues into the imaged abdominal aorta. Impression  No CT evidence for PE. Prior intrathoracic surgery. CAD. Complex cyst or mass left kidney. Recommend renal ultrasound. Results from [place] encounter on 02/17/21    CT CHEST WO CONT    Narrative  Dose Reduction:  All CT scans at this facility are performed using dose reduction optimization  techniques as appropriate to a performed exam including the following: Automated  exposure control, adjustments of the mA and/or kV according to patient size, or  use of iterative reconstruction technique. Findings: Unenhanced images were obtained, compared with 2/17/2021. Bilateral  groundglass airspace opacities are noted, right greater than left, with  bilateral worsening compared with the prior exam, consistent with moderately  severe viral pneumonia. No evidence of pleural or pericardial effusion. No  pneumothorax. Bilateral gynecomastia, right greater than left. Subtle changes of  hepatic steatosis. Colonic diverticula. Hyperdense cortical lesion, posterior  mid left kidney, not well characterized. Prominent left ventricle. Calcified  aortic valve. Bilateral native coronary calcification. Sternotomy for CABG. Calcified and tortuous thoracic aorta. Lipoma of right periscapular musculature.   Subcentimeter mediastinal nodes, likely reactive. Impression  Diffuse bilateral worsening of groundglass opacities, consistent  with progression of known viral pneumonia. MRI Results (maximum last 3): No results found for this or any previous visit. Nuclear Medicine Results (maximum last 3): No results found for this or any previous visit. US Results (maximum last 3): Results from [place] encounter on 04/01/21    US RETROPERITONEUM COMP    Narrative  Real-time ultrasonography of the kidneys and urinary bladder was performed. There is no CT scan April 1, 2021. INDICATION: Complex left renal cyst.    The left kidney measures 12 cm in length. There is a 2.4 x 2.2 x 2.3 cm anechoic  cyst in the lower pole of the left kidney corresponding to a cyst seen on recent  CT scan. The smaller exophytic slightly hyperdense mass projecting off of the  posterolateral left kidney seen on CT scan is not seen on today's exam.  The right kidney is partially obscured measuring 12.8 cm in length. No  hydronephrosis. There is a 10 mm anechoic cyst in the lower pole of the right  kidney. Urinary bladder contains a small amount of debris. Prevoid volume is  173.4 mL. The IVC is unremarkable. The distal aorta is normal. The mid and proximal aorta  are obscured by overlying bowel gas. Spleen is not enlarged. Impression  1. Bilateral renal cysts. 2. The hyperdense mass projecting off of the posterior left kidney seen on CT  scan is not adequately evaluated on ultrasound. This could be followed with pre  and postcontrast CT images or MRI if patient is able. Assessment and plan:   #Hyperglycemia  - POC + correctional insulin every 4 hours. - IVF infusion  - Check ketone level, to evaluate for DKA. - Monitor anion gap on AM BMP.     # Diabetes  - Start lispro 20u daily  - POC + correctional insulin   - check HbA1c  - Diabetes education    # Community acquired pneumonia   - Blood culture and sputum culture ordered  - MRSA screen  - Start antibiotics empirically: ceftriaxone &azithromycin    # Hypertension  - Continue home medications. # CAD s/p PCI  - No chest pain. Continue to monitor.      # Full code by default, need further clarification    # Medication reconciliation incomplete, appreciate assistance from pharmacy or nursing staff    Signed By: Janine Chong MD     June 16, 2022

## 2022-06-16 NOTE — PROGRESS NOTES
Reason for Admission:  Pneumonia                     RUR Score:  15%                  Plan for utilizing home health:    Not at this time      PCP: First and Last name:  Yolanda Hartley NP     Name of Practice:    Are you a current patient: Yes/No:    Approximate date of last visit: \"Been awhile\"   Can you participate in a virtual visit with your PCP:                     Current Advanced Directive/Advance Care Plan: Full Code     Pt confirmed that he is a full code      Healthcare Decision Maker:   Click here to [address] including selection of the Healthcare Decision Maker Relationship (ie \"Primary\")             Primary Decision Maker: Vandana Goode - Daughter - [phone]    Secondary Decision Maker: Goyo Boogie - Sister - [phone]                  Transition of Care Plan:      Met f/f with Pt, he confirmed that the information on the face sheet is correct. Pt stated that he lives by himself but that hsi Sammy Polanco comes by to check on him. Pt stated no HH, no DME and independent with ADL. Pt stated that he uses [address] in [facility]. Pt stated that when he is D/C he will need a Medicaid ride home. CM Dispo: Home with no needs at this time.

## 2022-06-16 NOTE — ED NOTES
Pt placed in a hospital bed at this time for pt comfort. Pt denies any further needs or requests at this time. Pt states that he is not \"eating anything\" for lunch today as well. Pt states, \"I still don't have an appetite. \"  Will continue to closely monitor pt.

## 2022-06-16 NOTE — ED NOTES
Report called to Kingston Knox RN at this time. Receiving nurse denies having any further questions at this time.

## 2022-06-17 VITALS
BODY MASS INDEX: 34.36 KG/M2 | TEMPERATURE: 98 F | WEIGHT: 240 LBS | RESPIRATION RATE: 20 BRPM | SYSTOLIC BLOOD PRESSURE: 133 MMHG | DIASTOLIC BLOOD PRESSURE: 69 MMHG | OXYGEN SATURATION: 94 % | HEIGHT: 70 IN | HEART RATE: 76 BPM

## 2022-06-17 LAB
ANION GAP SERPL CALC-SCNC: 10 MMOL/L (ref 5–15)
BASOPHILS # BLD: 0 K/UL (ref 0–0.1)
BASOPHILS NFR BLD: 0 % (ref 0–1)
BUN SERPL-MCNC: 26 MG/DL (ref 6–20)
BUN/CREAT SERPL: 25 (ref 12–20)
CA-I BLD-MCNC: 9.1 MG/DL (ref 8.5–10.1)
CHLORIDE SERPL-SCNC: 106 MMOL/L (ref 97–108)
CO2 SERPL-SCNC: 23 MMOL/L (ref 21–32)
CREAT SERPL-MCNC: 1.02 MG/DL (ref 0.7–1.3)
DIFFERENTIAL METHOD BLD: ABNORMAL
EOSINOPHIL # BLD: 0 K/UL (ref 0–0.4)
EOSINOPHIL NFR BLD: 0 % (ref 0–7)
ERYTHROCYTE [DISTWIDTH] IN BLOOD BY AUTOMATED COUNT: 14.3 % (ref 11.5–14.5)
GLUCOSE BLD STRIP.AUTO-MCNC: 177 MG/DL (ref 65–117)
GLUCOSE BLD STRIP.AUTO-MCNC: 211 MG/DL (ref 65–117)
GLUCOSE BLD STRIP.AUTO-MCNC: 458 MG/DL (ref 65–117)
GLUCOSE SERPL-MCNC: 220 MG/DL (ref 65–100)
HCT VFR BLD AUTO: 36.9 % (ref 36.6–50.3)
HGB BLD-MCNC: 11.7 G/DL (ref 12.1–17)
IMM GRANULOCYTES # BLD AUTO: 0.2 K/UL (ref 0–0.04)
IMM GRANULOCYTES NFR BLD AUTO: 1 % (ref 0–0.5)
LYMPHOCYTES # BLD: 1.1 K/UL (ref 0.8–3.5)
LYMPHOCYTES NFR BLD: 5 % (ref 12–49)
MCH RBC QN AUTO: 26.4 PG (ref 26–34)
MCHC RBC AUTO-ENTMCNC: 31.7 G/DL (ref 30–36.5)
MCV RBC AUTO: 83.1 FL (ref 80–99)
MONOCYTES # BLD: 2.2 K/UL (ref 0–1)
MONOCYTES NFR BLD: 10 % (ref 5–13)
NEUTS SEG # BLD: 17.9 K/UL (ref 1.8–8)
NEUTS SEG NFR BLD: 84 % (ref 32–75)
NRBC # BLD: 0 K/UL (ref 0–0.01)
NRBC BLD-RTO: 0 PER 100 WBC
PERFORMED BY, TECHID: ABNORMAL
PLATELET # BLD AUTO: 299 K/UL (ref 150–400)
PMV BLD AUTO: 10.8 FL (ref 8.9–12.9)
POTASSIUM SERPL-SCNC: 3.3 MMOL/L (ref 3.5–5.1)
RBC # BLD AUTO: 4.44 M/UL (ref 4.1–5.7)
SODIUM SERPL-SCNC: 139 MMOL/L (ref 136–145)
WBC # BLD AUTO: 21.4 K/UL (ref 4.1–11.1)

## 2022-06-17 PROCEDURE — 74011636637 HC RX REV CODE- 636/637: Performed by: STUDENT IN AN ORGANIZED HEALTH CARE EDUCATION/TRAINING PROGRAM

## 2022-06-17 PROCEDURE — 82962 GLUCOSE BLOOD TEST: CPT

## 2022-06-17 PROCEDURE — 74011636637 HC RX REV CODE- 636/637: Performed by: HOSPITALIST

## 2022-06-17 PROCEDURE — 74011250636 HC RX REV CODE- 250/636: Performed by: INTERNAL MEDICINE

## 2022-06-17 PROCEDURE — 36415 COLL VENOUS BLD VENIPUNCTURE: CPT

## 2022-06-17 PROCEDURE — G0378 HOSPITAL OBSERVATION PER HR: HCPCS

## 2022-06-17 PROCEDURE — 74011250637 HC RX REV CODE- 250/637: Performed by: STUDENT IN AN ORGANIZED HEALTH CARE EDUCATION/TRAINING PROGRAM

## 2022-06-17 PROCEDURE — 74011250637 HC RX REV CODE- 250/637: Performed by: INTERNAL MEDICINE

## 2022-06-17 PROCEDURE — 74011636637 HC RX REV CODE- 636/637: Performed by: INTERNAL MEDICINE

## 2022-06-17 PROCEDURE — 85025 COMPLETE CBC W/AUTO DIFF WBC: CPT

## 2022-06-17 PROCEDURE — 74011000250 HC RX REV CODE- 250: Performed by: INTERNAL MEDICINE

## 2022-06-17 PROCEDURE — 80048 BASIC METABOLIC PNL TOTAL CA: CPT

## 2022-06-17 RX ORDER — AZITHROMYCIN 500 MG/1
500 TABLET, FILM COATED ORAL DAILY
Qty: 4 TABLET | Refills: 0 | Status: SHIPPED | OUTPATIENT
Start: 2022-06-17 | End: 2022-06-21

## 2022-06-17 RX ORDER — INSULIN LISPRO 100 [IU]/ML
20 INJECTION, SOLUTION INTRAVENOUS; SUBCUTANEOUS ONCE
Status: COMPLETED | OUTPATIENT
Start: 2022-06-17 | End: 2022-06-17

## 2022-06-17 RX ORDER — GUAIFENESIN 600 MG/1
600 TABLET, EXTENDED RELEASE ORAL EVERY 12 HOURS
Qty: 28 TABLET | Refills: 0 | Status: SHIPPED | OUTPATIENT
Start: 2022-06-17 | End: 2022-07-01

## 2022-06-17 RX ORDER — AZITHROMYCIN 500 MG/1
500 TABLET, FILM COATED ORAL DAILY
Qty: 4 TABLET | Refills: 0 | Status: SHIPPED | OUTPATIENT
Start: 2022-06-17 | End: 2022-06-17 | Stop reason: SDUPTHER

## 2022-06-17 RX ORDER — POTASSIUM CHLORIDE 750 MG/1
40 TABLET, FILM COATED, EXTENDED RELEASE ORAL
Status: COMPLETED | OUTPATIENT
Start: 2022-06-17 | End: 2022-06-17

## 2022-06-17 RX ORDER — GUAIFENESIN 600 MG/1
600 TABLET, EXTENDED RELEASE ORAL EVERY 12 HOURS
Qty: 28 TABLET | Refills: 0 | Status: SHIPPED | OUTPATIENT
Start: 2022-06-17 | End: 2022-06-17

## 2022-06-17 RX ADMIN — GUAIFENESIN 600 MG: 600 TABLET, EXTENDED RELEASE ORAL at 09:52

## 2022-06-17 RX ADMIN — FUROSEMIDE 20 MG: 40 TABLET ORAL at 09:51

## 2022-06-17 RX ADMIN — NIFEDIPINE 30 MG: 30 TABLET, FILM COATED, EXTENDED RELEASE ORAL at 09:52

## 2022-06-17 RX ADMIN — CEFTRIAXONE SODIUM 1 G: 1 INJECTION, POWDER, FOR SOLUTION INTRAMUSCULAR; INTRAVENOUS at 09:52

## 2022-06-17 RX ADMIN — SERTRALINE HYDROCHLORIDE 50 MG: 50 TABLET ORAL at 09:52

## 2022-06-17 RX ADMIN — INSULIN LISPRO 4 UNITS: 100 INJECTION, SOLUTION INTRAVENOUS; SUBCUTANEOUS at 09:51

## 2022-06-17 RX ADMIN — POTASSIUM CHLORIDE 40 MEQ: 750 TABLET, FILM COATED, EXTENDED RELEASE ORAL at 09:53

## 2022-06-17 RX ADMIN — ENOXAPARIN SODIUM 40 MG: 100 INJECTION SUBCUTANEOUS at 09:52

## 2022-06-17 RX ADMIN — INSULIN LISPRO 20 UNITS: 100 INJECTION, SOLUTION INTRAVENOUS; SUBCUTANEOUS at 03:18

## 2022-06-17 RX ADMIN — PREDNISONE 40 MG: 20 TABLET ORAL at 10:00

## 2022-06-17 RX ADMIN — SODIUM CHLORIDE, PRESERVATIVE FREE 10 ML: 5 INJECTION INTRAVENOUS at 06:16

## 2022-06-17 RX ADMIN — INSULIN LISPRO 7 UNITS: 100 INJECTION, SOLUTION INTRAVENOUS; SUBCUTANEOUS at 06:45

## 2022-06-17 RX ADMIN — LISINOPRIL 10 MG: 10 TABLET ORAL at 09:51

## 2022-06-17 RX ADMIN — RANOLAZINE 500 MG: 500 TABLET, FILM COATED, EXTENDED RELEASE ORAL at 10:06

## 2022-06-17 RX ADMIN — ISOSORBIDE MONONITRATE 60 MG: 60 TABLET, EXTENDED RELEASE ORAL at 09:52

## 2022-06-17 RX ADMIN — INSULIN GLARGINE 22 UNITS: 100 INJECTION, SOLUTION SUBCUTANEOUS at 09:50

## 2022-06-17 NOTE — PROGRESS NOTES
Problem: Falls - Risk of  Goal: *Absence of Falls  Description: Document Steve Vázquez Fall Risk and appropriate interventions in the flowsheet. Outcome: Not Progressing Towards Goal  Note: Fall Risk Interventions:            Medication Interventions: Patient to call before getting OOB                   Problem: Patient Education: Go to Patient Education Activity  Goal: Patient/Family Education  Outcome: Not Progressing Towards Goal     Problem: Diabetes Self-Management  Goal: *Monitoring blood glucose, interpreting and using results  Description: Identify recommended blood glucose targets  and personal targets. Outcome: Not Progressing Towards Goal  Goal: *Prevention, detection, treatment of acute complications  Description: List symptoms of hyper- and hypoglycemia; describe how to treat low blood sugar and actions for lowering  high blood glucose level. Outcome: Not Progressing Towards Goal  Goal: *Prevention, detection and treatment of chronic complications  Description: Define the natural course of diabetes and describe the relationship of blood glucose levels to long term complications of diabetes. Outcome: Not Progressing Towards Goal     Problem: Falls - Risk of  Goal: *Absence of Falls  Description: Document Steve Vázquez Fall Risk and appropriate interventions in the flowsheet. Outcome: Not Progressing Towards Goal  Note: Fall Risk Interventions:            Medication Interventions: Patient to call before getting OOB                   Problem: Patient Education: Go to Patient Education Activity  Goal: Patient/Family Education  Outcome: Not Progressing Towards Goal     Problem: Diabetes Self-Management  Goal: *Monitoring blood glucose, interpreting and using results  Description: Identify recommended blood glucose targets  and personal targets.   Outcome: Not Progressing Towards Goal  Goal: *Prevention, detection, treatment of acute complications  Description: List symptoms of hyper- and hypoglycemia; describe how to treat low blood sugar and actions for lowering  high blood glucose level. Outcome: Not Progressing Towards Goal  Goal: *Prevention, detection and treatment of chronic complications  Description: Define the natural course of diabetes and describe the relationship of blood glucose levels to long term complications of diabetes.   Outcome: Not Progressing Towards Goal

## 2022-06-17 NOTE — PROGRESS NOTES
DC order noted. 1st IMM letter given within 48hrs. Patient states that he needs a cab and has no money to pay for one and no family/friends are able to  patient. CM notified  to get a cab voucher. Transport time requested for 12:30pm. CM notified primary RN. Discharge plan of care/case management plan validated with provider discharge order.

## 2022-06-17 NOTE — PROGRESS NOTES
PULMONARY NOTE  [facility]    Name: Malick Moura MRN: [id number]   : 1955 Hospital: [facility]   Date: 2022  Admission date: 6/15/2022 Hospital Day: 3       HPI:     Hospital Problems  Date Reviewed: 2021          Codes Class Noted POA    Pneumonia ICD-10-CM: J18.9  ICD-9-CM: 484  2022 Unknown        Uncontrolled type 2 diabetes mellitus with hyperglycemia [facility]) ICD-10-CM: E11.65  ICD-9-CM: 250.02  2021 Yes                   [x] High complexity decision making was performed  [x] See my orders for details      Subjective/Initial History:     I was asked by Bentley Coleman MD to see Malick Moura  a 69 y.o.  male in consultation     Excerpts from admission 6/15/2022 or consult notes as follows:   40-year-old male came in because of shortness of breath and dyspnea he was transferred from [facility] for possible pneumonia came to the emergency room complaining of cough fever chills and dyspnea started on antibiotic. His blood sugar was elevated and also his renal function was abnormal, he has history of renal mass left nephrectomy for possible hypernephroma so admitted and pulmonary consult was called.       No Known Allergies     MAR reviewed and pertinent medications noted or modified as needed     Current Facility-Administered Medications   Medication    potassium chloride SR (KLOR-CON 10) tablet 40 mEq    insulin lispro (HUMALOG) injection    glucose chewable tablet 16 g    glucagon (GLUCAGEN) injection 1 mg    dextrose 10% infusion 0-250 mL    cefTRIAXone (ROCEPHIN) 1 g in sterile water (preservative free) 10 mL IV syringe    isosorbide mononitrate ER (IMDUR) tablet 60 mg    furosemide (LASIX) tablet 20 mg    sertraline (ZOLOFT) tablet 50 mg    ranolazine ER (RANEXA) tablet 500 mg    lisinopriL (PRINIVIL, ZESTRIL) tablet 10 mg    NIFEdipine ER (PROCARDIA XL) tablet 30 mg    sodium chloride (NS) flush 5-40 mL    sodium chloride (NS) flush 5-40 mL    acetaminophen (TYLENOL) tablet 650 mg    Or    acetaminophen (TYLENOL) suppository 650 mg    polyethylene glycol (MIRALAX) packet 17 g    ondansetron (ZOFRAN ODT) tablet 4 mg    Or    ondansetron (ZOFRAN) injection 4 mg    enoxaparin (LOVENOX) injection 40 mg    azithromycin (ZITHROMAX) 500 mg in 0.9% sodium chloride 250 mL ([id number])    dextrose 10% infusion 0-250 mL    insulin glargine (LANTUS) injection 22 Units    predniSONE (DELTASONE) tablet 40 mg    guaiFENesin ER (MUCINEX) tablet 600 mg      Patient PCP: Galo Gonzales NP  PMH:  has a past medical history of Arthritis, Burning with urination, CAD (coronary artery disease), Diabetes ([address] GERD (gastroesophageal reflux disease), History of blood transfusion, HTN (hypertension), Hyperlipidemia, MI (myocardial infarction) ([address] Rectal bleeding, and Renal mass, left. He has no past medical history of Asthma. PSH:   has a past surgical history that includes hx coronary stent placement (2018); pr cardiac surg procedure unlist (2018); hx heart catheterization; hx colonoscopy; hx nephrectomy (Left, 08/23/2021); and hx urological (08/23/2021). FHX: family history includes Diabetes in his mother; Heart Disease in his father; Hypertension in his father and mother. SHX:  reports that he has never smoked. He has never used smokeless tobacco. He reports previous alcohol use. He reports current drug use. Drug: Marijuana. ROS:    Review of Systems   Constitutional: Positive for fever. HENT: Negative. Eyes: Negative. Respiratory: Positive for cough, shortness of breath and wheezing. Cardiovascular: Positive for orthopnea. Gastrointestinal: Negative. Genitourinary: Negative. Musculoskeletal: Negative. Skin: Negative. Neurological: Negative. Psychiatric/Behavioral: Negative.          Objective:     Vital Signs: Telemetry:    normal sinus rhythm Intake/Output:   Visit Vitals  BP 133/69 (BP 1 Location: Right upper arm, BP Patient Position: At rest;Lying)   Pulse 76   Temp 98 °F (36.7 °C)   Resp 20   Ht 5' 10\" (1.778 m)   Wt 108.9 kg (240 lb)   SpO2 94%   BMI 34.44 kg/m²       Temp (24hrs), Av.7 °F (36.5 °C), Min:97.2 °F (36.2 °C), Max:98 °F (36.7 °C)        O2 Device: None (Room air) O2 Flow Rate (L/min): 2 l/min       Wt Readings from Last 4 Encounters:   06/15/22 108.9 kg (240 lb)   06/15/22 108.9 kg (240 lb)   21 99.3 kg (219 lb)   21 99 kg (218 lb 3.2 oz)          Intake/Output Summary (Last 24 hours) at 2022 0939  Last data filed at 2022 0319  Gross per 24 hour   Intake 1000 ml   Output 1075 ml   Net -75 ml       Last shift:      No intake/output data recorded. Last 3 shifts: 06/15 1901 -  0700  In: 1000 [I.V.:1000]  Out: 1075 [Urine:1075]       Physical Exam:     Physical Exam  Constitutional:       Appearance: Normal appearance. HENT:      Head: Normocephalic and atraumatic. Nose: Nose normal.      Mouth/Throat:      Mouth: Mucous membranes are moist.   Eyes:      Pupils: Pupils are equal, round, and reactive to light. Cardiovascular:      Rate and Rhythm: Normal rate and regular rhythm. Pulses: Normal pulses. Heart sounds: Normal heart sounds. Pulmonary:      Effort: Pulmonary effort is normal.      Breath sounds: Wheezing present. Abdominal:      General: Abdomen is flat. Bowel sounds are normal.      Palpations: Abdomen is soft. Musculoskeletal:         General: Normal range of motion. Cervical back: Normal range of motion and neck supple. Skin:     General: Skin is warm. Neurological:      Mental Status: He is alert.    Psychiatric:         Mood and Affect: Mood normal.          Labs:    Recent Labs     22  0657 06/15/22  1722   WBC 21.4* 17.4*   HGB 11.7* 11.5*    243     Recent Labs     22  0657 06/15/22  2249 06/15/22  1722    133* 130*   K 3.3* 3.6 3.6    98 92*   CO2 23 16* 18* * 416* 576*   BUN 26* 30* 29*   CREA 1.02 1.64* 1.94*   CA 9.1 9.3 9.4   LAC  --  1.4 1.5     Recent Labs     06/16/22  0011   FIO2 21.0     No results for input(s): CPK, CKNDX, TROIQ in the last 72 hours. No lab exists for component: CPKMB  No results found for: BNPP, BNP   Lab Results   Component Value Date/Time    Culture result: No growth 2 days 06/15/2022 05:26 PM    Culture result: No growth 2 days 06/15/2022 05:22 PM    Culture result: No Growth (<1000 cfu/mL) 08/23/2021 09:15 AM     Lab Results   Component Value Date/Time    TSH 1.31 12/13/2020 08:50 AM       Imaging:    CXR Results  (Last 48 hours)               06/15/22 1715  XR CHEST PORT Final result    Impression:  Possible hydrostatic edema. Opacities in the lungs as above. Narrative:  Chest, frontal view, 6/15/2022       History: Cough. Rhonchi, right lung. Comparison: Chest 8/12/2021. Findings: Median sternotomy is noted. The cardiac silhouette is enlarged. The   lungs are underexpanded with crowding of bronchovascular structures. Hydrostatic edema is possible. There are opacities at the bases and right   perihilar region which could be atelectasis, infection, localized hydrostatic   edema. No pleural effusion or pneumothorax is identified. The osseous   structures are grossly intact. Results from [place] encounter on 06/15/22    XR CHEST PORT    Narrative  Chest, frontal view, 6/15/2022    History: Cough. Rhonchi, right lung. Comparison: Chest 8/12/2021. Findings: Median sternotomy is noted. The cardiac silhouette is enlarged. The  lungs are underexpanded with crowding of bronchovascular structures. Hydrostatic edema is possible. There are opacities at the bases and right  perihilar region which could be atelectasis, infection, localized hydrostatic  edema. No pleural effusion or pneumothorax is identified.   The osseous  structures are grossly intact. Impression  Possible hydrostatic edema. Opacities in the lungs as above. Results from [place] encounter on 08/12/21    XR CHEST PA LAT    Narrative  Chest, 2 views, 8/12/2021    History: Preoperative study. Comparison: None. Findings: The patient is status post median sternotomy. The cardiac silhouette  is within normal limits. The lungs are adequately expanded. Chronic-appearing  reticular opacities are noted. No hydrostatic edema is present. No focal  consolidation, pleural effusions or pneumothorax is identified. Degenerative  changes are present in the thoracic spine. Impression  No acute pulmonary process. Results from Hospital Encounter encounter on 02/22/21    XR CHEST PORT    Narrative  EXAM:  XR CHEST PORT    INDICATION: Bilateral lung opacities. COMPARISON: 2/22/2021    TECHNIQUE: Portable AP semiupright chest view at 1442 hours    FINDINGS: The cardiomediastinal contours are stable. There are increased bilateral patchy interstitial and airspace opacities. There  is no pleural effusion or pneumothorax. The bones and upper abdomen are stable. Impression  Increased bilateral patchy interstitial and airspace opacities. Results from [place] encounter on 06/15/22    CT CHEST WO CONT    Narrative  Exam: CT CHEST WO CONT    TECHNIQUE: Multiple transaxial CT images of the chest were obtained without  contrast. Coronal and sagittal reformatted images were provided. Dose reduction: All CT scans at this facility are performed using dose reduction  optimization techniques as appropriate to a performed exam including the  following: Automated exposure control, adjustments of the mA and/or kV according  to patient size, or use of iterative reconstruction technique. COMPARISON: Chest x-ray performed yesterday. HISTORY: Lung mass    FINDINGS: Motion degraded exam.    Mediastinum: Postsurgical changes of CABG.  Severe calcifications of the native  coronary arteries. Borderline cardiomegaly. Aorta is atherosclerotic. Prominent right lower paratracheal lymph node measures 13 mm. No obvious hilar  lymphadenopathy. Axilla and soft tissues: Asymmetric gynecomastia, right greater than left. No  axillary adenopathy. Macroscopic fat-containing intramuscular mass of the right  shoulder musculature, partially visualized but likely a lipoma. ABDOMEN: No acute process identified. Scattered colonic diverticula. There are  calcifications along the posterior capsule of the left kidney which may  represent sequela of previous trauma or surgery. Questionable punctate calcified  gallstone. LUNGS: Central airways are patent. Patchy groundglass airspace opacities within  the right upper lobe, and left lower lobe. Mixed groundglass and consolidation  within the right lower lobe. Nodular consolidation within the superior lingula  along the interlobar fissure measures 1.2 x 2.5 cm. There is volume loss and  platelike density in the right upper lobe, likely atelectasis. No pleural  effusion. No pneumothorax. Bones: Intact median sternotomy. Multilevel degenerative disc disease with  ventral bridging osteophytes. Impression  Exam limited by respiratory motion. 1. Multifocal bilateral airspace disease concerning for multilobar pneumonia. 2. Nodular consolidation in the lingula is likely infectious. Follow-up chest CT  is recommended to document resolution. 3. Mediastinal lymphadenopathy, likely reactive. IMPRESSION:   1. Acute  respiratory failure with Hypoxia   2. CAP  3. CAD s/p PCI  4. Renal Mass s/p left nephrectomy  5. Diabetes mellitus  6. Prognosis guarded       RECOMMENDATIONS/PLAN:     1.  On 2 liter nasal Cannula oxygen as salvage oxygen delivery device to provide high concentration of oxygen to overcome refractory hypoxia;  2. CT chest shows bilateral infiltrate multilobar pneumonia notable consolidation in the lingula no lung mass needs antibiotic and then repeat CAT scan or chest x-ray in a month  3. Agree with Empiric IV antibiotics pending culture results on rocephin and Zithromax       ·           Renea Pierce MD

## 2022-06-17 NOTE — PROGRESS NOTES
pts BG increased from 393 to 458 after 16 units of Lispro. Paged Dr. Roseann Russell, then ordered 20 units lispro after telephone readback.     Primary Nurse Mckenna Hartley RN and Valerie Elizabeth RN performed a dual skin assessment on this patient No impairment noted  Yuriy score is 22

## 2022-06-17 NOTE — DISCHARGE SUMMARY
Hospitalist Discharge Summary     Patient ID:    Daren Krueger  [id number]  19 y.o.  1955    Admit date: 6/15/2022    Discharge date : 6/17/2022    Chronic Diagnoses:    Problem List as of 6/17/2022 Date Reviewed: 9/14/2021          Codes Class Noted - Resolved    Pneumonia ICD-10-CM: J18.9  ICD-9-CM: 746  6/16/2022 - Present        At high risk for falls ICD-10-CM: Z91.81  ICD-9-CM: V15.88  5/23/2021 - Present        H/O colonoscopy ICD-10-CM: Z98.890  ICD-9-CM: V45.89  5/23/2021 - Present    Overview Signed 5/23/2021  1:55 PM by Rory Nunes NP     [facility]             Angiomyolipoma of left kidney ICD-10-CM: D17.71  ICD-9-CM: 223.0  4/9/2021 - Present    Overview Addendum 9/13/2021  2:02 PM by Edwardo Valadez NP     CT 4/4/21: The left kidney reveals a 1.7 x 1.7 cm mass that demonstrates enhancement. The precontrast CT reveals a density of 35 Hounsfield units. This enhances to 84 Hounsfield units after IV contrast administration. There is concern for neoplasm. He is s/p robotic left partial nephrectomy, intraoperative renal ultrasound on 8/23/21. Pathology: Angiomyolipoma, 2.0 cm, completely excised. Slight chronic interstitial nephritis.              Uncontrolled type 2 diabetes mellitus with hyperglycemia ([facility] [address] ICD-10-CM: E11.65  ICD-9-CM: 250.02  4/9/2021 - Present        Blurred vision, bilateral ICD-10-CM: H53.8  ICD-9-CM: 368.8  4/9/2021 - Present        Mixed hyperlipidemia ICD-10-CM: E78.2  ICD-9-CM: 272.2  4/9/2021 - Present        Respiratory failure with hypoxia (HCC) ICD-10-CM: J96.91  ICD-9-CM: 518.81  2/22/2021 - Present        Pneumonia due to COVID-19 virus ICD-10-CM: U07.1, J12.82  ICD-9-CM: 480.8, 079.89  2/17/2021 - Present        Chest pain ICD-10-CM: R07.9  ICD-9-CM: 786.50  12/12/2020 - Present        CAD (coronary artery disease) ICD-10-CM: I25.10  ICD-9-CM: 414.00  12/12/2020 - Present        Essential hypertension ICD-10-CM: I10  ICD-9-CM: 401.9 12/12/2020 - Present        RESOLVED: Kidney mass ICD-10-CM: K10.65  ICD-9-CM: 593.9  8/23/2021 - 9/13/2021          22    Final Diagnoses: Active Problems:    Uncontrolled type 2 diabetes mellitus with hyperglycemia ([facility] [address] (4/9/2021)      Pneumonia (6/16/2022)      Hospital Course:   Laly Tadeo is a 69year-old male with PMHx of CAD s/p PCI, diabetes, hypertension, hyperlipidemia, and daibetes mellitus who was transferred from [facility] for pneumonia and hyperglycemia. Patient reports that he has been having coughing, fever, chills, and shortness of breath. At that facility he was given Zosyn and azithromycin for pneumonia and transferred to [facility].  At the facility, he also found to have hyperglycemia with elevated anion gap and VARINDER. Patient admits to not be compliance to his home medications, and not taking insulin currently.   In the ED, hemodynamically stable. Lab work revealed leukocytosis. Anion gap elevated. Creatinine improving. COVID and flu negative. Blood culture obtained.  Chest x-ray showed opacities in the bases and right perihilar region, possible pneumonia.  Also possible hydrostatic edema.  On further questioning, patient denies orthopnea or lower extremity edema. Started on IV Zithromax, ceftriaxone, and systemic steroids. Pulmonary consult. CT chest showing multifocal bilateral airspace disease concerning for multilobular pneumonia. Nodular consolidation in lingula likely infectious. Patient remained on room air [facility] admission. Anion gap close. Cleared from pulmonary perspective for discharge on oral antibiotics. Close outpatient follow-up with PCP. Discharge Medications:   Current Discharge Medication List      START taking these medications    Details   azithromycin (Zithromax) 500 mg tab Take 1 Tablet by mouth daily for 4 days.   Qty: 4 Tablet, Refills: 0  Start date: 6/17/2022, End date: 6/21/2022      guaiFENesin ER (MUCINEX) 600 mg ER tablet Take 1 Tablet by mouth every twelve (12) hours for 14 days. Qty: 28 Tablet, Refills: 0  Start date: 6/17/2022, End date: 7/1/2022      Diabetic Supplies, Miscellan. kit 1 Kit by Does Not Apply route four (4) times daily. Dispense #180 Lancets to check blood sugars QID, Ref #1  Dispense #1 Glucometer use QID, Ref #None  Dispense #180 blood glucose strips to use QID, Reg #1  Dispense needles for injecting insulin  Dispense syringe for injecting insulin  Qty: 1 Kit, Refills: 0  Start date: 6/17/2022      insulin lispro 100 unit/mL soln injection by SubCUTAneous route Before breakfast, lunch, dinner and at bedtime. For Blood Sugar (mg/dL) of:              Less than 150 =   0 units  150 -199 =   2 units  200 -249 =   4 units  250 -299 =   6 units  300 -349 =   8 units  350 and above =   10 units and Call Primary Care Provider  Qty: 1 Vial, Refills: 2  Start date: 6/17/2022         CONTINUE these medications which have NOT CHANGED    Details   lisinopriL (PRINIVIL, ZESTRIL) 10 mg tablet Take 10 mg by mouth daily. insulin glargine (LANTUS) 100 unit/mL injection Take 20 units once daily at night  Qty: 1 Vial, Refills: 0      ranolazine ER (RANEXA) 500 mg SR tablet Take 500 mg by mouth two (2) times a day. sertraline (ZOLOFT) 50 mg tablet Take 1 Tab by mouth daily. Qty: 30 Tab, Refills: 2    Associated Diagnoses: Other depression      furosemide (LASIX) 20 mg tablet Take 1 Tab by mouth daily. Qty: 90 Tab, Refills: 0      NIFEdipine ER (PROCARDIA XL) 30 mg ER tablet Take 1 Tab by mouth daily. Qty: 30 Tab, Refills: 0      isosorbide mononitrate ER (IMDUR) 60 mg CR tablet Take 60 mg by mouth daily. Dr Ruben Raymond               Follow up Care:    1. Karyn Al NP in 1-2 weeks.       Follow-up Information     Follow up With Specialties Details Why Contact Info    Karyn Al NP [employer] Nurse Practitioner Schedule an appointment as soon as possible for a visit in 1 week Hospital follow-up multilobular pneumonia [phone] [facility] [address]  [phone]      [facility] EMERGENCY DEP Emergency Medicine  As needed, If symptoms worsen Arturo Gaming  [phone]            Patient Follow Up Instructions: Activity: Activity as tolerated  Diet:  Diabetic Diet  Wound care: none    Condition at Discharge:  Stable  __________________________________________________________________    Disposition  Home or Self Care  ____________________________________________________________________    Code Status:  Full Code  ___________________________________________________________________    Discharge Exam:  Patient seen and examined by me on discharge day. Pertinent Findings:    Gen:    Not in distress  Chest: Symmetric chest wall expansion. Decreased air entry bases, scattered rhonchi. No wheezing. On room air. CVS:   Regular rate and rhythm. +S1/S2. No murmur or gallop. No edema  Abd:  Soft, not distended, not tender. Active bowel sounds. Neuro:  Alert and oriented x3. CN II-XII grossly intact.     CONSULTATIONS: Pulmonary/Intensive care    Significant Diagnostic Studies:   Recent Results (from the past 24 hour(s))   GLUCOSE, POC    Collection Time: 06/16/22  9:34 AM   Result Value Ref Range    Glucose (POC) 278 (H) 65 - 117 mg/dL    Performed by [facility], POC    Collection Time: 06/16/22  1:35 PM   Result Value Ref Range    Glucose (POC) 334 (H) 65 - 117 mg/dL    Performed by [address] [place] [address], POC    Collection Time: 06/16/22  8:01 PM   Result Value Ref Range    Glucose (POC) 393 (H) 65 - 117 mg/dL    Performed by Nathanael Lima    GLUCOSE, POC    Collection Time: 06/17/22  2:34 AM   Result Value Ref Range    Glucose (POC) 458 (H) 65 - 117 mg/dL    Performed by Nathanael Lima    GLUCOSE, POC    Collection Time: 06/17/22  6:18 AM   Result Value Ref Range    Glucose (POC) 211 (H) 65 - 117 mg/dL    Performed by [address] [place] [address], [facility]    Collection Time: 06/17/22  6:57 AM   Result Value Ref Range    Sodium 139 136 - 145 mmol/L    Potassium 3.3 (L) 3.5 - 5.1 mmol/L    Chloride 106 97 - 108 mmol/L    CO2 23 21 - 32 mmol/L    Anion gap 10 5 - 15 mmol/L    Glucose 220 (H) 65 - 100 mg/dL    BUN 26 (H) 6 - 20 mg/dL    Creatinine 1.02 0.70 - 1.30 mg/dL    BUN/Creatinine ratio 25 (H) 12 - 20      GFR est AA >60 >60 ml/min/1.73m2    GFR est non-AA >60 >60 ml/min/1.73m2    Calcium 9.1 8.5 - 10.1 mg/dL   CBC WITH AUTOMATED DIFF    Collection Time: 06/17/22  6:57 AM   Result Value Ref Range    WBC 21.4 (H) 4.1 - 11.1 K/uL    RBC 4.44 4.10 - 5.70 M/uL    HGB 11.7 (L) 12.1 - 17.0 g/dL    HCT 36.9 36.6 - 50.3 %    MCV 83.1 80.0 - 99.0 FL    MCH 26.4 26.0 - 34.0 PG    MCHC 31.7 30.0 - 36.5 g/dL    RDW 14.3 11.5 - 14.5 %    PLATELET 778 140 - 049 K/uL    MPV 10.8 8.9 - 12.9 FL    NRBC 0.0 0.0  WBC    ABSOLUTE NRBC 0.00 0.00 - 0.01 K/uL    NEUTROPHILS 84 (H) 32 - 75 %    LYMPHOCYTES 5 (L) 12 - 49 %    MONOCYTES 10 5 - 13 %    EOSINOPHILS 0 0 - 7 %    BASOPHILS 0 0 - 1 %    IMMATURE GRANULOCYTES 1 (H) 0 - 0.5 %    ABS. NEUTROPHILS 17.9 (H) 1.8 - 8.0 K/UL    ABS. LYMPHOCYTES 1.1 0.8 - 3.5 K/UL    ABS. MONOCYTES 2.2 (H) 0.0 - 1.0 K/UL    ABS. EOSINOPHILS 0.0 0.0 - 0.4 K/UL    ABS. BASOPHILS 0.0 0.0 - 0.1 K/UL    ABS. IMM. GRANS. 0.2 (H) 0.00 - 0.04 K/UL    DF AUTOMATED       CT CHEST WO CONT   Final Result   Exam limited by respiratory motion. 1. Multifocal bilateral airspace disease concerning for multilobar pneumonia. 2. Nodular consolidation in the lingula is likely infectious. Follow-up chest CT   is recommended to document resolution. 3. Mediastinal lymphadenopathy, likely reactive.               Signed:  Romero Farrar PA-C  6/17/2022  7:55 AM

## 2022-06-21 ENCOUNTER — TELEPHONE (OUTPATIENT)
Dept: PRIMARY CARE CLINIC | Age: 67
End: 2022-06-21

## 2022-06-21 LAB
BACTERIA SPEC CULT: NORMAL
BACTERIA SPEC CULT: NORMAL
SPECIAL REQUESTS,SREQ: NORMAL
SPECIAL REQUESTS,SREQ: NORMAL

## 2022-06-29 ENCOUNTER — OFFICE VISIT (OUTPATIENT)
Dept: PRIMARY CARE CLINIC | Age: 67
End: 2022-06-29
Payer: MEDICARE

## 2022-06-29 VITALS
BODY MASS INDEX: 29.78 KG/M2 | HEART RATE: 84 BPM | TEMPERATURE: 97.4 F | SYSTOLIC BLOOD PRESSURE: 134 MMHG | DIASTOLIC BLOOD PRESSURE: 73 MMHG | RESPIRATION RATE: 18 BRPM | HEIGHT: 70 IN | WEIGHT: 208 LBS | OXYGEN SATURATION: 98 %

## 2022-06-29 DIAGNOSIS — E78.2 MIXED HYPERLIPIDEMIA: ICD-10-CM

## 2022-06-29 DIAGNOSIS — J18.9 PNEUMONIA OF BOTH LUNGS DUE TO INFECTIOUS ORGANISM, UNSPECIFIED PART OF LUNG: ICD-10-CM

## 2022-06-29 DIAGNOSIS — E66.09 CLASS 1 OBESITY DUE TO EXCESS CALORIES WITH SERIOUS COMORBIDITY AND BODY MASS INDEX (BMI) OF 30.0 TO 30.9 IN ADULT: ICD-10-CM

## 2022-06-29 DIAGNOSIS — E11.65 UNCONTROLLED TYPE 2 DIABETES MELLITUS WITH HYPERGLYCEMIA (HCC): ICD-10-CM

## 2022-06-29 DIAGNOSIS — I25.10 CORONARY ARTERY DISEASE INVOLVING NATIVE HEART WITHOUT ANGINA PECTORIS, UNSPECIFIED VESSEL OR LESION TYPE: ICD-10-CM

## 2022-06-29 DIAGNOSIS — F32.A MODERATELY SEVERE DEPRESSION: Primary | ICD-10-CM

## 2022-06-29 DIAGNOSIS — E87.6 HYPOKALEMIA: ICD-10-CM

## 2022-06-29 DIAGNOSIS — Z91.14 NONCOMPLIANCE WITH MEDICATION REGIMEN: ICD-10-CM

## 2022-06-29 DIAGNOSIS — Z09 HOSPITAL DISCHARGE FOLLOW-UP: ICD-10-CM

## 2022-06-29 DIAGNOSIS — I10 ESSENTIAL HYPERTENSION: ICD-10-CM

## 2022-06-29 DIAGNOSIS — D17.71 ANGIOMYOLIPOMA OF LEFT KIDNEY: ICD-10-CM

## 2022-06-29 PROCEDURE — 3046F HEMOGLOBIN A1C LEVEL >9.0%: CPT | Performed by: NURSE PRACTITIONER

## 2022-06-29 PROCEDURE — G8427 DOCREV CUR MEDS BY ELIG CLIN: HCPCS | Performed by: NURSE PRACTITIONER

## 2022-06-29 PROCEDURE — 99214 OFFICE O/P EST MOD 30 MIN: CPT | Performed by: NURSE PRACTITIONER

## 2022-06-29 PROCEDURE — 1123F ACP DISCUSS/DSCN MKR DOCD: CPT | Performed by: NURSE PRACTITIONER

## 2022-06-29 PROCEDURE — G8752 SYS BP LESS 140: HCPCS | Performed by: NURSE PRACTITIONER

## 2022-06-29 PROCEDURE — G8536 NO DOC ELDER MAL SCRN: HCPCS | Performed by: NURSE PRACTITIONER

## 2022-06-29 PROCEDURE — 3017F COLORECTAL CA SCREEN DOC REV: CPT | Performed by: NURSE PRACTITIONER

## 2022-06-29 PROCEDURE — G8417 CALC BMI ABV UP PARAM F/U: HCPCS | Performed by: NURSE PRACTITIONER

## 2022-06-29 PROCEDURE — 2022F DILAT RTA XM EVC RTNOPTHY: CPT | Performed by: NURSE PRACTITIONER

## 2022-06-29 PROCEDURE — G8754 DIAS BP LESS 90: HCPCS | Performed by: NURSE PRACTITIONER

## 2022-06-29 PROCEDURE — 1111F DSCHRG MED/CURRENT MED MERGE: CPT | Performed by: NURSE PRACTITIONER

## 2022-06-29 PROCEDURE — 1101F PT FALLS ASSESS-DOCD LE1/YR: CPT | Performed by: NURSE PRACTITIONER

## 2022-06-29 PROCEDURE — G8432 DEP SCR NOT DOC, RNG: HCPCS | Performed by: NURSE PRACTITIONER

## 2022-06-29 RX ORDER — FLUOXETINE HYDROCHLORIDE 20 MG/1
20 CAPSULE ORAL DAILY
Qty: 30 CAPSULE | Refills: 2 | Status: SHIPPED | OUTPATIENT
Start: 2022-06-29 | End: 2022-07-11 | Stop reason: SDUPTHER

## 2022-06-29 NOTE — PROGRESS NOTES
Chief Complaint   Patient presents with   [facility] Follow Up    Pneumonia     Follow up     1. \"Have you been to the ER, urgent care clinic since your last visit? Hospitalized since your last visit? \" in chart     2. \"Have you seen or consulted any other health care providers outside of the [address] [place] [address] since your last visit? \" No     3. For patients aged 39-70: Has the patient had a colonoscopy / FIT/ Cologuard? No      If the patient is female:    4. For patients aged 41-77: Has the patient had a mammogram within the past 2 years? NA - based on age or sex      11. For patients aged 21-65: Has the patient had a pap smear?  NA - based on age or sex

## 2022-06-29 NOTE — PROGRESS NOTES
Transitional Care Management Progress Note    Patient: Geetha Montgomery  : 1955  PCP: Avila Bullard NP    Date of office visit: 2022   Date of admission: 6/15/22  Date of discharge: 22  Hospital: [facility]    Call initiated w/i 2 business dates of discharge: *No response documented in the last 14 days   Date of the most recent call to the patient: *No documented post hospital discharge outreach found in the last 14 days      Assessment/Plan:     Diagnoses and all orders for this visit:    1. Moderately severe depression  -     FLUoxetine (PROzac) 20 mg capsule; Take 1 Capsule by mouth daily.  -     REFERRAL TO PSYCHIATRY  -     REFERRAL  Lifestyle [facility] RECONCILED W/ CURRENT OUTPATIENT MED LIST    2. Hospital discharge follow-up  -     ID DISCHARGE MEDS RECONCILED W/ CURRENT OUTPATIENT MED LIST    3. Uncontrolled type 2 diabetes mellitus with hyperglycemia ([facility] [address]  Lab Results   Component Value Date/Time    Hemoglobin A1c <3.8 (L) 2022 02:17 AM     -     REFERRAL TO HOME HEALTH    4. Coronary artery disease involving native heart without angina pectoris, unspecified vessel or lesion type  21    NUCLEAR CARDIAC STRESS TEST 2021    Interpretation Summary  Myocardial perfusion imaging and ECG stress test is normal.  Overall left ventricular systolic function was normal without regional wall motion abnormalities. The left ventricular ejection fraction was 60%. Signed by: Surya Medina MD on 2021  1:58 PM  21    ECHO ADULT COMPLETE 2021 4/3/2021    Interpretation Summary  · LV: Calculated LVEF is 55%. Normal cavity size, wall thickness and systolic function (ejection fraction normal). Wall motion: normal. Normal left ventricular strain. Mild (grade 1) left ventricular diastolic dysfunction. · LA: Mildly dilated left atrium. · AV: Mild aortic valve stenosis is present.  Mild aortic valve regurgitation is present. Signed by: Radha Jimenez MD on 4/3/2021 11:41 AM    -     furosemide (LASIX) 20 mg tablet; Take 1 Tablet by mouth daily. 5. Essential hypertension  -     lisinopriL (PRINIVIL, ZESTRIL) 10 mg tablet; Take 1 Tablet by mouth daily.  -     NIFEdipine ER (PROCARDIA XL) 30 mg ER tablet; Take 1 Tablet by mouth daily. 6. Class 1 obesity due to excess calories with serious comorbidity and body mass index (BMI) of 30.0 to 30.9 in adult    7. Mixed hyperlipidemia  Lab Results   Component Value Date/Time    LDL, calculated 97 12/13/2020 08:50 AM     8. Pneumonia of both lungs due to infectious organism, unspecified part of lung  CT Results (most recent):  Results from Hospital Encounter encounter on 06/15/22    CT CHEST WO CONT    Narrative  Exam: CT CHEST WO CONT    TECHNIQUE: Multiple transaxial CT images of the chest were obtained without  contrast. Coronal and sagittal reformatted images were provided. Dose reduction: All CT scans at this facility are performed using dose reduction  optimization techniques as appropriate to a performed exam including the  following: Automated exposure control, adjustments of the mA and/or kV according  to patient size, or use of iterative reconstruction technique. COMPARISON: Chest x-ray performed yesterday. HISTORY: Lung mass    FINDINGS: Motion degraded exam.    Mediastinum: Postsurgical changes of CABG. Severe calcifications of the native  coronary arteries. Borderline cardiomegaly. Aorta is atherosclerotic. Prominent right lower paratracheal lymph node measures 13 mm. No obvious hilar  lymphadenopathy. Axilla and soft tissues: Asymmetric gynecomastia, right greater than left. No  axillary adenopathy. Macroscopic fat-containing intramuscular mass of the right  shoulder musculature, partially visualized but likely a lipoma. ABDOMEN: No acute process identified. Scattered colonic diverticula.  There are  calcifications along the posterior capsule of the left kidney which may  represent sequela of previous trauma or surgery. Questionable punctate calcified  gallstone. LUNGS: Central airways are patent. Patchy groundglass airspace opacities within  the right upper lobe, and left lower lobe. Mixed groundglass and consolidation  within the right lower lobe. Nodular consolidation within the superior lingula  along the interlobar fissure measures 1.2 x 2.5 cm. There is volume loss and  platelike density in the right upper lobe, likely atelectasis. No pleural  effusion. No pneumothorax. Bones: Intact median sternotomy. Multilevel degenerative disc disease with  ventral bridging osteophytes. Impression  Exam limited by respiratory motion. 1. Multifocal bilateral airspace disease concerning for multilobar pneumonia. 2. Nodular consolidation in the lingula is likely infectious. Follow-up chest CT  is recommended to document resolution. 3. Mediastinal lymphadenopathy, likely reactive. 9. Angiomyolipoma of left kidney    10. Noncompliance with medications    11. Hypokalemia  Lab Results   Component Value Date/Time    Potassium 3.3 (L) 06/17/2022 06:57 AM       Patient reports cough and breathing are improved and completed antibiotics  Recommended follow up chest CT due to nodular consolidation (thought to be infectious). Plan to repeat in 8 weeks. He reports non-compliance with medications and associates this with his depression  In discussion, patient not taking the zoloft and reporting moderate depression symptoms. Denies any plan to harm self or others with no attempts in past.   Does have family who he stays with and some support at home. He is agreeable to trying prozac and reviewed potential side effects  Advised if he has worsening moods or suicidal thoughts, notify provider immediately or seek help at ED if unavailable. Verbalizes in agreement.   Going to set up with counseling and psychiatry  We discussed importance in medication compliance and reviewed all medications that he should be taking with summary provided. Initially not agreeable to home health coming but states he would be willing to allow them to come and assist with diabetes management. Encouraged to check sugars 3 times daily and provided log to record these on  Notify provider if sugar > 350 or < 70  Encouraged following diabetic diet and staying active   Check blood pressure daily and notify provider if > 140/90. Reports having blood pressure cuff at home. Is following with cardiology and will continue regular follow up and care with them  Not discharged on potassium but low in hospital. Will start on potassium 10meq daily. The complexity of medical decision making for this patient's transitional care is high   Follow-up and Dispositions    · Return in about 4 weeks (around 7/27/2022) for or sooner for worsening symptoms. Subjective:   Joyce Wilkerson is a 69 y.o. male presenting today for follow-up after hospital discharge. This encounter and supporting documentation was reviewed if available. Medication reconciliation was performed today. The main problem requiring admission was pneumonia and hyperglycemia. Other pertinent conditions include CAD, depression, hyperlipidemia, left kidney mass, obesity, hypertension and hyperlipidemia. Reports since discharge, he has not been taking his medicines regularly. Interval history/Current status: stable    Admitting symptoms have: improved      Medications marked \"taking\" at this time:  Prior to Admission medications    Medication Sig Start Date End Date Taking? Authorizing Provider   lisinopriL (PRINIVIL, ZESTRIL) 10 mg tablet Take 1 Tablet by mouth daily. 6/30/22  Yes Amarjit Pruitt NP   NIFEdipine ER (PROCARDIA XL) 30 mg ER tablet Take 1 Tablet by mouth daily. 6/30/22  Yes Amarjit Pruitt NP   furosemide (LASIX) 20 mg tablet Take 1 Tablet by mouth daily.  6/30/22  Yes Amarjit Pruitt NP   potassium chloride (KLOR-CON M10) 10 mEq tablet Take 1 Tablet by mouth daily. 6/30/22  Yes Dennise Cordero NP   FLUoxetine (PROzac) 20 mg capsule Take 1 Capsule by mouth daily. 6/29/22  Yes Dennise Cordero NP   guaiFENesin ER (MUCINEX) 600 mg ER tablet Take 1 Tablet by mouth every twelve (12) hours for 14 days. 6/17/22 7/1/22 Yes Oniel Bhakta PA-C   insulin glargine (LANTUS) 100 unit/mL injection Take 20 units once daily at night 8/24/21  Yes Mani Isabel MD   Diabetic Supplies, Miscellan. kit 1 Kit by Does Not Apply route four (4) times daily. Dispense #180 Lancets to check blood sugars QID, Ref #1  Dispense #1 Glucometer use QID, Ref #None  Dispense #180 blood glucose strips to use QID, Reg #1  Dispense needles for injecting insulin  Dispense syringe for injecting insulin 6/17/22   Oniel Bhakta PA-C   insulin lispro 100 unit/mL soln injection by SubCUTAneous route Before breakfast, lunch, dinner and at bedtime. For Blood Sugar (mg/dL) of:              Less than 150 =   0 units  150 -199 =   2 units  200 -249 =   4 units  250 -299 =   6 units  300 -349 =   8 units  350 and above =   10 units and Call Primary Care Provider  Patient not taking: Reported on 6/29/2022 6/17/22   Oniel Bhakta PA-C   lisinopriL (PRINIVIL, ZESTRIL) 10 mg tablet Take 10 mg by mouth daily. Patient not taking: Reported on 6/29/2022 6/30/22  Provider, Historical   ranolazine ER (RANEXA) 500 mg SR tablet Take 500 mg by mouth two (2) times a day. Patient not taking: Reported on 6/29/2022 6/1/21   Provider, Historical   furosemide (LASIX) 20 mg tablet Take 1 Tab by mouth daily. Patient not taking: Reported on 6/29/2022 4/9/21 6/30/22  Dennise Cordero NP   NIFEdipine ER (PROCARDIA XL) 30 mg ER tablet Take 1 Tab by mouth daily. Patient not taking: Reported on 6/29/2022 12/15/20 6/30/22  Louise Hartley MD   isosorbide mononitrate ER (IMDUR) 60 mg CR tablet Take 60 mg by mouth daily.  Dr Martha Covarrubias  Patient not taking: Reported on 6/29/2022 Other, MD Imani        Review of Systems   Constitutional: Positive for malaise/fatigue. Negative for chills, fever and weight loss. HENT: Negative. Eyes: Negative for pain, discharge and redness. Respiratory: Positive for sputum production. Negative for hemoptysis, shortness of breath and wheezing. Cardiovascular: Negative. Gastrointestinal: Negative. Genitourinary: Negative. Musculoskeletal: Positive for myalgias. Skin: Negative. Neurological: Negative. Psychiatric/Behavioral: Positive for depression. Negative for hallucinations, memory loss, substance abuse and suicidal ideas. Patient Active Problem List   Diagnosis Code    Chest pain R07.9    CAD (coronary artery disease) I25.10    Essential hypertension I10    Pneumonia due to COVID-19 virus U07.1, J12.82    Respiratory failure with hypoxia ([address] J96.91    Angiomyolipoma of left kidney D17.71    Uncontrolled type 2 diabetes mellitus with hyperglycemia ([facility]) E11.65    Blurred vision, bilateral H53.8    Mixed hyperlipidemia E78.2    At high risk for falls Z91.81    H/O colonoscopy Z98.890    Pneumonia J18.9     Patient Active Problem List    Diagnosis Date Noted    Pneumonia 06/16/2022    At high risk for falls 05/23/2021    H/O colonoscopy 05/23/2021    Angiomyolipoma of left kidney 04/09/2021    Uncontrolled type 2 diabetes mellitus with hyperglycemia ([address] 04/09/2021    Blurred vision, bilateral 04/09/2021    Mixed hyperlipidemia 04/09/2021    Respiratory failure with hypoxia ([address] 02/22/2021    Pneumonia due to COVID-19 virus 02/17/2021    Chest pain 12/12/2020    CAD (coronary artery disease) 12/12/2020    Essential hypertension 12/12/2020     Current Outpatient Medications   Medication Sig Dispense Refill    lisinopriL (PRINIVIL, ZESTRIL) 10 mg tablet Take 1 Tablet by mouth daily. 90 Tablet 1    NIFEdipine ER (PROCARDIA XL) 30 mg ER tablet Take 1 Tablet by mouth daily.  90 Tablet 1    furosemide (LASIX) 20 mg tablet Take 1 Tablet by mouth daily. 90 Tablet 1    potassium chloride (KLOR-CON M10) 10 mEq tablet Take 1 Tablet by mouth daily. 90 Tablet 1    FLUoxetine (PROzac) 20 mg capsule Take 1 Capsule by mouth daily. 30 Capsule 2    guaiFENesin ER (MUCINEX) 600 mg ER tablet Take 1 Tablet by mouth every twelve (12) hours for 14 days. 28 Tablet 0    insulin glargine (LANTUS) 100 unit/mL injection Take 20 units once daily at night 1 Vial 0    Diabetic Supplies, Miscellan. kit 1 Kit by Does Not Apply route four (4) times daily. Dispense #180 Lancets to check blood sugars QID, Ref #1  Dispense #1 Glucometer use QID, Ref #None  Dispense #180 blood glucose strips to use QID, Reg #1  Dispense needles for injecting insulin  Dispense syringe for injecting insulin 1 Kit 0    insulin lispro 100 unit/mL soln injection by SubCUTAneous route Before breakfast, lunch, dinner and at bedtime. For Blood Sugar (mg/dL) of:              Less than 150 =   0 units  150 -199 =   2 units  200 -249 =   4 units  250 -299 =   6 units  300 -349 =   8 units  350 and above =   10 units and Call Primary Care Provider (Patient not taking: Reported on 6/29/2022) 1 Vial 2    ranolazine ER (RANEXA) 500 mg SR tablet Take 500 mg by mouth two (2) times a day. (Patient not taking: Reported on 6/29/2022)      isosorbide mononitrate ER (IMDUR) 60 mg CR tablet Take 60 mg by mouth daily. Dr Alejandra Arzola (Patient not taking: Reported on 6/29/2022)       No Known Allergies  Past Medical History:   Diagnosis Date    Arthritis     Burning with urination     CAD (coronary artery disease)     patient stated stents placed unsure of how many     Diabetes ([address]     GERD (gastroesophageal reflux disease)     History of blood transfusion     patient stated approx 1 year ago     HTN (hypertension)     Hyperlipidemia     MI (myocardial infarction) ([address]     Rectal bleeding     patient stated this happened over the weekend.      Renal mass, left      Past Surgical History:   Procedure Laterality Date    HX COLONOSCOPY      HX CORONARY STENT PLACEMENT  2018    x1    HX HEART CATHETERIZATION      patient stated stents placed     HX NEPHRECTOMY Left 08/23/2021    robotic left partial nephrectomy,    HX UROLOGICAL  08/23/2021     intraoperative renal ultrasound    AR CARDIAC SURG PROCEDURE UNLIST  2018    BYPASS     Family History   Problem Relation Age of Onset    Hypertension Mother     Diabetes Mother     Hypertension Father     Heart Disease Father      Social History     Tobacco Use    Smoking status: Never Smoker    Smokeless tobacco: Never Used   Substance Use Topics    Alcohol use: Not Currently          Objective:     Visit Vitals  /73 (BP 1 Location: Left upper arm, BP Patient Position: Sitting, BP Cuff Size: Adult)   Pulse 84   Temp 97.4 °F (36.3 °C) (Temporal)   Resp 18   Ht 5' 10\" (1.778 m)   Wt 208 lb (94.3 kg)   SpO2 98%   BMI 29.84 kg/m²        Physical Exam  Vitals and nursing note reviewed. Constitutional:       Appearance: Normal appearance. He is obese. Cardiovascular:      Rate and Rhythm: Normal rate. Pulses: Normal pulses. Pulmonary:      Effort: Pulmonary effort is normal.      Comments: Dminished in bases  Abdominal:      General: Bowel sounds are normal.      Palpations: Abdomen is soft. Tenderness: There is no abdominal tenderness. There is no guarding. Musculoskeletal:      Right lower leg: Edema (trace) present. Left lower leg: Edema (trace) present. Skin:     General: Skin is warm and dry. Neurological:      Mental Status: He is alert and oriented to person, place, and time. Mental status is at baseline. Psychiatric:         Mood and Affect: Mood is depressed. Speech: Speech normal.         Behavior: Behavior is cooperative. Thought Content: Thought content is not paranoid or delusional. Thought content does not include homicidal or suicidal ideation.  Thought content does not include homicidal or suicidal plan. Cognition and Memory: Cognition normal.           We discussed the expected course, resolution and complications of the diagnosis(es) in detail. Medication risks, benefits, costs, interactions, and alternatives were discussed as indicated. I advised him to contact the office if his condition worsens, changes or fails to improve as anticipated. He expressed understanding with the diagnosis(es) and plan.      Julissa Zamora NP      Feeling depressed every day and not taking medication

## 2022-06-30 RX ORDER — POTASSIUM CHLORIDE 750 MG/1
10 TABLET, EXTENDED RELEASE ORAL DAILY
Qty: 90 TABLET | Refills: 1 | Status: SHIPPED | OUTPATIENT
Start: 2022-06-30

## 2022-06-30 RX ORDER — FUROSEMIDE 20 MG/1
20 TABLET ORAL DAILY
Qty: 90 TABLET | Refills: 1 | Status: SHIPPED | OUTPATIENT
Start: 2022-06-30

## 2022-06-30 RX ORDER — LISINOPRIL 10 MG/1
10 TABLET ORAL DAILY
Qty: 90 TABLET | Refills: 1 | Status: SHIPPED | OUTPATIENT
Start: 2022-06-30

## 2022-06-30 RX ORDER — NIFEDIPINE 30 MG/1
30 TABLET, EXTENDED RELEASE ORAL DAILY
Qty: 90 TABLET | Refills: 1 | Status: SHIPPED | OUTPATIENT
Start: 2022-06-30

## 2022-07-11 ENCOUNTER — OFFICE VISIT (OUTPATIENT)
Dept: BEHAVIORAL/MENTAL HEALTH CLINIC | Age: 67
End: 2022-07-11
Payer: MEDICARE

## 2022-07-11 VITALS — WEIGHT: 201 LBS | BODY MASS INDEX: 28.84 KG/M2

## 2022-07-11 DIAGNOSIS — F33.1 MODERATE EPISODE OF RECURRENT MAJOR DEPRESSIVE DISORDER (HCC): Primary | ICD-10-CM

## 2022-07-11 DIAGNOSIS — F32.A MODERATELY SEVERE DEPRESSION: ICD-10-CM

## 2022-07-11 PROCEDURE — G8432 DEP SCR NOT DOC, RNG: HCPCS | Performed by: NURSE PRACTITIONER

## 2022-07-11 PROCEDURE — 90792 PSYCH DIAG EVAL W/MED SRVCS: CPT | Performed by: NURSE PRACTITIONER

## 2022-07-11 PROCEDURE — G8427 DOCREV CUR MEDS BY ELIG CLIN: HCPCS | Performed by: NURSE PRACTITIONER

## 2022-07-11 PROCEDURE — G8417 CALC BMI ABV UP PARAM F/U: HCPCS | Performed by: NURSE PRACTITIONER

## 2022-07-11 PROCEDURE — G8536 NO DOC ELDER MAL SCRN: HCPCS | Performed by: NURSE PRACTITIONER

## 2022-07-11 RX ORDER — FLUOXETINE HYDROCHLORIDE 40 MG/1
40 CAPSULE ORAL DAILY
Qty: 90 CAPSULE | Refills: 2 | Status: SHIPPED | OUTPATIENT
Start: 2022-07-11 | End: 2022-10-09

## 2022-07-11 NOTE — PROGRESS NOTES
Jc Wong is a 69 y.o. male who presents today for the following:  Chief Complaint   Patient presents with    New Patient     \"Kind of depressed. \"    Depression       No Known Allergies    Current Outpatient Medications   Medication Sig    FLUoxetine (PROzac) 40 mg capsule Take 1 Capsule by mouth daily for 90 days.  lisinopriL (PRINIVIL, ZESTRIL) 10 mg tablet Take 1 Tablet by mouth daily.  NIFEdipine ER (PROCARDIA XL) 30 mg ER tablet Take 1 Tablet by mouth daily.  furosemide (LASIX) 20 mg tablet Take 1 Tablet by mouth daily.  potassium chloride (KLOR-CON M10) 10 mEq tablet Take 1 Tablet by mouth daily.  Diabetic Supplies, Miscellan. kit 1 Kit by Does Not Apply route four (4) times daily. Dispense #180 Lancets to check blood sugars QID, Ref #1  Dispense #1 Glucometer use QID, Ref #None  Dispense #180 blood glucose strips to use QID, Reg #1  Dispense needles for injecting insulin  Dispense syringe for injecting insulin    insulin glargine (LANTUS) 100 unit/mL injection Take 20 units once daily at night     No current facility-administered medications for this visit. Past Medical History:   Diagnosis Date    Arthritis     Burning with urination     CAD (coronary artery disease)     patient stated stents placed unsure of how many     Diabetes ([address]     GERD (gastroesophageal reflux disease)     History of blood transfusion     patient stated approx 1 year ago     HTN (hypertension)     Hyperlipidemia     MI (myocardial infarction) ([address]     Rectal bleeding     patient stated this happened over the weekend.      Renal mass, left        Past Surgical History:   Procedure Laterality Date    HX COLONOSCOPY      HX CORONARY STENT PLACEMENT  2018    x1    HX HEART CATHETERIZATION      patient stated stents placed     HX NEPHRECTOMY Left 08/23/2021    robotic left partial nephrectomy,    HX UROLOGICAL  08/23/2021     intraoperative renal ultrasound    MI CARDIAC SURG PROCEDURE UNLIST  2018    BYPASS       Family History   Problem Relation Age of Onset    Hypertension Mother     Diabetes Mother     Hypertension Father     Heart Disease Father        Social History     Socioeconomic History    Marital status:      Spouse name: Not on file    Number of children: 0    Years of education: Not on file    Highest education level: 9th grade   Occupational History    Not on file   Tobacco Use    Smoking status: Never Smoker    Smokeless tobacco: Never Used   Vaping Use    Vaping Use: Never used   Substance and Sexual Activity    Alcohol use: Not Currently    Drug use: Yes     Types: Marijuana    Sexual activity: Not Currently   Other Topics Concern     Service Not Asked    Blood Transfusions Not Asked    Caffeine Concern Not Asked    Occupational Exposure Not Asked    Hobby Hazards Not Asked    Sleep Concern Not Asked    Stress Concern Not Asked    Weight Concern Not Asked    Special Diet Not Asked    Back Care Not Asked    Exercise Not Asked    Bike Helmet Not Asked    Seat Belt Not Asked    Self-Exams Not Asked   Social History Narrative    Not on file     Social Determinants of Health     Financial Resource Strain:     Difficulty of Paying Living Expenses: Not on file   Food Insecurity:     Worried About Running Out of Food in the Last Year: Not on file    Randi of Food in the Last Year: Not on file   Transportation Needs:     Lack of Transportation (Medical): Not on file    Lack of Transportation (Non-Medical):  Not on file   Physical Activity:     Days of Exercise per Week: Not on file    Minutes of Exercise per Session: Not on file   Stress:     Feeling of Stress : Not on file   Social Connections:     Frequency of Communication with Friends and Family: Not on file    Frequency of Social Gatherings with Friends and Family: Not on file    Attends Scientology Services: Not on file    Active Member of Clubs or Organizations: Not on file  Attends Club or Organization Meetings: Not on file    Marital Status: Not on file   Intimate Partner Violence:     Fear of Current or Ex-Partner: Not on file    Emotionally Abused: Not on file    Physically Abused: Not on file    Sexually Abused: Not on file   Housing Stability:     Unable to Pay for Housing in the Last Year: Not on file    Number of [place] in the Last Year: Not on file    Unstable Housing in the Last Year: Not on file         Mr. Lisa Gaffney is a 64-year-old  -American male with history of major depressive disorder. He is currently taking fluoxetine 20 mg take 1 capsule daily. He denies history of psychiatric hospitalization and suicide attempt. Patient reports he was diagnosed with depression around the age of 70. Patient presents to the clinic unaccompanied, clean, fully, alert, and oriented. Gait is stable. Mood appears mild to moderately depressed without suicidal/homicidal thinking, risk is low, protective factor-family. He reports feeling depressed for the past 2 to 3 months which he relates to being \"lonely. \"  He mentions having some relatives living in the home which provides him with some company. He reports low energy, depressed mood, sleep has improved and appetite is stable. No psychotic symptoms observed or reported. He reports his memory is \"fair. \"  No psychotic symptoms observed or reported. Patient was reared by his parents. He denies abuse/trauma history. He has 2  siblings, 2 living brothers and 1 sister. Patient reports he was  from [id number], he reports his wife is . Education-ninth grade. 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 He has 1 daughter who lives in [facility]. Patient mentions that he visits his daughter regularly. He has no  or legal background. [employer].  He denies smoking, alcohol and drug use.   No history of substance use disorder. Review of Systems   Psychiatric/Behavioral: Positive for depression. All other systems reviewed and are negative. Visit Vitals  Wt 91.2 kg (201 lb)   BMI 28.84 kg/m²     Physical Exam  Psychiatric:         Attention and Perception: Attention and perception normal.         Mood and Affect: Mood is depressed. Speech: Speech normal.         Behavior: Behavior normal. Behavior is cooperative. Thought Content: Thought content normal.         Cognition and Memory: Cognition and memory normal.         Judgment: Judgment normal.          Plan: For depressed mood, change fluoxetine to 40 mg take 1 capsule daily. Therapy is recommended, however patient declines at this time. Follow-up with medical provider as appropriate. Patient is encouraged to contact the clinic for any issues.

## 2022-09-16 ENCOUNTER — OFFICE VISIT (OUTPATIENT)
Dept: PRIMARY CARE CLINIC | Age: 67
End: 2022-09-16
Payer: MEDICARE

## 2022-09-16 VITALS
BODY MASS INDEX: 29.81 KG/M2 | HEART RATE: 87 BPM | DIASTOLIC BLOOD PRESSURE: 70 MMHG | HEIGHT: 70 IN | WEIGHT: 208.2 LBS | SYSTOLIC BLOOD PRESSURE: 121 MMHG | RESPIRATION RATE: 18 BRPM | TEMPERATURE: 98.7 F | OXYGEN SATURATION: 99 %

## 2022-09-16 DIAGNOSIS — I25.10 CORONARY ARTERY DISEASE INVOLVING NATIVE HEART WITHOUT ANGINA PECTORIS, UNSPECIFIED VESSEL OR LESION TYPE: ICD-10-CM

## 2022-09-16 DIAGNOSIS — R91.1 LUNG NODULE: ICD-10-CM

## 2022-09-16 DIAGNOSIS — E11.65 UNCONTROLLED TYPE 2 DIABETES MELLITUS WITH HYPERGLYCEMIA (HCC): Primary | ICD-10-CM

## 2022-09-16 DIAGNOSIS — E78.2 MIXED HYPERLIPIDEMIA: ICD-10-CM

## 2022-09-16 DIAGNOSIS — Z00.00 MEDICARE ANNUAL WELLNESS VISIT, SUBSEQUENT: ICD-10-CM

## 2022-09-16 DIAGNOSIS — F32.A MODERATELY SEVERE DEPRESSION: ICD-10-CM

## 2022-09-16 DIAGNOSIS — Z12.11 SCREENING FOR MALIGNANT NEOPLASM OF COLON: ICD-10-CM

## 2022-09-16 DIAGNOSIS — I10 ESSENTIAL HYPERTENSION: ICD-10-CM

## 2022-09-16 LAB — HBA1C MFR BLD HPLC: 14 %

## 2022-09-16 PROCEDURE — 1123F ACP DISCUSS/DSCN MKR DOCD: CPT | Performed by: NURSE PRACTITIONER

## 2022-09-16 PROCEDURE — 3046F HEMOGLOBIN A1C LEVEL >9.0%: CPT | Performed by: NURSE PRACTITIONER

## 2022-09-16 PROCEDURE — 3017F COLORECTAL CA SCREEN DOC REV: CPT | Performed by: NURSE PRACTITIONER

## 2022-09-16 PROCEDURE — G0439 PPPS, SUBSEQ VISIT: HCPCS | Performed by: NURSE PRACTITIONER

## 2022-09-16 PROCEDURE — G8752 SYS BP LESS 140: HCPCS | Performed by: NURSE PRACTITIONER

## 2022-09-16 PROCEDURE — G8427 DOCREV CUR MEDS BY ELIG CLIN: HCPCS | Performed by: NURSE PRACTITIONER

## 2022-09-16 PROCEDURE — 1101F PT FALLS ASSESS-DOCD LE1/YR: CPT | Performed by: NURSE PRACTITIONER

## 2022-09-16 PROCEDURE — G8754 DIAS BP LESS 90: HCPCS | Performed by: NURSE PRACTITIONER

## 2022-09-16 PROCEDURE — G8536 NO DOC ELDER MAL SCRN: HCPCS | Performed by: NURSE PRACTITIONER

## 2022-09-16 PROCEDURE — 2022F DILAT RTA XM EVC RTNOPTHY: CPT | Performed by: NURSE PRACTITIONER

## 2022-09-16 PROCEDURE — 99214 OFFICE O/P EST MOD 30 MIN: CPT | Performed by: NURSE PRACTITIONER

## 2022-09-16 PROCEDURE — G8432 DEP SCR NOT DOC, RNG: HCPCS | Performed by: NURSE PRACTITIONER

## 2022-09-16 PROCEDURE — 83036 HEMOGLOBIN GLYCOSYLATED A1C: CPT | Performed by: NURSE PRACTITIONER

## 2022-09-16 PROCEDURE — G8417 CALC BMI ABV UP PARAM F/U: HCPCS | Performed by: NURSE PRACTITIONER

## 2022-09-16 RX ORDER — SYRINGE AND NEEDLE,INSULIN,1ML 31GX15/64"
SYRINGE, EMPTY DISPOSABLE MISCELLANEOUS
COMMUNITY
Start: 2022-06-19

## 2022-09-16 RX ORDER — INSULIN LISPRO 100 [IU]/ML
INJECTION, SOLUTION INTRAVENOUS; SUBCUTANEOUS
Qty: 27 ML | Refills: 1
Start: 2022-09-16

## 2022-09-16 RX ORDER — INSULIN GLARGINE 100 [IU]/ML
40 INJECTION, SOLUTION SUBCUTANEOUS DAILY
Qty: 36 ML | Refills: 1 | Status: SHIPPED | OUTPATIENT
Start: 2022-09-16

## 2022-09-16 RX ORDER — INSULIN LISPRO 100 [IU]/ML
INJECTION, SOLUTION INTRAVENOUS; SUBCUTANEOUS
COMMUNITY
Start: 2022-06-19 | End: 2022-09-16 | Stop reason: SDUPTHER

## 2022-09-16 NOTE — PROGRESS NOTES
Progress Note    Patient: Luis Jeong  : 1955  PCP: Yakov Goodman NP      Assessment/Plan:     Diagnoses and all orders for this visit:    1. Moderately severe depression  Patient reports some continued mild symptoms but much improved  Continues prozac 40mg daily  Following with psychiatry     2. Uncontrolled type 2 diabetes mellitus with hyperglycemia (HCC)  Lab Results   Component Value Date/Time    Hemoglobin A1c <3.8 (L) 2022 02:17 AM    Hemoglobin A1c (POC) 14.0 2022 01:11 PM     Last A1c: 13.1  Home readings: Did not bring and not checking regularly   Medication Compliance:no- not taking humalog and misses doses of lantus  Diabetic Eye Exam Up to date:no  Complications: CAD  Current Treatment: Humalog sliding scale, lantus 20 units nightly    Discussed with patient importance in compliance with insulin as he has not been taking the humalog very often and misses doses of lantus. Going to start on Jardiance 20mg daily and we reviewed potential side effects  Encouraged to stay well hydrated and notify provider immediately if signs of  mycotic infection develops  Increase lantus to 40 units daily and continue with humalog sliding scale before meals  He verbalizes understanding of how to inject insulin  Check glucose 4 times daily and bring meter to next appointment  Encourage to follow diabetic diet and get regular exercise 3-5 times weekly at least 30-40 minutes   Referring back to [facility] for diabetic eye exam as this is overdue  Check feet daily and notify provider immediately if wound develops    3. Coronary artery disease involving native heart without angina pectoris, unspecified vessel or lesion type  Hx bypass surgery 20    NUCLEAR CARDIAC STRESS TEST 2021    Interpretation Summary  Myocardial perfusion imaging and ECG stress test is normal.  Overall left ventricular systolic function was normal without regional wall motion abnormalities.   The left ventricular ejection fraction was 60%. Signed by: Flor Jeff MD on 5/20/2021  1:58 PM  04/01/21    ECHO ADULT COMPLETE 04/03/2021 4/3/2021    Interpretation Summary  · LV: Calculated LVEF is 55%. Normal cavity size, wall thickness and systolic function (ejection fraction normal). Wall motion: normal. Normal left ventricular strain. Mild (grade 1) left ventricular diastolic dysfunction. · LA: Mildly dilated left atrium. · AV: Mild aortic valve stenosis is present. Mild aortic valve regurgitation is present. Signed by: Lashawn Enriquez MD on 4/3/2021 11:41 AM  Continues follow up with [facility] who manages    4. Essential hypertension  Blood pressure is controlled and continue medications as directed  Encourage to monitor at home and notify provider if > 140/90 consistently         5. Mixed hyperlipidemia  Lab Results   Component Value Date/Time    LDL, calculated 97 12/13/2020 08:50 AM   Controlled     6. Lung nodule  CT Results (most recent):  Results from Hospital Encounter encounter on 06/15/22    CT CHEST WO CONT    Narrative  Exam: CT CHEST WO CONT    TECHNIQUE: Multiple transaxial CT images of the chest were obtained without  contrast. Coronal and sagittal reformatted images were provided. Dose reduction: All CT scans at this facility are performed using dose reduction  optimization techniques as appropriate to a performed exam including the  following: Automated exposure control, adjustments of the mA and/or kV according  to patient size, or use of iterative reconstruction technique. COMPARISON: Chest x-ray performed yesterday. HISTORY: Lung mass    FINDINGS: Motion degraded exam.    Mediastinum: Postsurgical changes of CABG. Severe calcifications of the native  coronary arteries. Borderline cardiomegaly. Aorta is atherosclerotic. Prominent right lower paratracheal lymph node measures 13 mm. No obvious hilar  lymphadenopathy.     Axilla and soft tissues: Asymmetric gynecomastia, right greater than left. No  axillary adenopathy. Macroscopic fat-containing intramuscular mass of the right  shoulder musculature, partially visualized but likely a lipoma. ABDOMEN: No acute process identified. Scattered colonic diverticula. There are  calcifications along the posterior capsule of the left kidney which may  represent sequela of previous trauma or surgery. Questionable punctate calcified  gallstone. LUNGS: Central airways are patent. Patchy groundglass airspace opacities within  the right upper lobe, and left lower lobe. Mixed groundglass and consolidation  within the right lower lobe. Nodular consolidation within the superior lingula  along the interlobar fissure measures 1.2 x 2.5 cm. There is volume loss and  platelike density in the right upper lobe, likely atelectasis. No pleural  effusion. No pneumothorax. Bones: Intact median sternotomy. Multilevel degenerative disc disease with  ventral bridging osteophytes. Impression  Exam limited by respiratory motion. 1. Multifocal bilateral airspace disease concerning for multilobar pneumonia. 2. Nodular consolidation in the lingula is likely infectious. Follow-up chest CT  is recommended to document resolution. 3. Mediastinal lymphadenopathy, likely reactive. Repeat 3 mo follow up CT of chest to re-evaluate    7. Angiomyolipoma of left kidney  S/p left partial nephrectomy on 8/23/21 with benign tumor excised    8. Noncompliance with medications  Have re-interated importance in medication compliance to avoid serious health complications which can be life threatening. 9. Hypokalemia  Lab Results   Component Value Date/Time    Potassium 3.3 (L) 06/17/2022 06:57 AM   On potassium supplement and repeat with labs      The complexity of medical decision making for this patient's transitional care is high     Follow-up and Dispositions    Return in about 4 weeks (around 10/14/2022).           Subjective:   Mindy Shone is a 69 y.o. male presenting today for follow-up after hospital discharge. This encounter and supporting documentation was reviewed if available. Medication reconciliation was performed today. The main problem requiring admission was pneumonia and hyperglycemia. Other pertinent conditions include CAD, depression, hyperlipidemia, left kidney mass, obesity, hypertension and hyperlipidemia. Reports since discharge, he has not been taking his medicines regularly. Medications marked \"taking\" at this time:  Prior to Admission medications    Medication Sig Start Date End Date Taking? Authorizing Provider   empagliflozin (JARDIANCE) 25 mg tablet Take 1 Tablet by mouth daily. 9/16/22  Yes Gennaro Will NP   insulin glargine (LANTUS) 100 unit/mL injection 40 Units by SubCUTAneous route daily. Take 20 units once daily at night 9/16/22  Yes Gennaro Will NP   insulin lispro (HUMALOG) 100 unit/mL injection INJECT PER SLIDING SCALE BEFORE BREAKFAST, LUNCH, DINNER, AND AT BEDTIME FOR BLOOD SUGAR LESS THAN 150=0 UNITS, 150-199=2 UNITS, 200-249=4 UNITS, 250-299=6 UNITS, 300-349=8 UNITS, 350 AND ABOVE= 10 UNITS AND CALL PRIMARY CARE PROVIDER. Max daily 30 units. 9/16/22  Yes Gennaro Will NP   insulin syringe-needle U-100 0.3 mL 31 gauge x 15/64\" syrg USE TO INJECT INSULIN 4 TIMES DAILY 6/19/22   Provider, Historical   insulin lispro (HUMALOG) 100 unit/mL injection INJECT PER SLIDING SCALE BEFORE BREAKFAST, LUNCH, DINNER, AND AT BEDTIME FOR BLOOD SUGAR LESS THAN 150=0 UNITS, 150-199=2 UNITS, 200-249=4 UNITS, 250-299=6 UNITS, 300-349=8 UNITS, 350 AND ABOVE= 10 UNITS AND CALL PRIMARY CARE PROVIDER. Patient not taking: Reported on 9/16/2022 6/19/22 9/16/22  Provider, Historical   FLUoxetine (PROzac) 40 mg capsule Take 1 Capsule by mouth daily for 90 days. 7/11/22 10/9/22  Sara Parra NP   lisinopriL (PRINIVIL, ZESTRIL) 10 mg tablet Take 1 Tablet by mouth daily.  6/30/22   Gennaro Will NP   NIFEdipine ER (PROCARDIA XL) 30 mg ER tablet Take 1 Tablet by mouth daily. 6/30/22   Frank Granados NP   furosemide (LASIX) 20 mg tablet Take 1 Tablet by mouth daily. 6/30/22   Frank Granados NP   potassium chloride (KLOR-CON M10) 10 mEq tablet Take 1 Tablet by mouth daily. 6/30/22   Frank Granados NP   Diabetic Supplies, Miscellan. kit 1 Kit by Does Not Apply route four (4) times daily. Dispense #180 Lancets to check blood sugars QID, Ref #1  Dispense #1 Glucometer use QID, Ref #None  Dispense #180 blood glucose strips to use QID, Reg #1  Dispense needles for injecting insulin  Dispense syringe for injecting insulin 6/17/22   Aparna Billings PA-C   insulin glargine (LANTUS) 100 unit/mL injection Take 20 units once daily at night 8/24/21 9/16/22  Juan Singh MD        Review of Systems   Constitutional:  Positive for malaise/fatigue. Negative for chills, fever and weight loss. HENT: Negative. Eyes:  Negative for pain, discharge and redness. Respiratory:  Negative for hemoptysis, sputum production, shortness of breath and wheezing. Cardiovascular: Negative. Gastrointestinal: Negative. Genitourinary: Negative. Musculoskeletal:  Positive for myalgias. Skin: Negative. Neurological: Negative. Psychiatric/Behavioral:  Positive for depression. Negative for hallucinations, memory loss, substance abuse and suicidal ideas.         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    Uncontrolled type 2 diabetes mellitus with hyperglycemia (HCC) E11.65    Blurred vision, bilateral H53.8    Mixed hyperlipidemia E78.2    At high risk for falls Z91.81    H/O colonoscopy Z98.890    Pneumonia J18.9     Patient Active Problem List    Diagnosis Date Noted    Pneumonia 06/16/2022 At high risk for falls 05/23/2021    H/O colonoscopy 05/23/2021    Angiomyolipoma of left kidney 04/09/2021    Uncontrolled type 2 diabetes mellitus with hyperglycemia ([facility] [address] 04/09/2021    Blurred vision, bilateral 04/09/2021    Mixed hyperlipidemia 04/09/2021    Res[facility] 02/22/2021    Pneumonia due to COVID-19 virus 02/17/2021    Chest pain 12/12/2020    CAD (coronary artery disease) 12/12/2020    Essential hypertension 12/12/2020     Current Outpatient Medications   Medication Sig Dispense Refill    empagliflozin (JARDIANCE) 25 mg tablet Take 1 Tablet by mouth daily. 90 Tablet 1    insulin glargine (LANTUS) 100 unit/mL injection 40 Units by SubCUTAneous route daily. Take 20 units once daily at night 36 mL 1    insulin lispro (HUMALOG) 100 unit/mL injection INJECT PER SLIDING SCALE BEFORE BREAKFAST, LUNCH, DINNER, AND AT BEDTIME FOR BLOOD SUGAR LESS THAN 150=0 UNITS, 150-199=2 UNITS, 200-249=4 UNITS, 250-299=6 UNITS, 300-349=8 UNITS, 350 AND ABOVE= 10 UNITS AND CALL PRIMARY CARE PROVIDER. Max daily 30 units. 27 mL 1    insulin syringe-needle U-100 0.3 mL 31 gauge x 15/64\" syrg USE TO INJECT INSULIN 4 TIMES DAILY      FLUoxetine (PROzac) 40 mg capsule Take 1 Capsule by mouth daily for 90 days. 90 Capsule 2    lisinopriL (PRINIVIL, ZESTRIL) 10 mg tablet Take 1 Tablet by mouth daily. 90 Tablet 1    NIFEdipine ER (PROCARDIA XL) 30 mg ER tablet Take 1 Tablet by mouth daily. 90 Tablet 1    furosemide (LASIX) 20 mg tablet Take 1 Tablet by mouth daily. 90 Tablet 1    potassium chloride (KLOR-CON M10) 10 mEq tablet Take 1 Tablet by mouth daily. 90 Tablet 1    Diabetic Supplies, Miscellan. kit 1 Kit by Does Not Apply route four (4) times daily.  Dispense #180 Lancets to check blood sugars QID, Ref #1  Dispense #1 Glucometer use QID, Ref #None  Dispense #180 blood glucose strips to use QID, Reg #1  Dispense needles for injecting insulin  Dispense syringe for injecting insulin 1 Kit 0     No Known Allergies  Past Medical History:   Diagnosis Date    Arthritis     Burning with urination     CAD (coronary artery disease)     patient stated stents placed unsure of how many     Diabetes ([address]     GERD (gastroesophageal reflux disease)     History of blood transfusion     patient stated approx 1 year ago     HTN (hypertension)     Hyperlipidemia     MI (myocardial infarction) ([address]     Rectal bleeding     patient stated this happened over the weekend. Renal mass, left      Past Surgical History:   Procedure Laterality Date    HX COLONOSCOPY      HX CORONARY STENT PLACEMENT  2018    x1    HX HEART CATHETERIZATION      patient stated stents placed     HX NEPHRECTOMY Left 08/23/2021    robotic left partial nephrectomy,    HX UROLOGICAL  08/23/2021     intraoperative renal ultrasound    [place] CARDIAC SURG PROCEDURE UNLIST  2018    BYPASS     Family History   Problem Relation Age of Onset    Hypertension Mother     Diabetes Mother     Hypertension Father     Heart Disease Father      Social History     Tobacco Use    Smoking status: Never    Smokeless tobacco: Never   Substance Use Topics    Alcohol use: Not Currently          Objective:     Visit Vitals  /70 (BP 1 Location: Left upper arm, BP Patient Position: Sitting, BP Cuff Size: Adult)   Pulse 87   Temp 98.7 °F (37.1 °C) (Temporal)   Resp 18   Ht 5' 10\" (1.778 m)   Wt 208 lb 3.2 oz (94.4 kg)   SpO2 99%   BMI 29.87 kg/m²        Physical Exam  Vitals and nursing note reviewed. Constitutional:       Appearance: Normal appearance. He is obese. Cardiovascular:      Rate and Rhythm: Normal rate. Pulses: Normal pulses. Pulmonary:      Effort: Pulmonary effort is normal.      Comments: Dminished in bases  Abdominal:      General: Bowel sounds are normal.      Palpations: Abdomen is soft. Tenderness: There is no abdominal tenderness. There is no guarding. Musculoskeletal:      Right lower leg: Edema (trace) present.       Left lower leg: Edema (trace) present. Skin:     General: Skin is warm and dry. Neurological:      Mental Status: He is alert and oriented to person, place, and time. Mental status is at baseline. Psychiatric:         Mood and Affect: Mood is depressed. Speech: Speech normal.         Behavior: Behavior is cooperative. Thought Content: Thought content is not paranoid or delusional. Thought content does not include homicidal or suicidal ideation. Thought content does not include homicidal or suicidal plan. Cognition and Memory: Cognition normal.         We discussed the expected course, resolution and complications of the diagnosis(es) in detail. Medication risks, benefits, costs, interactions, and alternatives were discussed as indicated. I advised him to contact the office if his condition worsens, changes or fails to improve as anticipated. He expressed understanding with the diagnosis(es) and plan. Chavez Norton NP      This is the Subsequent Medicare Annual Wellness Exam, performed 12 months or more after the Initial AWV or the last Subsequent AWV    I have reviewed the patient's medical history in detail and updated the computerized patient record. Assessment/Plan   Education and counseling provided:  Are appropriate based on today's review and evaluation  End-of-Life planning (with patient's consent)  Pneumococcal Vaccine  Influenza Vaccine  Hepatitis B Vaccine  Prostate cancer screening tests (PSA, covered annually)  Colorectal cancer screening tests  Cardiovascular screening blood test  Bone mass measurement (DEXA)  Screening for glaucoma  Diabetes screening test  Medical nutrition therapy for individuals with diabetes or renal disease    1. Uncontrolled type 2 diabetes mellitus with hyperglycemia (HCC)  -     AMB POC HEMOGLOBIN A1C  -     empagliflozin (JARDIANCE) 25 mg tablet; Take 1 Tablet by mouth daily. , Normal, Disp-90 Tablet, R-1  -     insulin glargine (LANTUS) 100 unit/mL injection; 40 Units by SubCUTAneous route daily. Take 20 units once daily at night, Normal, Disp-36 mL, R-1  -     insulin lispro (HUMALOG) 100 unit/mL injection; INJECT PER SLIDING SCALE BEFORE BREAKFAST, LUNCH, DINNER, AND AT BEDTIME FOR BLOOD SUGAR LESS THAN 150=0 UNITS, 150-199=2 UNITS, 200-249=4 UNITS, 250-299=6 UNITS, 300-349=8 UNITS, 350 AND ABOVE= 10 UNITS AND CALL PRIMARY CARE PROVIDER. Max daily 30 units. , No Print, Disp-27 mL, R-1  -     REFERRAL TO OPHTHALMOLOGY  2. Moderately severe depression  3. Coronary artery disease involving native heart without angina pectoris, unspecified vessel or lesion type  4. Essential hypertension  5. Screening for malignant neoplasm of colon  -     COLONOSCOPY; Future  -     REFERRAL TO GASTROENTEROLOGY  6. Mixed hyperlipidemia  7. Lung nodule  -     CT CHEST WO CONT; Future  8.  Medicare annual wellness visit, subsequent       Depression Risk Factor Screening     3 most recent PHQ Screens 9/16/2022   PHQ Not Done -   Little interest or pleasure in doing things Several days   Feeling down, depressed, irritable, or hopeless Several days   Total Score PHQ 2 2   Trouble falling or staying asleep, or sleeping too much -   Feeling tired or having little energy -   Poor appetite, weight loss, or overeating -   Feeling bad about yourself - or that you are a failure or have let yourself or your family down -   Trouble concentrating on things such as school, work, reading, or watching TV -   Moving or speaking so slowly that other people could have noticed; or the opposite being so fidgety that others notice -   Thoughts of being better off dead, or hurting yourself in some way -   PHQ 9 Score -   How difficult have these problems made it for you to do your work, take care of your home and get along with others -       Alcohol & Drug Abuse Risk Screen    Do you average more than 1 drink per night or more than 7 drinks a week: No    In the past three months have you have had more than 4 drinks containing alcohol on one occasion: No          Functional Ability and Level of Safety    Hearing: Hearing is good. Activities of Daily Living: The home contains: handrails  Patient does total self care      Ambulation: with no difficulty     Fall Risk:  Fall Risk Assessment, last 12 mths 9/16/2022   Able to walk? Yes   Fall in past 12 months? 0   Do you feel unsteady? 0   Are you worried about falling 0   Is TUG test greater than 12 seconds? -   Is the gait abnormal? -   Number of falls in past 12 months -   Fall with injury?  -      Abuse Screen:  Patient is not abused       Cognitive Screening    Has your family/caregiver stated any concerns about your memory: no     Cognitive Screening: Normal - Clock Drawing Test    Health Maintenance Due     Health Maintenance Due   Topic Date Due    COVID-19 Vaccine (1) Never done    Pneumococcal 65+ years (1 - PCV) Never done    Foot Exam Q1  Never done    MICROALBUMIN Q1  Never done    Eye Exam Retinal or Dilated  Never done    DTaP/Tdap/Td series (1 - Tdap) Never done    Colorectal Cancer Screening Combo  Never done    Shingrix Vaccine Age 50> (1 of 2) Never done    Lipid Screen  12/13/2021    Medicare Yearly Exam  05/15/2022    Flu Vaccine (1) Never done     Recommended immunizations have been discussed and he will obtain at local pharmacy if desires    Patient Care Team   Patient Care Team:  Sandhya Anderson NP as PCP - General (Nurse Practitioner)  Sandhya Anderson NP as PCP - [facility]  Mayur Desir NP (Surgery Physician)    History     Patient Active Problem List   Diagnosis Code    Chest pain R07.9    CAD (coronary artery disease) I25.10    Essential hypertension I10    Pneumonia due to COVID-19 virus U07.1, J12.82    Respiratory failure with hypoxia ([address] J96.91    Angiomyolipoma of left kidney D17.71    Uncontrolled type 2 diabetes mellitus with hyperglycemia ([address] E11.65    Blurred vision, bilateral H53.8    Mixed hyperlipidemia E78.2    At high risk for falls Z91.81    H/O colonoscopy Z98.890    Pneumonia J18.9     Past Medical History:   Diagnosis Date    Arthritis     Burning with urination     CAD (coronary artery disease)     patient stated stents placed unsure of how many     Diabetes ([facility]     GERD (gastroesophageal reflux disease)     History of blood transfusion     patient stated approx 1 year ago     HTN (hypertension)     Hyperlipidemia     MI (myocardial infarction) ([facility] [address]     Rectal bleeding     patient stated this happened over the weekend. Renal mass, left       Past Surgical History:   Procedure Laterality Date    HX COLONOSCOPY      HX CORONARY STENT PLACEMENT  2018    x1    HX HEART CATHETERIZATION      patient stated stents placed     HX NEPHRECTOMY Left 08/23/2021    robotic left partial nephrectomy,    HX UROLOGICAL  08/23/2021     intraoperative renal ultrasound    [place] CARDIAC SURG PROCEDURE UNLIST  2018    BYPASS     Current Outpatient Medications   Medication Sig Dispense Refill    empagliflozin (JARDIANCE) 25 mg tablet Take 1 Tablet by mouth daily. 90 Tablet 1    insulin glargine (LANTUS) 100 unit/mL injection 40 Units by SubCUTAneous route daily. Take 20 units once daily at night 36 mL 1    insulin lispro (HUMALOG) 100 unit/mL injection INJECT PER SLIDING SCALE BEFORE BREAKFAST, LUNCH, DINNER, AND AT BEDTIME FOR BLOOD SUGAR LESS THAN 150=0 UNITS, 150-199=2 UNITS, 200-249=4 UNITS, 250-299=6 UNITS, 300-349=8 UNITS, 350 AND ABOVE= 10 UNITS AND CALL PRIMARY CARE PROVIDER. Max daily 30 units. 27 mL 1    insulin syringe-needle U-100 0.3 mL 31 gauge x 15/64\" syrg USE TO INJECT INSULIN 4 TIMES DAILY      FLUoxetine (PROzac) 40 mg capsule Take 1 Capsule by mouth daily for 90 days. 90 Capsule 2    lisinopriL (PRINIVIL, ZESTRIL) 10 mg tablet Take 1 Tablet by mouth daily. 90 Tablet 1    NIFEdipine ER (PROCARDIA XL) 30 mg ER tablet Take 1 Tablet by mouth daily.  90 Tablet 1    furosemide (LASIX) 20 mg tablet Take 1 Tablet by mouth daily. 90 Tablet 1    potassium chloride (KLOR-CON M10) 10 mEq tablet Take 1 Tablet by mouth daily. 90 Tablet 1    Diabetic Supplies, Miscellan. kit 1 Kit by Does Not Apply route four (4) times daily.  Dispense #180 Lancets to check blood sugars QID, Ref #1  Dispense #1 Glucometer use QID, Ref #None  Dispense #180 blood glucose strips to use QID, Reg #1  Dispense needles for injecting insulin  Dispense syringe for injecting insulin 1 Kit 0     No Known Allergies    Family History   Problem Relation Age of Onset    Hypertension Mother     Diabetes Mother     Hypertension Father     Heart Disease Father      Social History     Tobacco Use    Smoking status: Never    Smokeless tobacco: Never   Substance Use Topics    Alcohol use: Not Currently         Kenneth Salinas, EMPERATRIZ

## 2022-10-20 ENCOUNTER — TELEPHONE (OUTPATIENT)
Dept: GASTROENTEROLOGY | Age: 67
End: 2022-10-20

## 2022-10-20 NOTE — TELEPHONE ENCOUNTER
colonoscopy Action 4: Continue Hide Differin Products: No Increase Regimen: Absorica 30mg twice daily Detail Level: Zone

## 2022-10-26 ENCOUNTER — TELEPHONE (OUTPATIENT)
Dept: GASTROENTEROLOGY | Age: 67
End: 2022-10-26

## 2022-11-23 ENCOUNTER — HOSPITAL ENCOUNTER (EMERGENCY)
Age: 67
Discharge: HOME | End: 2022-11-23
Attending: EMERGENCY MEDICINE
Payer: MEDICARE

## 2022-11-23 VITALS
HEIGHT: 70 IN | SYSTOLIC BLOOD PRESSURE: 165 MMHG | RESPIRATION RATE: 18 BRPM | DIASTOLIC BLOOD PRESSURE: 86 MMHG | BODY MASS INDEX: 32.21 KG/M2 | WEIGHT: 225 LBS | HEART RATE: 80 BPM | OXYGEN SATURATION: 99 % | TEMPERATURE: 98.9 F
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
## 2022-11-23 PROCEDURE — 99283 EMERGENCY DEPT VISIT LOW MDM: CPT

## 2022-11-23 PROCEDURE — 93005 ELECTROCARDIOGRAM TRACING: CPT
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
## 2022-11-25 ENCOUNTER — HOSPITAL ENCOUNTER (EMERGENCY)
Age: 67
Discharge: HOME | End: 2022-11-25
Attending: EMERGENCY MEDICINE
Payer: MEDICARE

## 2022-11-25 VITALS
BODY MASS INDEX: 32.21 KG/M2 | HEART RATE: 76 BPM | DIASTOLIC BLOOD PRESSURE: 108 MMHG | SYSTOLIC BLOOD PRESSURE: 161 MMHG | HEIGHT: 70 IN | RESPIRATION RATE: 18 BRPM | WEIGHT: 225 LBS | OXYGEN SATURATION: 98 % | TEMPERATURE: 97.6 F

## 2022-11-25 DIAGNOSIS — M25.512 PAIN IN JOINT OF LEFT SHOULDER: Primary | ICD-10-CM

## 2022-11-25 PROCEDURE — 99284 EMERGENCY DEPT VISIT MOD MDM: CPT

## 2022-11-25 PROCEDURE — 96372 THER/PROPH/DIAG INJ SC/IM: CPT

## 2022-11-25 PROCEDURE — 74011250636 HC RX REV CODE- 250/636: Performed by: EMERGENCY MEDICINE

## 2022-11-25 RX ORDER — KETOROLAC TROMETHAMINE 30 MG/ML
30 INJECTION, SOLUTION INTRAMUSCULAR; INTRAVENOUS
Status: COMPLETED | OUTPATIENT
Start: 2022-11-25 | End: 2022-11-25

## 2022-11-25 RX ORDER — IBUPROFEN 600 MG/1
600 TABLET ORAL
Qty: 20 TABLET | Refills: 0 | Status: SHIPPED | OUTPATIENT
Start: 2022-11-25

## 2022-11-25 RX ORDER — PREDNISONE 20 MG/1
20 TABLET ORAL DAILY
Qty: 10 TABLET | Refills: 0 | Status: SHIPPED | OUTPATIENT
Start: 2022-11-25 | End: 2022-12-01 | Stop reason: ALTCHOICE

## 2022-11-25 RX ADMIN — KETOROLAC TROMETHAMINE 30 MG: 30 INJECTION, SOLUTION INTRAMUSCULAR at 19:03

## 2022-11-25 RX ADMIN — METHYLPREDNISOLONE SODIUM SUCCINATE 125 MG: 125 INJECTION, POWDER, FOR SOLUTION INTRAMUSCULAR; INTRAVENOUS at 19:02
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
## 2022-12-01 ENCOUNTER — OFFICE VISIT (OUTPATIENT)
Dept: PRIMARY CARE CLINIC | Age: 67
End: 2022-12-01
Payer: MEDICARE

## 2022-12-01 VITALS
HEART RATE: 90 BPM | BODY MASS INDEX: 30.41 KG/M2 | DIASTOLIC BLOOD PRESSURE: 72 MMHG | SYSTOLIC BLOOD PRESSURE: 120 MMHG | WEIGHT: 212.4 LBS | RESPIRATION RATE: 20 BRPM | OXYGEN SATURATION: 95 % | TEMPERATURE: 97.4 F | HEIGHT: 70 IN

## 2022-12-01 DIAGNOSIS — D17.71 ANGIOMYOLIPOMA OF LEFT KIDNEY: ICD-10-CM

## 2022-12-01 DIAGNOSIS — I10 ESSENTIAL HYPERTENSION: ICD-10-CM

## 2022-12-01 DIAGNOSIS — Z91.14 NONCOMPLIANCE WITH MEDICATION REGIMEN: ICD-10-CM

## 2022-12-01 DIAGNOSIS — E87.6 HYPOKALEMIA: ICD-10-CM

## 2022-12-01 DIAGNOSIS — Z23 NEEDS FLU SHOT: Primary | ICD-10-CM

## 2022-12-01 DIAGNOSIS — E78.2 MIXED HYPERLIPIDEMIA: ICD-10-CM

## 2022-12-01 DIAGNOSIS — F32.A MODERATELY SEVERE DEPRESSION: ICD-10-CM

## 2022-12-01 DIAGNOSIS — E11.65 UNCONTROLLED TYPE 2 DIABETES MELLITUS WITH HYPERGLYCEMIA (HCC): ICD-10-CM

## 2022-12-01 DIAGNOSIS — R91.1 LUNG NODULE: ICD-10-CM

## 2022-12-01 DIAGNOSIS — I25.10 CORONARY ARTERY DISEASE INVOLVING NATIVE HEART WITHOUT ANGINA PECTORIS, UNSPECIFIED VESSEL OR LESION TYPE: ICD-10-CM

## 2022-12-01 PROCEDURE — 1123F ACP DISCUSS/DSCN MKR DOCD: CPT | Performed by: NURSE PRACTITIONER

## 2022-12-01 PROCEDURE — 3017F COLORECTAL CA SCREEN DOC REV: CPT | Performed by: NURSE PRACTITIONER

## 2022-12-01 PROCEDURE — G0008 ADMIN INFLUENZA VIRUS VAC: HCPCS | Performed by: NURSE PRACTITIONER

## 2022-12-01 PROCEDURE — G8752 SYS BP LESS 140: HCPCS | Performed by: NURSE PRACTITIONER

## 2022-12-01 PROCEDURE — 2022F DILAT RTA XM EVC RTNOPTHY: CPT | Performed by: NURSE PRACTITIONER

## 2022-12-01 PROCEDURE — 3078F DIAST BP <80 MM HG: CPT | Performed by: NURSE PRACTITIONER

## 2022-12-01 PROCEDURE — G8536 NO DOC ELDER MAL SCRN: HCPCS | Performed by: NURSE PRACTITIONER

## 2022-12-01 PROCEDURE — G8417 CALC BMI ABV UP PARAM F/U: HCPCS | Performed by: NURSE PRACTITIONER

## 2022-12-01 PROCEDURE — 3046F HEMOGLOBIN A1C LEVEL >9.0%: CPT | Performed by: NURSE PRACTITIONER

## 2022-12-01 PROCEDURE — 3074F SYST BP LT 130 MM HG: CPT | Performed by: NURSE PRACTITIONER

## 2022-12-01 PROCEDURE — 1101F PT FALLS ASSESS-DOCD LE1/YR: CPT | Performed by: NURSE PRACTITIONER

## 2022-12-01 PROCEDURE — G8427 DOCREV CUR MEDS BY ELIG CLIN: HCPCS | Performed by: NURSE PRACTITIONER

## 2022-12-01 PROCEDURE — 90694 VACC AIIV4 NO PRSRV 0.5ML IM: CPT | Performed by: NURSE PRACTITIONER

## 2022-12-01 PROCEDURE — G8754 DIAS BP LESS 90: HCPCS | Performed by: NURSE PRACTITIONER

## 2022-12-01 PROCEDURE — G8432 DEP SCR NOT DOC, RNG: HCPCS | Performed by: NURSE PRACTITIONER

## 2022-12-01 PROCEDURE — 99214 OFFICE O/P EST MOD 30 MIN: CPT | Performed by: NURSE PRACTITIONER

## 2022-12-01 RX ORDER — INSULIN GLARGINE 100 [IU]/ML
40 INJECTION, SOLUTION SUBCUTANEOUS
Qty: 36 ML | Refills: 1 | Status: SHIPPED | OUTPATIENT
Start: 2022-12-01

## 2022-12-01 RX ORDER — METFORMIN HYDROCHLORIDE 500 MG/1
1000 TABLET, EXTENDED RELEASE ORAL
Qty: 180 TABLET | Refills: 0 | Status: SHIPPED | OUTPATIENT
Start: 2022-12-01

## 2022-12-01 RX ORDER — INSULIN GLARGINE 100 [IU]/ML
INJECTION, SOLUTION SUBCUTANEOUS
COMMUNITY
Start: 2022-09-16 | End: 2022-12-01 | Stop reason: SDUPTHER

## 2022-12-01 NOTE — PROGRESS NOTES
Chief Complaint   Patient presents with    Hypertension     Follow up    1. \"Have you been to the ER, urgent care clinic since your last visit? Hospitalized since your last visit? \"  In chart     2. \"Have you seen or consulted any other health care providers outside of the [address] [place] [address] since your last visit? \" No     3. For patients aged 39-70: Has the patient had a colonoscopy / FIT/ Cologuard? Yes - no Care Gap present      If the patient is female:    4. For patients aged 41-77: Has the patient had a mammogram within the past 2 years? NA - based on age or sex      11. For patients aged 21-65: Has the patient had a pap smear?  NA - based on age or sex

## 2022-12-01 NOTE — PROGRESS NOTES
Progress Note    Patient: Rahul Galvez  : 1955  PCP: Harriet Roberts NP      Assessment/Plan:     Diagnoses and all orders for this visit:    1. Moderately severe depression  Patient reports some continued depression symptoms   Denies any suicidal thoughts  Continues prozac 40mg daily  Have encouraged to continue care with psychiatry    2. Uncontrolled type 2 diabetes mellitus with hyperglycemia (HCC)  Lab Results   Component Value Date/Time    Hemoglobin A1c <3.8 (L) 2022 02:17 AM    Hemoglobin A1c (POC) 14.0 2022 01:11 PM     Last A1c: 13.1  Home readings: Did not bring and not checking regularly   Medication Compliance:no- not taking humalog and misses doses of lantus  Diabetic Eye Exam Up to date:no  Complications: CAD  Current Treatment: Humalog sliding scale, lantus 40 units nightly, Jardiance 25mg daily    Discussed with patient importance in compliance with insulin as he still has not been taking the humalog very often and misses doses of lantus. Started on Jardiance 25mg daily and tolerating well  Encouraged to stay well hydrated and notify provider immediately if signs of  mycotic infection develops  Continue lantus  40 units daily and continue with humalog sliding scale before meals  Check glucose 4 times daily and bring meter to next appointment  Encourage to follow diabetic diet and get regular exercise 3-5 times weekly at least 30-40 minutes   Referred back to [facility] for diabetic eye exam as this is overdue  Check feet daily and notify provider immediately if wound develops    3. Coronary artery disease involving native heart without angina pectoris, unspecified vessel or lesion type  Hx bypass surgery 20    NUCLEAR CARDIAC STRESS TEST 2021    Interpretation Summary  Myocardial perfusion imaging and ECG stress test is normal.  Overall left ventricular systolic function was normal without regional wall motion abnormalities.   The left ventricular ejection fraction was 60%. Signed by: Rut Fu MD on 5/20/2021  1:58 PM  04/01/21    ECHO ADULT COMPLETE 04/03/2021 4/3/2021    Interpretation Summary  · LV: Calculated LVEF is 55%. Normal cavity size, wall thickness and systolic function (ejection fraction normal). Wall motion: normal. Normal left ventricular strain. Mild (grade 1) left ventricular diastolic dysfunction. · LA: Mildly dilated left atrium. · AV: Mild aortic valve stenosis is present. Mild aortic valve regurgitation is present. Signed by: Des Vizcarra MD on 4/3/2021 11:41 AM  Continues follow up with [facility] who manages    4. Essential hypertension  Blood pressure is controlled and continue nifedipine, lisinopril and lasix as directed  Encourage to monitor at home and notify provider if > 140/90 consistently     5. Mixed hyperlipidemia  Lab Results   Component Value Date/Time    LDL, calculated 97 12/13/2020 08:50 AM   Controlled     6. Lung nodule  CT Results (most recent):  Results from Hospital Encounter encounter on 06/15/22    CT CHEST WO CONT    Narrative  Exam: CT CHEST WO CONT    TECHNIQUE: Multiple transaxial CT images of the chest were obtained without  contrast. Coronal and sagittal reformatted images were provided. Dose reduction: All CT scans at this facility are performed using dose reduction  optimization techniques as appropriate to a performed exam including the  following: Automated exposure control, adjustments of the mA and/or kV according  to patient size, or use of iterative reconstruction technique. COMPARISON: Chest x-ray performed yesterday. HISTORY: Lung mass    FINDINGS: Motion degraded exam.    Mediastinum: Postsurgical changes of CABG. Severe calcifications of the native  coronary arteries. Borderline cardiomegaly. Aorta is atherosclerotic. Prominent right lower paratracheal lymph node measures 13 mm. No obvious hilar  lymphadenopathy.     Axilla and soft tissues: Asymmetric gynecomastia, right greater than left. No  axillary adenopathy. Macroscopic fat-containing intramuscular mass of the right  shoulder musculature, partially visualized but likely a lipoma. ABDOMEN: No acute process identified. Scattered colonic diverticula. There are  calcifications along the posterior capsule of the left kidney which may  represent sequela of previous trauma or surgery. Questionable punctate calcified  gallstone. LUNGS: Central airways are patent. Patchy groundglass airspace opacities within  the right upper lobe, and left lower lobe. Mixed groundglass and consolidation  within the right lower lobe. Nodular consolidation within the superior lingula  along the interlobar fissure measures 1.2 x 2.5 cm. There is volume loss and  platelike density in the right upper lobe, likely atelectasis. No pleural  effusion. No pneumothorax. Bones: Intact median sternotomy. Multilevel degenerative disc disease with  ventral bridging osteophytes. Impression  Exam limited by respiratory motion. 1. Multifocal bilateral airspace disease concerning for multilobar pneumonia. 2. Nodular consolidation in the lingula is likely infectious. Follow-up chest CT  is recommended to document resolution. 3. Mediastinal lymphadenopathy, likely reactive. Repeat 3 mo follow up CT of chest to re-evaluate    7. Angiomyolipoma of left kidney  S/p left partial nephrectomy on 8/23/21 with benign tumor excised    8. Noncompliance with medications  Have re-interated importance in medication compliance to avoid serious health complications which can be life threatening. 9. Hypokalemia  Lab Results   Component Value Date/Time    Potassium 3.3 (L) 06/17/2022 06:57 AM   On potassium supplement and repeat with labs    10.  Needs flu shot  Update annual flu shot  - INFLUENZA, FLUAD, (AGE 72 Y+), IM, PF, 0.5 ML      Subjective:   Vern Centeno is a 69 y.o. male presenting today for follow-up of chronic conditions with PMH of CAD, depression, pneumonia, hyperlipidemia, left kidney mass, obesity, hypertension and hyperlipidemia. Reports that he has not been checking glucose regularly and did not bring meter today. Did start on the Jardiance but missing doses of his insulin. Medications marked \"taking\" at this time:  Prior to Admission medications    Medication Sig Start Date End Date Taking? Authorizing Provider   metFORMIN ER (GLUCOPHAGE XR) 500 mg tablet Take 2 Tablets by mouth daily (with dinner). 12/1/22  Yes Radames Fritz NP   Lantus Solostar U-100 Insulin 100 unit/mL (3 mL) inpn 40 Units by SubCUTAneous route nightly. 12/1/22  Yes Radames Fritz NP   empagliflozin (JARDIANCE) 25 mg tablet Take 1 Tablet by mouth daily. 12/1/22  Yes Radames Fritz NP   ibuprofen (MOTRIN) 600 mg tablet Take 1 Tablet by mouth every six (6) hours as needed for Pain. 11/25/22   Christin Cobb MD   insulin syringe-needle U-100 0.3 mL 31 gauge x 15/64\" syrg USE TO INJECT INSULIN 4 TIMES DAILY 6/19/22   Provider, Historical   insulin lispro (HUMALOG) 100 unit/mL injection INJECT PER SLIDING SCALE BEFORE BREAKFAST, LUNCH, DINNER, AND AT BEDTIME FOR BLOOD SUGAR LESS THAN 150=0 UNITS, 150-199=2 UNITS, 200-249=4 UNITS, 250-299=6 UNITS, 300-349=8 UNITS, 350 AND ABOVE= 10 UNITS AND CALL PRIMARY CARE PROVIDER. Max daily 30 units. 9/16/22   Radames Fritz NP   lisinopriL (PRINIVIL, ZESTRIL) 10 mg tablet Take 1 Tablet by mouth daily. 6/30/22   Radames Fritz NP   NIFEdipine ER (PROCARDIA XL) 30 mg ER tablet Take 1 Tablet by mouth daily. 6/30/22   Radames Fritz NP   furosemide (LASIX) 20 mg tablet Take 1 Tablet by mouth daily. 6/30/22   Radames Fritz NP   potassium chloride (KLOR-CON M10) 10 mEq tablet Take 1 Tablet by mouth daily. 6/30/22   Radames Fritz NP   Diabetic Supplies, Miscellan. kit 1 Kit by Does Not Apply route four (4) times daily.  Dispense #180 Lancets to check blood sugars QID, Ref #1  Dispense #1 Glucometer use QID, Ref #None  Dispense #180 blood glucose strips to use QID, Reg #1  Dispense needles for injecting insulin  Dispense syringe for injecting insulin 6/17/22   Moses Cavazos PA-C        Review of Systems   Constitutional:  Positive for malaise/fatigue. Negative for chills, fever and weight loss. HENT: Negative. Eyes:  Negative for pain, discharge and redness. Respiratory:  Negative for hemoptysis, sputum production, shortness of breath and wheezing. Cardiovascular: Negative. Gastrointestinal: Negative. Genitourinary: Negative. Musculoskeletal:  Positive for myalgias. Skin: Negative. Neurological: Negative. Psychiatric/Behavioral:  Positive for depression. Negative for hallucinations, memory loss, substance abuse and suicidal ideas.         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    Uncontrolled type 2 diabetes mellitus with hyperglycemia (HCC) E11.65    Blurred vision, bilateral H53.8    Mixed hyperlipidemia E78.2    At high risk for falls Z91.81    H/O colonoscopy Z98.890    Pneumonia J18.9    Personal history of colonic polyps Z86.010     Patient Active Problem List    Diagnosis Date Noted    Pneumonia 06/16/2022    At high risk for falls 05/23/2021    H/O colonoscopy 05/23/2021    Angiomyolipoma of left kidney 04/09/2021    Uncontrolled type 2 diabetes mellitus with hyperglycemia ([address] 04/09/2021    Blurred vision, bilateral 04/09/2021    Mixed hyperlipidemia 04/09/2021    Respiratory failure with hypoxia ([address] 02/22/2021    Pneumonia due to COVID-19 virus 02/17/2021    Chest pain 12/12/2020    CAD (coronary artery disease) 12/12/2020    Essential hypertension 12/12/2020    Personal history of colonic polyps 06/25/2010     Current Outpatient Medications Medication Sig Dispense Refill    metFORMIN ER (GLUCOPHAGE XR) 500 mg tablet Take 2 Tablets by mouth daily (with dinner). 180 Tablet 0    Lantus Solostar U-100 Insulin 100 unit/mL (3 mL) inpn 40 Units by SubCUTAneous route nightly. 36 mL 1    empagliflozin (JARDIANCE) 25 mg tablet Take 1 Tablet by mouth daily. 90 Tablet 1    ibuprofen (MOTRIN) 600 mg tablet Take 1 Tablet by mouth every six (6) hours as needed for Pain. 20 Tablet 0    insulin syringe-needle U-100 0.3 mL 31 gauge x 15/64\" syrg USE TO INJECT INSULIN 4 TIMES DAILY      insulin lispro (HUMALOG) 100 unit/mL injection INJECT PER SLIDING SCALE BEFORE BREAKFAST, LUNCH, DINNER, AND AT BEDTIME FOR BLOOD SUGAR LESS THAN 150=0 UNITS, 150-199=2 UNITS, 200-249=4 UNITS, 250-299=6 UNITS, 300-349=8 UNITS, 350 AND ABOVE= 10 UNITS AND CALL PRIMARY CARE PROVIDER. Max daily 30 units. 27 mL 1    lisinopriL (PRINIVIL, ZESTRIL) 10 mg tablet Take 1 Tablet by mouth daily. 90 Tablet 1    NIFEdipine ER (PROCARDIA XL) 30 mg ER tablet Take 1 Tablet by mouth daily. 90 Tablet 1    furosemide (LASIX) 20 mg tablet Take 1 Tablet by mouth daily. 90 Tablet 1    potassium chloride (KLOR-CON M10) 10 mEq tablet Take 1 Tablet by mouth daily. 90 Tablet 1    Diabetic Supplies, Miscellan. kit 1 Kit by Does Not Apply route four (4) times daily.  Dispense #180 Lancets to check blood sugars QID, Ref #1  Dispense #1 Glucometer use QID, Ref #None  Dispense #180 blood glucose strips to use QID, Reg #1  Dispense needles for injecting insulin  Dispense syringe for injecting insulin 1 Kit 0     No Known Allergies  Past Medical History:   Diagnosis Date    Arthritis     Burning with urination     CAD (coronary artery disease)     patient stated stents placed unsure of how many     Diabetes [address]     GERD (gastroesophageal reflux disease)     History of blood transfusion     patient stated approx 1 year ago     HTN (hypertension)     Hyperlipidemia     MI (myocardial infarction) ([facility] [address]     Personal history of colonic polyps 6/25/2010 6-    Rectal bleeding     patient stated this happened over the weekend. Renal mass, left      Past Surgical History:   Procedure Laterality Date    HX COLONOSCOPY      HX CORONARY STENT PLACEMENT  2018    x1    HX HEART CATHETERIZATION      patient stated stents placed     HX NEPHRECTOMY Left 08/23/2021    robotic left partial nephrectomy,    HX UROLOGICAL  08/23/2021     intraoperative renal ultrasound    [place] CARDIAC SURG PROCEDURE UNLIST  2018    BYPASS     Family History   Problem Relation Age of Onset    Hypertension Mother     Diabetes Mother     Hypertension Father     Heart Disease Father      Social History     Tobacco Use    Smoking status: Never    Smokeless tobacco: Never   Substance Use Topics    Alcohol use: Not Currently          Objective:     Visit Vitals  /72 (BP 1 Location: Left upper arm, BP Patient Position: Sitting, BP Cuff Size: Adult)   Pulse 90   Temp 97.4 °F (36.3 °C) (Temporal)   Resp 20   Ht 5' 10\" (1.778 m)   Wt 212 lb 6.4 oz (96.3 kg)   SpO2 95%   BMI 30.48 kg/m²        Physical Exam  Vitals and nursing note reviewed. Constitutional:       Appearance: Normal appearance. He is obese. Cardiovascular:      Rate and Rhythm: Normal rate. Pulses: Normal pulses. Pulmonary:      Effort: Pulmonary effort is normal.      Comments: Dminished in bases  Abdominal:      General: Bowel sounds are normal.      Palpations: Abdomen is soft. Tenderness: There is no abdominal tenderness. There is no guarding. Musculoskeletal:      Right lower leg: Edema (trace) present. Left lower leg: Edema (trace) present. Skin:     General: Skin is warm and dry. Neurological:      Mental Status: He is alert and oriented to person, place, and time. Mental status is at baseline. Psychiatric:         Mood and Affect: Mood is depressed. Speech: Speech normal.         Behavior: Behavior is cooperative. Thought Content:  Thought content is not paranoid or delusional. Thought content does not include homicidal or suicidal ideation. Thought content does not include homicidal or suicidal plan. Cognition and Memory: Cognition normal.         We discussed the expected course, resolution and complications of the diagnosis(es) in detail. Medication risks, benefits, costs, interactions, and alternatives were discussed as indicated. I advised him to contact the office if his condition worsens, changes or fails to improve as anticipated. He expressed understanding with the diagnosis(es) and plan.      Francis Mauricio NP

## 2022-12-14 ENCOUNTER — TELEPHONE (OUTPATIENT)
Dept: PRIMARY CARE CLINIC | Age: 67
End: 2022-12-14

## 2022-12-15 ENCOUNTER — CLINICAL SUPPORT (OUTPATIENT)
Dept: PRIMARY CARE CLINIC | Age: 67
End: 2022-12-15
Payer: MEDICARE

## 2022-12-15 DIAGNOSIS — E11.65 UNCONTROLLED TYPE 2 DIABETES MELLITUS WITH HYPERGLYCEMIA (HCC): Primary | ICD-10-CM

## 2022-12-15 LAB — HBA1C MFR BLD HPLC: 12.4 %

## 2022-12-15 PROCEDURE — 83036 HEMOGLOBIN GLYCOSYLATED A1C: CPT | Performed by: NURSE PRACTITIONER

## 2022-12-15 PROCEDURE — 99211 OFF/OP EST MAY X REQ PHY/QHP: CPT | Performed by: NURSE PRACTITIONER

## 2022-12-15 PROCEDURE — 3046F HEMOGLOBIN A1C LEVEL >9.0%: CPT | Performed by: NURSE PRACTITIONER

## 2022-12-15 NOTE — TELEPHONE ENCOUNTER
Please contact patient to see if he has scheduled the CT that was ordered again. Inform very important to have testing to ensure now clear. Also have vera schedule fasting lab visit and fingerstick A1c for this coming week.

## 2022-12-20 ENCOUNTER — OFFICE VISIT (OUTPATIENT)
Dept: GASTROENTEROLOGY | Age: 67
End: 2022-12-20
Payer: MEDICARE

## 2022-12-20 ENCOUNTER — TELEPHONE (OUTPATIENT)
Dept: PRIMARY CARE CLINIC | Age: 67
End: 2022-12-20

## 2022-12-20 VITALS
DIASTOLIC BLOOD PRESSURE: 80 MMHG | SYSTOLIC BLOOD PRESSURE: 140 MMHG | BODY MASS INDEX: 30.75 KG/M2 | HEART RATE: 72 BPM | HEIGHT: 70 IN | OXYGEN SATURATION: 98 % | RESPIRATION RATE: 14 BRPM | TEMPERATURE: 97.7 F | WEIGHT: 214.8 LBS

## 2022-12-20 DIAGNOSIS — R19.7 DIARRHEA, UNSPECIFIED TYPE: ICD-10-CM

## 2022-12-20 DIAGNOSIS — K92.1 BLOOD IN STOOL: ICD-10-CM

## 2022-12-20 DIAGNOSIS — Z86.010 PERSONAL HISTORY OF COLONIC POLYPS: Primary | ICD-10-CM

## 2022-12-20 DIAGNOSIS — I50.22 CHRONIC SYSTOLIC (CONGESTIVE) HEART FAILURE (HCC): ICD-10-CM

## 2022-12-20 PROCEDURE — 3074F SYST BP LT 130 MM HG: CPT | Performed by: INTERNAL MEDICINE

## 2022-12-20 PROCEDURE — G8417 CALC BMI ABV UP PARAM F/U: HCPCS | Performed by: INTERNAL MEDICINE

## 2022-12-20 PROCEDURE — 1123F ACP DISCUSS/DSCN MKR DOCD: CPT | Performed by: INTERNAL MEDICINE

## 2022-12-20 PROCEDURE — 99204 OFFICE O/P NEW MOD 45 MIN: CPT | Performed by: INTERNAL MEDICINE

## 2022-12-20 PROCEDURE — G8536 NO DOC ELDER MAL SCRN: HCPCS | Performed by: INTERNAL MEDICINE

## 2022-12-20 PROCEDURE — G8427 DOCREV CUR MEDS BY ELIG CLIN: HCPCS | Performed by: INTERNAL MEDICINE

## 2022-12-20 PROCEDURE — 3078F DIAST BP <80 MM HG: CPT | Performed by: INTERNAL MEDICINE

## 2022-12-20 PROCEDURE — 3017F COLORECTAL CA SCREEN DOC REV: CPT | Performed by: INTERNAL MEDICINE

## 2022-12-20 PROCEDURE — G8510 SCR DEP NEG, NO PLAN REQD: HCPCS | Performed by: INTERNAL MEDICINE

## 2022-12-20 PROCEDURE — 1101F PT FALLS ASSESS-DOCD LE1/YR: CPT | Performed by: INTERNAL MEDICINE

## 2022-12-20 RX ORDER — POLYETHYLENE GLYCOL 3350 17 G/17G
POWDER, FOR SOLUTION ORAL
Qty: 510 G | Refills: 0 | Status: SHIPPED | OUTPATIENT
Start: 2022-12-20 | End: 2023-01-06
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
## 2022-12-20 NOTE — TELEPHONE ENCOUNTER
Pt has no humalog pen, he is on metformin which is causing extreme diarrhea even when sleep so brandin has stopped medication. His blood sugar was 425 when Allyssa  was there yesterday.  Needs replacement medication    Allyssa [phone]

## 2022-12-21 NOTE — PROGRESS NOTES
COLON[facility] IS [facility]ED[facility]6-2023[id number].  ON 12-

## 2022-12-22 DIAGNOSIS — E11.65 UNCONTROLLED TYPE 2 DIABETES MELLITUS WITH HYPERGLYCEMIA (HCC): ICD-10-CM

## 2022-12-22 RX ORDER — INSULIN LISPRO 100 [IU]/ML
INJECTION, SOLUTION INTRAVENOUS; SUBCUTANEOUS
Qty: 27 ML | Refills: 1
Start: 2022-12-22

## 2022-12-22 NOTE — TELEPHONE ENCOUNTER
RENETTA Spivey with the [facility] sent me to let her know about this pt. Advised her to contact us further if she needed to concerning this pt.

## 2023-01-02 NOTE — PROGRESS NOTES
Kristina Bennett is a 69 y.o. male who presents today for the following:  Chief Complaint   Patient presents with    Colon Polyps     Hx of colon polyps         No Known Allergies    Current Outpatient Medications   Medication Sig    polyethylene glycol (MIRALAX) 17 gram/dose powder Use as directed  Indications: emptying of the bowel    metFORMIN ER (GLUCOPHAGE XR) 500 mg tablet Take 2 Tablets by mouth daily (with dinner). Lantus Solostar U-100 Insulin 100 unit/mL (3 mL) inpn 40 Units by SubCUTAneous route nightly. empagliflozin (JARDIANCE) 25 mg tablet Take 1 Tablet by mouth daily. insulin syringe-needle U-100 0.3 mL 31 gauge x 15/64\" syrg USE TO INJECT INSULIN 4 TIMES DAILY    lisinopriL (PRINIVIL, ZESTRIL) 10 mg tablet Take 1 Tablet by mouth daily. NIFEdipine ER (PROCARDIA XL) 30 mg ER tablet Take 1 Tablet by mouth daily. furosemide (LASIX) 20 mg tablet Take 1 Tablet by mouth daily. potassium chloride (KLOR-CON M10) 10 mEq tablet Take 1 Tablet by mouth daily. Diabetic Supplies, Miscellan. kit 1 Kit by Does Not Apply route four (4) times daily. Dispense #180 Lancets to check blood sugars QID, Ref #1  Dispense #1 Glucometer use QID, Ref #None  Dispense #180 blood glucose strips to use QID, Reg #1  Dispense needles for injecting insulin  Dispense syringe for injecting insulin    insulin lispro (HUMALOG) 100 unit/mL injection INJECT PER SLIDING SCALE BEFORE BREAKFAST, LUNCH, DINNER, AND AT BEDTIME FOR BLOOD SUGAR LESS THAN 150=0 UNITS, 150-199=2 UNITS, 200-249=4 UNITS, 250-299=6 UNITS, 300-349=8 UNITS, 350 AND ABOVE= 10 UNITS AND CALL PRIMARY CARE PROVIDER. Max daily 30 units. ibuprofen (MOTRIN) 600 mg tablet Take 1 Tablet by mouth every six (6) hours as needed for Pain. No current facility-administered medications for this visit.        Past Medical History:   Diagnosis Date    Arthritis     Burning with urination     CAD (coronary artery disease)     patient stated stents placed unsure of how many     Diabetes ([facility] [address]     GERD (gastroesophageal reflux disease)     History of blood transfusion     patient stated approx 1 year ago     HTN (hypertension)     Hyperlipidemia     MI (myocardial infarction) ([facility] [address]     Personal history of colonic polyps 6/25/2010 6-    Rectal bleeding     patient stated this happened over the weekend.      Renal mass, left        Past Surgical History:   Procedure Laterality Date    HX COLONOSCOPY      HX CORONARY STENT PLACEMENT  2018    x1    HX HEART CATHETERIZATION      patient stated stents placed     HX NEPHRECTOMY Left 08/23/2021    robotic left partial nephrectomy,    HX UROLOGICAL  08/23/2021     intraoperative renal ultrasound    [place] CARDIAC SURG PROCEDURE UNLIST  2018    BYPASS       Family History   Problem Relation Age of Onset    Hypertension Mother     Diabetes Mother     Hypertension Father     Heart Disease Father        Social History     Socioeconomic History    Marital status:      Spouse name: Not on file    Number of children: 0    Years of education: Not on file    Highest education level: 9th grade   Occupational History    Not on file   Tobacco Use    Smoking status: Never    Smokeless tobacco: Never   Vaping Use    Vaping Use: Never used   Substance and Sexual Activity    Alcohol use: Not Currently    Drug use: Yes     Types: Marijuana    Sexual activity: Not Currently   Other Topics Concern     Service Not Asked    Blood Transfusions Not Asked    Caffeine Concern Not Asked    Occupational Exposure Not Asked    Hobby Hazards Not Asked    Sleep Concern Not Asked    Stress Concern Not Asked    Weight Concern Not Asked    Special Diet Not Asked    Back Care Not Asked    Exercise Not Asked    Bike Helmet Not Asked    Seat Belt Not Asked    Self-Exams Not Asked   Social History Narrative    Not on file     Social Determinants of Health     Financial Resource Strain: Not on file   Food Insecurity: Not on file   Transportation Needs: Not on file   Physical Activity: Not on file   Stress: Not on file   Social Connections: Not on file   Intimate Partner Violence: Not on file   Housing Stability: Not on file         HPI  67-year-old male with history of hypertensive atherosclerotic cardiovascular disease with coronary artery disease, hyperlipidemia, congestive heart failure, diabetes mellitus type 2, and colon polyps who comes in for follow-up visit for history of colon polyps. Last colonoscopy was greater than 10 years ago. He has a history colon polyps. His occasional diarrhea felt to be secondary to use of the metformin. He is eating well and has a good appetite. He did see blood in stool couple years ago, but none since. He states it was a large amount initially did not resolve. Stable weight. Review of Systems   Constitutional: Negative. HENT: Negative. Negative for nosebleeds. Eyes: Negative. Respiratory: Negative. Cardiovascular: Negative. Gastrointestinal:  Positive for diarrhea. Negative for abdominal pain, blood in stool, constipation, heartburn, melena, nausea and vomiting. Genitourinary: Negative. Musculoskeletal:  Positive for joint pain. Skin: Negative. Neurological: Negative. Endo/Heme/Allergies: Negative. Psychiatric/Behavioral: Negative. All other systems reviewed and are negative. Visit Vitals  BP (!) 140/80 (BP 1 Location: Left upper arm, BP Patient Position: Sitting, BP Cuff Size: Large adult)   Pulse 72   Temp 97.7 °F (36.5 °C) (Temporal)   Resp 14   Ht 5' 10\" (1.778 m)   Wt 97.4 kg (214 lb 12.8 oz)   SpO2 98% Comment: room air   BMI 30.82 kg/m²     Physical Exam  Vitals and nursing note reviewed. Constitutional:       Appearance: Normal appearance. He is obese. HENT:      Head: Normocephalic and atraumatic. Nose: Nose normal.      Mouth/Throat:      Mouth: Mucous membranes are moist.      Pharynx: Oropharynx is clear. Eyes:      General: No scleral icterus. Extraocular Movements: Extraocular movements intact. Conjunctiva/sclera: Conjunctivae normal.      Pupils: Pupils are equal, round, and reactive to light. Cardiovascular:      Rate and Rhythm: Normal rate and regular rhythm. Heart sounds: Normal heart sounds. Pulmonary:      Effort: Pulmonary effort is normal.      Breath sounds: Normal breath sounds. Abdominal:      General: Bowel sounds are normal. There is no distension. Palpations: Abdomen is soft. There is no mass. Tenderness: There is no abdominal tenderness. There is no right CVA tenderness, left CVA tenderness, guarding or rebound. Hernia: No hernia is present. Musculoskeletal:         General: Normal range of motion. Cervical back: Normal range of motion and neck supple. Skin:     General: Skin is warm and dry. Coloration: Skin is not jaundiced. Neurological:      General: No focal deficit present. Mental Status: He is alert and oriented to person, place, and time. Psychiatric:         Mood and Affect: Mood normal.         Behavior: Behavior normal.         Thought Content: Thought content normal.         Judgment: Judgment normal.          1. Personal history of colonic polyps  We will schedule for a surveillance colonoscopy. - COLONOSCOPY,DIAGNOSTIC; Future  - polyethylene glycol (MIRALAX) 17 gram/dose powder; Use as directed  Indications: emptying of the bowel  Dispense: 510 g; Refill: 0    2. Chronic systolic (congestive) heart failure      3. Diarrhea, unspecified type      4.  Blood in stool

## 2023-01-02 NOTE — H&P (VIEW-ONLY)
Shoshana Zuleta is a 69 y.o. male who presents today for the following:  Chief Complaint   Patient presents with    Colon Polyps     Hx of colon polyps         No Known Allergies    Current Outpatient Medications   Medication Sig    polyethylene glycol (MIRALAX) 17 gram/dose powder Use as directed  Indications: emptying of the bowel    metFORMIN ER (GLUCOPHAGE XR) 500 mg tablet Take 2 Tablets by mouth daily (with dinner). Lantus Solostar U-100 Insulin 100 unit/mL (3 mL) inpn 40 Units by SubCUTAneous route nightly. empagliflozin (JARDIANCE) 25 mg tablet Take 1 Tablet by mouth daily. insulin syringe-needle U-100 0.3 mL 31 gauge x 15/64\" syrg USE TO INJECT INSULIN 4 TIMES DAILY    lisinopriL (PRINIVIL, ZESTRIL) 10 mg tablet Take 1 Tablet by mouth daily. NIFEdipine ER (PROCARDIA XL) 30 mg ER tablet Take 1 Tablet by mouth daily. furosemide (LASIX) 20 mg tablet Take 1 Tablet by mouth daily. potassium chloride (KLOR-CON M10) 10 mEq tablet Take 1 Tablet by mouth daily. Diabetic Supplies, Miscellan. kit 1 Kit by Does Not Apply route four (4) times daily. Dispense #180 Lancets to check blood sugars QID, Ref #1  Dispense #1 Glucometer use QID, Ref #None  Dispense #180 blood glucose strips to use QID, Reg #1  Dispense needles for injecting insulin  Dispense syringe for injecting insulin    insulin lispro (HUMALOG) 100 unit/mL injection INJECT PER SLIDING SCALE BEFORE BREAKFAST, LUNCH, DINNER, AND AT BEDTIME FOR BLOOD SUGAR LESS THAN 150=0 UNITS, 150-199=2 UNITS, 200-249=4 UNITS, 250-299=6 UNITS, 300-349=8 UNITS, 350 AND ABOVE= 10 UNITS AND CALL PRIMARY CARE PROVIDER. Max daily 30 units. ibuprofen (MOTRIN) 600 mg tablet Take 1 Tablet by mouth every six (6) hours as needed for Pain. No current facility-administered medications for this visit.        Past Medical History:   Diagnosis Date    Arthritis     Burning with urination     CAD (coronary artery disease)     patient stated stents placed unsure of how many     Diabetes ([facility] [address]     GERD (gastroesophageal reflux disease)     History of blood transfusion     patient stated approx 1 year ago     HTN (hypertension)     Hyperlipidemia     MI (myocardial infarction) ([facility] [address]     Personal history of colonic polyps 6/25/2010 6-    Rectal bleeding     patient stated this happened over the weekend.      Renal mass, left        Past Surgical History:   Procedure Laterality Date    HX COLONOSCOPY      HX CORONARY STENT PLACEMENT  2018    x1    HX HEART CATHETERIZATION      patient stated stents placed     HX NEPHRECTOMY Left 08/23/2021    robotic left partial nephrectomy,    HX UROLOGICAL  08/23/2021     intraoperative renal ultrasound    [place] CARDIAC SURG PROCEDURE UNLIST  2018    BYPASS       Family History   Problem Relation Age of Onset    Hypertension Mother     Diabetes Mother     Hypertension Father     Heart Disease Father        Social History     Socioeconomic History    Marital status:      Spouse name: Not on file    Number of children: 0    Years of education: Not on file    Highest education level: 9th grade   Occupational History    Not on file   Tobacco Use    Smoking status: Never    Smokeless tobacco: Never   Vaping Use    Vaping Use: Never used   Substance and Sexual Activity    Alcohol use: Not Currently    Drug use: Yes     Types: Marijuana    Sexual activity: Not Currently   Other Topics Concern     Service Not Asked    Blood Transfusions Not Asked    Caffeine Concern Not Asked    Occupational Exposure Not Asked    Hobby Hazards Not Asked    Sleep Concern Not Asked    Stress Concern Not Asked    Weight Concern Not Asked    Special Diet Not Asked    Back Care Not Asked    Exercise Not Asked    Bike Helmet Not Asked    Seat Belt Not Asked    Self-Exams Not Asked   Social History Narrative    Not on file     Social Determinants of Health     Financial Resource Strain: Not on file   Food Insecurity: Not on file   Transportation Needs: Not on file   Physical Activity: Not on file   Stress: Not on file   Social Connections: Not on file   Intimate Partner Violence: Not on file   Housing Stability: Not on file         HPI  68-year-old male with history of hypertensive atherosclerotic cardiovascular disease with coronary artery disease, hyperlipidemia, congestive heart failure, diabetes mellitus type 2, and colon polyps who comes in for follow-up visit for history of colon polyps. Last colonoscopy was greater than 10 years ago. He has a history colon polyps. His occasional diarrhea felt to be secondary to use of the metformin. He is eating well and has a good appetite. He did see blood in stool couple years ago, but none since. He states it was a large amount initially did not resolve. Stable weight. Review of Systems   Constitutional: Negative. HENT: Negative. Negative for nosebleeds. Eyes: Negative. Respiratory: Negative. Cardiovascular: Negative. Gastrointestinal:  Positive for diarrhea. Negative for abdominal pain, blood in stool, constipation, heartburn, melena, nausea and vomiting. Genitourinary: Negative. Musculoskeletal:  Positive for joint pain. Skin: Negative. Neurological: Negative. Endo/Heme/Allergies: Negative. Psychiatric/Behavioral: Negative. All other systems reviewed and are negative. Visit Vitals  BP (!) 140/80 (BP 1 Location: Left upper arm, BP Patient Position: Sitting, BP Cuff Size: Large adult)   Pulse 72   Temp 97.7 °F (36.5 °C) (Temporal)   Resp 14   Ht 5' 10\" (1.778 m)   Wt 97.4 kg (214 lb 12.8 oz)   SpO2 98% Comment: room air   BMI 30.82 kg/m²     Physical Exam  Vitals and nursing note reviewed. Constitutional:       Appearance: Normal appearance. He is obese. HENT:      Head: Normocephalic and atraumatic. Nose: Nose normal.      Mouth/Throat:      Mouth: Mucous membranes are moist.      Pharynx: Oropharynx is clear. Eyes:      General: No scleral icterus. Extraocular Movements: Extraocular movements intact. Conjunctiva/sclera: Conjunctivae normal.      Pupils: Pupils are equal, round, and reactive to light. Cardiovascular:      Rate and Rhythm: Normal rate and regular rhythm. Heart sounds: Normal heart sounds. Pulmonary:      Effort: Pulmonary effort is normal.      Breath sounds: Normal breath sounds. Abdominal:      General: Bowel sounds are normal. There is no distension. Palpations: Abdomen is soft. There is no mass. Tenderness: There is no abdominal tenderness. There is no right CVA tenderness, left CVA tenderness, guarding or rebound. Hernia: No hernia is present. Musculoskeletal:         General: Normal range of motion. Cervical back: Normal range of motion and neck supple. Skin:     General: Skin is warm and dry. Coloration: Skin is not jaundiced. Neurological:      General: No focal deficit present. Mental Status: He is alert and oriented to person, place, and time. Psychiatric:         Mood and Affect: Mood normal.         Behavior: Behavior normal.         Thought Content: Thought content normal.         Judgment: Judgment normal.          1. Personal history of colonic polyps  We will schedule for a surveillance colonoscopy. - COLONOSCOPY,DIAGNOSTIC; Future  - polyethylene glycol (MIRALAX) 17 gram/dose powder; Use as directed  Indications: emptying of the bowel  Dispense: 510 g; Refill: 0    2. Chronic systolic (congestive) heart failure      3. Diarrhea, unspecified type      4.  Blood in stool

## 2023-01-04 ENCOUNTER — OFFICE VISIT (OUTPATIENT)
Dept: PRIMARY CARE CLINIC | Age: 68
End: 2023-01-04
Payer: MEDICARE

## 2023-01-04 VITALS
WEIGHT: 208.8 LBS | BODY MASS INDEX: 29.89 KG/M2 | SYSTOLIC BLOOD PRESSURE: 96 MMHG | TEMPERATURE: 97.6 F | OXYGEN SATURATION: 98 % | RESPIRATION RATE: 20 BRPM | HEART RATE: 88 BPM | DIASTOLIC BLOOD PRESSURE: 50 MMHG | HEIGHT: 70 IN

## 2023-01-04 DIAGNOSIS — E11.65 UNCONTROLLED TYPE 2 DIABETES MELLITUS WITH HYPERGLYCEMIA (HCC): Primary | ICD-10-CM

## 2023-01-04 DIAGNOSIS — Z91.14 NONCOMPLIANCE WITH MEDICATION REGIMEN: ICD-10-CM

## 2023-01-04 DIAGNOSIS — R91.1 LUNG NODULE: ICD-10-CM

## 2023-01-04 DIAGNOSIS — E05.90 HYPERTHYROIDISM: Primary | ICD-10-CM

## 2023-01-04 LAB — GLUCOSE POC: 323 MG/DL

## 2023-01-04 PROCEDURE — 3074F SYST BP LT 130 MM HG: CPT | Performed by: NURSE PRACTITIONER

## 2023-01-04 PROCEDURE — G8536 NO DOC ELDER MAL SCRN: HCPCS | Performed by: NURSE PRACTITIONER

## 2023-01-04 PROCEDURE — 3017F COLORECTAL CA SCREEN DOC REV: CPT | Performed by: NURSE PRACTITIONER

## 2023-01-04 PROCEDURE — 1101F PT FALLS ASSESS-DOCD LE1/YR: CPT | Performed by: NURSE PRACTITIONER

## 2023-01-04 PROCEDURE — 1123F ACP DISCUSS/DSCN MKR DOCD: CPT | Performed by: NURSE PRACTITIONER

## 2023-01-04 PROCEDURE — G8427 DOCREV CUR MEDS BY ELIG CLIN: HCPCS | Performed by: NURSE PRACTITIONER

## 2023-01-04 PROCEDURE — 3078F DIAST BP <80 MM HG: CPT | Performed by: NURSE PRACTITIONER

## 2023-01-04 PROCEDURE — 99214 OFFICE O/P EST MOD 30 MIN: CPT | Performed by: NURSE PRACTITIONER

## 2023-01-04 PROCEDURE — G8432 DEP SCR NOT DOC, RNG: HCPCS | Performed by: NURSE PRACTITIONER

## 2023-01-04 PROCEDURE — 2022F DILAT RTA XM EVC RTNOPTHY: CPT | Performed by: NURSE PRACTITIONER

## 2023-01-04 PROCEDURE — 3046F HEMOGLOBIN A1C LEVEL >9.0%: CPT | Performed by: NURSE PRACTITIONER

## 2023-01-04 PROCEDURE — G8417 CALC BMI ABV UP PARAM F/U: HCPCS | Performed by: NURSE PRACTITIONER

## 2023-01-04 PROCEDURE — 82962 GLUCOSE BLOOD TEST: CPT | Performed by: NURSE PRACTITIONER

## 2023-01-04 RX ORDER — FLASH GLUCOSE SENSOR
KIT MISCELLANEOUS
Qty: 2 KIT | Refills: 11 | Status: SHIPPED | OUTPATIENT
Start: 2023-01-04

## 2023-01-04 RX ORDER — METHIMAZOLE 5 MG/1
5 TABLET ORAL DAILY
Qty: 90 TABLET | Refills: 0 | Status: SHIPPED | OUTPATIENT
Start: 2023-01-04

## 2023-01-04 RX ORDER — FLASH GLUCOSE SCANNING READER
EACH MISCELLANEOUS
Qty: 1 EACH | Refills: 0 | Status: SHIPPED | OUTPATIENT
Start: 2023-01-04

## 2023-01-04 NOTE — PROGRESS NOTES
Chief Complaint   Patient presents with    Diabetes     1 month follow up     1. Have you been to the ER, urgent care clinic since your last visit? Hospitalized since your last visit? No    2. Have you seen or consulted any other health care providers outside of the [address] [place] [address] since your last visit? Include any pap smears or colon screening.  No

## 2023-01-04 NOTE — PROGRESS NOTES
Progress Note    Patient: Luis Pintollor  : 1955  PCP: Yakov Goodman NP      Assessment/Plan:     Diagnoses and all orders for this visit:    1. Uncontrolled type 2 diabetes mellitus with hyperglycemia ([address]  Lab Results   Component Value Date/Time    Hemoglobin A1c <3.8 (L) 2022 02:17 AM    Hemoglobin A1c (POC) 12.4 12/15/2022 02:22 PM     Last A1c: 13.1  Home readings: Did not bring and not checking regularly. Today glucose 323 but did eat in the last 2 hours. Medication Compliance:no- not taking humalog and still misses doses of lantus  Diabetic Eye Exam Up to date:no  Complications: CAD  Current Treatment: Humalog sliding scale, lantus 40 units nightly, Jardiance 25mg daily  Previous treatment: Metformin (could not tolerate)    Has bottle of metformin but he had reported he could not tolerate in past and has not been taking due to GI effects. Advised to remove bottle. Hes not taking the humalog despite encouragement from home health and myself  Reports taking his Jardiance and the Lantus 40 units (most nights)  Going to add Januvia 100mg daily and reviewed use and side effects (more compliant will oral meds so will attempt to transition him from as much insulin)  Continue Lantus 40 units nightly as well as sliding scale  Discussed with patient importance in compliance with medications due to long term risks of uncontrolled diabetes which can threaten life and/or limb. Started on Jardiance 25mg daily and tolerating well  Encouraged to stay well hydrated and notify provider immediately if signs of  mycotic infection develops  Check glucose 4 times daily and bring meter to next appointment. Sending shannan Frazier if approved. Encourage to follow diabetic diet and get regular exercise 3-5 times weekly at least 30-40 minutes   Referred again back to [facility] for diabetic eye exam as this is overdue  Check feet daily and notify provider immediately if wound develops    2.  Lung nodule  CT Results (most recent):  Results from Hospital Encounter encounter on 06/15/22    CT CHEST WO CONT    Narrative  Exam: CT CHEST WO CONT    TECHNIQUE: Multiple transaxial CT images of the chest were obtained without  contrast. Coronal and sagittal reformatted images were provided. Dose reduction: All CT scans at this facility are performed using dose reduction  optimization techniques as appropriate to a performed exam including the  following: Automated exposure control, adjustments of the mA and/or kV according  to patient size, or use of iterative reconstruction technique. COMPARISON: Chest x-ray performed yesterday. HISTORY: Lung mass    FINDINGS: Motion degraded exam.    Mediastinum: Postsurgical changes of CABG. Severe calcifications of the native  coronary arteries. Borderline cardiomegaly. Aorta is atherosclerotic. Prominent right lower paratracheal lymph node measures 13 mm. No obvious hilar  lymphadenopathy. Axilla and soft tissues: Asymmetric gynecomastia, right greater than left. No  axillary adenopathy. Macroscopic fat-containing intramuscular mass of the right  shoulder musculature, partially visualized but likely a lipoma. ABDOMEN: No acute process identified. Scattered colonic diverticula. There are  calcifications along the posterior capsule of the left kidney which may  represent sequela of previous trauma or surgery. Questionable punctate calcified  gallstone. LUNGS: Central airways are patent. Patchy groundglass airspace opacities within  the right upper lobe, and left lower lobe. Mixed groundglass and consolidation  within the right lower lobe. Nodular consolidation within the superior lingula  along the interlobar fissure measures 1.2 x 2.5 cm. There is volume loss and  platelike density in the right upper lobe, likely atelectasis. No pleural  effusion. No pneumothorax. Bones: Intact median sternotomy.  Multilevel degenerative disc disease with  ventral bridging osteophytes. Impression  Exam limited by respiratory motion. 1. Multifocal bilateral airspace disease concerning for multilobar pneumonia. 2. Nodular consolidation in the lingula is likely infectious. Follow-up chest CT  is recommended to document resolution. 3. Mediastinal lymphadenopathy, likely reactive. Repeat 3 mo follow up CT of chest to re-evaluate and order was resent 12/1/22    3. Noncompliance with medications  Have re-interated importance in medication compliance to avoid serious health complications which can be life threatening. Home health nurse ODILON Restrepo updated on medication changes as well as assistance on follow up CT      Follow-up and Dispositions    Return in about 6 weeks (around 2/15/2023) for or sooner for worsening symptoms. Subjective:   Shonna Vee is a 69 y.o. male presenting today for follow-up of chronic conditions with PMH of CAD, depression, pneumonia, hyperlipidemia, left kidney mass, obesity, hypertension and hyperlipidemia. Reports that he has not been checking glucose regularly and did not bring meter today. Is taking the jardiance and lantus but not taking the sliding scale regularly. Has metformin bottle but states he does not take due to the diarrhea from this. Medications marked \"taking\" at this time:  Prior to Admission medications    Medication Sig Start Date End Date Taking? Authorizing Provider   SITagliptin phosphate (JANUVIA) 100 mg tablet Take 1 Tablet by mouth daily.  1/4/23  Yes Gennaro Will NP   flash glucose sensor (FreeStyle Carver 2 Sensor) kit Use sensor to check glucose 4 times daily 1/4/23  Yes Gennaro Will NP   flash glucose scanning reader (FreeStyle Carver 2 [place]) misc Use to check glucose 4 times daily 1/4/23  Yes Gennaro Will NP   insulin lispro (HUMALOG) 100 unit/mL injection INJECT PER SLIDING SCALE BEFORE BREAKFAST, LUNCH, DINNER, AND AT BEDTIME FOR BLOOD SUGAR LESS THAN 150=0 UNITS, 150-199=2 UNITS, 200-249=4 UNITS, 250-299=6 UNITS, 300-349=8 UNITS, 350 AND ABOVE= 10 UNITS AND CALL PRIMARY CARE PROVIDER. Max daily 30 units. 12/22/22   Kary Meraz NP   polyethylene glycol [facility]) 17 gram/dose powder Use as directed  Indications: emptying of the bowel 12/20/22   MD Kim Enrique U-100 Insulin 100 unit/mL (3 mL) inpn 40 Units by SubCUTAneous route nightly. 12/1/22   Kary Meraz NP   empagliflozin (JARDIANCE) 25 mg tablet Take 1 Tablet by mouth daily. 12/1/22   Kary Meraz NP   metFORMIN ER (GLUCOPHAGE XR) 500 mg tablet Take 2 Tablets by mouth daily (with dinner). 12/1/22 1/4/23  Kary Meraz NP   ibuprofen (MOTRIN) 600 mg tablet Take 1 Tablet by mouth every six (6) hours as needed for Pain. 11/25/22   Christin Cobb MD   insulin syringe-needle U-100 0.3 mL 31 gauge x 15/64\" syrg USE TO INJECT INSULIN 4 TIMES DAILY 6/19/22   Provider, Historical   lisinopriL (PRINIVIL, ZESTRIL) 10 mg tablet Take 1 Tablet by mouth daily. 6/30/22   Kary Meraz NP   NIFEdipine ER (PROCARDIA XL) 30 mg ER tablet Take 1 Tablet by mouth daily. 6/30/22   Kary Meraz NP   furosemide (LASIX) 20 mg tablet Take 1 Tablet by mouth daily. 6/30/22   Kary Meraz NP   potassium chloride (KLOR-CON M10) 10 mEq tablet Take 1 Tablet by mouth daily. 6/30/22   Kary Meraz NP   Diabetic Supplies, Miscellan. kit 1 Kit by Does Not Apply route four (4) times daily. Dispense #180 Lancets to check blood sugars QID, Ref #1  Dispense #1 Glucometer use QID, Ref #None  Dispense #180 blood glucose strips to use QID, Reg #1  Dispense needles for injecting insulin  Dispense syringe for injecting insulin 6/17/22   Eleazar Walsh PA-C        Review of Systems   Constitutional:  Positive for malaise/fatigue. Negative for chills, fever and weight loss. HENT: Negative. Eyes:  Negative for pain, discharge and redness. Respiratory:  Negative for hemoptysis, sputum production, shortness of breath and wheezing. Cardiovascular: Negative. Gastrointestinal: Negative. Genitourinary: Negative. Musculoskeletal:  Positive for myalgias. Skin: Negative. Neurological: Negative. Psychiatric/Behavioral:  Positive for depression. Negative for hallucinations, memory loss, substance abuse and suicidal ideas. 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    Uncontrolled type 2 diabetes mellitus with hyperglycemia (HCC) E11.65    Blurred vision, bilateral H53.8    Mixed hyperlipidemia E78.2    At high risk for falls Z91.81    H/O colonoscopy Z98.890    Pneumonia J18.9    Personal history of colonic polyps Z86.010    Chronic systolic (congestive) heart failure I50.22     Patient Active Problem List    Diagnosis Date Noted    Chronic systolic (congestive) heart failure 12/20/2022    Pneumonia 06/16/2022    At high risk for falls 05/23/2021    H/O colonoscopy 05/23/2021    Angiomyolipoma of left kidney 04/09/2021    Uncontrolled type 2 diabetes mellitus with hyperglycemia ([facility] [address] 04/09/2021    Blurred vision, bilateral 04/09/2021    Mixed hyperlipidemia 04/09/2021    Respiratory failure with hypoxia ([facility] [address] 02/22/2021    Pneumonia due to COVID-19 virus 02/17/2021    Chest pain 12/12/2020    CAD (coronary artery disease) 12/12/2020    Essential hypertension 12/12/2020    Personal history of colonic polyps 06/25/2010     Current Outpatient Medications   Medication Sig Dispense Refill    SITagliptin phosphate (JANUVIA) 100 mg tablet Take 1 Tablet by mouth daily.  90 Tablet 1    flash glucose sensor (FreeStyle [place] 2 Sensor) kit Use sensor to check glucose 4 times daily 2 Kit 11    flash glucose scanning reader (FreeStyle Paty 2 [place]) misc Use to check glucose 4 times daily 1 Each 0    insulin lispro (HUMALOG) 100 unit/mL injection INJECT PER SLIDING SCALE BEFORE BREAKFAST, LUNCH, DINNER, AND AT BEDTIME FOR BLOOD SUGAR LESS THAN 150=0 UNITS, 150-199=2 UNITS, 200-249=4 UNITS, 250-299=6 UNITS, 300-349=8 UNITS, 350 AND ABOVE= 10 UNITS AND CALL PRIMARY CARE PROVIDER. Max daily 30 units. 27 mL 1    polyethylene glycol (MIRALAX) 17 gram/dose powder Use as directed  Indications: emptying of the bowel 510 g 0    Lantus Solostar U-100 Insulin 100 unit/mL (3 mL) inpn 40 Units by SubCUTAneous route nightly. 36 mL 1    empagliflozin (JARDIANCE) 25 mg tablet Take 1 Tablet by mouth daily. 90 Tablet 1    ibuprofen (MOTRIN) 600 mg tablet Take 1 Tablet by mouth every six (6) hours as needed for Pain. 20 Tablet 0    insulin syringe-needle U-100 0.3 mL 31 gauge x 15/64\" syrg USE TO INJECT INSULIN 4 TIMES DAILY      lisinopriL (PRINIVIL, ZESTRIL) 10 mg tablet Take 1 Tablet by mouth daily. 90 Tablet 1    NIFEdipine ER (PROCARDIA XL) 30 mg ER tablet Take 1 Tablet by mouth daily. 90 Tablet 1    furosemide (LASIX) 20 mg tablet Take 1 Tablet by mouth daily. 90 Tablet 1    potassium chloride (KLOR-CON M10) 10 mEq tablet Take 1 Tablet by mouth daily. 90 Tablet 1    Diabetic Supplies, Miscellan. kit 1 Kit by Does Not Apply route four (4) times daily.  Dispense #180 Lancets to check blood sugars QID, Ref #1  Dispense #1 Glucometer use QID, Ref #None  Dispense #180 blood glucose strips to use QID, Reg #1  Dispense needles for injecting insulin  Dispense syringe for injecting insulin 1 Kit 0     No Known Allergies  Past Medical History:   Diagnosis Date    Arthritis     Burning with urination     CAD (coronary artery disease)     patient stated stents placed unsure of how many     Diabetes ([facility] [address]     GERD (gastroesophageal reflux disease)     History of blood transfusion     patient stated approx 1 year ago     HTN (hypertension)     Hyperlipidemia     MI (myocardial infarction) ([facility] [address]     Personal history of colonic polyps 6/25/2010 6-    Rectal bleeding     patient stated this happened over the weekend. Renal mass, left      Past Surgical History:   Procedure Laterality Date    HX COLONOSCOPY      HX CORONARY STENT PLACEMENT  2018    x1    HX HEART CATHETERIZATION      patient stated stents placed     HX NEPHRECTOMY Left 08/23/2021    robotic left partial nephrectomy,    HX UROLOGICAL  08/23/2021     intraoperative renal ultrasound    [place] UNLISTED PROCEDURE CARDIAC SURGERY  2018    BYPASS     Family History   Problem Relation Age of Onset    Hypertension Mother     Diabetes Mother     Hypertension Father     Heart Disease Father      Social History     Tobacco Use    Smoking status: Never    Smokeless tobacco: Never   Substance Use Topics    Alcohol use: Not Currently          Objective:     Visit Vitals  BP (!) 96/50 (BP 1 Location: Left upper arm, BP Patient Position: Sitting, BP Cuff Size: Large adult)   Pulse 88   Temp 97.6 °F (36.4 °C) (Temporal)   Resp 20   Ht 5' 10\" (1.778 m)   Wt 208 lb 12.8 oz (94.7 kg)   SpO2 98%   BMI 29.96 kg/m²        Physical Exam  Vitals and nursing note reviewed. Constitutional:       Appearance: Normal appearance. He is obese. Cardiovascular:      Rate and Rhythm: Normal rate. Pulses: Normal pulses. Pulmonary:      Effort: Pulmonary effort is normal.      Comments: Dminished in bases  Abdominal:      General: Bowel sounds are normal.      Palpations: Abdomen is soft. Tenderness: There is no abdominal tenderness. There is no guarding. Musculoskeletal:      Right lower leg: Edema (trace) present. Left lower leg: Edema (trace) present. Skin:     General: Skin is warm and dry. Neurological:      Mental Status: He is alert and oriented to person, place, and time. Mental status is at baseline. Psychiatric:         Behavior: Behavior is cooperative. We discussed the expected course, resolution and complications of the diagnosis(es) in detail.   Medication risks, benefits, costs, interactions, and alternatives were discussed as indicated. I advised him to contact the office if his condition worsens, changes or fails to improve as anticipated. He expressed understanding with the diagnosis(es) and plan.      Torrey Arce NP

## 2023-01-05 NOTE — PROGRESS NOTES
Please let patient know that his labwork shows he is hyperthyroid  with TSH low and T4 elevated at 2.04. I am going to start on low dose of methimazole and very important repeat levels in 12 weeks. Also going to order US of thyroid. His bad cholesterol is elevated at 130 with total 237. Want him to work on following diabetic diet along with getting regular exercise to help control. Consider mediation at next visit but will hold on adding now since he also will be starting the [address] which is new as well. Labs otherwise stable. If any questions, let me know.

## 2023-01-06 ENCOUNTER — HOSPITAL ENCOUNTER (OUTPATIENT)
Age: 68
Setting detail: OUTPATIENT SURGERY
Discharge: HOME | End: 2023-01-06
Attending: INTERNAL MEDICINE | Admitting: INTERNAL MEDICINE
Payer: MEDICARE

## 2023-01-06 ENCOUNTER — ANESTHESIA EVENT (OUTPATIENT)
Dept: ENDOSCOPY | Age: 68
End: 2023-01-06
Payer: MEDICARE

## 2023-01-06 ENCOUNTER — ANESTHESIA (OUTPATIENT)
Dept: ENDOSCOPY | Age: 68
End: 2023-01-06
Payer: MEDICARE

## 2023-01-06 VITALS
HEIGHT: 69 IN | BODY MASS INDEX: 31.4 KG/M2 | RESPIRATION RATE: 18 BRPM | OXYGEN SATURATION: 97 % | WEIGHT: 212 LBS | DIASTOLIC BLOOD PRESSURE: 99 MMHG | TEMPERATURE: 97.7 F | SYSTOLIC BLOOD PRESSURE: 173 MMHG | HEART RATE: 73 BPM

## 2023-01-06 LAB
GLUCOSE BLD STRIP.AUTO-MCNC: 228 MG/DL (ref 65–100)
PERFORMED BY, TECHID: ABNORMAL

## 2023-01-06 PROCEDURE — 82962 GLUCOSE BLOOD TEST: CPT

## 2023-01-06 PROCEDURE — 76060000032 HC ANESTHESIA 0.5 TO 1 HR: Performed by: INTERNAL MEDICINE

## 2023-01-06 PROCEDURE — 2709999900 HC NON-CHARGEABLE SUPPLY: Performed by: INTERNAL MEDICINE

## 2023-01-06 PROCEDURE — 74011250636 HC RX REV CODE- 250/636: Performed by: INTERNAL MEDICINE

## 2023-01-06 PROCEDURE — 76040000007: Performed by: INTERNAL MEDICINE

## 2023-01-06 PROCEDURE — 74011250636 HC RX REV CODE- 250/636: Performed by: NURSE ANESTHETIST, CERTIFIED REGISTERED

## 2023-01-06 PROCEDURE — 74011000250 HC RX REV CODE- 250: Performed by: NURSE ANESTHETIST, CERTIFIED REGISTERED

## 2023-01-06 RX ORDER — PROPOFOL 10 MG/ML
INJECTION, EMULSION INTRAVENOUS AS NEEDED
Status: DISCONTINUED | OUTPATIENT
Start: 2023-01-06 | End: 2023-01-06 | Stop reason: HOSPADM

## 2023-01-06 RX ORDER — SODIUM CHLORIDE 0.9 % (FLUSH) 0.9 %
5-40 SYRINGE (ML) INJECTION AS NEEDED
Status: DISCONTINUED | OUTPATIENT
Start: 2023-01-06 | End: 2023-01-06 | Stop reason: HOSPADM

## 2023-01-06 RX ORDER — SODIUM CHLORIDE 0.9 % (FLUSH) 0.9 %
5-40 SYRINGE (ML) INJECTION EVERY 8 HOURS
Status: DISCONTINUED | OUTPATIENT
Start: 2023-01-06 | End: 2023-01-06 | Stop reason: HOSPADM

## 2023-01-06 RX ORDER — SODIUM CHLORIDE 9 MG/ML
25 INJECTION, SOLUTION INTRAVENOUS CONTINUOUS
Status: DISCONTINUED | OUTPATIENT
Start: 2023-01-06 | End: 2023-01-06 | Stop reason: HOSPADM

## 2023-01-06 RX ORDER — GLYCOPYRROLATE 0.2 MG/ML
INJECTION INTRAMUSCULAR; INTRAVENOUS AS NEEDED
Status: DISCONTINUED | OUTPATIENT
Start: 2023-01-06 | End: 2023-01-06 | Stop reason: HOSPADM

## 2023-01-06 RX ORDER — SODIUM CHLORIDE 9 MG/ML
INJECTION, SOLUTION INTRAVENOUS
Status: DISCONTINUED | OUTPATIENT
Start: 2023-01-06 | End: 2023-01-06 | Stop reason: HOSPADM

## 2023-01-06 RX ORDER — SODIUM CHLORIDE 9 MG/ML
125 INJECTION, SOLUTION INTRAVENOUS CONTINUOUS
Status: DISCONTINUED | OUTPATIENT
Start: 2023-01-06 | End: 2023-01-06 | Stop reason: HOSPADM

## 2023-01-06 RX ORDER — FLUOXETINE HYDROCHLORIDE 40 MG/1
40 CAPSULE ORAL DAILY
COMMUNITY

## 2023-01-06 RX ORDER — LIDOCAINE HYDROCHLORIDE 20 MG/ML
INJECTION, SOLUTION EPIDURAL; INFILTRATION; INTRACAUDAL; PERINEURAL AS NEEDED
Status: DISCONTINUED | OUTPATIENT
Start: 2023-01-06 | End: 2023-01-06 | Stop reason: HOSPADM

## 2023-01-06 RX ADMIN — SODIUM CHLORIDE: 9 INJECTION, SOLUTION INTRAVENOUS at 10:09

## 2023-01-06 RX ADMIN — GLYCOPYRROLATE 0.2 MG: 0.2 INJECTION, SOLUTION INTRAMUSCULAR; INTRAVENOUS at 10:17

## 2023-01-06 RX ADMIN — LIDOCAINE HYDROCHLORIDE 60 MG: 20 INJECTION, SOLUTION EPIDURAL; INFILTRATION; INTRACAUDAL; PERINEURAL at 10:17

## 2023-01-06 RX ADMIN — PROPOFOL 100 MG: 10 INJECTION, EMULSION INTRAVENOUS at 10:31

## 2023-01-06 RX ADMIN — PROPOFOL 50 MG: 10 INJECTION, EMULSION INTRAVENOUS at 10:49

## 2023-01-06 RX ADMIN — PROPOFOL 100 MG: 10 INJECTION, EMULSION INTRAVENOUS at 10:38

## 2023-01-06 RX ADMIN — PROPOFOL 100 MG: 10 INJECTION, EMULSION INTRAVENOUS at 10:41

## 2023-01-06 NOTE — DISCHARGE INSTRUCTIONS

## 2023-01-06 NOTE — ANESTHESIA PREPROCEDURE EVALUATION
Relevant Problems   No relevant active problems       Anesthetic History   No history of anesthetic complications  Other anesthesia complications          Review of Systems / Medical History  Patient summary reviewed, nursing notes reviewed and pertinent labs reviewed    Pulmonary  Within defined limits                 Neuro/Psych   Within defined limits           Cardiovascular  Within defined limits  Hypertension          CAD         GI/Hepatic/Renal  Within defined limits   GERD           Endo/Other  Within defined limits  Diabetes    Arthritis     Other Findings              Physical Exam    Airway  Mallampati: II  TM Distance: 4 - 6 cm  Neck ROM: normal range of motion        Cardiovascular    Rhythm: regular  Rate: normal         Dental  No notable dental hx       Pulmonary  Breath sounds clear to auscultation               Abdominal  Abdominal exam normal       Other Findings            Anesthetic Plan    ASA: 3  Anesthesia type: total IV anesthesia            Anesthetic plan and risks discussed with: Patient

## 2023-01-06 NOTE — INTERVAL H&P NOTE
Update History & Physical    The Patient's History and Physical of January 6, 2023 was reviewed with the patient and I examined the patient. There was no change. The surgical site was confirmed by the patient and me. Plan:  The risk, benefits, expected outcome, and alternative to the recommended procedure have been discussed with the patient. Patient understands and wants to proceed with the procedure.     Electronically signed by Jasmyne Hess MD on 1/6/2023 at 9:30 AM

## 2023-01-06 NOTE — OP NOTES
Colonoscopy Procedure Note      Patient: Nicole Ewing MRN: [id number]  SSN: [id number]    YOB: 1955  Age: 69 y.o. Sex: male      Date of Procedure: 1/6/2023  Date/Time:  1/6/2023 11:02 AM       IMPRESSION:     1. Generalized diverticulosis         RECOMMENDATIONS:     1) Increase fiber in diet to keep stools soft. 2) Repeat colonoscopy in 5 years. INDICATION: History of colon polyps    PROCEDURE PERFORMED: Colonoscopy     DESCRIPTION OF PROCEDURE: An informed consent was obtained. The patient was placed in left lateral position. Perianal inspection and a digital rectal exam was performed. Video colonoscope was introduced into the rectum and advanced under direct vision up to the terminal ileum. With adequate insufflation and maneuvering of the withdrawing scope, the colonic mucosa was visualized carefully. Retroflexion was performed in the rectum to see the anorectum and also in the ascending colon to look behind the folds. Vital signs, pulse oximetry, single lead cardiac monitor were monitored throughout the procedure as the sedation was titrated to the desired effect ensuring patient comfort and safety. The patient tolerated the procedure very well and was transferred to the recovery area. Following is the summary of findings: Diverticulosis was noted throughout the entire colon most prominent diverticulosis in the right colon. No other mucosal lesions were noted to the level of the cecum.         ENDOSCOPIST: Kishore Green MD     ENDOSCOPE: Olympus video colonoscope     ASSISTANT:Circ-1: Macarena Bond RN             Scrub Tech-1: Dellia Holiday     ANESTHESIA: TIVA      QUALITY OF PREPARATION:fair      FINDINGS:   Generalized diverticulosis        Complication:  None      EBL: None     SPECIMENS: * No specimens in log Bruno Werner MD  January 6, 2023  11:02 AM

## 2023-01-27 ENCOUNTER — HOSPITAL ENCOUNTER (OUTPATIENT)
Dept: CT IMAGING | Age: 68
Discharge: HOME | End: 2023-01-27
Attending: NURSE PRACTITIONER
Payer: MEDICARE

## 2023-01-27 DIAGNOSIS — R91.1 LUNG NODULE: ICD-10-CM

## 2023-01-27 PROCEDURE — 71250 CT THORAX DX C-: CPT

## 2023-01-30 NOTE — PROGRESS NOTES
Informed patient of CT results and recommendations per JANET Joiner NP. Patient stated understanding.

## 2023-02-13 ENCOUNTER — TELEPHONE (OUTPATIENT)
Dept: PRIMARY CARE CLINIC | Age: 68
End: 2023-02-13

## 2023-02-13 DIAGNOSIS — E05.90 HYPERTHYROIDISM: ICD-10-CM

## 2023-02-13 DIAGNOSIS — I10 ESSENTIAL HYPERTENSION: ICD-10-CM

## 2023-02-13 DIAGNOSIS — I25.10 CORONARY ARTERY DISEASE INVOLVING NATIVE HEART WITHOUT ANGINA PECTORIS, UNSPECIFIED VESSEL OR LESION TYPE: ICD-10-CM

## 2023-02-13 DIAGNOSIS — E11.65 UNCONTROLLED TYPE 2 DIABETES MELLITUS WITH HYPERGLYCEMIA (HCC): ICD-10-CM

## 2023-02-13 DIAGNOSIS — E87.6 HYPOKALEMIA: ICD-10-CM

## 2023-02-13 RX ORDER — LISINOPRIL 10 MG/1
10 TABLET ORAL DAILY
Qty: 90 TABLET | Refills: 1 | Status: SHIPPED | OUTPATIENT
Start: 2023-02-13 | End: 2023-02-16 | Stop reason: SDUPTHER

## 2023-02-13 RX ORDER — POTASSIUM CHLORIDE 750 MG/1
10 TABLET, EXTENDED RELEASE ORAL DAILY
Qty: 90 TABLET | Refills: 1 | Status: SHIPPED | OUTPATIENT
Start: 2023-02-13 | End: 2023-02-16 | Stop reason: SDUPTHER

## 2023-02-13 RX ORDER — METHIMAZOLE 5 MG/1
5 TABLET ORAL DAILY
Qty: 90 TABLET | Refills: 0 | Status: SHIPPED | OUTPATIENT
Start: 2023-02-13

## 2023-02-13 RX ORDER — NIFEDIPINE 30 MG/1
30 TABLET, EXTENDED RELEASE ORAL DAILY
Qty: 90 TABLET | Refills: 1 | Status: SHIPPED | OUTPATIENT
Start: 2023-02-13 | End: 2023-02-16 | Stop reason: SDUPTHER

## 2023-02-13 RX ORDER — FUROSEMIDE 20 MG/1
20 TABLET ORAL DAILY
Qty: 90 TABLET | Refills: 1 | Status: SHIPPED | OUTPATIENT
Start: 2023-02-13 | End: 2023-02-16 | Stop reason: SDUPTHER

## 2023-02-13 RX ORDER — INSULIN GLARGINE 100 [IU]/ML
40 INJECTION, SOLUTION SUBCUTANEOUS
Qty: 36 ML | Refills: 2 | Status: SHIPPED | OUTPATIENT
Start: 2023-02-13

## 2023-02-13 RX ORDER — FLUOXETINE HYDROCHLORIDE 40 MG/1
40 CAPSULE ORAL DAILY
Qty: 90 CAPSULE | Refills: 1 | Status: SHIPPED | OUTPATIENT
Start: 2023-02-13 | End: 2023-02-16 | Stop reason: SDUPTHER

## 2023-02-13 NOTE — TELEPHONE ENCOUNTER
Allyssa called in from [facility], she stated the pt will like to switch to a different pharmacy. He will like to switch to [facility] and need a total of 8 prescriptions sent there. Fax # for Maico if needed is [phone].  Her number if you have any questions is [phone]

## 2023-02-14 ENCOUNTER — TELEPHONE (OUTPATIENT)
Dept: PRIMARY CARE CLINIC | Age: 68
End: 2023-02-14

## 2023-02-15 ENCOUNTER — TELEPHONE (OUTPATIENT)
Dept: GASTROENTEROLOGY | Age: 68
End: 2023-02-15

## 2023-02-15 NOTE — TELEPHONE ENCOUNTER
Allyssa with Home Health cld & std she discharged pt from [address] [place] [address]. He is now taking insulin and his sugar has gone from 500 to 200.

## 2023-02-16 ENCOUNTER — OFFICE VISIT (OUTPATIENT)
Dept: PRIMARY CARE CLINIC | Age: 68
End: 2023-02-16
Payer: MEDICARE

## 2023-02-16 VITALS
OXYGEN SATURATION: 97 % | SYSTOLIC BLOOD PRESSURE: 113 MMHG | HEIGHT: 69 IN | WEIGHT: 219.2 LBS | BODY MASS INDEX: 32.47 KG/M2 | DIASTOLIC BLOOD PRESSURE: 65 MMHG | RESPIRATION RATE: 18 BRPM | HEART RATE: 84 BPM | TEMPERATURE: 97.1 F

## 2023-02-16 DIAGNOSIS — R91.1 LUNG NODULE: Primary | ICD-10-CM

## 2023-02-16 DIAGNOSIS — I10 ESSENTIAL HYPERTENSION: ICD-10-CM

## 2023-02-16 DIAGNOSIS — E11.65 UNCONTROLLED TYPE 2 DIABETES MELLITUS WITH HYPERGLYCEMIA (HCC): ICD-10-CM

## 2023-02-16 DIAGNOSIS — Z91.199 NONCOMPLIANCE: ICD-10-CM

## 2023-02-16 PROBLEM — F33.1 MODERATE EPISODE OF RECURRENT MAJOR DEPRESSIVE DISORDER (HCC): Status: ACTIVE | Noted: 2023-02-16

## 2023-02-16 PROCEDURE — 1101F PT FALLS ASSESS-DOCD LE1/YR: CPT | Performed by: NURSE PRACTITIONER

## 2023-02-16 PROCEDURE — 3078F DIAST BP <80 MM HG: CPT | Performed by: NURSE PRACTITIONER

## 2023-02-16 PROCEDURE — 2022F DILAT RTA XM EVC RTNOPTHY: CPT | Performed by: NURSE PRACTITIONER

## 2023-02-16 PROCEDURE — G8432 DEP SCR NOT DOC, RNG: HCPCS | Performed by: NURSE PRACTITIONER

## 2023-02-16 PROCEDURE — 3017F COLORECTAL CA SCREEN DOC REV: CPT | Performed by: NURSE PRACTITIONER

## 2023-02-16 PROCEDURE — G8417 CALC BMI ABV UP PARAM F/U: HCPCS | Performed by: NURSE PRACTITIONER

## 2023-02-16 PROCEDURE — 3074F SYST BP LT 130 MM HG: CPT | Performed by: NURSE PRACTITIONER

## 2023-02-16 PROCEDURE — G8427 DOCREV CUR MEDS BY ELIG CLIN: HCPCS | Performed by: NURSE PRACTITIONER

## 2023-02-16 PROCEDURE — 1123F ACP DISCUSS/DSCN MKR DOCD: CPT | Performed by: NURSE PRACTITIONER

## 2023-02-16 PROCEDURE — 99214 OFFICE O/P EST MOD 30 MIN: CPT | Performed by: NURSE PRACTITIONER

## 2023-02-16 PROCEDURE — G8536 NO DOC ELDER MAL SCRN: HCPCS | Performed by: NURSE PRACTITIONER

## 2023-02-16 PROCEDURE — 3046F HEMOGLOBIN A1C LEVEL >9.0%: CPT | Performed by: NURSE PRACTITIONER

## 2023-02-16 RX ORDER — LISINOPRIL 10 MG/1
10 TABLET ORAL DAILY
Qty: 10 TABLET | Refills: 0 | Status: SHIPPED | OUTPATIENT
Start: 2023-02-16

## 2023-02-16 RX ORDER — FUROSEMIDE 20 MG/1
20 TABLET ORAL DAILY
Qty: 10 TABLET | Refills: 0 | Status: SHIPPED | OUTPATIENT
Start: 2023-02-16

## 2023-02-16 RX ORDER — PEN NEEDLE, DIABETIC 31 GX3/16"
NEEDLE, DISPOSABLE MISCELLANEOUS
Qty: 100 PEN NEEDLE | Refills: 11 | Status: SHIPPED | OUTPATIENT
Start: 2023-02-16

## 2023-02-16 RX ORDER — FLUOXETINE HYDROCHLORIDE 40 MG/1
40 CAPSULE ORAL DAILY
Qty: 90 CAPSULE | Refills: 1 | Status: SHIPPED | OUTPATIENT
Start: 2023-02-16

## 2023-02-16 RX ORDER — POTASSIUM CHLORIDE 750 MG/1
10 TABLET, EXTENDED RELEASE ORAL DAILY
Qty: 90 TABLET | Refills: 1 | Status: SHIPPED | OUTPATIENT
Start: 2023-02-16

## 2023-02-16 RX ORDER — NIFEDIPINE 30 MG/1
30 TABLET, EXTENDED RELEASE ORAL DAILY
Qty: 10 TABLET | Refills: 0 | Status: SHIPPED | OUTPATIENT
Start: 2023-02-16

## 2023-02-16 RX ORDER — NIFEDIPINE 30 MG/1
30 TABLET, EXTENDED RELEASE ORAL DAILY
Qty: 90 TABLET | Refills: 1 | Status: SHIPPED | OUTPATIENT
Start: 2023-02-16 | End: 2023-02-16 | Stop reason: SDUPTHER

## 2023-02-16 RX ORDER — FUROSEMIDE 20 MG/1
20 TABLET ORAL DAILY
Qty: 90 TABLET | Refills: 1 | Status: SHIPPED | OUTPATIENT
Start: 2023-02-16 | End: 2023-02-16 | Stop reason: SDUPTHER

## 2023-02-16 RX ORDER — PEN NEEDLE, DIABETIC 31 GX3/16"
NEEDLE, DISPOSABLE MISCELLANEOUS
Qty: 200 PEN NEEDLE | Refills: 11 | Status: SHIPPED | OUTPATIENT
Start: 2023-02-16 | End: 2023-02-16 | Stop reason: SDUPTHER

## 2023-02-16 RX ORDER — LISINOPRIL 10 MG/1
10 TABLET ORAL DAILY
Qty: 90 TABLET | Refills: 1 | Status: SHIPPED | OUTPATIENT
Start: 2023-02-16 | End: 2023-02-16 | Stop reason: SDUPTHER

## 2023-02-16 RX ORDER — GLIPIZIDE 10 MG/1
10 TABLET, FILM COATED, EXTENDED RELEASE ORAL DAILY
Qty: 90 TABLET | Refills: 1 | Status: SHIPPED | OUTPATIENT
Start: 2023-02-16

## 2023-02-16 NOTE — PROGRESS NOTES
Progress Note    Patient: Dottie Oliver  : 1955  PCP: Hemal Prado NP      Assessment/Plan:     Diagnoses and all orders for this visit:    1. Uncontrolled type 2 diabetes mellitus with hyperglycemia ([address]  Lab Results   Component Value Date/Time    Hemoglobin A1c <3.8 (L) 2022 02:17 AM    Hemoglobin A1c (POC) 12.4 12/15/2022 02:22 PM     Last A1c: 13.1  Home readings: Did not bring and few readings available from home health. Trending down from the 400 range to 296 1 day ago. Medication Compliance:no but is improved. Not taking the humalog  Diabetic Eye Exam Up to date:no  Complications: CAD  Current Treatment: Humalog sliding scale, lantus 40 units nightly, Jardiance 25mg daily  Previous treatment: Metformin (could not tolerate), Januvia (not covered)      Hes not taking the humalog and feel this is too confusing for him  Will have him continue the Jardiance 25mg daily and lantus 40 units nightly  Add glipizide 10mg XR daily and reviewed use and side effects  Reports taking his Jardiance and the Lantus 40 units (most nights)  Discussed with patient importance in compliance with medications due to long term risks of uncontrolled diabetes which can threaten life and/or limb. Home health  now discharged and will see if he can be followed by pharm d due to concern of non-compliance without some monitoring/encouragement. Encouraged to stay well hydrated and notify provider immediately if signs of  mycotic infection develops  Check glucose at least daily and bring readings to next appointment. He does not believe the Abimbola Vizcaino was approved but will see if staff can check on this. Encourage to follow diabetic diet and get regular exercise 3-5 times weekly at least 30-40 minutes   Referred again back to [facility] for diabetic eye exam as this is overdue  Check feet daily and notify provider immediately if wound develops    2.  Lung nodule  CT Results (most recent):  Results from [facility] Encounter encounter on 01/27/23    CT CHEST WO CONT    Narrative  INDICATION: Solitary pulmonary nodule    COMPARISON: June 16, 2022    CONTRAST: None. TECHNIQUE:  5 mm axial images were obtained through the chest. Coronal and  sagittal reformats were generated. CT dose reduction was achieved through use  of a standardized protocol tailored for this examination and automatic exposure  control for dose modulation. The absence of intravenous contrast reduces the sensitivity for evaluation of  the mediastinum, veto, vasculature, and upper abdominal organs. FINDINGS:    CHEST WALL: No mass or axillary lymphadenopathy. THYROID: No nodule. MEDIASTINUM: No mass or lymphadenopathy. VETO: No mass or lymphadenopathy. THORACIC AORTA: No aneurysm. MAIN PULMONARY ARTERY: Normal in caliber. TRACHEA/BRONCHI: Patent. ESOPHAGUS: No wall thickening or dilatation. HEART: Normal in size. Coronary artery calcium: present  PLEURA: No effusion or pneumothorax. LUNGS: Interval resolution of airspace opacities in the right upper lobe with  residual groundglass opacification and wispy interstitial airspace opacities  suggestive of postinfectious scarring. There is a calcified granuloma in the  right upper lobe. No suspicious pulmonary nodule. INCIDENTALLY IMAGED UPPER ABDOMEN: No significant abnormality in the  incidentally imaged upper abdomen. BONES: No destructive bone lesion. Impression  1. Resolved diffuse pneumonia. 2.  Diffuse postinfectious scarring. 3.  Resolution of previously seen ovoid pulmonary nodule in the left upper lobe  which was likely related to the infection. Completed follow up CT which demonstrates resolved pneumonia and ovoid nodule     3. Noncompliance with medications  Have continued to re-interate importance in medication compliance to avoid serious health complications which can be life threatening. Referral to pharm d program given home health has discharged.     4. Hypertension  Blood pressure well controlled in office and will continue the nifedipine and lisinopril as directed. Encourage monitoring at home and bring readings to next OV          Subjective:   Sergio Jaeger is a 69 y.o. male presenting today for follow-up of uncontrolled diabetes with PMH of CAD, depression, pneumonia, hyperlipidemia, left kidney mass, obesity, hypertension and hyperlipidemia. Reports he has bee compliant with medications except the meal time insulin. States he only takes the night time dose. Has been checking glucose when home health comes and this has come down from the 400s to around 290s when checked. Not checking unless home health comes. Blood pressures with home health fluctuating but states that these were usually before medicine. Today blood pressure appears well controlled in office. Medications marked \"taking\" at this time:  Prior to Admission medications    Medication Sig Start Date End Date Taking? Authorizing Provider   FLUoxetine (PROzac) 40 mg capsule Take 1 Capsule by mouth daily. Indications: repeated episodes of anxiety 2/13/23  Yes Patrica Purdy NP   furosemide (LASIX) 20 mg tablet Take 1 Tablet by mouth daily. 2/13/23  Yes Patrica Purdy NP   empagliflozin (JARDIANCE) 25 mg tablet Take 1 Tablet by mouth daily. 2/13/23  Yes Patrica Purdy NP   Lantus Solostar U-100 Insulin 100 unit/mL (3 mL) inpn 40 Units by SubCUTAneous route nightly. 2/13/23  Yes Patrica Purdy NP   lisinopriL (PRINIVIL, ZESTRIL) 10 mg tablet Take 1 Tablet by mouth daily. 2/13/23  Yes Patrica Purdy NP   methIMAzole (TAPAZOLE) 5 mg tablet Take 1 Tablet by mouth daily. 2/13/23  Yes Patrica Purdy NP   NIFEdipine ER (PROCARDIA XL) 30 mg ER tablet Take 1 Tablet by mouth daily. 2/13/23  Yes Patrica Purdy NP   potassium chloride (KLOR-CON M10) 10 mEq tablet Take 1 Tablet by mouth daily.  2/13/23  Yes Patrica Purdy NP   flash glucose sensor (FreeStyle Carver 2 Sensor) kit Use sensor to check glucose 4 times daily 1/4/23 Reagan Adler NP   flash glucose scanning reader (FreeStyle Carver 2 [place]) misc Use to check glucose 4 times daily 1/4/23   Reagan Adler NP   insulin lispro (HUMALOG) 100 unit/mL injection INJECT PER SLIDING SCALE BEFORE BREAKFAST, LUNCH, DINNER, AND AT BEDTIME FOR BLOOD SUGAR LESS THAN 150=0 UNITS, 150-199=2 UNITS, 200-249=4 UNITS, 250-299=6 UNITS, 300-349=8 UNITS, 350 AND ABOVE= 10 UNITS AND CALL PRIMARY CARE PROVIDER. Max daily 30 units. Patient not taking: Reported on 2/16/2023 12/22/22   Reagan Adler NP   ibuprofen (MOTRIN) 600 mg tablet Take 1 Tablet by mouth every six (6) hours as needed for Pain. 11/25/22   Christin Cobb MD   insulin syringe-needle U-100 0.3 mL 31 gauge x 15/64\" syrg USE TO INJECT INSULIN 4 TIMES DAILY 6/19/22   Provider, Historical   Diabetic Supplies, Miscellan. kit 1 Kit by Does Not Apply route four (4) times daily. Dispense #180 Lancets to check blood sugars QID, Ref #1  Dispense #1 Glucometer use QID, Ref #None  Dispense #180 blood glucose strips to use QID, Reg #1  Dispense needles for injecting insulin  Dispense syringe for injecting insulin 6/17/22   Jeronimo Rodríguez PA-C        Review of Systems   Constitutional:  Positive for malaise/fatigue. Negative for chills, fever and weight loss. HENT: Negative. Eyes:  Negative for pain, discharge and redness. Respiratory:  Negative for hemoptysis, sputum production, shortness of breath and wheezing. Cardiovascular: Negative. Gastrointestinal: Negative. Genitourinary: Negative. Musculoskeletal:  Positive for myalgias. Skin: Negative. Neurological: Negative. Psychiatric/Behavioral:  Positive for depression. Negative for hallucinations, memory loss, substance abuse and suicidal ideas.         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    Uncontrolled type 2 diabetes mellitus with hyperglycemia (HCC) E11.65    Blurred vision, bilateral H53.8    Mixed hyperlipidemia E78.2    At high risk for falls Z91.81    H/O colonoscopy Z98.890    Pneumonia J18.9    Personal history of colonic polyps Z86.010    Chronic systolic (congestive) heart failure I50.22     Patient Active Problem List    Diagnosis Date Noted    Chronic systolic (congestive) heart failure 12/20/2022    Pneumonia 06/16/2022    At high risk for falls 05/23/2021    H/O colonoscopy 05/23/2021    Angiomyolipoma of left kidney 04/09/2021    Uncontrolled type 2 diabetes mellitus with hyperglycemia ([facility] [address] 04/09/2021    Blurred vision, bilateral 04/09/2021    Mixed hyperlipidemia 04/09/2021    Respiratory failure with hypoxia ([facility] 02/22/2021    Pneumonia due to COVID-19 virus 02/17/2021    Chest pain 12/12/2020    CAD (coronary artery disease) 12/12/2020    Essential hypertension 12/12/2020    Personal history of colonic polyps 06/25/2010     Current Outpatient Medications   Medication Sig Dispense Refill    FLUoxetine (PROzac) 40 mg capsule Take 1 Capsule by mouth daily. Indications: repeated episodes of anxiety 90 Capsule 1    furosemide (LASIX) 20 mg tablet Take 1 Tablet by mouth daily. 90 Tablet 1    empagliflozin (JARDIANCE) 25 mg tablet Take 1 Tablet by mouth daily. 90 Tablet 1    Lantus Solostar U-100 Insulin 100 unit/mL (3 mL) inpn 40 Units by SubCUTAneous route nightly. 36 mL 2    lisinopriL (PRINIVIL, ZESTRIL) 10 mg tablet Take 1 Tablet by mouth daily. 90 Tablet 1    methIMAzole (TAPAZOLE) 5 mg tablet Take 1 Tablet by mouth daily. 90 Tablet 0    NIFEdipine ER (PROCARDIA XL) 30 mg ER tablet Take 1 Tablet by mouth daily. 90 Tablet 1    potassium chloride (KLOR-CON M10) 10 mEq tablet Take 1 Tablet by mouth daily.  90 Tablet 1    flash glucose sensor (FreeStyle Paty 2 Sensor) kit Use sensor to check glucose 4 times daily 2 Kit 11    flash glucose scanning reader (FreeStyle Paty 2 [place]) misc Use to check glucose 4 times daily 1 Each 0    insulin lispro (HUMALOG) 100 unit/mL injection INJECT PER SLIDING SCALE BEFORE BREAKFAST, LUNCH, DINNER, AND AT BEDTIME FOR BLOOD SUGAR LESS THAN 150=0 UNITS, 150-199=2 UNITS, 200-249=4 UNITS, 250-299=6 UNITS, 300-349=8 UNITS, 350 AND ABOVE= 10 UNITS AND CALL PRIMARY CARE PROVIDER. Max daily 30 units. (Patient not taking: Reported on 2/16/2023) 27 mL 1    ibuprofen (MOTRIN) 600 mg tablet Take 1 Tablet by mouth every six (6) hours as needed for Pain. 20 Tablet 0    insulin syringe-needle U-100 0.3 mL 31 gauge x 15/64\" syrg USE TO INJECT INSULIN 4 TIMES DAILY      Diabetic Supplies, Miscellan. kit 1 Kit by Does Not Apply route four (4) times daily. Dispense #180 Lancets to check blood sugars QID, Ref #1  Dispense #1 Glucometer use QID, Ref #None  Dispense #180 blood glucose strips to use QID, Reg #1  Dispense needles for injecting insulin  Dispense syringe for injecting insulin 1 Kit 0     No Known Allergies  Past Medical History:   Diagnosis Date    Arthritis     Burning with urination     CAD (coronary artery disease)     patient stated stents placed unsure of how many     Diabetes ([address]     GERD (gastroesophageal reflux disease)     History of blood transfusion     patient stated approx 1 year ago     HTN (hypertension)     Hyperlipidemia     MI (myocardial infarction) ([address]     Personal history of colonic polyps 6/25/2010 6-    Rectal bleeding     patient stated this happened over the weekend.      Renal mass, left      Past Surgical History:   Procedure Laterality Date    COLONOSCOPY N/A 1/6/2023    COLONOSCOPY (TIVA) performed by Hans Copeland MD at [facility] ENDOSCOPY    HX COLONOSCOPY      HX CORONARY STENT PLACEMENT  2018    x1    HX HEART CATHETERIZATION      patient stated stents placed     HX NEPHRECTOMY Left 08/23/2021    robotic left partial nephrectomy,    HX UROLOGICAL  08/23/2021 intraoperative renal ultrasound    [place] UNLISTED PROCEDURE CARDIAC SURGERY  2018    BYPASS     Family History   Problem Relation Age of Onset    Hypertension Mother     Diabetes Mother     Hypertension Father     Heart Disease Father      Social History     Tobacco Use    Smoking status: Never    Smokeless tobacco: Never   Substance Use Topics    Alcohol use: Not Currently          Objective:     Visit Vitals  /65 (BP 1 Location: Left upper arm, BP Patient Position: Sitting, BP Cuff Size: Large adult)   Pulse 84   Temp 97.1 °F (36.2 °C) (Temporal)   Resp 18   Ht 5' 9\" (1.753 m)   Wt 219 lb 3.2 oz (99.4 kg)   SpO2 97%   BMI 32.37 kg/m²        Physical Exam  Vitals and nursing note reviewed. Constitutional:       Appearance: Normal appearance. He is obese. Cardiovascular:      Rate and Rhythm: Normal rate. Pulses: Normal pulses. Pulmonary:      Effort: Pulmonary effort is normal.      Comments: Dminished in bases  Abdominal:      General: Bowel sounds are normal.      Palpations: Abdomen is soft. Tenderness: There is no abdominal tenderness. There is no guarding. Musculoskeletal:      Right lower leg: Edema (trace) present. Left lower leg: Edema (trace) present. Skin:     General: Skin is warm and dry. Neurological:      Mental Status: He is alert and oriented to person, place, and time. Mental status is at baseline. Psychiatric:         Behavior: Behavior is cooperative. We discussed the expected course, resolution and complications of the diagnosis(es) in detail. Medication risks, benefits, costs, interactions, and alternatives were discussed as indicated. I advised him to contact the office if his condition worsens, changes or fails to improve as anticipated. He expressed understanding with the diagnosis(es) and plan.      Aysha Berry NP

## 2023-02-16 NOTE — PROGRESS NOTES
Chief Complaint   Patient presents with    Diabetes     6 week follow up     1. Have you been to the ER, urgent care clinic since your last visit? Hospitalized since your last visit? No    2. Have you seen or consulted any other health care providers outside of the [address] [place] [address] since your last visit? Include any pap smears or colon screening.  No

## 2023-02-17 ENCOUNTER — TELEPHONE (OUTPATIENT)
Dept: PRIMARY CARE CLINIC | Age: 68
End: 2023-02-17

## 2023-02-24 ENCOUNTER — TELEPHONE (OUTPATIENT)
Dept: PHARMACY | Age: 68
End: 2023-02-24

## 2023-02-24 NOTE — TELEPHONE ENCOUNTER
Called patient to schedule appointment for PharmD diabetes education and management per referral from PCP Bianca Alford, [facility]. Confirmed patient's . Scheduled patient for PharmD phone appointment on 3/1/23. Instructed patient to have all medications and BG readings ready for appointment. Patient expressed understanding and had no further questions. Thank you,  Patrick Ferro.  Avel Grier, [address] [place] [address] Only    Program: Medical Group  CPA in place: No  Recommendation Provided To: Patient/Caregiver: 1 via Telephone  Intervention Detail: Scheduled Appointment  Intervention Accepted By: Patient/Caregiver: 1  Time Spent (min): 10

## 2023-03-01 ENCOUNTER — HOSPITAL ENCOUNTER (EMERGENCY)
Age: 68
Discharge: HOME | End: 2023-03-01
Attending: EMERGENCY MEDICINE
Payer: MEDICARE

## 2023-03-01 ENCOUNTER — APPOINTMENT (OUTPATIENT)
Dept: VASCULAR SURGERY | Age: 68
End: 2023-03-01
Attending: EMERGENCY MEDICINE
Payer: MEDICARE

## 2023-03-01 ENCOUNTER — TELEPHONE (OUTPATIENT)
Dept: PHARMACY | Age: 68
End: 2023-03-01

## 2023-03-01 ENCOUNTER — APPOINTMENT (OUTPATIENT)
Dept: GENERAL RADIOLOGY | Age: 68
End: 2023-03-01
Attending: EMERGENCY MEDICINE
Payer: MEDICARE

## 2023-03-01 VITALS
HEIGHT: 69 IN | TEMPERATURE: 98 F | RESPIRATION RATE: 18 BRPM | OXYGEN SATURATION: 100 % | HEART RATE: 84 BPM | SYSTOLIC BLOOD PRESSURE: 182 MMHG | BODY MASS INDEX: 32.44 KG/M2 | DIASTOLIC BLOOD PRESSURE: 92 MMHG | WEIGHT: 219 LBS

## 2023-03-01 DIAGNOSIS — I96 DRY GANGRENE (HCC): Primary | ICD-10-CM

## 2023-03-01 DIAGNOSIS — E11.628 DIABETIC FOOT INFECTION (HCC): ICD-10-CM

## 2023-03-01 DIAGNOSIS — L08.9 DIABETIC FOOT INFECTION (HCC): ICD-10-CM

## 2023-03-01 LAB
ALBUMIN SERPL-MCNC: 2.9 G/DL (ref 3.5–5)
ALBUMIN/GLOB SERPL: 0.6 (ref 1.1–2.2)
ALP SERPL-CCNC: 90 U/L (ref 45–117)
ALT SERPL-CCNC: 21 U/L (ref 12–78)
ANION GAP SERPL CALC-SCNC: 15 MMOL/L (ref 5–15)
AST SERPL W P-5'-P-CCNC: 24 U/L (ref 15–37)
BASOPHILS # BLD: 0 K/UL (ref 0–0.1)
BASOPHILS NFR BLD: 0 % (ref 0–1)
BILIRUB SERPL-MCNC: 0.5 MG/DL (ref 0.2–1)
BUN SERPL-MCNC: 18 MG/DL (ref 6–20)
BUN/CREAT SERPL: 14 (ref 12–20)
CA-I BLD-MCNC: 9 MG/DL (ref 8.5–10.1)
CHLORIDE SERPL-SCNC: 101 MMOL/L (ref 97–108)
CO2 SERPL-SCNC: 22 MMOL/L (ref 21–32)
CREAT SERPL-MCNC: 1.29 MG/DL (ref 0.7–1.3)
DIFFERENTIAL METHOD BLD: ABNORMAL
EOSINOPHIL # BLD: 0 K/UL (ref 0–0.4)
EOSINOPHIL NFR BLD: 0 % (ref 0–7)
ERYTHROCYTE [DISTWIDTH] IN BLOOD BY AUTOMATED COUNT: 12.3 % (ref 11.5–14.5)
GLOBULIN SER CALC-MCNC: 5 G/DL (ref 2–4)
GLUCOSE SERPL-MCNC: 139 MG/DL (ref 65–100)
HCT VFR BLD AUTO: 37.8 % (ref 36.6–50.3)
HGB BLD-MCNC: 12.3 G/DL (ref 12.1–17)
IMM GRANULOCYTES # BLD AUTO: 0 K/UL (ref 0–0.04)
IMM GRANULOCYTES NFR BLD AUTO: 0 % (ref 0–0.5)
INR PPP: 1.1 (ref 0.9–1.1)
LACTATE SERPL-SCNC: 1.4 MMOL/L (ref 0.4–2)
LEFT ABI: 0.66
LEFT ARM BP: 170 MMHG
LEFT POSTERIOR TIBIAL: 121 MMHG
LYMPHOCYTES # BLD: 1.3 K/UL (ref 0.8–3.5)
LYMPHOCYTES NFR BLD: 12 % (ref 12–49)
MCH RBC QN AUTO: 28.3 PG (ref 26–34)
MCHC RBC AUTO-ENTMCNC: 32.5 G/DL (ref 30–36.5)
MCV RBC AUTO: 86.9 FL (ref 80–99)
MONOCYTES # BLD: 1 K/UL (ref 0–1)
MONOCYTES NFR BLD: 9 % (ref 5–13)
NEUTS SEG # BLD: 8.2 K/UL (ref 1.8–8)
NEUTS SEG NFR BLD: 79 % (ref 32–75)
NRBC # BLD: 0 K/UL (ref 0–0.01)
NRBC BLD-RTO: 0 PER 100 WBC
PLATELET # BLD AUTO: 305 K/UL (ref 150–400)
PMV BLD AUTO: 9.7 FL (ref 8.9–12.9)
POTASSIUM SERPL-SCNC: 3.5 MMOL/L (ref 3.5–5.1)
PROT SERPL-MCNC: 7.9 G/DL (ref 6.4–8.2)
PROTHROMBIN TIME: 13.9 SEC (ref 11.9–14.6)
RBC # BLD AUTO: 4.35 M/UL (ref 4.1–5.7)
RIGHT ABI: 0.95
RIGHT ARM BP: 192 MMHG
RIGHT POSTERIOR TIBIAL: 183 MMHG
SODIUM SERPL-SCNC: 138 MMOL/L (ref 136–145)
VAS LEFT DORSALIS PEDIS BP: 126 MMHG
VAS RIGHT DORSALIS PEDIS BP: 182 MMHG
WBC # BLD AUTO: 10.5 K/UL (ref 4.1–11.1)

## 2023-03-01 PROCEDURE — 96375 TX/PRO/DX INJ NEW DRUG ADDON: CPT

## 2023-03-01 PROCEDURE — 36415 COLL VENOUS BLD VENIPUNCTURE: CPT

## 2023-03-01 PROCEDURE — 85025 COMPLETE CBC W/AUTO DIFF WBC: CPT

## 2023-03-01 PROCEDURE — 99284 EMERGENCY DEPT VISIT MOD MDM: CPT

## 2023-03-01 PROCEDURE — 85610 PROTHROMBIN TIME: CPT

## 2023-03-01 PROCEDURE — 96374 THER/PROPH/DIAG INJ IV PUSH: CPT

## 2023-03-01 PROCEDURE — 74011250636 HC RX REV CODE- 250/636: Performed by: EMERGENCY MEDICINE

## 2023-03-01 PROCEDURE — 83605 ASSAY OF LACTIC ACID: CPT

## 2023-03-01 PROCEDURE — 93922 UPR/L XTREMITY ART 2 LEVELS: CPT

## 2023-03-01 PROCEDURE — 73630 X-RAY EXAM OF FOOT: CPT

## 2023-03-01 PROCEDURE — 74011000258 HC RX REV CODE- 258: Performed by: EMERGENCY MEDICINE

## 2023-03-01 PROCEDURE — 80053 COMPREHEN METABOLIC PANEL: CPT

## 2023-03-01 RX ORDER — CEPHALEXIN 500 MG/1
500 CAPSULE ORAL 2 TIMES DAILY
Qty: 10 CAPSULE | Refills: 0 | Status: SHIPPED | OUTPATIENT
Start: 2023-03-01 | End: 2023-03-06

## 2023-03-01 RX ORDER — KETOROLAC TROMETHAMINE 30 MG/ML
15 INJECTION, SOLUTION INTRAMUSCULAR; INTRAVENOUS ONCE
Status: COMPLETED | OUTPATIENT
Start: 2023-03-01 | End: 2023-03-01

## 2023-03-01 RX ADMIN — KETOROLAC TROMETHAMINE 15 MG: 30 INJECTION, SOLUTION INTRAMUSCULAR at 11:48

## 2023-03-01 RX ADMIN — SODIUM CHLORIDE 1000 ML: 9 INJECTION, SOLUTION INTRAVENOUS at 11:49

## 2023-03-01 RX ADMIN — CEFAZOLIN 2 G: 1 INJECTION, POWDER, FOR SOLUTION INTRAMUSCULAR; INTRAVENOUS at 14:11

## 2023-03-01 NOTE — ED TRIAGE NOTES
Reports \"diabetes got my left foot\", pt has sore to side of left great toe that has black center and foul odor noted, reports has been like this for a couple weeks,

## 2023-03-01 NOTE — ED PROVIDER NOTES
[facility] EMERGENCY DEPT  EMERGENCY DEPARTMENT HISTORY AND PHYSICAL EXAM      Date: 3/1/2023  Patient Name: Radha Orr  MRN: [id number]  [place]: 1955  Date of evaluation: 3/1/2023  Provider: Juliette Dior MD   Note Started: 11:33 AM 3/1/23    HISTORY OF PRESENT ILLNESS     Chief Complaint   Patient presents with    Foot Pain       History Provided By: Patient    HPI: Radha Orr, 69 y.o. male presents with complaint of left foot pain present for 2 weeks    PAST MEDICAL HISTORY   Past Medical History:  Past Medical History:   Diagnosis Date    Arthritis     Burning with urination     CAD (coronary artery disease)     patient stated stents placed unsure of how many     Diabetes ([address]     GERD (gastroesophageal reflux disease)     History of blood transfusion     patient stated approx 1 year ago     HTN (hypertension)     Hyperlipidemia     MI (myocardial infarction) ([address]     Personal history of colonic polyps 6/25/2010 6-    Rectal bleeding     patient stated this happened over the weekend.      Renal mass, left        Past Surgical History:  Past Surgical History:   Procedure Laterality Date    COLONOSCOPY N/A 1/6/2023    COLONOSCOPY (TIVA) performed by Kiana Albert MD at [facility]    HX COLONOSCOPY      HX CORONARY STENT PLACEMENT  2018    x1    HX HEART CATHETERIZATION      patient stated stents placed     HX NEPHRECTOMY Left 08/23/2021    robotic left partial nephrectomy,    HX UROLOGICAL  08/23/2021     intraoperative renal ultrasound    IN UNLISTED PROCEDURE CARDIAC SURGERY  2018    BYPASS       Family History:  Family History   Problem Relation Age of Onset    Hypertension Mother     Diabetes Mother     Hypertension Father     Heart Disease Father        Social History:  Social History     Tobacco Use    Smoking status: Never    Smokeless tobacco: Never   Vaping Use    Vaping Use: Never used   Substance Use Topics    Alcohol use: Not Currently    Drug use: Yes     Types: Marijuana     Comment: smoke last night       Allergies:  No Known Allergies    PCP: Cecilio Austin NP    Current Meds:   Previous Medications    DIABETIC Chirag Sorto. KIT    1 Kit by Does Not Apply route four (4) times daily. Dispense #180 Lancets to check blood sugars QID, Ref #1  Dispense #1 Glucometer use QID, Ref #None  Dispense #180 blood glucose strips to use QID, Reg #1  Dispense needles for injecting insulin  Dispense syringe for injecting insulin    EMPAGLIFLOZIN (JARDIANCE) 25 MG TABLET    Take 1 Tablet by mouth daily. FLASH GLUCOSE SCANNING READER (FREESTYLE AMERICO 2 READER) MISC    Use to check glucose 4 times daily    FLASH GLUCOSE SENSOR (FREESTYLE AMERICO 2 SENSOR) KIT    Use sensor to check glucose 4 times daily    FLUOXETINE (PROZAC) 40 MG CAPSULE    Take 1 Capsule by mouth daily. Indications: repeated episodes of anxiety    FUROSEMIDE (LASIX) 20 MG TABLET    Take 1 Tablet by mouth daily. GLIPIZIDE SR (GLUCOTROL XL) 10 MG CR TABLET    Take 1 Tablet by mouth daily. IBUPROFEN (MOTRIN) 600 MG TABLET    Take 1 Tablet by mouth every six (6) hours as needed for Pain. INSULIN LISPRO (HUMALOG) 100 UNIT/ML INJECTION    INJECT PER SLIDING SCALE BEFORE BREAKFAST, LUNCH, DINNER, AND AT BEDTIME FOR BLOOD SUGAR LESS THAN 150=0 UNITS, 150-199=2 UNITS, 200-249=4 UNITS, 250-299=6 UNITS, 300-349=8 UNITS, 350 AND ABOVE= 10 UNITS AND CALL PRIMARY CARE PROVIDER. Max daily 30 units. INSULIN NEEDLES, DISPOSABLE, 32 GAUGE X 5/32\" NDLE    Use to inject insulin daily    INSULIN SYRINGE-NEEDLE U-100 0.3 ML 31 GAUGE X 15/64\" SYRG    USE TO INJECT INSULIN 4 TIMES DAILY    LANTUS SOLOSTAR U-100 INSULIN 100 UNIT/ML (3 ML) INPN    40 Units by SubCUTAneous route nightly. LISINOPRIL (PRINIVIL, ZESTRIL) 10 MG TABLET    Take 1 Tablet by mouth daily. METHIMAZOLE (TAPAZOLE) 5 MG TABLET    Take 1 Tablet by mouth daily. NIFEDIPINE ER (PROCARDIA XL) 30 MG ER TABLET    Take 1 Tablet by mouth daily. POTASSIUM CHLORIDE (KLOR-CON M10) 10 MEQ TABLET    Take 1 Tablet by mouth daily. REVIEW OF SYSTEMS   Review of Systems   Constitutional:  Negative for chills and fever. Musculoskeletal:  Positive for arthralgias. Skin:  Positive for color change and wound. All other systems reviewed and are negative. Positives and Pertinent negatives as per HPI. PHYSICAL EXAM     ED Triage Vitals [03/01/23 1115]   ED Encounter Vitals Group      BP (!) 208/115      Pulse (Heart Rate) 85      Resp Rate 18      Temp 98 °F (36.7 °C)      Temp src       O2 Sat (%) 99 %      Weight 219 lb      Height 5' 9\"      Physical Exam  Vitals and nursing note reviewed. Constitutional:       General: He is not in acute distress. Appearance: He is not ill-appearing, toxic-appearing or diaphoretic. Cardiovascular:      Rate and Rhythm: Normal rate and regular rhythm. Pulses:           Dorsalis pedis pulses are 2+ on the left side. Posterior tibial pulses are detected w/ Doppler on the left side. Pulmonary:      Effort: Pulmonary effort is normal.      Breath sounds: Normal breath sounds. Musculoskeletal:      Left foot: Swelling and tenderness present. No Charcot foot. Abnormal pulse. Feet:    Feet:      Left foot:      Toenail Condition: Left toenails are abnormally thick. Skin:     Findings: Ecchymosis and lesion present. No abscess or wound. Comments: Pedal capillary refill 2 to 3 seconds   Neurological:      General: No focal deficit present. Mental Status: He is alert and oriented to person, place, and time. Sensory: No sensory deficit. SCREENINGS               No data recorded      LAB, EKG AND DIAGNOSTIC RESULTS   Labs:  No results found for this or any previous visit (from the past 12 hour(s)). EKG: Initial EKG interpreted by me.  Shows     Radiologic Studies:  Non-plain film images such as CT, Ultrasound and MRI are read by the radiologist. Plain radiographic images are visualized and preliminarily interpreted by the ED Provider with the below findings:    *    Interpretation per the Radiologist below, if available at the time of this note:  No results found. PROCEDURES   Unless otherwise noted below, none. Performed by: Marisel Pineda MD   Procedures      CRITICAL CARE TIME       ED COURSE and DIFFERENTIAL DIAGNOSIS/MDM   Vitals:    Vitals:    03/01/23 1115 03/01/23 1126   BP: (!) 208/115    Pulse: 85    Resp: 18    Temp: 98 °F (36.7 °C)    SpO2: 99% 99%   Weight: 99.3 kg (219 lb)    Height: 5' 9\" (1.753 m)         Patient was given the following medications:  Medications - No data to display    CONSULTS: (Who and What was discussed)  None     Chronic Conditions: Diabetes diabetes, hypertension, CAD  Social Determinants affecting Dx or Tx: Patient lacks support at home or lives alone. Counseling: ALCOHOL/SUBSTANCE ABUSE COUNSELING: Upon evaluation, pt endorsed recent alcohol/illicit drug use. For approximately 15 minutes, pt has been counseled on the dangers of alcohol and illicit drug use on their health, and they were encouraged to quit as soon as possible in order to decrease further risks to their health. Pt has conveyed their understanding of the risks involved should they continue to use these products. Records Reviewed (source and summary of external notes): Prior medical records    CC/HPI Summary, DDx, ED Course, and Reassessment: 68-year-old male presents complaint of left foot pain for 2 weeks and a sore left side of his great toe that has been developing over the last 2 weeks    DDx Charcot's foot, sepsis, localized infection, peripheral arterial disease    Labs ordered, ankle-brachial index ordered     Disposition Considerations (Tests not done, Shared Decision Making, Pt Expectation of Test or Treatment.):  Outpatient Case management consult placed    FINAL IMPRESSION   No diagnosis found.       DISPOSITION/PLAN       Discharge Note: The patient is stable for discharge home. The signs, symptoms, diagnosis, and discharge instructions have been discussed, understanding conveyed, and agreed upon. The patient is to follow up as recommended or return to ER should their symptoms worsen. PATIENT REFERRED TO:  Follow-up Information    None           DISCHARGE MEDICATIONS:  Current Discharge Medication List            DISCONTINUED MEDICATIONS:  Current Discharge Medication List          I am the Primary Clinician of Record: Melany Wick MD (electronically signed)    (Please note that parts of this dictation were completed with voice recognition software. Quite often unanticipated grammatical, syntax, homophones, and other interpretive errors are inadvertently transcribed by the computer software. Please disregards these errors.  Please excuse any errors that have escaped final proofreading.)

## 2023-03-01 NOTE — TELEPHONE ENCOUNTER
Pharmacy Progress Note     Attempted to complete DM education visit with patient today; however, patient reported that his foot/leg was in severe pain. He mentioned that he has a wound on his foot that may not have healed and believes it may be infected. Encouraged patient to go to ER or urgent care immediately as he may have a diabetic foot infection. He expressed understanding. Thank you,  Nahun Rodríguez.  KAVYA Sánchez  Clinical Pharmacist                 For Pharmacy Admin Tracking Only    Program: Medical Group  CPA in place: No  Recommendation Provided To: Patient/Caregiver: 1 via Telephone  Intervention Detail: Referral to Other Provider  Intervention Accepted By: Patient/Caregiver: 1  Time Spent (min): 15

## 2023-03-01 NOTE — ED NOTES
Spoke to [employer] at [facility] and has received fax. Will review and contact patient.

## 2023-03-02 NOTE — ED NOTES
Patient contacted today patient has picked up his antibiotics,    Patient encouraged to keep his appointment with Vik Tabares which is scheduled for later this month patient was informed that he is at risk of losing some toes and we would like to save his foot, patient understands and he said he will follow-up

## 2023-03-08 ENCOUNTER — OFFICE VISIT (OUTPATIENT)
Dept: PRIMARY CARE CLINIC | Age: 68
End: 2023-03-08
Payer: MEDICARE

## 2023-03-08 VITALS
HEIGHT: 69 IN | RESPIRATION RATE: 18 BRPM | BODY MASS INDEX: 31.87 KG/M2 | SYSTOLIC BLOOD PRESSURE: 127 MMHG | DIASTOLIC BLOOD PRESSURE: 73 MMHG | TEMPERATURE: 97.4 F | HEART RATE: 90 BPM | OXYGEN SATURATION: 98 % | WEIGHT: 215.2 LBS

## 2023-03-08 DIAGNOSIS — E11.621 DIABETIC ULCER OF TOE OF LEFT FOOT ASSOCIATED WITH TYPE 2 DIABETES MELLITUS, UNSPECIFIED ULCER STAGE (HCC): Primary | ICD-10-CM

## 2023-03-08 DIAGNOSIS — L03.116 CELLULITIS OF LEFT LOWER EXTREMITY: ICD-10-CM

## 2023-03-08 DIAGNOSIS — L97.529 DIABETIC ULCER OF TOE OF LEFT FOOT ASSOCIATED WITH TYPE 2 DIABETES MELLITUS, UNSPECIFIED ULCER STAGE (HCC): Primary | ICD-10-CM

## 2023-03-08 DIAGNOSIS — E11.65 UNCONTROLLED TYPE 2 DIABETES MELLITUS WITH HYPERGLYCEMIA (HCC): ICD-10-CM

## 2023-03-08 PROCEDURE — 1101F PT FALLS ASSESS-DOCD LE1/YR: CPT | Performed by: NURSE PRACTITIONER

## 2023-03-08 PROCEDURE — 1123F ACP DISCUSS/DSCN MKR DOCD: CPT | Performed by: NURSE PRACTITIONER

## 2023-03-08 PROCEDURE — G8427 DOCREV CUR MEDS BY ELIG CLIN: HCPCS | Performed by: NURSE PRACTITIONER

## 2023-03-08 PROCEDURE — G8417 CALC BMI ABV UP PARAM F/U: HCPCS | Performed by: NURSE PRACTITIONER

## 2023-03-08 PROCEDURE — 3078F DIAST BP <80 MM HG: CPT | Performed by: NURSE PRACTITIONER

## 2023-03-08 PROCEDURE — 3074F SYST BP LT 130 MM HG: CPT | Performed by: NURSE PRACTITIONER

## 2023-03-08 PROCEDURE — 3046F HEMOGLOBIN A1C LEVEL >9.0%: CPT | Performed by: NURSE PRACTITIONER

## 2023-03-08 PROCEDURE — G9717 DOC PT DX DEP/BP F/U NT REQ: HCPCS | Performed by: NURSE PRACTITIONER

## 2023-03-08 PROCEDURE — 3017F COLORECTAL CA SCREEN DOC REV: CPT | Performed by: NURSE PRACTITIONER

## 2023-03-08 PROCEDURE — G8536 NO DOC ELDER MAL SCRN: HCPCS | Performed by: NURSE PRACTITIONER

## 2023-03-08 PROCEDURE — 2022F DILAT RTA XM EVC RTNOPTHY: CPT | Performed by: NURSE PRACTITIONER

## 2023-03-08 PROCEDURE — 99214 OFFICE O/P EST MOD 30 MIN: CPT | Performed by: NURSE PRACTITIONER

## 2023-03-08 RX ORDER — PEN NEEDLE, DIABETIC 32 GX 1/4"
NEEDLE, DISPOSABLE MISCELLANEOUS
COMMUNITY
Start: 2023-02-16

## 2023-03-08 RX ORDER — GLIPIZIDE 10 MG/1
10 TABLET ORAL 2 TIMES DAILY
Qty: 60 TABLET | Refills: 3 | Status: SHIPPED | OUTPATIENT
Start: 2023-03-08

## 2023-03-08 RX ORDER — CIPROFLOXACIN 750 MG/1
750 TABLET, FILM COATED ORAL 2 TIMES DAILY
Qty: 20 TABLET | Refills: 0 | Status: SHIPPED | OUTPATIENT
Start: 2023-03-08 | End: 2023-03-18

## 2023-03-08 RX ORDER — CLINDAMYCIN HYDROCHLORIDE 300 MG/1
300 CAPSULE ORAL 3 TIMES DAILY
Qty: 30 CAPSULE | Refills: 0 | Status: SHIPPED | OUTPATIENT
Start: 2023-03-08 | End: 2023-03-18

## 2023-03-08 NOTE — PROGRESS NOTES
Chief Complaint   Patient presents with    ED Follow-up    Toe Injury     Toe infection     1. Have you been to the ER, urgent care clinic since your last visit? Hospitalized since your last visit?in chart     2. Have you seen or consulted any other health care providers outside of the [address] [place] [address] since your last visit? Include any pap smears or colon screening.  No

## 2023-03-09 ENCOUNTER — TELEPHONE (OUTPATIENT)
Dept: PRIMARY CARE CLINIC | Age: 68
End: 2023-03-09

## 2023-03-09 ENCOUNTER — OFFICE VISIT (OUTPATIENT)
Dept: PODIATRY | Age: 68
End: 2023-03-09
Payer: MEDICARE

## 2023-03-09 DIAGNOSIS — L03.032 CELLULITIS AND ABSCESS OF TOE, LEFT: ICD-10-CM

## 2023-03-09 DIAGNOSIS — L02.612 CELLULITIS AND ABSCESS OF TOE, LEFT: ICD-10-CM

## 2023-03-09 DIAGNOSIS — L97.522 DIABETIC ULCER OF TOE OF LEFT FOOT ASSOCIATED WITH DIABETES MELLITUS DUE TO UNDERLYING CONDITION, WITH FAT LAYER EXPOSED (HCC): Primary | ICD-10-CM

## 2023-03-09 DIAGNOSIS — E08.621 DIABETIC ULCER OF TOE OF LEFT FOOT ASSOCIATED WITH DIABETES MELLITUS DUE TO UNDERLYING CONDITION, WITH FAT LAYER EXPOSED (HCC): Primary | ICD-10-CM

## 2023-03-09 DIAGNOSIS — E11.65 UNCONTROLLED TYPE 2 DIABETES MELLITUS WITH HYPERGLYCEMIA (HCC): ICD-10-CM

## 2023-03-09 PROCEDURE — G9717 DOC PT DX DEP/BP F/U NT REQ: HCPCS | Performed by: PODIATRIST

## 2023-03-09 PROCEDURE — G8536 NO DOC ELDER MAL SCRN: HCPCS | Performed by: PODIATRIST

## 2023-03-09 PROCEDURE — G8417 CALC BMI ABV UP PARAM F/U: HCPCS | Performed by: PODIATRIST

## 2023-03-09 PROCEDURE — 1101F PT FALLS ASSESS-DOCD LE1/YR: CPT | Performed by: PODIATRIST

## 2023-03-09 PROCEDURE — 3017F COLORECTAL CA SCREEN DOC REV: CPT | Performed by: PODIATRIST

## 2023-03-09 PROCEDURE — G8427 DOCREV CUR MEDS BY ELIG CLIN: HCPCS | Performed by: PODIATRIST

## 2023-03-09 PROCEDURE — 1123F ACP DISCUSS/DSCN MKR DOCD: CPT | Performed by: PODIATRIST

## 2023-03-09 PROCEDURE — 3046F HEMOGLOBIN A1C LEVEL >9.0%: CPT | Performed by: PODIATRIST

## 2023-03-09 PROCEDURE — 2022F DILAT RTA XM EVC RTNOPTHY: CPT | Performed by: PODIATRIST

## 2023-03-09 PROCEDURE — 99204 OFFICE O/P NEW MOD 45 MIN: CPT | Performed by: PODIATRIST

## 2023-03-09 PROCEDURE — 11042 DBRDMT SUBQ TIS 1ST 20SQCM/<: CPT | Performed by: PODIATRIST

## 2023-03-09 RX ORDER — TRAMADOL HYDROCHLORIDE 50 MG/1
50 TABLET ORAL
Qty: 12 TABLET | Refills: 0 | Status: SHIPPED | OUTPATIENT
Start: 2023-03-09 | End: 2023-03-12

## 2023-03-09 NOTE — TELEPHONE ENCOUNTER
Spoke with Miranda Marvin at Dr Chris Pierce office and she scheduled pt to be seen today-called pt and he stated that he would not be able to get there today, I stressed the urgency for him to keep the appt for today and asked if his daughter could take him and he said she was at work.  Pt stated he would call the office to cancel

## 2023-03-09 NOTE — PROGRESS NOTES
1. Have you been to the ER, urgent care clinic since your last visit? Hospitalized since your last visit? Yes Where: [address]    2. Have you seen or consulted any other health care providers outside of the [address] [place] [address] since your last visit? Include any pap smears or colon screening.  No    Chief Complaint   Patient presents with    Diabetic Foot Exam

## 2023-03-09 NOTE — PROGRESS NOTES
Progress Note    Patient: Karo Dixon  : 1955  PCP: Lo Underwood NP      Assessment/Plan:     Diagnoses and all orders for this visit:    1. Uncontrolled type 2 diabetes mellitus with hyperglycemia ([facility]  Lab Results   Component Value Date/Time    Hemoglobin A1c <3.8 (L) 2022 02:17 AM    Hemoglobin A1c (POC) 12.4 12/15/2022 02:22 PM     Home readings: Only few readings available which are averaging around 250  Medication Compliance:no but is improved. Not taking the humalog  Diabetic Eye Exam Up to date:no  Complications: CAD  Current Treatment: Humalog sliding scale, lantus 40 units nightly, Jardiance 25mg daily, Glipizide 10mg XR daily  Previous treatment: Metformin (could not tolerate), Januvia (not covered)      Hes not taking the humalog as continues to feel that it is too confusing and does not check glucose before he eats very often. Will have him continue the Jardiance 25mg daily and lantus 40 units nightly  Increase the glipizide to 10mg twice daily   Discussed with patient again importance in compliance with medications due to long term risks of uncontrolled diabetes which can threaten life and/or limb. Home health  did  discharge and  pharm d referral sent last visit due to concern of non-compliance without some monitoring/encouragement. Encouraged to stay well hydrated and notify provider immediately if signs of  mycotic infection develops  Check glucose at least daily and bring readings to next appointment. He does not believe the Larissa Agarwal was approved but going to try sending dexcom. Daughter will try to help him with this. Encourage to follow diabetic diet and get regular exercise 3-5 times weekly at least 30-40 minutes (once foot wound healed)  Referred  to [facility] for diabetic eye exam as this is overdue  Referring for urgent visit to podiatry due to the foot wound.   Would like him to see endocrinology but patient has declined due to transportation difficulty  - glipiZIDE (GLUCOTROL) 10 mg tablet; Take 1 Tablet by mouth two (2) times a day. Dispense: 60 Tablet; Refill: 3     2. Diabetic ulcer of toe of left foot associated with type 2 diabetes mellitus, unspecified ulcer stage [facility])  Reviewed ED notes  Patient given keflex which he completed but minimal improvement is noted  High concern given uncontrolled diabetes  Reports his tetanus is up to date   He did not have wound covered and discussed importance in keeping wound clean  Wound care completed in office with saline irrigation, topical bacitracin and tegaderm application. Will continue at home twice daily  Starting on clindamycin to add MRSA coverage as well as cipro for pseudomonas  Will attempt to get urgent visit with podiatry on 3/9/23 due to high concern for worsening infection given uncontrolled diabetes and noncompliance history. Daughter who lives out of town is with him and states that she is able to help him get transportation to appointment. Sending tramadol to use sparingly prn for pain uncontrolled with other measures  Advised do not drive or operate machinery if experiencing drowsiness or otherwise feel impaired while taking medication. Follow up pending   Advised if he develops increasing redness, pain or other worsening symptoms, needs to go immediately back to ED.  - clindamycin (CLEOCIN) 300 mg capsule; Take 1 Capsule by mouth three (3) times daily for 10 days. Dispense: 30 Capsule; Refill: 0  - ciprofloxacin HCl (CIPRO) 750 mg tablet; Take 1 Tablet by mouth two (2) times a day for 10 days. Dispense: 20 Tablet; Refill: 0  Controlled Substance Monitoring:    RX Monitoring 3/8/2023   Periodic Controlled Substance Monitoring Possible medication side effects, risk of tolerance/dependence & alternative treatments discussed. ;No signs of potential drug abuse or diversion identified. 3. Cellulitis of left lower extremity  See above  - clindamycin (CLEOCIN) 300 mg capsule;  Take 1 Capsule by mouth three (3) times daily for 10 days. Dispense: 30 Capsule; Refill: 0  - ciprofloxacin HCl (CIPRO) 750 mg tablet; Take 1 Tablet by mouth two (2) times a day for 10 days. Dispense: 20 Tablet; Refill: 0        Follow-up and Dispositions    Return pending urgent eval with podiatry. Subjective:   Dada Deshpande is a 69 y.o. male presenting today for follow-up from ED due to left foot wound with PMH of uncontrolled diabetes,CAD, depression, pneumonia, hyperlipidemia, left kidney mass, obesity, hypertension and hyperlipidemia. Reports he has been compliant with medications except the meal time insulin. Was seen in ED on 3/1/23 after developing blister to left great toe about 1 week prior. States that he was given keflex but has not seen any improvement. Area is turning black in center of wound with no significant drainage. Does endorse pain in left foot as well as redness and swelling. No fever or chills. Has not been set up with podiatry. Did bring few readings from home and has been around 250s range. Medications marked \"taking\" at this time:  Prior to Admission medications    Medication Sig Start Date End Date Taking? Authorizing Provider   traMADoL (ULTRAM) 50 mg tablet Take 1 Tablet by mouth every eight (8) hours as needed for Pain for up to 3 days. Max Daily Amount: 150 mg. 3/9/23 3/12/23 Yes Ayla Vu NP   clindamycin (CLEOCIN) 300 mg capsule Take 1 Capsule by mouth three (3) times daily for 10 days. 3/8/23 3/18/23 Yes Ayla Vu NP   ciprofloxacin HCl (CIPRO) 750 mg tablet Take 1 Tablet by mouth two (2) times a day for 10 days. 3/8/23 3/18/23 Yes Ayla Vu NP   glipiZIDE (GLUCOTROL) 10 mg tablet Take 1 Tablet by mouth two (2) times a day. 3/8/23  Yes Ayla Vu NP   BD Ultra-Fine Micro Pen Needle 32 gauge x 1/4\" ndle USE TO INJECT INSULIN DAILY 2/16/23   Provider, Historical   FLUoxetine (PROzac) 40 mg capsule Take 1 Capsule by mouth daily.  Indications: repeated episodes of anxiety 2/16/23   Patirca Purdy NP   empagliflozin (JARDIANCE) 25 mg tablet Take 1 Tablet by mouth daily. 2/16/23   Isaiahbarbara EMPERATRIZ Purdy   potassium chloride (KLOR-CON M10) 10 mEq tablet Take 1 Tablet by mouth daily. 2/16/23   Patrica Purdy NP   NIFEdipine ER (PROCARDIA XL) 30 mg ER tablet Take 1 Tablet by mouth daily. 2/16/23   Patrica Purdy NP   lisinopriL (PRINIVIL, ZESTRIL) 10 mg tablet Take 1 Tablet by mouth daily. 2/16/23   Patrica Purdy NP   furosemide (LASIX) 20 mg tablet Take 1 Tablet by mouth daily. 2/16/23   Patrica Purdy NP   Lantus Solostar U-100 Insulin 100 unit/mL (3 mL) inpn 40 Units by SubCUTAneous route nightly. 2/13/23   Patrica Purdy NP   methIMAzole (TAPAZOLE) 5 mg tablet Take 1 Tablet by mouth daily. 2/13/23   Patrica Purdy NP   flash glucose sensor (FreeStyle Carver 2 Sensor) kit Use sensor to check glucose 4 times daily 1/4/23   Isaiahbarbara EMPERATRIZ Purdy   flash glucose scanning reader [facility] Carver 2 [place]) misc Use to check glucose 4 times daily 1/4/23   Patrica Purdy NP   insulin lispro (HUMALOG) 100 unit/mL injection INJECT PER SLIDING SCALE BEFORE BREAKFAST, LUNCH, DINNER, AND AT BEDTIME FOR BLOOD SUGAR LESS THAN 150=0 UNITS, 150-199=2 UNITS, 200-249=4 UNITS, 250-299=6 UNITS, 300-349=8 UNITS, 350 AND ABOVE= 10 UNITS AND CALL PRIMARY CARE PROVIDER. Max daily 30 units. Patient not taking: Reported on 2/16/2023 12/22/22   Patrica Purdy NP   ibuprofen (MOTRIN) 600 mg tablet Take 1 Tablet by mouth every six (6) hours as needed for Pain. 11/25/22   Christin Cobb MD   insulin syringe-needle U-100 0.3 mL 31 gauge x 15/64\" syrg USE TO INJECT INSULIN 4 TIMES DAILY 6/19/22   Provider, Historical   Diabetic Supplies, Miscellan. kit 1 Kit by Does Not Apply route four (4) times daily.  Dispense #180 Lancets to check blood sugars QID, Ref #1  Dispense #1 Glucometer use QID, Ref #None  Dispense #180 blood glucose strips to use QID, Reg #1  Dispense needles for injecting insulin  Dispense syringe for injecting insulin 6/17/22   Joyce Mcelroy PA-C        Review of Systems   Constitutional:  Positive for malaise/fatigue. Negative for chills, fever and weight loss. HENT: Negative. Eyes:  Negative for pain, discharge and redness. Respiratory:  Negative for hemoptysis, sputum production, shortness of breath and wheezing. Cardiovascular:  Positive for leg swelling (left foot). Negative for chest pain, palpitations, orthopnea and claudication. Gastrointestinal: Negative. Genitourinary: Negative. Musculoskeletal:  Positive for joint pain and myalgias. Skin:         Wound left great toe   Neurological: Negative. Psychiatric/Behavioral:  Positive for depression. Negative for hallucinations, memory loss, substance abuse and suicidal ideas.         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    Uncontrolled type 2 diabetes mellitus with hyperglycemia (HCC) E11.65    Blurred vision, bilateral H53.8    Mixed hyperlipidemia E78.2    At high risk for falls Z91.81    H/O colonoscopy Z98.890    Pneumonia J18.9    Personal history of colonic polyps Z86.010    Chronic systolic (congestive) heart failure I50.22    Moderate episode of recurrent major depressive disorder ([address] F33.1     Patient Active Problem List    Diagnosis Date Noted    Moderate episode of recurrent major depressive disorder ([address] 02/16/2023    Chronic systolic (congestive) heart failure 12/20/2022    Pneumonia 06/16/2022    At high risk for falls 05/23/2021    H/O colonoscopy 05/23/2021    Angiomyolipoma of left kidney 04/09/2021    Uncontrolled type 2 diabetes mellitus with hyperglycemia ([address] 04/09/2021    Blurred vision, bilateral 04/09/2021    Mixed hyperlipidemia 04/09/2021    Respiratory failure with hypoxia ([facility] 02/22/2021    Pneumonia due to COVID-19 virus 02/17/2021    Chest pain 12/12/2020    CAD (coronary artery disease) 12/12/2020    Essential hypertension 12/12/2020    Personal history of colonic polyps 06/25/2010     Current Outpatient Medications   Medication Sig Dispense Refill    traMADoL (ULTRAM) 50 mg tablet Take 1 Tablet by mouth every eight (8) hours as needed for Pain for up to 3 days. Max Daily Amount: 150 mg. 12 Tablet 0    clindamycin (CLEOCIN) 300 mg capsule Take 1 Capsule by mouth three (3) times daily for 10 days. 30 Capsule 0    ciprofloxacin HCl (CIPRO) 750 mg tablet Take 1 Tablet by mouth two (2) times a day for 10 days. 20 Tablet 0    glipiZIDE (GLUCOTROL) 10 mg tablet Take 1 Tablet by mouth two (2) times a day. 60 Tablet 3    BD Ultra-Fine Micro Pen Needle 32 gauge x 1/4\" ndle USE TO INJECT INSULIN DAILY      FLUoxetine (PROzac) 40 mg capsule Take 1 Capsule by mouth daily. Indications: repeated episodes of anxiety 90 Capsule 1    empagliflozin (JARDIANCE) 25 mg tablet Take 1 Tablet by mouth daily. 90 Tablet 1    potassium chloride (KLOR-CON M10) 10 mEq tablet Take 1 Tablet by mouth daily. 90 Tablet 1    NIFEdipine ER (PROCARDIA XL) 30 mg ER tablet Take 1 Tablet by mouth daily. 10 Tablet 0    lisinopriL (PRINIVIL, ZESTRIL) 10 mg tablet Take 1 Tablet by mouth daily. 10 Tablet 0    furosemide (LASIX) 20 mg tablet Take 1 Tablet by mouth daily. 10 Tablet 0    Lantus Solostar U-100 Insulin 100 unit/mL (3 mL) inpn 40 Units by SubCUTAneous route nightly. 36 mL 2    methIMAzole (TAPAZOLE) 5 mg tablet Take 1 Tablet by mouth daily.  90 Tablet 0    flash glucose sensor (FreeStyle Paty 2 Sensor) kit Use sensor to check glucose 4 times daily 2 Kit 11    flash glucose scanning reader (FreeStyle Paty 2 [place]) misc Use to check glucose 4 times daily 1 Each 0    insulin lispro (HUMALOG) 100 unit/mL injection INJECT PER SLIDING SCALE BEFORE BREAKFAST, LUNCH, DINNER, AND AT BEDTIME FOR BLOOD SUGAR LESS THAN 150=0 UNITS, 150-199=2 UNITS, 200-249=4 UNITS, 250-299=6 UNITS, 300-349=8 UNITS, 350 AND ABOVE= 10 UNITS AND CALL PRIMARY CARE PROVIDER. Max daily 30 units. (Patient not taking: Reported on 2/16/2023) 27 mL 1    ibuprofen (MOTRIN) 600 mg tablet Take 1 Tablet by mouth every six (6) hours as needed for Pain. 20 Tablet 0    insulin syringe-needle U-100 0.3 mL 31 gauge x 15/64\" syrg USE TO INJECT INSULIN 4 TIMES DAILY      Diabetic Supplies, Miscellan. kit 1 Kit by Does Not Apply route four (4) times daily. Dispense #180 Lancets to check blood sugars QID, Ref #1  Dispense #1 Glucometer use QID, Ref #None  Dispense #180 blood glucose strips to use QID, Reg #1  Dispense needles for injecting insulin  Dispense syringe for injecting insulin 1 Kit 0     No Known Allergies  Past Medical History:   Diagnosis Date    Arthritis     Burning with urination     CAD (coronary artery disease)     patient stated stents placed unsure of how many     Diabetes ([address]     GERD (gastroesophageal reflux disease)     History of blood transfusion     patient stated approx 1 year ago     HTN (hypertension)     Hyperlipidemia     MI (myocardial infarction) ([address]     Personal history of colonic polyps 6/25/2010 6-    Rectal bleeding     patient stated this happened over the weekend.      Renal mass, left      Past Surgical History:   Procedure Laterality Date    COLONOSCOPY N/A 1/6/2023    COLONOSCOPY (TIVA) performed by Sara House MD at [facility] ENDOSCOPY    HX COLONOSCOPY      HX CORONARY STENT PLACEMENT  2018    x1    HX HEART CATHETERIZATION      patient stated stents placed     HX NEPHRECTOMY Left 08/23/2021    robotic left partial nephrectomy,    HX UROLOGICAL  08/23/2021     intraoperative renal ultrasound    NE UNLISTED PROCEDURE CARDIAC SURGERY  2018    BYPASS     Family History   Problem Relation Age of Onset    Hypertension Mother     Diabetes Mother     Hypertension Father     Heart Disease Father Social History     Tobacco Use    Smoking status: Never    Smokeless tobacco: Never   Substance Use Topics    Alcohol use: Not Currently          Objective:     Visit Vitals  /73 (BP 1 Location: Left upper arm, BP Patient Position: Sitting, BP Cuff Size: Large adult)   Pulse 90   Temp 97.4 °F (36.3 °C) (Temporal)   Resp 18   Ht 5' 9\" (1.753 m)   Wt 215 lb 3.2 oz (97.6 kg)   SpO2 98%   BMI 31.78 kg/m²        Physical Exam  Vitals and nursing note reviewed. Constitutional:       Appearance: Normal appearance. He is obese. Cardiovascular:      Rate and Rhythm: Normal rate. Pulses:           Dorsalis pedis pulses are 2+ on the right side and detected w/ Doppler on the left side. Pulmonary:      Effort: Pulmonary effort is normal.      Breath sounds: Normal breath sounds. Comments: Dminished in bases  Abdominal:      General: Bowel sounds are normal.      Palpations: Abdomen is soft. Tenderness: There is no abdominal tenderness. There is no guarding. Musculoskeletal:      Right lower leg: Edema (trace) present. Left lower leg: Edema (trace) present. Feet:    Feet:      Right foot:      Toenail Condition: Right toenails are abnormally thick. Left foot:      Skin integrity: Skin breakdown, erythema and warmth present. Toenail Condition: Left toenails are abnormally thick. Comments: 2cm x 2cm wound with black scab in center with surrounding erythema. No drainage or odor. Skin:     General: Skin is warm and dry. Neurological:      Mental Status: He is alert and oriented to person, place, and time. Mental status is at baseline. Gait: Gait abnormal.   Psychiatric:         Behavior: Behavior is cooperative. We discussed the expected course, resolution and complications of the diagnosis(es) in detail. Medication risks, benefits, costs, interactions, and alternatives were discussed as indicated.   I advised him to contact the office if his condition worsens, changes or fails to improve as anticipated. He expressed understanding with the diagnosis(es) and plan.      Manisha Garcia NP

## 2023-03-10 ENCOUNTER — TELEPHONE (OUTPATIENT)
Dept: PHARMACY | Age: 68
End: 2023-03-10

## 2023-03-10 NOTE — TELEPHONE ENCOUNTER
Pharmacy Progress Note       Called to reschedule appointment for diabetes education/medication management. Scheduled for call on 3/15/23 at 11 am.       Thank you,  Rocco Will.  Elsi Martinez, BCPS  Clinical Pharmacist             For Pharmacy Admin Tracking Only    Program: Medical Group  CPA in place: No  Recommendation Provided To: Patient/Caregiver: 1 via Telephone  Intervention Detail: Scheduled Appointment  Intervention Accepted By: Patient/Caregiver: 1  Time Spent (min): 10

## 2023-03-10 NOTE — PROGRESS NOTES
[place] [facility]     Patient Name: Kirk Connor    : 1955    Visit Date: 3/9/2023    Office Visit Note    Subjective:     Patient is a 69 y.o. male who is being seen in office initial visit for ulcer to the left hallux. Patient was referred by Beau Adame NP patient has a history of uncontrolled diabetes. Patient states that he first noted a blister to the left hallux about a 1 week ago. Patient presented to his primary care doctor with an infection to his left hallux. Patient was placed in Clindamycin 300 mg and Cipro 750mg. Patient has been doing local wound care himself with Bactroban. Past Medical History:   Diagnosis Date    Arthritis     Burning with urination     CAD (coronary artery disease)     patient stated stents placed unsure of how many     Diabetes ([address]     GERD (gastroesophageal reflux disease)     History of blood transfusion     patient stated approx 1 year ago     HTN (hypertension)     Hyperlipidemia     MI (myocardial infarction) ([address]     Personal history of colonic polyps 2010    Rectal bleeding     patient stated this happened over the weekend.      Renal mass, left      Past Surgical History:   Procedure Laterality Date    COLONOSCOPY N/A 2023    COLONOSCOPY (TIVA) performed by Yamile Lara MD at [facility] ENDOSCOPY    HX COLONOSCOPY      HX CORONARY STENT PLACEMENT  2018    x1    HX HEART CATHETERIZATION      patient stated stents placed     HX NEPHRECTOMY Left 2021    robotic left partial nephrectomy,    HX UROLOGICAL  2021     intraoperative renal ultrasound    [place] UNLISTED PROCEDURE CARDIAC SURGERY  2018    BYPASS       Family History   Problem Relation Age of Onset    Hypertension Mother     Diabetes Mother     Hypertension Father     Heart Disease Father       Social History     Tobacco Use    Smoking status: Never    Smokeless tobacco: Never   Substance Use Topics    Alcohol use: Not Currently     No Known Allergies  Prior to Admission medications    Medication Sig Start Date End Date Taking? Authorizing Provider   traMADoL (ULTRAM) 50 mg tablet Take 1 Tablet by mouth every eight (8) hours as needed for Pain for up to 3 days. Max Daily Amount: 150 mg. 3/9/23 3/12/23  Regino Chawla NP   BD Ultra-Fine Micro Pen Needle 32 gauge x 1/4\" ndle USE TO INJECT INSULIN DAILY 2/16/23   Provider, Historical   clindamycin (CLEOCIN) 300 mg capsule Take 1 Capsule by mouth three (3) times daily for 10 days. 3/8/23 3/18/23  Regino Chawla NP   ciprofloxacin HCl (CIPRO) 750 mg tablet Take 1 Tablet by mouth two (2) times a day for 10 days. 3/8/23 3/18/23  Regino Chawla NP   glipiZIDE (GLUCOTROL) 10 mg tablet Take 1 Tablet by mouth two (2) times a day. 3/8/23   Regino Chawla NP   FLUoxetine (PROzac) 40 mg capsule Take 1 Capsule by mouth daily. Indications: repeated episodes of anxiety 2/16/23   Regino Chawla NP   empagliflozin (JARDIANCE) 25 mg tablet Take 1 Tablet by mouth daily. 2/16/23   Regino Chawla NP   potassium chloride (KLOR-CON M10) 10 mEq tablet Take 1 Tablet by mouth daily. 2/16/23   Regino Chawla NP   NIFEdipine ER (PROCARDIA XL) 30 mg ER tablet Take 1 Tablet by mouth daily. 2/16/23   Regino Chawla NP   lisinopriL (PRINIVIL, ZESTRIL) 10 mg tablet Take 1 Tablet by mouth daily. 2/16/23   Regino Chawla NP   furosemide (LASIX) 20 mg tablet Take 1 Tablet by mouth daily. 2/16/23   Regino Chawla NP   Lantus Solostar U-100 Insulin 100 unit/mL (3 mL) inpn 40 Units by SubCUTAneous route nightly. 2/13/23   Regino Chawla NP   methIMAzole (TAPAZOLE) 5 mg tablet Take 1 Tablet by mouth daily.  2/13/23   Regino Chawla NP   flash glucose sensor ([employer] 2 Sensor) kit Use sensor to check glucose 4 times daily 1/4/23   Regino Chawla NP   flash glucose scanning reader [facility] [employer] 2 Melvin) misc Use to check glucose 4 times daily 1/4/23   Regino Chawla NP   insulin lispro (HUMALOG) 100 unit/mL injection INJECT PER SLIDING SCALE BEFORE BREAKFAST, LUNCH, DINNER, AND AT BEDTIME FOR BLOOD SUGAR LESS THAN 150=0 UNITS, 150-199=2 UNITS, 200-249=4 UNITS, 250-299=6 UNITS, 300-349=8 UNITS, 350 AND ABOVE= 10 UNITS AND CALL PRIMARY CARE PROVIDER. Max daily 30 units. Patient not taking: Reported on 2/16/2023 12/22/22   Scott Patterson NP   ibuprofen (MOTRIN) 600 mg tablet Take 1 Tablet by mouth every six (6) hours as needed for Pain. 11/25/22   Christin Cobb MD   insulin syringe-needle U-100 0.3 mL 31 gauge x 15/64\" syrg USE TO INJECT INSULIN 4 TIMES DAILY 6/19/22   Provider, Historical   Diabetic Supplies, Miscellan. kit 1 Kit by Does Not Apply route four (4) times daily. Dispense #180 Lancets to check blood sugars QID, Ref #1  Dispense #1 Glucometer use QID, Ref #None  Dispense #180 blood glucose strips to use QID, Reg #1  Dispense needles for injecting insulin  Dispense syringe for injecting insulin 6/17/22   Justus Angulo PA-C       Review of Systems   Constitutional:  Negative for chills, diaphoresis, fever and malaise/fatigue. Respiratory:  Negative for cough, hemoptysis, sputum production, shortness of breath and wheezing. No cyanosis   Cardiovascular:  Negative for chest pain, palpitations, claudication and leg swelling. Gastrointestinal:  Negative for abdominal pain, constipation, diarrhea, heartburn, nausea and vomiting. Genitourinary:  Negative for frequency. Musculoskeletal:  Positive for joint pain. Negative for back pain, myalgias and neck pain. Skin:  Negative for itching and rash. Ulcer left hallux   Neurological:  Positive for tingling. Negative for headaches. Alert and oriented x 4. Endo/Heme/Allergies:  Negative for polydipsia. Psychiatric/Behavioral:  Negative for depression and memory loss. The patient is not nervous/anxious. Objective: There were no vitals taken for this visit. GEN: Pt is AAOx4 and in NAD. No dressing noted to B/L PAPI.  No family noted at [place]  DERM: Wound noted to the left hallux. Purulent drainage noted. Dry skin noted to B/L LE. VASC: Pedal pulses (DP/PT) diminished to B/L LE. CFT<3sec to all digits of B/L LE. No pedal hair growth noted to the level of the digits for B/L LE. Skin temp is warm to cool from proximal to distal for B/L LE. Neg homans/carole signs to B/L LE. No varicosities or telangectasias noted to B/L LE.  NEURO: Protective and epicritic sensations grossly absent to B/L LE  MSK: Pain on palpation left hallux no gross deformities. Good muscle tone and bulk noted to B/L LE.  PSYCH: Cooperative with normal mood and affect     Data Review: No results found for this or any previous visit (from the past 24 hour(s)). Postdebridement picture      Assessment:   Diagnoses and all orders for this visit:    1. Diabetic ulcer of toe of left foot associated with diabetes mellitus due to underlying condition, with fat layer exposed ([address]  -     CULTURE, ANAEROBIC AND AEROBIC    2. Uncontrolled type 2 diabetes mellitus with hyperglycemia (HCC)    3. Cellulitis and abscess of toe, left        Plan:     Patient seen and evaluated in the office. Ulcer was seen and evaluated. At this time recommend wound debridement due to fibrotic tissue. - Using a #15 blade, a sharp/excisional debridement was performed to the wound noted to the left hallux. Contaminated or devitalized subcutaneous tissue was removed (including epidermis and dermis) down to the left level subcutaneous tissue. Upon completion, bleeding/granular tissue was noted. Appropriate wound care performed. The total area debrided measured 10 cmsq.   -Patient to continue oral antibiotics prescribed by primary care doctor. Clindamycin 300 mg and Cipro 750mg. Wound cultures were performed in office.  -Wound care: Apply Betadine wet to dry, 4x4 gauze, Kerlix and secure with tape daily dressing changes. Patient was educated on dressing changes.   Patient states that family member can do dressing changes at home.  -Discussed with patient the importance of calling office or going to emergency department if ulcer worsens or if pain intensifies. Follow-up and Dispositions    Return in about 1 week (around 3/16/2023).         Damaris Zaldivar DPM, CWSP, [address] [place], [place] [address]paolo POTTS, [address] [facility] [place]: [phone]  F: [phone]

## 2023-03-15 ENCOUNTER — VIRTUAL VISIT (OUTPATIENT)
Dept: PHARMACY | Age: 68
End: 2023-03-15

## 2023-03-15 DIAGNOSIS — E11.65 UNCONTROLLED TYPE 2 DIABETES MELLITUS WITH HYPERGLYCEMIA (HCC): Primary | ICD-10-CM

## 2023-03-15 LAB
BACTERIA SPEC AEROBE CULT: ABNORMAL
BACTERIA SPEC ANAEROBE CULT: ABNORMAL

## 2023-03-15 NOTE — PROGRESS NOTES
Phone follow-up for diabetes management     S/O: Placed an outgoing call to patient (2 identifiers used) to follow-up on diabetes. Patient states that his foot is doing much better and he is in less pain than he was before. He is almost finished with his course of antibiotics. He reports taking diabetes medications as prescribed, except for the Humalog, which PCP is aware of per notes. Patient has not checked blood sugars at home but denies any s/sx of hypoglycemia. Current Diabetes Regimen:  Lantus U-100 40 units once daily   Jardiance 25 mg once daily   Glipizide 10 mg twice daily     Patient reports previous intolerance to metformin (stomach upset). Per notes, he Januvia was too expensive       ROS: Patient denies hypoglycemic and hyperglycemic signs/symptoms, chest pain, shortness of breath, vision changes. +foot changes; being treated for diabetic foot infection and saw podiatry last week for evaluation     Blood Sugars:  - Not currently checking blood sugars at home. Interested in [facility] CGM   - The last glucose reading we have on file is 139 which is an improvement from previous readings      Labs:  Lab Results   Component Value Date/Time    Sodium 138 03/01/2023 11:44 AM    Potassium 3.5 03/01/2023 11:44 AM    Chloride 101 03/01/2023 11:44 AM    CO2 22 03/01/2023 11:44 AM    Anion gap 15 03/01/2023 11:44 AM    Glucose 139 (H) 03/01/2023 11:44 AM    BUN 18 03/01/2023 11:44 AM    Creatinine 1.29 03/01/2023 11:44 AM    BUN/Creatinine ratio 14 03/01/2023 11:44 AM    GFR est AA >60 06/17/2022 06:57 AM    GFR est non-AA >60 06/17/2022 06:57 AM    Calcium 9.0 03/01/2023 11:44 AM    Bilirubin, total 0.5 03/01/2023 11:44 AM    Alk.  phosphatase 90 03/01/2023 11:44 AM    Protein, total 7.9 03/01/2023 11:44 AM    Albumin 2.9 (L) 03/01/2023 11:44 AM    Globulin 5.0 (H) 03/01/2023 11:44 AM    A-G Ratio 0.6 (L) 03/01/2023 11:44 AM    ALT (SGPT) 21 03/01/2023 11:44 AM    AST (SGOT) 24 03/01/2023 11:44 AM Lab Results   Component Value Date/Time    Hemoglobin A1c <3.8 (L) 06/16/2022 02:17 AM    Hemoglobin A1c (POC) 12.4 12/15/2022 02:22 PM       A/P:    Diabetes: a1c not at goal <7%. Patient interested in [place] device to assist with blood sugar monitoring if covered by his insurance. He states that PCP tried to get this covered for him before but he believes it was denied. Discussed that he would likely need to be on multiple insulin injections per day to have this covered by Medicare. Patient is not interested in having to do multiple insulin injections each day, which could be a barrier to getting the device approved. Alternatively, discussed adding a GLP-1 agonist to assist with management if affordable. Patient states that he would be interested in this if covered by insurance. Will discuss options with PCP and follow up as appropriate. Unable to make any insulin adjustments at this time due to patient not monitoring blood sugars at home. Patient verbalized understanding all information presented and advised to contact me with any additional questions or concerns. Follow up: 1-2 weeks      Thank you,  Yadira Rao Officer, [facility]  Clinical Pharmacist           For Pharmacy Admin Tracking Only    Program: Medical Group  CPA in place: No  Recommendation Provided To: Patient/Caregiver: 0 via Virtual Visit  Intervention Accepted By: Patient/Caregiver: 0  Time Spent (min): 45

## 2023-03-15 NOTE — Clinical Note
Hey there! I touched base with this patient today and he states that his foot is doing much better. We started discussing blood sugar monitoring and he says he is interested in [place]. Unfortunately, his insurance likely will require him to be on multiple insulin injections/day, which he has not wanted to do. I can always try to resubmit the order to see if you are in agreement? I also think a GLP-1 agonist could be a good option if his insurance will cover it. Let me know what you think. I told him I would touch base with him again next week. Thanks!  Sofy Peña

## 2023-03-20 ENCOUNTER — APPOINTMENT (OUTPATIENT)
Dept: MRI IMAGING | Age: 68
DRG: 854 | End: 2023-03-20
Attending: PODIATRIST
Payer: MEDICARE

## 2023-03-20 ENCOUNTER — HOSPITAL ENCOUNTER (INPATIENT)
Age: 68
LOS: 4 days | Discharge: HOME HEALTH | DRG: 854 | End: 2023-03-24
Attending: STUDENT IN AN ORGANIZED HEALTH CARE EDUCATION/TRAINING PROGRAM | Admitting: INTERNAL MEDICINE
Payer: MEDICARE

## 2023-03-20 ENCOUNTER — OFFICE VISIT (OUTPATIENT)
Dept: PODIATRY | Age: 68
End: 2023-03-20

## 2023-03-20 ENCOUNTER — APPOINTMENT (OUTPATIENT)
Dept: GENERAL RADIOLOGY | Age: 68
DRG: 854 | End: 2023-03-20
Attending: STUDENT IN AN ORGANIZED HEALTH CARE EDUCATION/TRAINING PROGRAM
Payer: MEDICARE

## 2023-03-20 VITALS
TEMPERATURE: 97.7 F | DIASTOLIC BLOOD PRESSURE: 65 MMHG | BODY MASS INDEX: 30.93 KG/M2 | HEART RATE: 95 BPM | SYSTOLIC BLOOD PRESSURE: 122 MMHG | HEIGHT: 69 IN | WEIGHT: 208.8 LBS

## 2023-03-20 DIAGNOSIS — E11.621 DIABETIC ULCER OF TOE OF LEFT FOOT ASSOCIATED WITH TYPE 2 DIABETES MELLITUS, WITH NECROSIS OF BONE (HCC): Primary | ICD-10-CM

## 2023-03-20 DIAGNOSIS — L97.524 DIABETIC ULCER OF TOE OF LEFT FOOT ASSOCIATED WITH TYPE 2 DIABETES MELLITUS, WITH NECROSIS OF BONE (HCC): Primary | ICD-10-CM

## 2023-03-20 DIAGNOSIS — E11.65 UNCONTROLLED TYPE 2 DIABETES MELLITUS WITH HYPERGLYCEMIA (HCC): ICD-10-CM

## 2023-03-20 DIAGNOSIS — I74.4 THROMBOSIS OF ARTERIES OF THE EXTREMITIES (HCC): ICD-10-CM

## 2023-03-20 DIAGNOSIS — I70.202 TIBIAL ARTERY OCCLUSION, LEFT (HCC): ICD-10-CM

## 2023-03-20 DIAGNOSIS — E08.621 DIABETIC ULCER OF TOE OF LEFT FOOT ASSOCIATED WITH DIABETES MELLITUS DUE TO UNDERLYING CONDITION, WITH FAT LAYER EXPOSED (HCC): Primary | ICD-10-CM

## 2023-03-20 DIAGNOSIS — I70.202 POPLITEAL ARTERY OCCLUSION, LEFT (HCC): ICD-10-CM

## 2023-03-20 DIAGNOSIS — L97.522 DIABETIC ULCER OF TOE OF LEFT FOOT ASSOCIATED WITH DIABETES MELLITUS DUE TO UNDERLYING CONDITION, WITH FAT LAYER EXPOSED (HCC): Primary | ICD-10-CM

## 2023-03-20 DIAGNOSIS — I73.9 PVD (PERIPHERAL VASCULAR DISEASE) (HCC): ICD-10-CM

## 2023-03-20 PROBLEM — I96 GANGRENE (HCC): Status: ACTIVE | Noted: 2023-03-20

## 2023-03-20 PROBLEM — M86.9 OSTEOMYELITIS (HCC): Status: ACTIVE | Noted: 2023-03-20

## 2023-03-20 LAB
ALBUMIN SERPL-MCNC: 2.8 G/DL (ref 3.5–5)
ALBUMIN/GLOB SERPL: 0.6 (ref 1.1–2.2)
ALP SERPL-CCNC: 89 U/L (ref 45–117)
ALT SERPL-CCNC: 18 U/L (ref 12–78)
ANION GAP SERPL CALC-SCNC: 8 MMOL/L (ref 5–15)
AST SERPL W P-5'-P-CCNC: 11 U/L (ref 15–37)
BASE DEFICIT BLD-SCNC: 0.8 MMOL/L
BASOPHILS # BLD: 0.1 K/UL (ref 0–0.1)
BASOPHILS NFR BLD: 1 % (ref 0–1)
BILIRUB SERPL-MCNC: 0.2 MG/DL (ref 0.2–1)
BUN SERPL-MCNC: 33 MG/DL (ref 6–20)
BUN/CREAT SERPL: 23 (ref 12–20)
CA-I BLD-MCNC: 1.2 MMOL/L (ref 1.12–1.32)
CA-I BLD-MCNC: 9.5 MG/DL (ref 8.5–10.1)
CHLORIDE BLD-SCNC: 105 MMOL/L (ref 98–107)
CHLORIDE SERPL-SCNC: 104 MMOL/L (ref 97–108)
CO2 BLD-SCNC: 23 MMOL/L
CO2 SERPL-SCNC: 23 MMOL/L (ref 21–32)
CREAT SERPL-MCNC: 1.41 MG/DL (ref 0.7–1.3)
CREAT UR-MCNC: 1.38 MG/DL (ref 0.6–1.3)
CRP SERPL HS-MCNC: >9.5 MG/L
DIFFERENTIAL METHOD BLD: ABNORMAL
EOSINOPHIL # BLD: 0.2 K/UL (ref 0–0.4)
EOSINOPHIL NFR BLD: 2 % (ref 0–7)
ERYTHROCYTE [DISTWIDTH] IN BLOOD BY AUTOMATED COUNT: 13.1 % (ref 11.5–14.5)
FIO2, L/MIN - FIO2P: ABNORMAL
GLOBULIN SER CALC-MCNC: 4.9 G/DL (ref 2–4)
GLUCOSE BLD STRIP.AUTO-MCNC: 106 MG/DL (ref 65–100)
GLUCOSE BLD STRIP.AUTO-MCNC: 147 MG/DL (ref 65–100)
GLUCOSE BLD STRIP.AUTO-MCNC: 79 MG/DL (ref 65–100)
GLUCOSE SERPL-MCNC: 103 MG/DL (ref 65–100)
HCO3 BLD-SCNC: 23.4 MMOL/L (ref 19–28)
HCT VFR BLD AUTO: 36.1 % (ref 36.6–50.3)
HGB BLD-MCNC: 11.3 G/DL (ref 12.1–17)
IMM GRANULOCYTES # BLD AUTO: 0 K/UL (ref 0–0.04)
IMM GRANULOCYTES NFR BLD AUTO: 0 % (ref 0–0.5)
LACTATE BLD-SCNC: 1.38 MMOL/L (ref 0.4–2)
LACTATE SERPL-SCNC: 1 MMOL/L (ref 0.4–2)
LYMPHOCYTES # BLD: 2.3 K/UL (ref 0.8–3.5)
LYMPHOCYTES NFR BLD: 26 % (ref 12–49)
MCH RBC QN AUTO: 27.8 PG (ref 26–34)
MCHC RBC AUTO-ENTMCNC: 31.3 G/DL (ref 30–36.5)
MCV RBC AUTO: 88.7 FL (ref 80–99)
MONOCYTES # BLD: 0.9 K/UL (ref 0–1)
MONOCYTES NFR BLD: 10 % (ref 5–13)
NEUTS SEG # BLD: 5.5 K/UL (ref 1.8–8)
NEUTS SEG NFR BLD: 61 % (ref 32–75)
NRBC # BLD: 0 K/UL (ref 0–0.01)
NRBC BLD-RTO: 0 PER 100 WBC
PCO2 BLD: 36.9 MMHG (ref 35–45)
PERFORMED BY, TECHID: ABNORMAL
PERFORMED BY, TECHID: ABNORMAL
PERFORMED BY, TECHID: NORMAL
PH BLD: 7.41 (ref 7.35–7.45)
PLATELET # BLD AUTO: 480 K/UL (ref 150–400)
PMV BLD AUTO: 9.3 FL (ref 8.9–12.9)
PO2 BLD: 33 MMHG (ref 75–100)
POTASSIUM BLD-SCNC: 4.5 MMOL/L (ref 3.5–5.5)
POTASSIUM SERPL-SCNC: 4.4 MMOL/L (ref 3.5–5.1)
PROT SERPL-MCNC: 7.7 G/DL (ref 6.4–8.2)
RBC # BLD AUTO: 4.07 M/UL (ref 4.1–5.7)
SAO2 % BLD: 65 %
SAO2 % BLD: 65 %
SODIUM BLD-SCNC: 139 MMOL/L (ref 136–145)
SODIUM SERPL-SCNC: 135 MMOL/L (ref 136–145)
SPECIMEN SITE: ABNORMAL
TSH SERPL DL<=0.05 MIU/L-ACNC: 1.97 UIU/ML (ref 0.36–3.74)
WBC # BLD AUTO: 9.1 K/UL (ref 4.1–11.1)

## 2023-03-20 PROCEDURE — 87040 BLOOD CULTURE FOR BACTERIA: CPT

## 2023-03-20 PROCEDURE — 65270000029 HC RM PRIVATE

## 2023-03-20 PROCEDURE — 99222 1ST HOSP IP/OBS MODERATE 55: CPT | Performed by: SURGERY

## 2023-03-20 PROCEDURE — 73718 MRI LOWER EXTREMITY W/O DYE: CPT

## 2023-03-20 PROCEDURE — 83036 HEMOGLOBIN GLYCOSYLATED A1C: CPT

## 2023-03-20 PROCEDURE — 80053 COMPREHEN METABOLIC PANEL: CPT

## 2023-03-20 PROCEDURE — 83605 ASSAY OF LACTIC ACID: CPT

## 2023-03-20 PROCEDURE — 74011000258 HC RX REV CODE- 258: Performed by: STUDENT IN AN ORGANIZED HEALTH CARE EDUCATION/TRAINING PROGRAM

## 2023-03-20 PROCEDURE — 84443 ASSAY THYROID STIM HORMONE: CPT

## 2023-03-20 PROCEDURE — 37186 SEC ART THROMBECTOMY ADD-ON: CPT | Performed by: SURGERY

## 2023-03-20 PROCEDURE — 85025 COMPLETE CBC W/AUTO DIFF WBC: CPT

## 2023-03-20 PROCEDURE — 99285 EMERGENCY DEPT VISIT HI MDM: CPT

## 2023-03-20 PROCEDURE — 36415 COLL VENOUS BLD VENIPUNCTURE: CPT

## 2023-03-20 PROCEDURE — 74011250636 HC RX REV CODE- 250/636: Performed by: STUDENT IN AN ORGANIZED HEALTH CARE EDUCATION/TRAINING PROGRAM

## 2023-03-20 PROCEDURE — 82962 GLUCOSE BLOOD TEST: CPT

## 2023-03-20 PROCEDURE — 86141 C-REACTIVE PROTEIN HS: CPT

## 2023-03-20 PROCEDURE — 99215 OFFICE O/P EST HI 40 MIN: CPT | Performed by: PODIATRIST

## 2023-03-20 PROCEDURE — 73630 X-RAY EXAM OF FOOT: CPT

## 2023-03-20 RX ORDER — INSULIN LISPRO 100 [IU]/ML
0.1 INJECTION, SOLUTION INTRAVENOUS; SUBCUTANEOUS
Status: DISCONTINUED | OUTPATIENT
Start: 2023-03-20 | End: 2023-03-20

## 2023-03-20 RX ORDER — INSULIN LISPRO 100 [IU]/ML
INJECTION, SOLUTION INTRAVENOUS; SUBCUTANEOUS
Status: DISCONTINUED | OUTPATIENT
Start: 2023-03-20 | End: 2023-03-24 | Stop reason: HOSPADM

## 2023-03-20 RX ORDER — METHIMAZOLE 5 MG/1
5 TABLET ORAL DAILY
Status: DISCONTINUED | OUTPATIENT
Start: 2023-03-21 | End: 2023-03-24 | Stop reason: HOSPADM

## 2023-03-20 RX ORDER — GLIPIZIDE 5 MG/1
10 TABLET ORAL 2 TIMES DAILY
Status: DISCONTINUED | OUTPATIENT
Start: 2023-03-20 | End: 2023-03-20

## 2023-03-20 RX ORDER — LISINOPRIL 10 MG/1
10 TABLET ORAL DAILY
Status: DISCONTINUED | OUTPATIENT
Start: 2023-03-21 | End: 2023-03-24 | Stop reason: HOSPADM

## 2023-03-20 RX ORDER — DEXTROSE MONOHYDRATE 100 MG/ML
0-250 INJECTION, SOLUTION INTRAVENOUS AS NEEDED
Status: DISCONTINUED | OUTPATIENT
Start: 2023-03-20 | End: 2023-03-24 | Stop reason: HOSPADM

## 2023-03-20 RX ORDER — IBUPROFEN 200 MG
4 TABLET ORAL AS NEEDED
Status: DISCONTINUED | OUTPATIENT
Start: 2023-03-20 | End: 2023-03-24 | Stop reason: HOSPADM

## 2023-03-20 RX ORDER — FLUOXETINE HYDROCHLORIDE 20 MG/1
40 CAPSULE ORAL DAILY
Status: DISCONTINUED | OUTPATIENT
Start: 2023-03-21 | End: 2023-03-24 | Stop reason: HOSPADM

## 2023-03-20 RX ADMIN — SODIUM CHLORIDE 3 G: 900 INJECTION INTRAVENOUS at 21:00

## 2023-03-20 RX ADMIN — SODIUM CHLORIDE 3 G: 900 INJECTION INTRAVENOUS at 15:21

## 2023-03-20 RX ADMIN — VANCOMYCIN HYDROCHLORIDE 2500 MG: 5 INJECTION, POWDER, LYOPHILIZED, FOR SOLUTION INTRAVENOUS at 15:21

## 2023-03-20 NOTE — PROGRESS NOTES
[place] [facility]     Patient Name: Cesar Frausto    : 1955    Visit Date: 3/20/2023    Office Visit Note      Patient was seen and evaluated today in office. At this time ulcer was evaluated in the left hallux is noted to be deteriorating with its ischemic changes. Patient finished all oral antibiotics. Discoloration noted to left hallux. I recommend patient to go to [address] [place] [address] emergency department for further evaluation of ulcer left hallux. Patient understood and states that he will go now to the ER.          Roman Lopez DPM, CWSP, [address], [facility] [place]: [phone]  F: [phone]

## 2023-03-20 NOTE — PROGRESS NOTES
Admission Medication Reconciliation:    Information obtained from:  Patient    Comments/Recommendations: Reviewed PTA medications and patient's allergies. Removed ibuprofen 600 mg  Removed [facility]     Pharmacy; [facility] ([place])       Allergies:  Patient has no known allergies. Significant PMH/Disease States:   Past Medical History:   Diagnosis Date    Arthritis     Burning with urination     CAD (coronary artery disease)     patient stated stents placed unsure of how many     Diabetes ([address]     GERD (gastroesophageal reflux disease)     History of blood transfusion     patient stated approx 1 year ago     HTN (hypertension)     Hyperlipidemia     MI (myocardial infarction) ([address]     Personal history of colonic polyps 2010    Rectal bleeding     patient stated this happened over the weekend. Renal mass, left      Chief Complaint for this Admission:    Chief Complaint   Patient presents with    Foot Pain     Prior to Admission Medications:   Prior to Admission Medications   Prescriptions Last Dose Informant Patient Reported? Taking? FLUoxetine (PROzac) 40 mg capsule  Self No Yes   Sig: Take 1 Capsule by mouth daily. Indications: repeated episodes of anxiety   Lantus Solostar U-100 Insulin 100 unit/mL (3 mL) inpn  Self No Yes   Si Units by SubCUTAneous route nightly. NIFEdipine ER (PROCARDIA XL) 30 mg ER tablet  Self No Yes   Sig: Take 1 Tablet by mouth daily. SITagliptin phosphate (JANUVIA) 100 mg tablet  Self Yes Yes   Sig: Take 100 mg by mouth daily. empagliflozin (JARDIANCE) 25 mg tablet  Self No Yes   Sig: Take 1 Tablet by mouth daily. furosemide (LASIX) 20 mg tablet  Self No Yes   Sig: Take 1 Tablet by mouth daily. glipiZIDE (GLUCOTROL) 10 mg tablet  Self No Yes   Sig: Take 1 Tablet by mouth two (2) times a day. lisinopriL (PRINIVIL, ZESTRIL) 10 mg tablet  Self No Yes   Sig: Take 1 Tablet by mouth daily.    methIMAzole (TAPAZOLE) 5 mg tablet  Self No Yes   Sig: Take 1 Tablet by mouth daily. potassium chloride (KLOR-CON M10) 10 mEq tablet  Self No Yes   Sig: Take 1 Tablet by mouth daily.       Facility-Administered Medications: None       Madyson Morales

## 2023-03-20 NOTE — Clinical Note
Vessel: left iliac, CFA, PFA, SFA, popliteal, PTA, peroneal and ELIZABETH. Hand injection to the artery. Single view taken.

## 2023-03-20 NOTE — CONSULTS
[facility] CONSULT NOTE    Subjective:         Date of Consultation: March 20, 2023     Referring Physician: John Hardy MD    Patient is a 69 y.o. male who is being seen for left great toe ulcer. Patient has a hx of arthritis, CAD, uncontrolled diabetes mellitus type 2, GERD, HTN, HLD, MI and renal mass. Patient was originally seen in the office on 03/09/2023 by my partner (Dr. Tommas Lefort). He was seen again earlier today and referred to the emergency department for further work-up and management. He states he has suffered with a wound to the left great toe for roughly 3 weeks. He has failed outpatient treatment with oral antibiotics. He denies any pain due to his diabetic peripheral neuropathy. He denies any overt trauma. He denies any systemic signs of infection but does admit to local drainage.   He denies any other lower extremity complaints      Patient Active Problem List    Diagnosis Date Noted    Moderate episode of recurrent major depressive disorder ([address] 02/16/2023    Chronic systolic (congestive) heart failure 12/20/2022    Pneumonia 06/16/2022    At high risk for falls 05/23/2021    H/O colonoscopy 05/23/2021    Angiomyolipoma of left kidney 04/09/2021    Uncontrolled type 2 diabetes mellitus with hyperglycemia ([address] 04/09/2021    Blurred vision, bilateral 04/09/2021    Mixed hyperlipidemia 04/09/2021    Respiratory failure with hypoxia ([address] 02/22/2021    Pneumonia due to COVID-19 virus 02/17/2021    Chest pain 12/12/2020    CAD (coronary artery disease) 12/12/2020    Essential hypertension 12/12/2020    Personal history of colonic polyps 06/25/2010     Past Medical History:   Diagnosis Date    Arthritis     Burning with urination     CAD (coronary artery disease)     patient stated stents placed unsure of how many     Diabetes ([address]     GERD (gastroesophageal reflux disease)     History of blood transfusion     patient stated approx 1 year ago     HTN (hypertension) Hyperlipidemia     MI (myocardial infarction) [facility])     Personal history of colonic polyps 6/25/2010 6-    Rectal bleeding     patient stated this happened over the weekend. Renal mass, left       Past Surgical History:   Procedure Laterality Date    COLONOSCOPY N/A 1/6/2023    COLONOSCOPY (TIVA) performed by Tracy Ochoa MD at [facility] ENDOSCOPY    HX COLONOSCOPY      HX CORONARY STENT PLACEMENT  2018    x1    HX HEART CATHETERIZATION      patient stated stents placed     HX NEPHRECTOMY Left 08/23/2021    robotic left partial nephrectomy,    HX UROLOGICAL  08/23/2021     intraoperative renal ultrasound    AR UNLISTED PROCEDURE CARDIAC SURGERY  2018    BYPASS      Family History   Problem Relation Age of Onset    Hypertension Mother     Diabetes Mother     Hypertension Father     Heart Disease Father       Social History     Tobacco Use    Smoking status: Never    Smokeless tobacco: Never   Substance Use Topics    Alcohol use: Not Currently     Current Facility-Administered Medications   Medication Dose Route Frequency Provider Last Rate Last Admin    ampicillin-sulbactam (UNASYN) 3 g in 0.9% sodium chloride (MBP/ADV) 100 mL MBP  3 g IntraVENous Q6H Keo Shannon MD        vancomycin (VANCOCIN) 2,500 mg in 0.9% sodium chloride 500 mL IVPB  2,500 mg IntraVENous ONCE Vera Lambert MD        VANCOMYCIN INFORMATION NOTE   Other Rx Dosing/Monitoring Vera Lambert MD         Current Outpatient Medications   Medication Sig Dispense Refill    BD Ultra-Fine Micro Pen Needle 32 gauge x 1/4\" ndle USE TO INJECT INSULIN DAILY      glipiZIDE (GLUCOTROL) 10 mg tablet Take 1 Tablet by mouth two (2) times a day. 60 Tablet 3    FLUoxetine (PROzac) 40 mg capsule Take 1 Capsule by mouth daily. Indications: repeated episodes of anxiety 90 Capsule 1    empagliflozin (JARDIANCE) 25 mg tablet Take 1 Tablet by mouth daily.  90 Tablet 1    potassium chloride (KLOR-CON M10) 10 mEq tablet Take 1 Tablet by mouth daily. 90 Tablet 1    NIFEdipine ER (PROCARDIA XL) 30 mg ER tablet Take 1 Tablet by mouth daily. 10 Tablet 0    lisinopriL (PRINIVIL, ZESTRIL) 10 mg tablet Take 1 Tablet by mouth daily. 10 Tablet 0    furosemide (LASIX) 20 mg tablet Take 1 Tablet by mouth daily. 10 Tablet 0    Lantus Solostar U-100 Insulin 100 unit/mL (3 mL) inpn 40 Units by SubCUTAneous route nightly. 36 mL 2    methIMAzole (TAPAZOLE) 5 mg tablet Take 1 Tablet by mouth daily. 90 Tablet 0    flash glucose sensor (FreeStyle Paty 2 Sensor) kit Use sensor to check glucose 4 times daily 2 Kit 11    flash glucose scanning reader (FreeStyle Paty 2 [place]) misc Use to check glucose 4 times daily 1 Each 0    insulin lispro (HUMALOG) 100 unit/mL injection INJECT PER SLIDING SCALE BEFORE BREAKFAST, LUNCH, DINNER, AND AT BEDTIME FOR BLOOD SUGAR LESS THAN 150=0 UNITS, 150-199=2 UNITS, 200-249=4 UNITS, 250-299=6 UNITS, 300-349=8 UNITS, 350 AND ABOVE= 10 UNITS AND CALL PRIMARY CARE PROVIDER. Max daily 30 units. (Patient not taking: Reported on 2/16/2023) 27 mL 1    ibuprofen (MOTRIN) 600 mg tablet Take 1 Tablet by mouth every six (6) hours as needed for Pain. 20 Tablet 0    insulin syringe-needle U-100 0.3 mL 31 gauge x 15/64\" syrg USE TO INJECT INSULIN 4 TIMES DAILY      Diabetic Supplies, Miscellan. kit 1 Kit by Does Not Apply route four (4) times daily. Dispense #180 Lancets to check blood sugars QID, Ref #1  Dispense #1 Glucometer use QID, Ref #None  Dispense #180 blood glucose strips to use QID, Reg #1  Dispense needles for injecting insulin  Dispense syringe for injecting insulin 1 Kit 0      No Known Allergies     Review of Systems:    Constitutional:  Negative for chills, diaphoresis, fever and malaise/fatigue. Respiratory:  Negative for cough, hemoptysis, sputum production, shortness of breath and wheezing. No cyanosis   Cardiovascular:  Negative for chest pain, palpitations, claudication and leg swelling.    Gastrointestinal:  Negative for abdominal pain, constipation, diarrhea, heartburn, nausea and vomiting. Genitourinary:  Negative for frequency. Musculoskeletal:  Positive for joint pain. Negative for back pain, myalgias and neck pain. Skin:  Negative for itching and rash. Ulcer left hallux   Neurological:  Positive for tingling. Negative for headaches. Alert and oriented x 4. Endo/Heme/Allergies:  Negative for polydipsia. Psychiatric/Behavioral:  Negative for depression and memory loss. The patient is not nervous/anxious. Objective:     Patient Vitals for the past 8 hrs:   BP Temp Pulse Resp SpO2 Height Weight   23 1226 114/73 97.5 °F (36.4 °C) 87 18 98 % 5' 10\" (1.778 m) 225 lb (102.1 kg)     Temp (24hrs), Av.6 °F (36.4 °C), Min:97.5 °F (36.4 °C), Max:97.7 °F (36.5 °C)      Physical Exam:    GEN: Pt is AAOx4 and in NAD. No dressing noted to B/L LE. No family noted at [facility]  DERM: Wound noted to the left hallux. Purulent drainage noted. Dry skin noted to B/L LE. VASC: Pedal pulses (DP/PT) diminished to B/L LE. CFT<3sec to all digits of B/L LE. No pedal hair growth noted to the level of the digits for B/L LE. Skin temp is warm to cool from proximal to distal for B/L LE. Neg homans/carole signs to B/L LE. No varicosities or telangectasias noted to B/L LE.  NEURO: Protective and epicritic sensations grossly absent to B/L LE  MSK: Pain on palpation left hallux no gross deformities. Good muscle tone and bulk noted to B/L LE.  PSYCH: Cooperative with normal mood and affect       Image Review:   EXAM: XR FOOT LT MIN 3 V     INDICATION: L toe ulcer. COMPARISON: Left foot radiographs 3/1/2023. FINDINGS: Three views of the left foot demonstrate diffuse soft tissue swelling. Soft tissue irregularity/ulceration at the medial aspect of the great toe  surrounding packing material. Fragmentation at the lateral base of the first  proximal phalanx. No malalignment.      IMPRESSION  Diffuse soft tissue swelling with irregularity/ulceration at the  medial aspect of the great toe. Fragmentation of the lateral base of the first  proximal phalanx may reflect an age indeterminate fracture deformity, but given  the adjacent soft tissue changes, consider acute osteomyelitis/septic arthritis. Impression:     Diabetic ulcer, left great toe  Uncontrolled DM T2  PAD    Recommendation:     Patient seen and evaluated at bedside  - Current labs personally reviewed and findings dicussed with patient  - Local wound care orders placed, betadine WTD. Offloading to the area for wound healing purposes  - Abnormal RUPERTO, pending vascular consult and appreciate recs  - XR images of the left foot personally reviewed and findings discussed with pt. MRI ordered to further eval  - Cont empiric abx. Will tailor based upon wcx   - We will follow closely    Thank you for the consult! Jose Corrales, [facility], [address] [place] [address] and Foot Surgery  [address] [facility] Michael:  [phone]  F:  [phone]    Bell Olivier available 24/7

## 2023-03-20 NOTE — PROGRESS NOTES
TRANSFER - IN REPORT:    Written report received from [address], RN(name) on Sonido Matamoros  being received from ED(unit) for routine progression of care      Report consisted of patients Situation, Background, Assessment and   Recommendations(SBAR). Information from the following report(s) SBAR was reviewed with the receiving nurse. Assessment completed upon patients arrival to unit and care assumed.

## 2023-03-20 NOTE — Clinical Note
Vessel: left iliac, CFA, PFA, SFA, popliteal, PTA, peroneal, ELIZABETH and dorsalis pedis. Hand injection to the artery. Multiple views taken.

## 2023-03-20 NOTE — Clinical Note
TRANSFER - OUT REPORT:     Verbal report given to: [place] (phone), (at bedside). Report consisted of patient's Situation, Background, Assessment and   Recommendations(SBAR). Opportunity for questions and clarification was provided. Patient transported with a Registered Nurse. Patient transported to: recovery.

## 2023-03-20 NOTE — H&P
History & Physical    Primary Care Provider: Leidy Ayon NP  Source of Information: Patient/family     History of Presenting Illness:   Ana Rosa Goncalves is a 69 y.o. male who presents with darkening of left great toe along with worsening wound which has been getting worse outpatient. He was referred by Dr. Lyndsey Belcher for hospitalization. Mr Edvin Arriola is a 58-year-old  woman with history of diabetes mellitus, hypertension, dyslipidemia, coronary artery disease and GERD. His primary care provider is Maura Kennedy. He has developed diabetic ulcer on his left great foot for the last 4 weeks. He has been followed for the last 2 weeks and this has not been improving. Had a pre-existing history of diabetic neuropathy and cannot feel the pain in his toe. He does not recall any injury or exposure to extreme heat or cold. He is a previous history of Onychomycosis as well. In the emergency room, he has received Unasyn and vancomycin; MRI already has been done and he is already been seen by Dr. Arya Younger.         Review of Systems:  Pertinent items are noted in the History of Present Illness. Past Medical History:   Diagnosis Date    Arthritis     Burning with urination     CAD (coronary artery disease)     patient stated stents placed unsure of how many     Diabetes ([address]     GERD (gastroesophageal reflux disease)     History of blood transfusion     patient stated approx 1 year ago     HTN (hypertension)     Hyperlipidemia     MI (myocardial infarction) ([address]     Personal history of colonic polyps 6/25/2010 6-    Rectal bleeding     patient stated this happened over the weekend.      Renal mass, left         Past Surgical History:   Procedure Laterality Date    COLONOSCOPY N/A 1/6/2023    COLONOSCOPY (TIVA) performed by Cynthia Tobar MD at [facility] ENDOSCOPY    HX COLONOSCOPY      HX CORONARY STENT PLACEMENT  2018    x1    HX HEART CATHETERIZATION      patient stated stents placed     HX NEPHRECTOMY Left 08/23/2021    robotic left partial nephrectomy,    HX UROLOGICAL  08/23/2021     intraoperative renal ultrasound    MD UNLISTED PROCEDURE CARDIAC SURGERY  2018    BYPASS       Prior to Admission medications    Medication Sig Start Date End Date Taking? Authorizing Provider   BD Ultra-Fine Micro Pen Needle 32 gauge x 1/4\" ndle USE TO INJECT INSULIN DAILY 2/16/23   Provider, Historical   glipiZIDE (GLUCOTROL) 10 mg tablet Take 1 Tablet by mouth two (2) times a day. 3/8/23   Lianne Price NP   FLUoxetine (PROzac) 40 mg capsule Take 1 Capsule by mouth daily. Indications: repeated episodes of anxiety 2/16/23   Lianne Price NP   empagliflozin (JARDIANCE) 25 mg tablet Take 1 Tablet by mouth daily. 2/16/23   Lianne Price NP   potassium chloride (KLOR-CON M10) 10 mEq tablet Take 1 Tablet by mouth daily. 2/16/23   Lianne Price NP   NIFEdipine ER (PROCARDIA XL) 30 mg ER tablet Take 1 Tablet by mouth daily. 2/16/23   Lianne Price NP   lisinopriL (PRINIVIL, ZESTRIL) 10 mg tablet Take 1 Tablet by mouth daily. 2/16/23   Lianne Price NP   furosemide (LASIX) 20 mg tablet Take 1 Tablet by mouth daily. 2/16/23   Lianne Price NP   Lantus Solostar U-100 Insulin 100 unit/mL (3 mL) inpn 40 Units by SubCUTAneous route nightly. 2/13/23   Lianne Price NP   methIMAzole (TAPAZOLE) 5 mg tablet Take 1 Tablet by mouth daily. 2/13/23   Lianne Price NP   flash glucose sensor (FreeStyle [place] 2 Sensor) kit Use sensor to check glucose 4 times daily 1/4/23   Lianne Price NP   flash glucose scanning reader [facility] Carver 2 [place]) misc Use to check glucose 4 times daily 1/4/23   Lianne Price NP   insulin lispro (HUMALOG) 100 unit/mL injection INJECT PER SLIDING SCALE BEFORE BREAKFAST, LUNCH, DINNER, AND AT BEDTIME FOR BLOOD SUGAR LESS THAN 150=0 UNITS, 150-199=2 UNITS, 200-249=4 UNITS, 250-299=6 UNITS, 300-349=8 UNITS, 350 AND ABOVE= 10 UNITS AND CALL PRIMARY CARE PROVIDER. Max daily 30 units.   Patient not taking: Reported on 2/16/2023 12/22/22   Rad De NP   ibuprofen (MOTRIN) 600 mg tablet Take 1 Tablet by mouth every six (6) hours as needed for Pain. 11/25/22   Christin Cobb MD   insulin syringe-needle U-100 0.3 mL 31 gauge x 15/64\" syrg USE TO INJECT INSULIN 4 TIMES DAILY 6/19/22   Provider, Historical   Diabetic Supplies, Miscellan. kit 1 Kit by Does Not Apply route four (4) times daily.  Dispense #180 Lancets to check blood sugars QID, Ref #1  Dispense #1 Glucometer use QID, Ref #None  Dispense #180 blood glucose strips to use QID, Reg #1  Dispense needles for injecting insulin  Dispense syringe for injecting insulin 6/17/22   Cade Kemp PA-C       No Known Allergies     Family History   Problem Relation Age of Onset    Hypertension Mother     Diabetes Mother     Hypertension Father     Heart Disease Father         Social History     Socioeconomic History    Marital status:     Number of children: 0    Highest education level: 9th grade   Tobacco Use    Smoking status: Never    Smokeless tobacco: Never   Vaping Use    Vaping Use: Never used   Substance and Sexual Activity    Alcohol use: Not Currently    Drug use: Yes     Types: Marijuana     Comment: smoke last night    Sexual activity: Not Currently     Social Determinants of Health     Financial Resource Strain: High Risk    Difficulty of Paying Living Expenses: Very hard   Food Insecurity: Food Insecurity Present    Worried About [facility] in the Last Year: Sometimes true    Ran Out of Food in the Last Year: Sometimes true   Transportation Needs: No Transportation Needs    Lack of Transportation (Medical): No    Lack of Transportation (Non-Medical): No   Housing Stability: Unknown    Unable to Pay for Housing in the Last Year: No    Unstable Housing in the Last Year: No            CODE STATUS:  DNR    Full x   Other      Objective:     Physical Exam:     Visit Vitals  /73 (BP 1 Location: Right upper arm, BP Patient Position: At rest;Sitting)   Pulse 87   Temp 97.5 °F (36.4 °C)   Resp 18   Ht 5' 10\" (1.778 m)   Wt 102.1 kg (225 lb)   SpO2 98%   BMI 32.28 kg/m²      O2 Device: None (Room air)    General:  Alert, cooperative, no distress, appears stated age. Head:  Normocephalic, without obvious abnormality, atraumatic. Eyes:  Conjunctivae/corneas clear. PERRL, EOMs intact. Nose: Nares normal. Septum midline. Mucosa normal. No drainage or sinus tenderness. Throat: Lips, mucosa, and tongue normal. Teeth and gums normal.   Neck: Supple, symmetrical, trachea midline, no adenopathy, thyroid: no enlargement/tenderness/nodules, no carotid bruit and no JVD. Back:   Symmetric, no curvature. ROM normal. No CVA tenderness. Lungs:   Clear to auscultation bilaterally. Chest wall:  No tenderness or deformity. Heart:  Regular rate and rhythm, S1, S2 normal, no murmur, click, rub or gallop. Abdomen:   Soft, non-tender. Bowel sounds normal. No masses,  No organomegaly. Extremities: Extremities normal, atraumatic, no cyanosis or edema. Pulses: 2+ and symmetric all extremities. Skin: Skin color, texture, turgor normal. No rashes or lesions   Neurologic: CNII-XII intact. No motor or sensory deficits. Darkening of left great toe partially on the medial side. His foot is otherwise in the bandage and it did not open the bandage. 24 Hour Results:    Recent Results (from the past 24 hour(s))   BLOOD GAS,CHEM8,LACTIC ACID POC    Collection Time: 03/20/23  2:14 PM   Result Value Ref Range    pH (POC) 7.41 7.35 - 7.45      pCO2 (POC) 36.9 35.0 - 45.0 mmHg    pO2 (POC) 33 (LL) 75 - 100 mmHg    Sodium,  136 - 145 mmol/L    Potassium, POC 4.5 3.5 - 5.5 mmol/L    Calcium, ionized (POC) 1.20 1. 12 - 1.32 mmol/L    Glucose,  (H) 65 - 100 mg/dL    Chloride,  98 - 107 MMOL/L    Creatinine, POC 1.38 (H) 0.6 - 1.3 MG/DL    eGFR (POC) 55 (L) >60 ml/min/1.73m2    Base deficit (POC) 0.8 mmol/L    HCO3 (POC) 23.4 19.0 - 28.0 mmol/L    CO2, POC 23 MMOL/L    Sample source Venous      Performed by [facility]     Lactic Acid (POC) 1.38 0.40 - 2.00 mmol/L    Respiratory Rate PENDING     FIO2, L/min PENDING     O2 SAT 65 %   CBC WITH AUTOMATED DIFF    Collection Time: 03/20/23  2:16 PM   Result Value Ref Range    WBC 9.1 4.1 - 11.1 K/uL    RBC 4.07 (L) 4.10 - 5.70 M/uL    HGB 11.3 (L) 12.1 - 17.0 g/dL    HCT 36.1 (L) 36.6 - 50.3 %    MCV 88.7 80.0 - 99.0 FL    MCH 27.8 26.0 - 34.0 PG    MCHC 31.3 30.0 - 36.5 g/dL    RDW 13.1 11.5 - 14.5 %    PLATELET 173 (H) 527 - 400 K/uL    MPV 9.3 8.9 - 12.9 FL    NRBC 0.0 0.0  WBC    ABSOLUTE NRBC 0.00 0.00 - 0.01 K/uL    NEUTROPHILS 61 32 - 75 %    LYMPHOCYTES 26 12 - 49 %    MONOCYTES 10 5 - 13 %    EOSINOPHILS 2 0 - 7 %    BASOPHILS 1 0 - 1 %    IMMATURE GRANULOCYTES 0 0 - 0.5 %    ABS. NEUTROPHILS 5.5 1.8 - 8.0 K/UL    ABS. LYMPHOCYTES 2.3 0.8 - 3.5 K/UL    ABS. MONOCYTES 0.9 0.0 - 1.0 K/UL    ABS. EOSINOPHILS 0.2 0.0 - 0.4 K/UL    ABS. BASOPHILS 0.1 0.0 - 0.1 K/UL    ABS. IMM. GRANS. 0.0 0.00 - 0.04 K/UL    DF AUTOMATED     METABOLIC PANEL, COMPREHENSIVE    Collection Time: 03/20/23  2:16 PM   Result Value Ref Range    Sodium 135 (L) 136 - 145 mmol/L    Potassium 4.4 3.5 - 5.1 mmol/L    Chloride 104 97 - 108 mmol/L    CO2 23 21 - 32 mmol/L    Anion gap 8 5 - 15 mmol/L    Glucose 103 (H) 65 - 100 mg/dL    BUN 33 (H) 6 - 20 mg/dL    Creatinine 1.41 (H) 0.70 - 1.30 mg/dL    BUN/Creatinine ratio 23 (H) 12 - 20      eGFR 54 (L) >60 ml/min/1.73m2    Calcium 9.5 8.5 - 10.1 mg/dL    Bilirubin, total 0.2 0.2 - 1.0 mg/dL    AST (SGOT) 11 (L) 15 - 37 U/L    ALT (SGPT) 18 12 - 78 U/L    Alk. phosphatase 89 45 - 117 U/L    Protein, total 7.7 6.4 - 8.2 g/dL    Albumin 2.8 (L) 3.5 - 5.0 g/dL    Globulin 4.9 (H) 2.0 - 4.0 g/dL    A-G Ratio 0.6 (L) 1.1 - 2.2           Imaging:   MRI FOOT LT WO CONT   Final Result   1.   Ulceration of the medial aspect of the great toe with findings concerning   for acute osteomyelitis in the first proximal and distal phalanges. 2.  Suspected focus of plantar fibromatosis in the midfoot. XR FOOT LT MIN 3 V   Final Result   Diffuse soft tissue swelling with irregularity/ulceration at the   medial aspect of the great toe. Fragmentation of the lateral base of the first   proximal phalanx may reflect an age indeterminate fracture deformity, but given   the adjacent soft tissue changes, consider acute osteomyelitis/septic arthritis. Assessment:     Severe sepsis/cellulitis/osteomyelitis-septic arthritis in a diabetic  Possibility of severe PAD  History of diabetes mellitus type 2 with complication diabetes mellitus  Poorly controlled/uncontrolled type 2 diabetes mellitus  Coronary artery disease   hypertension  Chronic CHF    Discussion/Medical Decision Making: Patient with numerous medical comorbidities, each with increased risk for mortality and morbidity if left untreated. Patient requires medications with high risk of toxicity and need for intensive monitoring. I have reviewed patient's presenting subjective and objective findings, as well as all laboratory studies, imaging studies, and vital signs to date as well as treatment rendered and patient's response to those treatments. In addition, prior medical, surgical and relevant social and family histories were reviewed. Plan:     Appreciate podiatry input  Appreciate need for surgery. On vancomycin and Unasyn. We will trend procalcitonin  Will obtain HbA1c  We will continue home medications.   Hold Jardiance      CODE STATUS: Full    Social determinants of health: None known      Home medications were reviewed    Signed By: Shawna Amor MD     March 20, 2023

## 2023-03-20 NOTE — ED TRIAGE NOTES
Pt is a diabetic who saw his podiatrist this morning and sent to the ED. Pt has an sore on his left foot for 3-4 weeks.

## 2023-03-20 NOTE — Clinical Note
Right femoral artery. Micropuncture needle used. Using fluoro and ultrasound guidance.  Number of attempts =  1.

## 2023-03-20 NOTE — Clinical Note
Vessel: left peroneal, ELIZABETH and dorsalis pedis. Hand injection to the artery. Multiple views taken.

## 2023-03-20 NOTE — ED PROVIDER NOTES
[facility] EMERGENCY DEPT  EMERGENCY DEPARTMENT HISTORY AND PHYSICAL EXAM      Date: 3/20/2023  Patient Name: Estefany Krueger  MRN: [id number]  YOB: 1955  Date of evaluation: 3/20/2023  Provider: Nelli Cheung MD   Note Started: 12:46 PM 3/20/23    HISTORY OF PRESENT ILLNESS     Chief Complaint   Patient presents with    Foot Pain       History Provided By: Patient    HPI: Estefany Krueger, 69 y.o. male with past history as reported below presenting to the ED with left great toe ulceration and likely development of necrotic tissue. The patient was seen by Dr. Thais Alford last week and the patient returns today for follow-up and is noted that he had developed necrotic tissue along the outside edge of the ulceration. Patient denies any pain or fever. He notes some pain in the left foot. PAST MEDICAL HISTORY   Past Medical History:  Past Medical History:   Diagnosis Date    Arthritis     Burning with urination     CAD (coronary artery disease)     patient stated stents placed unsure of how many     Diabetes ([address]     GERD (gastroesophageal reflux disease)     History of blood transfusion     patient stated approx 1 year ago     HTN (hypertension)     Hyperlipidemia     MI (myocardial infarction) ([address]     Personal history of colonic polyps 6/25/2010 6-    Rectal bleeding     patient stated this happened over the weekend.      Renal mass, left        Past Surgical History:  Past Surgical History:   Procedure Laterality Date    COLONOSCOPY N/A 1/6/2023    COLONOSCOPY (TIVA) performed by Marissa Ziegler MD at [facility] ENDOSCOPY    HX COLONOSCOPY      HX CORONARY STENT PLACEMENT  2018    x1    HX HEART CATHETERIZATION      patient stated stents placed     HX NEPHRECTOMY Left 08/23/2021    robotic left partial nephrectomy,    HX UROLOGICAL  08/23/2021     intraoperative renal ultrasound    RI UNLISTED PROCEDURE CARDIAC SURGERY  2018    BYPASS       Family History:  Family History   Problem Relation Age of Onset    Hypertension Mother     Diabetes Mother     Hypertension Father     Heart Disease Father        Social History:  Social History     Tobacco Use    Smoking status: Never    Smokeless tobacco: Never   Vaping Use    Vaping Use: Never used   Substance Use Topics    Alcohol use: Not Currently    Drug use: Yes     Types: Marijuana     Comment: smoke last night       Allergies:  No Known Allergies    PCP: Rad De NP    Current Meds:   Previous Medications    BD ULTRA-FINE MICRO PEN NEEDLE 32 GAUGE X 1/4\" NDLE    USE TO INJECT INSULIN DAILY    DIABETIC Kylie Jacinto. KIT    1 Kit by Does Not Apply route four (4) times daily. Dispense #180 Lancets to check blood sugars QID, Ref #1  Dispense #1 Glucometer use QID, Ref #None  Dispense #180 blood glucose strips to use QID, Reg #1  Dispense needles for injecting insulin  Dispense syringe for injecting insulin    EMPAGLIFLOZIN (JARDIANCE) 25 MG TABLET    Take 1 Tablet by mouth daily. FLASH GLUCOSE SCANNING READER (FREESTYLE AMERICO 2 READER) MISC    Use to check glucose 4 times daily    FLASH GLUCOSE SENSOR (FREESTYLE AMERICO 2 SENSOR) KIT    Use sensor to check glucose 4 times daily    FLUOXETINE (PROZAC) 40 MG CAPSULE    Take 1 Capsule by mouth daily. Indications: repeated episodes of anxiety    FUROSEMIDE (LASIX) 20 MG TABLET    Take 1 Tablet by mouth daily. GLIPIZIDE (GLUCOTROL) 10 MG TABLET    Take 1 Tablet by mouth two (2) times a day. IBUPROFEN (MOTRIN) 600 MG TABLET    Take 1 Tablet by mouth every six (6) hours as needed for Pain. INSULIN LISPRO (HUMALOG) 100 UNIT/ML INJECTION    INJECT PER SLIDING SCALE BEFORE BREAKFAST, LUNCH, DINNER, AND AT BEDTIME FOR BLOOD SUGAR LESS THAN 150=0 UNITS, 150-199=2 UNITS, 200-249=4 UNITS, 250-299=6 UNITS, 300-349=8 UNITS, 350 AND ABOVE= 10 UNITS AND CALL PRIMARY CARE PROVIDER. Max daily 30 units.     INSULIN SYRINGE-NEEDLE U-100 0.3 ML 31 GAUGE X 15/64\" SYRG    USE TO INJECT INSULIN 4 TIMES DAILY    LANTUS SOLOSTAR U-100 INSULIN 100 UNIT/ML (3 ML) INPN    40 Units by SubCUTAneous route nightly. LISINOPRIL (PRINIVIL, ZESTRIL) 10 MG TABLET    Take 1 Tablet by mouth daily. METHIMAZOLE (TAPAZOLE) 5 MG TABLET    Take 1 Tablet by mouth daily. NIFEDIPINE ER (PROCARDIA XL) 30 MG ER TABLET    Take 1 Tablet by mouth daily. POTASSIUM CHLORIDE (KLOR-CON M10) 10 MEQ TABLET    Take 1 Tablet by mouth daily. REVIEW OF SYSTEMS     Positives and Pertinent negatives as per HPI. PHYSICAL EXAM     ED Triage Vitals [03/20/23 1226]   ED Encounter Vitals Group      /73      Pulse (Heart Rate) 87      Resp Rate 18      Temp 97.5 °F (36.4 °C)      Temp src       O2 Sat (%) 98 %      Weight 225 lb      Height 5' 10\"      Physical Exam  Vitals and nursing note reviewed. Constitutional:       General: He is not in acute distress. Appearance: Normal appearance. HENT:      Head: Normocephalic. Nose: Nose normal.      Mouth/Throat:      Mouth: Mucous membranes are moist.   Eyes:      Conjunctiva/sclera: Conjunctivae normal.   Cardiovascular:      Rate and Rhythm: Normal rate. Comments: Weak left dorsalis pedis pulse although cap refill about 2 seconds distally. Minimal cap refill in the left great toe  Pulmonary:      Effort: Pulmonary effort is normal.      Breath sounds: Normal breath sounds. Abdominal:      General: Abdomen is flat. Musculoskeletal:         General: No deformity. Cervical back: Neck supple. Comments: There is a large ulcer along the medial edge of the left great toe. There is no pain out of proportion in the foot there is no crepitus or blistering. Skin:     General: Skin is warm. Neurological:      General: No focal deficit present. Mental Status: He is alert.    Psychiatric:         Mood and Affect: Mood normal.        ED COURSE and DIFFERENTIAL DIAGNOSIS/MDM   Records Reviewed (source and summary of external notes): Prior medical records    Vitals:    Vitals: 03/20/23 1226   BP: 114/73   Pulse: 87   Resp: 18   Temp: 97.5 °F (36.4 °C)   SpO2: 98%   Weight: 102.1 kg (225 lb)   Height: 5' 10\" (1.778 m)       CC/HPI Summary, DDx, ED Course, and Reassessment: 77-year-old male presenting to the ED for left great toe ulceration, differential diagnosis includes wet gangrene, dry gangrene, osteomyelitis, diabetic foot infection, toe ischemia, less likely necrotizing soft tissue infection. This issue appears to be subacute as it has developed over the last week. Will begin abx. ED Course as of 03/20/23 1512   Mon Mar 20, 2023   1354 XR FOOT LT MIN 3 V  Noted results, the patient is on antibiotics [PC]      ED Course User Index  [PC] Elester Officer, MD        Patient was given the following medications:  Medications   ampicillin-sulbactam (UNASYN) 3 g in 0.9% sodium chloride (MBP/ADV) 100 mL MBP (has no administration in time range)   vancomycin (VANCOCIN) 2,500 mg in 0.9% sodium chloride 500 mL IVPB (has no administration in time range)   VANCOMYCIN INFORMATION NOTE (has no administration in time range)     Discussed with hospitalist who accepts admission. CONSULTS: (Who and What was discussed)  IP CONSULT TO VASCULAR SURGERY  IP CONSULT TO PODIATRY       Social Determinants affecting Dx or Tx:       Disposition Considerations (Tests not done, Shared Decision Making, Pt Expectation of Test or Treatment.):      SCREENINGS               No data recorded        LAB, EKG AND DIAGNOSTIC RESULTS   Labs:  Recent Results (from the past 12 hour(s))   BLOOD GAS,CHEM8,LACTIC ACID POC    Collection Time: 03/20/23  2:14 PM   Result Value Ref Range    pH (POC) 7.41 7.35 - 7.45      pCO2 (POC) 36.9 35.0 - 45.0 mmHg    pO2 (POC) 33 (LL) 75 - 100 mmHg    Sodium,  136 - 145 mmol/L    Potassium, POC 4.5 3.5 - 5.5 mmol/L    Calcium, ionized (POC) 1.20 1. 12 - 1.32 mmol/L    Glucose,  (H) 65 - 100 mg/dL    Chloride,  98 - 107 MMOL/L    Creatinine, POC 1.38 (H) 0.6 - 1.3 MG/DL    eGFR (POC) 55 (L) >60 ml/min/1.73m2    Base deficit (POC) 0.8 mmol/L    HCO3 (POC) 23.4 19.0 - 28.0 mmol/L    CO2, POC 23 MMOL/L    Sample source Venous      Performed by Carolina Escobedo     Lactic Acid (POC) 1.38 0.40 - 2.00 mmol/L    Respiratory Rate PENDING     FIO2, L/min PENDING     O2 SAT 65 %   METABOLIC PANEL, COMPREHENSIVE    Collection Time: 03/20/23  2:16 PM   Result Value Ref Range    Sodium 135 (L) 136 - 145 mmol/L    Potassium 4.4 3.5 - 5.1 mmol/L    Chloride 104 97 - 108 mmol/L    CO2 23 21 - 32 mmol/L    Anion gap 8 5 - 15 mmol/L    Glucose 103 (H) 65 - 100 mg/dL    BUN 33 (H) 6 - 20 mg/dL    Creatinine 1.41 (H) 0.70 - 1.30 mg/dL    BUN/Creatinine ratio 23 (H) 12 - 20      eGFR 54 (L) >60 ml/min/1.73m2    Calcium 9.5 8.5 - 10.1 mg/dL    Bilirubin, total 0.2 0.2 - 1.0 mg/dL    AST (SGOT) 11 (L) 15 - 37 U/L    ALT (SGPT) 18 12 - 78 U/L    Alk. phosphatase 89 45 - 117 U/L    Protein, total 7.7 6.4 - 8.2 g/dL    Albumin 2.8 (L) 3.5 - 5.0 g/dL    Globulin 4.9 (H) 2.0 - 4.0 g/dL    A-G Ratio 0.6 (L) 1.1 - 2.2         Radiologic Studies:  Non-plain film images such as CT, Ultrasound and MRI are read by the radiologist. Plain radiographic images are visualized and preliminarily interpreted by the ED Provider with the below findings:    XR showing bone involvement in L great toe    Interpretation per the Radiologist below, if available at the time of this note:  XR FOOT LT MIN 3 V    Result Date: 3/20/2023  EXAM: XR FOOT LT MIN 3 V INDICATION: L toe ulcer. COMPARISON: Left foot radiographs 3/1/2023. FINDINGS: Three views of the left foot demonstrate diffuse soft tissue swelling. Soft tissue irregularity/ulceration at the medial aspect of the great toe surrounding packing material. Fragmentation at the lateral base of the first proximal phalanx. No malalignment. Diffuse soft tissue swelling with irregularity/ulceration at the medial aspect of the great toe.  Fragmentation of the lateral base of the first proximal phalanx may reflect an age indeterminate fracture deformity, but given the adjacent soft tissue changes, consider acute osteomyelitis/septic arthritis. PROCEDURES   Unless otherwise noted below, none. Performed by: Janet Mckinney MD   Procedures      CRITICAL CARE TIME       FINAL IMPRESSION     1. Diabetic ulcer of toe of left foot associated with type 2 diabetes mellitus, with necrosis of bone [address] [address]          DISPOSITION/PLAN   Admitted    Admit Note: Pt is being admitted by hospitalist. The results of their tests and reason(s) for their admission have been discussed with pt and/or available family. They convey agreement and understanding for the need to be admitted and for the admission diagnosis. PATIENT REFERRED TO:  Follow-up Information       Follow up With Specialties Details Why Contact Info    Radha Santiago DPM Podiatry Schedule an appointment as soon as possible for a visit in 1 week(s) For wound re-check [address] [address]  [phone]                DISCHARGE MEDICATIONS:  Current Discharge Medication List            DISCONTINUED MEDICATIONS:  Current Discharge Medication List          I am the Primary Clinician of Record: Janet Mckinney MD (electronically signed)    (Please note that parts of this dictation were completed with voice recognition software. Quite often unanticipated grammatical, syntax, homophones, and other interpretive errors are inadvertently transcribed by the computer software. Please disregards these errors.  Please excuse any errors that have escaped final proofreading.)

## 2023-03-20 NOTE — PROGRESS NOTES
1. Have you been to the ER, urgent care clinic since your last visit? Hospitalized since your last visit? No    2. Have you seen or consulted any other health care providers outside of the [address] [place] [address] since your last visit? Include any pap smears or colon screening.  No    Chief Complaint   Patient presents with    Wound Check     Left great toe

## 2023-03-21 LAB
ALBUMIN SERPL-MCNC: 2.4 G/DL (ref 3.5–5)
ANION GAP SERPL CALC-SCNC: 3 MMOL/L (ref 5–15)
APPEARANCE UR: CLEAR
BACTERIA URNS QL MICRO: NEGATIVE /HPF
BASOPHILS # BLD: 0.1 K/UL (ref 0–0.1)
BASOPHILS NFR BLD: 1 % (ref 0–1)
BILIRUB UR QL: NEGATIVE
BUN SERPL-MCNC: 29 MG/DL (ref 6–20)
BUN/CREAT SERPL: 24 (ref 12–20)
CA-I BLD-MCNC: 9 MG/DL (ref 8.5–10.1)
CHLORIDE SERPL-SCNC: 108 MMOL/L (ref 97–108)
CO2 SERPL-SCNC: 24 MMOL/L (ref 21–32)
COLOR UR: ABNORMAL
CREAT SERPL-MCNC: 1.22 MG/DL (ref 0.7–1.3)
DIFFERENTIAL METHOD BLD: ABNORMAL
EOSINOPHIL # BLD: 0.2 K/UL (ref 0–0.4)
EOSINOPHIL NFR BLD: 3 % (ref 0–7)
EPITH CASTS URNS QL MICRO: ABNORMAL /LPF
ERYTHROCYTE [DISTWIDTH] IN BLOOD BY AUTOMATED COUNT: 13.4 % (ref 11.5–14.5)
EST. AVERAGE GLUCOSE BLD GHB EST-MCNC: 246 MG/DL
GLUCOSE BLD STRIP.AUTO-MCNC: 114 MG/DL (ref 65–100)
GLUCOSE BLD STRIP.AUTO-MCNC: 189 MG/DL (ref 65–100)
GLUCOSE SERPL-MCNC: 164 MG/DL (ref 65–100)
GLUCOSE UR STRIP.AUTO-MCNC: >300 MG/DL
HBA1C MFR BLD: 10.2 % (ref 4–5.6)
HCT VFR BLD AUTO: 34.6 % (ref 36.6–50.3)
HGB BLD-MCNC: 10.6 G/DL (ref 12.1–17)
HGB UR QL STRIP: NEGATIVE
IMM GRANULOCYTES # BLD AUTO: 0 K/UL (ref 0–0.04)
IMM GRANULOCYTES NFR BLD AUTO: 1 % (ref 0–0.5)
KETONES UR QL STRIP.AUTO: NEGATIVE MG/DL
LEUKOCYTE ESTERASE UR QL STRIP.AUTO: NEGATIVE
LYMPHOCYTES # BLD: 1.8 K/UL (ref 0.8–3.5)
LYMPHOCYTES NFR BLD: 26 % (ref 12–49)
MCH RBC QN AUTO: 27.3 PG (ref 26–34)
MCHC RBC AUTO-ENTMCNC: 30.6 G/DL (ref 30–36.5)
MCV RBC AUTO: 89.2 FL (ref 80–99)
MONOCYTES # BLD: 0.7 K/UL (ref 0–1)
MONOCYTES NFR BLD: 11 % (ref 5–13)
NEUTS SEG # BLD: 3.9 K/UL (ref 1.8–8)
NEUTS SEG NFR BLD: 58 % (ref 32–75)
NITRITE UR QL STRIP.AUTO: NEGATIVE
NRBC # BLD: 0 K/UL (ref 0–0.01)
NRBC BLD-RTO: 0 PER 100 WBC
PERFORMED BY, TECHID: ABNORMAL
PERFORMED BY, TECHID: ABNORMAL
PH UR STRIP: 5 (ref 5–8)
PHOSPHATE SERPL-MCNC: 4 MG/DL (ref 2.6–4.7)
PLATELET # BLD AUTO: 401 K/UL (ref 150–400)
PMV BLD AUTO: 9.1 FL (ref 8.9–12.9)
POTASSIUM SERPL-SCNC: 4 MMOL/L (ref 3.5–5.1)
PROCALCITONIN SERPL-MCNC: 0.05 NG/ML
PROT UR STRIP-MCNC: 30 MG/DL
RBC # BLD AUTO: 3.88 M/UL (ref 4.1–5.7)
RBC #/AREA URNS HPF: ABNORMAL /HPF (ref 0–5)
SODIUM SERPL-SCNC: 135 MMOL/L (ref 136–145)
SP GR UR REFRACTOMETRY: >1.03 (ref 1–1.03)
UROBILINOGEN UR QL STRIP.AUTO: 0.1 EU/DL (ref 0.1–1)
WBC # BLD AUTO: 6.8 K/UL (ref 4.1–11.1)
WBC URNS QL MICRO: ABNORMAL /HPF (ref 0–4)

## 2023-03-21 PROCEDURE — 99153 MOD SED SAME PHYS/QHP EA: CPT | Performed by: SURGERY

## 2023-03-21 PROCEDURE — 77030013516 HC DEV INFL ANGI MRTM -B: Performed by: SURGERY

## 2023-03-21 PROCEDURE — 047S3ZZ DILATION OF LEFT POSTERIOR TIBIAL ARTERY, PERCUTANEOUS APPROACH: ICD-10-PCS | Performed by: SURGERY

## 2023-03-21 PROCEDURE — 74011000258 HC RX REV CODE- 258: Performed by: STUDENT IN AN ORGANIZED HEALTH CARE EDUCATION/TRAINING PROGRAM

## 2023-03-21 PROCEDURE — C1769 GUIDE WIRE: HCPCS | Performed by: SURGERY

## 2023-03-21 PROCEDURE — 82962 GLUCOSE BLOOD TEST: CPT

## 2023-03-21 PROCEDURE — 75625 CONTRAST EXAM ABDOMINL AORTA: CPT | Performed by: SURGERY

## 2023-03-21 PROCEDURE — 04CN3ZZ EXTIRPATION OF MATTER FROM LEFT POPLITEAL ARTERY, PERCUTANEOUS APPROACH: ICD-10-PCS | Performed by: SURGERY

## 2023-03-21 PROCEDURE — 75716 ARTERY X-RAYS ARMS/LEGS: CPT | Performed by: SURGERY

## 2023-03-21 PROCEDURE — 74011250636 HC RX REV CODE- 250/636: Performed by: SURGERY

## 2023-03-21 PROCEDURE — 99152 MOD SED SAME PHYS/QHP 5/>YRS: CPT | Performed by: SURGERY

## 2023-03-21 PROCEDURE — 74011250636 HC RX REV CODE- 250/636: Performed by: INTERNAL MEDICINE

## 2023-03-21 PROCEDURE — 37186 SEC ART THROMBECTOMY ADD-ON: CPT | Performed by: SURGERY

## 2023-03-21 PROCEDURE — 76210000001 HC OR PH I REC 2.5 TO 3 HR: Performed by: SURGERY

## 2023-03-21 PROCEDURE — 74011636637 HC RX REV CODE- 636/637: Performed by: SURGERY

## 2023-03-21 PROCEDURE — 76937 US GUIDE VASCULAR ACCESS: CPT | Performed by: SURGERY

## 2023-03-21 PROCEDURE — 37225 PR REVSC OPN/PRQ FEM/POP W/ATHRC/ANGIOP SM VSL: CPT | Performed by: SURGERY

## 2023-03-21 PROCEDURE — 74011000250 HC RX REV CODE- 250: Performed by: SURGERY

## 2023-03-21 PROCEDURE — C1894 INTRO/SHEATH, NON-LASER: HCPCS | Performed by: SURGERY

## 2023-03-21 PROCEDURE — 04CS3ZZ EXTIRPATION OF MATTER FROM LEFT POSTERIOR TIBIAL ARTERY, PERCUTANEOUS APPROACH: ICD-10-PCS | Performed by: SURGERY

## 2023-03-21 PROCEDURE — C1757 CATH, THROMBECTOMY/EMBOLECT: HCPCS | Performed by: SURGERY

## 2023-03-21 PROCEDURE — 85025 COMPLETE CBC W/AUTO DIFF WBC: CPT

## 2023-03-21 PROCEDURE — 37187 VENOUS MECH THROMBECTOMY: CPT | Performed by: SURGERY

## 2023-03-21 PROCEDURE — 36415 COLL VENOUS BLD VENIPUNCTURE: CPT

## 2023-03-21 PROCEDURE — C1760 CLOSURE DEV, VASC: HCPCS | Performed by: SURGERY

## 2023-03-21 PROCEDURE — C1725 CATH, TRANSLUMIN NON-LASER: HCPCS | Performed by: SURGERY

## 2023-03-21 PROCEDURE — 047N3ZZ DILATION OF LEFT POPLITEAL ARTERY, PERCUTANEOUS APPROACH: ICD-10-PCS | Performed by: SURGERY

## 2023-03-21 PROCEDURE — C2623 CATH, TRANSLUMIN, DRUG-COAT: HCPCS | Performed by: SURGERY

## 2023-03-21 PROCEDURE — 74011000636 HC RX REV CODE- 636: Performed by: SURGERY

## 2023-03-21 PROCEDURE — 80069 RENAL FUNCTION PANEL: CPT

## 2023-03-21 PROCEDURE — 36245 INS CATH ABD/L-EXT ART 1ST: CPT | Performed by: SURGERY

## 2023-03-21 PROCEDURE — 74011000258 HC RX REV CODE- 258: Performed by: SURGERY

## 2023-03-21 PROCEDURE — 99232 SBSQ HOSP IP/OBS MODERATE 35: CPT | Performed by: PODIATRIST

## 2023-03-21 PROCEDURE — 75710 ARTERY X-RAYS ARM/LEG: CPT | Performed by: SURGERY

## 2023-03-21 PROCEDURE — C1887 CATHETER, GUIDING: HCPCS | Performed by: SURGERY

## 2023-03-21 PROCEDURE — 37229 PR REVSC OPN/PRQ TIB/PERO W/ATHRC/ANGIOP SM VSL: CPT | Performed by: SURGERY

## 2023-03-21 PROCEDURE — 65270000029 HC RM PRIVATE

## 2023-03-21 PROCEDURE — C1714 CATH, TRANS ATHERECTOMY, DIR: HCPCS | Performed by: SURGERY

## 2023-03-21 PROCEDURE — 74011250637 HC RX REV CODE- 250/637: Performed by: INTERNAL MEDICINE

## 2023-03-21 PROCEDURE — C1884 EMBOLIZATION PROTECT SYST: HCPCS | Performed by: SURGERY

## 2023-03-21 PROCEDURE — 84145 PROCALCITONIN (PCT): CPT

## 2023-03-21 PROCEDURE — 37229 HC ARTHERC TIB/PER UNI +/-PTA INIT: CPT | Performed by: SURGERY

## 2023-03-21 PROCEDURE — 81001 URINALYSIS AUTO W/SCOPE: CPT

## 2023-03-21 PROCEDURE — 87086 URINE CULTURE/COLONY COUNT: CPT

## 2023-03-21 PROCEDURE — 77030006078 HC TAPE GLOW N TEL LEMV -B: Performed by: SURGERY

## 2023-03-21 PROCEDURE — 74011250636 HC RX REV CODE- 250/636: Performed by: STUDENT IN AN ORGANIZED HEALTH CARE EDUCATION/TRAINING PROGRAM

## 2023-03-21 RX ORDER — MIDAZOLAM HYDROCHLORIDE 1 MG/ML
INJECTION INTRAMUSCULAR; INTRAVENOUS AS NEEDED
Status: DISCONTINUED | OUTPATIENT
Start: 2023-03-21 | End: 2023-03-21 | Stop reason: HOSPADM

## 2023-03-21 RX ORDER — HEPARIN SODIUM 10000 [USP'U]/100ML
9-25 INJECTION, SOLUTION INTRAVENOUS
Status: DISCONTINUED | OUTPATIENT
Start: 2023-03-21 | End: 2023-03-23

## 2023-03-21 RX ORDER — HEPARIN SODIUM 1000 [USP'U]/ML
2000 INJECTION, SOLUTION INTRAVENOUS; SUBCUTANEOUS AS NEEDED
Status: DISCONTINUED | OUTPATIENT
Start: 2023-03-21 | End: 2023-03-21

## 2023-03-21 RX ORDER — LIDOCAINE HYDROCHLORIDE 10 MG/ML
INJECTION INFILTRATION; PERINEURAL AS NEEDED
Status: DISCONTINUED | OUTPATIENT
Start: 2023-03-21 | End: 2023-03-21 | Stop reason: HOSPADM

## 2023-03-21 RX ORDER — SODIUM CHLORIDE 0.9 % (FLUSH) 0.9 %
5-40 SYRINGE (ML) INJECTION AS NEEDED
Status: DISCONTINUED | OUTPATIENT
Start: 2023-03-21 | End: 2023-03-23

## 2023-03-21 RX ORDER — HEPARIN SODIUM 1000 [USP'U]/ML
4000 INJECTION, SOLUTION INTRAVENOUS; SUBCUTANEOUS AS NEEDED
Status: DISCONTINUED | OUTPATIENT
Start: 2023-03-21 | End: 2023-03-21

## 2023-03-21 RX ORDER — HYDRALAZINE HYDROCHLORIDE 20 MG/ML
10 INJECTION INTRAMUSCULAR; INTRAVENOUS ONCE
Status: COMPLETED | OUTPATIENT
Start: 2023-03-21 | End: 2023-03-21

## 2023-03-21 RX ORDER — HEPARIN SODIUM 1000 [USP'U]/ML
2000 INJECTION, SOLUTION INTRAVENOUS; SUBCUTANEOUS AS NEEDED
Status: DISCONTINUED | OUTPATIENT
Start: 2023-03-21 | End: 2023-03-24 | Stop reason: HOSPADM

## 2023-03-21 RX ORDER — FENTANYL CITRATE 50 UG/ML
INJECTION, SOLUTION INTRAMUSCULAR; INTRAVENOUS AS NEEDED
Status: DISCONTINUED | OUTPATIENT
Start: 2023-03-21 | End: 2023-03-21 | Stop reason: HOSPADM

## 2023-03-21 RX ORDER — NITROGLYCERIN 5 MG/ML
INJECTION, SOLUTION INTRAVENOUS AS NEEDED
Status: DISCONTINUED | OUTPATIENT
Start: 2023-03-21 | End: 2023-03-21 | Stop reason: HOSPADM

## 2023-03-21 RX ORDER — HEPARIN SODIUM 10000 [USP'U]/100ML
9-25 INJECTION, SOLUTION INTRAVENOUS
Status: DISCONTINUED | OUTPATIENT
Start: 2023-03-21 | End: 2023-03-21

## 2023-03-21 RX ORDER — HEPARIN SODIUM 1000 [USP'U]/ML
4000 INJECTION, SOLUTION INTRAVENOUS; SUBCUTANEOUS AS NEEDED
Status: DISCONTINUED | OUTPATIENT
Start: 2023-03-21 | End: 2023-03-24 | Stop reason: HOSPADM

## 2023-03-21 RX ORDER — HEPARIN SODIUM 1000 [USP'U]/ML
INJECTION, SOLUTION INTRAVENOUS; SUBCUTANEOUS AS NEEDED
Status: DISCONTINUED | OUTPATIENT
Start: 2023-03-21 | End: 2023-03-21 | Stop reason: HOSPADM

## 2023-03-21 RX ORDER — SODIUM CHLORIDE 0.9 % (FLUSH) 0.9 %
5-40 SYRINGE (ML) INJECTION EVERY 8 HOURS
Status: DISCONTINUED | OUTPATIENT
Start: 2023-03-21 | End: 2023-03-23

## 2023-03-21 RX ORDER — HEPARIN SODIUM 200 [USP'U]/100ML
INJECTION, SOLUTION INTRAVENOUS
Status: COMPLETED | OUTPATIENT
Start: 2023-03-21 | End: 2023-03-21

## 2023-03-21 RX ORDER — HEPARIN SODIUM 1000 [USP'U]/ML
4000 INJECTION, SOLUTION INTRAVENOUS; SUBCUTANEOUS ONCE
Status: COMPLETED | OUTPATIENT
Start: 2023-03-21 | End: 2023-03-21

## 2023-03-21 RX ORDER — METOPROLOL TARTRATE 5 MG/5ML
2.5 INJECTION INTRAVENOUS EVERY 6 HOURS
Status: DISCONTINUED | OUTPATIENT
Start: 2023-03-21 | End: 2023-03-24 | Stop reason: HOSPADM

## 2023-03-21 RX ADMIN — HYDRALAZINE HYDROCHLORIDE 10 MG: 20 INJECTION, SOLUTION INTRAMUSCULAR; INTRAVENOUS at 15:29

## 2023-03-21 RX ADMIN — SODIUM CHLORIDE, PRESERVATIVE FREE 10 ML: 5 INJECTION INTRAVENOUS at 21:18

## 2023-03-21 RX ADMIN — HEPARIN SODIUM 4000 UNITS: 1000 INJECTION, SOLUTION INTRAVENOUS; SUBCUTANEOUS at 05:32

## 2023-03-21 RX ADMIN — VANCOMYCIN HYDROCHLORIDE 750 MG: 750 INJECTION, POWDER, LYOPHILIZED, FOR SOLUTION INTRAVENOUS at 04:15

## 2023-03-21 RX ADMIN — VANCOMYCIN HYDROCHLORIDE 750 MG: 750 INJECTION, POWDER, LYOPHILIZED, FOR SOLUTION INTRAVENOUS at 16:58

## 2023-03-21 RX ADMIN — LISINOPRIL 10 MG: 10 TABLET ORAL at 09:20

## 2023-03-21 RX ADMIN — SODIUM BICARBONATE: 84 INJECTION, SOLUTION INTRAVENOUS at 17:44

## 2023-03-21 RX ADMIN — HEPARIN SODIUM AND DEXTROSE 9.2 UNITS/KG/HR: 10000; 5 INJECTION INTRAVENOUS at 07:31

## 2023-03-21 RX ADMIN — INSULIN LISPRO 2 UNITS: 100 INJECTION, SOLUTION INTRAVENOUS; SUBCUTANEOUS at 21:16

## 2023-03-21 RX ADMIN — HEPARIN SODIUM AND DEXTROSE 9 UNITS/KG/HR: 10000; 5 INJECTION INTRAVENOUS at 23:17

## 2023-03-21 RX ADMIN — SODIUM CHLORIDE, PRESERVATIVE FREE 10 ML: 5 INJECTION INTRAVENOUS at 16:49

## 2023-03-21 RX ADMIN — SODIUM CHLORIDE 3 G: 900 INJECTION INTRAVENOUS at 21:18

## 2023-03-21 RX ADMIN — METHIMAZOLE 5 MG: 5 TABLET ORAL at 09:20

## 2023-03-21 RX ADMIN — HEPARIN SODIUM AND DEXTROSE 9 UNITS/KG/HR: 10000; 5 INJECTION INTRAVENOUS at 05:33

## 2023-03-21 RX ADMIN — SODIUM CHLORIDE 3 G: 900 INJECTION INTRAVENOUS at 02:21

## 2023-03-21 RX ADMIN — METOPROLOL TARTRATE 2.5 MG: 5 INJECTION INTRAVENOUS at 18:57

## 2023-03-21 RX ADMIN — FLUOXETINE 40 MG: 20 CAPSULE ORAL at 09:20

## 2023-03-21 RX ADMIN — SODIUM BICARBONATE: 84 INJECTION, SOLUTION INTRAVENOUS at 06:05

## 2023-03-21 RX ADMIN — SODIUM CHLORIDE 3 G: 900 INJECTION INTRAVENOUS at 09:20

## 2023-03-21 NOTE — PERIOP NOTES
Family updated at 2:22 PM by Estela Toussaint RN.     Pt states there is nobody he wants called at this time, says he will call them later himself

## 2023-03-21 NOTE — PROGRESS NOTES
TRANSFER - IN REPORT:    Verbal report received from PACU(name) on Amparo Query  being received from [employer]) for routine post - op      Report consisted of patients Situation, Background, Assessment and   Recommendations(SBAR). Information from the following report(s) SBAR, Kardex, OR Summary, Procedure Summary, Intake/Output, MAR, Recent Results, and Med Rec Status was reviewed with the receiving nurse. Opportunity for questions and clarification was provided. Assessment completed upon patients arrival to unit and care assumed.

## 2023-03-21 NOTE — PROGRESS NOTES
Problem: Falls - Risk of  Goal: *Absence of Falls  Description: Document John Paredes Fall Risk and appropriate interventions in the flowsheet. Outcome: Progressing Towards Goal  Note: Fall Risk Interventions:                                Problem: Patient Education: Go to Patient Education Activity  Goal: Patient/Family Education  Outcome: Progressing Towards Goal     Problem: Pressure Injury - Risk of  Goal: *Prevention of pressure injury  Description: Document Yuriy Scale and appropriate interventions in the flowsheet.   Outcome: Progressing Towards Goal  Note: Pressure Injury Interventions:  Sensory Interventions: Assess changes in LOC    Moisture Interventions: Absorbent underpads    Activity Interventions: Increase time out of bed    Mobility Interventions: HOB 30 degrees or less    Nutrition Interventions: Document food/fluid/supplement intake                     Problem: Patient Education: Go to Patient Education Activity  Goal: Patient/Family Education  Outcome: Progressing Towards Goal

## 2023-03-21 NOTE — PROGRESS NOTES
Heparin Infusion Initiation  Radha Orr is a 69 y.o. male starting heparin for:  ischemic leg  Heparin dosing: order for weight based protocol   Initial Dosing Weight: 102.1 kg (Recorded body weight)  Recent Labs     03/21/23  0233 03/20/23  1416   * 480*   HGB 10.6* 11.3*     Factor Xa inhibitor/LMWH use within the past 72 hours? No  If yes, date and time of last administration: N/A  Hypertriglyceridemia (> 690 mg/dL) or hyperbilirubinemia (> 37 mg/dL conjugated bilirubin, >14 mg/dL unconjugated bilirubin) present? No  Recent Labs     03/20/23  1416   TBILI 0.2       Assessment/Plan:   Heparin to be monitored using anti-Xa for duration of therapy  Initial bolus ordered: Yes  Starting rate:  9 unit/kg/hr  PRN boluses entered:  Yes

## 2023-03-21 NOTE — PROGRESS NOTES
[facility]      Progress Note    Date:3/21/2023       Room:PACU/PL  Patient Florentino Rod     YOB: 1955     Age:67 y.o. Subjective    Subjective   Pt seen at [facility] in PACU. Denies any new complaints      Review of Systems  Constitutional:  Negative for chills, diaphoresis, fever and malaise/fatigue. Respiratory:  Negative for cough, hemoptysis, sputum production, shortness of breath and wheezing. No cyanosis   Cardiovascular:  Negative for chest pain, palpitations, claudication and leg swelling. Gastrointestinal:  Negative for abdominal pain, constipation, diarrhea, heartburn, nausea and vomiting. Genitourinary:  Negative for frequency. Musculoskeletal:  Positive for joint pain. Negative for back pain, myalgias and neck pain. Skin:  Negative for itching and rash. Ulcer left hallux   Neurological:  Positive for tingling. Negative for headaches. Alert and oriented x 4. Endo/Heme/Allergies:  Negative for polydipsia. Psychiatric/Behavioral:  Negative for depression and memory loss. The patient is not nervous/anxious. Objective         Vitals Last 24 Hours:  TEMPERATURE:  Temp  Av.5 °F (36.9 °C)  Min: 98.2 °F (36.8 °C)  Max: 98.7 °F (37.1 °C)  RESPIRATIONS RANGE: Resp  Av.8  Min: 13  Max: 24  PULSE OXIMETRY RANGE: SpO2  Av.1 %  Min: 92 %  Max: 100 %  PULSE RANGE: Pulse  Av.7  Min: 71  Max: 91  BLOOD PRESSURE RANGE: Systolic (91GXI), DDQ:899 , Min:116 , XDH:643   ; Diastolic (73UJA), DEJ:59, Min:73, Max:94    I/O (24Hr): Intake/Output Summary (Last 24 hours) at 3/21/2023 1634  Last data filed at 3/21/2023 1536  Gross per 24 hour   Intake --   Output 350 ml   Net -350 ml     Objective  GEN: Pt is AAOx4 and in NAD. No dressing noted to B/L LE. No family noted at [facility]  DERM: Wound noted to the left hallux. Purulent drainage noted. Dry skin noted to B/L LE. VASC: Pedal pulses (DP/PT) diminished to B/L LE. CFT<3sec to all digits of B/L LE. No pedal hair growth noted to the level of the digits for B/L LE. Skin temp is warm to cool from proximal to distal for B/L LE. Neg homans/carole signs to B/L LE. No varicosities or telangectasias noted to B/L LE.  NEURO: Protective and epicritic sensations grossly absent to B/L LE  MSK: Pain on palpation left hallux no gross deformities. Good muscle tone and bulk noted to B/L LE.  PSYCH: Cooperative with normal mood and affect       Labs/Imaging/Diagnostics    Labs:  CBC:  Recent Labs     03/21/23 0233 03/20/23  1416   WBC 6.8 9.1   RBC 3.88* 4.07*   HGB 10.6* 11.3*   HCT 34.6* 36.1*   MCV 89.2 88.7   RDW 13.4 13.1   * 480*     CHEMISTRIES:  Recent Labs     03/21/23  0233 03/20/23  1416   * 135*   K 4.0 4.4    104   CO2 24 23   BUN 29* 33*   CA 9.0 9.5   PHOS 4.0  --    PT/INR:No results for input(s): INR, INREXT in the last 72 hours. No lab exists for component: PROTIME  APTT:No results for input(s): APTT in the last 72 hours. LIVER PROFILE:  Recent Labs     03/20/23  1416   AST 11*   ALT 18     Lab Results   Component Value Date/Time    ALT (SGPT) 18 03/20/2023 02:16 PM    AST (SGOT) 11 (L) 03/20/2023 02:16 PM    Alk. phosphatase 89 03/20/2023 02:16 PM    Bilirubin, total 0.2 03/20/2023 02:16 PM       Imaging Last 24 Hours:  No results found. Assessment//Plan   Active Problems:    Gangrene ([address] (3/20/2023)      Osteomyelitis ([address] (3/20/2023)      Assessment & Plan    Diabetic ulcer, left great toe  Uncontrolled DM T2  PAD      Patient seen and evaluated at bedside  - Current labs personally reviewed and findings dicussed with patient  - Cont local wound care orders placed, betadine WTD. Offloading to the area for wound healing purposes  - Per vascular, inflow optimized today via angioplasty  - Tx options reviewed, conservative vs surgical. Possible complications discussed.  Pt states he will discuss with his family and we can discuss further tomorrow  - Cont empiric abx. Will tailor based upon wcx   - We will follow closely           Rosalie Mejia.  Cecy Holder DPM, CW, [facility]  [address] [place] [address] [place]:  [phone]  F:  [phone]     Nika Raines available 24/7    Electronically signed by Alex Stafford DPM on 3/21/2023 at 4:34 PM

## 2023-03-21 NOTE — PROGRESS NOTES
TRANSFER - OUT REPORT:    Verbal report given to Allegra(name) on Katharine Jamila  being transferred to 5East unit) for routine post - op       Report consisted of patients Situation, Background, Assessment and   Recommendations(SBAR). Information from the following report(s) SBAR was reviewed with the receiving nurse. Lines:   Peripheral IV 03/20/23 Anterior; Left Forearm (Active)       Peripheral IV 03/21/23 Anterior;Right Forearm (Active)        Opportunity for questions and clarification was provided.       Patient transported with:   Registered Nurse(PACU)

## 2023-03-21 NOTE — PERIOP NOTES
TRANSFER - OUT REPORT:    Verbal report given to MAYUR Zuluaga(name) on Cece Ford  being transferred to (unit) for routine post - op       Report consisted of patients Situation, Background, Assessment and   Recommendations(SBAR). Information from the following report(s) SBAR, Procedure Summary, Intake/Output, MAR, and Recent Results was reviewed with the receiving nurse. Lines:   Peripheral IV 03/20/23 Anterior; Left Forearm (Active)   Site Assessment Clean, dry, & intact 03/21/23 1536   Phlebitis Assessment 0 03/21/23 1536   Infiltration Assessment 0 03/21/23 1536   Dressing Status Clean, dry, & intact 03/21/23 1536   Dressing Type Transparent;Tape 03/21/23 1536   Hub Color/Line Status Pink; Infusing 03/21/23 1536       Peripheral IV 03/21/23 Anterior;Right Forearm (Active)   Site Assessment Clean, dry, & intact 03/21/23 1536   Phlebitis Assessment 0 03/21/23 1536   Infiltration Assessment 0 03/21/23 1536   Dressing Status Clean, dry, & intact 03/21/23 1536   Dressing Type Tape;Transparent 03/21/23 1536   Hub Color/Line Status Pink;Flushed 03/21/23 1536   Alcohol Cap Used Yes 03/21/23 1536        Opportunity for questions and clarification was provided.       Patient transported with:   Registered Nurse

## 2023-03-21 NOTE — PROGRESS NOTES
Hospitalist Progress Note               Daily Progress Note: 3/21/2023      Subjective:     Seen in evening at 5 E. S/p surgery. Reports feeling somewhat relieved. Appetite intact. Not in pain. No other symptoms reported. Hospital course to date:    Admit 3/20 because of darkening of left great toe along with worsening wound, referred by Dr. Umm De for hospitalization. Prior medical history of diabetes mellitus, hypertension, dyslipidemia, coronary disease, GERD and has had a developing diabetic ulcer on his left great toe for the last 4 weeks. Severe PAD. Dr. Woodrow Merlos nd Dr. Radha Car seeing him    Problem List:  Problem List as of 3/21/2023 Date Reviewed: 3/20/2023            Codes Class Noted - Resolved    Gangrene ([facility] ICD-10-CM: M25  ICD-9-CM: 785.4  3/20/2023 - Present        Osteomyelitis ([facility] ICD-10-CM: M86.9  ICD-9-CM: 730.20  3/20/2023 - Present        Moderate episode of recurrent major depressive disorder ([facility] ICD-10-CM: F33.1  ICD-9-CM: 296.32  2/16/2023 - Present        Chronic systolic (congestive) heart failure ICD-10-CM: I50.22  ICD-9-CM: 428.22, 428.0  12/20/2022 - Present        Pneumonia ICD-10-CM: J18.9  ICD-9-CM: 486  6/16/2022 - Present        At high risk for falls ICD-10-CM: Z91.81  ICD-9-CM: V15.88  5/23/2021 - Present        H/O colonoscopy ICD-10-CM: Z98.890  ICD-9-CM: V45.89  5/23/2021 - Present    Overview Signed 5/23/2021  1:55 PM by Giorgi Wall NP     [facility]             Angiomyolipoma of left kidney ICD-10-CM: D17.71  ICD-9-CM: 223.0  4/9/2021 - Present    Overview Addendum 9/13/2021  2:02 PM by Nikki Ivey NP     CT 4/4/21: The left kidney reveals a 1.7 x 1.7 cm mass that demonstrates enhancement. The precontrast CT reveals a density of 35 Hounsfield units. This enhances to 84 Hounsfield units after IV contrast administration. There is concern for neoplasm.     He is s/p robotic left partial nephrectomy, intraoperative renal ultrasound on 8/23/21. Pathology: Angiomyolipoma, 2.0 cm, completely excised. Slight chronic interstitial nephritis.              Uncontrolled type 2 diabetes mellitus with hyperglycemia ([facility] ICD-10-CM: E11.65  ICD-9-CM: 250.02  4/9/2021 - Present        Blurred vision, bilateral ICD-10-CM: H53.8  ICD-9-CM: 368.8  4/9/2021 - Present        Mixed hyperlipidemia ICD-10-CM: E78.2  ICD-9-CM: 272.2  4/9/2021 - Present        Respiratory failure with hypoxia (HCC) ICD-10-CM: J96.91  ICD-9-CM: 518.81  2/22/2021 - Present        Pneumonia due to COVID-19 virus ICD-10-CM: U07.1, J12.82  ICD-9-CM: 480.8, 079.89  2/17/2021 - Present        Chest pain ICD-10-CM: R07.9  ICD-9-CM: 786.50  12/12/2020 - Present        CAD (coronary artery disease) ICD-10-CM: I25.10  ICD-9-CM: 414.00  12/12/2020 - Present        Essential hypertension ICD-10-CM: I10  ICD-9-CM: 401.9  12/12/2020 - Present        Personal history of colonic polyps ICD-10-CM: Z86.010  ICD-9-CM: V12.72  6/25/2010 - Present    Overview Signed 10/26/2022 10:52 AM by Gumaro Delgadillo     6-             RESOLVED: Kidney mass ICD-10-CM: N28.89  ICD-9-CM: 593.9  8/23/2021 - 9/13/2021           Medications reviewed  Current Facility-Administered Medications   Medication Dose Route Frequency    sodium bicarbonate (8.4%) 150 mEq in dextrose 5% 1,000 mL infusion   IntraVENous CONTINUOUS    sodium chloride (NS) flush 5-40 mL  5-40 mL IntraVENous Q8H    sodium chloride (NS) flush 5-40 mL  5-40 mL IntraVENous PRN    metoprolol (LOPRESSOR) injection 2.5 mg  2.5 mg IntraVENous Q6H    [START ON 3/22/2023] vancomycin (VANCOCIN) 750 mg in 0.9% sodium chloride 250 mL ([id number])  750 mg IntraVENous Q12H    [START ON 3/22/2023] VANCOMYCIN RANDOM LEVEL DUE ON 3/22 AT 0400   Other ONCE    ampicillin-sulbactam (UNASYN) 3 g in 0.9% sodium chloride (MBP/ADV) 100 mL MBP  3 g IntraVENous Q6H    VANCOMYCIN INFORMATION NOTE   Other Rx Dosing/Monitoring    FLUoxetine (PROzac) capsule 40 mg  40 mg Oral DAILY methIMAzole (TAPAZOLE) tablet 5 mg  5 mg Oral DAILY    lisinopriL (PRINIVIL, ZESTRIL) tablet 10 mg  10 mg Oral DAILY    insulin lispro (HUMALOG) injection   SubCUTAneous AC&HS    glucose chewable tablet 16 g  4 Tablet Oral PRN    glucagon (GLUCAGEN) injection 1 mg  1 mg IntraMUSCular PRN    dextrose 10% infusion 0-250 mL  0-250 mL IntraVENous PRN       Review of Systems:   Pertinent items are noted in HPI. Objective:   Physical Exam:     Visit Vitals  /60 (BP 1 Location: Left upper arm, BP Patient Position: Semi fowlers)   Pulse 85   Temp 98.4 °F (36.9 °C)   Resp 20   Ht 5' 10\" (1.778 m)   Wt 102.1 kg (225 lb)   SpO2 99%   BMI 32.28 kg/m²      O2 Device: None (Room air)    Temp (24hrs), Av.3 °F (36.8 °C), Min:97.7 °F (36.5 °C), Max:98.7 °F (37.1 °C)     07 -  1900  In: -   Out: 350 [Urine:350]   No intake/output data recorded. Physical Exam  Constitutional:       Appearance: Normal appearance. He is obese. HENT:      Head: Normocephalic and atraumatic. Mouth/Throat:      Mouth: Mucous membranes are dry. Cardiovascular:      Rate and Rhythm: Regular rhythm. Tachycardia present. Pulmonary:      Effort: Pulmonary effort is normal.      Breath sounds: No wheezing. Abdominal:      General: There is no distension. Skin:     General: Skin is warm and dry. Neurological:      General: No focal deficit present. Mental Status: He is alert. Mental status is at baseline.         Intake/Output Summary (Last 24 hours) at 3/21/2023 1746  Last data filed at 3/21/2023 1536  Gross per 24 hour   Intake --   Output 350 ml   Net -350 ml          Data Review:       Recent Days:  Recent Labs     23  0233 23  1416   WBC 6.8 9.1   HGB 10.6* 11.3*   HCT 34.6* 36.1*   * 480*     Recent Labs     23  0233 23  1416   * 135*   K 4.0 4.4    104   CO2 24 23   * 103*   BUN 29* 33*   CREA 1.22 1.41*   CA 9.0 9.5   PHOS 4.0  --    ALB 2.4* 2.8*   TBILI --  0.2   ALT  --  18     No results for input(s): PH, PCO2, PO2, HCO3, FIO2 in the last 72 hours. 24 Hour Results:  Recent Results (from the past 24 hour(s))   HEMOGLOBIN A1C WITH EAG    Collection Time: 03/20/23  6:43 PM   Result Value Ref Range    Hemoglobin A1c 10.2 (H) 4.0 - 5.6 %    Est. average glucose 246 mg/dL   TSH 3RD GENERATION    Collection Time: 03/20/23  6:43 PM   Result Value Ref Range    TSH 1.97 0.36 - 3.74 uIU/mL   GLUCOSE, POC    Collection Time: 03/20/23  8:23 PM   Result Value Ref Range    Glucose (POC) 147 (H) 65 - 100 mg/dL    Performed by Cyndy Duarte    PROCALCITONIN    Collection Time: 03/21/23  2:33 AM   Result Value Ref Range    Procalcitonin 0.05 (H) 0 ng/mL   CBC WITH AUTOMATED DIFF    Collection Time: 03/21/23  2:33 AM   Result Value Ref Range    WBC 6.8 4.1 - 11.1 K/uL    RBC 3.88 (L) 4.10 - 5.70 M/uL    HGB 10.6 (L) 12.1 - 17.0 g/dL    HCT 34.6 (L) 36.6 - 50.3 %    MCV 89.2 80.0 - 99.0 FL    MCH 27.3 26.0 - 34.0 PG    MCHC 30.6 30.0 - 36.5 g/dL    RDW 13.4 11.5 - 14.5 %    PLATELET 360 (H) 677 - 400 K/uL    MPV 9.1 8.9 - 12.9 FL    NRBC 0.0 0.0  WBC    ABSOLUTE NRBC 0.00 0.00 - 0.01 K/uL    NEUTROPHILS 58 32 - 75 %    LYMPHOCYTES 26 12 - 49 %    MONOCYTES 11 5 - 13 %    EOSINOPHILS 3 0 - 7 %    BASOPHILS 1 0 - 1 %    IMMATURE GRANULOCYTES 1 (H) 0 - 0.5 %    ABS. NEUTROPHILS 3.9 1.8 - 8.0 K/UL    ABS. LYMPHOCYTES 1.8 0.8 - 3.5 K/UL    ABS. MONOCYTES 0.7 0.0 - 1.0 K/UL    ABS. EOSINOPHILS 0.2 0.0 - 0.4 K/UL    ABS. BASOPHILS 0.1 0.0 - 0.1 K/UL    ABS. IMM.  GRANS. 0.0 0.00 - 0.04 K/UL    DF AUTOMATED     RENAL FUNCTION PANEL    Collection Time: 03/21/23  2:33 AM   Result Value Ref Range    Sodium 135 (L) 136 - 145 mmol/L    Potassium 4.0 3.5 - 5.1 mmol/L    Chloride 108 97 - 108 mmol/L    CO2 24 21 - 32 mmol/L    Anion gap 3 (L) 5 - 15 mmol/L    Glucose 164 (H) 65 - 100 mg/dL    BUN 29 (H) 6 - 20 mg/dL    Creatinine 1.22 0.70 - 1.30 mg/dL    BUN/Creatinine ratio 24 (H) 12 - 20      eGFR >60 >60 ml/min/1.73m2    Calcium 9.0 8.5 - 10.1 mg/dL    Phosphorus 4.0 2.6 - 4.7 mg/dL    Albumin 2.4 (L) 3.5 - 5.0 g/dL   GLUCOSE, POC    Collection Time: 03/21/23  2:41 PM   Result Value Ref Range    Glucose (POC) 114 (H) 65 - 100 mg/dL    Performed by Mir Rivera    URINALYSIS W/MICROSCOPIC    Collection Time: 03/21/23  2:58 PM   Result Value Ref Range    Color Yellow/Straw      Appearance Clear Clear      Specific gravity >1.030 (H) 1.003 - 1.030    pH (UA) 5.0 5.0 - 8.0      Protein 30 (A) Negative mg/dL    Glucose >300 (A) Negative mg/dL    Ketone Negative Negative mg/dL    Bilirubin Negative Negative      Blood Negative Negative      Urobilinogen 0.1 0.1 - 1.0 EU/dL    Nitrites Negative Negative      Leukocyte Esterase Negative Negative      Epithelial cells Few Few /lpf    Bacteria Negative Negative /hpf    WBC 0-4 0 - 4 /hpf    RBC 0-5 0 - 5 /hpf       MRI FOOT LT WO CONT   Final Result   1. Ulceration of the medial aspect of the great toe with findings concerning   for acute osteomyelitis in the first proximal and distal phalanges. 2.  Suspected focus of plantar fibromatosis in the midfoot. XR FOOT LT MIN 3 V   Final Result   Diffuse soft tissue swelling with irregularity/ulceration at the   medial aspect of the great toe. Fragmentation of the lateral base of the first   proximal phalanx may reflect an age indeterminate fracture deformity, but given   the adjacent soft tissue changes, consider acute osteomyelitis/septic arthritis.            Assessment:    S/p operative debridement of left great toe  Severe sepsis/cellulitis/osteomyelitis-septic arthritis in a diabetic  Possibility of severe PAD  History of diabetes mellitus type 2 with complication diabetes mellitus  Poorly controlled/uncontrolled type 2 diabetes mellitus  Coronary artery disease   hypertension  Chronic CHF      Discussion/MDM: Patient with multiple medical comorbidities, each with high likelihood for morbidity and mortality if left untreated. Patient being treated with medication that requires intensive monitoring due to increased risk for toxicity. I have reviewed patient's presenting subjective and objective findings, as well as all laboratory studies, imaging studies, and vital signs to date as well as treatment rendered and patient's response to those treatments. In addition, prior medical, surgical and relevant social and family histories were reviewed. Plan:  Await operative note for details  Continue antibiotics; procalcitonin levels 0.05  HbA1c of 10.2  Most likely discharge plan tomorrow. Care Plan discussed with: Patient/Family    Code status: Full    Social determinants of health: Medication compliance    Disposition: Home with office follow-up    Total time spent with patient: 35minutes.     Mandeep Bundy MD

## 2023-03-21 NOTE — PROGRESS NOTES
2130H: Talked to Dr. Penny Gil over the phone and ask regarding patient's current diet. MD said that RN can give the patient diabetic diet and that he will talk to the patient tomorrow for medical or any surgical procedures. MD order carried out.     0200H: Left great toe still has purulent drainage and blood on the left side of the wound, black discoloration on the upper part of the wound. Sensation for the foot is still decreased. Pedal pulses are weak and unappreciable. Wound care (wet to dry) dressing done.

## 2023-03-21 NOTE — PERIOP NOTES
Notified Dr. Boubacar Radford of pt bp 192/95, orders received for Hydralazine 10mg IV now x1dose as well as lopressor 2.5mg IV q6hrs; Followup on sodium bicarb fluids, Dr. Boubacar Radford states to continue the fluids for another 24 hours then will d/c; also followed up about heparin infusion orders, Dr. Boubacar Radford states to leave the pressure dressing on right groin until tomorrow morning and he will remove and he will call the nurse caring for the pt tonight to ask about the groin site and decide if he will restart heparin tonight.

## 2023-03-21 NOTE — CONSULTS
History and Physical    Chief complaints: Left great toe gangrene   History of Presenting Illness:  Gloria Strauss is a 69 y.o. very pleasant gentleman with a diabetes and coronary artery disease presents hospital with discoloration and gangrene involving left great toe. Patient states wound started a few weeks ago, he is not sure how it was started denies any trauma. Denies any fever chills. Denies chest pain shortness of breath. Vascular examination shows: Palpable femoral pulses noted. Nonpalpable pedal pulse noted bilaterally. Left foot appears cool to touch. Patient had RUPERTO examination done 2 weeks ago findings as discussed below. Past Medical History:   Diagnosis Date    Arthritis     Burning with urination     CAD (coronary artery disease)     patient stated stents placed unsure of how many     Diabetes ([address]     GERD (gastroesophageal reflux disease)     History of blood transfusion     patient stated approx 1 year ago     HTN (hypertension)     Hyperlipidemia     MI (myocardial infarction) ([address]     Personal history of colonic polyps 6/25/2010 6-    Rectal bleeding     patient stated this happened over the weekend.      Renal mass, left       Past Surgical History:   Procedure Laterality Date    COLONOSCOPY N/A 1/6/2023    COLONOSCOPY (TIVA) performed by Lila Mckeon MD at [facility] ENDOSCOPY    HX COLONOSCOPY      HX CORONARY STENT PLACEMENT  2018    x1    HX HEART CATHETERIZATION      patient stated stents placed     HX NEPHRECTOMY Left 08/23/2021    robotic left partial nephrectomy,    HX UROLOGICAL  08/23/2021     intraoperative renal ultrasound    MI UNLISTED PROCEDURE CARDIAC SURGERY  2018    BYPASS     Family History   Problem Relation Age of Onset    Hypertension Mother     Diabetes Mother     Hypertension Father     Heart Disease Father       Social History     Tobacco Use    Smoking status: Never    Smokeless tobacco: Never   Substance Use Topics    Alcohol use: Not Currently       Prior to Admission medications    Medication Sig Start Date End Date Taking? Authorizing Provider   SITagliptin phosphate (JANUVIA) 100 mg tablet Take 100 mg by mouth daily. Yes Provider, Historical   glipiZIDE (GLUCOTROL) 10 mg tablet Take 1 Tablet by mouth two (2) times a day. 3/8/23  Yes Gregory Zelaya NP   FLUoxetine (PROzac) 40 mg capsule Take 1 Capsule by mouth daily. Indications: repeated episodes of anxiety 2/16/23  Yes Gregory Zelaya NP   empagliflozin (JARDIANCE) 25 mg tablet Take 1 Tablet by mouth daily. 2/16/23  Yes Gregory Zelaya NP   potassium chloride (KLOR-CON M10) 10 mEq tablet Take 1 Tablet by mouth daily. 2/16/23  Yes Gregory Zelaya NP   NIFEdipine ER (PROCARDIA XL) 30 mg ER tablet Take 1 Tablet by mouth daily. 2/16/23  Yes Gregory Zelaya NP   lisinopriL (PRINIVIL, ZESTRIL) 10 mg tablet Take 1 Tablet by mouth daily. 2/16/23  Yes Gregory Zelaya NP   furosemide (LASIX) 20 mg tablet Take 1 Tablet by mouth daily. 2/16/23  Yes Gregory Zelaya NP   Lantus Solostar U-100 Insulin 100 unit/mL (3 mL) inpn 40 Units by SubCUTAneous route nightly. 2/13/23  Yes Gregory Zelaya NP   methIMAzole (TAPAZOLE) 5 mg tablet Take 1 Tablet by mouth daily. 2/13/23  Yes Gregory Zelaya NP     No Known Allergies     Review of Systems:  Pertinent review of systems discussed in HPI, and rest of organ systems personally reviewed and they are negative. Objective:   Vital signs reviewed:      Visit Vitals  /79 (BP 1 Location: Left upper arm, BP Patient Position: At rest;Supine)   Pulse 78   Temp 98.5 °F (36.9 °C)   Resp 18   Ht 5' 10\" (1.778 m)   Wt 225 lb (102.1 kg)   SpO2 99%   BMI 32.28 kg/m²       Physical Exam:   General appearance:   Patient is awake and alert, not in particular distress. Head and neck atraumatic normocephalic. ENT shows normal oral mucosa, no jaundice no hoarse voice. Eyes: Pupil equal gaze appropriate. Cardiac system regular rate rhythm.   Pulmonary: No audible wheeze. Chest wall: Chest wall excursion normal with respiration cycle, there is no deformity or chest trauma. Abdomen: Soft not tender or distended, bowel sounds active. There is no obvious palpable mass, or hernia. Neurologic: Nonfocal.  Cranial nerves intact, no new focal findings. Musculoskeletal system: Motor function normal limits, motor function 5 out of 5, range of motion normal in all 4 extremity  Skin: Warm and moist.  Hematologic system: No obvious bruising. Psychosocial: Appropriate and cooperative.   Vascular examination: As above  Current Facility-Administered Medications   Medication Dose Route Frequency Provider Last Rate Last Admin    sodium bicarbonate (8.4%) 150 mEq in dextrose 5% 1,000 mL infusion   IntraVENous CONTINUOUS Mirna Cedeño MD        heparin 25,000 units in D5W 250 ml infusion  12-25 Units/kg/hr IntraVENous TITRATE Mirna Cedeño MD        ampicillin-sulbactam (UNASYN) 3 g in 0.9% sodium chloride (MBP/ADV) 100 mL MBP  3 g IntraVENous Q6H Veda Earl  mL/hr at 03/21/23 0221 3 g at 03/21/23 0221    VANCOMYCIN INFORMATION NOTE   Other Rx Dosing/Monitoring Veda Earl MD        vancomycin (VANCOCIN) 750 mg in 0.9% sodium chloride 250 mL ([id number])  750 mg IntraVENous Q12H Tracy Elizalde  mL/hr at 03/21/23 0415 750 mg at 03/21/23 0415    Vancomycin Level Due 3/21 1400 - Please make sure lab is drawn   Other ONCE Merrill White MD        FLUoxetine (PROzac) capsule 40 mg  40 mg Oral DAILY Merrill White MD        methIMAzole (TAPAZOLE) tablet 5 mg  5 mg Oral DAILY Merrill White MD        lisinopriL (PRINIVIL, ZESTRIL) tablet 10 mg  10 mg Oral DAILY Merrill White MD        insulin lispro (HUMALOG) injection   SubCUTAneous AC&HS Merrill White MD        glucose chewable tablet 16 g  4 Tablet Oral PRN Merrill White MD        glucagon (GLUCAGEN) injection 1 mg  1 mg IntraMUSCular PRN Merrill White MD        dextrose 10% infusion 0-250 mL 0-250 mL IntraVENous LUIS Baer MD               Data Review: Labs are reviewed. Discussed  Recent Results (from the past 24 hour(s))   BLOOD GAS,CHEM8,LACTIC ACID POC    Collection Time: 03/20/23  2:14 PM   Result Value Ref Range    pH (POC) 7.41 7.35 - 7.45      pCO2 (POC) 36.9 35.0 - 45.0 mmHg    pO2 (POC) 33 (LL) 75 - 100 mmHg    Sodium,  136 - 145 mmol/L    Potassium, POC 4.5 3.5 - 5.5 mmol/L    Calcium, ionized (POC) 1.20 1. 12 - 1.32 mmol/L    Glucose,  (H) 65 - 100 mg/dL    Chloride,  98 - 107 MMOL/L    Creatinine, POC 1.38 (H) 0.6 - 1.3 MG/DL    eGFR (POC) 55 (L) >60 ml/min/1.73m2    Base deficit (POC) 0.8 mmol/L    HCO3 (POC) 23.4 19.0 - 28.0 mmol/L    CO2, POC 23 MMOL/L    Sample source Venous      Performed by Staci Hughes     Lactic Acid (POC) 1.38 0.40 - 2.00 mmol/L    Respiratory Rate PENDING     FIO2, L/min PENDING     O2 SAT 65 %   CBC WITH AUTOMATED DIFF    Collection Time: 03/20/23  2:16 PM   Result Value Ref Range    WBC 9.1 4.1 - 11.1 K/uL    RBC 4.07 (L) 4.10 - 5.70 M/uL    HGB 11.3 (L) 12.1 - 17.0 g/dL    HCT 36.1 (L) 36.6 - 50.3 %    MCV 88.7 80.0 - 99.0 FL    MCH 27.8 26.0 - 34.0 PG    MCHC 31.3 30.0 - 36.5 g/dL    RDW 13.1 11.5 - 14.5 %    PLATELET 141 (H) 294 - 400 K/uL    MPV 9.3 8.9 - 12.9 FL    NRBC 0.0 0.0  WBC    ABSOLUTE NRBC 0.00 0.00 - 0.01 K/uL    NEUTROPHILS 61 32 - 75 %    LYMPHOCYTES 26 12 - 49 %    MONOCYTES 10 5 - 13 %    EOSINOPHILS 2 0 - 7 %    BASOPHILS 1 0 - 1 %    IMMATURE GRANULOCYTES 0 0 - 0.5 %    ABS. NEUTROPHILS 5.5 1.8 - 8.0 K/UL    ABS. LYMPHOCYTES 2.3 0.8 - 3.5 K/UL    ABS. MONOCYTES 0.9 0.0 - 1.0 K/UL    ABS. EOSINOPHILS 0.2 0.0 - 0.4 K/UL    ABS. BASOPHILS 0.1 0.0 - 0.1 K/UL    ABS. IMM.  GRANS. 0.0 0.00 - 0.04 K/UL    DF AUTOMATED     CRP, HIGH SENSITIVITY    Collection Time: 03/20/23  2:16 PM   Result Value Ref Range    CRP, High sensitivity >9.4 mg/L   METABOLIC PANEL, COMPREHENSIVE    Collection Time: 03/20/23  2:16 PM   Result Value Ref Range    Sodium 135 (L) 136 - 145 mmol/L    Potassium 4.4 3.5 - 5.1 mmol/L    Chloride 104 97 - 108 mmol/L    CO2 23 21 - 32 mmol/L    Anion gap 8 5 - 15 mmol/L    Glucose 103 (H) 65 - 100 mg/dL    BUN 33 (H) 6 - 20 mg/dL    Creatinine 1.41 (H) 0.70 - 1.30 mg/dL    BUN/Creatinine ratio 23 (H) 12 - 20      eGFR 54 (L) >60 ml/min/1.73m2    Calcium 9.5 8.5 - 10.1 mg/dL    Bilirubin, total 0.2 0.2 - 1.0 mg/dL    AST (SGOT) 11 (L) 15 - 37 U/L    ALT (SGPT) 18 12 - 78 U/L    Alk. phosphatase 89 45 - 117 U/L    Protein, total 7.7 6.4 - 8.2 g/dL    Albumin 2.8 (L) 3.5 - 5.0 g/dL    Globulin 4.9 (H) 2.0 - 4.0 g/dL    A-G Ratio 0.6 (L) 1.1 - 2.2     LACTIC ACID    Collection Time: 03/20/23  3:25 PM   Result Value Ref Range    Lactic acid 1.0 0.4 - 2.0 mmol/L   GLUCOSE, POC    Collection Time: 03/20/23  4:55 PM   Result Value Ref Range    Glucose (POC) 79 65 - 100 mg/dL    Performed by Miley Bains    TSH 3RD GENERATION    Collection Time: 03/20/23  6:43 PM   Result Value Ref Range    TSH 1.97 0.36 - 3.74 uIU/mL   GLUCOSE, POC    Collection Time: 03/20/23  8:23 PM   Result Value Ref Range    Glucose (POC) 147 (H) 65 - 100 mg/dL    Performed by Hanna Gloria    PROCALCITONIN    Collection Time: 03/21/23  2:33 AM   Result Value Ref Range    Procalcitonin 0.05 (H) 0 ng/mL   CBC WITH AUTOMATED DIFF    Collection Time: 03/21/23  2:33 AM   Result Value Ref Range    WBC 6.8 4.1 - 11.1 K/uL    RBC 3.88 (L) 4.10 - 5.70 M/uL    HGB 10.6 (L) 12.1 - 17.0 g/dL    HCT 34.6 (L) 36.6 - 50.3 %    MCV 89.2 80.0 - 99.0 FL    MCH 27.3 26.0 - 34.0 PG    MCHC 30.6 30.0 - 36.5 g/dL    RDW 13.4 11.5 - 14.5 %    PLATELET 862 (H) 886 - 400 K/uL    MPV 9.1 8.9 - 12.9 FL    NRBC 0.0 0.0  WBC    ABSOLUTE NRBC 0.00 0.00 - 0.01 K/uL    NEUTROPHILS 58 32 - 75 %    LYMPHOCYTES 26 12 - 49 %    MONOCYTES 11 5 - 13 %    EOSINOPHILS 3 0 - 7 %    BASOPHILS 1 0 - 1 %    IMMATURE GRANULOCYTES 1 (H) 0 - 0.5 %    ABS.  NEUTROPHILS 3.9 1.8 - 8.0 K/UL    ABS. LYMPHOCYTES 1.8 0.8 - 3.5 K/UL    ABS. MONOCYTES 0.7 0.0 - 1.0 K/UL    ABS. EOSINOPHILS 0.2 0.0 - 0.4 K/UL    ABS. BASOPHILS 0.1 0.0 - 0.1 K/UL    ABS. IMM. GRANS. 0.0 0.00 - 0.04 K/UL    DF AUTOMATED     RENAL FUNCTION PANEL    Collection Time: 03/21/23  2:33 AM   Result Value Ref Range    Sodium 135 (L) 136 - 145 mmol/L    Potassium 4.0 3.5 - 5.1 mmol/L    Chloride 108 97 - 108 mmol/L    CO2 24 21 - 32 mmol/L    Anion gap 3 (L) 5 - 15 mmol/L    Glucose 164 (H) 65 - 100 mg/dL    BUN 29 (H) 6 - 20 mg/dL    Creatinine 1.22 0.70 - 1.30 mg/dL    BUN/Creatinine ratio 24 (H) 12 - 20      eGFR >60 >60 ml/min/1.73m2    Calcium 9.0 8.5 - 10.1 mg/dL    Phosphorus 4.0 2.6 - 4.7 mg/dL    Albumin 2.4 (L) 3.5 - 5.0 g/dL           Imagings reviewed: discussed as below. No name on file. Assessment:     Active Problems:    Gangrene ([facility] (3/20/2023)      Osteomyelitis ([facility] (3/20/2023)        Plan:     I did review RUPERTO examination that was performed March 1, 2023. Right ankle index appears normal however left side shows ankle index of 0.6,. Clinically patient has nonpalpable pedal pulse with cold foot with gangrene on the great toe area. Findings suggest critical limb ischemia. Patient will be started on heparin drip. Patient's renal function was mildly jeopardized. I will also start sodium bicarbonate solution for anticipation angiogram on the left leg. Continue IV antibiotics. Patient will need a local wound care with iodine soaks daily. Podiatry has seen the patient as outpatient patient was directed to hospital admission. Patient was discussed with diagnosis care plans and prognosis.

## 2023-03-22 LAB
ALBUMIN SERPL-MCNC: 2.3 G/DL (ref 3.5–5)
ANION GAP SERPL CALC-SCNC: 4 MMOL/L (ref 5–15)
APTT PPP: 35.6 SEC (ref 21.2–34.1)
BUN SERPL-MCNC: 16 MG/DL (ref 6–20)
BUN/CREAT SERPL: 16 (ref 12–20)
CA-I BLD-MCNC: 8.6 MG/DL (ref 8.5–10.1)
CHLORIDE SERPL-SCNC: 106 MMOL/L (ref 97–108)
CO2 SERPL-SCNC: 26 MMOL/L (ref 21–32)
CREAT SERPL-MCNC: 1 MG/DL (ref 0.7–1.3)
GLUCOSE BLD STRIP.AUTO-MCNC: 132 MG/DL (ref 65–100)
GLUCOSE BLD STRIP.AUTO-MCNC: 142 MG/DL (ref 65–100)
GLUCOSE BLD STRIP.AUTO-MCNC: 169 MG/DL (ref 65–100)
GLUCOSE BLD STRIP.AUTO-MCNC: 198 MG/DL (ref 65–100)
GLUCOSE SERPL-MCNC: 130 MG/DL (ref 65–100)
PERFORMED BY, TECHID: ABNORMAL
PHOSPHATE SERPL-MCNC: 3.6 MG/DL (ref 2.6–4.7)
POTASSIUM SERPL-SCNC: 3.6 MMOL/L (ref 3.5–5.1)
SODIUM SERPL-SCNC: 136 MMOL/L (ref 136–145)
THERAPEUTIC RANGE,PTTT: ABNORMAL SEC (ref 82–109)
UFH PPP CHRO-ACNC: 0.27 IU/ML
UFH PPP CHRO-ACNC: <0.1 IU/ML
VANCOMYCIN SERPL-MCNC: 13.7 UG/ML

## 2023-03-22 PROCEDURE — 74011000258 HC RX REV CODE- 258: Performed by: SURGERY

## 2023-03-22 PROCEDURE — 74011250636 HC RX REV CODE- 250/636: Performed by: SURGERY

## 2023-03-22 PROCEDURE — 74011250637 HC RX REV CODE- 250/637: Performed by: SURGERY

## 2023-03-22 PROCEDURE — 65270000029 HC RM PRIVATE

## 2023-03-22 PROCEDURE — 80069 RENAL FUNCTION PANEL: CPT

## 2023-03-22 PROCEDURE — 99232 SBSQ HOSP IP/OBS MODERATE 35: CPT | Performed by: SURGERY

## 2023-03-22 PROCEDURE — 82962 GLUCOSE BLOOD TEST: CPT

## 2023-03-22 PROCEDURE — 85730 THROMBOPLASTIN TIME PARTIAL: CPT

## 2023-03-22 PROCEDURE — 74011000250 HC RX REV CODE- 250: Performed by: SURGERY

## 2023-03-22 PROCEDURE — 36415 COLL VENOUS BLD VENIPUNCTURE: CPT

## 2023-03-22 PROCEDURE — 80202 ASSAY OF VANCOMYCIN: CPT

## 2023-03-22 PROCEDURE — 85520 HEPARIN ASSAY: CPT

## 2023-03-22 PROCEDURE — 99233 SBSQ HOSP IP/OBS HIGH 50: CPT | Performed by: PODIATRIST

## 2023-03-22 PROCEDURE — 74011636637 HC RX REV CODE- 636/637: Performed by: SURGERY

## 2023-03-22 RX ORDER — CLOPIDOGREL BISULFATE 75 MG/1
75 TABLET ORAL DAILY
Status: DISCONTINUED | OUTPATIENT
Start: 2023-03-22 | End: 2023-03-24 | Stop reason: HOSPADM

## 2023-03-22 RX ADMIN — SODIUM CHLORIDE 3 G: 900 INJECTION INTRAVENOUS at 21:44

## 2023-03-22 RX ADMIN — HEPARIN SODIUM 4000 UNITS: 1000 INJECTION, SOLUTION INTRAVENOUS; SUBCUTANEOUS at 12:05

## 2023-03-22 RX ADMIN — SODIUM BICARBONATE: 84 INJECTION, SOLUTION INTRAVENOUS at 21:44

## 2023-03-22 RX ADMIN — SODIUM CHLORIDE 3 G: 900 INJECTION INTRAVENOUS at 15:49

## 2023-03-22 RX ADMIN — SODIUM BICARBONATE: 84 INJECTION, SOLUTION INTRAVENOUS at 07:20

## 2023-03-22 RX ADMIN — METOPROLOL TARTRATE 2.5 MG: 5 INJECTION INTRAVENOUS at 01:34

## 2023-03-22 RX ADMIN — VANCOMYCIN HYDROCHLORIDE 1000 MG: 1 INJECTION, POWDER, LYOPHILIZED, FOR SOLUTION INTRAVENOUS at 20:32

## 2023-03-22 RX ADMIN — HEPARIN SODIUM 2000 UNITS: 1000 INJECTION INTRAVENOUS; SUBCUTANEOUS at 20:31

## 2023-03-22 RX ADMIN — INSULIN LISPRO 2 UNITS: 100 INJECTION, SOLUTION INTRAVENOUS; SUBCUTANEOUS at 12:07

## 2023-03-22 RX ADMIN — CLOPIDOGREL BISULFATE 75 MG: 75 TABLET ORAL at 09:50

## 2023-03-22 RX ADMIN — METOPROLOL TARTRATE 2.5 MG: 5 INJECTION INTRAVENOUS at 12:18

## 2023-03-22 RX ADMIN — SODIUM CHLORIDE, PRESERVATIVE FREE 10 ML: 5 INJECTION INTRAVENOUS at 14:00

## 2023-03-22 RX ADMIN — METHIMAZOLE 5 MG: 5 TABLET ORAL at 09:50

## 2023-03-22 RX ADMIN — INSULIN LISPRO 2 UNITS: 100 INJECTION, SOLUTION INTRAVENOUS; SUBCUTANEOUS at 17:45

## 2023-03-22 RX ADMIN — SODIUM CHLORIDE 3 G: 900 INJECTION INTRAVENOUS at 03:05

## 2023-03-22 RX ADMIN — FLUOXETINE 40 MG: 20 CAPSULE ORAL at 09:50

## 2023-03-22 RX ADMIN — METOPROLOL TARTRATE 2.5 MG: 5 INJECTION INTRAVENOUS at 06:24

## 2023-03-22 RX ADMIN — METOPROLOL TARTRATE 2.5 MG: 5 INJECTION INTRAVENOUS at 17:46

## 2023-03-22 RX ADMIN — VANCOMYCIN HYDROCHLORIDE 1000 MG: 1 INJECTION, POWDER, LYOPHILIZED, FOR SOLUTION INTRAVENOUS at 09:53

## 2023-03-22 RX ADMIN — LISINOPRIL 10 MG: 10 TABLET ORAL at 09:50

## 2023-03-22 RX ADMIN — SODIUM CHLORIDE, PRESERVATIVE FREE 10 ML: 5 INJECTION INTRAVENOUS at 06:24

## 2023-03-22 RX ADMIN — HEPARIN SODIUM AND DEXTROSE 13 UNITS/KG/HR: 10000; 5 INJECTION INTRAVENOUS at 12:04

## 2023-03-22 RX ADMIN — SODIUM CHLORIDE, PRESERVATIVE FREE 10 ML: 5 INJECTION INTRAVENOUS at 20:38

## 2023-03-22 NOTE — PROGRESS NOTES
Reason for Admission:  Osteomyelitis ([facility] Gangrene ([facility] [address]                     RUR Score:  12%                   Plan for utilizing home health:  TBD        PCP: First and Last name:  Jodie Brunson NP     Name of Practice:    Are you a current patient: Yes/No: YES   Approximate date of last visit: 2 WEEKS AGO   Can you participate in a virtual visit with your PCP:                     Current Advanced Directive/Advance Care Plan: Prior      Healthcare Decision Maker:   Click here to complete [address] including selection of the Healthcare Decision Maker Relationship (ie \"Primary\")             Primary Decision Maker: Lana Mcburney [facility] - [phone]                  Transition of Care Plan:                    MET WITH PATIENT AT BEDSIDE. LIVVES AT ADDRESS ON FILE WITH GRANDSON AND GRANDSONS gf. USES [facility] FOR dme. RELIES ON OTHER PEOPLE FOR TRANSPORT. Uses [facility] in [address]. Self care. No prior HH. Discharge plan TBD based on what patient needs at discharge.

## 2023-03-22 NOTE — ACP (ADVANCE CARE PLANNING)
Advance Care Planning   Advance Care Planning Inpatient Note  [facility]    Today's Date: 3/22/2023  Unit: [facility]    Received request from IDT member. Upon review of chart and communication with care team, patient's decision making abilities are not in question. Patient was/were present in the room during visit. Goals of ACP Conversation:  Discuss Advance Care planning documents    Health Care Decision Makers:      Primary Decision Maker: Sandhya Rodriguez Sister - [phone]    Summary:  Completed [address]    Advance Care Planning Documents (Patient Wishes) on file:  Healthcare Power of /Advance Directive appointment of Health care agent  Living Will/ Advance Directive     Assessment:    Responded to in-basket request to complete Advance Medical Directive (AMD). Mr. Mercedes Alatorre designated his sister as the Primary Decision Maker. Ms. Tadeo's home number is no longer in service; verified the mobile is a good number.     Interventions:  Provided education on documents for clarity and greater understanding  Assisted in the completion of documents according to patient's wishes at this time    Care Preferences Communicated:  No    Outcomes/Plan:  New Advance Directive completed  Returned original document(s) to patient, as well as copies for distribution to appointed agents  Copy of Advance Directive given to staff to scan into medical record    [facility] on 3/22/2023 at 10:37 AM

## 2023-03-22 NOTE — PROGRESS NOTES
[facility]      Progress Note    Date:3/22/2023       Room:[facility]  Patient Sarah Harvey     YOB: 1955     Age:67 y.o. Subjective    Subjective   Pt seen at [facility]. Denies any new complaints. Per nursing, o acute overnight events      Review of Systems  Constitutional:  Negative for chills, diaphoresis, fever and malaise/fatigue. Respiratory:  Negative for cough, hemoptysis, sputum production, shortness of breath and wheezing. No cyanosis   Cardiovascular:  Negative for chest pain, palpitations, claudication and leg swelling. Gastrointestinal:  Negative for abdominal pain, constipation, diarrhea, heartburn, nausea and vomiting. Genitourinary:  Negative for frequency. Musculoskeletal:  Positive for joint pain. Negative for back pain, myalgias and neck pain. Skin:  Negative for itching and rash. Ulcer left hallux   Neurological:  Positive for tingling. Negative for headaches. Alert and oriented x 4. Endo/Heme/Allergies:  Negative for polydipsia. Psychiatric/Behavioral:  Negative for depression and memory loss. The patient is not nervous/anxious. Objective         Vitals Last 24 Hours:  TEMPERATURE:  Temp  Av.5 °F (36.9 °C)  Min: 98.2 °F (36.8 °C)  Max: 98.8 °F (37.1 °C)  RESPIRATIONS RANGE: Resp  Av.4  Min: 17  Max: 22  PULSE OXIMETRY RANGE: SpO2  Av %  Min: 93 %  Max: 100 %  PULSE RANGE: Pulse  Av.3  Min: 72  Max: 85  BLOOD PRESSURE RANGE: Systolic (35IYH), AIN:398 , Min:120 , FTM:022   ; Diastolic (99WIS), CJA:32, Min:60, Max:89    I/O (24Hr): Intake/Output Summary (Last 24 hours) at 3/22/2023 1702  Last data filed at 3/22/2023 1554  Gross per 24 hour   Intake 1200 ml   Output 851 ml   Net 349 ml     Objective  GEN: Pt is AAOx4 and in NAD. No dressing noted to B/L LE. No family noted at [facility]  DERM: Wound noted to the left hallux. Purulent drainage noted. Dry skin noted to B/L LE.    VASC: Pedal pulses (DP/PT) diminished to B/L LE. CFT<3sec to all digits of B/L LE. No pedal hair growth noted to the level of the digits for B/L LE. Skin temp is warm to cool from proximal to distal for B/L LE. Neg homans/carole signs to B/L LE. No varicosities or telangectasias noted to B/L LE.  NEURO: Protective and epicritic sensations grossly absent to B/L LE  MSK: Pain on palpation left hallux no gross deformities. Good muscle tone and bulk noted to B/L LE.  PSYCH: Cooperative with normal mood and affect       Labs/Imaging/Diagnostics    Labs:  CBC:  Recent Labs     03/21/23  0233 03/20/23  1416   WBC 6.8 9.1   RBC 3.88* 4.07*   HGB 10.6* 11.3*   HCT 34.6* 36.1*   MCV 89.2 88.7   RDW 13.4 13.1   * 480*     CHEMISTRIES:  Recent Labs     03/22/23  0350 03/21/23  0233 03/20/23  1416    135* 135*   K 3.6 4.0 4.4    108 104   CO2 26 24 23   BUN 16 29* 33*   CA 8.6 9.0 9.5   PHOS 3.6 4.0  --    PT/INR:No results for input(s): INR, INREXT in the last 72 hours. No lab exists for component: PROTIME  APTT:  Recent Labs     03/22/23  0350   APTT 35.6*     LIVER PROFILE:  Recent Labs     03/20/23  1416   AST 11*   ALT 18     Lab Results   Component Value Date/Time    ALT (SGPT) 18 03/20/2023 02:16 PM    AST (SGOT) 11 (L) 03/20/2023 02:16 PM    Alk. phosphatase 89 03/20/2023 02:16 PM    Bilirubin, total 0.2 03/20/2023 02:16 PM       Imaging Last 24 Hours:  No results found. Assessment//Plan   Active Problems:    Gangrene ([address] (3/20/2023)      Osteomyelitis ([address] (3/20/2023)      Assessment & Plan    Diabetic ulcer, left great toe  Uncontrolled DM T2  PAD        Patient seen and evaluated at bedside  - Current labs personally reviewed and findings dicussed with patient  - Cont local wound care orders placed, betadine WTD. Offloading to the area for wound healing purposes  - Per vascular, inflow optimized today via angioplasty  - Tx options reviewed, conservative vs surgical. Possible complications discussed.  Pt has elected for amputation of the left great toe. NPO after midnight and written consent to be signed/witnessed  - Cont empiric abx. Will tailor based upon wcx   - We will follow closely           Alannah Okeefe.  [address] Powell DPM, CWSP, [address] [place] [address] and Foot Surgery  [address] [place] [address]  Sandra Corderor:  [phone]  F:  [phone]     Laverne Hodge available 24/7    Electronically signed by Pricila Calvert DPM on 3/22/2023 at 5:02 PM

## 2023-03-22 NOTE — PROGRESS NOTES
Vancomycin Dosing Consult  Jerrica Slaughter is a 69 y.o. male with SSTI (ulcer in the left hallux). Pharmacy was consulted by Dr. Rk Francis to dose and monitor vancomycin. Today is day 3. Antibiotic regimen: Vancomycin + Unasyn    Temp (24hrs), Av.6 °F (36.4 °C), Min:97.5 °F (36.4 °C), Max:97.7 °F (36.5 °C)    No results for input(s): WBC, CREA, BUN in the last 72 hours. Est CrCl: 81.1 mL/min  Concomitant nephrotoxic drugs: None    Cultures:   3/20 Blood: NG    MRSA Swab: N/A    Target range: AUC/ARIK 400-600    Recent level history:  Date/Time Dose & Interval Measured Level (mcg/mL) Associated AUC/ARIK   3/20 2500 mg x1     3/21 750 mg q12     3/22 750 mg q12 400       Assessment/Plan:   Afebrile, WBC WNL, renal Fxn is improving and SCR= 1.0 today  Pt with Gangrene, Osteomyelitis  Discontinue  vancomycin 750 mg IV every 12 hours. The projected AUC/ARIK ratio of is 400. Start Vancomycin 1000 mg q12hr for Projected AUC/ARIK ratio of 526.   Level scheduled for 3/23 at 0600 am.  Antimicrobial stop date pending

## 2023-03-22 NOTE — PROGRESS NOTES
PROGRESS NOTE      Chief Complaints:  Patient examined this morning. HPI and  Objective:    Patient denies any worsening pain. Patient says left foot feels better. On exam patient has a Doppler signal noted both DP and PT left foot is warm now. Review of Systems:  Rest of review of system reviewed personally and they are negative. EXAM:  Visit Vitals  BP (!) 164/78   Pulse 72   Temp 98.5 °F (36.9 °C)   Resp 18   Ht 5' 10\" (1.778 m)   Wt 211 lb 10.3 oz (96 kg)   SpO2 95%   BMI 30.37 kg/m²       Patient is awake   Head and neck atraumatic, normocephalic. ENT: No hoarse voice, gaze appropriate. Cardiac system regular rate rhythm. Pulmonary no audible wheeze, no cyanosis. Chest wall excursion normal with respiration cycle  Abdomen is soft not particularly distended. Neurologically nonfocal.  Hematology system: No bruising noted. Musculoskeletal system: No joint deformity noted. Genitourinary system: No hematuria noted. Skin is warm and moist.  Psychosocial: Cooperative. Vascular examination as previously noted no changes.     Current Facility-Administered Medications   Medication Dose Route Frequency Provider Last Rate Last Admin    sodium bicarbonate (8.4%) 150 mEq in dextrose 5% 1,000 mL infusion   IntraVENous CONTINUOUS Susan Cedeño MD 84 mL/hr at 03/21/23 1744 New Bag at 03/21/23 1744    sodium chloride (NS) flush 5-40 mL  5-40 mL IntraVENous Q8H Susan Cedeño MD   10 mL at 03/22/23 7994    sodium chloride (NS) flush 5-40 mL  5-40 mL IntraVENous PRN Susan Cedeño MD        metoprolol [facility]) injection 2.5 mg  2.5 mg IntraVENous Q6H Susan Cedeño MD   2.5 mg at 03/22/23 0970    vancomycin (VANCOCIN) 750 mg in 0.9% sodium chloride 250 mL ([id number])  750 mg IntraVENous Q12H Bradford White MD        heparin 25,000 units in D5W 250 ml infusion  9-25 Units/kg/hr IntraVENous TITRATE Susan Cedeño MD 9.2 mL/hr at 03/21/23 2317 9 Units/kg/hr at 03/21/23 2317    heparin (porcine) 1,000 unit/mL injection 4,000 Units  4,000 Units IntraVENous PRN Siena Cedeño MD        Or    heparin (porcine) 1,000 unit/mL injection 2,000 Units  2,000 Units IntraVENous PRN Siena Cedeño MD        ampicillin-sulbactam (UNASYN) 3 g in 0.9% sodium chloride (MBP/ADV) 100 mL MBP  3 g IntraVENous Q6H Siena Cedeño  mL/hr at 03/22/23 0305 3 g at 03/22/23 0305    VANCOMYCIN INFORMATION NOTE   Other Rx Dosing/Monitoring Siena Cedeño MD        FLUoxetine (PROzac) capsule 40 mg  40 mg Oral DAILY Siena Cedeño MD   40 mg at 03/21/23 0920    methIMAzole (TAPAZOLE) tablet 5 mg  5 mg Oral DAILY Siena Cedeño MD   5 mg at 03/21/23 0920    lisinopriL (PRINIVIL, ZESTRIL) tablet 10 mg  10 mg Oral DAILY Siena Cedeño MD   10 mg at 03/21/23 0920    insulin lispro (HUMALOG) injection   SubCUTAneous AC&HS Siena Cedeño MD   2 Units at 03/21/23 2116    glucose chewable tablet 16 g  4 Tablet Oral PRN Siena Cedeño MD        glucagon [facility]) injection 1 mg  1 mg IntraMUSCular PRN Siena Cedeño MD        dextrose 10% infusion 0-250 mL  0-250 mL IntraVENous PRN Siena Cedeño MD               Recent Results (from the past 24 hour(s))   GLUCOSE, POC    Collection Time: 03/21/23  2:41 PM   Result Value Ref Range    Glucose (POC) 114 (H) 65 - 100 mg/dL    Performed by Vickey Quick W/MICROSCOPIC    Collection Time: 03/21/23  2:58 PM   Result Value Ref Range    Color Yellow/Straw      Appearance Clear Clear      Specific gravity >1.030 (H) 1.003 - 1.030    pH (UA) 5.0 5.0 - 8.0      Protein 30 (A) Negative mg/dL    Glucose >300 (A) Negative mg/dL    Ketone Negative Negative mg/dL    Bilirubin Negative Negative      Blood Negative Negative      Urobilinogen 0.1 0.1 - 1.0 EU/dL    Nitrites Negative Negative      Leukocyte Esterase Negative Negative      Epithelial cells Few Few /lpf    Bacteria Negative Negative /hpf    WBC 0-4 0 - 4 /hpf    RBC 0-5 0 - 5 /hpf   GLUCOSE, POC    Collection Time: 03/21/23 7:55 PM   Result Value Ref Range    Glucose (POC) 189 (H) 65 - 100 mg/dL    Performed by Lajuan Rubinstein    RENAL FUNCTION PANEL    Collection Time: 03/22/23  3:50 AM   Result Value Ref Range    Sodium 136 136 - 145 mmol/L    Potassium 3.6 3.5 - 5.1 mmol/L    Chloride 106 97 - 108 mmol/L    CO2 26 21 - 32 mmol/L    Anion gap 4 (L) 5 - 15 mmol/L    Glucose 130 (H) 65 - 100 mg/dL    BUN 16 6 - 20 mg/dL    Creatinine 1.00 0.70 - 1.30 mg/dL    BUN/Creatinine ratio 16 12 - 20      eGFR >60 >60 ml/min/1.73m2    Calcium 8.6 8.5 - 10.1 mg/dL    Phosphorus 3.6 2.6 - 4.7 mg/dL    Albumin 2.3 (L) 3.5 - 5.0 g/dL   VANCOMYCIN, RANDOM    Collection Time: 03/22/23  3:50 AM   Result Value Ref Range    Vancomycin, random 13.7 ug/mL   PTT    Collection Time: 03/22/23  3:50 AM   Result Value Ref Range    aPTT 35.6 (H) 21.2 - 34.1 sec    aPTT, therapeutic range   82 - 109 sec       ASSESSMENT:   Patient is 69 y.o. with diagnosis of : Active Problems:    Gangrene ([address] (3/20/2023)      Osteomyelitis ([facility] (3/20/2023)        PLAN:                 I redressed the wound with iodine soaked 4 x 4 gauze and Kerlix roll. Angiogram showed a three-vessel  below knee level on the left side. Patient had a successful recanalization of the left posterior tibial artery with atherectomy, balloon PTA angioplasty, thrombectomy with thrombectomy catheter. Continue heparin drip eventually switched over to oral anticoagulant therapy with Plavix. I will continue monitor his progress.

## 2023-03-22 NOTE — PROGRESS NOTES
Spiritual Care Assessment/Progress Note  [facility]      NAME: Misti Ordaz      MRN: [id number]  AGE: 69 y.o.  SEX: male  Synagogue Affiliation: [employer]ian   Language: English     3/22/2023     Total Time (in minutes): 58     Spiritual Assessment begun in [facility] through conversation with:         [x]Patient        [] Family    [] Friend(s)        Reason for Consult: Advance medical directive consult     Spiritual beliefs: (Please include comment if needed)     [x] Identifies with a sri tradition: [employer]ybeterian        [] Supported by a sri community:            [] Claims no spiritual orientation:           [] Seeking spiritual identity:                [] Adheres to an individual form of spirituality:           [] Not able to assess:                           Identified resources for coping:      [x] Prayer                               [] Music                  [] Guided Imagery     [x] Family/friends                 [] Pet visits     [x] Devotional reading                         [] Unknown     [] Other:                                               Interventions offered during this visit: (See comments for more details)    Patient Interventions: Advance medical directive completed, Affirmation of sri, Affirmation of emotions/emotional suffering, Coping skills reviewed/reinforced, Initial visit, Normalization of emotional/spiritual concerns, Prayer (actual), Synagogue beliefs/image of God discussed           Plan of Care:     [] Support spiritual and/or cultural needs    [] Support AMD and/or advance care planning process      [] Support grieving process   [] Coordinate Rites and/or Rituals    [] Coordination with community clergy   [] No spiritual needs identified at this time   [] Detailed Plan of Care below (See Comments)  [] Make referral to Music Therapy  [] Make referral to Pet Therapy     [] Make referral to Addiction services  [] Make referral to [facility]  [] Make referral to Spiritual Care Partner  [] No future visits requested        [x] Contact Spiritual Care for further referrals     Comments: Responded to in-basket request to complete Advance Medical Directive (AMD). Mr. Giselle Alexander designated his sister as the Primary Decision Maker. Ms. Tadeo's home number is no longer in service; verified the mobile is a good number. Engaged in meaningful dialogue with Mr. Giselle Alexander. Mr. Giselle Alexanedr believes his health condition is God's will for his life and accepts whatever he believes God allows. Mr. Giselle Alexander shared his viewpoints on the state of his current Confucianism. He has witnessed many changes since the days of his youth that he doesn't necessarily agree with, however, he has resolved to accept and go along with it. Mr. Giselle Alexander continues to grieve the death of his wife of [de-identified] years; she  six years ago. Mr. Giselle Alexander depends on his sri in God, prayer, and the scriptures to sustain him. He is supported by his sister, Raven Diez. Mr. Giselle Alexander alluded to the fact that he doesn't have anybody else. Provided prayer, empathetic listening, emotional and spiritual support. Spiritual Care remains available if needed. Rev.  Mala Stanton MDIV   can be reached by calling the  at [facility]  [phone]

## 2023-03-23 ENCOUNTER — ANESTHESIA (OUTPATIENT)
Dept: SURGERY | Age: 68
End: 2023-03-23
Payer: MEDICARE

## 2023-03-23 ENCOUNTER — ANESTHESIA EVENT (OUTPATIENT)
Dept: SURGERY | Age: 68
End: 2023-03-23
Payer: MEDICARE

## 2023-03-23 LAB
ALBUMIN SERPL-MCNC: 2.2 G/DL (ref 3.5–5)
ANION GAP SERPL CALC-SCNC: 5 MMOL/L (ref 5–15)
BACTERIA SPEC CULT: NORMAL
BUN SERPL-MCNC: 12 MG/DL (ref 6–20)
BUN/CREAT SERPL: 14 (ref 12–20)
CA-I BLD-MCNC: 8.7 MG/DL (ref 8.5–10.1)
CHLORIDE SERPL-SCNC: 105 MMOL/L (ref 97–108)
CO2 SERPL-SCNC: 26 MMOL/L (ref 21–32)
CREAT SERPL-MCNC: 0.86 MG/DL (ref 0.7–1.3)
GLUCOSE BLD STRIP.AUTO-MCNC: 143 MG/DL (ref 65–100)
GLUCOSE BLD STRIP.AUTO-MCNC: 168 MG/DL (ref 65–100)
GLUCOSE BLD STRIP.AUTO-MCNC: 207 MG/DL (ref 65–100)
GLUCOSE BLD STRIP.AUTO-MCNC: 230 MG/DL (ref 65–100)
GLUCOSE SERPL-MCNC: 138 MG/DL (ref 65–100)
PERFORMED BY, TECHID: ABNORMAL
PHOSPHATE SERPL-MCNC: 3.2 MG/DL (ref 2.6–4.7)
POTASSIUM SERPL-SCNC: 3.6 MMOL/L (ref 3.5–5.1)
SODIUM SERPL-SCNC: 136 MMOL/L (ref 136–145)
SPECIAL REQUESTS,SREQ: NORMAL
UFH PPP CHRO-ACNC: 0.46 IU/ML
UFH PPP CHRO-ACNC: 0.51 IU/ML
UFH PPP CHRO-ACNC: <0.1 IU/ML
VANCOMYCIN TROUGH SERPL-MCNC: 13.3 UG/ML (ref 5–10)

## 2023-03-23 PROCEDURE — 28820 AMPUTATION OF TOE: CPT | Performed by: PODIATRIST

## 2023-03-23 PROCEDURE — 76210000063 HC OR PH I REC FIRST 0.5 HR: Performed by: PODIATRIST

## 2023-03-23 PROCEDURE — 2709999900 HC NON-CHARGEABLE SUPPLY: Performed by: PODIATRIST

## 2023-03-23 PROCEDURE — 0Y6Q0Z0 DETACHMENT AT LEFT 1ST TOE, COMPLETE, OPEN APPROACH: ICD-10-PCS | Performed by: PODIATRIST

## 2023-03-23 PROCEDURE — 74011000250 HC RX REV CODE- 250: Performed by: NURSE ANESTHETIST, CERTIFIED REGISTERED

## 2023-03-23 PROCEDURE — 77030040361 HC SLV COMPR DVT MDII -B: Performed by: PODIATRIST

## 2023-03-23 PROCEDURE — 74011250636 HC RX REV CODE- 250/636: Performed by: LICENSED PRACTICAL NURSE

## 2023-03-23 PROCEDURE — 74011250636 HC RX REV CODE- 250/636: Performed by: SURGERY

## 2023-03-23 PROCEDURE — 76060000032 HC ANESTHESIA 0.5 TO 1 HR: Performed by: PODIATRIST

## 2023-03-23 PROCEDURE — 74011636637 HC RX REV CODE- 636/637: Performed by: SURGERY

## 2023-03-23 PROCEDURE — 80202 ASSAY OF VANCOMYCIN: CPT

## 2023-03-23 PROCEDURE — 82962 GLUCOSE BLOOD TEST: CPT

## 2023-03-23 PROCEDURE — 74011250636 HC RX REV CODE- 250/636

## 2023-03-23 PROCEDURE — 85520 HEPARIN ASSAY: CPT

## 2023-03-23 PROCEDURE — 65270000029 HC RM PRIVATE

## 2023-03-23 PROCEDURE — 74011250637 HC RX REV CODE- 250/637: Performed by: SURGERY

## 2023-03-23 PROCEDURE — 77030000032 HC CUF TRNQT ZIMM -B: Performed by: PODIATRIST

## 2023-03-23 PROCEDURE — 77030039497 HC CST PAD STERILE CHCS -A: Performed by: PODIATRIST

## 2023-03-23 PROCEDURE — 99232 SBSQ HOSP IP/OBS MODERATE 35: CPT | Performed by: SURGERY

## 2023-03-23 PROCEDURE — 80069 RENAL FUNCTION PANEL: CPT

## 2023-03-23 PROCEDURE — 74011000250 HC RX REV CODE- 250: Performed by: PODIATRIST

## 2023-03-23 PROCEDURE — 36415 COLL VENOUS BLD VENIPUNCTURE: CPT

## 2023-03-23 PROCEDURE — 74011000258 HC RX REV CODE- 258: Performed by: SURGERY

## 2023-03-23 PROCEDURE — 74011000250 HC RX REV CODE- 250: Performed by: SURGERY

## 2023-03-23 PROCEDURE — 74011250636 HC RX REV CODE- 250/636: Performed by: NURSE ANESTHETIST, CERTIFIED REGISTERED

## 2023-03-23 PROCEDURE — 76010000138 HC OR TIME 0.5 TO 1 HR: Performed by: PODIATRIST

## 2023-03-23 RX ORDER — MIDAZOLAM HYDROCHLORIDE 1 MG/ML
INJECTION, SOLUTION INTRAMUSCULAR; INTRAVENOUS AS NEEDED
Status: DISCONTINUED | OUTPATIENT
Start: 2023-03-23 | End: 2023-03-23 | Stop reason: HOSPADM

## 2023-03-23 RX ORDER — SODIUM CHLORIDE 0.9 % (FLUSH) 0.9 %
5-40 SYRINGE (ML) INJECTION AS NEEDED
Status: CANCELLED | OUTPATIENT
Start: 2023-03-23

## 2023-03-23 RX ORDER — SODIUM CHLORIDE 0.9 % (FLUSH) 0.9 %
5-40 SYRINGE (ML) INJECTION EVERY 8 HOURS
Status: CANCELLED | OUTPATIENT
Start: 2023-03-23

## 2023-03-23 RX ORDER — FENTANYL CITRATE 50 UG/ML
50 INJECTION, SOLUTION INTRAMUSCULAR; INTRAVENOUS
Status: CANCELLED | OUTPATIENT
Start: 2023-03-23

## 2023-03-23 RX ORDER — ONDANSETRON 2 MG/ML
INJECTION INTRAMUSCULAR; INTRAVENOUS AS NEEDED
Status: DISCONTINUED | OUTPATIENT
Start: 2023-03-23 | End: 2023-03-23 | Stop reason: HOSPADM

## 2023-03-23 RX ORDER — ONDANSETRON 2 MG/ML
4 INJECTION INTRAMUSCULAR; INTRAVENOUS AS NEEDED
Status: CANCELLED | OUTPATIENT
Start: 2023-03-23

## 2023-03-23 RX ORDER — LIDOCAINE HYDROCHLORIDE 20 MG/ML
INJECTION, SOLUTION EPIDURAL; INFILTRATION; INTRACAUDAL; PERINEURAL AS NEEDED
Status: DISCONTINUED | OUTPATIENT
Start: 2023-03-23 | End: 2023-03-23 | Stop reason: HOSPADM

## 2023-03-23 RX ORDER — PROPOFOL 10 MG/ML
INJECTION, EMULSION INTRAVENOUS AS NEEDED
Status: DISCONTINUED | OUTPATIENT
Start: 2023-03-23 | End: 2023-03-23 | Stop reason: HOSPADM

## 2023-03-23 RX ORDER — DIPHENHYDRAMINE HYDROCHLORIDE 50 MG/ML
12.5 INJECTION, SOLUTION INTRAMUSCULAR; INTRAVENOUS AS NEEDED
Status: CANCELLED | OUTPATIENT
Start: 2023-03-23 | End: 2023-03-23

## 2023-03-23 RX ORDER — DEXAMETHASONE SODIUM PHOSPHATE 4 MG/ML
INJECTION, SOLUTION INTRA-ARTICULAR; INTRALESIONAL; INTRAMUSCULAR; INTRAVENOUS; SOFT TISSUE AS NEEDED
Status: DISCONTINUED | OUTPATIENT
Start: 2023-03-23 | End: 2023-03-23 | Stop reason: HOSPADM

## 2023-03-23 RX ORDER — HEPARIN SODIUM 5000 [USP'U]/ML
5000 INJECTION, SOLUTION INTRAVENOUS; SUBCUTANEOUS EVERY 8 HOURS
Status: DISCONTINUED | OUTPATIENT
Start: 2023-03-23 | End: 2023-03-24 | Stop reason: HOSPADM

## 2023-03-23 RX ORDER — FENTANYL CITRATE 50 UG/ML
INJECTION, SOLUTION INTRAMUSCULAR; INTRAVENOUS AS NEEDED
Status: DISCONTINUED | OUTPATIENT
Start: 2023-03-23 | End: 2023-03-23 | Stop reason: HOSPADM

## 2023-03-23 RX ORDER — OXYCODONE AND ACETAMINOPHEN 5; 325 MG/1; MG/1
1 TABLET ORAL AS NEEDED
Status: CANCELLED | OUTPATIENT
Start: 2023-03-23

## 2023-03-23 RX ORDER — LIDOCAINE HYDROCHLORIDE 10 MG/ML
INJECTION INFILTRATION; PERINEURAL AS NEEDED
Status: DISCONTINUED | OUTPATIENT
Start: 2023-03-23 | End: 2023-03-23 | Stop reason: HOSPADM

## 2023-03-23 RX ORDER — ALBUTEROL SULFATE 0.83 MG/ML
2.5 SOLUTION RESPIRATORY (INHALATION) AS NEEDED
Status: CANCELLED | OUTPATIENT
Start: 2023-03-23

## 2023-03-23 RX ORDER — SODIUM CHLORIDE, SODIUM LACTATE, POTASSIUM CHLORIDE, CALCIUM CHLORIDE 600; 310; 30; 20 MG/100ML; MG/100ML; MG/100ML; MG/100ML
INJECTION, SOLUTION INTRAVENOUS
Status: DISCONTINUED | OUTPATIENT
Start: 2023-03-23 | End: 2023-03-23 | Stop reason: HOSPADM

## 2023-03-23 RX ORDER — HYDROMORPHONE HYDROCHLORIDE 1 MG/ML
0.5 INJECTION, SOLUTION INTRAMUSCULAR; INTRAVENOUS; SUBCUTANEOUS
Status: CANCELLED | OUTPATIENT
Start: 2023-03-23

## 2023-03-23 RX ADMIN — METHIMAZOLE 5 MG: 5 TABLET ORAL at 08:36

## 2023-03-23 RX ADMIN — DEXAMETHASONE SODIUM PHOSPHATE 4 MG: 4 INJECTION, SOLUTION INTRA-ARTICULAR; INTRALESIONAL; INTRAMUSCULAR; INTRAVENOUS; SOFT TISSUE at 13:30

## 2023-03-23 RX ADMIN — METOPROLOL TARTRATE 2.5 MG: 5 INJECTION INTRAVENOUS at 05:34

## 2023-03-23 RX ADMIN — VANCOMYCIN HYDROCHLORIDE 1000 MG: 1 INJECTION, POWDER, LYOPHILIZED, FOR SOLUTION INTRAVENOUS at 08:28

## 2023-03-23 RX ADMIN — SODIUM CHLORIDE 3 G: 900 INJECTION INTRAVENOUS at 08:36

## 2023-03-23 RX ADMIN — SODIUM CHLORIDE 3 G: 900 INJECTION INTRAVENOUS at 03:51

## 2023-03-23 RX ADMIN — LISINOPRIL 10 MG: 10 TABLET ORAL at 08:28

## 2023-03-23 RX ADMIN — SODIUM CHLORIDE, POTASSIUM CHLORIDE, SODIUM LACTATE AND CALCIUM CHLORIDE: 600; 310; 30; 20 INJECTION, SOLUTION INTRAVENOUS at 13:14

## 2023-03-23 RX ADMIN — LIDOCAINE HYDROCHLORIDE 5 ML: 20 INJECTION, SOLUTION EPIDURAL; INFILTRATION; INTRACAUDAL; PERINEURAL at 13:22

## 2023-03-23 RX ADMIN — PROPOFOL 20 MG: 10 INJECTION, EMULSION INTRAVENOUS at 13:22

## 2023-03-23 RX ADMIN — METOPROLOL TARTRATE 2.5 MG: 5 INJECTION INTRAVENOUS at 17:35

## 2023-03-23 RX ADMIN — METOPROLOL TARTRATE 2.5 MG: 5 INJECTION INTRAVENOUS at 00:05

## 2023-03-23 RX ADMIN — HEPARIN SODIUM 5000 UNITS: 5000 INJECTION INTRAVENOUS; SUBCUTANEOUS at 17:35

## 2023-03-23 RX ADMIN — FENTANYL CITRATE 50 MCG: 0.05 INJECTION, SOLUTION INTRAMUSCULAR; INTRAVENOUS at 13:20

## 2023-03-23 RX ADMIN — MIDAZOLAM HYDROCHLORIDE 2 MG: 2 INJECTION, SOLUTION INTRAMUSCULAR; INTRAVENOUS at 13:14

## 2023-03-23 RX ADMIN — INSULIN LISPRO 2 UNITS: 100 INJECTION, SOLUTION INTRAVENOUS; SUBCUTANEOUS at 22:33

## 2023-03-23 RX ADMIN — SODIUM CHLORIDE 3 G: 900 INJECTION INTRAVENOUS at 15:26

## 2023-03-23 RX ADMIN — INSULIN LISPRO 3 UNITS: 100 INJECTION, SOLUTION INTRAVENOUS; SUBCUTANEOUS at 17:35

## 2023-03-23 RX ADMIN — ONDANSETRON 4 MG: 2 INJECTION INTRAMUSCULAR; INTRAVENOUS at 13:20

## 2023-03-23 RX ADMIN — SODIUM CHLORIDE 3 G: 900 INJECTION INTRAVENOUS at 22:33

## 2023-03-23 RX ADMIN — VANCOMYCIN HYDROCHLORIDE 1000 MG: 1 INJECTION, POWDER, LYOPHILIZED, FOR SOLUTION INTRAVENOUS at 21:17

## 2023-03-23 NOTE — PROGRESS NOTES
Hospitalist Progress Note            Daily Progress Note: 3/23/2023 8:19 AM  Hospital course:   68-year-old male with a past medical history of CAD, diabetes, GERD, HTN, HLD, history of MI that presents to the emergency department for worsening darkness along the left great toe. Over the last 4 weeks patient has developed a diabetic ulcer on the left great toe. He has been followed outpatient for the last 2 weeks however it has not been improving. He was admitted for management of diabetic foot ulcer. He was started on Unasyn and vancomycin. Podiatry and vascular surgery consulted. Vascular performed angiogram on 3/22 and performed recanalization of left posterior tibial artery with arthrectomy, balloon PTA angioplasty, thrombectomy with thrombectomy catheter. Podiatry performed procedure on 3/23/2023. Subjective:   Examined at bedside. Offers no complaints at this time patient scheduled for podiatry procedure today      Assessment/Plan:   Active Problems:    Gangrene ([address] (3/20/2023)      Osteomyelitis ([address] (3/20/2023)    Severe sepsis/cellulitis/osteomyelitis  - Podiatry consulted  - Patient to go for podiatry procedure on 3/23/2023  - Continue IV Unasyn and vancomycin  -- Wound care: Bed 18 WTD. Offloading to the area for wound healing purposes    Type 2 diabetes with peripheral neuropathy, uncontrolled  - Hemoglobin A1c is 10.2%  -Hold Jardiance    PAD  - Vascular consulted. Right RUPERTO is normal however left RUPERTO revealed as 0.6. Patient had a clinically nonpalpable pedal pulse with cold foot and gangrene to the great toe area.   -- Vascular performed angiogram on 3/22 and performed recanalization of left posterior tibial artery with arthrectomy, balloon PTA angioplasty, thrombectomy with thrombectomy catheter    HTN  - Continue lisinopril, metoprolol    Anxiety/depression  - Continue fluoxetine    History of hypothyroidism  - Continue methimazole    DVT Prophylaxis: Heparin drip which will be switched over to oral Plavix  Code Status: Full Code  POA/NOK:    Medical Decision Making:    Labs reviewed by myself: AMY    Diagnostic data reviewed by myself: X-ray left foot and MRI left foot. Toxic drug monitoring: IV vancomycin needs troughs and continuous renal function monitoring      MDM Discussion: Patient with numerous medical comorbidities, each with increased risk for mortality and morbidity if left untreated. Patient requires medications with high risk of toxicity and need for intensive monitoring. I have reviewed patient's presenting subjective and objective findings, as well as all laboratory studies, imaging studies, and vital signs to date as well as treatment rendered and patient's response to those treatments. In addition, prior medical, surgical and relevant social and family histories were reviewed.         Disposition and discharge barriers:   podiatry procedure, IV antibiotics  Care Plan discussed with: Patient, nursing, case management    Current Facility-Administered Medications   Medication Dose Route Frequency    [Held by provider] clopidogreL (PLAVIX) tablet 75 mg  75 mg Oral DAILY    vancomycin (VANCOCIN) 1,000 mg in 0.9% sodium chloride 250 mL ([id number])  1,000 mg IntraVENous Q12H    sodium bicarbonate (8.4%) 150 mEq in dextrose 5% 1,000 mL infusion   IntraVENous CONTINUOUS    metoprolol (LOPRESSOR) injection 2.5 mg  2.5 mg IntraVENous Q6H    heparin 25,000 units in D5W 250 ml infusion  9-25 Units/kg/hr IntraVENous TITRATE    heparin (porcine) 1,000 unit/mL injection 4,000 Units  4,000 Units IntraVENous PRN    Or    heparin (porcine) 1,000 unit/mL injection 2,000 Units  2,000 Units IntraVENous PRN    ampicillin-sulbactam (UNASYN) 3 g in 0.9% sodium chloride (MBP/ADV) 100 mL MBP  3 g IntraVENous Q6H    VANCOMYCIN INFORMATION NOTE   Other Rx Dosing/Monitoring    FLUoxetine (PROzac) capsule 40 mg  40 mg Oral DAILY    methIMAzole (TAPAZOLE) tablet 5 mg  5 mg Oral DAILY    lisinopriL (PRINIVIL, ZESTRIL) tablet 10 mg  10 mg Oral DAILY    insulin lispro (HUMALOG) injection   SubCUTAneous AC&HS    glucose chewable tablet 16 g  4 Tablet Oral PRN    glucagon (GLUCAGEN) injection 1 mg  1 mg IntraMUSCular PRN    dextrose 10% infusion 0-250 mL  0-250 mL IntraVENous PRN        REVIEW OF SYSTEMS    Review of Systems   Constitutional:  Positive for malaise/fatigue. Respiratory:  Negative for cough, shortness of breath and wheezing. Cardiovascular:  Negative for chest pain, palpitations and leg swelling. Genitourinary:  Negative for dysuria. Musculoskeletal:  Negative for joint pain and myalgias. Neurological:  Negative for dizziness and headaches. Objective:     Visit Vitals  BP (!) 165/86 (BP 1 Location: Left upper arm, BP Patient Position: At rest)   Pulse 64   Temp 98.4 °F (36.9 °C)   Resp 20   Ht 5' 10\" (1.778 m)   Wt 94.6 kg (208 lb 8.9 oz)   SpO2 96%   BMI 29.92 kg/m²      O2 Device: None (Room air)    Temp (24hrs), Av.4 °F (36.9 °C), Min:98 °F (36.7 °C), Max:98.6 °F (37 °C)        PHYSICAL EXAM:    Physical Exam  Vitals and nursing note reviewed. Constitutional:       General: He is not in acute distress. Appearance: He is not ill-appearing. Cardiovascular:      Rate and Rhythm: Normal rate and regular rhythm. Heart sounds: No murmur heard. Pulmonary:      Effort: Pulmonary effort is normal. No respiratory distress. Breath sounds: Normal breath sounds. No stridor. No wheezing. Abdominal:      General: Abdomen is flat. There is no distension. Palpations: Abdomen is soft. Skin:     General: Skin is warm. Comments: Left great toe gangrene. Not seen by me as foot is currently dressed. Neurological:      Mental Status: He is alert. Mental status is at baseline.    Psychiatric:         Mood and Affect: Mood normal.         Behavior: Behavior normal.        Data Review    Recent Results (from the past 24 hour(s))   GLUCOSE, POC    Collection Time: 03/22/23 11:07 AM   Result Value Ref Range    Glucose (POC) 169 (H) 65 - 100 mg/dL    Performed by Barbara Paredes    GLUCOSE, POC    Collection Time: 03/22/23  5:16 PM   Result Value Ref Range    Glucose (POC) 142 (H) 65 - 100 mg/dL    Performed by Sarah Rodgers    ANTI-XA UNFRACTIONATED AND LMW HEPARIN    Collection Time: 03/22/23  6:18 PM   Result Value Ref Range    Heparin Xa,Unfrac. and LMW 0.27 IU/mL   GLUCOSE, POC    Collection Time: 03/22/23  8:40 PM   Result Value Ref Range    Glucose (POC) 198 (H) 65 - 100 mg/dL    Performed by Dianna Ellis UNFRACTIONATED AND LMW HEPARIN    Collection Time: 03/23/23  1:54 AM   Result Value Ref Range    Heparin Xa,Unfrac. and LMW 0.51 IU/mL   RENAL FUNCTION PANEL    Collection Time: 03/23/23  5:38 AM   Result Value Ref Range    Sodium 136 136 - 145 mmol/L    Potassium 3.6 3.5 - 5.1 mmol/L    Chloride 105 97 - 108 mmol/L    CO2 26 21 - 32 mmol/L    Anion gap 5 5 - 15 mmol/L    Glucose 138 (H) 65 - 100 mg/dL    BUN 12 6 - 20 mg/dL    Creatinine 0.86 0.70 - 1.30 mg/dL    BUN/Creatinine ratio 14 12 - 20      eGFR >60 >60 ml/min/1.73m2    Calcium 8.7 8.5 - 10.1 mg/dL    Phosphorus 3.2 2.6 - 4.7 mg/dL    Albumin 2.2 (L) 3.5 - 5.0 g/dL   VANCOMYCIN, TROUGH    Collection Time: 03/23/23  5:38 AM   Result Value Ref Range    Vancomycin,trough 13.3 (H) 5.0 - 10.0 ug/mL   GLUCOSE, POC    Collection Time: 03/23/23  7:59 AM   Result Value Ref Range    Glucose (POC) 168 (H) 65 - 100 mg/dL    Performed by Treva Stover (CON)        MRI FOOT LT WO CONT   Final Result   1. Ulceration of the medial aspect of the great toe with findings concerning   for acute osteomyelitis in the first proximal and distal phalanges. 2.  Suspected focus of plantar fibromatosis in the midfoot. XR FOOT LT MIN 3 V   Final Result   Diffuse soft tissue swelling with irregularity/ulceration at the   medial aspect of the great toe.  Fragmentation of the lateral base of the first   proximal phalanx may reflect an age indeterminate fracture deformity, but given   the adjacent soft tissue changes, consider acute osteomyelitis/septic arthritis. Intake and Output:  Current Shift: No intake/output data recorded. Last three shifts: 03/21 1901 - 03/23 0700  In: 4176.4 [P.O.:1200; I.V.:2976.4]  Out: 951 [Urine:950]      Lab/Data Review:  Recent Labs     03/21/23  0233 03/20/23  1416   WBC 6.8 9.1   HGB 10.6* 11.3*   HCT 34.6* 36.1*   * 480*     Recent Labs     03/23/23  0538 03/22/23  0350 03/21/23  0233 03/20/23  1416    136 135* 135*   K 3.6 3.6 4.0 4.4    106 108 104   CO2 26 26 24 23   * 130* 164* 103*   BUN 12 16 29* 33*   CREA 0.86 1.00 1.22 1.41*   CA 8.7 8.6 9.0 9.5   PHOS 3.2 3.6 4.0  --    ALB 2.2* 2.3* 2.4* 2.8*   TBILI  --   --   --  0.2   ALT  --   --   --  18     No results for input(s): PH, PCO2, PO2, HCO3, FIO2 in the last 72 hours.   Recent Results (from the past 24 hour(s))   GLUCOSE, POC    Collection Time: 03/22/23 11:07 AM   Result Value Ref Range    Glucose (POC) 169 (H) 65 - 100 mg/dL    Performed by Harjeet Pardo, POC    Collection Time: 03/22/23  5:16 PM   Result Value Ref Range    Glucose (POC) 142 (H) 65 - 100 mg/dL    Performed by Sai Medellin    ANTI-XA UNFRACTIONATED AND LMW HEPARIN    Collection Time: 03/22/23  6:18 PM   Result Value Ref Range    Heparin Xa,Unfrac. and LMW 0.27 IU/mL   GLUCOSE, POC    Collection Time: 03/22/23  8:40 PM   Result Value Ref Range    Glucose (POC) 198 (H) 65 - 100 mg/dL    Performed by Eugenia Antonio UNFRACTIONATED AND LMW HEPARIN    Collection Time: 03/23/23  1:54 AM   Result Value Ref Range    Heparin Xa,Unfrac. and LMW 0.51 IU/mL   RENAL FUNCTION PANEL    Collection Time: 03/23/23  5:38 AM   Result Value Ref Range    Sodium 136 136 - 145 mmol/L    Potassium 3.6 3.5 - 5.1 mmol/L    Chloride 105 97 - 108 mmol/L    CO2 26 21 - 32 mmol/L    Anion gap 5 5 - 15 mmol/L    Glucose 138 (H) 65 - 100 mg/dL BUN 12 6 - 20 mg/dL    Creatinine 0.86 0.70 - 1.30 mg/dL    BUN/Creatinine ratio 14 12 - 20      eGFR >60 >60 ml/min/1.73m2    Calcium 8.7 8.5 - 10.1 mg/dL    Phosphorus 3.2 2.6 - 4.7 mg/dL    Albumin 2.2 (L) 3.5 - 5.0 g/dL   VANCOMYCIN, TROUGH    Collection Time: 03/23/23  5:38 AM   Result Value Ref Range    Vancomycin,trough 13.3 (H) 5.0 - 10.0 ug/mL   GLUCOSE, POC    Collection Time: 03/23/23  7:59 AM   Result Value Ref Range    Glucose (POC) 168 (H) 65 - 100 mg/dL    Performed by Kevin Moyer (CON)            _____________________________________________________________________________  Time spent in direct care including coordination of service, review of data and examination: 36 minutes    ______________________________________________________________________________    ALEX Sage    This is dictation was done by dragon, computer voice recognition software. Quite often unanticipated grammatical, syntax, homophones and other interpretive errors or inadvertently transcribed by the computer software. Please excuse errors that have escaped final proofreading. Thank you.

## 2023-03-23 NOTE — ANESTHESIA PREPROCEDURE EVALUATION
Relevant Problems   RESPIRATORY SYSTEM   (+) Pneumonia   (+) Pneumonia due to COVID-19 virus      NEUROLOGY   (+) Moderate episode of recurrent major depressive disorder (HCC)      CARDIOVASCULAR   (+) CAD (coronary artery disease)   (+) Essential hypertension      RENAL FAILURE   (+) Angiomyolipoma of left kidney      HEMATOLOGY   (+) Osteomyelitis (HCC)       Anesthetic History   No history of anesthetic complications            Review of Systems / Medical History  Patient summary reviewed, nursing notes reviewed and pertinent labs reviewed    Pulmonary  Within defined limits                 Neuro/Psych   Within defined limits           Cardiovascular    Hypertension          Past MI, CAD, cardiac stents, CABG and hyperlipidemia      Comments:   11/23/23 EKG    Sinus rhythm   Atrial premature complexes   Left ventricular hypertrophy   Inferior infarct, old   Anterior ST elevation, probably due to LVH     Confirmed by Freya Andre ([id number]) on 11/28/2022 9:36:43 AM      4/2021 KYUNG    Interpretation Summary    ? LV: Calculated LVEF is 55%. Normal cavity size, wall thickness and systolic function (ejection fraction normal). Wall motion: normal. Normal left ventricular strain. Mild (grade 1) left ventricular diastolic dysfunction. ? LA: Mildly dilated left atrium. ? AV: Mild aortic valve stenosis is present. Mild aortic valve regurgitation is present.      GI/Hepatic/Renal     GERD           Endo/Other    Diabetes: poorly controlled, type 2, using insulin    Obesity, arthritis and anemia     Other Findings   Comments: Procedure Information    Case: [id number] Date/Time: 03/23/23 1300  Procedure: AMPUTATION TOE (Left)  Anesthesia type: General  Pre-op diagnosis: none given  Location: [facility] MAIN OR 06 / [facility] MAIN OR  Surgeons: Andres Fry DPM      Medical History  Diabetes ([facility] [address]  HTN (hypertension)  Hyperlipidemia  Burning with urination  CAD (coronary artery disease)  MI (myocardial infarction) ([facility] [address]  Arthritis  GERD (gastroesophageal reflux disease)  Rectal bleeding  Renal mass, left  History of blood transfusion  Personal history of colonic polyps         Physical Exam    Airway  Mallampati: III  TM Distance: 4 - 6 cm  Neck ROM: normal range of motion   Mouth opening: Normal     Cardiovascular    Rhythm: regular  Rate: normal         Dental  No notable dental hx       Pulmonary  Breath sounds clear to auscultation               Abdominal  GI exam deferred       Other Findings   Comments: Results for Anjel Frazier (MRN [id number]) as of 8/23/2021 08:27    8/12/2021 11:17  Sodium: 135 (L)  Potassium: 3.8  Chloride: 104  CO2: 26  Anion gap: 5  Glucose: 314 (H)  BUN: 13  Creatinine: 1.05  BUN/Creatinine ratio: 12  Calcium: 8.9  GFR est non-AA: >60  GFR est AA: >60    Results for Anjel Frazier (MRN [id number]) as of 8/23/2021 08:27    8/20/2021 10:25  HGB: 8.1 (L)  HCT: 26.7 (L)    Component 8/19/21 1025  Crossmatch Expiration 08/26/2021,2359   ABO/Rh(D) A Positive   Antibody screen Negative            Anesthetic Plan    ASA: 4  Anesthesia type: total IV anesthesia, MAC and general - backup    Monitoring Plan: Continuous noninvasive hemodynamic monitoring      Induction: Intravenous  Anesthetic plan and risks discussed with: Patient

## 2023-03-23 NOTE — PROGRESS NOTES
Patient returned from OR. Received report. Pt alert, oriented. VSS/ documented. Denies pain/ Left Foot. Dressing clean/ intact. Offloaded on pillow.

## 2023-03-23 NOTE — PROGRESS NOTES
PROGRESS NOTE      Chief Complaints:  Patient examined this morning. HPI and  Objective:    Patient has no new complaints. Left foot is warm to touch Doppler signal noted DP and PT. Review of Systems:  Rest of review of system reviewed personally and they are negative. EXAM:  Visit Vitals  BP (!) 164/80 (BP 1 Location: Left upper arm, BP Patient Position: At rest)   Pulse 62   Temp 98 °F (36.7 °C)   Resp 20   Ht 5' 10\" (1.778 m)   Wt 208 lb 8.9 oz (94.6 kg)   SpO2 95%   BMI 29.92 kg/m²       Patient is awake   Head and neck atraumatic, normocephalic. ENT: No hoarse voice, gaze appropriate. Cardiac system regular rate rhythm. Pulmonary no audible wheeze, no cyanosis. Chest wall excursion normal with respiration cycle  Abdomen is soft not particularly distended. Neurologically nonfocal.  Hematology system: No bruising noted. Musculoskeletal system: No joint deformity noted. Genitourinary system: No hematuria noted. Skin is warm and moist.  Psychosocial: Cooperative. Vascular examination as previously noted no changes.     Current Facility-Administered Medications   Medication Dose Route Frequency Provider Last Rate Last Admin    clopidogreL (PLAVIX) tablet 75 mg  75 mg Oral DAILY Susan Cedeño MD   75 mg at 03/22/23 0950    vancomycin (VANCOCIN) 1,000 mg in 0.9% sodium chloride 250 mL ([id number])  1,000 mg IntraVENous Q12H DavidsonSusan  mL/hr at 03/22/23 2032 1,000 mg at 03/22/23 2032    sodium bicarbonate (8.4%) 150 mEq in dextrose 5% 1,000 mL infusion   IntraVENous CONTINUOUS Susan Cedeño MD 84 mL/hr at 03/22/23 2144 New Bag at 03/22/23 2144    metoprolol (LOPRESSOR) injection 2.5 mg  2.5 mg IntraVENous Q6H Susan Cedeño MD   2.5 mg at 03/23/23 0534    heparin 25,000 units in D5W 250 ml infusion  9-25 Units/kg/hr IntraVENous TITRATE Susan Cedeño MD 15.3 mL/hr at 03/23/23 0352 15 Units/kg/hr at 03/23/23 0352    heparin (porcine) 1,000 unit/mL injection 4,000 Units  4,000 Units IntraVENous PRN Aruna Cedeño MD   4,000 Units at 03/22/23 1205    Or    heparin (porcine) 1,000 unit/mL injection 2,000 Units  2,000 Units IntraVENous PRN Aruna Cedeño MD   2,000 Units at 03/22/23 2031    ampicillin-sulbactam (UNASYN) 3 g in 0.9% sodium chloride (MBP/ADV) 100 mL MBP  3 g IntraVENous Q6H Aruna Cedeño  mL/hr at 03/23/23 0351 3 g at 03/23/23 2443    VANCOMYCIN INFORMATION NOTE   Other Rx Dosing/Monitoring Aruna Cedeño MD        FLUoxetine (PROzac) capsule 40 mg  40 mg Oral DAILY Aruna Cedeño MD   40 mg at 03/22/23 7421    methIMAzole (TAPAZOLE) tablet 5 mg  5 mg Oral DAILY Aruna Cedeño MD   5 mg at 03/22/23 0950    lisinopriL (PRINIVIL, ZESTRIL) tablet 10 mg  10 mg Oral DAILY Aruna Cedeño MD   10 mg at 03/22/23 5730    insulin lispro (HUMALOG) injection   SubCUTAneous AC&HS Aruna Cedeño MD   2 Units at 03/22/23 1745    glucose chewable tablet 16 g  4 Tablet Oral PRN Aruna Cedeño MD        glucagon [facility]) injection 1 mg  1 mg IntraMUSCular PRN Aruna Cedeño MD        dextrose 10% infusion 0-250 mL  0-250 mL IntraVENous PRN Aruna Cedeño MD               Recent Results (from the past 24 hour(s))   GLUCOSE, POC    Collection Time: 03/22/23  7:54 AM   Result Value Ref Range    Glucose (POC) 132 (H) 65 - 100 mg/dL    Performed by Chantale Urbina, POC    Collection Time: 03/22/23 11:07 AM   Result Value Ref Range    Glucose (POC) 169 (H) 65 - 100 mg/dL    Performed by Ghada Garcia, POC    Collection Time: 03/22/23  5:16 PM   Result Value Ref Range    Glucose (POC) 142 (H) 65 - 100 mg/dL    Performed by Viri Goldberg    ANTI-XA UNFRACTIONATED AND LMW HEPARIN    Collection Time: 03/22/23  6:18 PM   Result Value Ref Range    Heparin Xa,Unfrac. and LMW 0.27 IU/mL   GLUCOSE, POC    Collection Time: 03/22/23  8:40 PM   Result Value Ref Range    Glucose (POC) 198 (H) 65 - 100 mg/dL    Performed by Janak De    ANTI-XA UNFRACTIONATED AND LMW HEPARIN    Collection Time: 03/23/23  1:54 AM   Result Value Ref Range    Heparin Xa,Unfrac. and LMW 0.51 IU/mL   RENAL FUNCTION PANEL    Collection Time: 03/23/23  5:38 AM   Result Value Ref Range    Sodium 136 136 - 145 mmol/L    Potassium 3.6 3.5 - 5.1 mmol/L    Chloride 105 97 - 108 mmol/L    CO2 26 21 - 32 mmol/L    Anion gap 5 5 - 15 mmol/L    Glucose 138 (H) 65 - 100 mg/dL    BUN 12 6 - 20 mg/dL    Creatinine 0.86 0.70 - 1.30 mg/dL    BUN/Creatinine ratio 14 12 - 20      eGFR >60 >60 ml/min/1.73m2    Calcium 8.7 8.5 - 10.1 mg/dL    Phosphorus 3.2 2.6 - 4.7 mg/dL    Albumin 2.2 (L) 3.5 - 5.0 g/dL   VANCOMYCIN, TROUGH    Collection Time: 03/23/23  5:38 AM   Result Value Ref Range    Vancomycin,trough 13.3 (H) 5.0 - 10.0 ug/mL       ASSESSMENT:   Patient is 69 y.o. with diagnosis of : Active Problems:    Gangrene ([facility] [address] (3/20/2023)      Osteomyelitis ([facility] [address] (3/20/2023)        PLAN:                 Patient scheduled for left great toe amputation today per podiatry. Continue anticoagulant therapy perioperatively. We will transition to Eliquis upon discharge. We will continue to follow.

## 2023-03-23 NOTE — OP NOTES
Operative Report    Patient: Charlotte Rodriguez MRN: [id number]  SSN: [id number]    YOB: 1955  Age: 69 y.o. Sex: male       Date of Surgery:   03/23/2023     Preoperative Diagnosis:   Osteomyelitis, left great toe    Postoperative Diagnosis:   Same     Surgeon(s) and Role:     * Julisa Morales DPM - Primary    Assistant(s):  SARAH Gordon    Anesthesia:   MAC with local, consisting of 20cc of 1% lidocaine plain    Procedure:   AMPUTATION OF THE LEFT GREAT TOE    Procedure in Detail:   Pt was seen in the pre-operative holding area and all questions were answered and all concerns were addressed. The operative procedure was discussed in great detail, with all possible complications highlighted. Pt verbalized complete understanding and the consent was signed and witnessed. Pt was on scheduled abx, thus no additional abx ordered for surgical prophylaxis. The operative limb was marked and the pt was transported to the operating room. The pt was transferred to the operating table and anesthesia was administered as indicated above. The left foot was scrubbed and draped in sterile fashion. A time out was performed to confirm the correct pt, correct procedure, correct limb, abx, allergies, fire risk and attendees within the operating room. Upon completion, the procedure commenced. With a 15 blade, a circumferential incision was made about the metatarsophalangeal joint of the left great toe. The digit was sharply disarticulated at the MPJ. Purulent drainage was noted. The surgical site was flushed with normal saline and the remaining tissue was bleeding/granular. The deep tissue was coapted with 3.0 vicryl and the skin was reapproximated with with 3.0 prolene. Nitro paste was applied to the incision and ryder-incision for vasodilation and increase healing potential. The site was covered with xeroform, 4x4, kerlex and a light ace wrap.  A post op shoe was applied for protected ambulation. Pt tolerated the above procedures well and all post operative counts were correct. Pt was transferred to PACU without incident. A thorough neurovascular check was then performed. Upon meeting transfer criteria, pt was transferred back to the medical floor.     Estimated Blood Loss:    10cc    Tourniquet Time:   None    Implants:   None           Specimens:   None    Drains:   None                Complications:   None      Signed By:  Edmundo Hartley DPM     March 23, 2023

## 2023-03-23 NOTE — ANESTHESIA POSTPROCEDURE EVALUATION
Procedure(s):  AMPUTATION TOE.    total IV anesthesia, MAC, general - backup    Anesthesia Post Evaluation      Multimodal analgesia: multimodal analgesia used between 6 hours prior to anesthesia start to PACU discharge  Patient location during evaluation: PACU  Patient participation: complete - patient participated  Level of consciousness: awake  Pain score: 0  Pain management: adequate  Airway patency: patent  Anesthetic complications: no  Cardiovascular status: acceptable  Respiratory status: acceptable  Hydration status: acceptable  Post anesthesia nausea and vomiting:  controlled  Final Post Anesthesia Temperature Assessment:  Normothermia (36.0-37.5 degrees C)      INITIAL Post-op Vital signs:   Vitals Value Taken Time   /85 03/23/23 1400   Temp 36.6 °C (97.9 °F) 03/23/23 1356   Pulse 67 03/23/23 1400   Resp 16 03/23/23 1400   SpO2 96 % 03/23/23 1402   Vitals shown include unvalidated device data.

## 2023-03-23 NOTE — PERIOP NOTES
Patient stated he would like to call his family himself when he returns to his room, no family updated.

## 2023-03-23 NOTE — PROGRESS NOTES
Problem: Falls - Risk of  Goal: *Absence of Falls  Description: Document Obi Wang Fall Risk and appropriate interventions in the flowsheet.   Outcome: Progressing Towards Goal  Note: Fall Risk Interventions:

## 2023-03-23 NOTE — PROGRESS NOTES
Vancomycin Dosing Consult  Amparo Merchant is a 69 y.o. male with SSTI (ulcer in the left hallux). Pharmacy was consulted by Dr. Tasha Duran to dose and monitor vancomycin. Today is day 4. Antibiotic regimen: Vancomycin + Unasyn    Temp (24hrs), Av.4 °F (36.9 °C), Min:98 °F (36.7 °C), Max:98.6 °F (37 °C)    Recent Labs     23  0233 23  1416   WBC 6.8 9.1     Recent Labs     23  0538 23  0350 23  0233 23  1416   CREA 0.86 1.00 1.22 1.41*   BUN 12 16 29* 33*     No results for input(s): CRP in the last 336 hours. Recent Labs     23  0233   PCT 0.05*       Estimated Creatinine Clearance: 93.6 mL/min (based on SCr of 0.86 mg/dL). ml/min  Concomitant nephrotoxic drugs: None    Cultures:   3/20 Blood: NG, prelim  3/21 Urine: pending    MRSA Swab: N/A    Target range: AUC/ARIK 400-600    Recent level history:  Date/Time Dose & Interval Measured Level (mcg/mL) Associated AUC/ARIK   3/20 2500 mg x1     3/21 750 mg q12     3/22 750 mg q12 13.7 400   3/23 1000mg q12h 13.3 436      Assessment/Plan:   Afebrile, WBC WNL, renal Fxn is improving and SCR= 0.86 today  Pt with Gangrene, Osteomyelitis  Vancomycin level is in the target range. Continue Vancomycin 1000 mg q12hr.   Level scheduled for 3/23 at 0600 am.  Antimicrobial stop date pending

## 2023-03-24 VITALS
BODY MASS INDEX: 29.67 KG/M2 | DIASTOLIC BLOOD PRESSURE: 64 MMHG | WEIGHT: 207.23 LBS | OXYGEN SATURATION: 97 % | SYSTOLIC BLOOD PRESSURE: 117 MMHG | RESPIRATION RATE: 16 BRPM | HEART RATE: 78 BPM | HEIGHT: 70 IN | TEMPERATURE: 98.5 F

## 2023-03-24 LAB
ALBUMIN SERPL-MCNC: 2.2 G/DL (ref 3.5–5)
ANION GAP SERPL CALC-SCNC: 4 MMOL/L (ref 5–15)
BASOPHILS # BLD: 0.1 K/UL (ref 0–0.1)
BASOPHILS NFR BLD: 1 % (ref 0–1)
BUN SERPL-MCNC: 13 MG/DL (ref 6–20)
BUN/CREAT SERPL: 14 (ref 12–20)
CA-I BLD-MCNC: 8.9 MG/DL (ref 8.5–10.1)
CHLORIDE SERPL-SCNC: 107 MMOL/L (ref 97–108)
CO2 SERPL-SCNC: 25 MMOL/L (ref 21–32)
CREAT SERPL-MCNC: 0.92 MG/DL (ref 0.7–1.3)
DIFFERENTIAL METHOD BLD: ABNORMAL
EOSINOPHIL # BLD: 0.1 K/UL (ref 0–0.4)
EOSINOPHIL NFR BLD: 1 % (ref 0–7)
ERYTHROCYTE [DISTWIDTH] IN BLOOD BY AUTOMATED COUNT: 13.2 % (ref 11.5–14.5)
GLUCOSE BLD STRIP.AUTO-MCNC: 133 MG/DL (ref 65–100)
GLUCOSE BLD STRIP.AUTO-MCNC: 165 MG/DL (ref 65–100)
GLUCOSE SERPL-MCNC: 120 MG/DL (ref 65–100)
HCT VFR BLD AUTO: 28.7 % (ref 36.6–50.3)
HGB BLD-MCNC: 9.1 G/DL (ref 12.1–17)
IMM GRANULOCYTES # BLD AUTO: 0 K/UL (ref 0–0.04)
IMM GRANULOCYTES NFR BLD AUTO: 0 % (ref 0–0.5)
LYMPHOCYTES # BLD: 2.6 K/UL (ref 0.8–3.5)
LYMPHOCYTES NFR BLD: 25 % (ref 12–49)
MCH RBC QN AUTO: 27.9 PG (ref 26–34)
MCHC RBC AUTO-ENTMCNC: 31.7 G/DL (ref 30–36.5)
MCV RBC AUTO: 88 FL (ref 80–99)
MONOCYTES # BLD: 1.1 K/UL (ref 0–1)
MONOCYTES NFR BLD: 11 % (ref 5–13)
NEUTS SEG # BLD: 6.5 K/UL (ref 1.8–8)
NEUTS SEG NFR BLD: 62 % (ref 32–75)
NRBC # BLD: 0 K/UL (ref 0–0.01)
NRBC BLD-RTO: 0 PER 100 WBC
PERFORMED BY, TECHID: ABNORMAL
PERFORMED BY, TECHID: ABNORMAL
PHOSPHATE SERPL-MCNC: 3.1 MG/DL (ref 2.6–4.7)
PLATELET # BLD AUTO: 348 K/UL (ref 150–400)
PMV BLD AUTO: 10 FL (ref 8.9–12.9)
POTASSIUM SERPL-SCNC: 4.2 MMOL/L (ref 3.5–5.1)
RBC # BLD AUTO: 3.26 M/UL (ref 4.1–5.7)
SODIUM SERPL-SCNC: 136 MMOL/L (ref 136–145)
VANCOMYCIN SERPL-MCNC: 14.4 UG/ML
WBC # BLD AUTO: 10.4 K/UL (ref 4.1–11.1)

## 2023-03-24 PROCEDURE — 74011250636 HC RX REV CODE- 250/636: Performed by: LICENSED PRACTICAL NURSE

## 2023-03-24 PROCEDURE — 99232 SBSQ HOSP IP/OBS MODERATE 35: CPT | Performed by: PODIATRIST

## 2023-03-24 PROCEDURE — 74011250636 HC RX REV CODE- 250/636: Performed by: SURGERY

## 2023-03-24 PROCEDURE — 80069 RENAL FUNCTION PANEL: CPT

## 2023-03-24 PROCEDURE — 85025 COMPLETE CBC W/AUTO DIFF WBC: CPT

## 2023-03-24 PROCEDURE — 74011636637 HC RX REV CODE- 636/637: Performed by: SURGERY

## 2023-03-24 PROCEDURE — 74011250636 HC RX REV CODE- 250/636

## 2023-03-24 PROCEDURE — 82962 GLUCOSE BLOOD TEST: CPT

## 2023-03-24 PROCEDURE — 36415 COLL VENOUS BLD VENIPUNCTURE: CPT

## 2023-03-24 PROCEDURE — 74011000258 HC RX REV CODE- 258: Performed by: SURGERY

## 2023-03-24 PROCEDURE — 80202 ASSAY OF VANCOMYCIN: CPT

## 2023-03-24 PROCEDURE — 74011000250 HC RX REV CODE- 250: Performed by: SURGERY

## 2023-03-24 PROCEDURE — 74011250637 HC RX REV CODE- 250/637: Performed by: SURGERY

## 2023-03-24 RX ORDER — LEVOFLOXACIN 750 MG/1
750 TABLET ORAL DAILY
Qty: 10 TABLET | Refills: 0 | Status: SHIPPED | OUTPATIENT
Start: 2023-03-24 | End: 2023-04-03

## 2023-03-24 RX ADMIN — METOPROLOL TARTRATE 2.5 MG: 5 INJECTION INTRAVENOUS at 07:24

## 2023-03-24 RX ADMIN — SODIUM CHLORIDE 3 G: 900 INJECTION INTRAVENOUS at 04:05

## 2023-03-24 RX ADMIN — HEPARIN SODIUM 5000 UNITS: 5000 INJECTION INTRAVENOUS; SUBCUTANEOUS at 09:10

## 2023-03-24 RX ADMIN — LISINOPRIL 10 MG: 10 TABLET ORAL at 09:10

## 2023-03-24 RX ADMIN — SODIUM CHLORIDE 3 G: 900 INJECTION INTRAVENOUS at 11:47

## 2023-03-24 RX ADMIN — METHIMAZOLE 5 MG: 5 TABLET ORAL at 09:10

## 2023-03-24 RX ADMIN — FLUOXETINE 40 MG: 20 CAPSULE ORAL at 09:10

## 2023-03-24 RX ADMIN — INSULIN LISPRO 2 UNITS: 100 INJECTION, SOLUTION INTRAVENOUS; SUBCUTANEOUS at 11:51

## 2023-03-24 RX ADMIN — METOPROLOL TARTRATE 2.5 MG: 5 INJECTION INTRAVENOUS at 01:26

## 2023-03-24 RX ADMIN — HEPARIN SODIUM 5000 UNITS: 5000 INJECTION INTRAVENOUS; SUBCUTANEOUS at 01:26

## 2023-03-24 RX ADMIN — VANCOMYCIN HYDROCHLORIDE 1000 MG: 1 INJECTION, POWDER, LYOPHILIZED, FOR SOLUTION INTRAVENOUS at 09:12

## 2023-03-24 NOTE — PROGRESS NOTES
Problem: Falls - Risk of  Goal: *Absence of Falls  Description: Document Skeet Hopkins Fall Risk and appropriate interventions in the flowsheet.   Outcome: Progressing Towards Goal  Note: Fall Risk Interventions:

## 2023-03-24 NOTE — PROGRESS NOTES
Problem: Falls - Risk of  Goal: *Absence of Falls  Description: Document Anette Olvera Fall Risk and appropriate interventions in the flowsheet. Outcome: Progressing Towards Goal  Note: Fall Risk Interventions:                                Problem: Patient Education: Go to Patient Education Activity  Goal: Patient/Family Education  Outcome: Progressing Towards Goal     Problem: Pressure Injury - Risk of  Goal: *Prevention of pressure injury  Description: Document Yuriy Scale and appropriate interventions in the flowsheet. Outcome: Progressing Towards Goal  Note: Pressure Injury Interventions:  Sensory Interventions: Assess changes in LOC, Keep linens dry and wrinkle-free, Minimize linen layers    Moisture Interventions: Minimize layers    Activity Interventions: Increase time out of bed    Mobility Interventions: HOB 30 degrees or less    Nutrition Interventions: Document food/fluid/supplement intake                     Problem: Patient Education: Go to Patient Education Activity  Goal: Patient/Family Education  Outcome: Progressing Towards Goal     Problem: Lower Extremity Wound Care  Goal: *Non-infected wound: Improvement of existing wound, absence of infection, and maintenance of skin integrity  Outcome: Progressing Towards Goal  Goal: *Infected Wound: Prevention of further infection and promotion of healing  Description: Infection control procedures (eg: clean dressings, clean gloves, hand washing, precautions to isolate wound from contamination, sterile instruments used for wound debridement) should be implemented. Outcome: Progressing Towards Goal  Goal: Interventions  Outcome: Progressing Towards Goal     Problem: Diabetes Self-Management  Goal: *Disease process and treatment process  Description: Define diabetes and identify own type of diabetes; list 3 options for treating diabetes.   Outcome: Progressing Towards Goal  Goal: *Incorporating nutritional management into lifestyle  Description: Describe effect of type, amount and timing of food on blood glucose; list 3 methods for planning meals. Outcome: Progressing Towards Goal  Goal: *Incorporating physical activity into lifestyle  Description: State effect of exercise on blood glucose levels. Outcome: Progressing Towards Goal  Goal: *Developing strategies to promote health/change behavior  Description: Define the ABC's of diabetes; identify appropriate screenings, schedule and personal plan for screenings. Outcome: Progressing Towards Goal  Goal: *Using medications safely  Description: State effect of diabetes medications on diabetes; name diabetes medication taking, action and side effects. Outcome: Progressing Towards Goal  Goal: *Monitoring blood glucose, interpreting and using results  Description: Identify recommended blood glucose targets  and personal targets. Outcome: Progressing Towards Goal  Goal: *Prevention, detection, treatment of acute complications  Description: List symptoms of hyper- and hypoglycemia; describe how to treat low blood sugar and actions for lowering  high blood glucose level. Outcome: Progressing Towards Goal  Goal: *Prevention, detection and treatment of chronic complications  Description: Define the natural course of diabetes and describe the relationship of blood glucose levels to long term complications of diabetes.   Outcome: Progressing Towards Goal  Goal: *Developing strategies to address psychosocial issues  Description: Describe feelings about living with diabetes; identify support needed and support network  Outcome: Progressing Towards Goal  Goal: *Insulin pump training  Outcome: Progressing Towards Goal  Goal: *Sick day guidelines  Outcome: Progressing Towards Goal  Goal: *Patient Specific Goal (EDIT GOAL, INSERT TEXT)  Outcome: Progressing Towards Goal     Problem: Patient Education: Go to Patient Education Activity  Goal: Patient/Family Education  Outcome: Progressing Towards Goal

## 2023-03-24 NOTE — WOUND CARE
WCN consulted for \"wound left thigh\". 2 x skin tears noted to left lateral thigh, pink dry wound beds. Apply hydrogel to wound beds and cover with gentle lite foam dressing daily, see dressing order. Patient ambulatory and independent with movement as witnessed. No other skin/wound care needs noted at this time. Re-consult WCN if needed for other skin care concerns.

## 2023-03-24 NOTE — PROGRESS NOTES
Discharge plan of care/case management plan validated with provider discharge order. Discharge order noted. Discharge instructions given to pt w pt verbalizing understanding. PIV x 2 removed with cath tip intact. E-scripts prescribed to pts personal pharmacy. Condition stable at time of discharge.

## 2023-03-24 NOTE — PROGRESS NOTES
Vancomycin Dosing Consult  Sonido Matamoros is a 69 y.o. male with SSTI (ulcer in the left hallux). Pharmacy was consulted by Dr. Shell Mondragon to dose and monitor vancomycin. Today is day 5. Antibiotic regimen: Vancomycin + Unasyn    Temp (24hrs), Av.4 °F (36.9 °C), Min:98 °F (36.7 °C), Max:98.6 °F (37 °C)    Recent Labs     23  0233 23  1416   WBC 6.8 9.1     Recent Labs     23  0538 23  0350 23  0233 23  1416   CREA 0.86 1.00 1.22 1.41*   BUN 12 16 29* 33*     No results for input(s): CRP in the last 336 hours. Recent Labs     23  0233   PCT 0.05*       Estimated Creatinine Clearance: 87.2 mL/min  Concomitant nephrotoxic drugs: None    Cultures:   3/20 Blood: NG, prelim  3/21 Urine: pending    MRSA Swab: N/A    Target range: AUC/ARIK 400-600    Recent level history:  Date/Time Dose & Interval Measured Level (mcg/mL) Associated AUC/ARIK   3/20 2500 mg x1     3/21 750 mg q12     3/22 750 mg q12 13.7 400   3/23 1000mg q12h 13.3 436   3/24 @ 0652 1000 mg IV q12h 14.4 444      Assessment/Plan:   Afebrile, WBC WNL, Scr stable  Pt with Gangrene, Osteomyelitis  Vancomycin level is in the target range. Continue Vancomycin 1000 mg q12hr.   Level scheduled for 3/26 @ 0600  Antimicrobial stop date pending

## 2023-03-24 NOTE — PROGRESS NOTES
Skin tear open to air, nil drainage noted, seen to left outer thigh, Patient stated \" I noticed it after having procedure done on 3/21, a blue plastic was placed on me and may have torn it when it was removed. Pt c/o burning sensation to same. He stated he didn't come to the hospital with it. Wound care consulted.

## 2023-03-24 NOTE — PROGRESS NOTES
Patient discharging home today with home health. Referrals sent, waiting for accepting facility. Discharge plan of care/case management plan validated with provider discharge order. Medicare pt has received, reviewed, and signed 2nd IM letter informing them of their right to appeal the discharge. Signed copied has been placed on pt bedside chart.

## 2023-03-24 NOTE — DISCHARGE SUMMARY
Hospitalist Discharge Summary     Patient ID:    Misti Ordaz  [id number]  73 y.o.  1955    Admit date: 3/20/2023    Discharge date : 3/24/2023      Final Diagnoses: Active Problems:    Gangrene ([address] (3/20/2023)      Osteomyelitis ([facility] [address] (3/20/2023)      Reason for Hospitalization/Hospital Course:   68-year-old male with a past medical history of CAD, diabetes, GERD, HTN, HLD, history of MI that presents to the emergency department for worsening darkness along the left great toe. Over the last 4 weeks patient has developed a diabetic ulcer on the left great toe. He has been followed outpatient for the last 2 weeks however it has not been improving. He was admitted for management of diabetic foot ulcer. He was started on Unasyn and vancomycin. Podiatry and vascular surgery consulted. Vascular performed angiogram on 3/22 and performed recanalization of left posterior tibial artery with arthrectomy, balloon PTA angioplasty, thrombectomy with thrombectomy catheter. Podiatry performed procedure on 3/23/2023. Patient medically stable for discharge and has been cleared by vascular and podiatry. He is to discharge in stable condition with instructions to follow-up in 1 week with podiatry, and to follow-up in 10 days with Dr. Suzanne De Los Santos (Vascular Surgery). He is to Start Levofloxacin and continue to take it for 10 days. He is to start taking Eliquis 5 mg twice daily (12h apart). He is to follow up with primary care in 14 days for management of comorbid condition and careful titration of antihypertensives and diabetic medications. Discharge Medications:   Current Discharge Medication List        START taking these medications    Details   levoFLOXacin (LEVAQUIN) 750 mg tablet Take 1 Tablet by mouth daily for 10 days.   Qty: 10 Tablet, Refills: 0  Start date: 3/24/2023, End date: 4/3/2023      apixaban (Eliquis) 5 mg tablet Take 1 Tablet by mouth two (2) times a day for 30 days.  [place]: 60 Tablet, Refills: 0  Start date: 3/24/2023, End date: 4/23/2023           CONTINUE these medications which have NOT CHANGED    Details   SITagliptin phosphate (JANUVIA) 100 mg tablet Take 100 mg by mouth daily. glipiZIDE (GLUCOTROL) 10 mg tablet Take 1 Tablet by mouth two (2) times a day. Qty: 60 Tablet, Refills: 3    Associated Diagnoses: Uncontrolled type 2 diabetes mellitus with hyperglycemia (HCC)      FLUoxetine (PROzac) 40 mg capsule Take 1 Capsule by mouth daily. Indications: repeated episodes of anxiety  Qty: 90 Capsule, Refills: 1      empagliflozin (JARDIANCE) 25 mg tablet Take 1 Tablet by mouth daily. Qty: 90 Tablet, Refills: 1    Associated Diagnoses: Uncontrolled type 2 diabetes mellitus with hyperglycemia (HCC)      potassium chloride (KLOR-CON M10) 10 mEq tablet Take 1 Tablet by mouth daily. Qty: 90 Tablet, Refills: 1    Associated Diagnoses: Noncompliance      NIFEdipine ER (PROCARDIA XL) 30 mg ER tablet Take 1 Tablet by mouth daily. Qty: 10 Tablet, Refills: 0    Associated Diagnoses: Essential hypertension      lisinopriL (PRINIVIL, ZESTRIL) 10 mg tablet Take 1 Tablet by mouth daily. Qty: 10 Tablet, Refills: 0    Associated Diagnoses: Essential hypertension      furosemide (LASIX) 20 mg tablet Take 1 Tablet by mouth daily. Qty: 10 Tablet, Refills: 0    Associated Diagnoses: Essential hypertension      Lantus Solostar U-100 Insulin 100 unit/mL (3 mL) inpn 40 Units by SubCUTAneous route nightly. Qty: 36 mL, Refills: 2    Associated Diagnoses: Uncontrolled type 2 diabetes mellitus with hyperglycemia (HCC)      methIMAzole (TAPAZOLE) 5 mg tablet Take 1 Tablet by mouth daily. Qty: 90 Tablet, Refills: 0    Associated Diagnoses: Hyperthyroidism               Follow up Care:    1. Sariah Lipscomb NP in 1-2 weeks.       Follow-up Information       Follow up With Specialties Details Why Contact Info    Sonia Varma DPM Podiatry Schedule an appointment as soon as possible for a visit in 1 week(s) For wound re-check [address] [facility] [id number] [phone]      Tavares Cedeño MD Surgery Physician, General and Vascular Surgery Follow up in 10 day(s)  [address] [facility] [address]  Martha Mckeon NP Family Nurse Practitioner Follow up As needed, If symptoms worsen [address] [phone]      Santo Navarrete NP [facility] Nurse Practitioner   [address]  [address]  [phone]                Patient Follow Up Instructions: Activity: Activity as tolerated  Diet:  Cardiac Diet and Diabetic Diet  Wound Care:  Patient to keep dressing clean dry and intact with first dressing change performed in office. Condition at Discharge:  Stable  __________________________________________________________________    Disposition  Home Health [facility]  ____________________________________________________________________    Code Status:  Full Code  ___________________________________________________________________    Discharge Exam:  Patient seen and examined by me on discharge day. Pertinent Findings:  Gen:    Not in distress  Chest: Clear lungs  CVS:   Regular rhythm.   No edema  Abd:  Soft, not distended, not tender  Neuro:  Alert        CONSULTATIONS: Vascular Surgery and Podiatry    Significant Diagnostic Studies:   Recent Results (from the past 24 hour(s))   GLUCOSE, POC    Collection Time: 03/23/23 11:30 AM   Result Value Ref Range    Glucose (POC) 143 (H) 65 - 100 mg/dL    Performed by Phi Mehta    ANTI-XA UNFRACTIONATED AND LMW HEPARIN    Collection Time: 03/23/23  3:12 PM   Result Value Ref Range    Heparin Xa,Unfrac. and LMW <0.10 IU/mL   GLUCOSE, POC    Collection Time: 03/23/23  4:56 PM   Result Value Ref Range    Glucose (POC) 207 (H) 65 - 100 mg/dL    Performed by Phi Mehta    GLUCOSE, POC    Collection Time: 03/23/23  9:17 PM   Result Value Ref Range    Glucose (POC) 230 (H) 65 - 100 mg/dL    Performed by Jenifer Ftizgerald    RENAL FUNCTION PANEL    Collection Time: 03/24/23  6:52 AM   Result Value Ref Range    Sodium 136 136 - 145 mmol/L    Potassium 4.2 3.5 - 5.1 mmol/L    Chloride 107 97 - 108 mmol/L    CO2 25 21 - 32 mmol/L    Anion gap 4 (L) 5 - 15 mmol/L    Glucose 120 (H) 65 - 100 mg/dL    BUN 13 6 - 20 mg/dL    Creatinine 0.92 0.70 - 1.30 mg/dL    BUN/Creatinine ratio 14 12 - 20      eGFR >60 >60 ml/min/1.73m2    Calcium 8.9 8.5 - 10.1 mg/dL    Phosphorus 3.1 2.6 - 4.7 mg/dL    Albumin 2.2 (L) 3.5 - 5.0 g/dL   VANCOMYCIN, RANDOM    Collection Time: 03/24/23  6:52 AM   Result Value Ref Range    Vancomycin, random 14.4 ug/mL   CBC WITH AUTOMATED DIFF    Collection Time: 03/24/23  6:52 AM   Result Value Ref Range    WBC 10.4 4.1 - 11.1 K/uL    RBC 3.26 (L) 4.10 - 5.70 M/uL    HGB 9.1 (L) 12.1 - 17.0 g/dL    HCT 28.7 (L) 36.6 - 50.3 %    MCV 88.0 80.0 - 99.0 FL    MCH 27.9 26.0 - 34.0 PG    MCHC 31.7 30.0 - 36.5 g/dL    RDW 13.2 11.5 - 14.5 %    PLATELET 386 880 - 932 K/uL    MPV 10.0 8.9 - 12.9 FL    NRBC 0.0 0.0  WBC    ABSOLUTE NRBC 0.00 0.00 - 0.01 K/uL    NEUTROPHILS 62 32 - 75 %    LYMPHOCYTES 25 12 - 49 %    MONOCYTES 11 5 - 13 %    EOSINOPHILS 1 0 - 7 %    BASOPHILS 1 0 - 1 %    IMMATURE GRANULOCYTES 0 0 - 0.5 %    ABS. NEUTROPHILS 6.5 1.8 - 8.0 K/UL    ABS. LYMPHOCYTES 2.6 0.8 - 3.5 K/UL    ABS. MONOCYTES 1.1 (H) 0.0 - 1.0 K/UL    ABS. EOSINOPHILS 0.1 0.0 - 0.4 K/UL    ABS. BASOPHILS 0.1 0.0 - 0.1 K/UL    ABS. IMM. GRANS. 0.0 0.00 - 0.04 K/UL    DF AUTOMATED     GLUCOSE, POC    Collection Time: 03/24/23  7:43 AM   Result Value Ref Range    Glucose (POC) 133 (H) 65 - 100 mg/dL    Performed by Patrick Glass      MRI FOOT LT WO CONT   Final Result   1. Ulceration of the medial aspect of the great toe with findings concerning   for acute osteomyelitis in the first proximal and distal phalanges. 2.  Suspected focus of plantar fibromatosis in the midfoot.          XR FOOT LT MIN 3 V   Final Result   Diffuse soft tissue swelling with irregularity/ulceration at the   medial aspect of the great toe. Fragmentation of the lateral base of the first   proximal phalanx may reflect an age indeterminate fracture deformity, but given   the adjacent soft tissue changes, consider acute osteomyelitis/septic arthritis. Time spent in direct and indirect care including coordination of services: 29 minutes    Signed:  Carlos Martinez  3/24/2023  10:53 AM    This is dictation was done by dragon, computer voice recognition software. Quite often unanticipated grammatical, syntax, homophones and other interpretive errors or inadvertently transcribed by the computer software. Please excuse errors that have escaped final proofreading. Thank you.

## 2023-03-24 NOTE — PROGRESS NOTES
[facility]    Progress Note    Date:3/24/2023       Room:[facility]  Patient Trinity Rose     YOB: 1955     Age:67 y.o. Subjective:     Patient is a 69 y.o. male who is being seen at bedside status post left hallux amputation. Patient denies any pain at this time. Patient denies any complaints this time. Review of Systems  Constitutional:  Negative for chills, diaphoresis, fever and malaise/fatigue. Respiratory:  Negative for cough, hemoptysis, sputum production, shortness of breath and wheezing. No cyanosis   Cardiovascular:  Negative for chest pain, palpitations, claudication and leg swelling. Gastrointestinal:  Negative for abdominal pain, constipation, diarrhea, heartburn, nausea and vomiting. Genitourinary:  Negative for frequency. Musculoskeletal:  Positive for joint pain. Negative for back pain, myalgias and neck pain. Skin:  Negative for itching and rash. Ulcer left hallux   Neurological:  Positive for tingling. Negative for headaches. Alert and oriented x 4. Endo/Heme/Allergies:  Negative for polydipsia. Psychiatric/Behavioral:  Negative for depression and memory loss. The patient is not nervous/anxious. Objective:     Vitals Last 24 Hours:  TEMPERATURE:  Temp  Av.1 °F (36.7 °C)  Min: 97.7 °F (36.5 °C)  Max: 98.7 °F (37.1 °C)  RESPIRATIONS RANGE: Resp  Av.8  Min: 16  Max: 18  PULSE OXIMETRY RANGE: SpO2  Av %  Min: 94 %  Max: 99 %  PULSE RANGE: Pulse  Av.1  Min: 65  Max: 77  BLOOD PRESSURE RANGE: Systolic (19VEZ), GQY:835 , Min:134 , YRK:716        Diastolic (16TAN), QOX:40, Min:71, Max:93        I/O (24Hr): Intake/Output Summary (Last 24 hours) at 3/24/2023 0856  Last data filed at 3/23/2023 1742  Gross per 24 hour   Intake 240 ml   Output 750 ml   Net -510 ml     Physical Exam:    Objective  GEN: Pt is AAOx4 and in NAD. No family noted at [facility]  DERM: Bandage was kept clean dry and intact.   No erythema noted past dressing. VASC: Pedal pulses (DP/PT) diminished to B/L LE. CFT<3sec to all digits of B/L LE. No pedal hair growth noted to the level of the digits for B/L LE. Skin temp is warm to cool from proximal to distal for B/L LE. Neg homans/carole signs to B/L LE. No varicosities or telangectasias noted to B/L LE.  NEURO: Protective and epicritic sensations grossly absent to B/L LE  MSK: Pain on palpation left hallux no gross deformities. Good muscle tone and bulk noted to B/L LE.  PSYCH: Cooperative with normal mood and affect     Labs/Imaging/Diagnostics:     Labs:  CBC:  Recent Labs     03/24/23  0652   WBC 10.4   RBC 3.26*   HGB 9.1*   HCT 28.7*   MCV 88.0   RDW 13.2        CHEMISTRIES:  Recent Labs     03/24/23  0652 03/23/23  0538 03/22/23  0350    136 136   K 4.2 3.6 3.6    105 106   CO2 25 26 26   BUN 13 12 16   CA 8.9 8.7 8.6   PHOS 3.1 3.2 3.6   PT/INR:  No results for input(s): INR, INREXT, INREXT, INREXT in the last 72 hours. No lab exists for component: PROTIME  APTT:  Recent Labs     03/22/23  0350   APTT 35.6*     LIVER PROFILE:  No results for input(s): AST, ALT in the last 72 hours. No lab exists for component: Eleanor Cullen, ALKPHOS  Lab Results   Component Value Date/Time    ALT (SGPT) 18 03/20/2023 02:16 PM    AST (SGOT) 11 (L) 03/20/2023 02:16 PM    Alk. phosphatase 89 03/20/2023 02:16 PM    Bilirubin, total 0.2 03/20/2023 02:16 PM       Imaging Last 24 Hours:  No results found. Assessment:   Osteomyelitis left second toe. Diabetic ulcer, left great toe  Uncontrolled DM T2  PAD       Plan:   - Patient was seen and evaluated at bedside.  - Current labs personally reviewed and findings dicussed with patient  -Patient is okay to be discharged from podiatry standpoint.   Patient to keep dressing clean dry and intact with first dressing change performed in office.  -Recommend patient to be discharged home with levofloxacin for 10 days.  - Patient is to follow-up in office 1 week after being discharged.     Esau Melo DPM, [facility], [address] [place] [address], [address] [place] [address]  O: [phone]  F: [phone]    Electronically signed by Esau Melo DPM on 3/24/2023 at 8:38 AM

## 2023-03-26 LAB
BACTERIA SPEC CULT: NORMAL
SPECIAL REQUESTS,SREQ: NORMAL

## 2023-03-26 NOTE — PROGRESS NOTES
Physician Progress Note      PATIENT:               Griselda Arroyo  CSN #:                  [id number]  :                       1955  ADMIT DATE:       3/20/2023 12:31 PM  Kelly Jimenez [place] DATE:        3/24/2023 2:53 PM  RESPONDING  PROVIDER #:        Renetta Maher MD          QUERY TEXT:    Dear Dr. Harish Ricks,    Patient admitted with left great toe ulceration with osteomyelitis, gangrene, critical limb ischemia. Noted documentation of sepsis in Attending H&P and progress note. In order to support the diagnosis of sepsis, please include additional clinical indicators in your documentation. Or please document if the diagnosis of sepsis has been ruled out after further study. The medical record reflects the following:  Risk Factors: 69 M referred to ED by Podiatry for left great toe ulceration; MRI noting findings concerning for acute osteomyelitis; per Vascular, patient with gangrene and critical limb ischemia ; patient underwent operative procedure on 3/21/23    Clinical Indicators:  -per Attending documentation, \"Severe sepsis/cellulitis/osteomyelitis-septic arthritis in a diabetic\"  -WBC 9.1. Jodee Leah Jodee Leah 6.8  -lactic acid 1.0  -procalcitonin 0.05  -CRP >9.5  -temp max 98.8  -HR max 91 x 1 occurrence  -no noted hypotension    Treatment: labs, MRI, Podiatry consult, Vascular consult, OR procedure (report pending), IV Unasyn and Vancomycin    Thank you,  PRINCE SonN, RN, [facility]  Clinical   [email or web address] or contact via [employer]  Options provided:  -- Sepsis present as evidenced by, Please document evidence.   -- Sepsis was ruled out after study  -- Other - I will add my own diagnosis  -- Disagree - Not applicable / Not valid  -- Disagree - Clinically unable to determine / Unknown  -- Refer to Clinical Documentation Reviewer    PROVIDER RESPONSE TEXT:    Sepsis is present as evidenced by cellulitis    Query created by: Olimpia Pierce on 3/24/2023 8:37 AM      Electronically signed by:  Amelia Price MD 3/26/2023 5:07 PM

## 2023-03-27 LAB
BACTERIA SPEC CULT: NORMAL
SPECIAL REQUESTS,SREQ: NORMAL

## 2023-03-28 ENCOUNTER — TELEPHONE (OUTPATIENT)
Dept: PRIMARY CARE CLINIC | Age: 68
End: 2023-03-28

## 2023-03-28 NOTE — PROGRESS NOTES
Physician Progress Note      PATIENT:               Tonya Mendoza  CSN #:                  [id number]  :                       1955  ADMIT DATE:       3/20/2023 12:31 PM  [address] DATE:        3/24/2023 2:53 PM  RESPONDING  PROVIDER #:        Diana Garcia MD          QUERY TEXT:    Dear Dr. Lola Reed. Earline Cohen -    Patient admitted with sepsis, left great toe ulcer with DM, PVD, osteomyelitis. Documentation reflects H&P and 3/21/23 Attending progress note states patient with septic arthritis. If possible, please document in the progress notes and discharge summary if septic arthritis was: The medical record reflects the following:  Risk Factors: 69 M presented from [place] [address] office to ED with left great toe wound; admitted with sepsis, left great toe wound, DM, PVD, osteomyelitis    Clinical Indicators:  -per H&P and 3/21/23 Attending progress note, \"Severe sepsis/cellulitis/osteomyelitis-septic arthritis in a diabetic\"  -MRI notes osteomyelitis in the first proximal and distal phalanges  -patient underwent LLE angioplasty, atherectomy, thrombectomy by Vascular consultant  -patient underwent left great toe amputation by Podiatry    Treatment: labs, xr, MRI, Podiatry consult, Vascular consult, wound care, LLE angioplasty, left great toe amputation, IV Unasyn, IV Vancomycin    Thank you,  PRINCE HowardN, RN, CRCR  Clinical   [email or web address] or contact via Perfect Serve  Options provided:  -- Septic arthritis confirmed after study  -- Septic arthritis treated and resolved  -- Septic arthritis ruled out after study  -- Other - I will add my own diagnosis  -- Disagree - Not applicable / Not valid  -- Disagree - Clinically unable to determine / Unknown  -- Refer to Clinical Documentation Reviewer    PROVIDER RESPONSE TEXT:    Septic arthritis treated and resolved.     Query created by: Sridhar Diehl on 3/28/2023 5:46 AM      Electronically signed by:  Suzanne Hinojosa Lc Sanchez MD 3/28/2023 7:27 AM

## 2023-03-28 NOTE — TELEPHONE ENCOUNTER
Have tried calling pt twice in the last two days for a follow up call.  TCM for hospital stay, no answer and unable to LVM

## 2023-03-30 ENCOUNTER — OFFICE VISIT (OUTPATIENT)
Dept: PRIMARY CARE CLINIC | Age: 68
End: 2023-03-30
Payer: MEDICARE

## 2023-03-30 VITALS
OXYGEN SATURATION: 98 % | DIASTOLIC BLOOD PRESSURE: 55 MMHG | HEIGHT: 70 IN | WEIGHT: 211.2 LBS | HEART RATE: 93 BPM | BODY MASS INDEX: 30.24 KG/M2 | TEMPERATURE: 98.1 F | SYSTOLIC BLOOD PRESSURE: 135 MMHG | RESPIRATION RATE: 20 BRPM

## 2023-03-30 DIAGNOSIS — I73.9 PVD (PERIPHERAL VASCULAR DISEASE) (HCC): ICD-10-CM

## 2023-03-30 DIAGNOSIS — I10 ESSENTIAL HYPERTENSION: ICD-10-CM

## 2023-03-30 DIAGNOSIS — S98.111A AMPUTATED GREAT TOE, RIGHT (HCC): ICD-10-CM

## 2023-03-30 DIAGNOSIS — E78.2 MIXED HYPERLIPIDEMIA: ICD-10-CM

## 2023-03-30 DIAGNOSIS — Z91.199 HISTORY OF NONCOMPLIANCE WITH MEDICAL TREATMENT: ICD-10-CM

## 2023-03-30 DIAGNOSIS — Z09 HOSPITAL DISCHARGE FOLLOW-UP: ICD-10-CM

## 2023-03-30 DIAGNOSIS — I96 GANGRENE OF TOE OF RIGHT FOOT (HCC): ICD-10-CM

## 2023-03-30 DIAGNOSIS — E11.65 UNCONTROLLED TYPE 2 DIABETES MELLITUS WITH HYPERGLYCEMIA (HCC): Primary | ICD-10-CM

## 2023-03-30 DIAGNOSIS — R26.9 IMPAIRED GAIT: ICD-10-CM

## 2023-03-30 DIAGNOSIS — D64.9 ANEMIA, UNSPECIFIED TYPE: ICD-10-CM

## 2023-03-30 PROCEDURE — 3078F DIAST BP <80 MM HG: CPT | Performed by: NURSE PRACTITIONER

## 2023-03-30 PROCEDURE — 1123F ACP DISCUSS/DSCN MKR DOCD: CPT | Performed by: NURSE PRACTITIONER

## 2023-03-30 PROCEDURE — 99214 OFFICE O/P EST MOD 30 MIN: CPT | Performed by: NURSE PRACTITIONER

## 2023-03-30 PROCEDURE — 3075F SYST BP GE 130 - 139MM HG: CPT | Performed by: NURSE PRACTITIONER

## 2023-03-30 PROCEDURE — G8427 DOCREV CUR MEDS BY ELIG CLIN: HCPCS | Performed by: NURSE PRACTITIONER

## 2023-03-30 PROCEDURE — G8536 NO DOC ELDER MAL SCRN: HCPCS | Performed by: NURSE PRACTITIONER

## 2023-03-30 PROCEDURE — G9717 DOC PT DX DEP/BP F/U NT REQ: HCPCS | Performed by: NURSE PRACTITIONER

## 2023-03-30 PROCEDURE — 2022F DILAT RTA XM EVC RTNOPTHY: CPT | Performed by: NURSE PRACTITIONER

## 2023-03-30 PROCEDURE — 1111F DSCHRG MED/CURRENT MED MERGE: CPT | Performed by: NURSE PRACTITIONER

## 2023-03-30 PROCEDURE — 1101F PT FALLS ASSESS-DOCD LE1/YR: CPT | Performed by: NURSE PRACTITIONER

## 2023-03-30 PROCEDURE — 3046F HEMOGLOBIN A1C LEVEL >9.0%: CPT | Performed by: NURSE PRACTITIONER

## 2023-03-30 PROCEDURE — 3017F COLORECTAL CA SCREEN DOC REV: CPT | Performed by: NURSE PRACTITIONER

## 2023-03-30 PROCEDURE — G8417 CALC BMI ABV UP PARAM F/U: HCPCS | Performed by: NURSE PRACTITIONER

## 2023-03-30 RX ORDER — ATORVASTATIN CALCIUM 10 MG/1
10 TABLET, FILM COATED ORAL DAILY
Qty: 90 TABLET | Refills: 1 | Status: SHIPPED | OUTPATIENT
Start: 2023-03-30

## 2023-03-30 NOTE — PROGRESS NOTES
Transitional Care Management Progress Note    Patient: Dada Deshpande  : 1955  PCP: Ayla Vu NP    Date of office visit: 3/30/2023   Date of admission: 3/20/23  Date of discharge: 3/24/23  Hospital: [facility]    Call initiated w/i 2 business dates of discharge: *No response documented in the last 14 days   Date of the most recent call to the patient: *No documented post hospital discharge outreach found in the last 14 days      Assessment/Plan:   Diagnoses and all orders for this visit:    1. Uncontrolled type 2 diabetes mellitus with hyperglycemia ([facility] [address]  Lab Results   Component Value Date/Time    Hemoglobin A1c 10.2 (H) 2023 06:43 PM    Hemoglobin A1c (POC) 12.4 12/15/2022 02:22 PM   Home readings: Only 1 available for review at 117  Medication Compliance: Yes but declines the Humalog due to frequent dosing  Diabetic Eye Exam Up to date:no  Complications: CAD  Current Treatment: Lantus 40 units nightly, Jardiance 25mg daily, Glipizide 10mg BID, Januvia 100mg daily (added at hospital-previously  not affordable but will see if able to  today)  Previous treatment: Metformin (could not tolerate), Januvia (not covered)     Will have him continue medications as directed and does appear better controlled at this time. Continue to discuss importance in compliance with medications due to long term risks of uncontrolled diabetes which can threaten life and/or limb. Home health now following as well as Pharm D  Encouraged to stay well hydrated and notify provider immediately if signs of  mycotic infection develops  Check glucose at least daily and bring readings to next appointment. Log provided. Oliverio Cho was not approved.   Encourage to follow diabetic diet and get regular exercise 3-5 times weekly as tolerated (once foot wound healed and off loading not required)  [place] as this is overdue and has contact information to schedule  Continuing care with podiatry  Would like him to see endocrinology but patient has declined due to transportation difficulty  -     SITagliptin phosphate (JANUVIA) 100 mg tablet; Take 1 Tablet by mouth daily. 2. Amputated great toe, right (HCC)  Incision is clean, dry and intact without s&s of infection  On levaquin 750mg daily  He was advised today to contact podiatry office as well as vascular to set up recommended  1 week follow up appointment. Advised to contact provider if any difficulty scheduling. 3. Gangrene of toe of right foot (HCC)  Tx with vancomycin and Unasyn w/ MRI demonstrating acute osteomyelitis on 3/202/23   Now s/p right great toe amputation   Discharged on levaquin 750mg daily and is completing now  To be managed by podiatry     4. PVD (peripheral vascular disease) ([facility] [address]  S/p Vascular performed angiogram on 3/22 and performed recanalization of left posterior tibial artery with arthrectomy, balloon PTA angioplasty, thrombectomy with thrombectomy catheter. On eliuis 5mg BID  To be managed by vascular    5. Mixed hyperlipidemia  Lab Results   Component Value Date/Time    LDL, calculated 130 (H) 12/15/2022 02:10 PM    LDL, calculated 97 12/13/2020 08:50 AM    The 10-year ASCVD risk score ([place] DK, et al., 2019) is: 32.5%   Starting on atorvastatin 10mg daily and reviewed use/potential side effects  Repeat CMP at next visit    6. Essential hypertension  Blood pressure is at goal today and will continue nifedipine 30mg ER daily, lisinopril 10mg daily and furosemide 20mg daily. Check readings at home and notify provider if > 140/90    7. Impaired gait  Encourage use of assistive device with ambulating    8. History of noncompliance with medical treatment  Have re-interated importance in compliance with all medications and follow ups to prevent life threatening complications. He has had some improvement since discharge and home health is following.      9. Anemia, unspecified type  Lab Results   Component Value Date/Time    WBC 10.4 03/24/2023 06:52 AM    HGB 9.1 (L) 03/24/2023 06:52 AM    HCT 28.7 (L) 03/24/2023 06:52 AM    PLATELET 593 82/06/6545 06:52 AM    MCV 88.0 03/24/2023 06:52 AM   Continue monitoring and will repeat cbc in 4 weeks  -     CBC WITH AUTOMATED DIFF  -     FERRITIN    The complexity of medical decision making for this patient's transitional care is moderate   Follow-up and Dispositions    Return in about 4 weeks (around 4/27/2023) for or sooner for worsening symptoms. Subjective:   Alaina Rodriguez is a 69 y.o. male presenting today for follow-up after hospital discharge. This encounter and supporting documentation was reviewed if available. Medication reconciliation was performed today. The main problem requiring admission was gangrene with right great toe amputation. Complications during admission: none. Other contributory conditions include hypertension, hyperlipidemia, uncontrolled type 2 diabetes,CAD, depression, pneumonia, hyperlipidemia, left kidney mass, obesity, hypertension and hyperlipidemia. Reports compliance with medications. States that he did check glucose this am and was 117 but did not bring meter. Home health changing dressing to right foot and denies any pain or signs of infection. Has not scheduled yet with vascular or podiatry. Interval history/Current status: stable    Admitting symptoms have: improved      Medications marked \"taking\" at this time:  Prior to Admission medications    Medication Sig Start Date End Date Taking? Authorizing Provider   SITagliptin phosphate (JANUVIA) 100 mg tablet Take 1 Tablet by mouth daily. 3/30/23  Yes Yessy Johns NP   atorvastatin (LIPITOR) 10 mg tablet Take 1 Tablet by mouth daily. 3/30/23  Yes Yessy Johns NP   levoFLOXacin (LEVAQUIN) 750 mg tablet Take 1 Tablet by mouth daily for 10 days.  3/24/23 4/3/23  ALEX Espinal   apixaban (Eliquis) 5 mg tablet Take 1 Tablet by mouth two (2) times a day for 30 days. 3/24/23 4/23/23  ALEX Espinal   SITagliptin phosphate (JANUVIA) 100 mg tablet Take 100 mg by mouth daily. 3/30/23  Adelaide Crocker   glipiZIDE (GLUCOTROL) 10 mg tablet Take 1 Tablet by mouth two (2) times a day. 3/8/23   Yessy Johns NP   FLUoxetine (PROzac) 40 mg capsule Take 1 Capsule by mouth daily. Indications: repeated episodes of anxiety 2/16/23   Yessy Johns NP   empagliflozin (JARDIANCE) 25 mg tablet Take 1 Tablet by mouth daily. 2/16/23   Yessy Johns NP   potassium chloride (KLOR-CON M10) 10 mEq tablet Take 1 Tablet by mouth daily. 2/16/23   Yessy Johns NP   NIFEdipine ER (PROCARDIA XL) 30 mg ER tablet Take 1 Tablet by mouth daily. 2/16/23   Yessy Johns NP   lisinopriL (PRINIVIL, ZESTRIL) 10 mg tablet Take 1 Tablet by mouth daily. 2/16/23   Yessy Johns NP   furosemide (LASIX) 20 mg tablet Take 1 Tablet by mouth daily. 2/16/23   Yessy Johns NP   Lantus Solostar U-100 Insulin 100 unit/mL (3 mL) inpn 40 Units by SubCUTAneous route nightly. 2/13/23   Yessy Johns NP   methIMAzole (TAPAZOLE) 5 mg tablet Take 1 Tablet by mouth daily. 2/13/23   Yessy Johns NP        Review of Systems   Constitutional:  Negative for chills, fever, malaise/fatigue and weight loss. Eyes:  Negative for pain, discharge and redness. Respiratory:  Negative for cough, hemoptysis, sputum production and shortness of breath. Cardiovascular:  Positive for leg swelling. Negative for chest pain, palpitations, orthopnea and claudication. Gastrointestinal: Negative. Genitourinary: Negative. Musculoskeletal:  Positive for myalgias. Neurological:  Positive for tingling. Negative for dizziness, focal weakness, seizures, loss of consciousness and headaches.         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    Uncontrolled type 2 diabetes mellitus with hyperglycemia ([facility]) E11.65    Blurred vision, bilateral H53.8    Mixed hyperlipidemia E78.2    At high risk for falls Z91.81    H/O colonoscopy Z98.890    Pneumonia J18.9    Personal history of colonic polyps Z86.010    Chronic systolic (congestive) heart failure I50.22    Moderate episode of recurrent major depressive disorder ([facility]) F33.1    Gangrene ([facility]) I96    Osteomyelitis ([facility]) M86.9     Patient Active Problem List    Diagnosis Date Noted    Gangrene ([facility] [address] 03/20/2023    Osteomyelitis ([facility] [address] 03/20/2023    Moderate episode of recurrent major depressive disorder ([facility] [address] 02/16/2023    Chronic systolic (congestive) heart failure 12/20/2022    Pneumonia 06/16/2022    At high risk for falls 05/23/2021    H/O colonoscopy 05/23/2021    Angiomyolipoma of left kidney 04/09/2021    Uncontrolled type 2 diabetes mellitus with hyperglycemia ([address] 04/09/2021    Blurred vision, bilateral 04/09/2021    Mixed hyperlipidemia 04/09/2021    Respiratory failure with hypoxia ([facility] [address] 02/22/2021    Pneumonia due to COVID-19 virus 02/17/2021    Chest pain 12/12/2020    CAD (coronary artery disease) 12/12/2020    Essential hypertension 12/12/2020    Personal history of colonic polyps 06/25/2010     Current Outpatient Medications   Medication Sig Dispense Refill    SITagliptin phosphate (JANUVIA) 100 mg tablet Take 1 Tablet by mouth daily. 90 Tablet 1    atorvastatin (LIPITOR) 10 mg tablet Take 1 Tablet by mouth daily. 90 Tablet 1    levoFLOXacin (LEVAQUIN) 750 mg tablet Take 1 Tablet by mouth daily for 10 days. 10 Tablet 0    apixaban (Eliquis) 5 mg tablet Take 1 Tablet by mouth two (2) times a day for 30 days. 60 Tablet 0    glipiZIDE (GLUCOTROL) 10 mg tablet Take 1 Tablet by mouth two (2) times a day. 60 Tablet 3    FLUoxetine (PROzac) 40 mg capsule Take 1 Capsule by mouth daily.  Indications: repeated episodes of anxiety 90 Capsule 1    empagliflozin (JARDIANCE) 25 mg tablet Take 1 Tablet by mouth daily. 90 Tablet 1    potassium chloride (KLOR-CON M10) 10 mEq tablet Take 1 Tablet by mouth daily. 90 Tablet 1    NIFEdipine ER (PROCARDIA XL) 30 mg ER tablet Take 1 Tablet by mouth daily. 10 Tablet 0    lisinopriL (PRINIVIL, ZESTRIL) 10 mg tablet Take 1 Tablet by mouth daily. 10 Tablet 0    furosemide (LASIX) 20 mg tablet Take 1 Tablet by mouth daily. 10 Tablet 0    Lantus Solostar U-100 Insulin 100 unit/mL (3 mL) inpn 40 Units by SubCUTAneous route nightly. 36 mL 2    methIMAzole (TAPAZOLE) 5 mg tablet Take 1 Tablet by mouth daily. 90 Tablet 0     No Known Allergies  Past Medical History:   Diagnosis Date    Arthritis     Burning with urination     CAD (coronary artery disease)     patient stated stents placed unsure of how many     Diabetes ([facility] [address]     GERD (gastroesophageal reflux disease)     History of blood transfusion     patient stated approx 1 year ago     HTN (hypertension)     Hyperlipidemia     MI (myocardial infarction) ([facility] [address]     Personal history of colonic polyps 6/25/2010 6-    Rectal bleeding     patient stated this happened over the weekend.      Renal mass, left      Past Surgical History:   Procedure Laterality Date    COLONOSCOPY N/A 1/6/2023    COLONOSCOPY (TIVA) performed by Timi Sunshine MD at [facility] ENDOSCOPY    HX COLONOSCOPY      HX CORONARY STENT PLACEMENT  2018    x1    HX HEART CATHETERIZATION      patient stated stents placed     HX NEPHRECTOMY Left 08/23/2021    robotic left partial nephrectomy,    HX UROLOGICAL  08/23/2021     intraoperative renal ultrasound    IN UNLISTED PROCEDURE CARDIAC SURGERY  2018    BYPASS     Family History   Problem Relation Age of Onset    Hypertension Mother     Diabetes Mother     Hypertension Father     Heart Disease Father      Social History     Tobacco Use    Smoking status: Never    Smokeless tobacco: Never   Substance Use Topics    Alcohol use: Not Currently          Objective:   Visit Vitals  BP (!) 135/55 (BP 1 Location: Left upper arm, BP Patient Position: Sitting, BP Cuff Size: Large adult)   Pulse 93   Temp 98.1 °F (36.7 °C) (Temporal)   Resp 20   Ht 5' 10\" (1.778 m)   Wt 211 lb 3.2 oz (95.8 kg)   SpO2 98%   BMI 30.30 kg/m²        Physical Exam  Vitals and nursing note reviewed. Constitutional:       Appearance: Normal appearance. He is obese. Cardiovascular:      Rate and Rhythm: Normal rate. Pulmonary:      Effort: Pulmonary effort is normal.      Breath sounds: Normal breath sounds. Abdominal:      General: Bowel sounds are normal.      Palpations: Abdomen is soft. Tenderness: There is no abdominal tenderness. Musculoskeletal:         General: Normal range of motion. Right lower leg: Edema (trace) present. Left lower leg: Edema (trace) present. Feet:    Feet:      Comments: Incision clean, dry and intact with sutures present. No drainage or increased warmth/surrounding erythema. Diminished pulses bilaterally  Neurological:      Mental Status: He is alert and oriented to person, place, and time. Mental status is at baseline. Gait: Gait abnormal.   Psychiatric:         Mood and Affect: Mood normal.         Behavior: Behavior normal.          We discussed the expected course, resolution and complications of the diagnosis(es) in detail. Medication risks, benefits, costs, interactions, and alternatives were discussed as indicated. I advised him to contact the office if his condition worsens, changes or fails to improve as anticipated. He expressed understanding with the diagnosis(es) and plan.      Donovan Sanchez NP

## 2023-03-30 NOTE — PROGRESS NOTES
Chief Complaint   Patient presents with    Hospital Follow Up    Surgical Follow-up     Amputation of toe       1. Have you been to the ER, urgent care clinic since your last visit? Hospitalized since your last visit? No    2. Have you seen or consulted any other health care providers outside of the [address] [place] [address] since your last visit? Include any pap smears or colon screening.  No

## 2023-04-11 ENCOUNTER — OFFICE VISIT (OUTPATIENT)
Dept: PODIATRY | Age: 68
End: 2023-04-11

## 2023-04-11 VITALS
RESPIRATION RATE: 16 BRPM | WEIGHT: 211 LBS | HEART RATE: 90 BPM | SYSTOLIC BLOOD PRESSURE: 142 MMHG | BODY MASS INDEX: 30.21 KG/M2 | HEIGHT: 70 IN | DIASTOLIC BLOOD PRESSURE: 80 MMHG

## 2023-04-11 DIAGNOSIS — E11.8 TYPE 2 DIABETES MELLITUS WITH COMPLICATION, WITHOUT LONG-TERM CURRENT USE OF INSULIN (HCC): Primary | ICD-10-CM

## 2023-04-17 ENCOUNTER — TELEPHONE (OUTPATIENT)
Dept: SURGERY | Age: 68
End: 2023-04-17

## 2023-04-17 NOTE — TELEPHONE ENCOUNTER
Eddie from [address] [place] [address] wanted to leave her fax number so that new orders can be faxed.  Fax number is [phone]

## 2023-04-17 NOTE — TELEPHONE ENCOUNTER
Spoke to Eddie, patient thought he had appt here tomorrow let her know it is with podiatry. She is going to call and make patient aware and call podiatry.

## 2023-04-18 ENCOUNTER — OFFICE VISIT (OUTPATIENT)
Dept: PODIATRY | Age: 68
End: 2023-04-18

## 2023-04-18 VITALS
WEIGHT: 211 LBS | HEIGHT: 70 IN | DIASTOLIC BLOOD PRESSURE: 80 MMHG | BODY MASS INDEX: 30.21 KG/M2 | SYSTOLIC BLOOD PRESSURE: 140 MMHG | RESPIRATION RATE: 16 BRPM | HEART RATE: 75 BPM

## 2023-04-18 DIAGNOSIS — E11.8 TYPE 2 DIABETES MELLITUS WITH COMPLICATION, WITHOUT LONG-TERM CURRENT USE OF INSULIN (HCC): Primary | ICD-10-CM

## 2023-04-18 NOTE — PROGRESS NOTES
Chief Complaint   Patient presents with    Foot Pain     Bilat     Visit Vitals  BP (!) 140/80   Pulse 75   Resp 16   Ht 5' 10\" (1.778 m)   Wt 211 lb (95.7 kg)   BMI 30.28 kg/m²     AMAURY BrumfieldA

## 2023-04-18 NOTE — PROGRESS NOTES
Chief Complaint   Patient presents with    Foot Pain     Bilat     Visit Vitals  BP (!) 140/80   Pulse 75   Resp 16   Ht 5' 10\" (1.778 m)   Wt 211 lb (95.7 kg)   BMI 30.28 kg/m²     Prince [place], RMA

## 2023-04-21 ENCOUNTER — HOSPITAL ENCOUNTER (INPATIENT)
Age: 68
LOS: 1 days | Discharge: HOME | DRG: 394 | End: 2023-04-23
Attending: EMERGENCY MEDICINE | Admitting: INTERNAL MEDICINE
Payer: MEDICARE

## 2023-04-21 DIAGNOSIS — E87.6 HYPOKALEMIA: Primary | ICD-10-CM

## 2023-04-21 DIAGNOSIS — R19.7 DIARRHEA, UNSPECIFIED TYPE: ICD-10-CM

## 2023-04-21 LAB
ALBUMIN SERPL-MCNC: 2.8 G/DL (ref 3.5–5)
ALBUMIN/GLOB SERPL: 0.6 (ref 1.1–2.2)
ALP SERPL-CCNC: 97 U/L (ref 45–117)
ALT SERPL-CCNC: 20 U/L (ref 12–78)
ANION GAP SERPL CALC-SCNC: 15 MMOL/L (ref 5–15)
AST SERPL W P-5'-P-CCNC: 17 U/L (ref 15–37)
BASOPHILS # BLD: 0.1 K/UL (ref 0–0.2)
BASOPHILS NFR BLD: 1 % (ref 0–2.5)
BILIRUB SERPL-MCNC: 0.2 MG/DL (ref 0.2–1)
BUN SERPL-MCNC: 12 MG/DL (ref 6–20)
BUN/CREAT SERPL: 9 (ref 12–20)
CA-I BLD-MCNC: 8.9 MG/DL (ref 8.5–10.1)
CHLORIDE SERPL-SCNC: 101 MMOL/L (ref 97–108)
CO2 SERPL-SCNC: 21 MMOL/L (ref 21–32)
CREAT SERPL-MCNC: 1.33 MG/DL (ref 0.7–1.3)
EOSINOPHIL # BLD: 0 K/UL (ref 0–0.7)
EOSINOPHIL NFR BLD: 0 % (ref 0.9–2.9)
ERYTHROCYTE [DISTWIDTH] IN BLOOD BY AUTOMATED COUNT: 14.9 % (ref 11.5–14.5)
GLOBULIN SER CALC-MCNC: 4.9 G/DL (ref 2–4)
GLUCOSE SERPL-MCNC: 126 MG/DL (ref 65–100)
HCT VFR BLD AUTO: 33.9 % (ref 41–53)
HGB BLD-MCNC: 10.9 G/DL (ref 13.5–17.5)
LACTATE SERPL-SCNC: 1 MMOL/L (ref 0.4–2)
LIPASE SERPL-CCNC: 86 U/L (ref 73–393)
LYMPHOCYTES # BLD: 1.8 K/UL (ref 1–4.8)
LYMPHOCYTES NFR BLD: 16 % (ref 20.5–51.1)
MCH RBC QN AUTO: 27 PG (ref 31–34)
MCHC RBC AUTO-ENTMCNC: 32.2 G/DL (ref 31–36)
MCV RBC AUTO: 83.7 FL (ref 80–100)
MONOCYTES # BLD: 1.8 K/UL (ref 0.2–2.4)
MONOCYTES NFR BLD: 16 % (ref 1.7–9.3)
NEUTS SEG # BLD: 7.6 K/UL (ref 1.8–7.7)
NEUTS SEG NFR BLD: 67 % (ref 42–75)
NRBC # BLD: 0.01 K/UL
NRBC BLD-RTO: 0.1 PER 100 WBC
PLATELET # BLD AUTO: 311 K/UL (ref 150–400)
PMV BLD AUTO: 7.6 FL (ref 6.5–11.5)
POTASSIUM SERPL-SCNC: 2.3 MMOL/L (ref 3.5–5.1)
PROT SERPL-MCNC: 7.7 G/DL (ref 6.4–8.2)
RBC # BLD AUTO: 4.05 M/UL (ref 4.5–5.9)
SODIUM SERPL-SCNC: 137 MMOL/L (ref 136–145)
WBC # BLD AUTO: 11.2 K/UL (ref 4.4–11.3)

## 2023-04-21 PROCEDURE — 36415 COLL VENOUS BLD VENIPUNCTURE: CPT

## 2023-04-21 PROCEDURE — 96361 HYDRATE IV INFUSION ADD-ON: CPT

## 2023-04-21 PROCEDURE — 74011250637 HC RX REV CODE- 250/637: Performed by: EMERGENCY MEDICINE

## 2023-04-21 PROCEDURE — 99285 EMERGENCY DEPT VISIT HI MDM: CPT

## 2023-04-21 PROCEDURE — 74011250636 HC RX REV CODE- 250/636: Performed by: EMERGENCY MEDICINE

## 2023-04-21 PROCEDURE — G0378 HOSPITAL OBSERVATION PER HR: HCPCS

## 2023-04-21 PROCEDURE — 85025 COMPLETE CBC W/AUTO DIFF WBC: CPT

## 2023-04-21 PROCEDURE — 96376 TX/PRO/DX INJ SAME DRUG ADON: CPT

## 2023-04-21 PROCEDURE — 83605 ASSAY OF LACTIC ACID: CPT

## 2023-04-21 PROCEDURE — 74011250636 HC RX REV CODE- 250/636

## 2023-04-21 PROCEDURE — 96375 TX/PRO/DX INJ NEW DRUG ADDON: CPT

## 2023-04-21 PROCEDURE — 80053 COMPREHEN METABOLIC PANEL: CPT

## 2023-04-21 PROCEDURE — 96374 THER/PROPH/DIAG INJ IV PUSH: CPT

## 2023-04-21 PROCEDURE — 83690 ASSAY OF LIPASE: CPT

## 2023-04-21 RX ORDER — ONDANSETRON 2 MG/ML
4 INJECTION INTRAMUSCULAR; INTRAVENOUS
Status: DISCONTINUED | OUTPATIENT
Start: 2023-04-21 | End: 2023-04-22

## 2023-04-21 RX ORDER — SODIUM CHLORIDE 0.9 % (FLUSH) 0.9 %
5-40 SYRINGE (ML) INJECTION AS NEEDED
Status: DISCONTINUED | OUTPATIENT
Start: 2023-04-21 | End: 2023-04-22

## 2023-04-21 RX ORDER — POTASSIUM CHLORIDE 750 MG/1
40 TABLET, FILM COATED, EXTENDED RELEASE ORAL
Status: COMPLETED | OUTPATIENT
Start: 2023-04-21 | End: 2023-04-21

## 2023-04-21 RX ORDER — SODIUM CHLORIDE 9 MG/ML
1000 INJECTION, SOLUTION INTRAVENOUS ONCE
Status: COMPLETED | OUTPATIENT
Start: 2023-04-21 | End: 2023-04-21

## 2023-04-21 RX ORDER — ACETAMINOPHEN 325 MG/1
650 TABLET ORAL
Status: DISCONTINUED | OUTPATIENT
Start: 2023-04-21 | End: 2023-04-22

## 2023-04-21 RX ORDER — ONDANSETRON 2 MG/ML
4 INJECTION INTRAMUSCULAR; INTRAVENOUS
Status: COMPLETED | OUTPATIENT
Start: 2023-04-21 | End: 2023-04-21

## 2023-04-21 RX ORDER — ACETAMINOPHEN 650 MG/1
650 SUPPOSITORY RECTAL
Status: DISCONTINUED | OUTPATIENT
Start: 2023-04-21 | End: 2023-04-22

## 2023-04-21 RX ORDER — ONDANSETRON 2 MG/ML
INJECTION INTRAMUSCULAR; INTRAVENOUS
Status: COMPLETED
Start: 2023-04-21 | End: 2023-04-21

## 2023-04-21 RX ORDER — SODIUM CHLORIDE 0.9 % (FLUSH) 0.9 %
5-40 SYRINGE (ML) INJECTION EVERY 8 HOURS
Status: DISCONTINUED | OUTPATIENT
Start: 2023-04-21 | End: 2023-04-22

## 2023-04-21 RX ORDER — ONDANSETRON 4 MG/1
4 TABLET, ORALLY DISINTEGRATING ORAL
Status: DISCONTINUED | OUTPATIENT
Start: 2023-04-21 | End: 2023-04-23 | Stop reason: HOSPADM

## 2023-04-21 RX ORDER — POLYETHYLENE GLYCOL 3350 17 G/17G
17 POWDER, FOR SOLUTION ORAL DAILY PRN
Status: DISCONTINUED | OUTPATIENT
Start: 2023-04-21 | End: 2023-04-22

## 2023-04-21 RX ORDER — POTASSIUM CHLORIDE 7.45 MG/ML
10 INJECTION INTRAVENOUS
Status: COMPLETED | OUTPATIENT
Start: 2023-04-21 | End: 2023-04-21

## 2023-04-21 RX ADMIN — SODIUM CHLORIDE 1000 ML: 9 INJECTION, SOLUTION INTRAVENOUS at 19:22

## 2023-04-21 RX ADMIN — POTASSIUM CHLORIDE 10 MEQ: 7.46 INJECTION, SOLUTION INTRAVENOUS at 20:06

## 2023-04-21 RX ADMIN — ONDANSETRON 4 MG: 2 INJECTION INTRAMUSCULAR; INTRAVENOUS at 19:25

## 2023-04-21 RX ADMIN — NITROGLYCERIN 0.5 INCH: 20 OINTMENT TOPICAL at 20:31

## 2023-04-21 RX ADMIN — POTASSIUM CHLORIDE 40 MEQ: 750 TABLET, FILM COATED, EXTENDED RELEASE ORAL at 20:06

## 2023-04-21 RX ADMIN — POTASSIUM CHLORIDE 10 MEQ: 7.46 INJECTION, SOLUTION INTRAVENOUS at 21:26

## 2023-04-22 PROBLEM — R07.9 CHEST PAIN: Status: RESOLVED | Noted: 2020-12-12 | Resolved: 2023-04-22

## 2023-04-22 PROBLEM — E11.8 TYPE 2 DIABETES MELLITUS WITH COMPLICATION, WITHOUT LONG-TERM CURRENT USE OF INSULIN (HCC): Status: ACTIVE | Noted: 2023-04-22

## 2023-04-22 PROBLEM — Z91.148 H/O MEDICATION NONCOMPLIANCE: Status: ACTIVE | Noted: 2023-04-22

## 2023-04-22 PROBLEM — K52.9 GASTROENTERITIS: Status: ACTIVE | Noted: 2023-04-22

## 2023-04-22 PROBLEM — I25.10 CAD (CORONARY ARTERY DISEASE): Status: RESOLVED | Noted: 2020-12-12 | Resolved: 2023-04-22

## 2023-04-22 PROBLEM — Z91.81 AT HIGH RISK FOR FALLS: Status: RESOLVED | Noted: 2021-05-23 | Resolved: 2023-04-22

## 2023-04-22 PROBLEM — N17.9 AKI (ACUTE KIDNEY INJURY) (HCC): Status: ACTIVE | Noted: 2023-04-22

## 2023-04-22 PROBLEM — I73.9 PAD (PERIPHERAL ARTERY DISEASE) (HCC): Status: ACTIVE | Noted: 2023-04-22

## 2023-04-22 LAB
ALBUMIN SERPL-MCNC: 2.4 G/DL (ref 3.5–5)
ALBUMIN/GLOB SERPL: 0.6 (ref 1.1–2.2)
ALP SERPL-CCNC: 87 U/L (ref 45–117)
ALT SERPL-CCNC: 16 U/L (ref 12–78)
ANION GAP SERPL CALC-SCNC: 12 MMOL/L (ref 5–15)
AST SERPL W P-5'-P-CCNC: 15 U/L (ref 15–37)
BILIRUB SERPL-MCNC: 0.2 MG/DL (ref 0.2–1)
BUN SERPL-MCNC: 9 MG/DL (ref 6–20)
BUN/CREAT SERPL: 8 (ref 12–20)
CA-I BLD-MCNC: 8.5 MG/DL (ref 8.5–10.1)
CHLORIDE SERPL-SCNC: 106 MMOL/L (ref 97–108)
CO2 SERPL-SCNC: 19 MMOL/L (ref 21–32)
CREAT SERPL-MCNC: 1.15 MG/DL (ref 0.7–1.3)
FOLATE SERPL-MCNC: 15.6 NG/ML (ref 5–21)
GLOBULIN SER CALC-MCNC: 4.2 G/DL (ref 2–4)
GLUCOSE BLD STRIP.AUTO-MCNC: 113 MG/DL (ref 65–100)
GLUCOSE BLD STRIP.AUTO-MCNC: 113 MG/DL (ref 65–100)
GLUCOSE BLD STRIP.AUTO-MCNC: 132 MG/DL (ref 65–100)
GLUCOSE BLD STRIP.AUTO-MCNC: 134 MG/DL (ref 65–100)
GLUCOSE BLD STRIP.AUTO-MCNC: 148 MG/DL (ref 65–100)
GLUCOSE SERPL-MCNC: 129 MG/DL (ref 65–100)
IRON SATN MFR SERPL: 6 % (ref 20–50)
IRON SERPL-MCNC: 12 UG/DL (ref 35–150)
MAGNESIUM SERPL-MCNC: 1.6 MG/DL (ref 1.6–2.4)
PERFORMED BY, TECHID: ABNORMAL
POTASSIUM SERPL-SCNC: 2.2 MMOL/L (ref 3.5–5.1)
POTASSIUM SERPL-SCNC: 2.8 MMOL/L (ref 3.5–5.1)
PROT SERPL-MCNC: 6.6 G/DL (ref 6.4–8.2)
SODIUM SERPL-SCNC: 137 MMOL/L (ref 136–145)
TIBC SERPL-MCNC: 202 UG/DL (ref 250–450)
VIT B12 SERPL-MCNC: 220 PG/ML (ref 193–986)

## 2023-04-22 PROCEDURE — 74011250637 HC RX REV CODE- 250/637: Performed by: HOSPITALIST

## 2023-04-22 PROCEDURE — 74011250637 HC RX REV CODE- 250/637: Performed by: INTERNAL MEDICINE

## 2023-04-22 PROCEDURE — 74011250636 HC RX REV CODE- 250/636: Performed by: HOSPITALIST

## 2023-04-22 PROCEDURE — 83540 ASSAY OF IRON: CPT

## 2023-04-22 PROCEDURE — 65270000029 HC RM PRIVATE

## 2023-04-22 PROCEDURE — 36415 COLL VENOUS BLD VENIPUNCTURE: CPT

## 2023-04-22 PROCEDURE — 83735 ASSAY OF MAGNESIUM: CPT

## 2023-04-22 PROCEDURE — 82962 GLUCOSE BLOOD TEST: CPT

## 2023-04-22 PROCEDURE — 74011000250 HC RX REV CODE- 250: Performed by: EMERGENCY MEDICINE

## 2023-04-22 PROCEDURE — G0378 HOSPITAL OBSERVATION PER HR: HCPCS

## 2023-04-22 PROCEDURE — 84132 ASSAY OF SERUM POTASSIUM: CPT

## 2023-04-22 PROCEDURE — 74011000250 HC RX REV CODE- 250: Performed by: HOSPITALIST

## 2023-04-22 PROCEDURE — 74011250636 HC RX REV CODE- 250/636: Performed by: INTERNAL MEDICINE

## 2023-04-22 PROCEDURE — 74011636637 HC RX REV CODE- 636/637: Performed by: HOSPITALIST

## 2023-04-22 PROCEDURE — 82607 VITAMIN B-12: CPT

## 2023-04-22 RX ORDER — METHIMAZOLE 5 MG/1
5 TABLET ORAL DAILY
Status: DISCONTINUED | OUTPATIENT
Start: 2023-04-22 | End: 2023-04-23 | Stop reason: HOSPADM

## 2023-04-22 RX ORDER — MAGNESIUM SULFATE HEPTAHYDRATE 40 MG/ML
2 INJECTION, SOLUTION INTRAVENOUS ONCE
Status: COMPLETED | OUTPATIENT
Start: 2023-04-22 | End: 2023-04-22

## 2023-04-22 RX ORDER — SODIUM CHLORIDE, SODIUM LACTATE, POTASSIUM CHLORIDE, CALCIUM CHLORIDE 600; 310; 30; 20 MG/100ML; MG/100ML; MG/100ML; MG/100ML
75 INJECTION, SOLUTION INTRAVENOUS CONTINUOUS
Status: DISCONTINUED | OUTPATIENT
Start: 2023-04-22 | End: 2023-04-22

## 2023-04-22 RX ORDER — SODIUM CHLORIDE 0.9 % (FLUSH) 0.9 %
5-40 SYRINGE (ML) INJECTION EVERY 8 HOURS
Status: DISCONTINUED | OUTPATIENT
Start: 2023-04-22 | End: 2023-04-23 | Stop reason: HOSPADM

## 2023-04-22 RX ORDER — SODIUM CHLORIDE AND POTASSIUM CHLORIDE 300; 900 MG/100ML; MG/100ML
INJECTION, SOLUTION INTRAVENOUS CONTINUOUS
Status: DISCONTINUED | OUTPATIENT
Start: 2023-04-22 | End: 2023-04-23 | Stop reason: HOSPADM

## 2023-04-22 RX ORDER — INSULIN LISPRO 100 [IU]/ML
INJECTION, SOLUTION INTRAVENOUS; SUBCUTANEOUS
Status: DISCONTINUED | OUTPATIENT
Start: 2023-04-22 | End: 2023-04-23 | Stop reason: HOSPADM

## 2023-04-22 RX ORDER — IBUPROFEN 200 MG
4 TABLET ORAL AS NEEDED
Status: DISCONTINUED | OUTPATIENT
Start: 2023-04-22 | End: 2023-04-23 | Stop reason: HOSPADM

## 2023-04-22 RX ORDER — PSEUDOEPHED/ACETAMINOPHEN/CPM 30-500-2MG
2 TABLET ORAL
Status: DISCONTINUED | OUTPATIENT
Start: 2023-04-22 | End: 2023-04-23 | Stop reason: HOSPADM

## 2023-04-22 RX ORDER — ACETAMINOPHEN 325 MG/1
650 TABLET ORAL
Status: DISCONTINUED | OUTPATIENT
Start: 2023-04-22 | End: 2023-04-23 | Stop reason: HOSPADM

## 2023-04-22 RX ORDER — SODIUM CHLORIDE 0.9 % (FLUSH) 0.9 %
5-40 SYRINGE (ML) INJECTION AS NEEDED
Status: DISCONTINUED | OUTPATIENT
Start: 2023-04-22 | End: 2023-04-23 | Stop reason: HOSPADM

## 2023-04-22 RX ORDER — DEXTROSE 50 % IN WATER (D50W) INTRAVENOUS SYRINGE
25-50 AS NEEDED
Status: DISCONTINUED | OUTPATIENT
Start: 2023-04-22 | End: 2023-04-23 | Stop reason: HOSPADM

## 2023-04-22 RX ORDER — INSULIN GLARGINE 100 [IU]/ML
0.3 INJECTION, SOLUTION SUBCUTANEOUS
Status: DISCONTINUED | OUTPATIENT
Start: 2023-04-22 | End: 2023-04-23 | Stop reason: HOSPADM

## 2023-04-22 RX ORDER — FLUOXETINE HYDROCHLORIDE 20 MG/1
40 CAPSULE ORAL DAILY
Status: DISCONTINUED | OUTPATIENT
Start: 2023-04-22 | End: 2023-04-23 | Stop reason: HOSPADM

## 2023-04-22 RX ORDER — ATORVASTATIN CALCIUM 10 MG/1
10 TABLET, FILM COATED ORAL DAILY
Status: DISCONTINUED | OUTPATIENT
Start: 2023-04-22 | End: 2023-04-23 | Stop reason: HOSPADM

## 2023-04-22 RX ORDER — NIFEDIPINE 30 MG/1
30 TABLET, EXTENDED RELEASE ORAL DAILY
Status: DISCONTINUED | OUTPATIENT
Start: 2023-04-22 | End: 2023-04-23

## 2023-04-22 RX ORDER — ACETAMINOPHEN 650 MG/1
650 SUPPOSITORY RECTAL
Status: DISCONTINUED | OUTPATIENT
Start: 2023-04-22 | End: 2023-04-23 | Stop reason: HOSPADM

## 2023-04-22 RX ORDER — METOCLOPRAMIDE HYDROCHLORIDE 5 MG/ML
5 INJECTION INTRAMUSCULAR; INTRAVENOUS
Status: DISCONTINUED | OUTPATIENT
Start: 2023-04-22 | End: 2023-04-22

## 2023-04-22 RX ORDER — INSULIN LISPRO 100 [IU]/ML
0.1 INJECTION, SOLUTION INTRAVENOUS; SUBCUTANEOUS
Status: DISCONTINUED | OUTPATIENT
Start: 2023-04-22 | End: 2023-04-23 | Stop reason: HOSPADM

## 2023-04-22 RX ORDER — POTASSIUM CHLORIDE 20 MEQ/1
40 TABLET, EXTENDED RELEASE ORAL EVERY 6 HOURS
Status: COMPLETED | OUTPATIENT
Start: 2023-04-22 | End: 2023-04-22

## 2023-04-22 RX ADMIN — NIFEDIPINE 30 MG: 30 TABLET, EXTENDED RELEASE ORAL at 08:18

## 2023-04-22 RX ADMIN — POTASSIUM CHLORIDE 40 MEQ: 1500 TABLET, EXTENDED RELEASE ORAL at 17:38

## 2023-04-22 RX ADMIN — MAGNESIUM SULFATE HEPTAHYDRATE 2 G: 40 INJECTION, SOLUTION INTRAVENOUS at 08:39

## 2023-04-22 RX ADMIN — SODIUM CHLORIDE, POTASSIUM CHLORIDE, SODIUM LACTATE AND CALCIUM CHLORIDE 75 ML/HR: 600; 310; 30; 20 INJECTION, SOLUTION INTRAVENOUS at 03:03

## 2023-04-22 RX ADMIN — FLUOXETINE 40 MG: 20 CAPSULE ORAL at 08:17

## 2023-04-22 RX ADMIN — LOPERAMIDE HYDROCHLORIDE 2 MG: 1 SOLUTION ORAL at 10:09

## 2023-04-22 RX ADMIN — SODIUM CHLORIDE, PRESERVATIVE FREE 10 ML: 5 INJECTION INTRAVENOUS at 02:11

## 2023-04-22 RX ADMIN — APIXABAN 5 MG: 5 TABLET, FILM COATED ORAL at 08:20

## 2023-04-22 RX ADMIN — POTASSIUM CHLORIDE 40 MEQ: 1500 TABLET, EXTENDED RELEASE ORAL at 08:17

## 2023-04-22 RX ADMIN — METHIMAZOLE 5 MG: 5 TABLET ORAL at 08:18

## 2023-04-22 RX ADMIN — POTASSIUM CHLORIDE AND SODIUM CHLORIDE: 900; 300 INJECTION, SOLUTION INTRAVENOUS at 08:06

## 2023-04-22 RX ADMIN — POTASSIUM CHLORIDE AND SODIUM CHLORIDE: 900; 300 INJECTION, SOLUTION INTRAVENOUS at 16:47

## 2023-04-22 RX ADMIN — SODIUM CHLORIDE, PRESERVATIVE FREE 10 ML: 5 INJECTION INTRAVENOUS at 21:22

## 2023-04-22 RX ADMIN — SODIUM CHLORIDE, PRESERVATIVE FREE 10 ML: 5 INJECTION INTRAVENOUS at 14:06

## 2023-04-22 RX ADMIN — SODIUM CHLORIDE, PRESERVATIVE FREE 10 ML: 5 INJECTION INTRAVENOUS at 05:40

## 2023-04-22 RX ADMIN — ATORVASTATIN CALCIUM 10 MG: 10 TABLET, FILM COATED ORAL at 08:17

## 2023-04-22 RX ADMIN — POTASSIUM CHLORIDE 40 MEQ: 1500 TABLET, EXTENDED RELEASE ORAL at 12:14

## 2023-04-22 RX ADMIN — INSULIN GLARGINE 29 UNITS: 100 INJECTION, SOLUTION SUBCUTANEOUS at 21:20

## 2023-04-22 RX ADMIN — LOPERAMIDE HYDROCHLORIDE 2 MG: 1 SOLUTION ORAL at 21:11

## 2023-04-22 RX ADMIN — LOPERAMIDE HYDROCHLORIDE 2 MG: 1 SOLUTION ORAL at 16:35

## 2023-04-22 RX ADMIN — ACETAMINOPHEN 650 MG: 325 TABLET, FILM COATED ORAL at 16:00

## 2023-04-22 RX ADMIN — APIXABAN 5 MG: 5 TABLET, FILM COATED ORAL at 21:11

## 2023-04-22 RX ADMIN — INSULIN GLARGINE 29 UNITS: 100 INJECTION, SOLUTION SUBCUTANEOUS at 03:11

## 2023-04-23 VITALS
DIASTOLIC BLOOD PRESSURE: 81 MMHG | SYSTOLIC BLOOD PRESSURE: 155 MMHG | BODY MASS INDEX: 28.28 KG/M2 | OXYGEN SATURATION: 95 % | HEIGHT: 73 IN | RESPIRATION RATE: 16 BRPM | HEART RATE: 70 BPM | WEIGHT: 213.4 LBS | TEMPERATURE: 97.1 F

## 2023-04-23 LAB
ANION GAP SERPL CALC-SCNC: 7 MMOL/L (ref 5–15)
BUN SERPL-MCNC: 6 MG/DL (ref 6–20)
BUN/CREAT SERPL: 7 (ref 12–20)
CA-I BLD-MCNC: 8.3 MG/DL (ref 8.5–10.1)
CHLORIDE SERPL-SCNC: 110 MMOL/L (ref 97–108)
CO2 SERPL-SCNC: 23 MMOL/L (ref 21–32)
CREAT SERPL-MCNC: 0.87 MG/DL (ref 0.7–1.3)
GLUCOSE BLD STRIP.AUTO-MCNC: 77 MG/DL (ref 65–100)
GLUCOSE SERPL-MCNC: 87 MG/DL (ref 65–100)
MAGNESIUM SERPL-MCNC: 1.9 MG/DL (ref 1.6–2.4)
PERFORMED BY, TECHID: NORMAL
POTASSIUM SERPL-SCNC: 3.2 MMOL/L (ref 3.5–5.1)
SODIUM SERPL-SCNC: 140 MMOL/L (ref 136–145)

## 2023-04-23 PROCEDURE — 74011250636 HC RX REV CODE- 250/636: Performed by: INTERNAL MEDICINE

## 2023-04-23 PROCEDURE — 80048 BASIC METABOLIC PNL TOTAL CA: CPT

## 2023-04-23 PROCEDURE — 74011000250 HC RX REV CODE- 250: Performed by: HOSPITALIST

## 2023-04-23 PROCEDURE — 74011250637 HC RX REV CODE- 250/637: Performed by: HOSPITALIST

## 2023-04-23 PROCEDURE — 74011250637 HC RX REV CODE- 250/637: Performed by: INTERNAL MEDICINE

## 2023-04-23 PROCEDURE — 36415 COLL VENOUS BLD VENIPUNCTURE: CPT

## 2023-04-23 PROCEDURE — 82962 GLUCOSE BLOOD TEST: CPT

## 2023-04-23 PROCEDURE — 83735 ASSAY OF MAGNESIUM: CPT

## 2023-04-23 RX ORDER — NIFEDIPINE 30 MG/1
60 TABLET, EXTENDED RELEASE ORAL DAILY
Status: DISCONTINUED | OUTPATIENT
Start: 2023-04-23 | End: 2023-04-23 | Stop reason: HOSPADM

## 2023-04-23 RX ORDER — POTASSIUM CHLORIDE 750 MG/1
20 TABLET, EXTENDED RELEASE ORAL 2 TIMES DAILY
Qty: 20 TABLET | Refills: 0 | Status: SHIPPED | OUTPATIENT
Start: 2023-04-23 | End: 2023-04-28

## 2023-04-23 RX ORDER — POTASSIUM CHLORIDE 20 MEQ/1
40 TABLET, EXTENDED RELEASE ORAL
Status: COMPLETED | OUTPATIENT
Start: 2023-04-23 | End: 2023-04-23

## 2023-04-23 RX ORDER — PSEUDOEPHED/ACETAMINOPHEN/CPM 30-500-2MG
2 TABLET ORAL
Qty: 15 ML | Refills: 0 | Status: SHIPPED | OUTPATIENT
Start: 2023-04-23 | End: 2023-04-26

## 2023-04-23 RX ADMIN — FLUOXETINE 40 MG: 20 CAPSULE ORAL at 08:28

## 2023-04-23 RX ADMIN — METHIMAZOLE 5 MG: 5 TABLET ORAL at 08:28

## 2023-04-23 RX ADMIN — POTASSIUM CHLORIDE 40 MEQ: 1500 TABLET, EXTENDED RELEASE ORAL at 08:33

## 2023-04-23 RX ADMIN — ATORVASTATIN CALCIUM 10 MG: 10 TABLET, FILM COATED ORAL at 08:28

## 2023-04-23 RX ADMIN — NIFEDIPINE 60 MG: 30 TABLET, EXTENDED RELEASE ORAL at 08:28

## 2023-04-23 RX ADMIN — POTASSIUM CHLORIDE AND SODIUM CHLORIDE: 900; 300 INJECTION, SOLUTION INTRAVENOUS at 01:17

## 2023-04-23 RX ADMIN — SODIUM CHLORIDE, PRESERVATIVE FREE 10 ML: 5 INJECTION INTRAVENOUS at 05:43

## 2023-04-23 RX ADMIN — LOPERAMIDE HYDROCHLORIDE 2 MG: 1 SOLUTION ORAL at 07:16

## 2023-04-23 RX ADMIN — APIXABAN 5 MG: 5 TABLET, FILM COATED ORAL at 08:28

## 2023-04-24 ENCOUNTER — TELEPHONE (OUTPATIENT)
Dept: PRIMARY CARE CLINIC | Age: 68
End: 2023-04-24

## 2023-04-24 NOTE — TELEPHONE ENCOUNTER
Care Transitions Initial Follow Up Call    Outreach made within 2 business days of discharge: Yes    Patient: Judie Looney Patient : 1955   MRN: [id number]  Reason for Admission: hypokalemia  Discharge Date: 23       Spoke with: pt    Discharge department/facility: [facility]    TCM Interactive Patient Contact:  Was patient able to fill all prescriptions: Yes  Was patient instructed to bring all medications to the follow-up visit: Yes  Is patient taking all medications as directed in the discharge summary?  Yes  Does patient understand their discharge instructions: Yes  Does patient have questions or concerns that need addressed prior to 7-14 day follow up office visit: No    Scheduled appointment with PCP within 7-14 days    Follow Up  Future Appointments   Date Time Provider Martha Bray   2023  2:00 PM [place], [place], DPM RPP BS AMB   2023 10:00 AM Titus Garvin NP SPCPE BS CHANO Solomon LPN

## 2023-04-25 ENCOUNTER — OFFICE VISIT (OUTPATIENT)
Dept: PODIATRY | Age: 68
End: 2023-04-25
Payer: MEDICARE

## 2023-04-25 VITALS
DIASTOLIC BLOOD PRESSURE: 81 MMHG | HEART RATE: 65 BPM | BODY MASS INDEX: 28.23 KG/M2 | HEIGHT: 73 IN | SYSTOLIC BLOOD PRESSURE: 155 MMHG | WEIGHT: 213 LBS | RESPIRATION RATE: 16 BRPM

## 2023-04-25 DIAGNOSIS — E08.621 DIABETIC ULCER OF TOE OF LEFT FOOT ASSOCIATED WITH DIABETES MELLITUS DUE TO UNDERLYING CONDITION, WITH FAT LAYER EXPOSED (HCC): Primary | ICD-10-CM

## 2023-04-25 DIAGNOSIS — I73.9 PAD (PERIPHERAL ARTERY DISEASE) (HCC): ICD-10-CM

## 2023-04-25 DIAGNOSIS — L97.522 DIABETIC ULCER OF TOE OF LEFT FOOT ASSOCIATED WITH DIABETES MELLITUS DUE TO UNDERLYING CONDITION, WITH FAT LAYER EXPOSED (HCC): Primary | ICD-10-CM

## 2023-04-25 DIAGNOSIS — E11.42 TYPE 2 DIABETES MELLITUS WITH DIABETIC POLYNEUROPATHY, UNSPECIFIED WHETHER LONG TERM INSULIN USE (HCC): ICD-10-CM

## 2023-04-25 PROCEDURE — G8417 CALC BMI ABV UP PARAM F/U: HCPCS | Performed by: PODIATRIST

## 2023-04-25 PROCEDURE — 1111F DSCHRG MED/CURRENT MED MERGE: CPT | Performed by: PODIATRIST

## 2023-04-25 PROCEDURE — 3078F DIAST BP <80 MM HG: CPT | Performed by: PODIATRIST

## 2023-04-25 PROCEDURE — 3017F COLORECTAL CA SCREEN DOC REV: CPT | Performed by: PODIATRIST

## 2023-04-25 PROCEDURE — 3074F SYST BP LT 130 MM HG: CPT | Performed by: PODIATRIST

## 2023-04-25 PROCEDURE — 2022F DILAT RTA XM EVC RTNOPTHY: CPT | Performed by: PODIATRIST

## 2023-04-25 PROCEDURE — 1123F ACP DISCUSS/DSCN MKR DOCD: CPT | Performed by: PODIATRIST

## 2023-04-25 PROCEDURE — 1101F PT FALLS ASSESS-DOCD LE1/YR: CPT | Performed by: PODIATRIST

## 2023-04-25 PROCEDURE — 3046F HEMOGLOBIN A1C LEVEL >9.0%: CPT | Performed by: PODIATRIST

## 2023-04-25 PROCEDURE — 99214 OFFICE O/P EST MOD 30 MIN: CPT | Performed by: PODIATRIST

## 2023-04-25 PROCEDURE — G8427 DOCREV CUR MEDS BY ELIG CLIN: HCPCS | Performed by: PODIATRIST

## 2023-04-25 PROCEDURE — G8432 DEP SCR NOT DOC, RNG: HCPCS | Performed by: PODIATRIST

## 2023-04-25 PROCEDURE — G8536 NO DOC ELDER MAL SCRN: HCPCS | Performed by: PODIATRIST

## 2023-04-26 ENCOUNTER — TELEPHONE (OUTPATIENT)
Dept: PRIMARY CARE CLINIC | Age: 68
End: 2023-04-26

## 2023-04-26 DIAGNOSIS — R11.10 VOMITING, UNSPECIFIED VOMITING TYPE, UNSPECIFIED WHETHER NAUSEA PRESENT: Primary | ICD-10-CM

## 2023-04-26 RX ORDER — ONDANSETRON 8 MG/1
8 TABLET, ORALLY DISINTEGRATING ORAL
Qty: 30 TABLET | Refills: 1 | Status: ON HOLD | OUTPATIENT
Start: 2023-04-26

## 2023-04-26 NOTE — TELEPHONE ENCOUNTER
Patient's daughter would like for you to give her a call. She has some concerns about her father. She spoke with him by phone; he is still throwing up , having diarrhea, and in pain. Please advise. She may be reached at [phone].

## 2023-04-26 NOTE — TELEPHONE ENCOUNTER
Spoke with patient. Reports he was having diarrhea but thinks this is better currently. Does not desire to go to ED but advised if he develops increasing lower abdominal pain or vomiting, needs to go back immediately. Is being admitted on Sunday 4/30/23 for vascular procedure and advised for him to notify the provider prior to ensure symptoms are better. Declines to come to office today but will consider if not better tommorow.

## 2023-04-27 ENCOUNTER — TELEPHONE (OUTPATIENT)
Dept: PRIMARY CARE CLINIC | Age: 68
End: 2023-04-27

## 2023-04-30 ENCOUNTER — HOSPITAL ENCOUNTER (INPATIENT)
Age: 68
LOS: 4 days | Discharge: HOME HEALTH | DRG: 501 | End: 2023-05-04
Attending: EMERGENCY MEDICINE | Admitting: INTERNAL MEDICINE
Payer: MEDICARE

## 2023-04-30 ENCOUNTER — APPOINTMENT (OUTPATIENT)
Dept: GENERAL RADIOLOGY | Age: 68
DRG: 501 | End: 2023-04-30
Attending: NURSE PRACTITIONER
Payer: MEDICARE

## 2023-04-30 DIAGNOSIS — L08.9 DIABETIC INFECTION OF LEFT FOOT (HCC): ICD-10-CM

## 2023-04-30 DIAGNOSIS — A41.01 SEPSIS DUE TO METHICILLIN SUSCEPTIBLE STAPHYLOCOCCUS AUREUS (MSSA) WITHOUT ACUTE ORGAN DYSFUNCTION (HCC): ICD-10-CM

## 2023-04-30 DIAGNOSIS — Z91.199 NONCOMPLIANCE: ICD-10-CM

## 2023-04-30 DIAGNOSIS — T81.49XA CELLULITIS, WOUND, POST-OPERATIVE: ICD-10-CM

## 2023-04-30 DIAGNOSIS — L08.9 LEFT FOOT INFECTION: ICD-10-CM

## 2023-04-30 DIAGNOSIS — M86.172 OSTEOMYELITIS OF FOOT, LEFT, ACUTE (HCC): ICD-10-CM

## 2023-04-30 DIAGNOSIS — E11.628 DIABETIC INFECTION OF LEFT FOOT (HCC): ICD-10-CM

## 2023-04-30 DIAGNOSIS — S99.922A FOOT INJURY, LEFT, INITIAL ENCOUNTER: Primary | ICD-10-CM

## 2023-04-30 LAB
ABO + RH BLD: NORMAL
ALBUMIN SERPL-MCNC: 2.6 G/DL (ref 3.5–5)
ALBUMIN/GLOB SERPL: 0.5 (ref 1.1–2.2)
ALP SERPL-CCNC: 96 U/L (ref 45–117)
ALT SERPL-CCNC: 19 U/L (ref 12–78)
ANION GAP SERPL CALC-SCNC: 7 MMOL/L (ref 5–15)
APTT PPP: 28.5 SEC (ref 21.2–34.1)
AST SERPL W P-5'-P-CCNC: 15 U/L (ref 15–37)
BASOPHILS # BLD: 0.1 K/UL (ref 0–0.1)
BASOPHILS NFR BLD: 1 % (ref 0–1)
BILIRUB SERPL-MCNC: 0.2 MG/DL (ref 0.2–1)
BLOOD GROUP ANTIBODIES SERPL: NEGATIVE
BUN SERPL-MCNC: 14 MG/DL (ref 6–20)
BUN/CREAT SERPL: 13 (ref 12–20)
CA-I BLD-MCNC: 8.7 MG/DL (ref 8.5–10.1)
CHLORIDE SERPL-SCNC: 108 MMOL/L (ref 97–108)
CO2 SERPL-SCNC: 22 MMOL/L (ref 21–32)
CREAT SERPL-MCNC: 1.08 MG/DL (ref 0.7–1.3)
DIFFERENTIAL METHOD BLD: ABNORMAL
EOSINOPHIL # BLD: 0.3 K/UL (ref 0–0.4)
EOSINOPHIL NFR BLD: 2 % (ref 0–7)
ERYTHROCYTE [DISTWIDTH] IN BLOOD BY AUTOMATED COUNT: 13.8 % (ref 11.5–14.5)
GLOBULIN SER CALC-MCNC: 5.5 G/DL (ref 2–4)
GLUCOSE BLD STRIP.AUTO-MCNC: 120 MG/DL (ref 65–100)
GLUCOSE BLD STRIP.AUTO-MCNC: 181 MG/DL (ref 65–100)
GLUCOSE SERPL-MCNC: 123 MG/DL (ref 65–100)
HCT VFR BLD AUTO: 34.3 % (ref 36.6–50.3)
HGB BLD-MCNC: 10.8 G/DL (ref 12.1–17)
IMM GRANULOCYTES # BLD AUTO: 0.1 K/UL (ref 0–0.04)
IMM GRANULOCYTES NFR BLD AUTO: 1 % (ref 0–0.5)
INR PPP: 1.4 (ref 0.9–1.1)
LACTATE SERPL-SCNC: 1.5 MMOL/L (ref 0.4–2)
LYMPHOCYTES # BLD: 2.6 K/UL (ref 0.8–3.5)
LYMPHOCYTES NFR BLD: 14 % (ref 12–49)
MCH RBC QN AUTO: 26.9 PG (ref 26–34)
MCHC RBC AUTO-ENTMCNC: 31.5 G/DL (ref 30–36.5)
MCV RBC AUTO: 85.3 FL (ref 80–99)
MONOCYTES # BLD: 1.8 K/UL (ref 0–1)
MONOCYTES NFR BLD: 10 % (ref 5–13)
NEUTS SEG # BLD: 13.6 K/UL (ref 1.8–8)
NEUTS SEG NFR BLD: 72 % (ref 32–75)
NRBC # BLD: 0 K/UL (ref 0–0.01)
NRBC BLD-RTO: 0 PER 100 WBC
PERFORMED BY, TECHID: ABNORMAL
PERFORMED BY, TECHID: ABNORMAL
PLATELET # BLD AUTO: 458 K/UL (ref 150–400)
PMV BLD AUTO: 9.9 FL (ref 8.9–12.9)
POTASSIUM SERPL-SCNC: 2.8 MMOL/L (ref 3.5–5.1)
PROCALCITONIN SERPL-MCNC: 0.14 NG/ML
PROT SERPL-MCNC: 8.1 G/DL (ref 6.4–8.2)
PROTHROMBIN TIME: 16.8 SEC (ref 11.9–14.6)
RBC # BLD AUTO: 4.02 M/UL (ref 4.1–5.7)
SODIUM SERPL-SCNC: 137 MMOL/L (ref 136–145)
SPECIMEN EXP DATE BLD: NORMAL
THERAPEUTIC RANGE,PTTT: NORMAL SEC (ref 82–109)
WBC # BLD AUTO: 18.5 K/UL (ref 4.1–11.1)

## 2023-04-30 PROCEDURE — 82962 GLUCOSE BLOOD TEST: CPT

## 2023-04-30 PROCEDURE — 86900 BLOOD TYPING SEROLOGIC ABO: CPT

## 2023-04-30 PROCEDURE — 84145 PROCALCITONIN (PCT): CPT

## 2023-04-30 PROCEDURE — 87040 BLOOD CULTURE FOR BACTERIA: CPT

## 2023-04-30 PROCEDURE — 80053 COMPREHEN METABOLIC PANEL: CPT

## 2023-04-30 PROCEDURE — 87186 SC STD MICRODIL/AGAR DIL: CPT

## 2023-04-30 PROCEDURE — 87077 CULTURE AEROBIC IDENTIFY: CPT

## 2023-04-30 PROCEDURE — 74011636637 HC RX REV CODE- 636/637: Performed by: NURSE PRACTITIONER

## 2023-04-30 PROCEDURE — 99223 1ST HOSP IP/OBS HIGH 75: CPT | Performed by: INTERNAL MEDICINE

## 2023-04-30 PROCEDURE — 83605 ASSAY OF LACTIC ACID: CPT

## 2023-04-30 PROCEDURE — 85610 PROTHROMBIN TIME: CPT

## 2023-04-30 PROCEDURE — 87205 SMEAR GRAM STAIN: CPT

## 2023-04-30 PROCEDURE — 74011250637 HC RX REV CODE- 250/637: Performed by: NURSE PRACTITIONER

## 2023-04-30 PROCEDURE — 74011000250 HC RX REV CODE- 250: Performed by: NURSE PRACTITIONER

## 2023-04-30 PROCEDURE — 74011000258 HC RX REV CODE- 258: Performed by: NURSE PRACTITIONER

## 2023-04-30 PROCEDURE — 74011250636 HC RX REV CODE- 250/636: Performed by: NURSE PRACTITIONER

## 2023-04-30 PROCEDURE — 85025 COMPLETE CBC W/AUTO DIFF WBC: CPT

## 2023-04-30 PROCEDURE — 65270000029 HC RM PRIVATE

## 2023-04-30 PROCEDURE — 99285 EMERGENCY DEPT VISIT HI MDM: CPT

## 2023-04-30 PROCEDURE — 87641 MR-STAPH DNA AMP PROBE: CPT

## 2023-04-30 PROCEDURE — 73630 X-RAY EXAM OF FOOT: CPT

## 2023-04-30 PROCEDURE — 85730 THROMBOPLASTIN TIME PARTIAL: CPT

## 2023-04-30 RX ORDER — FUROSEMIDE 40 MG/1
20 TABLET ORAL DAILY
Status: DISCONTINUED | OUTPATIENT
Start: 2023-05-01 | End: 2023-05-04 | Stop reason: HOSPADM

## 2023-04-30 RX ORDER — IBUPROFEN 200 MG
4 TABLET ORAL AS NEEDED
Status: DISCONTINUED | OUTPATIENT
Start: 2023-04-30 | End: 2023-05-04 | Stop reason: HOSPADM

## 2023-04-30 RX ORDER — ACETAMINOPHEN 650 MG/1
650 SUPPOSITORY RECTAL
Status: DISCONTINUED | OUTPATIENT
Start: 2023-04-30 | End: 2023-05-04 | Stop reason: HOSPADM

## 2023-04-30 RX ORDER — SODIUM CHLORIDE 0.9 % (FLUSH) 0.9 %
5-40 SYRINGE (ML) INJECTION EVERY 8 HOURS
Status: DISCONTINUED | OUTPATIENT
Start: 2023-04-30 | End: 2023-05-04 | Stop reason: HOSPADM

## 2023-04-30 RX ORDER — DEXTROSE MONOHYDRATE 100 MG/ML
0-250 INJECTION, SOLUTION INTRAVENOUS AS NEEDED
Status: DISCONTINUED | OUTPATIENT
Start: 2023-04-30 | End: 2023-05-04 | Stop reason: HOSPADM

## 2023-04-30 RX ORDER — LISINOPRIL 10 MG/1
10 TABLET ORAL DAILY
Status: DISCONTINUED | OUTPATIENT
Start: 2023-05-01 | End: 2023-05-04 | Stop reason: HOSPADM

## 2023-04-30 RX ORDER — ENOXAPARIN SODIUM 100 MG/ML
40 INJECTION SUBCUTANEOUS DAILY
Status: DISCONTINUED | OUTPATIENT
Start: 2023-05-01 | End: 2023-05-04 | Stop reason: HOSPADM

## 2023-04-30 RX ORDER — POLYETHYLENE GLYCOL 3350 17 G/17G
17 POWDER, FOR SOLUTION ORAL DAILY PRN
Status: DISCONTINUED | OUTPATIENT
Start: 2023-04-30 | End: 2023-05-04 | Stop reason: HOSPADM

## 2023-04-30 RX ORDER — INSULIN GLARGINE 100 [IU]/ML
40 INJECTION, SOLUTION SUBCUTANEOUS
Status: DISCONTINUED | OUTPATIENT
Start: 2023-04-30 | End: 2023-05-04 | Stop reason: HOSPADM

## 2023-04-30 RX ORDER — ATORVASTATIN CALCIUM 10 MG/1
10 TABLET, FILM COATED ORAL DAILY
Status: DISCONTINUED | OUTPATIENT
Start: 2023-05-01 | End: 2023-05-04 | Stop reason: HOSPADM

## 2023-04-30 RX ORDER — ONDANSETRON 2 MG/ML
4 INJECTION INTRAMUSCULAR; INTRAVENOUS
Status: DISCONTINUED | OUTPATIENT
Start: 2023-04-30 | End: 2023-05-04 | Stop reason: HOSPADM

## 2023-04-30 RX ORDER — INSULIN LISPRO 100 [IU]/ML
INJECTION, SOLUTION INTRAVENOUS; SUBCUTANEOUS
Status: DISCONTINUED | OUTPATIENT
Start: 2023-04-30 | End: 2023-05-04 | Stop reason: HOSPADM

## 2023-04-30 RX ORDER — ONDANSETRON 4 MG/1
4 TABLET, ORALLY DISINTEGRATING ORAL
Status: DISCONTINUED | OUTPATIENT
Start: 2023-04-30 | End: 2023-05-04 | Stop reason: HOSPADM

## 2023-04-30 RX ORDER — HYDROMORPHONE HYDROCHLORIDE 1 MG/ML
1 INJECTION, SOLUTION INTRAMUSCULAR; INTRAVENOUS; SUBCUTANEOUS
Status: DISCONTINUED | OUTPATIENT
Start: 2023-04-30 | End: 2023-04-30

## 2023-04-30 RX ORDER — POTASSIUM CHLORIDE 20 MEQ/1
40 TABLET, EXTENDED RELEASE ORAL 2 TIMES DAILY
Status: COMPLETED | OUTPATIENT
Start: 2023-04-30 | End: 2023-05-01

## 2023-04-30 RX ORDER — ACETAMINOPHEN 325 MG/1
650 TABLET ORAL
Status: DISCONTINUED | OUTPATIENT
Start: 2023-04-30 | End: 2023-05-04 | Stop reason: HOSPADM

## 2023-04-30 RX ORDER — POTASSIUM CHLORIDE 20 MEQ/1
20 TABLET, EXTENDED RELEASE ORAL DAILY
Status: DISCONTINUED | OUTPATIENT
Start: 2023-05-01 | End: 2023-05-03

## 2023-04-30 RX ORDER — HYDROMORPHONE HYDROCHLORIDE 1 MG/ML
1 INJECTION, SOLUTION INTRAMUSCULAR; INTRAVENOUS; SUBCUTANEOUS
Status: ACTIVE | OUTPATIENT
Start: 2023-04-30 | End: 2023-05-02

## 2023-04-30 RX ORDER — METHIMAZOLE 5 MG/1
5 TABLET ORAL DAILY
Status: DISCONTINUED | OUTPATIENT
Start: 2023-05-01 | End: 2023-05-04 | Stop reason: HOSPADM

## 2023-04-30 RX ORDER — SODIUM CHLORIDE 0.9 % (FLUSH) 0.9 %
5-40 SYRINGE (ML) INJECTION AS NEEDED
Status: DISCONTINUED | OUTPATIENT
Start: 2023-04-30 | End: 2023-05-04 | Stop reason: HOSPADM

## 2023-04-30 RX ORDER — FLUOXETINE HYDROCHLORIDE 20 MG/1
40 CAPSULE ORAL DAILY
Status: DISCONTINUED | OUTPATIENT
Start: 2023-05-01 | End: 2023-05-04 | Stop reason: HOSPADM

## 2023-04-30 RX ORDER — SODIUM CHLORIDE 9 MG/ML
75 INJECTION, SOLUTION INTRAVENOUS CONTINUOUS
Status: DISCONTINUED | OUTPATIENT
Start: 2023-04-30 | End: 2023-05-03

## 2023-04-30 RX ORDER — NIFEDIPINE 30 MG/1
30 TABLET, EXTENDED RELEASE ORAL DAILY
Status: DISCONTINUED | OUTPATIENT
Start: 2023-05-01 | End: 2023-05-04 | Stop reason: HOSPADM

## 2023-04-30 RX ORDER — POTASSIUM CHLORIDE 750 MG/1
10 TABLET, EXTENDED RELEASE ORAL DAILY
Status: DISCONTINUED | OUTPATIENT
Start: 2023-05-01 | End: 2023-04-30

## 2023-04-30 RX ADMIN — INSULIN GLARGINE 40 UNITS: 100 INJECTION, SOLUTION SUBCUTANEOUS at 22:47

## 2023-04-30 RX ADMIN — POTASSIUM CHLORIDE 40 MEQ: 1500 TABLET, EXTENDED RELEASE ORAL at 21:42

## 2023-04-30 RX ADMIN — SODIUM CHLORIDE 75 ML/HR: 9 INJECTION, SOLUTION INTRAVENOUS at 19:41

## 2023-04-30 RX ADMIN — PIPERACILLIN AND TAZOBACTAM 4.5 G: 4; .5 INJECTION, POWDER, LYOPHILIZED, FOR SOLUTION INTRAVENOUS at 19:43

## 2023-04-30 RX ADMIN — SODIUM CHLORIDE, PRESERVATIVE FREE 10 ML: 5 INJECTION INTRAVENOUS at 22:47

## 2023-05-01 LAB
ALBUMIN SERPL-MCNC: 2.3 G/DL (ref 3.5–5)
ALBUMIN/GLOB SERPL: 0.5 (ref 1.1–2.2)
ALP SERPL-CCNC: 85 U/L (ref 45–117)
ALT SERPL-CCNC: 17 U/L (ref 12–78)
ANION GAP SERPL CALC-SCNC: 6 MMOL/L (ref 5–15)
AST SERPL W P-5'-P-CCNC: 10 U/L (ref 15–37)
BASOPHILS # BLD: 0.1 K/UL (ref 0–0.1)
BASOPHILS NFR BLD: 0 % (ref 0–1)
BILIRUB SERPL-MCNC: 0.3 MG/DL (ref 0.2–1)
BUN SERPL-MCNC: 11 MG/DL (ref 6–20)
BUN/CREAT SERPL: 11 (ref 12–20)
CA-I BLD-MCNC: 8.7 MG/DL (ref 8.5–10.1)
CHLORIDE SERPL-SCNC: 111 MMOL/L (ref 97–108)
CO2 SERPL-SCNC: 22 MMOL/L (ref 21–32)
CREAT SERPL-MCNC: 0.96 MG/DL (ref 0.7–1.3)
CRP SERPL-MCNC: 7.19 MG/DL (ref 0–0.6)
DIFFERENTIAL METHOD BLD: ABNORMAL
EOSINOPHIL # BLD: 0.4 K/UL (ref 0–0.4)
EOSINOPHIL NFR BLD: 2 % (ref 0–7)
ERYTHROCYTE [DISTWIDTH] IN BLOOD BY AUTOMATED COUNT: 13.9 % (ref 11.5–14.5)
ERYTHROCYTE [SEDIMENTATION RATE] IN BLOOD: 73 MM/HR (ref 0–20)
GLOBULIN SER CALC-MCNC: 5 G/DL (ref 2–4)
GLUCOSE BLD STRIP.AUTO-MCNC: 139 MG/DL (ref 65–100)
GLUCOSE BLD STRIP.AUTO-MCNC: 211 MG/DL (ref 65–100)
GLUCOSE BLD STRIP.AUTO-MCNC: 85 MG/DL (ref 65–100)
GLUCOSE BLD STRIP.AUTO-MCNC: 92 MG/DL (ref 65–100)
GLUCOSE SERPL-MCNC: 80 MG/DL (ref 65–100)
HCT VFR BLD AUTO: 33.3 % (ref 36.6–50.3)
HGB BLD-MCNC: 10.3 G/DL (ref 12.1–17)
IMM GRANULOCYTES # BLD AUTO: 0.1 K/UL (ref 0–0.04)
IMM GRANULOCYTES NFR BLD AUTO: 1 % (ref 0–0.5)
LYMPHOCYTES # BLD: 2.2 K/UL (ref 0.8–3.5)
LYMPHOCYTES NFR BLD: 12 % (ref 12–49)
MAGNESIUM SERPL-MCNC: 2.2 MG/DL (ref 1.6–2.4)
MCH RBC QN AUTO: 26.2 PG (ref 26–34)
MCHC RBC AUTO-ENTMCNC: 30.9 G/DL (ref 30–36.5)
MCV RBC AUTO: 84.7 FL (ref 80–99)
MONOCYTES # BLD: 1.8 K/UL (ref 0–1)
MONOCYTES NFR BLD: 10 % (ref 5–13)
MRSA DNA SPEC QL NAA+PROBE: NOT DETECTED
NEUTS SEG # BLD: 14.1 K/UL (ref 1.8–8)
NEUTS SEG NFR BLD: 75 % (ref 32–75)
NRBC # BLD: 0 K/UL (ref 0–0.01)
NRBC BLD-RTO: 0 PER 100 WBC
PERFORMED BY, TECHID: ABNORMAL
PERFORMED BY, TECHID: ABNORMAL
PERFORMED BY, TECHID: NORMAL
PERFORMED BY, TECHID: NORMAL
PLATELET # BLD AUTO: 433 K/UL (ref 150–400)
PMV BLD AUTO: 9.1 FL (ref 8.9–12.9)
POTASSIUM SERPL-SCNC: 2.8 MMOL/L (ref 3.5–5.1)
PROCALCITONIN SERPL-MCNC: 0.19 NG/ML
PROT SERPL-MCNC: 7.3 G/DL (ref 6.4–8.2)
RBC # BLD AUTO: 3.93 M/UL (ref 4.1–5.7)
SODIUM SERPL-SCNC: 139 MMOL/L (ref 136–145)
WBC # BLD AUTO: 18.6 K/UL (ref 4.1–11.1)

## 2023-05-01 PROCEDURE — 85652 RBC SED RATE AUTOMATED: CPT

## 2023-05-01 PROCEDURE — 74011250636 HC RX REV CODE- 250/636: Performed by: NURSE PRACTITIONER

## 2023-05-01 PROCEDURE — 84145 PROCALCITONIN (PCT): CPT

## 2023-05-01 PROCEDURE — 36415 COLL VENOUS BLD VENIPUNCTURE: CPT

## 2023-05-01 PROCEDURE — 74011250636 HC RX REV CODE- 250/636: Performed by: INTERNAL MEDICINE

## 2023-05-01 PROCEDURE — 85025 COMPLETE CBC W/AUTO DIFF WBC: CPT

## 2023-05-01 PROCEDURE — 80053 COMPREHEN METABOLIC PANEL: CPT

## 2023-05-01 PROCEDURE — 99223 1ST HOSP IP/OBS HIGH 75: CPT | Performed by: PODIATRIST

## 2023-05-01 PROCEDURE — 74011000258 HC RX REV CODE- 258: Performed by: NURSE PRACTITIONER

## 2023-05-01 PROCEDURE — 74011636637 HC RX REV CODE- 636/637: Performed by: NURSE PRACTITIONER

## 2023-05-01 PROCEDURE — 86140 C-REACTIVE PROTEIN: CPT

## 2023-05-01 PROCEDURE — 99232 SBSQ HOSP IP/OBS MODERATE 35: CPT | Performed by: INTERNAL MEDICINE

## 2023-05-01 PROCEDURE — 74011250637 HC RX REV CODE- 250/637: Performed by: NURSE PRACTITIONER

## 2023-05-01 PROCEDURE — 83735 ASSAY OF MAGNESIUM: CPT

## 2023-05-01 PROCEDURE — 74011000250 HC RX REV CODE- 250: Performed by: NURSE PRACTITIONER

## 2023-05-01 PROCEDURE — 65270000029 HC RM PRIVATE

## 2023-05-01 PROCEDURE — 82962 GLUCOSE BLOOD TEST: CPT

## 2023-05-01 RX ORDER — POTASSIUM CHLORIDE 7.45 MG/ML
10 INJECTION INTRAVENOUS
Status: COMPLETED | OUTPATIENT
Start: 2023-05-01 | End: 2023-05-01

## 2023-05-01 RX ADMIN — POTASSIUM CHLORIDE 20 MEQ: 1500 TABLET, EXTENDED RELEASE ORAL at 08:46

## 2023-05-01 RX ADMIN — VANCOMYCIN HYDROCHLORIDE 2000 MG: 5 INJECTION, POWDER, LYOPHILIZED, FOR SOLUTION INTRAVENOUS at 01:37

## 2023-05-01 RX ADMIN — POTASSIUM CHLORIDE 10 MEQ: 7.46 INJECTION, SOLUTION INTRAVENOUS at 11:12

## 2023-05-01 RX ADMIN — FUROSEMIDE 20 MG: 40 TABLET ORAL at 08:36

## 2023-05-01 RX ADMIN — VANCOMYCIN HYDROCHLORIDE 1250 MG: 1.25 INJECTION, POWDER, LYOPHILIZED, FOR SOLUTION INTRAVENOUS at 21:50

## 2023-05-01 RX ADMIN — PIPERACILLIN AND TAZOBACTAM 3.38 G: 3; .375 INJECTION, POWDER, LYOPHILIZED, FOR SOLUTION INTRAVENOUS at 01:38

## 2023-05-01 RX ADMIN — ATORVASTATIN CALCIUM 10 MG: 10 TABLET, FILM COATED ORAL at 08:37

## 2023-05-01 RX ADMIN — NIFEDIPINE 30 MG: 30 TABLET, FILM COATED, EXTENDED RELEASE ORAL at 08:37

## 2023-05-01 RX ADMIN — PIPERACILLIN AND TAZOBACTAM 3.38 G: 3; .375 INJECTION, POWDER, LYOPHILIZED, FOR SOLUTION INTRAVENOUS at 16:49

## 2023-05-01 RX ADMIN — SODIUM CHLORIDE, PRESERVATIVE FREE 10 ML: 5 INJECTION INTRAVENOUS at 21:53

## 2023-05-01 RX ADMIN — SODIUM CHLORIDE, PRESERVATIVE FREE 10 ML: 5 INJECTION INTRAVENOUS at 05:07

## 2023-05-01 RX ADMIN — INSULIN LISPRO 4 UNITS: 100 INJECTION, SOLUTION INTRAVENOUS; SUBCUTANEOUS at 21:51

## 2023-05-01 RX ADMIN — FLUOXETINE 40 MG: 20 CAPSULE ORAL at 08:46

## 2023-05-01 RX ADMIN — SODIUM CHLORIDE, PRESERVATIVE FREE 10 ML: 5 INJECTION INTRAVENOUS at 13:13

## 2023-05-01 RX ADMIN — PIPERACILLIN AND TAZOBACTAM 3.38 G: 3; .375 INJECTION, POWDER, LYOPHILIZED, FOR SOLUTION INTRAVENOUS at 08:37

## 2023-05-01 RX ADMIN — METHIMAZOLE 5 MG: 5 TABLET ORAL at 08:36

## 2023-05-01 RX ADMIN — INSULIN GLARGINE 40 UNITS: 100 INJECTION, SOLUTION SUBCUTANEOUS at 21:52

## 2023-05-01 RX ADMIN — POTASSIUM CHLORIDE 10 MEQ: 7.46 INJECTION, SOLUTION INTRAVENOUS at 10:17

## 2023-05-01 RX ADMIN — POTASSIUM CHLORIDE 10 MEQ: 7.46 INJECTION, SOLUTION INTRAVENOUS at 12:09

## 2023-05-01 RX ADMIN — LISINOPRIL 10 MG: 10 TABLET ORAL at 08:37

## 2023-05-01 RX ADMIN — POTASSIUM CHLORIDE 40 MEQ: 1500 TABLET, EXTENDED RELEASE ORAL at 08:37

## 2023-05-01 RX ADMIN — POTASSIUM CHLORIDE 10 MEQ: 7.46 INJECTION, SOLUTION INTRAVENOUS at 13:12

## 2023-05-01 NOTE — PROGRESS NOTES
Vancomycin Dosing Consult  Gloria Strauss is a 77 y.o. male with osteomyelitis (recent left great toe amputation). Pharmacy was consulted by Gary Galicia NP to dose and monitor vancomycin. Today is day 1. Antibiotic regimen: Vancomycin + Zosyn    Temp (24hrs), Av.9 °F (36.6 °C), Min:97.9 °F (36.6 °C), Max:97.9 °F (36.6 °C)    Recent Labs     23  1651   WBC 18.5*   CREA 1.08   BUN 14     Est CrCl: 75.3 mL/min  Concomitant nephrotoxic drugs: None    Cultures:    Wound (great toe): pending   Blood: pending    MRSA Swab: Not ordered, patient already received first dose of vancomycin    Target range: AUC/ARIK 400-600    Recent level history:  Date/Time Dose & Interval Measured Level (mcg/mL) Associated AUC/ARIK              Assessment/Plan:   Afebrile, leukocytosis  Started Vancomycin 2000mg IV x1 then 1250mg IV q24h (estimated AUC = 425). Pharmacy will order a random level tomorrow to monitor.   Antimicrobial stop date TBD

## 2023-05-01 NOTE — PROGRESS NOTES
Progress Note    Patient: Marina Hahn MRN: [id number]  SSN: [id number]    YOB: 1955  Age: 77 y.o. Sex: male      Admit Date: 4/30/2023    LOS: 1 day     Subjective:   Patient followed for possible surgical site infection left foot. Blood and wound cultures are pending. WBC, ESR, procal and CRP all elevated. MRI not done yet. Patient resting comfortably with no new complaints. He states that he is scheduled to have \"bone surgery\" tomorrow. Objective:     Vitals:    04/30/23 2229 05/01/23 0318 05/01/23 0833 05/01/23 1452   BP: 126/70 131/72 (!) 181/79 (!) 154/77   Pulse: 89 81 87 67   Resp: 18 18 18 18   Temp: 97.9 °F (36.6 °C) 98.2 °F (36.8 °C) 98.4 °F (36.9 °C) 98.2 °F (36.8 °C)   SpO2: 94% 96% 100% 100%   Weight:       Height:            Intake and Output:  Current Shift: No intake/output data recorded. Last three shifts: No intake/output data recorded. Physical Exam:   Vitals and nursing note reviewed. Constitutional:       Appearance: He is not ill-appearing. Genitourinary:     Comments: No Sam[place]  Musculoskeletal:         General: Swelling present. Cervical back: Neck supple. Right lower leg: No edema. Left lower leg: Edema present. Feet:       Comments: Left foot surgical site from left great toe amputation is open, no foul odor, dusky appearance, did not probe to bone   Skin:     Findings: No rash. Neurological:      General: No focal deficit present. Mental Status: He is alert and oriented to person, place, and time.    Psychiatric:    normal behavior       Lab/Data Review:     WBC 18,600     ESR 73  CRP 7.19  Procal 0.19 < 0.14    Blood cultures (4/30) in process  Wound culture left foot (4/30) Gram positive cocci in clusters    Assessment:     Active Problems:    Left foot infection (4/30/2023)    Possible surgical site infection, left foot, culture pending, Day #2 IV Vancomycin and Zosyn  Rule out contiguous osteomyelitis, left first metatarsal  Sepsis with leukocytosis and elevated procal, ESR and CRP  Diabetes mellitus     Comment:  Broad antibiotic coverage but sub-optimal clinical response thus far. Plan:   Continue IV Vancomycin and Zosyn  Follow-up wound culture taken from left foot surgical site  Follow-up blood cultures  In am, repeat CBC, procal,  ESR and CRP  Awaiting MRI left foot w/o contrast  Ceretec scan left foot  ? Surgery planned          Signed By: Cassius Bailey MD     May 1, 2023

## 2023-05-01 NOTE — PROGRESS NOTES
HOSPITALIST PROGRESS NOTE  Omkar Muñoz MD, [facility]  [address]         Daily Progress Note: 5/1/2023      Subjective:     Patient is alert and oriented x4 and evaluated with nurse at bedside. Continues to have significant diarrhea over the last 1 to 2 weeks on outpatient antibiotics as well. No overnight fever/chills, chest pain, shortness of breath noted. Counseled on findings of significant left foot osteomyelitis on MRI noted on 4/30/2023. Possible I&D per general surgery later today.     Medications reviewed  Current Facility-Administered Medications   Medication Dose Route Frequency    potassium chloride 10 mEq in 100 ml IVPB  10 mEq IntraVENous Q1H    atorvastatin (LIPITOR) tablet 10 mg  10 mg Oral DAILY    FLUoxetine (PROzac) capsule 40 mg  40 mg Oral DAILY    furosemide (LASIX) tablet 20 mg  20 mg Oral DAILY    insulin glargine (LANTUS) injection 40 Units  40 Units SubCUTAneous QHS    lisinopriL (PRINIVIL, ZESTRIL) tablet 10 mg  10 mg Oral DAILY    methIMAzole (TAPAZOLE) tablet 5 mg  5 mg Oral DAILY    NIFEdipine ER (PROCARDIA XL) tablet 30 mg  30 mg Oral DAILY    sodium chloride (NS) flush 5-40 mL  5-40 mL IntraVENous Q8H    sodium chloride (NS) flush 5-40 mL  5-40 mL IntraVENous PRN    acetaminophen (TYLENOL) tablet 650 mg  650 mg Oral Q6H PRN    Or    acetaminophen (TYLENOL) suppository 650 mg  650 mg Rectal Q6H PRN    polyethylene glycol (MIRALAX) packet 17 g  17 g Oral DAILY PRN    ondansetron (ZOFRAN ODT) tablet 4 mg  4 mg Oral Q8H PRN    Or    ondansetron (ZOFRAN) injection 4 mg  4 mg IntraVENous Q6H PRN    enoxaparin (LOVENOX) injection 40 mg  40 mg SubCUTAneous DAILY    insulin lispro (HUMALOG) injection   SubCUTAneous AC&HS    glucose chewable tablet 16 g  4 Tablet Oral PRN    glucagon (GLUCAGEN) injection 1 mg  1 mg IntraMUSCular PRN    dextrose 10% infusion 0-250 mL  0-250 mL IntraVENous PRN    0.9% sodium chloride infusion  75 mL/hr IntraVENous CONTINUOUS    piperacillin-tazobactam (ZOSYN) 3.375 g in 0.9% sodium chloride (MBP/ADV) 100 mL MBP  3.375 g IntraVENous Q8H    potassium chloride (K-DUR, KLOR-CON M20) SR tablet 20 mEq  20 mEq Oral DAILY    HYDROmorphone (DILAUDID) injection 1 mg  1 mg IntraVENous Q4H PRN    VANCOMYCIN INFORMATION NOTE   Other Rx Dosing/Monitoring    vancomycin (VANCOCIN) 1,250 mg in 0.9% sodium chloride 250 mL ([id number])  1,250 mg IntraVENous Q24H    [START ON 2023] VANCOMYCIN RANDOM LEVEL DUE ON  AT 1900   Other ONCE       Review of Systems:   A comprehensive review of systems was negative except for that written in the HPI. Objective:   Physical Exam:     Visit Vitals  /72 (BP 1 Location: Right upper arm, BP Patient Position: At rest)   Pulse 87   Temp 98.4 °F (36.9 °C)   Resp 18   Ht 5' 10\" (1.778 m)   Wt 99.8 kg (220 lb)   SpO2 100%   BMI 31.57 kg/m²      O2 Device: None (Room air)  Patient Vitals for the past 8 hrs:   Temp Pulse Resp BP SpO2   23 0833 98.4 °F (36.9 °C) 87 18 -- 100 %   23 0318 98.2 °F (36.8 °C) 81 18 131/72 96 %          Temp (24hrs), Av.1 °F (36.7 °C), Min:97.9 °F (36.6 °C), Max:98.4 °F (36.9 °C)    No intake/output data recorded. No intake/output data recorded. General:  Alert, cooperative, no distress, appears stated age. Lungs:   Clear to auscultation bilaterally. Chest wall:  No tenderness or deformity. Heart:  Regular rate and rhythm, S1, S2 normal, no murmur, click, rub or gallop. Abdomen:   Soft, non-tender. Bowel sounds normal. No masses,  No organomegaly. Extremities: Extremities normal, atraumatic, no cyanosis or edema. Pulses: 2+ and symmetric all extremities.    Skin: Skin color, texture, turgor normal. No rashes or lesions   Neurologic: No gross sensory or motor deficits     Data Review:       Recent Days:  Recent Labs     23  0431 23  1651   WBC 18.6* 18.5*   HGB 10.3* 10.8*   HCT 33.3* 34.3*   * 458*     Recent Labs     05/01/23  0431 04/30/23  1651    137   K 2.8* 2.8*   * 108   CO2 22 22   GLU 80 123*   BUN 11 14   CREA 0.96 1.08   CA 8.7 8.7   MG 2.2  --    ALB 2.3* 2.6*   TBILI 0.3 0.2   ALT 17 19   INR  --  1.4*     No results for input(s): PH, PCO2, PO2, HCO3, FIO2 in the last 72 hours. 24 Hour Results:  Recent Results (from the past 24 hour(s))   CBC WITH AUTOMATED DIFF    Collection Time: 04/30/23  4:51 PM   Result Value Ref Range    WBC 18.5 (H) 4.1 - 11.1 K/uL    RBC 4.02 (L) 4.10 - 5.70 M/uL    HGB 10.8 (L) 12.1 - 17.0 g/dL    HCT 34.3 (L) 36.6 - 50.3 %    MCV 85.3 80.0 - 99.0 FL    MCH 26.9 26.0 - 34.0 PG    MCHC 31.5 30.0 - 36.5 g/dL    RDW 13.8 11.5 - 14.5 %    PLATELET 797 (H) 751 - 400 K/uL    MPV 9.9 8.9 - 12.9 FL    NRBC 0.0 0.0  WBC    ABSOLUTE NRBC 0.00 0.00 - 0.01 K/uL    NEUTROPHILS 72 32 - 75 %    LYMPHOCYTES 14 12 - 49 %    MONOCYTES 10 5 - 13 %    EOSINOPHILS 2 0 - 7 %    BASOPHILS 1 0 - 1 %    IMMATURE GRANULOCYTES 1 (H) 0 - 0.5 %    ABS. NEUTROPHILS 13.6 (H) 1.8 - 8.0 K/UL    ABS. LYMPHOCYTES 2.6 0.8 - 3.5 K/UL    ABS. MONOCYTES 1.8 (H) 0.0 - 1.0 K/UL    ABS. EOSINOPHILS 0.3 0.0 - 0.4 K/UL    ABS. BASOPHILS 0.1 0.0 - 0.1 K/UL    ABS. IMM. GRANS. 0.1 (H) 0.00 - 0.04 K/UL    DF AUTOMATED     METABOLIC PANEL, COMPREHENSIVE    Collection Time: 04/30/23  4:51 PM   Result Value Ref Range    Sodium 137 136 - 145 mmol/L    Potassium 2.8 (L) 3.5 - 5.1 mmol/L    Chloride 108 97 - 108 mmol/L    CO2 22 21 - 32 mmol/L    Anion gap 7 5 - 15 mmol/L    Glucose 123 (H) 65 - 100 mg/dL    BUN 14 6 - 20 mg/dL    Creatinine 1.08 0.70 - 1.30 mg/dL    BUN/Creatinine ratio 13 12 - 20      eGFR >60 >60 ml/min/1.73m2    Calcium 8.7 8.5 - 10.1 mg/dL    Bilirubin, total 0.2 0.2 - 1.0 mg/dL    AST (SGOT) 15 15 - 37 U/L    ALT (SGPT) 19 12 - 78 U/L    Alk.  phosphatase 96 45 - 117 U/L    Protein, total 8.1 6.4 - 8.2 g/dL    Albumin 2.6 (L) 3.5 - 5.0 g/dL    Globulin 5.5 (H) 2.0 - 4.0 g/dL    A-G Ratio 0.5 (L) 1.1 - 2.2     LACTIC ACID    Collection Time: 04/30/23  4:51 PM   Result Value Ref Range    Lactic acid 1.5 0.4 - 2.0 mmol/L   PROCALCITONIN    Collection Time: 04/30/23  4:51 PM   Result Value Ref Range    Procalcitonin 0.14 (H) 0 ng/mL   PROTHROMBIN TIME + INR    Collection Time: 04/30/23  4:51 PM   Result Value Ref Range    Prothrombin time 16.8 (H) 11.9 - 14.6 sec    INR 1.4 (H) 0.9 - 1.1     PTT    Collection Time: 04/30/23  4:51 PM   Result Value Ref Range    aPTT 28.5 21.2 - 34.1 sec    aPTT, therapeutic range   82 - 109 sec   TYPE & SCREEN    Collection Time: 04/30/23  5:44 PM   Result Value Ref Range    Crossmatch Expiration 05/03/2023,2359     ABO/Rh(D) A Positive     Antibody screen Negative    GLUCOSE, POC    Collection Time: 04/30/23  9:45 PM   Result Value Ref Range    Glucose (POC) 120 (H) 65 - 100 mg/dL    Performed by Austin Rodrigez    GLUCOSE, POC    Collection Time: 04/30/23 10:42 PM   Result Value Ref Range    Glucose (POC) 181 (H) 65 - 100 mg/dL    Performed by Steve Lawrence    MRSA SCREEN - PCR (NASAL)    Collection Time: 04/30/23 11:15 PM   Result Value Ref Range    MRSA by PCR, Nasal Not Detected Not Detected     METABOLIC PANEL, COMPREHENSIVE    Collection Time: 05/01/23  4:31 AM   Result Value Ref Range    Sodium 139 136 - 145 mmol/L    Potassium 2.8 (L) 3.5 - 5.1 mmol/L    Chloride 111 (H) 97 - 108 mmol/L    CO2 22 21 - 32 mmol/L    Anion gap 6 5 - 15 mmol/L    Glucose 80 65 - 100 mg/dL    BUN 11 6 - 20 mg/dL    Creatinine 0.96 0.70 - 1.30 mg/dL    BUN/Creatinine ratio 11 (L) 12 - 20      eGFR >60 >60 ml/min/1.73m2    Calcium 8.7 8.5 - 10.1 mg/dL    Bilirubin, total 0.3 0.2 - 1.0 mg/dL    AST (SGOT) 10 (L) 15 - 37 U/L    ALT (SGPT) 17 12 - 78 U/L    Alk.  phosphatase 85 45 - 117 U/L    Protein, total 7.3 6.4 - 8.2 g/dL    Albumin 2.3 (L) 3.5 - 5.0 g/dL    Globulin 5.0 (H) 2.0 - 4.0 g/dL    A-G Ratio 0.5 (L) 1.1 - 2.2     CBC WITH AUTOMATED DIFF    Collection Time: 05/01/23  4:31 AM   Result Value Ref Range    WBC 18.6 (H) 4.1 - 11.1 K/uL    RBC 3.93 (L) 4.10 - 5.70 M/uL    HGB 10.3 (L) 12.1 - 17.0 g/dL    HCT 33.3 (L) 36.6 - 50.3 %    MCV 84.7 80.0 - 99.0 FL    MCH 26.2 26.0 - 34.0 PG    MCHC 30.9 30.0 - 36.5 g/dL    RDW 13.9 11.5 - 14.5 %    PLATELET 285 (H) 746 - 400 K/uL    MPV 9.1 8.9 - 12.9 FL    NRBC 0.0 0.0  WBC    ABSOLUTE NRBC 0.00 0.00 - 0.01 K/uL    NEUTROPHILS 75 32 - 75 %    LYMPHOCYTES 12 12 - 49 %    MONOCYTES 10 5 - 13 %    EOSINOPHILS 2 0 - 7 %    BASOPHILS 0 0 - 1 %    IMMATURE GRANULOCYTES 1 (H) 0 - 0.5 %    ABS. NEUTROPHILS 14.1 (H) 1.8 - 8.0 K/UL    ABS. LYMPHOCYTES 2.2 0.8 - 3.5 K/UL    ABS. MONOCYTES 1.8 (H) 0.0 - 1.0 K/UL    ABS. EOSINOPHILS 0.4 0.0 - 0.4 K/UL    ABS. BASOPHILS 0.1 0.0 - 0.1 K/UL    ABS. IMM. GRANS. 0.1 (H) 0.00 - 0.04 K/UL    DF AUTOMATED     PROCALCITONIN    Collection Time: 05/01/23  4:31 AM   Result Value Ref Range    Procalcitonin 0.19 (H) 0 ng/mL   MAGNESIUM    Collection Time: 05/01/23  4:31 AM   Result Value Ref Range    Magnesium 2.2 1.6 - 2.4 mg/dL   C REACTIVE PROTEIN, QT    Collection Time: 05/01/23  4:31 AM   Result Value Ref Range    C-Reactive protein 7.19 (H) 0.00 - 0.60 mg/dL   SED RATE (ESR)    Collection Time: 05/01/23  4:31 AM   Result Value Ref Range    Sed rate, automated 73 (H) 0 - 20 mm/hr   GLUCOSE, POC    Collection Time: 05/01/23  8:53 AM   Result Value Ref Range    Glucose (POC) 85 65 - 100 mg/dL    Performed by Fany Nation            Assessment/Plan:     Left foot Infection  S/p Left great toe amputation  - Podiatry consult possible I&D later today  Continue IV Zosyn and vancomycin pharmacy dosing  MRI of left foot \with possibility of osteomyelitis noted. Appreciate ID consult     Persistent diarrhea  Also on outpatient antibiotics prior to hospitalization. Obtain C. difficile.     Hypertension  Lisinopril 10 mg oral daily  Lasix 20 mg oral daily Hypokalemia  Replete with IV and p.o. potassium. Secondary to ongoing diarrhea. Type 2 Diabetes  Bedside glucose monitoring  Sliding scale coverage  Home dose Jardiance     Hyperthyroidism  Methimazole 5 mg oral daily        DVT Prophylaxis: Lovenox      Care Plan discussed with: Patient/Family and Nurse    Total time spent with patient: 30 minutes. With greater than 50% spent in coordination of care and counseling.     John Maher MD

## 2023-05-01 NOTE — PROGRESS NOTES
Problem: Pain  Goal: *Control of Pain  Outcome: Progressing Towards Goal     Problem: Falls - Risk of  Goal: *Absence of Falls  Description: Document Jolanta Fall Risk and appropriate interventions in the flowsheet. Outcome: Progressing Towards Goal  Note: Fall Risk Interventions:                                Problem: Patient Education: Go to Patient Education Activity  Goal: Patient/Family Education  Outcome: Progressing Towards Goal     Problem: Surgical Wound Care  Goal: *Non-infected Wound: Absence of infection signs and symptoms  Description: Infection control procedures (eg: clean dressings, clean gloves, hand washing, precautions to isolate wound from contamination, sterile instruments used for wound debridement) should be implemented. Outcome: Progressing Towards Goal  Goal: *Infected Wound: Prevention of further infection and promotion of healing  Description: Consider the use of systemic antibiotics in patients with cellulitis, osteomyelitis, bacteremia, or sepsis if there are no contraindications.   Outcome: Progressing Towards Goal  Goal: *Improvement of existing wound and maintenance of skin integrity  Outcome: Progressing Towards Goal

## 2023-05-01 NOTE — PROGRESS NOTES
Reason for Readmission:     Left Foot Infection         RUR Score/Risk Level:  20%       PCP: First and Last name:  Marlyn Fernandez NP   Name of Practice:    Are you a current patient: Yes/No:    Approximate date of last visit: 3 weeks ago   Can you participate in a virtual visit with your PCP:     Is a Care Conference indicated: No      Did you attend your follow up appointment (s): If not, why not:  Yes       Resources/supports as identified by patient/family:   Ankit Johnson and sister       Top Challenges facing patient (as identified by patient/family and CM): Finances/Medication cost?  No issues     Transportation   No issues     Support system or lack thereof? No issues     Living arrangements? No issues         Self-care/ADLs/Cognition? No issues       Current Advanced Directive/Advance Care Plan:  Full Code  CM confirmed patient is a Full Code           Plan for utilizing home health:   Current with [facility]             Transition of Care Plan:    Based on readmission, the patient's previous Plan of Care   has been evaluated and/or modified. The current Transition of Care Plan is:         Patient lives at home with his grandson. Patient uses no DME and is independent in care. Patient was open with [facility] before admission. Choice letter signed and placed on chart to resume care with [facility]. Referral sent. Patient's grandson will likely be his ride home at discharge. Patient uses [address] in [place], [place]. Current Dispo: Home with .

## 2023-05-02 ENCOUNTER — APPOINTMENT (OUTPATIENT)
Dept: MRI IMAGING | Age: 68
DRG: 501 | End: 2023-05-02
Attending: INTERNAL MEDICINE
Payer: MEDICARE

## 2023-05-02 ENCOUNTER — ANESTHESIA EVENT (OUTPATIENT)
Dept: SURGERY | Age: 68
DRG: 501 | End: 2023-05-02
Payer: MEDICARE

## 2023-05-02 ENCOUNTER — ANESTHESIA (OUTPATIENT)
Dept: SURGERY | Age: 68
DRG: 501 | End: 2023-05-02
Payer: MEDICARE

## 2023-05-02 LAB
ANION GAP SERPL CALC-SCNC: 3 MMOL/L (ref 5–15)
BACTERIA SPEC AEROBE CULT: ABNORMAL
BACTERIA SPEC ANAEROBE CULT: ABNORMAL
BASOPHILS # BLD: 0.1 K/UL (ref 0–0.1)
BASOPHILS NFR BLD: 1 % (ref 0–1)
BUN SERPL-MCNC: 5 MG/DL (ref 6–20)
BUN/CREAT SERPL: 6 (ref 12–20)
CA-I BLD-MCNC: 8.2 MG/DL (ref 8.5–10.1)
CHLORIDE SERPL-SCNC: 110 MMOL/L (ref 97–108)
CO2 SERPL-SCNC: 26 MMOL/L (ref 21–32)
CREAT SERPL-MCNC: 0.86 MG/DL (ref 0.7–1.3)
CREAT SERPL-MCNC: 0.87 MG/DL (ref 0.7–1.3)
CRP SERPL-MCNC: 6.37 MG/DL (ref 0–0.6)
DATE LAST DOSE: NORMAL
DIFFERENTIAL METHOD BLD: ABNORMAL
EOSINOPHIL # BLD: 0.5 K/UL (ref 0–0.4)
EOSINOPHIL NFR BLD: 5 % (ref 0–7)
ERYTHROCYTE [DISTWIDTH] IN BLOOD BY AUTOMATED COUNT: 13.9 % (ref 11.5–14.5)
ERYTHROCYTE [DISTWIDTH] IN BLOOD BY AUTOMATED COUNT: 14.2 % (ref 11.5–14.5)
ERYTHROCYTE [SEDIMENTATION RATE] IN BLOOD: 66 MM/HR (ref 0–20)
GLUCOSE BLD STRIP.AUTO-MCNC: 85 MG/DL (ref 65–100)
GLUCOSE BLD STRIP.AUTO-MCNC: 90 MG/DL (ref 65–100)
GLUCOSE BLD STRIP.AUTO-MCNC: 93 MG/DL (ref 65–100)
GLUCOSE SERPL-MCNC: 129 MG/DL (ref 65–100)
HCT VFR BLD AUTO: 30.8 % (ref 36.6–50.3)
HCT VFR BLD AUTO: 32.2 % (ref 36.6–50.3)
HGB BLD-MCNC: 10 G/DL (ref 12.1–17)
HGB BLD-MCNC: 9.5 G/DL (ref 12.1–17)
IMM GRANULOCYTES # BLD AUTO: 0.1 K/UL (ref 0–0.04)
IMM GRANULOCYTES NFR BLD AUTO: 1 % (ref 0–0.5)
LYMPHOCYTES # BLD: 2.1 K/UL (ref 0.8–3.5)
LYMPHOCYTES NFR BLD: 18 % (ref 12–49)
MCH RBC QN AUTO: 26.2 PG (ref 26–34)
MCH RBC QN AUTO: 26.4 PG (ref 26–34)
MCHC RBC AUTO-ENTMCNC: 30.8 G/DL (ref 30–36.5)
MCHC RBC AUTO-ENTMCNC: 31.1 G/DL (ref 30–36.5)
MCV RBC AUTO: 85 FL (ref 80–99)
MCV RBC AUTO: 85.1 FL (ref 80–99)
MONOCYTES # BLD: 1.2 K/UL (ref 0–1)
MONOCYTES NFR BLD: 11 % (ref 5–13)
NEUTS SEG # BLD: 7.6 K/UL (ref 1.8–8)
NEUTS SEG NFR BLD: 64 % (ref 32–75)
NRBC # BLD: 0 K/UL (ref 0–0.01)
NRBC # BLD: 0 K/UL (ref 0–0.01)
NRBC BLD-RTO: 0 PER 100 WBC
NRBC BLD-RTO: 0 PER 100 WBC
PERFORMED BY, TECHID: NORMAL
PLATELET # BLD AUTO: 392 K/UL (ref 150–400)
PLATELET # BLD AUTO: 394 K/UL (ref 150–400)
PMV BLD AUTO: 9.2 FL (ref 8.9–12.9)
PMV BLD AUTO: 9.4 FL (ref 8.9–12.9)
POTASSIUM SERPL-SCNC: 2.9 MMOL/L (ref 3.5–5.1)
PROCALCITONIN SERPL-MCNC: 0.08 NG/ML
RBC # BLD AUTO: 3.62 M/UL (ref 4.1–5.7)
RBC # BLD AUTO: 3.79 M/UL (ref 4.1–5.7)
REPORTED DOSE,DOSE: NORMAL UNITS
SODIUM SERPL-SCNC: 139 MMOL/L (ref 136–145)
VANCOMYCIN SERPL-MCNC: 6.5 UG/ML
WBC # BLD AUTO: 11.6 K/UL (ref 4.1–11.1)
WBC # BLD AUTO: 9.3 K/UL (ref 4.1–11.1)

## 2023-05-02 PROCEDURE — 77030039465 HC PRB ASPIR SONICVAC MSNX -F: Performed by: PODIATRIST

## 2023-05-02 PROCEDURE — 84145 PROCALCITONIN (PCT): CPT

## 2023-05-02 PROCEDURE — 87075 CULTR BACTERIA EXCEPT BLOOD: CPT

## 2023-05-02 PROCEDURE — 73718 MRI LOWER EXTREMITY W/O DYE: CPT

## 2023-05-02 PROCEDURE — 85027 COMPLETE CBC AUTOMATED: CPT

## 2023-05-02 PROCEDURE — 13160 SEC CLSR SURG WND/DEHSN XTN: CPT | Performed by: PODIATRIST

## 2023-05-02 PROCEDURE — 74011000258 HC RX REV CODE- 258: Performed by: NURSE PRACTITIONER

## 2023-05-02 PROCEDURE — 74011000250 HC RX REV CODE- 250: Performed by: NURSE ANESTHETIST, CERTIFIED REGISTERED

## 2023-05-02 PROCEDURE — 82962 GLUCOSE BLOOD TEST: CPT

## 2023-05-02 PROCEDURE — 74011250637 HC RX REV CODE- 250/637: Performed by: NURSE PRACTITIONER

## 2023-05-02 PROCEDURE — 86140 C-REACTIVE PROTEIN: CPT

## 2023-05-02 PROCEDURE — 85652 RBC SED RATE AUTOMATED: CPT

## 2023-05-02 PROCEDURE — 74011000250 HC RX REV CODE- 250: Performed by: PODIATRIST

## 2023-05-02 PROCEDURE — 74011636637 HC RX REV CODE- 636/637: Performed by: NURSE PRACTITIONER

## 2023-05-02 PROCEDURE — 99232 SBSQ HOSP IP/OBS MODERATE 35: CPT | Performed by: INTERNAL MEDICINE

## 2023-05-02 PROCEDURE — 76210000063 HC OR PH I REC FIRST 0.5 HR: Performed by: PODIATRIST

## 2023-05-02 PROCEDURE — 2709999900 HC NON-CHARGEABLE SUPPLY: Performed by: PODIATRIST

## 2023-05-02 PROCEDURE — 85025 COMPLETE CBC W/AUTO DIFF WBC: CPT

## 2023-05-02 PROCEDURE — 74011250636 HC RX REV CODE- 250/636: Performed by: NURSE PRACTITIONER

## 2023-05-02 PROCEDURE — 80048 BASIC METABOLIC PNL TOTAL CA: CPT

## 2023-05-02 PROCEDURE — 36415 COLL VENOUS BLD VENIPUNCTURE: CPT

## 2023-05-02 PROCEDURE — 87186 SC STD MICRODIL/AGAR DIL: CPT

## 2023-05-02 PROCEDURE — 76010000138 HC OR TIME 0.5 TO 1 HR: Performed by: PODIATRIST

## 2023-05-02 PROCEDURE — 65270000029 HC RM PRIVATE

## 2023-05-02 PROCEDURE — 80202 ASSAY OF VANCOMYCIN: CPT

## 2023-05-02 PROCEDURE — 74011000250 HC RX REV CODE- 250: Performed by: ANESTHESIOLOGY

## 2023-05-02 PROCEDURE — 77030031129 HC SUT MCRYL1 J&J -A: Performed by: PODIATRIST

## 2023-05-02 PROCEDURE — 77030002986 HC SUT PROL J&J -A: Performed by: PODIATRIST

## 2023-05-02 PROCEDURE — 74011250636 HC RX REV CODE- 250/636: Performed by: NURSE ANESTHETIST, CERTIFIED REGISTERED

## 2023-05-02 PROCEDURE — 87205 SMEAR GRAM STAIN: CPT

## 2023-05-02 PROCEDURE — 74011250636 HC RX REV CODE- 250/636: Performed by: INTERNAL MEDICINE

## 2023-05-02 PROCEDURE — 77030039464 HC TU IRR ENDO DISP MSNX -B: Performed by: PODIATRIST

## 2023-05-02 PROCEDURE — 74011250636 HC RX REV CODE- 250/636

## 2023-05-02 PROCEDURE — 0JQR0ZZ REPAIR LEFT FOOT SUBCUTANEOUS TISSUE AND FASCIA, OPEN APPROACH: ICD-10-PCS | Performed by: PODIATRIST

## 2023-05-02 PROCEDURE — 76060000032 HC ANESTHESIA 0.5 TO 1 HR: Performed by: PODIATRIST

## 2023-05-02 RX ORDER — SODIUM CHLORIDE 9 MG/ML
INJECTION, SOLUTION INTRAVENOUS
Status: DISCONTINUED | OUTPATIENT
Start: 2023-05-02 | End: 2023-05-02 | Stop reason: HOSPADM

## 2023-05-02 RX ORDER — SODIUM CHLORIDE 0.9 % (FLUSH) 0.9 %
5-40 SYRINGE (ML) INJECTION EVERY 8 HOURS
Status: DISCONTINUED | OUTPATIENT
Start: 2023-05-02 | End: 2023-05-03

## 2023-05-02 RX ORDER — LIDOCAINE HYDROCHLORIDE 10 MG/ML
0.1 INJECTION, SOLUTION EPIDURAL; INFILTRATION; INTRACAUDAL; PERINEURAL AS NEEDED
Status: DISCONTINUED | OUTPATIENT
Start: 2023-05-02 | End: 2023-05-02

## 2023-05-02 RX ORDER — FENTANYL CITRATE 50 UG/ML
50 INJECTION, SOLUTION INTRAMUSCULAR; INTRAVENOUS AS NEEDED
Status: DISCONTINUED | OUTPATIENT
Start: 2023-05-02 | End: 2023-05-02

## 2023-05-02 RX ORDER — NORETHINDRONE AND ETHINYL ESTRADIOL 0.5-0.035
5 KIT ORAL AS NEEDED
Status: DISCONTINUED | OUTPATIENT
Start: 2023-05-02 | End: 2023-05-04 | Stop reason: HOSPADM

## 2023-05-02 RX ORDER — ONDANSETRON 2 MG/ML
4 INJECTION INTRAMUSCULAR; INTRAVENOUS AS NEEDED
Status: DISCONTINUED | OUTPATIENT
Start: 2023-05-02 | End: 2023-05-04 | Stop reason: HOSPADM

## 2023-05-02 RX ORDER — POTASSIUM CHLORIDE 7.45 MG/ML
INJECTION INTRAVENOUS
Status: COMPLETED
Start: 2023-05-02 | End: 2023-05-02

## 2023-05-02 RX ORDER — LIDOCAINE HYDROCHLORIDE 10 MG/ML
INJECTION INFILTRATION; PERINEURAL AS NEEDED
Status: DISCONTINUED | OUTPATIENT
Start: 2023-05-02 | End: 2023-05-02 | Stop reason: HOSPADM

## 2023-05-02 RX ORDER — LIDOCAINE HYDROCHLORIDE 20 MG/ML
INJECTION, SOLUTION EPIDURAL; INFILTRATION; INTRACAUDAL; PERINEURAL AS NEEDED
Status: DISCONTINUED | OUTPATIENT
Start: 2023-05-02 | End: 2023-05-02 | Stop reason: HOSPADM

## 2023-05-02 RX ORDER — FENTANYL CITRATE 50 UG/ML
50 INJECTION, SOLUTION INTRAMUSCULAR; INTRAVENOUS
Status: DISCONTINUED | OUTPATIENT
Start: 2023-05-02 | End: 2023-05-02

## 2023-05-02 RX ORDER — POTASSIUM CHLORIDE 7.45 MG/ML
10 INJECTION INTRAVENOUS ONCE
Status: COMPLETED | OUTPATIENT
Start: 2023-05-02 | End: 2023-05-02

## 2023-05-02 RX ORDER — HYDROMORPHONE HYDROCHLORIDE 1 MG/ML
0.5 INJECTION, SOLUTION INTRAMUSCULAR; INTRAVENOUS; SUBCUTANEOUS
Status: DISCONTINUED | OUTPATIENT
Start: 2023-05-02 | End: 2023-05-02

## 2023-05-02 RX ORDER — PROPOFOL 10 MG/ML
INJECTION, EMULSION INTRAVENOUS
Status: DISCONTINUED | OUTPATIENT
Start: 2023-05-02 | End: 2023-05-02 | Stop reason: HOSPADM

## 2023-05-02 RX ORDER — SODIUM CHLORIDE 0.9 % (FLUSH) 0.9 %
5-40 SYRINGE (ML) INJECTION AS NEEDED
Status: DISCONTINUED | OUTPATIENT
Start: 2023-05-02 | End: 2023-05-02

## 2023-05-02 RX ORDER — POVIDONE-IODINE 10 MG/G
OINTMENT TOPICAL AS NEEDED
Status: DISCONTINUED | OUTPATIENT
Start: 2023-05-02 | End: 2023-05-02 | Stop reason: HOSPADM

## 2023-05-02 RX ORDER — DIPHENHYDRAMINE HYDROCHLORIDE 50 MG/ML
12.5 INJECTION, SOLUTION INTRAMUSCULAR; INTRAVENOUS AS NEEDED
Status: ACTIVE | OUTPATIENT
Start: 2023-05-02 | End: 2023-05-02

## 2023-05-02 RX ORDER — MORPHINE SULFATE 2 MG/ML
2 INJECTION, SOLUTION INTRAMUSCULAR; INTRAVENOUS
Status: DISCONTINUED | OUTPATIENT
Start: 2023-05-02 | End: 2023-05-04 | Stop reason: HOSPADM

## 2023-05-02 RX ADMIN — VANCOMYCIN HYDROCHLORIDE 1250 MG: 1.25 INJECTION, POWDER, LYOPHILIZED, FOR SOLUTION INTRAVENOUS at 20:51

## 2023-05-02 RX ADMIN — ATORVASTATIN CALCIUM 10 MG: 10 TABLET, FILM COATED ORAL at 08:34

## 2023-05-02 RX ADMIN — POTASSIUM CHLORIDE 10 MEQ: 7.45 INJECTION INTRAVENOUS at 15:11

## 2023-05-02 RX ADMIN — ENOXAPARIN SODIUM 40 MG: 100 INJECTION SUBCUTANEOUS at 08:33

## 2023-05-02 RX ADMIN — LIDOCAINE HYDROCHLORIDE 60 MG: 20 INJECTION, SOLUTION EPIDURAL; INFILTRATION; INTRACAUDAL; PERINEURAL at 15:32

## 2023-05-02 RX ADMIN — SODIUM CHLORIDE 75 ML/HR: 9 INJECTION, SOLUTION INTRAVENOUS at 20:10

## 2023-05-02 RX ADMIN — NIFEDIPINE 30 MG: 30 TABLET, FILM COATED, EXTENDED RELEASE ORAL at 08:39

## 2023-05-02 RX ADMIN — SODIUM CHLORIDE, PRESERVATIVE FREE 10 ML: 5 INJECTION INTRAVENOUS at 21:30

## 2023-05-02 RX ADMIN — PIPERACILLIN AND TAZOBACTAM 3.38 G: 3; .375 INJECTION, POWDER, LYOPHILIZED, FOR SOLUTION INTRAVENOUS at 22:09

## 2023-05-02 RX ADMIN — POTASSIUM CHLORIDE 10 MEQ: 7.46 INJECTION, SOLUTION INTRAVENOUS at 15:11

## 2023-05-02 RX ADMIN — INSULIN GLARGINE 40 UNITS: 100 INJECTION, SOLUTION SUBCUTANEOUS at 21:05

## 2023-05-02 RX ADMIN — SODIUM CHLORIDE 75 ML/HR: 9 INJECTION, SOLUTION INTRAVENOUS at 06:08

## 2023-05-02 RX ADMIN — POTASSIUM CHLORIDE 20 MEQ: 1500 TABLET, EXTENDED RELEASE ORAL at 08:34

## 2023-05-02 RX ADMIN — FLUOXETINE 40 MG: 20 CAPSULE ORAL at 08:34

## 2023-05-02 RX ADMIN — LISINOPRIL 10 MG: 10 TABLET ORAL at 08:34

## 2023-05-02 RX ADMIN — PROPOFOL 50 MCG/KG/MIN: 10 INJECTION, EMULSION INTRAVENOUS at 15:32

## 2023-05-02 RX ADMIN — PIPERACILLIN AND TAZOBACTAM 3.38 G: 3; .375 INJECTION, POWDER, LYOPHILIZED, FOR SOLUTION INTRAVENOUS at 14:03

## 2023-05-02 RX ADMIN — SODIUM CHLORIDE: 9 INJECTION, SOLUTION INTRAVENOUS at 15:28

## 2023-05-02 RX ADMIN — PIPERACILLIN AND TAZOBACTAM 3.38 G: 3; .375 INJECTION, POWDER, LYOPHILIZED, FOR SOLUTION INTRAVENOUS at 05:59

## 2023-05-02 RX ADMIN — FUROSEMIDE 20 MG: 40 TABLET ORAL at 08:33

## 2023-05-02 RX ADMIN — METHIMAZOLE 5 MG: 5 TABLET ORAL at 08:39

## 2023-05-02 RX ADMIN — SODIUM CHLORIDE, PRESERVATIVE FREE 10 ML: 5 INJECTION INTRAVENOUS at 17:05

## 2023-05-02 RX ADMIN — MORPHINE SULFATE 2 MG: 2 INJECTION, SOLUTION INTRAMUSCULAR; INTRAVENOUS at 22:25

## 2023-05-02 NOTE — CONSULTS
[place] [facility]    Consult Note    Subjective:         Date of Consultation: May 1, 2023     Referring Physician: Tracy Dexter MD    Patient is a 77 y.o.  male who is being seen for nonhealing ulcer to the left foot. Patient has a hx of  diabetic, with history of osteomyelitis left great toe (proximal and distal phalanges), status post amputation in March 2023 Patient followed up with Dr. Cecy Holder last Tuesday. He recommend patient to be readmitted due to nonhealing ulcer with bone exposure. .    Patient Active Problem List    Diagnosis Date Noted    Left foot infection 04/30/2023    Type 2 diabetes mellitus with complication, without long-term current use of insulin ([address] 04/22/2023    Gastroenteritis 04/22/2023    VARINDER (acute kidney injury) ([address] 04/22/2023    PAD (peripheral artery disease) ([address] 04/22/2023    Hypokalemia 04/21/2023    Essential hypertension 12/12/2020     Past Medical History:   Diagnosis Date    Essential hypertension     GERD (gastroesophageal reflux disease)     Mixed hyperlipidemia     Moderate episode of recurrent major depressive disorder ([address] 2/16/2023    Osteomyelitis ([address] 3/20/2023    Pneumonia due to COVID-19 virus 2/17/2021    Renal mass, left     Type 2 diabetes mellitus with complication, without long-term current use of insulin ([address]       Past Surgical History:   Procedure Laterality Date    COLONOSCOPY N/A 01/06/2023    COLONOSCOPY (TIVA) performed by Juana Gary MD at [facility] ENDOSCOPY    HX AMPUTATION TOE Left     HX COLONOSCOPY      HX CORONARY STENT PLACEMENT  2018    x1    HX HEART CATHETERIZATION      patient stated stents placed     HX NEPHRECTOMY Left 08/23/2021    robotic left partial nephrectomy,    HX UROLOGICAL  08/23/2021     intraoperative renal ultrasound    [place] UNLISTED PROCEDURE CARDIAC SURGERY  2018    BYPASS      Family History   Problem Relation Age of Onset    Hypertension Mother     Diabetes Mother     Hypertension Father Heart Disease Father       Social History     Tobacco Use    Smoking status: Never    Smokeless tobacco: Never   Substance Use Topics    Alcohol use: Not Currently     Current Facility-Administered Medications   Medication Dose Route Frequency Provider Last Rate Last Admin    atorvastatin (LIPITOR) tablet 10 mg  10 mg Oral DAILY Jason Acosta, NP   10 mg at 05/01/23 0837    FLUoxetine (PROzac) capsule 40 mg  40 mg Oral DAILY Jason Acosta, NP   40 mg at 05/01/23 0846    furosemide (LASIX) tablet 20 mg  20 mg Oral DAILY Jason Ocean, NP   20 mg at 05/01/23 0836    insulin glargine (LANTUS) injection 40 Units  40 Units SubCUTAneous QHS Jason Acosta, NP   40 Units at 05/01/23 2152    lisinopriL (PRINIVIL, ZESTRIL) tablet 10 mg  10 mg Oral DAILY Jason Acosta, NP   10 mg at 05/01/23 6420    methIMAzole (TAPAZOLE) tablet 5 mg  5 mg Oral DAILY Jason Acosta, NP   5 mg at 05/01/23 0836    NIFEdipine ER (PROCARDIA XL) tablet 30 mg  30 mg Oral DAILY Jason Acosta, NP   30 mg at 05/01/23 0837    sodium chloride (NS) flush 5-40 mL  5-40 mL IntraVENous Q8H Geraldo Jaimie F, NP   10 mL at 05/01/23 2153    sodium chloride (NS) flush 5-40 mL  5-40 mL IntraVENous PRN Jason Acosta NP        acetaminophen (TYLENOL) tablet 650 mg  650 mg Oral Q6H PRN Jason Acosta NP        Or    acetaminophen (TYLENOL) suppository 650 mg  650 mg Rectal Q6H PRN Jason Acosta NP        polyethylene glycol (MIRALAX) packet 17 g  17 g Oral DAILY PRN Jason Acosta NP        ondansetron (ZOFRAN ODT) tablet 4 mg  4 mg Oral Q8H PRN Jason Acosta NP        Or    ondansetron (ZOFRAN) injection 4 mg  4 mg IntraVENous Q6H PRN Jason Acosta NP        enoxaparin (LOVENOX) injection 40 mg  40 mg SubCUTAneous DAILY Jason Acosta NP        insulin lispro (HUMALOG) injection   SubCUTAneous AC&HS Jason Acosta NP   4 Units at 05/01/23 2151    glucose chewable tablet 16 g  4 Tablet Oral PRN Stanley Hamming, NP        glucagon (GLUCAGEN) injection 1 mg  1 mg IntraMUSCular PRN Stanley Luising, NP        dextrose 10% infusion 0-250 mL  0-250 mL IntraVENous PRN Stanley Luising, NP        0.9% sodium chloride infusion  75 mL/hr IntraVENous CONTINUOUS Stanley Hamming, NP 75 mL/hr at 04/30/23 1941 75 mL/hr at 04/30/23 1941    piperacillin-tazobactam (ZOSYN) 3.375 g in 0.9% sodium chloride (MBP/ADV) 100 mL MBP  3.375 g IntraVENous Q8H Stanley Hamming, NP 25 mL/hr at 05/01/23 1649 3.375 g at 05/01/23 1649    potassium chloride (K-DUR, KLOR-CON M20) SR tablet 20 mEq  20 mEq Oral DAILY Stanley Luising, NP   20 mEq at 05/01/23 0846    HYDROmorphone (DILAUDID) injection 1 mg  1 mg IntraVENous Q4H PRN Stanley Luising, NP        VANCOMYCIN INFORMATION NOTE   Other Rx Dosing/Monitoring Stanley Uribe, NP        vancomycin (VANCOCIN) 1,250 mg in 0.9% sodium chloride 250 mL ([id number])  1,250 mg IntraVENous Q24H Stanley Luising, NP   1,250 mg at 05/01/23 2150    [START ON 5/2/2023] VANCOMYCIN RANDOM LEVEL DUE ON 5/2 AT 1900   Other ONCE Stanley Luising, NP          No Known Allergies     Review of Systems:  Constitutional: No fever, No chills, No sweats, No weakness, No fatigue  Respiratory: No shortness of breath, No cough, No sputum production, No hemoptysis, No wheezing, No cyanosis. Cardiovascular: No chest pain, No palpitations, No bradycardia, No tachycardia, No peripheral edema, No syncope. Gastrointestinal: No nausea, No vomiting, No diarrhea, No constipation, No heartburn, No abdominal pain. Endocrine: No excessive thirst, No polyuria, No cold intolerance, No heat intolerance, No excessive hunger. Immunologic: No immunocompromised, No recurrent fevers, No recurrent infections. Musculoskeletal: No back pain, No neck pain, No joint pain, No muscle pain, No claudication, No decreased range of motion, No trauma.   Integumentary: Positive Ulcer foot  Neurologic: Alert and oriented X4, No abnormal balance, No headache, No confusion, positive numbness, No tingling. Psychiatric: No anxiety, No depression, No leila. Objective:     Patient Vitals for the past 8 hrs:   BP Temp Pulse Resp SpO2   23 121/75 98.5 °F (36.9 °C) 70 18 99 %     Temp (24hrs), Av.3 °F (36.8 °C), Min:98.2 °F (36.8 °C), Max:98.5 °F (36.9 °C)      Physical Exam:    GEN: Pt is AAOx4 and in NAD. No dressing noted to B/L LE. No family noted at [facility]  DERM: Wound left foot. Bone exposed. VASC: Pedal pulses (DP/PT) not palpable to B/L LE. CFT<3sec to all digits of B/L LE. Pedal hair growth noted to the level of the digits for B/L LE. Skin temp is warm to warm from proximal to distal for B/L LE. Neg homans/carole signs to B/L LE. No varicosities or telangectasias noted to B/L LE.  NEURO: Protective and epicritic sensations grossly absent to B/L LE  MSK: (-) POP, Amputation left hallux Good muscle tone and bulk noted to B/L LE.  PSYCH: Cooperative with normal mood and affect     Lab Review:   Recent Results (from the past 24 hour(s))   MRSA SCREEN - PCR (NASAL)    Collection Time: 23 11:15 PM   Result Value Ref Range    MRSA by PCR, Nasal Not Detected Not Detected     METABOLIC PANEL, COMPREHENSIVE    Collection Time: 23  4:31 AM   Result Value Ref Range    Sodium 139 136 - 145 mmol/L    Potassium 2.8 (L) 3.5 - 5.1 mmol/L    Chloride 111 (H) 97 - 108 mmol/L    CO2 22 21 - 32 mmol/L    Anion gap 6 5 - 15 mmol/L    Glucose 80 65 - 100 mg/dL    BUN 11 6 - 20 mg/dL    Creatinine 0.96 0.70 - 1.30 mg/dL    BUN/Creatinine ratio 11 (L) 12 - 20      eGFR >60 >60 ml/min/1.73m2    Calcium 8.7 8.5 - 10.1 mg/dL    Bilirubin, total 0.3 0.2 - 1.0 mg/dL    AST (SGOT) 10 (L) 15 - 37 U/L    ALT (SGPT) 17 12 - 78 U/L    Alk.  phosphatase 85 45 - 117 U/L    Protein, total 7.3 6.4 - 8.2 g/dL    Albumin 2.3 (L) 3.5 - 5.0 g/dL    Globulin 5.0 (H) 2.0 - 4.0 g/dL    A-G Ratio 0.5 (L) 1.1 - 2.2     CBC WITH AUTOMATED DIFF Collection Time: 05/01/23  4:31 AM   Result Value Ref Range    WBC 18.6 (H) 4.1 - 11.1 K/uL    RBC 3.93 (L) 4.10 - 5.70 M/uL    HGB 10.3 (L) 12.1 - 17.0 g/dL    HCT 33.3 (L) 36.6 - 50.3 %    MCV 84.7 80.0 - 99.0 FL    MCH 26.2 26.0 - 34.0 PG    MCHC 30.9 30.0 - 36.5 g/dL    RDW 13.9 11.5 - 14.5 %    PLATELET 446 (H) 548 - 400 K/uL    MPV 9.1 8.9 - 12.9 FL    NRBC 0.0 0.0  WBC    ABSOLUTE NRBC 0.00 0.00 - 0.01 K/uL    NEUTROPHILS 75 32 - 75 %    LYMPHOCYTES 12 12 - 49 %    MONOCYTES 10 5 - 13 %    EOSINOPHILS 2 0 - 7 %    BASOPHILS 0 0 - 1 %    IMMATURE GRANULOCYTES 1 (H) 0 - 0.5 %    ABS. NEUTROPHILS 14.1 (H) 1.8 - 8.0 K/UL    ABS. LYMPHOCYTES 2.2 0.8 - 3.5 K/UL    ABS. MONOCYTES 1.8 (H) 0.0 - 1.0 K/UL    ABS. EOSINOPHILS 0.4 0.0 - 0.4 K/UL    ABS. BASOPHILS 0.1 0.0 - 0.1 K/UL    ABS. IMM.  GRANS. 0.1 (H) 0.00 - 0.04 K/UL    DF AUTOMATED     PROCALCITONIN    Collection Time: 05/01/23  4:31 AM   Result Value Ref Range    Procalcitonin 0.19 (H) 0 ng/mL   MAGNESIUM    Collection Time: 05/01/23  4:31 AM   Result Value Ref Range    Magnesium 2.2 1.6 - 2.4 mg/dL   C REACTIVE PROTEIN, QT    Collection Time: 05/01/23  4:31 AM   Result Value Ref Range    C-Reactive protein 7.19 (H) 0.00 - 0.60 mg/dL   SED RATE (ESR)    Collection Time: 05/01/23  4:31 AM   Result Value Ref Range    Sed rate, automated 73 (H) 0 - 20 mm/hr   GLUCOSE, POC    Collection Time: 05/01/23  8:53 AM   Result Value Ref Range    Glucose (POC) 85 65 - 100 mg/dL    Performed by [address], POC    Collection Time: 05/01/23 11:21 AM   Result Value Ref Range    Glucose (POC) 92 65 - 100 mg/dL    Performed by Trace Reyes, POC    Collection Time: 05/01/23  4:32 PM   Result Value Ref Range    Glucose (POC) 139 (H) 65 - 100 mg/dL    Performed by Darell HANDY, POC    Collection Time: 05/01/23  9:14 PM   Result Value Ref Range    Glucose (POC) 211 (H) 65 - 100 mg/dL    Performed by Luz Elena Badillo Results:    MRI Results (most recent):  Results from [facility] encounter on 03/20/23    MRI FOOT LT WO CONT    Narrative  EXAM:  MRI FOOT LT WO CONT    INDICATION: Osteomyelitis, left great toe    COMPARISON: None    TECHNIQUE: Axial, coronal, and sagittal MRI of the left mid and forefoot in the  T1, T2, and inversion-recovery pulse sequences with and without fat saturation . CONTRAST: None. FINDINGS:    Bone marrow: Prominent patchy marrow edema versus confluent marrow replacement  in the medial base of the first distal phalanx and medial distal aspect of the  first proximal phalanx, concerning for acute osteomyelitis. Patchy edema signal  throughout the remaining first proximal phalanx is likely reactive. Joint fluid: Physiologic. Tendons: Intact. Muscles: Diffuse atrophy and fatty infiltration and patchy edema signal.    Neurovascular bundles: Not well evaluated. Articular cartilage: Severe hallux sesamoid complex osteoarthritis. Mild-to-moderate osteoarthritis throughout the remaining visualized mid and  forefoot. Soft tissues: Ulceration at the medial aspect of the great toe. Edema signal and  swelling involving the plantar aspect of the great toe and medial forefoot. No  definite focal fluid collection to suggest abscess. 16 mm x 10 mm x 7 mm  lobulated intermediate T2 and low T1 signal focus in the subcutaneous soft  tissues of the plantar mid foot with interspersed fat signal (601-4, 301-14),  incompletely evaluated, but may reflect plantar fibromatosis. Other: Several foci of susceptibility between the fourth and fifth metatarsal  necks likely correlating to tiny metallic foreign bodies are seen on prior  radiographs. Impression  1. Ulceration of the medial aspect of the great toe with findings concerning  for acute osteomyelitis in the first proximal and distal phalanges. 2.  Suspected focus of plantar fibromatosis in the midfoot.      XR Results (most recent):  Results from [place] encounter on 04/30/23    XR FOOT LT MIN 3 V    Narrative  EXAM: XR FOOT LT MIN 3 V    INDICATION: Left great toe amputaion. COMPARISON: Left foot radiographs 3/20/2023. FINDINGS: Three views of the left foot demonstrate interval great toe  amputation. Overlying soft tissue defect with swelling and gas is likely  postsurgical. Subtle apparent osteolysis at the first metatarsal head, cannot  exclude acute osteomyelitis. Impression  Interval great toe amputation. Subtle apparent osteolysis at the  first metatarsal head, cannot exclude acute osteomyelitis. Assessment:   Osteomyelitis left foot  Chronic nonhealing ulcer left foot  Uncontrolled diabetes mellitus with neuropathy  PVD    Plan:     Patient seen and evaluated at bedside  - Current labs personally reviewed and findings dicussed with patient  - Xrays seen and evaluated. MRI of the left foot pending  -Discussed with patient that I recommend a debridement of nonviable tissue and bone with secondary wound closure. All risk benefits and complications were discussed with patient. No guarantees were given. Patient to be n.p.o. at midnight. Patient was scheduled for surgery tomorrow. Loli Wallace DPM, CW, [address] [place] [address], [facility]  Karenlc Santiago: [phone]  F: [phone]

## 2023-05-03 ENCOUNTER — APPOINTMENT (OUTPATIENT)
Dept: GENERAL RADIOLOGY | Age: 68
DRG: 501 | End: 2023-05-03
Attending: INTERNAL MEDICINE
Payer: MEDICARE

## 2023-05-03 LAB
ANION GAP SERPL CALC-SCNC: 7 MMOL/L (ref 5–15)
BASOPHILS # BLD: 0.1 K/UL (ref 0–0.1)
BASOPHILS NFR BLD: 1 % (ref 0–1)
BUN SERPL-MCNC: 5 MG/DL (ref 6–20)
BUN/CREAT SERPL: 6 (ref 12–20)
CA-I BLD-MCNC: 8.1 MG/DL (ref 8.5–10.1)
CHLORIDE SERPL-SCNC: 108 MMOL/L (ref 97–108)
CO2 SERPL-SCNC: 23 MMOL/L (ref 21–32)
CREAT SERPL-MCNC: 0.81 MG/DL (ref 0.7–1.3)
CRP SERPL-MCNC: 3.34 MG/DL (ref 0–0.6)
DIFFERENTIAL METHOD BLD: ABNORMAL
EOSINOPHIL # BLD: 0.5 K/UL (ref 0–0.4)
EOSINOPHIL NFR BLD: 4 % (ref 0–7)
ERYTHROCYTE [DISTWIDTH] IN BLOOD BY AUTOMATED COUNT: 14.1 % (ref 11.5–14.5)
ERYTHROCYTE [SEDIMENTATION RATE] IN BLOOD: 89 MM/HR (ref 0–20)
GLUCOSE BLD STRIP.AUTO-MCNC: 140 MG/DL (ref 65–100)
GLUCOSE BLD STRIP.AUTO-MCNC: 162 MG/DL (ref 65–100)
GLUCOSE BLD STRIP.AUTO-MCNC: 268 MG/DL (ref 65–100)
GLUCOSE BLD STRIP.AUTO-MCNC: 72 MG/DL (ref 65–100)
GLUCOSE SERPL-MCNC: 117 MG/DL (ref 65–100)
HCT VFR BLD AUTO: 29.4 % (ref 36.6–50.3)
HGB BLD-MCNC: 9.1 G/DL (ref 12.1–17)
IMM GRANULOCYTES # BLD AUTO: 0 K/UL (ref 0–0.04)
IMM GRANULOCYTES NFR BLD AUTO: 0 % (ref 0–0.5)
LYMPHOCYTES # BLD: 2.2 K/UL (ref 0.8–3.5)
LYMPHOCYTES NFR BLD: 20 % (ref 12–49)
MCH RBC QN AUTO: 26.3 PG (ref 26–34)
MCHC RBC AUTO-ENTMCNC: 31 G/DL (ref 30–36.5)
MCV RBC AUTO: 85 FL (ref 80–99)
MONOCYTES # BLD: 1.1 K/UL (ref 0–1)
MONOCYTES NFR BLD: 10 % (ref 5–13)
NEUTS SEG # BLD: 7.3 K/UL (ref 1.8–8)
NEUTS SEG NFR BLD: 65 % (ref 32–75)
NRBC # BLD: 0 K/UL (ref 0–0.01)
NRBC BLD-RTO: 0 PER 100 WBC
PERFORMED BY, TECHID: ABNORMAL
PERFORMED BY, TECHID: NORMAL
PLATELET # BLD AUTO: 401 K/UL (ref 150–400)
PMV BLD AUTO: 9.6 FL (ref 8.9–12.9)
POTASSIUM SERPL-SCNC: 3.4 MMOL/L (ref 3.5–5.1)
PROCALCITONIN SERPL-MCNC: <0.05 NG/ML
RBC # BLD AUTO: 3.46 M/UL (ref 4.1–5.7)
SODIUM SERPL-SCNC: 138 MMOL/L (ref 136–145)
WBC # BLD AUTO: 11.1 K/UL (ref 4.1–11.1)

## 2023-05-03 PROCEDURE — 99232 SBSQ HOSP IP/OBS MODERATE 35: CPT | Performed by: INTERNAL MEDICINE

## 2023-05-03 PROCEDURE — 86140 C-REACTIVE PROTEIN: CPT

## 2023-05-03 PROCEDURE — 73620 X-RAY EXAM OF FOOT: CPT

## 2023-05-03 PROCEDURE — 74011000250 HC RX REV CODE- 250: Performed by: NURSE PRACTITIONER

## 2023-05-03 PROCEDURE — 85652 RBC SED RATE AUTOMATED: CPT

## 2023-05-03 PROCEDURE — 82962 GLUCOSE BLOOD TEST: CPT

## 2023-05-03 PROCEDURE — 80048 BASIC METABOLIC PNL TOTAL CA: CPT

## 2023-05-03 PROCEDURE — 65270000029 HC RM PRIVATE

## 2023-05-03 PROCEDURE — 99024 POSTOP FOLLOW-UP VISIT: CPT | Performed by: PODIATRIST

## 2023-05-03 PROCEDURE — 74011250637 HC RX REV CODE- 250/637: Performed by: NURSE PRACTITIONER

## 2023-05-03 PROCEDURE — 74011000258 HC RX REV CODE- 258: Performed by: NURSE PRACTITIONER

## 2023-05-03 PROCEDURE — 74011250637 HC RX REV CODE- 250/637: Performed by: STUDENT IN AN ORGANIZED HEALTH CARE EDUCATION/TRAINING PROGRAM

## 2023-05-03 PROCEDURE — 85025 COMPLETE CBC W/AUTO DIFF WBC: CPT

## 2023-05-03 PROCEDURE — 74011250636 HC RX REV CODE- 250/636: Performed by: NURSE PRACTITIONER

## 2023-05-03 PROCEDURE — 74011250636 HC RX REV CODE- 250/636: Performed by: STUDENT IN AN ORGANIZED HEALTH CARE EDUCATION/TRAINING PROGRAM

## 2023-05-03 PROCEDURE — 74011250636 HC RX REV CODE- 250/636: Performed by: INTERNAL MEDICINE

## 2023-05-03 PROCEDURE — 84145 PROCALCITONIN (PCT): CPT

## 2023-05-03 PROCEDURE — 36415 COLL VENOUS BLD VENIPUNCTURE: CPT

## 2023-05-03 PROCEDURE — 74011636637 HC RX REV CODE- 636/637: Performed by: NURSE PRACTITIONER

## 2023-05-03 RX ORDER — SODIUM CHLORIDE AND POTASSIUM CHLORIDE 150; 900 MG/100ML; MG/100ML
INJECTION, SOLUTION INTRAVENOUS CONTINUOUS
Status: DISCONTINUED | OUTPATIENT
Start: 2023-05-03 | End: 2023-05-04 | Stop reason: HOSPADM

## 2023-05-03 RX ORDER — OXYCODONE HYDROCHLORIDE 5 MG/1
5 TABLET ORAL
Status: DISCONTINUED | OUTPATIENT
Start: 2023-05-03 | End: 2023-05-04 | Stop reason: HOSPADM

## 2023-05-03 RX ORDER — POTASSIUM CHLORIDE 20 MEQ/1
40 TABLET, EXTENDED RELEASE ORAL DAILY
Status: DISCONTINUED | OUTPATIENT
Start: 2023-05-03 | End: 2023-05-04 | Stop reason: HOSPADM

## 2023-05-03 RX ADMIN — POTASSIUM CHLORIDE 40 MEQ: 1500 TABLET, EXTENDED RELEASE ORAL at 10:09

## 2023-05-03 RX ADMIN — SODIUM CHLORIDE, PRESERVATIVE FREE 10 ML: 5 INJECTION INTRAVENOUS at 05:30

## 2023-05-03 RX ADMIN — FUROSEMIDE 20 MG: 40 TABLET ORAL at 10:10

## 2023-05-03 RX ADMIN — POTASSIUM CHLORIDE AND SODIUM CHLORIDE: 900; 150 INJECTION, SOLUTION INTRAVENOUS at 12:29

## 2023-05-03 RX ADMIN — ATORVASTATIN CALCIUM 10 MG: 10 TABLET, FILM COATED ORAL at 10:10

## 2023-05-03 RX ADMIN — INSULIN LISPRO 6 UNITS: 100 INJECTION, SOLUTION INTRAVENOUS; SUBCUTANEOUS at 21:28

## 2023-05-03 RX ADMIN — INSULIN LISPRO 2 UNITS: 100 INJECTION, SOLUTION INTRAVENOUS; SUBCUTANEOUS at 12:30

## 2023-05-03 RX ADMIN — PIPERACILLIN AND TAZOBACTAM 3.38 G: 3; .375 INJECTION, POWDER, LYOPHILIZED, FOR SOLUTION INTRAVENOUS at 16:50

## 2023-05-03 RX ADMIN — NIFEDIPINE 30 MG: 30 TABLET, FILM COATED, EXTENDED RELEASE ORAL at 10:09

## 2023-05-03 RX ADMIN — FLUOXETINE 40 MG: 20 CAPSULE ORAL at 10:16

## 2023-05-03 RX ADMIN — MORPHINE SULFATE 2 MG: 2 INJECTION, SOLUTION INTRAMUSCULAR; INTRAVENOUS at 05:24

## 2023-05-03 RX ADMIN — PIPERACILLIN AND TAZOBACTAM 3.38 G: 3; .375 INJECTION, POWDER, LYOPHILIZED, FOR SOLUTION INTRAVENOUS at 21:30

## 2023-05-03 RX ADMIN — SODIUM CHLORIDE, PRESERVATIVE FREE 10 ML: 5 INJECTION INTRAVENOUS at 21:31

## 2023-05-03 RX ADMIN — LISINOPRIL 10 MG: 10 TABLET ORAL at 10:09

## 2023-05-03 RX ADMIN — INSULIN GLARGINE 40 UNITS: 100 INJECTION, SOLUTION SUBCUTANEOUS at 21:28

## 2023-05-03 RX ADMIN — VANCOMYCIN HYDROCHLORIDE 1000 MG: 1 INJECTION, POWDER, LYOPHILIZED, FOR SOLUTION INTRAVENOUS at 10:16

## 2023-05-03 RX ADMIN — PIPERACILLIN AND TAZOBACTAM 3.38 G: 3; .375 INJECTION, POWDER, LYOPHILIZED, FOR SOLUTION INTRAVENOUS at 05:24

## 2023-05-03 RX ADMIN — METHIMAZOLE 5 MG: 5 TABLET ORAL at 10:09

## 2023-05-04 ENCOUNTER — APPOINTMENT (OUTPATIENT)
Dept: GENERAL RADIOLOGY | Age: 68
DRG: 501 | End: 2023-05-04
Attending: INTERNAL MEDICINE
Payer: MEDICARE

## 2023-05-04 VITALS
SYSTOLIC BLOOD PRESSURE: 171 MMHG | WEIGHT: 220 LBS | TEMPERATURE: 99.3 F | HEIGHT: 70 IN | HEART RATE: 82 BPM | BODY MASS INDEX: 31.5 KG/M2 | DIASTOLIC BLOOD PRESSURE: 82 MMHG | OXYGEN SATURATION: 97 % | RESPIRATION RATE: 16 BRPM

## 2023-05-04 PROBLEM — A49.01 MSSA (METHICILLIN SUSCEPTIBLE STAPHYLOCOCCUS AUREUS): Status: ACTIVE | Noted: 2023-05-04

## 2023-05-04 LAB
ANION GAP SERPL CALC-SCNC: 6 MMOL/L (ref 5–15)
BACTERIA SPEC CULT: ABNORMAL
BACTERIA SPEC CULT: ABNORMAL
BASOPHILS # BLD: 0.1 K/UL (ref 0–0.1)
BASOPHILS NFR BLD: 1 % (ref 0–1)
BUN SERPL-MCNC: 6 MG/DL (ref 6–20)
BUN/CREAT SERPL: 7 (ref 12–20)
CA-I BLD-MCNC: 8.8 MG/DL (ref 8.5–10.1)
CHLORIDE SERPL-SCNC: 106 MMOL/L (ref 97–108)
CO2 SERPL-SCNC: 26 MMOL/L (ref 21–32)
CREAT SERPL-MCNC: 0.82 MG/DL (ref 0.7–1.3)
CRP SERPL-MCNC: 5.73 MG/DL (ref 0–0.6)
DIFFERENTIAL METHOD BLD: ABNORMAL
EOSINOPHIL # BLD: 0.2 K/UL (ref 0–0.4)
EOSINOPHIL NFR BLD: 2 % (ref 0–7)
ERYTHROCYTE [DISTWIDTH] IN BLOOD BY AUTOMATED COUNT: 14.2 % (ref 11.5–14.5)
ERYTHROCYTE [SEDIMENTATION RATE] IN BLOOD: 87 MM/HR (ref 0–20)
GLUCOSE BLD STRIP.AUTO-MCNC: 108 MG/DL (ref 65–100)
GLUCOSE BLD STRIP.AUTO-MCNC: 69 MG/DL (ref 65–100)
GLUCOSE SERPL-MCNC: 79 MG/DL (ref 65–100)
GRAM STN SPEC: ABNORMAL
GRAM STN SPEC: ABNORMAL
HCT VFR BLD AUTO: 32.9 % (ref 36.6–50.3)
HGB BLD-MCNC: 10.1 G/DL (ref 12.1–17)
IMM GRANULOCYTES # BLD AUTO: 0.1 K/UL (ref 0–0.04)
IMM GRANULOCYTES NFR BLD AUTO: 1 % (ref 0–0.5)
LYMPHOCYTES # BLD: 2.6 K/UL (ref 0.8–3.5)
LYMPHOCYTES NFR BLD: 26 % (ref 12–49)
MCH RBC QN AUTO: 26 PG (ref 26–34)
MCHC RBC AUTO-ENTMCNC: 30.7 G/DL (ref 30–36.5)
MCV RBC AUTO: 84.8 FL (ref 80–99)
MONOCYTES # BLD: 1.1 K/UL (ref 0–1)
MONOCYTES NFR BLD: 10 % (ref 5–13)
NEUTS SEG # BLD: 6.3 K/UL (ref 1.8–8)
NEUTS SEG NFR BLD: 60 % (ref 32–75)
NRBC # BLD: 0 K/UL (ref 0–0.01)
NRBC BLD-RTO: 0 PER 100 WBC
PERFORMED BY, TECHID: ABNORMAL
PERFORMED BY, TECHID: NORMAL
PLATELET # BLD AUTO: 415 K/UL (ref 150–400)
PMV BLD AUTO: 9.4 FL (ref 8.9–12.9)
POTASSIUM SERPL-SCNC: 2.9 MMOL/L (ref 3.5–5.1)
POTASSIUM SERPL-SCNC: 3.3 MMOL/L (ref 3.5–5.1)
RBC # BLD AUTO: 3.88 M/UL (ref 4.1–5.7)
SODIUM SERPL-SCNC: 138 MMOL/L (ref 136–145)
SPECIAL REQUESTS,SREQ: ABNORMAL
WBC # BLD AUTO: 10.4 K/UL (ref 4.1–11.1)

## 2023-05-04 PROCEDURE — 85025 COMPLETE CBC W/AUTO DIFF WBC: CPT

## 2023-05-04 PROCEDURE — 71045 X-RAY EXAM CHEST 1 VIEW: CPT

## 2023-05-04 PROCEDURE — 74011250636 HC RX REV CODE- 250/636: Performed by: INTERNAL MEDICINE

## 2023-05-04 PROCEDURE — 74011000258 HC RX REV CODE- 258: Performed by: INTERNAL MEDICINE

## 2023-05-04 PROCEDURE — 36569 INSJ PICC 5 YR+ W/O IMAGING: CPT

## 2023-05-04 PROCEDURE — 36415 COLL VENOUS BLD VENIPUNCTURE: CPT

## 2023-05-04 PROCEDURE — 86140 C-REACTIVE PROTEIN: CPT

## 2023-05-04 PROCEDURE — 74011250636 HC RX REV CODE- 250/636: Performed by: NURSE PRACTITIONER

## 2023-05-04 PROCEDURE — 82962 GLUCOSE BLOOD TEST: CPT

## 2023-05-04 PROCEDURE — 85652 RBC SED RATE AUTOMATED: CPT

## 2023-05-04 PROCEDURE — 74011250636 HC RX REV CODE- 250/636: Performed by: STUDENT IN AN ORGANIZED HEALTH CARE EDUCATION/TRAINING PROGRAM

## 2023-05-04 PROCEDURE — 74011000258 HC RX REV CODE- 258: Performed by: NURSE PRACTITIONER

## 2023-05-04 PROCEDURE — 99232 SBSQ HOSP IP/OBS MODERATE 35: CPT | Performed by: INTERNAL MEDICINE

## 2023-05-04 PROCEDURE — 84132 ASSAY OF SERUM POTASSIUM: CPT

## 2023-05-04 PROCEDURE — 74011250637 HC RX REV CODE- 250/637: Performed by: STUDENT IN AN ORGANIZED HEALTH CARE EDUCATION/TRAINING PROGRAM

## 2023-05-04 PROCEDURE — 74011250637 HC RX REV CODE- 250/637: Performed by: NURSE PRACTITIONER

## 2023-05-04 PROCEDURE — 74011000250 HC RX REV CODE- 250: Performed by: NURSE PRACTITIONER

## 2023-05-04 PROCEDURE — 36573 INSJ PICC RS&I 5 YR+: CPT | Performed by: STUDENT IN AN ORGANIZED HEALTH CARE EDUCATION/TRAINING PROGRAM

## 2023-05-04 RX ORDER — CEFTRIAXONE 1 G/1
2 INJECTION, POWDER, FOR SOLUTION INTRAMUSCULAR; INTRAVENOUS DAILY
Qty: 84 G | Refills: 0 | Status: SHIPPED
Start: 2023-05-04 | End: 2023-06-15

## 2023-05-04 RX ORDER — POTASSIUM CHLORIDE 750 MG/1
20 TABLET, FILM COATED, EXTENDED RELEASE ORAL
Status: COMPLETED | OUTPATIENT
Start: 2023-05-04 | End: 2023-05-04

## 2023-05-04 RX ORDER — POTASSIUM CHLORIDE 750 MG/1
20 TABLET, EXTENDED RELEASE ORAL DAILY
Qty: 90 TABLET | Refills: 1 | Status: SHIPPED | OUTPATIENT
Start: 2023-05-04

## 2023-05-04 RX ORDER — OXYCODONE AND ACETAMINOPHEN 5; 325 MG/1; MG/1
1 TABLET ORAL
Qty: 12 TABLET | Refills: 0 | Status: SHIPPED | OUTPATIENT
Start: 2023-05-04 | End: 2023-05-07

## 2023-05-04 RX ORDER — POTASSIUM CHLORIDE 7.45 MG/ML
10 INJECTION INTRAVENOUS
Status: COMPLETED | OUTPATIENT
Start: 2023-05-04 | End: 2023-05-04

## 2023-05-04 RX ADMIN — POTASSIUM CHLORIDE 10 MEQ: 7.46 INJECTION, SOLUTION INTRAVENOUS at 14:14

## 2023-05-04 RX ADMIN — NIFEDIPINE 30 MG: 30 TABLET, FILM COATED, EXTENDED RELEASE ORAL at 08:18

## 2023-05-04 RX ADMIN — METHIMAZOLE 5 MG: 5 TABLET ORAL at 08:18

## 2023-05-04 RX ADMIN — ATORVASTATIN CALCIUM 10 MG: 10 TABLET, FILM COATED ORAL at 08:18

## 2023-05-04 RX ADMIN — SODIUM CHLORIDE, PRESERVATIVE FREE 10 ML: 5 INJECTION INTRAVENOUS at 14:09

## 2023-05-04 RX ADMIN — POTASSIUM CHLORIDE 10 MEQ: 7.46 INJECTION, SOLUTION INTRAVENOUS at 11:33

## 2023-05-04 RX ADMIN — POTASSIUM CHLORIDE AND SODIUM CHLORIDE: 900; 150 INJECTION, SOLUTION INTRAVENOUS at 05:05

## 2023-05-04 RX ADMIN — SODIUM CHLORIDE, PRESERVATIVE FREE 10 ML: 5 INJECTION INTRAVENOUS at 05:05

## 2023-05-04 RX ADMIN — POTASSIUM CHLORIDE 20 MEQ: 750 TABLET, EXTENDED RELEASE ORAL at 17:07

## 2023-05-04 RX ADMIN — POTASSIUM CHLORIDE 10 MEQ: 7.46 INJECTION, SOLUTION INTRAVENOUS at 09:47

## 2023-05-04 RX ADMIN — POTASSIUM CHLORIDE 40 MEQ: 1500 TABLET, EXTENDED RELEASE ORAL at 08:18

## 2023-05-04 RX ADMIN — FLUOXETINE 40 MG: 20 CAPSULE ORAL at 08:18

## 2023-05-04 RX ADMIN — LISINOPRIL 10 MG: 10 TABLET ORAL at 08:19

## 2023-05-04 RX ADMIN — CEFTRIAXONE 2 G: 2 INJECTION, POWDER, FOR SOLUTION INTRAMUSCULAR; INTRAVENOUS at 11:33

## 2023-05-04 RX ADMIN — POTASSIUM CHLORIDE 10 MEQ: 7.46 INJECTION, SOLUTION INTRAVENOUS at 12:50

## 2023-05-04 RX ADMIN — FUROSEMIDE 20 MG: 40 TABLET ORAL at 08:18

## 2023-05-04 RX ADMIN — PIPERACILLIN AND TAZOBACTAM 3.38 G: 3; .375 INJECTION, POWDER, LYOPHILIZED, FOR SOLUTION INTRAVENOUS at 05:05

## 2023-05-07 LAB
BACTERIA SPEC CULT: ABNORMAL
BACTERIA SPEC CULT: ABNORMAL
BACTERIA SPEC CULT: NORMAL
GRAM STN SPEC: ABNORMAL
GRAM STN SPEC: ABNORMAL
SPECIAL REQUESTS,SREQ: ABNORMAL
SPECIAL REQUESTS,SREQ: NORMAL

## 2023-05-10 ENCOUNTER — HOSPITAL ENCOUNTER (OUTPATIENT)
Facility: HOSPITAL | Age: 68
Discharge: HOME | End: 2023-05-13
Payer: MEDICARE

## 2023-05-10 RX ORDER — POTASSIUM CHLORIDE 750 MG/1
10 CAPSULE, EXTENDED RELEASE ORAL 2 TIMES DAILY
COMMUNITY

## 2023-05-10 RX ORDER — METHIMAZOLE 5 MG/1
5 TABLET ORAL 3 TIMES DAILY
COMMUNITY

## 2023-05-10 RX ORDER — ATORVASTATIN CALCIUM 10 MG/1
10 TABLET, FILM COATED ORAL DAILY
COMMUNITY

## 2023-05-10 RX ORDER — NIFEDIPINE 30 MG/1
30 TABLET, FILM COATED, EXTENDED RELEASE ORAL DAILY
COMMUNITY

## 2023-05-10 RX ORDER — INSULIN GLARGINE 100 [IU]/ML
40 INJECTION, SOLUTION SUBCUTANEOUS NIGHTLY
COMMUNITY

## 2023-05-10 RX ORDER — CEFTRIAXONE 1 G/1
1000 INJECTION, POWDER, FOR SOLUTION INTRAMUSCULAR; INTRAVENOUS EVERY 24 HOURS
COMMUNITY

## 2023-05-10 RX ORDER — ONDANSETRON HYDROCHLORIDE 8 MG/1
8 TABLET, FILM COATED ORAL EVERY 8 HOURS PRN
COMMUNITY

## 2023-05-10 RX ORDER — LISINOPRIL 10 MG/1
10 TABLET ORAL DAILY
COMMUNITY

## 2023-05-10 RX ORDER — FUROSEMIDE 20 MG/1
20 TABLET ORAL 2 TIMES DAILY
COMMUNITY

## 2023-05-10 RX ORDER — FLUOXETINE HYDROCHLORIDE 40 MG/1
40 CAPSULE ORAL DAILY
COMMUNITY

## 2023-05-10 RX ORDER — GLIPIZIDE 10 MG/1
10 TABLET ORAL
COMMUNITY

## 2023-05-16 ENCOUNTER — OFFICE VISIT (OUTPATIENT)
Age: 68
End: 2023-05-16

## 2023-05-16 VITALS
SYSTOLIC BLOOD PRESSURE: 149 MMHG | HEART RATE: 74 BPM | TEMPERATURE: 98.3 F | DIASTOLIC BLOOD PRESSURE: 82 MMHG | HEIGHT: 70 IN | WEIGHT: 220 LBS | BODY MASS INDEX: 31.5 KG/M2

## 2023-05-16 DIAGNOSIS — Z98.890 S/P FOOT SURGERY: Primary | ICD-10-CM

## 2023-05-16 PROCEDURE — 99024 POSTOP FOLLOW-UP VISIT: CPT | Performed by: PODIATRIST

## 2023-05-18 ENCOUNTER — HOSPITAL ENCOUNTER (OUTPATIENT)
Facility: HOSPITAL | Age: 68
Discharge: HOME | End: 2023-05-18
Payer: MEDICARE

## 2023-05-18 LAB
CRP SERPL-MCNC: <0.29 MG/DL (ref 0–0.6)
ERYTHROCYTE [DISTWIDTH] IN BLOOD BY AUTOMATED COUNT: 15.7 % (ref 11.5–14.5)
ERYTHROCYTE [SEDIMENTATION RATE] IN BLOOD: 67 MM/HR
HCT VFR BLD AUTO: 33.3 % (ref 36.6–50.3)
HGB BLD-MCNC: 10 G/DL (ref 12.1–17)
MCH RBC QN AUTO: 26.2 PG (ref 26–34)
MCHC RBC AUTO-ENTMCNC: 30 G/DL (ref 30–36.5)
MCV RBC AUTO: 87.4 FL (ref 80–99)
NRBC # BLD: 0 K/UL (ref 0–0.01)
NRBC BLD-RTO: 0 PER 100 WBC
PLATELET # BLD AUTO: 374 K/UL (ref 150–400)
PMV BLD AUTO: 10 FL (ref 8.9–12.9)
RBC # BLD AUTO: 3.81 M/UL (ref 4.1–5.7)
WBC # BLD AUTO: 6.1 K/UL (ref 4.1–11.1)

## 2023-05-18 PROCEDURE — 85027 COMPLETE CBC AUTOMATED: CPT

## 2023-05-18 PROCEDURE — 85652 RBC SED RATE AUTOMATED: CPT

## 2023-05-18 PROCEDURE — 86140 C-REACTIVE PROTEIN: CPT

## 2023-05-18 NOTE — RESULT ENCOUNTER NOTE
Patient on Ceftriaxone for  osteomyelitis left foot. WBC and CRP normal; ESR decreasing. Lab results reviewed.

## 2023-05-23 RX ORDER — CEFTRIAXONE 1 G/1
2 INJECTION, POWDER, FOR SOLUTION INTRAMUSCULAR; INTRAVENOUS DAILY
Qty: 60 EACH | Refills: 1 | COMMUNITY
Start: 2023-05-04 | End: 2023-06-15

## 2023-05-23 RX ORDER — POTASSIUM CHLORIDE 750 MG/1
20 TABLET, EXTENDED RELEASE ORAL DAILY
COMMUNITY
Start: 2023-05-04 | End: 2023-07-03 | Stop reason: SDUPTHER

## 2023-05-24 ENCOUNTER — HOSPITAL ENCOUNTER (OUTPATIENT)
Facility: HOSPITAL | Age: 68
Discharge: HOME | End: 2023-05-27
Payer: MEDICARE

## 2023-05-24 DIAGNOSIS — I50.22 CHRONIC SYSTOLIC HEART FAILURE (HCC): ICD-10-CM

## 2023-05-24 DIAGNOSIS — Z47.81 AFTERCARE FOR AMPUTATION STUMP: ICD-10-CM

## 2023-05-24 LAB
CRP SERPL-MCNC: <0.29 MG/DL (ref 0–0.6)
ERYTHROCYTE [DISTWIDTH] IN BLOOD BY AUTOMATED COUNT: 15.7 % (ref 11.5–14.5)
ERYTHROCYTE [SEDIMENTATION RATE] IN BLOOD: 64 MM/HR
HCT VFR BLD AUTO: 31.4 % (ref 36.6–50.3)
HGB BLD-MCNC: 9.5 G/DL (ref 12.1–17)
MCH RBC QN AUTO: 26.6 PG (ref 26–34)
MCHC RBC AUTO-ENTMCNC: 30.3 G/DL (ref 30–36.5)
MCV RBC AUTO: 88 FL (ref 80–99)
NRBC # BLD: 0 K/UL (ref 0–0.01)
NRBC BLD-RTO: 0 PER 100 WBC
PLATELET # BLD AUTO: 316 K/UL (ref 150–400)
PMV BLD AUTO: 10.3 FL (ref 8.9–12.9)
RBC # BLD AUTO: 3.57 M/UL (ref 4.1–5.7)
WBC # BLD AUTO: 5.5 K/UL (ref 4.1–11.1)

## 2023-05-24 PROCEDURE — 85027 COMPLETE CBC AUTOMATED: CPT

## 2023-05-24 PROCEDURE — 86140 C-REACTIVE PROTEIN: CPT

## 2023-05-24 PROCEDURE — 85652 RBC SED RATE AUTOMATED: CPT

## 2023-05-26 NOTE — PROGRESS NOTES
[place] [facility]         Patient Name: Britni Rod    : 1955    Visit Date: 2023    Office Visit Note      Subjective:         Patient is a 77 y.o. male who is being seen in office follow up visit for s/p secondary wound closure left foot. Past Medical History:   Diagnosis Date    Essential hypertension     GERD (gastroesophageal reflux disease)     Mixed hyperlipidemia     Moderate episode of recurrent major depressive disorder ([address] 2023    Osteomyelitis ([address] 3/20/2023    Pneumonia due to COVID-19 virus 2021    Renal mass, left     Type 2 diabetes mellitus with complication, without long-term current use of insulin [facility]      Past Surgical History:   Procedure Laterality Date    CARDIAC CATHETERIZATION      patient stated stents placed     COLONOSCOPY      COLONOSCOPY N/A 2023    COLONOSCOPY (TIVA) performed by Tim Griffin MD at [facility]  2018    x1    KIDNEY REMOVAL Left 2021    robotic left partial nephrectomy,    [place] UNLISTED PROCEDURE CARDIAC SURGERY  2018    BYPASS    TOE AMPUTATION Left     UROLOGICAL SURGERY  2021     intraoperative renal ultrasound       Family History   Problem Relation Age of Onset    Heart Disease Father     Hypertension Father     Hypertension Mother     Diabetes Mother       Social History     Tobacco Use    Smoking status: Never    Smokeless tobacco: Never   Substance Use Topics    Alcohol use: Not Currently     No Known Allergies  Prior to Admission medications    Medication Sig Start Date End Date Taking?  Authorizing Provider   cefTRIAXone (ROCEPHIN) 1 g injection Inject 1,000 mg into the muscle every 24 hours   Yes Historical Provider, MD   potassium chloride (MICRO-K) 10 MEQ extended release capsule Take 1 capsule by mouth 2 times daily   Yes Historical Provider, MD   ondansetron (ZOFRAN) 8 MG tablet Take 1 tablet by mouth every 8 hours as needed   Yes

## 2023-05-30 ENCOUNTER — OFFICE VISIT (OUTPATIENT)
Age: 68
End: 2023-05-30
Payer: MEDICARE

## 2023-05-30 VITALS
BODY MASS INDEX: 31.5 KG/M2 | DIASTOLIC BLOOD PRESSURE: 79 MMHG | TEMPERATURE: 98.2 F | HEIGHT: 70 IN | SYSTOLIC BLOOD PRESSURE: 150 MMHG | WEIGHT: 220 LBS | HEART RATE: 76 BPM

## 2023-05-30 DIAGNOSIS — E08.621 DIABETES MELLITUS DUE TO UNDERLYING CONDITION WITH FOOT ULCER (CODE) (HCC): ICD-10-CM

## 2023-05-30 DIAGNOSIS — L97.522 ULCER OF LEFT FOOT, WITH FAT LAYER EXPOSED (HCC): Primary | ICD-10-CM

## 2023-05-30 DIAGNOSIS — B35.1 ONYCHOMYCOSIS: ICD-10-CM

## 2023-05-30 PROCEDURE — 11042 DBRDMT SUBQ TIS 1ST 20SQCM/<: CPT | Performed by: PODIATRIST

## 2023-05-30 PROCEDURE — G8427 DOCREV CUR MEDS BY ELIG CLIN: HCPCS | Performed by: PODIATRIST

## 2023-05-30 PROCEDURE — 1123F ACP DISCUSS/DSCN MKR DOCD: CPT | Performed by: PODIATRIST

## 2023-05-30 PROCEDURE — G8417 CALC BMI ABV UP PARAM F/U: HCPCS | Performed by: PODIATRIST

## 2023-05-30 PROCEDURE — 11721 DEBRIDE NAIL 6 OR MORE: CPT | Performed by: PODIATRIST

## 2023-05-30 PROCEDURE — 99213 OFFICE O/P EST LOW 20 MIN: CPT | Performed by: PODIATRIST

## 2023-05-30 PROCEDURE — 3077F SYST BP >= 140 MM HG: CPT | Performed by: PODIATRIST

## 2023-05-30 PROCEDURE — 1036F TOBACCO NON-USER: CPT | Performed by: PODIATRIST

## 2023-05-30 PROCEDURE — 3078F DIAST BP <80 MM HG: CPT | Performed by: PODIATRIST

## 2023-05-30 PROCEDURE — 3017F COLORECTAL CA SCREEN DOC REV: CPT | Performed by: PODIATRIST

## 2023-05-30 ASSESSMENT — ENCOUNTER SYMPTOMS
SHORTNESS OF BREATH: 0
DIARRHEA: 0
ABDOMINAL PAIN: 0
VOMITING: 0

## 2023-05-30 NOTE — PROGRESS NOTES
[place] [facility]         Patient Name: Mary Contreras    : 1955    Visit Date: 2023    Office Visit Note      Subjective:         Patient is a 77 y.o. male who is being seen in office follow up visit for wound to the left foot. Patient had a secondary wound closure performed by Dr. Raven Mo. Patient has been receiving local wound care by home health care agency. Past Medical History:   Diagnosis Date    Essential hypertension     GERD (gastroesophageal reflux disease)     Mixed hyperlipidemia     Moderate episode of recurrent major depressive disorder ([address] 2023    Osteomyel[facility] ([facility] 3/20/2023    Pneumonia due to COVID-19 virus 2021    Renal mass, left     Type 2 diabetes mellitus with complication, without long-term current use of insulin ([facility]      Past Surgical History:   Procedure Laterality Date    CARDIAC CATHETERIZATION      patient stated stents placed     COLONOSCOPY      COLONOSCOPY N/A 2023    COLONOSCOPY (TIVA) performed by Scar Lazo MD at [facility]  2018    x1    KIDNEY REMOVAL Left 2021    robotic left partial nephrectomy,    [place] UNLISTED PROCEDURE CARDIAC SURGERY  2018    BYPASS    TOE AMPUTATION Left     UROLOGICAL SURGERY  2021     intraoperative renal ultrasound       Family History   Problem Relation Age of Onset    Heart Disease Father     Hypertension Father     Hypertension Mother     Diabetes Mother       Social History     Tobacco Use    Smoking status: Never    Smokeless tobacco: Never   Substance Use Topics    Alcohol use: Not Currently     No Known Allergies  Prior to Admission medications    Medication Sig Start Date End Date Taking?  Authorizing Provider   cefTRIAXone (ROCEPHIN) 1 g injection Infuse 2,000 mg intravenously daily 5/4/23 6/15/23  Ar Automatic Reconciliation   potassium chloride (KLOR-CON M) 10 MEQ extended release tablet Take 2 tablets by mouth

## 2023-05-30 NOTE — PROGRESS NOTES
1. Have you been to the ER, urgent care clinic since your last visit? Hospitalized since your last visit? No    2. Have you seen or consulted any other health care providers outside of the [address] [place] [address] since your last visit? Include any pap smears or colon screening.  No    Chief Complaint   Patient presents with    Wound Check     BP (!) 150/79 (Site: Left Upper Arm, Position: Sitting, Cuff Size: Medium Adult)   Pulse 76   Temp 98.2 °F (36.8 °C) (Temporal)   Ht 5' 10\" (1.778 m)   Wt 220 lb (99.8 kg)   BMI 31.57 kg/m²

## 2023-06-05 ENCOUNTER — HOSPITAL ENCOUNTER (OUTPATIENT)
Facility: HOSPITAL | Age: 68
Discharge: HOME | End: 2023-06-08
Payer: MEDICARE

## 2023-06-05 DIAGNOSIS — E11.65 TYPE II DIABETES MELLITUS WITH HYPEROSMOLARITY, UNCONTROLLED (HCC): ICD-10-CM

## 2023-06-05 DIAGNOSIS — T87.44 INFECTION OF LEFT BELOW KNEE AMPUTATION (HCC): ICD-10-CM

## 2023-06-05 DIAGNOSIS — R87.89: ICD-10-CM

## 2023-06-05 DIAGNOSIS — E11.00 TYPE II DIABETES MELLITUS WITH HYPEROSMOLARITY, UNCONTROLLED (HCC): ICD-10-CM

## 2023-06-05 LAB
CRP SERPL-MCNC: <0.29 MG/DL (ref 0–0.6)
ERYTHROCYTE [DISTWIDTH] IN BLOOD BY AUTOMATED COUNT: 15.6 % (ref 11.5–14.5)
ERYTHROCYTE [SEDIMENTATION RATE] IN BLOOD: 51 MM/HR
HCT VFR BLD AUTO: 33.7 % (ref 36.6–50.3)
HGB BLD-MCNC: 10.3 G/DL (ref 12.1–17)
MCH RBC QN AUTO: 26.8 PG (ref 26–34)
MCHC RBC AUTO-ENTMCNC: 30.6 G/DL (ref 30–36.5)
MCV RBC AUTO: 87.5 FL (ref 80–99)
NRBC # BLD: 0 K/UL (ref 0–0.01)
NRBC BLD-RTO: 0 PER 100 WBC
PLATELET # BLD AUTO: 254 K/UL (ref 150–400)
PMV BLD AUTO: 9.9 FL (ref 8.9–12.9)
RBC # BLD AUTO: 3.85 M/UL (ref 4.1–5.7)
WBC # BLD AUTO: 7 K/UL (ref 4.1–11.1)

## 2023-06-05 PROCEDURE — 85027 COMPLETE CBC AUTOMATED: CPT

## 2023-06-05 PROCEDURE — 86140 C-REACTIVE PROTEIN: CPT

## 2023-06-05 PROCEDURE — 85652 RBC SED RATE AUTOMATED: CPT

## 2023-06-13 ENCOUNTER — OFFICE VISIT (OUTPATIENT)
Age: 68
End: 2023-06-13
Payer: MEDICARE

## 2023-06-13 VITALS — HEART RATE: 77 BPM | TEMPERATURE: 98.1 F | DIASTOLIC BLOOD PRESSURE: 83 MMHG | SYSTOLIC BLOOD PRESSURE: 182 MMHG

## 2023-06-13 DIAGNOSIS — L97.522 ULCER OF LEFT FOOT, WITH FAT LAYER EXPOSED (HCC): Primary | ICD-10-CM

## 2023-06-13 DIAGNOSIS — I73.9 PAD (PERIPHERAL ARTERY DISEASE) (HCC): ICD-10-CM

## 2023-06-13 PROCEDURE — 3078F DIAST BP <80 MM HG: CPT | Performed by: PODIATRIST

## 2023-06-13 PROCEDURE — 99213 OFFICE O/P EST LOW 20 MIN: CPT | Performed by: PODIATRIST

## 2023-06-13 PROCEDURE — 11042 DBRDMT SUBQ TIS 1ST 20SQCM/<: CPT | Performed by: PODIATRIST

## 2023-06-13 PROCEDURE — 1036F TOBACCO NON-USER: CPT | Performed by: PODIATRIST

## 2023-06-13 PROCEDURE — 3017F COLORECTAL CA SCREEN DOC REV: CPT | Performed by: PODIATRIST

## 2023-06-13 PROCEDURE — G8427 DOCREV CUR MEDS BY ELIG CLIN: HCPCS | Performed by: PODIATRIST

## 2023-06-13 PROCEDURE — G8417 CALC BMI ABV UP PARAM F/U: HCPCS | Performed by: PODIATRIST

## 2023-06-13 PROCEDURE — 1123F ACP DISCUSS/DSCN MKR DOCD: CPT | Performed by: PODIATRIST

## 2023-06-13 PROCEDURE — 3074F SYST BP LT 130 MM HG: CPT | Performed by: PODIATRIST

## 2023-06-16 ENCOUNTER — HOSPITAL ENCOUNTER (OUTPATIENT)
Facility: HOSPITAL | Age: 68
Discharge: HOME | End: 2023-06-19
Payer: MEDICARE

## 2023-06-16 LAB
CRP SERPL-MCNC: <0.29 MG/DL (ref 0–0.6)
ERYTHROCYTE [DISTWIDTH] IN BLOOD BY AUTOMATED COUNT: 15.5 % (ref 11.5–14.5)
ERYTHROCYTE [SEDIMENTATION RATE] IN BLOOD: 54 MM/HR
HCT VFR BLD AUTO: 34.6 % (ref 36.6–50.3)
HGB BLD-MCNC: 10.7 G/DL (ref 12.1–17)
MCH RBC QN AUTO: 27 PG (ref 26–34)
MCHC RBC AUTO-ENTMCNC: 30.9 G/DL (ref 30–36.5)
MCV RBC AUTO: 87.4 FL (ref 80–99)
NRBC # BLD: 0 K/UL (ref 0–0.01)
NRBC BLD-RTO: 0 PER 100 WBC
PLATELET # BLD AUTO: 264 K/UL (ref 150–400)
PMV BLD AUTO: 9.9 FL (ref 8.9–12.9)
RBC # BLD AUTO: 3.96 M/UL (ref 4.1–5.7)
WBC # BLD AUTO: 6 K/UL (ref 4.1–11.1)

## 2023-06-16 PROCEDURE — 85027 COMPLETE CBC AUTOMATED: CPT

## 2023-06-16 PROCEDURE — 86140 C-REACTIVE PROTEIN: CPT

## 2023-06-16 PROCEDURE — 85652 RBC SED RATE AUTOMATED: CPT

## 2023-06-19 ENCOUNTER — TELEPHONE (OUTPATIENT)
Age: 68
End: 2023-06-19

## 2023-06-19 NOTE — TELEPHONE ENCOUNTER
Marcial an nurse from [facility] called in to get  a verbal order to remove pt's picc line since pt completed IV antibiotics on Friday and really wants picc line out ASAP.  Nurse will remove picc line today per provider

## 2023-07-03 ENCOUNTER — HOSPITAL ENCOUNTER (EMERGENCY)
Facility: HOSPITAL | Age: 68
Discharge: HOME | End: 2023-07-03
Attending: EMERGENCY MEDICINE
Payer: MEDICARE

## 2023-07-03 VITALS
TEMPERATURE: 97.9 F | RESPIRATION RATE: 18 BRPM | HEIGHT: 70 IN | OXYGEN SATURATION: 99 % | HEART RATE: 73 BPM | SYSTOLIC BLOOD PRESSURE: 154 MMHG | BODY MASS INDEX: 31.5 KG/M2 | WEIGHT: 220 LBS | DIASTOLIC BLOOD PRESSURE: 69 MMHG

## 2023-07-03 DIAGNOSIS — E87.6 HYPOKALEMIA: Primary | ICD-10-CM

## 2023-07-03 LAB
ALBUMIN SERPL-MCNC: 2.9 G/DL (ref 3.5–5)
ALBUMIN/GLOB SERPL: 0.7 (ref 1.1–2.2)
ALP SERPL-CCNC: 90 U/L (ref 45–117)
ALT SERPL-CCNC: 20 U/L (ref 12–78)
ANION GAP SERPL CALC-SCNC: 9 MMOL/L (ref 5–15)
AST SERPL W P-5'-P-CCNC: 20 U/L (ref 15–37)
BASOPHILS # BLD: 0.1 K/UL (ref 0–0.1)
BASOPHILS NFR BLD: 1 % (ref 0–1)
BILIRUB SERPL-MCNC: 0.2 MG/DL (ref 0.2–1)
BUN SERPL-MCNC: 16 MG/DL (ref 6–20)
BUN/CREAT SERPL: 14 (ref 12–20)
CA-I BLD-MCNC: 9.3 MG/DL (ref 8.5–10.1)
CHLORIDE SERPL-SCNC: 108 MMOL/L (ref 97–108)
CO2 SERPL-SCNC: 23 MMOL/L (ref 21–32)
CREAT SERPL-MCNC: 1.14 MG/DL (ref 0.7–1.3)
DIFFERENTIAL METHOD BLD: ABNORMAL
EOSINOPHIL # BLD: 0.3 K/UL (ref 0–0.4)
EOSINOPHIL NFR BLD: 2 % (ref 0–7)
ERYTHROCYTE [DISTWIDTH] IN BLOOD BY AUTOMATED COUNT: 14.7 % (ref 11.5–14.5)
GLOBULIN SER CALC-MCNC: 4.2 G/DL (ref 2–4)
GLUCOSE SERPL-MCNC: 67 MG/DL (ref 65–100)
HCT VFR BLD AUTO: 33.7 % (ref 36.6–50.3)
HGB BLD-MCNC: 10.6 G/DL (ref 12.1–17)
IMM GRANULOCYTES # BLD AUTO: 0 K/UL (ref 0–0.04)
IMM GRANULOCYTES NFR BLD AUTO: 0 % (ref 0–0.5)
LYMPHOCYTES # BLD: 2.1 K/UL (ref 0.8–3.5)
LYMPHOCYTES NFR BLD: 19 % (ref 12–49)
MAGNESIUM SERPL-MCNC: 1.8 MG/DL (ref 1.6–2.4)
MCH RBC QN AUTO: 27.2 PG (ref 26–34)
MCHC RBC AUTO-ENTMCNC: 31.5 G/DL (ref 30–36.5)
MCV RBC AUTO: 86.4 FL (ref 80–99)
MONOCYTES # BLD: 1.2 K/UL (ref 0–1)
MONOCYTES NFR BLD: 11 % (ref 5–13)
NEUTS SEG # BLD: 7.2 K/UL (ref 1.8–8)
NEUTS SEG NFR BLD: 67 % (ref 32–75)
NRBC # BLD: 0 K/UL (ref 0–0.01)
NRBC BLD-RTO: 0 PER 100 WBC
PLATELET # BLD AUTO: 308 K/UL (ref 150–400)
PMV BLD AUTO: 9.1 FL (ref 8.9–12.9)
POTASSIUM SERPL-SCNC: 2.4 MMOL/L (ref 3.5–5.1)
PROT SERPL-MCNC: 7.1 G/DL (ref 6.4–8.2)
RBC # BLD AUTO: 3.9 M/UL (ref 4.1–5.7)
SODIUM SERPL-SCNC: 140 MMOL/L (ref 136–145)
WBC # BLD AUTO: 10.8 K/UL (ref 4.1–11.1)

## 2023-07-03 PROCEDURE — 6370000000 HC RX 637 (ALT 250 FOR IP): Performed by: EMERGENCY MEDICINE

## 2023-07-03 PROCEDURE — 80053 COMPREHEN METABOLIC PANEL: CPT

## 2023-07-03 PROCEDURE — 36415 COLL VENOUS BLD VENIPUNCTURE: CPT

## 2023-07-03 PROCEDURE — 96365 THER/PROPH/DIAG IV INF INIT: CPT

## 2023-07-03 PROCEDURE — 96361 HYDRATE IV INFUSION ADD-ON: CPT

## 2023-07-03 PROCEDURE — 6360000002 HC RX W HCPCS: Performed by: EMERGENCY MEDICINE

## 2023-07-03 PROCEDURE — 83735 ASSAY OF MAGNESIUM: CPT

## 2023-07-03 PROCEDURE — 85025 COMPLETE CBC W/AUTO DIFF WBC: CPT

## 2023-07-03 PROCEDURE — 99284 EMERGENCY DEPT VISIT MOD MDM: CPT

## 2023-07-03 PROCEDURE — 2580000003 HC RX 258: Performed by: EMERGENCY MEDICINE

## 2023-07-03 RX ORDER — 0.9 % SODIUM CHLORIDE 0.9 %
1000 INTRAVENOUS SOLUTION INTRAVENOUS ONCE
Status: COMPLETED | OUTPATIENT
Start: 2023-07-03 | End: 2023-07-03

## 2023-07-03 RX ORDER — POTASSIUM CHLORIDE 20 MEQ/1
20 TABLET, EXTENDED RELEASE ORAL 2 TIMES DAILY
Qty: 30 TABLET | Refills: 1 | Status: SHIPPED | OUTPATIENT
Start: 2023-07-03

## 2023-07-03 RX ORDER — POTASSIUM CHLORIDE 7.45 MG/ML
10 INJECTION INTRAVENOUS
Status: COMPLETED | OUTPATIENT
Start: 2023-07-03 | End: 2023-07-03

## 2023-07-03 RX ORDER — POTASSIUM CHLORIDE 20 MEQ/1
40 TABLET, EXTENDED RELEASE ORAL ONCE
Status: COMPLETED | OUTPATIENT
Start: 2023-07-03 | End: 2023-07-03

## 2023-07-03 RX ADMIN — SODIUM CHLORIDE 1000 ML: 9 INJECTION, SOLUTION INTRAVENOUS at 11:17

## 2023-07-03 RX ADMIN — POTASSIUM CHLORIDE 10 MEQ: 7.46 INJECTION, SOLUTION INTRAVENOUS at 12:20

## 2023-07-03 RX ADMIN — POTASSIUM CHLORIDE 40 MEQ: 1500 TABLET, EXTENDED RELEASE ORAL at 12:21

## 2023-07-03 NOTE — ED TRIAGE NOTES
Patient reports diarrhea  for \" a few weeks\" has not seen PCP for same but has appt today at 2pm patient reports he is taking OTC meds for same but doesn't know what it is called

## 2023-07-03 NOTE — ED PROVIDER NOTES
[facility]  EMERGENCY DEPARTMENT HISTORY AND PHYSICAL EXAM      Date: 7/3/2023  Patient Name: Cory Anderson  MRN: [id number]  [address] 1955  Date of evaluation: 7/3/2023  Provider: Segundo Jones MD   Note Started: 10:58 AM EDT 7/3/23    HISTORY OF PRESENT ILLNESS     Chief Complaint   Patient presents with    Diarrhea       History Provided By: Patient    HPI: Cory Anderson is a 77 y.o. male     PAST MEDICAL HISTORY   Past Medical History:  Past Medical History:   Diagnosis Date    Essential hypertension     GERD (gastroesophageal reflux disease)     Mixed hyperlipidemia     Moderate episode of recurrent major depressive disorder ([address]) 2/16/2023    Osteomyelitis ([address]) 3/20/2023    Pneumonia due to COVID-19 virus 2/17/2021    Renal mass, left     Type 2 diabetes mellitus with complication, without long-term current use of insulin ([address])        Past Surgical History:  Past Surgical History:   Procedure Laterality Date    CARDIAC CATHETERIZATION      patient stated stents placed     COLONOSCOPY      COLONOSCOPY N/A 01/06/2023    COLONOSCOPY (TIVA) performed by Will Spangler MD at [facility] PLAC[facility]  2018    x1    KIDNEY REMOVAL Left 08/23/2021    robotic left partial nephrectomy,    [place] UNLISTED PROCEDURE CARDIAC SURGERY  2018    BYPASS    TOE AMPUTATION Left     UROLOGICAL SURGERY  08/23/2021     intraoperative renal ultrasound       Family History:  Family History   Problem Relation Age of Onset    Heart Disease Father     Hypertension Father     Hypertension Mother     Diabetes Mother        Social History:  Social History     Tobacco Use    Smoking status: Never    Smokeless tobacco: Never   Substance Use Topics    Alcohol use: Not Currently    Drug use: Yes     Types: Marijuana Kip Kuster)       Allergies:  No Known Allergies    PCP: DENISA Garber NP    Current Meds:   No current

## 2023-07-06 ENCOUNTER — OFFICE VISIT (OUTPATIENT)
Age: 68
End: 2023-07-06
Payer: MEDICARE

## 2023-07-06 VITALS
TEMPERATURE: 98.1 F | WEIGHT: 202 LBS | DIASTOLIC BLOOD PRESSURE: 84 MMHG | HEIGHT: 70 IN | SYSTOLIC BLOOD PRESSURE: 162 MMHG | HEART RATE: 59 BPM | OXYGEN SATURATION: 98 % | BODY MASS INDEX: 28.92 KG/M2 | RESPIRATION RATE: 16 BRPM

## 2023-07-06 DIAGNOSIS — L08.9 LEFT FOOT INFECTION: Primary | ICD-10-CM

## 2023-07-06 PROCEDURE — G8427 DOCREV CUR MEDS BY ELIG CLIN: HCPCS | Performed by: SURGERY

## 2023-07-06 PROCEDURE — 1123F ACP DISCUSS/DSCN MKR DOCD: CPT | Performed by: SURGERY

## 2023-07-06 PROCEDURE — G8417 CALC BMI ABV UP PARAM F/U: HCPCS | Performed by: SURGERY

## 2023-07-06 PROCEDURE — 3017F COLORECTAL CA SCREEN DOC REV: CPT | Performed by: SURGERY

## 2023-07-06 PROCEDURE — 3077F SYST BP >= 140 MM HG: CPT | Performed by: SURGERY

## 2023-07-06 PROCEDURE — 99213 OFFICE O/P EST LOW 20 MIN: CPT | Performed by: SURGERY

## 2023-07-06 PROCEDURE — 1036F TOBACCO NON-USER: CPT | Performed by: SURGERY

## 2023-07-06 PROCEDURE — 3079F DIAST BP 80-89 MM HG: CPT | Performed by: SURGERY

## 2023-07-06 ASSESSMENT — PATIENT HEALTH QUESTIONNAIRE - PHQ9
SUM OF ALL RESPONSES TO PHQ QUESTIONS 1-9: 0
1. LITTLE INTEREST OR PLEASURE IN DOING THINGS: 0
SUM OF ALL RESPONSES TO PHQ QUESTIONS 1-9: 0
SUM OF ALL RESPONSES TO PHQ9 QUESTIONS 1 & 2: 0
SUM OF ALL RESPONSES TO PHQ QUESTIONS 1-9: 0
SUM OF ALL RESPONSES TO PHQ QUESTIONS 1-9: 0
2. FEELING DOWN, DEPRESSED OR HOPELESS: 0

## 2023-07-06 NOTE — PROGRESS NOTES
Vascular History and Physical    Patient: Tank Fleming  MRN: [id number]    YOB: 1955  Age: 77 y.o. Sex: male     Chief Complaint:  Chief Complaint   Patient presents with    New Patient     Patient states Julieta Churchill wants to check out his left foot        History of Present Illness: Tank Fleming is a 77 y.o. very pleasant gentleman is here today for vascular assessment. Patient had left leg angiogram March 2023. Followed by he had a left great toe amputation. Wound has not healed completely. Patient denies any fever or chills.     Social History:  Social Connections: Not on file       Past Medical History:  Past Medical History:   Diagnosis Date    Essential hypertension     GERD (gastroesophageal reflux disease)     Mixed hyperlipidemia     Moderate episode of recurrent major depressive disorder ([address]) 2/16/2023    Osteomyelitis ([address]) 3/20/2023    Pneumonia due to COVID-19 virus 2/17/2021    Renal mass, left     Type 2 diabetes mellitus with complication, without long-term current use of insulin ([address])        Surgical History:  Past Surgical History:   Procedure Laterality Date    CARDIAC CATHETERIZATION      patient stated stents placed     COLONOSCOPY      COLONOSCOPY N/A 01/06/2023    COLONOSCOPY (TIVA) performed by Eren Nobles MD at [facility]  2018    x1    KIDNEY REMOVAL Left 08/23/2021    robotic left partial nephrectomy,    [place] UNLISTED PROCEDURE CARDIAC SURGERY  2018    BYPASS    TOE AMPUTATION Left     UROLOGICAL SURGERY  08/23/2021     intraoperative renal ultrasound       Allergies:  No Known Allergies    Current Meds:  Current Outpatient Medications   Medication Sig Dispense Refill    potassium chloride (KLOR-CON M) 20 MEQ extended release tablet Take 1 tablet by mouth 2 times daily 30 tablet 1    cefTRIAXone (ROCEPHIN) 1 g injection Inject 1,000 mg into the muscle every 24 hours      potassium chloride (MICRO-K) 10 MEQ extended

## 2023-07-12 ASSESSMENT — ENCOUNTER SYMPTOMS
SHORTNESS OF BREATH: 0
VOMITING: 0
DIARRHEA: 0
ABDOMINAL PAIN: 0

## 2023-07-12 NOTE — PROGRESS NOTES
[facility]         Patient Name: Miranda Mendez    : 1955    Visit Date: 2023    Office Visit Note      Subjective:         Patient is a 77 y.o. male who is being seen in office follow up visit for ulcer left foot. Patient states he has been unable to follow-up with vascular surgeon. Patient has home health care nurse for wound dressing changes. Past Medical History:   Diagnosis Date    Essential hypertension     GERD (gastroesophageal reflux disease)     Mixed hyperlipidemia     Moderate episode of recurrent major depressive disorder ([address]) 2023    Osteomyelitis ([address]) 3/20/2023    Pneumonia due to COVID-19 virus 2021    Renal mass, left     Type 2 diabetes mellitus with complication, without long-term current use of insulin ([address])      Past Surgical History:   Procedure Laterality Date    CARDIAC CATHETERIZATION      patient stated stents placed     COLONOSCOPY      COLONOSCOPY N/A 2023    COLONOSCOPY (TIVA) performed by Nae Sarkar MD at [facility]  2018    x1    KIDNEY REMOVAL Left 2021    robotic left partial nephrectomy,    [place] UNLISTED PROCEDURE CARDIAC SURGERY  2018    BYPASS    TOE AMPUTATION Left     UROLOGICAL SURGERY  2021     intraoperative renal ultrasound       Family History   Problem Relation Age of Onset    Heart Disease Father     Hypertension Father     Hypertension Mother     Diabetes Mother       Social History     Tobacco Use    Smoking status: Never    Smokeless tobacco: Never   Substance Use Topics    Alcohol use: Not Currently     No Known Allergies  Prior to Admission medications    Medication Sig Start Date End Date Taking?  Authorizing Provider   potassium chloride (KLOR-CON M) 20 MEQ extended release tablet Take 1 tablet by mouth 2 times daily 7/3/23   Tej Lamb MD   cefTRIAXone (ROCEPHIN) 1 g injection Inject 1,000 mg into the muscle every 24 hours

## 2023-07-17 RX ORDER — METHIMAZOLE 5 MG/1
TABLET ORAL
Qty: 90 TABLET | Refills: 0 | Status: SHIPPED | OUTPATIENT
Start: 2023-07-17

## 2023-07-18 ENCOUNTER — TELEPHONE (OUTPATIENT)
Facility: CLINIC | Age: 68
End: 2023-07-18

## 2023-07-18 DIAGNOSIS — I10 ESSENTIAL (PRIMARY) HYPERTENSION: Primary | ICD-10-CM

## 2023-07-20 RX ORDER — LISINOPRIL 20 MG/1
20 TABLET ORAL DAILY
Qty: 90 TABLET | Refills: 1 | Status: SHIPPED | OUTPATIENT
Start: 2023-07-20

## 2023-07-20 RX ORDER — CEFTRIAXONE 2 G/1
INJECTION, POWDER, FOR SOLUTION INTRAMUSCULAR; INTRAVENOUS
COMMUNITY
Start: 2023-06-07

## 2023-07-27 ENCOUNTER — OFFICE VISIT (OUTPATIENT)
Age: 68
End: 2023-07-27
Payer: MEDICARE

## 2023-07-27 VITALS
TEMPERATURE: 97.8 F | RESPIRATION RATE: 15 BRPM | BODY MASS INDEX: 28.92 KG/M2 | OXYGEN SATURATION: 98 % | SYSTOLIC BLOOD PRESSURE: 128 MMHG | WEIGHT: 202 LBS | HEART RATE: 87 BPM | DIASTOLIC BLOOD PRESSURE: 66 MMHG | HEIGHT: 70 IN

## 2023-07-27 DIAGNOSIS — I73.9 PERIPHERAL VASCULAR DISEASE (HCC): Primary | ICD-10-CM

## 2023-07-27 DIAGNOSIS — L08.9 LEFT FOOT INFECTION: ICD-10-CM

## 2023-07-27 PROCEDURE — G8417 CALC BMI ABV UP PARAM F/U: HCPCS | Performed by: SURGERY

## 2023-07-27 PROCEDURE — 1123F ACP DISCUSS/DSCN MKR DOCD: CPT | Performed by: SURGERY

## 2023-07-27 PROCEDURE — 3074F SYST BP LT 130 MM HG: CPT | Performed by: SURGERY

## 2023-07-27 PROCEDURE — 99213 OFFICE O/P EST LOW 20 MIN: CPT | Performed by: SURGERY

## 2023-07-27 PROCEDURE — 3017F COLORECTAL CA SCREEN DOC REV: CPT | Performed by: SURGERY

## 2023-07-27 PROCEDURE — 3078F DIAST BP <80 MM HG: CPT | Performed by: SURGERY

## 2023-07-27 PROCEDURE — 1036F TOBACCO NON-USER: CPT | Performed by: SURGERY

## 2023-07-27 PROCEDURE — G8427 DOCREV CUR MEDS BY ELIG CLIN: HCPCS | Performed by: SURGERY

## 2023-07-31 PROBLEM — I73.9 PERIPHERAL VASCULAR DISEASE (HCC): Status: ACTIVE | Noted: 2023-07-31

## 2023-07-31 NOTE — PROGRESS NOTES
Vascular History and Physical    Patient: Linda Cole  MRN: [id number]    YOB: 1955  Age: 77 y.o. Sex: male     Chief Complaint:  No chief complaint on file. History of Present Illness: Linda Cole is a 77 y.o. very pleasant gentleman is here today to check left foot wound. Patient had a previous left great toe amputation wounds are still open. Patient had previous left leg intervention with angiogram March 2023. Patient denies any pain or fever or chills.     Social History:  Social Connections: Not on file       Past Medical History:  Past Medical History:   Diagnosis Date    Essential hypertension     GERD (gastroesophageal reflux disease)     Mixed hyperlipidemia     Moderate episode of recurrent major depressive disorder ([address]) 2/16/2023    Osteomyelitis ([address]) 3/20/2023    Pneumonia due to COVID-19 virus 2/17/2021    Renal mass, left     Type 2 diabetes mellitus with complication, without long-term current use of insulin ([address])        Surgical History:  Past Surgical History:   Procedure Laterality Date    CARDIAC CATHETERIZATION      patient stated stents placed     COLONOSCOPY      COLONOSCOPY N/A 01/06/2023    COLONOSCOPY (TIVA) performed by Jeramie Parker MD at [facility]  2018    x1    KIDNEY REMOVAL Left 08/23/2021    robotic left partial nephrectomy,    [place] UNLISTED PROCEDURE CARDIAC SURGERY  2018    BYPASS    TOE AMPUTATION Left     UROLOGICAL SURGERY  08/23/2021     intraoperative renal ultrasound       Allergies:  No Known Allergies    Current Meds:  Current Outpatient Medications   Medication Sig Dispense Refill    lisinopril (PRINIVIL;ZESTRIL) 20 MG tablet Take 1 tablet by mouth daily 90 tablet 1    methIMAzole (TAPAZOLE) 5 MG tablet TAKE 1 TABLET EVERY DAY 90 tablet 0    potassium chloride (MICRO-K) 10 MEQ extended release capsule Take 1 capsule by mouth 2 times daily      FLUoxetine (PROZAC) 40 MG capsule Take 1

## 2023-08-07 ENCOUNTER — TELEPHONE (OUTPATIENT)
Facility: CLINIC | Age: 68
End: 2023-08-07

## 2023-08-07 NOTE — TELEPHONE ENCOUNTER
----- Message from DENISA Florence NP sent at 8/1/2023  8:15 AM EDT -----  Patient needs a follow up with me in the next 3-4 weeks

## 2023-08-11 ENCOUNTER — OFFICE VISIT (OUTPATIENT)
Age: 68
End: 2023-08-11
Payer: MEDICARE

## 2023-08-11 VITALS
TEMPERATURE: 97.3 F | HEART RATE: 82 BPM | BODY MASS INDEX: 32.07 KG/M2 | DIASTOLIC BLOOD PRESSURE: 76 MMHG | HEIGHT: 70 IN | WEIGHT: 224 LBS | SYSTOLIC BLOOD PRESSURE: 131 MMHG | OXYGEN SATURATION: 96 % | RESPIRATION RATE: 15 BRPM

## 2023-08-11 DIAGNOSIS — I73.9 PERIPHERAL VASCULAR DISEASE (HCC): Primary | ICD-10-CM

## 2023-08-11 DIAGNOSIS — L08.9 LEFT FOOT INFECTION: ICD-10-CM

## 2023-08-11 PROCEDURE — 1036F TOBACCO NON-USER: CPT | Performed by: SURGERY

## 2023-08-11 PROCEDURE — 1123F ACP DISCUSS/DSCN MKR DOCD: CPT | Performed by: SURGERY

## 2023-08-11 PROCEDURE — 3017F COLORECTAL CA SCREEN DOC REV: CPT | Performed by: SURGERY

## 2023-08-11 PROCEDURE — 99213 OFFICE O/P EST LOW 20 MIN: CPT | Performed by: SURGERY

## 2023-08-11 PROCEDURE — G8417 CALC BMI ABV UP PARAM F/U: HCPCS | Performed by: SURGERY

## 2023-08-11 PROCEDURE — 3074F SYST BP LT 130 MM HG: CPT | Performed by: SURGERY

## 2023-08-11 PROCEDURE — 3078F DIAST BP <80 MM HG: CPT | Performed by: SURGERY

## 2023-08-11 PROCEDURE — G8427 DOCREV CUR MEDS BY ELIG CLIN: HCPCS | Performed by: SURGERY

## 2023-08-28 ENCOUNTER — OFFICE VISIT (OUTPATIENT)
Age: 68
End: 2023-08-28
Payer: MEDICARE

## 2023-08-28 VITALS
OXYGEN SATURATION: 94 % | DIASTOLIC BLOOD PRESSURE: 75 MMHG | SYSTOLIC BLOOD PRESSURE: 138 MMHG | RESPIRATION RATE: 18 BRPM | HEART RATE: 82 BPM | BODY MASS INDEX: 32.35 KG/M2 | HEIGHT: 70 IN | TEMPERATURE: 98.2 F | WEIGHT: 226 LBS

## 2023-08-28 DIAGNOSIS — I73.9 PERIPHERAL VASCULAR DISEASE (HCC): Primary | ICD-10-CM

## 2023-08-28 PROCEDURE — G8427 DOCREV CUR MEDS BY ELIG CLIN: HCPCS | Performed by: SURGERY

## 2023-08-28 PROCEDURE — 3075F SYST BP GE 130 - 139MM HG: CPT | Performed by: SURGERY

## 2023-08-28 PROCEDURE — 3078F DIAST BP <80 MM HG: CPT | Performed by: SURGERY

## 2023-08-28 PROCEDURE — 1036F TOBACCO NON-USER: CPT | Performed by: SURGERY

## 2023-08-28 PROCEDURE — G8417 CALC BMI ABV UP PARAM F/U: HCPCS | Performed by: SURGERY

## 2023-08-28 PROCEDURE — 99213 OFFICE O/P EST LOW 20 MIN: CPT | Performed by: SURGERY

## 2023-08-28 PROCEDURE — 3017F COLORECTAL CA SCREEN DOC REV: CPT | Performed by: SURGERY

## 2023-08-28 PROCEDURE — 1123F ACP DISCUSS/DSCN MKR DOCD: CPT | Performed by: SURGERY

## 2023-08-28 ASSESSMENT — PATIENT HEALTH QUESTIONNAIRE - PHQ9
4. FEELING TIRED OR HAVING LITTLE ENERGY: 3
9. THOUGHTS THAT YOU WOULD BE BETTER OFF DEAD, OR OF HURTING YOURSELF: 1
2. FEELING DOWN, DEPRESSED OR HOPELESS: 3
6. FEELING BAD ABOUT YOURSELF - OR THAT YOU ARE A FAILURE OR HAVE LET YOURSELF OR YOUR FAMILY DOWN: 2
8. MOVING OR SPEAKING SO SLOWLY THAT OTHER PEOPLE COULD HAVE NOTICED. OR THE OPPOSITE, BEING SO FIGETY OR RESTLESS THAT YOU HAVE BEEN MOVING AROUND A LOT MORE THAN USUAL: 2
SUM OF ALL RESPONSES TO PHQ QUESTIONS 1-9: 12
SUM OF ALL RESPONSES TO PHQ QUESTIONS 1-9: 12
7. TROUBLE CONCENTRATING ON THINGS, SUCH AS READING THE NEWSPAPER OR WATCHING TELEVISION: 0
SUM OF ALL RESPONSES TO PHQ9 QUESTIONS 1 & 2: 3
5. POOR APPETITE OR OVEREATING: 1
SUM OF ALL RESPONSES TO PHQ QUESTIONS 1-9: 11
SUM OF ALL RESPONSES TO PHQ QUESTIONS 1-9: 12
10. IF YOU CHECKED OFF ANY PROBLEMS, HOW DIFFICULT HAVE THESE PROBLEMS MADE IT FOR YOU TO DO YOUR WORK, TAKE CARE OF THINGS AT HOME, OR GET ALONG WITH OTHER PEOPLE: 1
1. LITTLE INTEREST OR PLEASURE IN DOING THINGS: 0
3. TROUBLE FALLING OR STAYING ASLEEP: 0

## 2023-08-28 ASSESSMENT — COLUMBIA-SUICIDE SEVERITY RATING SCALE - C-SSRS
1. WITHIN THE PAST MONTH, HAVE YOU WISHED YOU WERE DEAD OR WISHED YOU COULD GO TO SLEEP AND NOT WAKE UP?: YES
6. HAVE YOU EVER DONE ANYTHING, STARTED TO DO ANYTHING, OR PREPARED TO DO ANYTHING TO END YOUR LIFE?: NO
2. HAVE YOU ACTUALLY HAD ANY THOUGHTS OF KILLING YOURSELF?: NO

## 2023-08-28 NOTE — PROGRESS NOTES
Identified pt with two pt identifiers (name and ). Reviewed chart in preparation for visit and have obtained necessary documentation. Mike Fischer is a 77 y.o. male  Chief Complaint   Patient presents with    Follow-up     Follow-up: foot ulcer. There were no vitals taken for this visit. 1. Have you been to the ER, urgent care clinic since your last visit? Hospitalized since your last visit? No    2. Have you seen or consulted any other health care providers outside of the [address] [place] [address] since your last visit? Include any pap smears or colon screening.  No
without long-term current use of insulin (HCC)    Gastroenteritis    MATT (acute kidney injury) ([address])    PAD (peripheral artery disease) (HCC)    Left foot infection    MSSA (methicillin susceptible Staphylococcus aureus)    Peripheral vascular disease ([address])       Plans: Patient was instructed another week of Aquacel Ag and he can discontinue. Since the wounds are closed we will closely monitor vascular status. Patient will be reassessed in 3 months follow-up for surveillance vascular examination. We discussed continue work on risk factor modification for PAD. Patient was encouraged to continue to be active and exercising.           Leo Figueroa MD

## 2023-10-31 ENCOUNTER — OFFICE VISIT (OUTPATIENT)
Facility: CLINIC | Age: 68
End: 2023-10-31
Payer: MEDICARE

## 2023-10-31 VITALS
RESPIRATION RATE: 18 BRPM | BODY MASS INDEX: 32.04 KG/M2 | OXYGEN SATURATION: 98 % | SYSTOLIC BLOOD PRESSURE: 148 MMHG | DIASTOLIC BLOOD PRESSURE: 61 MMHG | HEIGHT: 70 IN | HEART RATE: 87 BPM | TEMPERATURE: 97.8 F | WEIGHT: 223.8 LBS

## 2023-10-31 DIAGNOSIS — E11.65 TYPE 2 DIABETES MELLITUS WITH HYPERGLYCEMIA, WITHOUT LONG-TERM CURRENT USE OF INSULIN (HCC): Primary | ICD-10-CM

## 2023-10-31 DIAGNOSIS — E87.6 HYPOKALEMIA: ICD-10-CM

## 2023-10-31 DIAGNOSIS — E78.2 MIXED HYPERLIPIDEMIA: ICD-10-CM

## 2023-10-31 DIAGNOSIS — F33.40 RECURRENT MAJOR DEPRESSIVE DISORDER, IN REMISSION (HCC): ICD-10-CM

## 2023-10-31 DIAGNOSIS — R26.9 IMPAIRED GAIT: ICD-10-CM

## 2023-10-31 DIAGNOSIS — E05.90 HYPERTHYROIDISM: ICD-10-CM

## 2023-10-31 DIAGNOSIS — I73.9 PERIPHERAL VASCULAR DISEASE, UNSPECIFIED (HCC): ICD-10-CM

## 2023-10-31 DIAGNOSIS — D64.9 ANEMIA, UNSPECIFIED TYPE: ICD-10-CM

## 2023-10-31 DIAGNOSIS — I10 ESSENTIAL (PRIMARY) HYPERTENSION: ICD-10-CM

## 2023-10-31 DIAGNOSIS — Z12.5 SCREENING FOR PROSTATE CANCER: ICD-10-CM

## 2023-10-31 LAB — HBA1C MFR BLD: 7.2 %

## 2023-10-31 PROCEDURE — 3077F SYST BP >= 140 MM HG: CPT | Performed by: NURSE PRACTITIONER

## 2023-10-31 PROCEDURE — 3017F COLORECTAL CA SCREEN DOC REV: CPT | Performed by: NURSE PRACTITIONER

## 2023-10-31 PROCEDURE — 83036 HEMOGLOBIN GLYCOSYLATED A1C: CPT | Performed by: NURSE PRACTITIONER

## 2023-10-31 PROCEDURE — 1036F TOBACCO NON-USER: CPT | Performed by: NURSE PRACTITIONER

## 2023-10-31 PROCEDURE — 3078F DIAST BP <80 MM HG: CPT | Performed by: NURSE PRACTITIONER

## 2023-10-31 PROCEDURE — G8427 DOCREV CUR MEDS BY ELIG CLIN: HCPCS | Performed by: NURSE PRACTITIONER

## 2023-10-31 PROCEDURE — G8417 CALC BMI ABV UP PARAM F/U: HCPCS | Performed by: NURSE PRACTITIONER

## 2023-10-31 PROCEDURE — 2022F DILAT RTA XM EVC RTNOPTHY: CPT | Performed by: NURSE PRACTITIONER

## 2023-10-31 PROCEDURE — 1123F ACP DISCUSS/DSCN MKR DOCD: CPT | Performed by: NURSE PRACTITIONER

## 2023-10-31 PROCEDURE — 99214 OFFICE O/P EST MOD 30 MIN: CPT | Performed by: NURSE PRACTITIONER

## 2023-10-31 PROCEDURE — 3046F HEMOGLOBIN A1C LEVEL >9.0%: CPT | Performed by: NURSE PRACTITIONER

## 2023-10-31 PROCEDURE — G8484 FLU IMMUNIZE NO ADMIN: HCPCS | Performed by: NURSE PRACTITIONER

## 2023-10-31 RX ORDER — LISINOPRIL 10 MG/1
10 TABLET ORAL DAILY
COMMUNITY
End: 2023-10-31 | Stop reason: SDUPTHER

## 2023-10-31 RX ORDER — ACYCLOVIR 400 MG/1
TABLET ORAL
Qty: 1 EACH | Refills: 0 | Status: SHIPPED | OUTPATIENT
Start: 2023-10-31

## 2023-10-31 RX ORDER — ATORVASTATIN CALCIUM 20 MG/1
20 TABLET, FILM COATED ORAL DAILY
Qty: 90 TABLET | Refills: 1 | Status: SHIPPED | OUTPATIENT
Start: 2023-10-31

## 2023-10-31 RX ORDER — LISINOPRIL 20 MG/1
20 TABLET ORAL DAILY
Qty: 90 TABLET | Refills: 1 | Status: SHIPPED | OUTPATIENT
Start: 2023-10-31

## 2023-10-31 ASSESSMENT — ENCOUNTER SYMPTOMS
VOMITING: 0
EYE PAIN: 0
BLOOD IN STOOL: 0
DIARRHEA: 0
SINUS PAIN: 0
WHEEZING: 0
EYE DISCHARGE: 0
APNEA: 0
STRIDOR: 0
SORE THROAT: 0
COLOR CHANGE: 0
CHEST TIGHTNESS: 0
FACIAL SWELLING: 0
EYE ITCHING: 0
CONSTIPATION: 0
EYE REDNESS: 0
TROUBLE SWALLOWING: 0
ABDOMINAL DISTENTION: 0
NAUSEA: 0
CHOKING: 0
COUGH: 0
ABDOMINAL PAIN: 0
SHORTNESS OF BREATH: 0
BACK PAIN: 0
PHOTOPHOBIA: 0

## 2023-10-31 NOTE — PROGRESS NOTES
Subjective  Chief Complaint   Patient presents with    Diabetes     HPI:  Kulwant Brandt is a 77 y.o. male who presents for follow up of chronic conditions after extended absence with [facility] hypertension, hyperlipidemia, uncontrolled type 2 diabetes,CAD, depression,  pneumonia, hyperlipidemia, left kidney mass, obesity, foot wound,hypertension and hyperlipidemia. Reports taking medicines as noted in chart but has stopped several since last visit on his own. Has not been checking glucose regularly due to he does not like sticking finger. Reports following diabetic diet closely however and has been more active since foot wounds healed. Is still following with podiatry and vascular. Has diabetic shoes that will be arriving in next few weeks. Past Medical History:   Diagnosis Date    Essential hypertension     GERD (gastroesophageal reflux disease)     Mixed hyperlipidemia     Moderate episode of recurrent major depressive disorder ([address]) 2/16/2023    Osteomyelitis ([address]) 3/20/2023    Pneumonia due to COVID-19 virus 2/17/2021    Renal mass, left     Type 2 diabetes mellitus with complication, without long-term current use of insulin (HCC)         Past Surgical History:   Procedure Laterality Date    CARDIAC CATHETERIZATION      patient stated stents placed     COLONOSCOPY      COLONOSCOPY N/A 01/06/2023    COLONOSCOPY (TIVA) performed by Amirah Marcano MD at [address] [facility]  2018    x1    KIDNEY REMOVAL Left 08/23/2021    robotic left partial nephrectomy,    [place] UNLISTED PROCEDURE CARDIAC SURGERY  2018    BYPASS    TOE AMPUTATION Left     UROLOGICAL SURGERY  08/23/2021     intraoperative renal ultrasound        Family History   Problem Relation Age of Onset    Heart Disease Father     Hypertension Father     Hypertension Mother     Diabetes Mother         Social History     Socioeconomic History    Marital status:       Spouse name: None    Number of children: None

## 2023-10-31 NOTE — PROGRESS NOTES
Chief Complaint   Patient presents with    Diabetes     Follow up     No other c/o    Pt brought in meds went over list in chart, pt confirmed      1. Have you been to the ER, urgent care clinic since your last visit? Hospitalized since your last visit? No    2. Have you seen or consulted any other health care providers outside of the [address] [place] [address] Avenue since your last visit? Include any pap smears or colon screening.  No

## 2023-11-13 RX ORDER — EMPAGLIFLOZIN 25 MG/1
25 TABLET, FILM COATED ORAL DAILY
Qty: 90 TABLET | Refills: 1 | Status: SHIPPED | OUTPATIENT
Start: 2023-11-13

## 2023-11-29 ENCOUNTER — NURSE ONLY (OUTPATIENT)
Facility: CLINIC | Age: 68
End: 2023-11-29

## 2023-11-29 VITALS — SYSTOLIC BLOOD PRESSURE: 131 MMHG | DIASTOLIC BLOOD PRESSURE: 67 MMHG | HEART RATE: 88 BPM

## 2023-12-14 ENCOUNTER — OFFICE VISIT (OUTPATIENT)
Facility: CLINIC | Age: 68
End: 2023-12-14
Payer: MEDICARE

## 2023-12-14 VITALS
BODY MASS INDEX: 31.84 KG/M2 | WEIGHT: 222.4 LBS | DIASTOLIC BLOOD PRESSURE: 65 MMHG | SYSTOLIC BLOOD PRESSURE: 123 MMHG | TEMPERATURE: 97.8 F | RESPIRATION RATE: 18 BRPM | HEIGHT: 70 IN | OXYGEN SATURATION: 98 % | HEART RATE: 85 BPM

## 2023-12-14 DIAGNOSIS — E87.6 HYPOKALEMIA: ICD-10-CM

## 2023-12-14 DIAGNOSIS — I10 ESSENTIAL (PRIMARY) HYPERTENSION: Primary | ICD-10-CM

## 2023-12-14 DIAGNOSIS — Z23 IMMUNIZATION DUE: ICD-10-CM

## 2023-12-14 DIAGNOSIS — Z00.00 MEDICARE ANNUAL WELLNESS VISIT, SUBSEQUENT: ICD-10-CM

## 2023-12-14 PROCEDURE — 3074F SYST BP LT 130 MM HG: CPT | Performed by: NURSE PRACTITIONER

## 2023-12-14 PROCEDURE — G8417 CALC BMI ABV UP PARAM F/U: HCPCS | Performed by: NURSE PRACTITIONER

## 2023-12-14 PROCEDURE — G8427 DOCREV CUR MEDS BY ELIG CLIN: HCPCS | Performed by: NURSE PRACTITIONER

## 2023-12-14 PROCEDURE — 1036F TOBACCO NON-USER: CPT | Performed by: NURSE PRACTITIONER

## 2023-12-14 PROCEDURE — 3078F DIAST BP <80 MM HG: CPT | Performed by: NURSE PRACTITIONER

## 2023-12-14 PROCEDURE — G0439 PPPS, SUBSEQ VISIT: HCPCS | Performed by: NURSE PRACTITIONER

## 2023-12-14 PROCEDURE — G8482 FLU IMMUNIZE ORDER/ADMIN: HCPCS | Performed by: NURSE PRACTITIONER

## 2023-12-14 PROCEDURE — 3017F COLORECTAL CA SCREEN DOC REV: CPT | Performed by: NURSE PRACTITIONER

## 2023-12-14 PROCEDURE — 99213 OFFICE O/P EST LOW 20 MIN: CPT | Performed by: NURSE PRACTITIONER

## 2023-12-14 PROCEDURE — 1123F ACP DISCUSS/DSCN MKR DOCD: CPT | Performed by: NURSE PRACTITIONER

## 2023-12-14 PROCEDURE — 90674 CCIIV4 VAC NO PRSV 0.5 ML IM: CPT | Performed by: NURSE PRACTITIONER

## 2023-12-14 PROCEDURE — G0008 ADMIN INFLUENZA VIRUS VAC: HCPCS | Performed by: NURSE PRACTITIONER

## 2023-12-14 ASSESSMENT — PATIENT HEALTH QUESTIONNAIRE - PHQ9
SUM OF ALL RESPONSES TO PHQ9 QUESTIONS 1 & 2: 2
SUM OF ALL RESPONSES TO PHQ QUESTIONS 1-9: 2
SUM OF ALL RESPONSES TO PHQ QUESTIONS 1-9: 2
2. FEELING DOWN, DEPRESSED OR HOPELESS: 1
SUM OF ALL RESPONSES TO PHQ QUESTIONS 1-9: 2
1. LITTLE INTEREST OR PLEASURE IN DOING THINGS: 1
SUM OF ALL RESPONSES TO PHQ QUESTIONS 1-9: 2

## 2023-12-14 ASSESSMENT — LIFESTYLE VARIABLES
HOW OFTEN DO YOU HAVE A DRINK CONTAINING ALCOHOL: NEVER
HOW MANY STANDARD DRINKS CONTAINING ALCOHOL DO YOU HAVE ON A TYPICAL DAY: PATIENT DOES NOT DRINK

## 2023-12-15 LAB
ALBUMIN SERPL-MCNC: 4.1 G/DL (ref 3.9–4.9)
ALBUMIN/GLOB SERPL: 1.3 {RATIO} (ref 1.2–2.2)
ALP SERPL-CCNC: 115 IU/L (ref 44–121)
ALT SERPL-CCNC: 15 IU/L (ref 0–44)
AST SERPL-CCNC: 17 IU/L (ref 0–40)
BASOPHILS # BLD AUTO: 0.1 X10E3/UL (ref 0–0.2)
BASOPHILS NFR BLD AUTO: 1 %
BILIRUB SERPL-MCNC: <0.2 MG/DL (ref 0–1.2)
BUN SERPL-MCNC: 25 MG/DL (ref 8–27)
BUN/CREAT SERPL: 21 (ref 10–24)
CALCIUM SERPL-MCNC: 9.7 MG/DL (ref 8.6–10.2)
CHLORIDE SERPL-SCNC: 103 MMOL/L (ref 96–106)
CHOLEST SERPL-MCNC: 208 MG/DL (ref 100–199)
CO2 SERPL-SCNC: 20 MMOL/L (ref 20–29)
CREAT SERPL-MCNC: 1.21 MG/DL (ref 0.76–1.27)
EGFRCR SERPLBLD CKD-EPI 2021: 64 ML/MIN/1.73
EOSINOPHIL # BLD AUTO: 0.3 X10E3/UL (ref 0–0.4)
EOSINOPHIL NFR BLD AUTO: 4 %
ERYTHROCYTE [DISTWIDTH] IN BLOOD BY AUTOMATED COUNT: 14.6 % (ref 11.6–15.4)
GLOBULIN SER CALC-MCNC: 3.1 G/DL (ref 1.5–4.5)
GLUCOSE SERPL-MCNC: 139 MG/DL (ref 70–99)
HCT VFR BLD AUTO: 35.4 % (ref 37.5–51)
HDLC SERPL-MCNC: 88 MG/DL
HGB BLD-MCNC: 10.4 G/DL (ref 13–17.7)
IMM GRANULOCYTES # BLD AUTO: 0 X10E3/UL (ref 0–0.1)
IMM GRANULOCYTES NFR BLD AUTO: 0 %
LDLC SERPL CALC-MCNC: 103 MG/DL (ref 0–99)
LYMPHOCYTES # BLD AUTO: 2.5 X10E3/UL (ref 0.7–3.1)
LYMPHOCYTES NFR BLD AUTO: 35 %
MAGNESIUM SERPL-MCNC: 2.3 MG/DL (ref 1.6–2.3)
MCH RBC QN AUTO: 23.2 PG (ref 26.6–33)
MCHC RBC AUTO-ENTMCNC: 29.4 G/DL (ref 31.5–35.7)
MCV RBC AUTO: 79 FL (ref 79–97)
MONOCYTES # BLD AUTO: 0.8 X10E3/UL (ref 0.1–0.9)
MONOCYTES NFR BLD AUTO: 11 %
NEUTROPHILS # BLD AUTO: 3.5 X10E3/UL (ref 1.4–7)
NEUTROPHILS NFR BLD AUTO: 49 %
PLATELET # BLD AUTO: 338 X10E3/UL (ref 150–450)
POTASSIUM SERPL-SCNC: 4.3 MMOL/L (ref 3.5–5.2)
PROT SERPL-MCNC: 7.2 G/DL (ref 6–8.5)
PSA SERPL-MCNC: 0.3 NG/ML (ref 0–4)
RBC # BLD AUTO: 4.48 X10E6/UL (ref 4.14–5.8)
REFLEX CRITERIA: NORMAL
SODIUM SERPL-SCNC: 140 MMOL/L (ref 134–144)
SPECIMEN STATUS REPORT: NORMAL
TRIGL SERPL-MCNC: 97 MG/DL (ref 0–149)
TSH SERPL DL<=0.005 MIU/L-ACNC: 1.05 UIU/ML (ref 0.45–4.5)
VLDLC SERPL CALC-MCNC: 17 MG/DL (ref 5–40)
WBC # BLD AUTO: 7.1 X10E3/UL (ref 3.4–10.8)

## 2023-12-17 DIAGNOSIS — D64.9 ANEMIA, UNSPECIFIED TYPE: Primary | ICD-10-CM

## 2024-02-19 ENCOUNTER — OFFICE VISIT (OUTPATIENT)
Facility: CLINIC | Age: 69
End: 2024-02-19
Payer: MEDICARE

## 2024-02-19 VITALS
OXYGEN SATURATION: 98 % | SYSTOLIC BLOOD PRESSURE: 118 MMHG | DIASTOLIC BLOOD PRESSURE: 54 MMHG | WEIGHT: 230.2 LBS | HEART RATE: 91 BPM | HEIGHT: 70 IN | BODY MASS INDEX: 32.96 KG/M2 | TEMPERATURE: 97.2 F | RESPIRATION RATE: 18 BRPM

## 2024-02-19 DIAGNOSIS — D64.9 ANEMIA, UNSPECIFIED TYPE: ICD-10-CM

## 2024-02-19 DIAGNOSIS — E05.90 HYPERTHYROIDISM: ICD-10-CM

## 2024-02-19 DIAGNOSIS — E11.65 TYPE 2 DIABETES MELLITUS WITH HYPERGLYCEMIA, WITHOUT LONG-TERM CURRENT USE OF INSULIN (HCC): Primary | ICD-10-CM

## 2024-02-19 DIAGNOSIS — E87.6 HYPOKALEMIA: ICD-10-CM

## 2024-02-19 DIAGNOSIS — I10 ESSENTIAL (PRIMARY) HYPERTENSION: ICD-10-CM

## 2024-02-19 DIAGNOSIS — F33.40 RECURRENT MAJOR DEPRESSIVE DISORDER, IN REMISSION (HCC): ICD-10-CM

## 2024-02-19 DIAGNOSIS — E78.2 MIXED HYPERLIPIDEMIA: ICD-10-CM

## 2024-02-19 DIAGNOSIS — I73.9 PERIPHERAL VASCULAR DISEASE, UNSPECIFIED (HCC): ICD-10-CM

## 2024-02-19 LAB — HBA1C MFR BLD: 8.5 %

## 2024-02-19 PROCEDURE — 2022F DILAT RTA XM EVC RTNOPTHY: CPT | Performed by: NURSE PRACTITIONER

## 2024-02-19 PROCEDURE — G8427 DOCREV CUR MEDS BY ELIG CLIN: HCPCS | Performed by: NURSE PRACTITIONER

## 2024-02-19 PROCEDURE — G8417 CALC BMI ABV UP PARAM F/U: HCPCS | Performed by: NURSE PRACTITIONER

## 2024-02-19 PROCEDURE — 1123F ACP DISCUSS/DSCN MKR DOCD: CPT | Performed by: NURSE PRACTITIONER

## 2024-02-19 PROCEDURE — 1036F TOBACCO NON-USER: CPT | Performed by: NURSE PRACTITIONER

## 2024-02-19 PROCEDURE — 83036 HEMOGLOBIN GLYCOSYLATED A1C: CPT | Performed by: NURSE PRACTITIONER

## 2024-02-19 PROCEDURE — 3078F DIAST BP <80 MM HG: CPT | Performed by: NURSE PRACTITIONER

## 2024-02-19 PROCEDURE — 3017F COLORECTAL CA SCREEN DOC REV: CPT | Performed by: NURSE PRACTITIONER

## 2024-02-19 PROCEDURE — 99214 OFFICE O/P EST MOD 30 MIN: CPT | Performed by: NURSE PRACTITIONER

## 2024-02-19 PROCEDURE — 3074F SYST BP LT 130 MM HG: CPT | Performed by: NURSE PRACTITIONER

## 2024-02-19 PROCEDURE — G8482 FLU IMMUNIZE ORDER/ADMIN: HCPCS | Performed by: NURSE PRACTITIONER

## 2024-02-19 PROCEDURE — 3046F HEMOGLOBIN A1C LEVEL >9.0%: CPT | Performed by: NURSE PRACTITIONER

## 2024-02-19 RX ORDER — SEMAGLUTIDE 0.68 MG/ML
0.25 INJECTION, SOLUTION SUBCUTANEOUS
Qty: 3 ML | Refills: 2 | Status: SHIPPED | OUTPATIENT
Start: 2024-02-19

## 2024-02-19 ASSESSMENT — ENCOUNTER SYMPTOMS
EYE REDNESS: 0
ABDOMINAL PAIN: 0
CHEST TIGHTNESS: 0
ABDOMINAL DISTENTION: 0
COLOR CHANGE: 0
CONSTIPATION: 0
BACK PAIN: 0
VOMITING: 0
COUGH: 0
NAUSEA: 0
SHORTNESS OF BREATH: 0
WHEEZING: 0
SORE THROAT: 0
FACIAL SWELLING: 0
PHOTOPHOBIA: 0
EYE ITCHING: 0
EYE DISCHARGE: 0
CHOKING: 0
DIARRHEA: 0
STRIDOR: 0
BLOOD IN STOOL: 0
EYE PAIN: 0
APNEA: 0
SINUS PAIN: 0
TROUBLE SWALLOWING: 0

## 2024-02-19 NOTE — PROGRESS NOTES
Subjective  Chief Complaint   Patient presents with    Diabetes     HPI:  Chavez Skelton is a 68 y.o. male who presents for follow up of chronic conditions after extended absence with [facility] hypertension, hyperlipidemia, uncontrolled type 2 diabetes,CAD, depression,  pneumonia, hyperlipidemia, left kidney mass, obesity, foot wound,hypertension and hyperlipidemia. Reports taking medicines as noted in chart. Has  been checking glucose but did not bring home readings. Reports under 200 when checking.  Is still following with podiatry and vascular. No new concerns.      Past Medical History:   Diagnosis Date    Essential hypertension     GERD (gastroesophageal reflux disease)     Mixed hyperlipidemia     Moderate episode of recurrent major depressive disorder (HCC) 2/16/2023    Osteomyelitis (HCC) 3/20/2023    Pneumonia due to COVID-19 virus 2/17/2021    Renal mass, left     Type 2 diabetes mellitus with complication, without long-term current use of insulin (HCC)         Past Surgical History:   Procedure Laterality Date    CARDIAC CATHETERIZATION      patient stated stents placed     COLONOSCOPY      COLONOSCOPY N/A 01/06/2023    COLONOSCOPY (TIVA) performed by Gissell Newman MD at [facility] ENDOSCOPY    CORONARY ANGIOPLASTY WITH STENT PLACEMENT  2018    x1    KIDNEY REMOVAL Left 08/23/2021    robotic left partial nephrectomy,    [place] UNLISTED PROCEDURE CARDIAC SURGERY  2018    BYPASS    TOE AMPUTATION Left     UROLOGICAL SURGERY  08/23/2021     intraoperative renal ultrasound        Family History   Problem Relation Age of Onset    Heart Disease Father     Hypertension Father     Hypertension Mother     Diabetes Mother         Social History     Socioeconomic History    Marital status:      Spouse name: None    Number of children: None    Years of education: None    Highest education level: None   Tobacco Use    Smoking status: Never    Smokeless tobacco: Never   Substance and Sexual Activity    Alcohol use: Not

## 2024-02-19 NOTE — PROGRESS NOTES
Chief Complaint   Patient presents with    Diabetes     Follow up     No other c/o    Pt brought in meds went over list in chart, pt confirmed     1. Have you been to the ER, urgent care clinic since your last visit?  Hospitalized since your last visit?No    2. Have you seen or consulted any other health care providers outside of the [facility] since your last visit?  Include any pap smears or colon screening. No

## 2024-03-01 ENCOUNTER — TELEPHONE (OUTPATIENT)
Facility: CLINIC | Age: 69
End: 2024-03-01

## 2024-03-02 ENCOUNTER — HOSPITAL ENCOUNTER (EMERGENCY)
Facility: HOSPITAL | Age: 69
Discharge: HOME | End: 2024-03-02
Attending: EMERGENCY MEDICINE
Payer: MEDICARE

## 2024-03-02 VITALS
HEART RATE: 89 BPM | OXYGEN SATURATION: 100 % | SYSTOLIC BLOOD PRESSURE: 132 MMHG | DIASTOLIC BLOOD PRESSURE: 86 MMHG | TEMPERATURE: 97.9 F | RESPIRATION RATE: 17 BRPM

## 2024-03-02 DIAGNOSIS — K21.9 GASTROESOPHAGEAL REFLUX DISEASE, UNSPECIFIED WHETHER ESOPHAGITIS PRESENT: Primary | ICD-10-CM

## 2024-03-02 LAB
EKG ATRIAL RATE: 94 BPM
EKG DIAGNOSIS: NORMAL
EKG P AXIS: 33 DEGREES
EKG P-R INTERVAL: 183 MS
EKG Q-T INTERVAL: 379 MS
EKG QRS DURATION: 111 MS
EKG QTC CALCULATION (BAZETT): 472 MS
EKG R AXIS: -16 DEGREES
EKG T AXIS: 116 DEGREES
EKG VENTRICULAR RATE: 93 BPM

## 2024-03-02 PROCEDURE — 6370000000 HC RX 637 (ALT 250 FOR IP): Performed by: EMERGENCY MEDICINE

## 2024-03-02 PROCEDURE — 93005 ELECTROCARDIOGRAM TRACING: CPT | Performed by: EMERGENCY MEDICINE

## 2024-03-02 PROCEDURE — 99283 EMERGENCY DEPT VISIT LOW MDM: CPT

## 2024-03-02 RX ORDER — PANTOPRAZOLE SODIUM 40 MG/1
40 TABLET, DELAYED RELEASE ORAL
Status: COMPLETED | OUTPATIENT
Start: 2024-03-02 | End: 2024-03-02

## 2024-03-02 RX ORDER — MAGNESIUM HYDROXIDE/ALUMINUM HYDROXICE/SIMETHICONE 120; 1200; 1200 MG/30ML; MG/30ML; MG/30ML
30 SUSPENSION ORAL
Status: COMPLETED | OUTPATIENT
Start: 2024-03-02 | End: 2024-03-02

## 2024-03-02 RX ORDER — OMEPRAZOLE 20 MG/1
20 CAPSULE, DELAYED RELEASE ORAL
Qty: 60 CAPSULE | Refills: 0 | Status: SHIPPED | OUTPATIENT
Start: 2024-03-02

## 2024-03-02 RX ORDER — PANTOPRAZOLE SODIUM 40 MG/1
40 TABLET, DELAYED RELEASE ORAL
Qty: 30 TABLET | Refills: 0 | Status: SHIPPED | OUTPATIENT
Start: 2024-03-02

## 2024-03-02 RX ADMIN — PANTOPRAZOLE SODIUM 40 MG: 40 TABLET, DELAYED RELEASE ORAL at 10:55

## 2024-03-02 RX ADMIN — ALUMINUM HYDROXIDE, MAGNESIUM HYDROXIDE, AND SIMETHICONE 30 ML: 200; 200; 20 SUSPENSION ORAL at 10:55

## 2024-03-02 ASSESSMENT — PAIN - FUNCTIONAL ASSESSMENT: PAIN_FUNCTIONAL_ASSESSMENT: 0-10

## 2024-03-02 ASSESSMENT — ENCOUNTER SYMPTOMS
ALLERGIC/IMMUNOLOGIC NEGATIVE: 1
RESPIRATORY NEGATIVE: 1
EYES NEGATIVE: 1

## 2024-03-02 ASSESSMENT — PAIN DESCRIPTION - ONSET: ONSET: ON-GOING

## 2024-03-02 ASSESSMENT — PAIN DESCRIPTION - DESCRIPTORS: DESCRIPTORS: BURNING

## 2024-03-02 ASSESSMENT — PAIN DESCRIPTION - PAIN TYPE: TYPE: ACUTE PAIN

## 2024-03-02 ASSESSMENT — PAIN SCALES - GENERAL: PAINLEVEL_OUTOF10: 8

## 2024-03-02 ASSESSMENT — PAIN DESCRIPTION - LOCATION: LOCATION: THROAT

## 2024-03-02 NOTE — ED PROVIDER NOTES
[facility] EMERGENCY DEPT  EMERGENCY DEPARTMENT ENCOUNTER      Pt Name: Chavez Skelton  MRN: [id number]  Birthdate 1955  Date of evaluation: 3/2/2024  Provider: James Oliver MD    CHIEF COMPLAINT       Chief Complaint   Patient presents with    Gastroesophageal Reflux         HISTORY OF PRESENT ILLNESS   (Location/Symptom, Timing/Onset, Context/Setting, Quality, Duration, Modifying Factors, Severity)  Note limiting factors.   Chavez Skelton is a 68 y.o. male who presents to the emergency department recurrence of GERD symptoms.  Patient has noted early morning burning in his throat with an acid taste and smell for the past 5 nights he is out of his GERD medication.  He denies chest pain shortness of breath nausea vomiting or abdominal pain.  Known coronary disease status post bypass peripheral vascular disease hypertension and diabetes    HPI    Nursing Notes were reviewed.    REVIEW OF SYSTEMS    (2-9 systems for level 4, 10 or more for level 5)     Review of Systems   Constitutional: Negative.    HENT: Negative.     Eyes: Negative.    Respiratory: Negative.     Cardiovascular: Negative.    Gastrointestinal:         Per HPI   Endocrine: Negative.    Genitourinary: Negative.    Musculoskeletal: Negative.    Allergic/Immunologic: Negative.    Neurological: Negative.    Hematological: Negative.    Psychiatric/Behavioral: Negative.         Except as noted above the remainder of the review of systems was reviewed and negative.       PAST MEDICAL HISTORY     Past Medical History:   Diagnosis Date    Essential hypertension     GERD (gastroesophageal reflux disease)     Mixed hyperlipidemia     Moderate episode of recurrent major depressive disorder (HCC) 2/16/2023    Osteomyelitis (HCC) 3/20/2023    Pneumonia due to COVID-19 virus 2/17/2021    Renal mass, left     Type 2 diabetes mellitus with complication, without long-term current use of insulin (HCC)          SURGICAL HISTORY       Past Surgical History:   Procedure  MRI are read by the radiologist. Plain radiographic images are visualized and preliminarily interpreted by the emergency physician with the below findings:        Interpretation per the Radiologist below, if available at the time of this note:    No orders to display         ED BEDSIDE ULTRASOUND:   Performed by ED Physician - none    LABS:  Labs Reviewed - No data to display    All other labs were within normal range or not returned as of this dictation.    EMERGENCY DEPARTMENT COURSE and DIFFERENTIAL DIAGNOSIS/MDM:   Vitals:    Vitals:    03/02/24 1043   BP: 132/86   Pulse: 89   Resp: 17   Temp: 97.9 °F (36.6 °C)   SpO2: 100%           Medical Decision Making  Amount and/or Complexity of Data Reviewed  ECG/medicine tests: ordered.    Risk  OTC drugs.  Prescription drug management.    Patient received total relief with oral Maalox given a tablet of Protonix and prescription for the same.  The patient has multiple risk factors and known coronary disease he states scissors not like any sensation from his heart clearly reflux by his previous history prop relief with an acid now symptom-free will discharge to home    Patient given prescription for Protonix also similar prescription for omeprazole which ever is cheaper he will fill    REASSESSMENT          CRITICAL CARE TIME   Total Critical Care time was      minutes, excluding separately reportable procedures.  There was a high probability of clinically significant/life threatening deterioration in the patient's condition which required my urgent intervention.   CONSULTS:  None    PROCEDURES:  Unless otherwise noted below, none     Procedures    FINAL IMPRESSION    No diagnosis found.      DISPOSITION/PLAN   DISPOSITION        PATIENT REFERRED TO:  No follow-up provider specified.    DISCHARGE MEDICATIONS:  New Prescriptions    No medications on file     Controlled Substances Monitoring:          No data to display                (Please note that portions of this

## 2024-03-02 NOTE — ED TRIAGE NOTES
PT reports acid reflux that began last night. Pt has hx of GERD and reports he is not on any medication. PT reports pain felt like a burning sensation rising up his throat. Pt denies taking any OTC medications for his symptoms.

## 2024-04-15 DIAGNOSIS — E11.65 TYPE 2 DIABETES MELLITUS WITH HYPERGLYCEMIA, WITHOUT LONG-TERM CURRENT USE OF INSULIN (HCC): ICD-10-CM

## 2024-04-15 RX ORDER — OMEPRAZOLE 20 MG/1
CAPSULE, DELAYED RELEASE ORAL
Qty: 180 CAPSULE | Refills: 0 | OUTPATIENT
Start: 2024-04-15

## 2024-04-15 RX ORDER — EMPAGLIFLOZIN 25 MG/1
25 TABLET, FILM COATED ORAL DAILY
Qty: 90 TABLET | Refills: 1 | Status: SHIPPED | OUTPATIENT
Start: 2024-04-15

## 2024-04-15 RX ORDER — PANTOPRAZOLE SODIUM 40 MG/1
40 TABLET, DELAYED RELEASE ORAL DAILY
Qty: 90 TABLET | Refills: 1 | Status: SHIPPED | OUTPATIENT
Start: 2024-04-15

## 2024-04-15 RX ORDER — DIPHENHYDRAMINE HCL 25 MG
TABLET ORAL
Qty: 1 KIT | Refills: 0 | OUTPATIENT
Start: 2024-04-15

## 2024-04-15 RX ORDER — SEMAGLUTIDE 0.68 MG/ML
2 INJECTION, SOLUTION SUBCUTANEOUS
Qty: 9 ML | Refills: 0 | Status: SHIPPED | OUTPATIENT
Start: 2024-04-15

## 2024-04-16 NOTE — PROGRESS NOTES
[address]                                                                                          Hospitalist Progress Note  Renate Hinojosa MD  Answering service: [phone] from in house phone        Date of Service:  3/9/2021  NAME:  Elton Sepulveda  :  1955  MRN:  [id number]      Admission Summary:      Mr. Brenda Pierre is a 73-year-old black male who was admitted to [facility] ICU on  for severe Covid 19 pneumonia complicated by acute hypoxic respiratory failure. Patient initially presented to [facility] on  where he was admitted and was being treated with oxygen 4 L/min via nasal cannula. He was also treated with Decadron and azithromycin and ceftriaxone. On  he started becoming more hypoxic and his oxygen demand increased to 15 L/min via nasal cannula. Later on that day, he was tried on CPAP but patient was unable to tolerate it. He was transferred to [facility] on  as [facility] does not have ICU. He has been being treated with high flow oxygen with prone as tolerated. He had required Precedex drip for hyperactive delirium that has resolved and he is off the Precedex. He developed VARINDER that has resolved. Interval history / Subjective:        No acute overnight events. Remains on 30l/m HFNC. Comfortable, denies sob or cp. NAD. Assessment & Plan:   Acute hypoxic respiratory failure secondary to severe COVID-19 pneumonia  · on oxygen via high flow nasal cannula, wean as able  · Increase Steroids since no improvement today on HFNC  · S/p abx  · Continue vitamin C, vitamin D  · procal low 0.05, trend inflammatory markers     Acute metabolic encephalopathy, acute hyperactive delirium. Resolved  · Weaned off Precedex drip  · Weaned off trazodone     Type 2 diabetes with hyperglycemia  · continue Lantus, Humalog  · Cont SSI  · Monitor on steroids     Hypertension, above goal, history of CAD status post Addended by: MEG MELLO on: 4/16/2024 03:11 PM     Modules accepted: Orders     CABG  · increased lisinopril, cont nifedipine ER, metoprolol succinate     BPH: Continue tamsulosin  Sleep aid: Melatonin 6 mg nightly     DVT prophylaxis: Enoxaparin per Covid protocol  CODE STATUS: Full  Anticipated discharge>48h, likely needs SNF/IPR. Patient lives alone. Hospital Problems  Never Reviewed          Codes Class Noted POA    Respiratory failure with hypoxia [facility]) ICD-10-CM: J96.91  ICD-9-CM: 518.81  2/22/2021 Unknown                Review of Systems:    per HPI      Vital Signs:    Last 24hrs VS reviewed since prior progress note. Most recent are:  Visit Vitals  /64 (BP 1 Location: Right upper arm, BP Patient Position: At rest)   Pulse 88   Temp 97.5 °F (36.4 °C)   Resp 24   Ht 5' 10\" (1.778 m)   Wt 112.4 kg (247 lb 12.8 oz)   SpO2 91%   BMI 35.56 kg/m²         Intake/Output Summary (Last 24 hours) at 3/9/2021 7225  Last data filed at 3/8/2021 2114  Gross per 24 hour   Intake 480 ml   Output 850 ml   Net -370 ml        Physical Examination:     I had a face to face encounter with this patient and independently examined them on3/9/2021 as outlined below:        Constitutional:  Pleasant gentleman,on oxygen via HFNC,sitting in bed,not in distress   Resp:  slightly decreased but grossly clear bilaterally. No wheezing/rhonchi/rales.  No accessory muscle use   Chest Wall: No deformity   CV:  Regular rhythm, normal rate    GI:  Soft, non distended, non tender     Musculoskeletal:  No edema, warm    Neurologic:  Mental status:AAO   Cranial nerves II-XII : WNL  Motor exam: INMAN            Data Review:    Review and/or order of clinical lab test  Review and/or order of tests in the radiology section of CPT  Review and/or order of tests in the medicine section of CPT      Labs:     Recent Labs     03/08/21  0001 03/07/21 0429   WBC 10.4 7.5   HGB 9.5* 9.4*   HCT 31.3* 31.2*    266     Recent Labs     03/08/21  0001 03/07/21 0429    137   K 4.2 4.7    104   CO2 28 28   BUN 19 16 CREA 1.06 0.86   * 295*   CA 9.0 9.2   MG  --  2.3   PHOS  --  3.4     No results for input(s): ALT, AP, TBIL, TBILI, TP, ALB, GLOB, GGT, AML, LPSE in the last 72 hours. No lab exists for component: SGOT, GPT, AMYP, HLPSE  No results for input(s): INR, PTP, APTT, INREXT, INREXT in the last 72 hours. No results for input(s): FE, TIBC, PSAT, FERR in the last 72 hours. No results found for: FOL, RBCF   No results for input(s): PH, PCO2, PO2 in the last 72 hours. No results for input(s): CPK, CKNDX, TROIQ in the last 72 hours.     No lab exists for component: CPKMB  Lab Results   Component Value Date/Time    Cholesterol, total 205 (H) 12/13/2020 08:50 AM    HDL Cholesterol 93 12/13/2020 08:50 AM    LDL, calculated 97 12/13/2020 08:50 AM    Triglyceride 75 12/13/2020 08:50 AM    CHOL/HDL Ratio 2.2 12/13/2020 08:50 AM     Lab Results   Component Value Date/Time    Glucose (POC) 176 (H) 03/09/2021 06:43 AM    Glucose (POC) 280 (H) 03/08/2021 09:13 PM    Glucose (POC) 389 (H) 03/08/2021 05:20 PM    Glucose (POC) 339 (H) 03/08/2021 12:24 PM    Glucose (POC) 387 (H) 03/08/2021 06:48 AM     No results found for: COLOR, APPRN, SPGRU, REFSG, BALDEMAR, PROTU, GLUCU, KETU, BILU, UROU, ENRIQUETA, LEUKU, GLUKE, EPSU, BACTU, WBCU, RBCU, CASTS, UCRY      Medications Reviewed:     Current Facility-Administered Medications   Medication Dose Route Frequency    insulin glargine (LANTUS) injection 25 Units  25 Units SubCUTAneous QHS    insulin lispro (HUMALOG) injection 5 Units  5 Units SubCUTAneous TIDAC    lisinopriL (PRINIVIL, ZESTRIL) tablet 20 mg  20 mg Oral DAILY    methylPREDNISolone (PF) (SOLU-MEDROL) injection 40 mg  40 mg IntraVENous Q12H    furosemide (LASIX) tablet 40 mg  40 mg Oral DAILY    metoprolol succinate (TOPROL-XL) XL tablet 50 mg  50 mg Oral BIDPC    insulin lispro (HUMALOG) injection   SubCUTAneous AC&HS    melatonin tablet 6 mg  6 mg Oral QHS    senna-docusate (PERICOLACE) 8.6-50 mg per tablet 1 Tab 1 Tab Oral DAILY    albuterol (PROVENTIL HFA, VENTOLIN HFA, PROAIR HFA) inhaler 1 Puff  1 Puff Inhalation Q6H PRN    And    ipratropium (ATROVENT HFA) 17 mcg inhaler  1 Puff Inhalation Q6H PRN    glucose chewable tablet 16 g  4 Tab Oral PRN    dextrose (D50W) injection syrg 12.5-25 g  12.5-25 g IntraVENous PRN    glucagon (GLUCAGEN) injection 1 mg  1 mg IntraMUSCular PRN    NIFEdipine ER (PROCARDIA XL) tablet 30 mg  30 mg Oral DAILY    sodium chloride (NS) flush 5-40 mL  5-40 mL IntraVENous Q8H    sodium chloride (NS) flush 5-40 mL  5-40 mL IntraVENous PRN    acetaminophen (TYLENOL) tablet 650 mg  650 mg Oral Q6H PRN    Or    acetaminophen (TYLENOL) suppository 650 mg  650 mg Rectal Q6H PRN    polyethylene glycol (MIRALAX) packet 17 g  17 g Oral DAILY PRN    promethazine (PHENERGAN) tablet 12.5 mg  12.5 mg Oral Q6H PRN    Or    ondansetron (ZOFRAN) injection 4 mg  4 mg IntraVENous Q6H PRN    enoxaparin (LOVENOX) injection 40 mg  40 mg SubCUTAneous Q12H    guaiFENesin-dextromethorphan (ROBITUSSIN DM) 100-10 mg/5 mL syrup 5 mL  5 mL Oral Q4H PRN    cholecalciferol (VITAMIN D3) (1000 Units /25 mcg) tablet 2,000 Units  2,000 Units Oral DAILY    ascorbic acid (vitamin C) (VITAMIN C) tablet 1,000 mg  1,000 mg Oral DAILY    pantoprazole (PROTONIX) tablet 40 mg  40 mg Oral ACB    tamsulosin (FLOMAX) capsule 0.4 mg  0.4 mg Oral QHS    hydrALAZINE (APRESOLINE) 20 mg/mL injection 10-20 mg  10-20 mg IntraVENous Q6H PRN     ______________________________________________________________________  EXPECTED LENGTH OF STAY: 5d 12h  ACTUAL LENGTH OF STAY:          15                 Chikis Mack MD

## 2024-04-20 ENCOUNTER — HOSPITAL ENCOUNTER (EMERGENCY)
Facility: HOSPITAL | Age: 69
Discharge: HOME | End: 2024-04-20
Attending: EMERGENCY MEDICINE
Payer: MEDICARE

## 2024-04-20 ENCOUNTER — APPOINTMENT (OUTPATIENT)
Facility: HOSPITAL | Age: 69
End: 2024-04-20
Attending: EMERGENCY MEDICINE
Payer: MEDICARE

## 2024-04-20 VITALS
DIASTOLIC BLOOD PRESSURE: 94 MMHG | HEART RATE: 78 BPM | TEMPERATURE: 97.7 F | BODY MASS INDEX: 32.21 KG/M2 | HEIGHT: 70 IN | RESPIRATION RATE: 19 BRPM | WEIGHT: 225 LBS | OXYGEN SATURATION: 95 % | SYSTOLIC BLOOD PRESSURE: 187 MMHG

## 2024-04-20 DIAGNOSIS — S22.42XA CLOSED FRACTURE OF MULTIPLE RIBS OF LEFT SIDE, INITIAL ENCOUNTER: Primary | ICD-10-CM

## 2024-04-20 LAB
EKG ATRIAL RATE: 77 BPM
EKG DIAGNOSIS: NORMAL
EKG P AXIS: 22 DEGREES
EKG P-R INTERVAL: 168 MS
EKG Q-T INTERVAL: 415 MS
EKG QRS DURATION: 115 MS
EKG QTC CALCULATION (BAZETT): 470 MS
EKG R AXIS: -12 DEGREES
EKG T AXIS: 89 DEGREES
EKG VENTRICULAR RATE: 77 BPM

## 2024-04-20 PROCEDURE — 99284 EMERGENCY DEPT VISIT MOD MDM: CPT

## 2024-04-20 PROCEDURE — 71250 CT THORAX DX C-: CPT

## 2024-04-20 PROCEDURE — 93005 ELECTROCARDIOGRAM TRACING: CPT | Performed by: EMERGENCY MEDICINE

## 2024-04-20 PROCEDURE — 6370000000 HC RX 637 (ALT 250 FOR IP): Performed by: EMERGENCY MEDICINE

## 2024-04-20 RX ORDER — IBUPROFEN 600 MG/1
600 TABLET ORAL
Status: COMPLETED | OUTPATIENT
Start: 2024-04-20 | End: 2024-04-20

## 2024-04-20 RX ORDER — OXYCODONE HYDROCHLORIDE AND ACETAMINOPHEN 5; 325 MG/1; MG/1
1 TABLET ORAL
Status: COMPLETED | OUTPATIENT
Start: 2024-04-20 | End: 2024-04-20

## 2024-04-20 RX ORDER — HYDROCODONE BITARTRATE AND ACETAMINOPHEN 5; 325 MG/1; MG/1
1 TABLET ORAL EVERY 6 HOURS PRN
Qty: 28 TABLET | Refills: 0 | Status: SHIPPED | OUTPATIENT
Start: 2024-04-20 | End: 2024-04-20

## 2024-04-20 RX ORDER — HYDROCODONE BITARTRATE AND ACETAMINOPHEN 5; 325 MG/1; MG/1
1 TABLET ORAL EVERY 6 HOURS PRN
Qty: 28 TABLET | Refills: 0 | Status: SHIPPED | OUTPATIENT
Start: 2024-04-20 | End: 2024-04-27

## 2024-04-20 RX ADMIN — IBUPROFEN 600 MG: 600 TABLET, FILM COATED ORAL at 07:39

## 2024-04-20 RX ADMIN — OXYCODONE AND ACETAMINOPHEN 1 TABLET: 5; 325 TABLET ORAL at 07:39

## 2024-04-20 ASSESSMENT — PAIN DESCRIPTION - LOCATION
LOCATION: RIB CAGE
LOCATION: RIB CAGE

## 2024-04-20 ASSESSMENT — PAIN SCALES - GENERAL
PAINLEVEL_OUTOF10: 8

## 2024-04-20 ASSESSMENT — ENCOUNTER SYMPTOMS
ALLERGIC/IMMUNOLOGIC NEGATIVE: 1
GASTROINTESTINAL NEGATIVE: 1
SHORTNESS OF BREATH: 0
RESPIRATORY NEGATIVE: 1
EYES NEGATIVE: 1

## 2024-04-20 ASSESSMENT — PAIN - FUNCTIONAL ASSESSMENT
PAIN_FUNCTIONAL_ASSESSMENT: PREVENTS OR INTERFERES SOME ACTIVE ACTIVITIES AND ADLS
PAIN_FUNCTIONAL_ASSESSMENT: 0-10

## 2024-04-20 ASSESSMENT — PAIN DESCRIPTION - FREQUENCY: FREQUENCY: INTERMITTENT

## 2024-04-20 NOTE — ED TRIAGE NOTES
Pt reports trip and fall on 4/16. Denies hitting head. Denies LOC. Reports residual left shoulder, arm and chest wall pain.

## 2024-04-20 NOTE — ED PROVIDER NOTES
[facility] EMERGENCY DEPT  EMERGENCY DEPARTMENT ENCOUNTER      Pt Name: Chavez Skelton  MRN: [id number]  Birthdate 1955  Date of evaluation: 4/20/2024  Provider: Emil Gao MD  7:36 AM    CHIEF COMPLAINT       Chief Complaint   Patient presents with    Fall         HISTORY OF PRESENT ILLNESS    Chavez Skelton is a 68 y.o. male who presents to the emergency department with complaint of pain in left chest and ribs after falling on 4/16/24. He reports that the pain is worse with movement or palpation. He denies SOB.      HPI    Nursing Notes were reviewed.    REVIEW OF SYSTEMS       Review of Systems   Constitutional: Negative.    HENT: Negative.     Eyes: Negative.    Respiratory: Negative.  Negative for shortness of breath.    Cardiovascular: Negative.  Negative for chest pain.        Left ribs pain   Gastrointestinal: Negative.    Endocrine: Negative.    Genitourinary: Negative.    Musculoskeletal: Negative.    Skin: Negative.    Allergic/Immunologic: Negative.    Neurological: Negative.  Negative for weakness.   Hematological: Negative.    Psychiatric/Behavioral: Negative.  Negative for behavioral problems.    All other systems reviewed and are negative.      Except as noted above the remainder of the review of systems was reviewed and negative.       PAST MEDICAL HISTORY     Past Medical History:   Diagnosis Date    Essential hypertension     GERD (gastroesophageal reflux disease)     Mixed hyperlipidemia     Moderate episode of recurrent major depressive disorder (HCC) 2/16/2023    Osteomyelitis (HCC) 3/20/2023    Pneumonia due to COVID-19 virus 2/17/2021    Renal mass, left     Type 2 diabetes mellitus with complication, without long-term current use of insulin (HCC)          SURGICAL HISTORY       Past Surgical History:   Procedure Laterality Date    CARDIAC CATHETERIZATION      patient stated stents placed     COLONOSCOPY      COLONOSCOPY N/A 01/06/2023    COLONOSCOPY (TIVA) performed by Gissell Newman,  a voice recognition program.  Efforts were made to edit the dictations but occasionally words are mis-transcribed.)    Emil Gao MD (electronically signed)  Attending Emergency Physician           Jason Gao MD  04/20/24 0738       Jason Gao MD  04/20/24 0740       Jason Gao MD  04/20/24 0748

## 2024-04-22 DIAGNOSIS — E11.65 TYPE 2 DIABETES MELLITUS WITH HYPERGLYCEMIA, WITHOUT LONG-TERM CURRENT USE OF INSULIN (HCC): Primary | ICD-10-CM

## 2024-04-25 ENCOUNTER — TELEPHONE (OUTPATIENT)
Facility: CLINIC | Age: 69
End: 2024-04-25

## 2024-04-25 NOTE — TELEPHONE ENCOUNTER
[facility] pharmacy called stating they need clarification on ozempic 0.25,  it was sent in with the directions to inject 0.2 mg but it will need to be changed to the 0.2 ozempic pen please call to [phone]

## 2024-04-30 PROBLEM — D50.9 IDA (IRON DEFICIENCY ANEMIA): Status: ACTIVE | Noted: 2024-04-30

## 2024-05-01 DIAGNOSIS — E11.65 TYPE 2 DIABETES MELLITUS WITH HYPERGLYCEMIA, WITHOUT LONG-TERM CURRENT USE OF INSULIN (HCC): ICD-10-CM

## 2024-05-02 ENCOUNTER — OFFICE VISIT (OUTPATIENT)
Age: 69
End: 2024-05-02
Payer: MEDICARE

## 2024-05-02 VITALS
TEMPERATURE: 98 F | BODY MASS INDEX: 32.38 KG/M2 | WEIGHT: 226.2 LBS | RESPIRATION RATE: 18 BRPM | OXYGEN SATURATION: 91 % | HEART RATE: 82 BPM | DIASTOLIC BLOOD PRESSURE: 70 MMHG | HEIGHT: 70 IN | SYSTOLIC BLOOD PRESSURE: 142 MMHG

## 2024-05-02 DIAGNOSIS — D50.9 IRON DEFICIENCY ANEMIA, UNSPECIFIED IRON DEFICIENCY ANEMIA TYPE: Primary | ICD-10-CM

## 2024-05-02 DIAGNOSIS — Z86.010 HISTORY OF COLON POLYPS: ICD-10-CM

## 2024-05-02 DIAGNOSIS — K57.30 DIVERTICULAR DISEASE OF COLON: ICD-10-CM

## 2024-05-02 PROCEDURE — 1036F TOBACCO NON-USER: CPT | Performed by: INTERNAL MEDICINE

## 2024-05-02 PROCEDURE — 1123F ACP DISCUSS/DSCN MKR DOCD: CPT | Performed by: INTERNAL MEDICINE

## 2024-05-02 PROCEDURE — 99214 OFFICE O/P EST MOD 30 MIN: CPT | Performed by: INTERNAL MEDICINE

## 2024-05-02 PROCEDURE — 3077F SYST BP >= 140 MM HG: CPT | Performed by: INTERNAL MEDICINE

## 2024-05-02 PROCEDURE — 43235 EGD DIAGNOSTIC BRUSH WASH: CPT | Performed by: INTERNAL MEDICINE

## 2024-05-02 PROCEDURE — 3078F DIAST BP <80 MM HG: CPT | Performed by: INTERNAL MEDICINE

## 2024-05-02 PROCEDURE — 3017F COLORECTAL CA SCREEN DOC REV: CPT | Performed by: INTERNAL MEDICINE

## 2024-05-02 PROCEDURE — G8427 DOCREV CUR MEDS BY ELIG CLIN: HCPCS | Performed by: INTERNAL MEDICINE

## 2024-05-02 PROCEDURE — G8417 CALC BMI ABV UP PARAM F/U: HCPCS | Performed by: INTERNAL MEDICINE

## 2024-05-02 RX ORDER — HYDROCODONE BITARTRATE AND ACETAMINOPHEN 5; 325 MG/1; MG/1
1 TABLET ORAL EVERY 6 HOURS PRN
COMMUNITY

## 2024-05-02 ASSESSMENT — PATIENT HEALTH QUESTIONNAIRE - PHQ9
SUM OF ALL RESPONSES TO PHQ QUESTIONS 1-9: 1
SUM OF ALL RESPONSES TO PHQ QUESTIONS 1-9: 1
SUM OF ALL RESPONSES TO PHQ9 QUESTIONS 1 & 2: 1
2. FEELING DOWN, DEPRESSED OR HOPELESS: SEVERAL DAYS
SUM OF ALL RESPONSES TO PHQ QUESTIONS 1-9: 1
SUM OF ALL RESPONSES TO PHQ QUESTIONS 1-9: 1
1. LITTLE INTEREST OR PLEASURE IN DOING THINGS: NOT AT ALL

## 2024-05-02 ASSESSMENT — ENCOUNTER SYMPTOMS
RECTAL PAIN: 0
ABDOMINAL PAIN: 0
CONSTIPATION: 0
VOMITING: 0
NAUSEA: 0
BLOOD IN STOOL: 0
ABDOMINAL DISTENTION: 0
ANAL BLEEDING: 0
RESPIRATORY NEGATIVE: 1
ALLERGIC/IMMUNOLOGIC NEGATIVE: 1
DIARRHEA: 0

## 2024-05-03 ENCOUNTER — PREP FOR PROCEDURE (OUTPATIENT)
Facility: CLINIC | Age: 69
End: 2024-05-03

## 2024-05-03 PROBLEM — D50.9 IRON DEFICIENCY ANEMIA, UNSPECIFIED: Status: ACTIVE | Noted: 2024-05-03

## 2024-05-03 NOTE — PROGRESS NOTES
Chief Complaint   Patient presents with    Anemia     \"Have you been to the ER, urgent care clinic since your last visit?  Hospitalized since your last visit?\"    NO    “Have you seen or consulted any other health care providers outside of [facility] since your last visit?”    NO            Click Here for Release of Records Request   
Spoke with patient discuss EGD instructions He agreed to schedule on 5-22-24 at 9:30. He verbalized understand and had no further questions at this time.   [employer]/ [employer] auth #[id number]  via [employer]  
No   Physical Activity: Insufficiently Active (12/14/2023)    Exercise Vital Sign     Days of Exercise per Week: 2 days     Minutes of Exercise per Session: 20 min   Stress: Not on file   Social Connections: Not on file   Intimate Partner Violence: Not on file   Housing Stability: Unknown (3/8/2023)    Housing Stability Vital Sign     Unable to Pay for Housing in the Last Year: No     Number of Places Lived in the Last Year: Not on file     Unstable Housing in the Last Year: No         69-year-old male with history of hypertensive atherosclerotic cardiovascular disease with coronary artery disease, hyperlipidemia, congestive heart failure, diabetes mellitus type 2, and colon polyps who comes in for evaluation of iron deficiency anemia.  Patient did have a colonoscopy on 1/6/2023 which showed generalized diverticulosis..  Patient found to be anemic.  No gross GI bleeding recently.  He has had black stools approximately 1 month ago.  He denies use of Pepto-Bismol or iron therapy.  No abdominal pain.  He states he fell on the left side of his torso and fractured few ribs recently.  He states he is recovering and doing better.  He is eating good.  Bowel movements are constipated at times.  His weight has recently been stable.  He denies use of NSAIDs.    Anemia  There has been no abdominal pain.         Review of Systems   Constitutional: Negative.    HENT:  Negative for nosebleeds.    Respiratory: Negative.     Cardiovascular: Negative.    Gastrointestinal:  Negative for abdominal distention, abdominal pain, anal bleeding, blood in stool, constipation, diarrhea, nausea, rectal pain and vomiting.   Genitourinary: Negative.    Musculoskeletal:  Positive for arthralgias.   Skin: Negative.    Allergic/Immunologic: Negative.    Neurological: Negative.    Hematological: Negative.    Psychiatric/Behavioral: Negative.     All other systems reviewed and are negative.          BP (!) 142/70 (Site: Left Upper Arm, Position:

## 2024-05-03 NOTE — H&P (VIEW-ONLY)
Chavez Skelton is a 69 y.o. male who presents today for the following:  Chief Complaint   Patient presents with    Anemia         No Known Allergies    Current Outpatient Medications   Medication Sig Dispense Refill    HYDROcodone-acetaminophen (NORCO) 5-325 MG per tablet Take 1 tablet by mouth every 6 hours as needed for Pain. prn Max Daily Amount: 4 tablets      Semaglutide,0.25 or 0.5MG/DOS, 2 MG/3ML SOPN Inject 0.5 mg into the skin every 7 days 9 mL 1    pantoprazole (PROTONIX) 40 MG tablet Take 1 tablet by mouth daily 90 tablet 1    JARDIANCE 25 MG tablet TAKE 1 TABLET EVERY DAY 90 tablet 1    omeprazole (PRILOSEC) 20 MG delayed release capsule Take 1 capsule by mouth 2 times daily (before meals) 60 capsule 0    Continuous Blood Gluc  (DEXCOM G7 ) WANDA Use to check glucose 4 times daily 1 each 0    atorvastatin (LIPITOR) 20 MG tablet Take 1 tablet by mouth daily 90 tablet 1    lisinopril (PRINIVIL;ZESTRIL) 20 MG tablet Take 1 tablet by mouth daily 90 tablet 1    methIMAzole (TAPAZOLE) 5 MG tablet TAKE 1 TABLET EVERY DAY 90 tablet 0    potassium chloride (KLOR-CON M) 20 MEQ extended release tablet Take 1 tablet by mouth 2 times daily 30 tablet 1    FLUoxetine (PROZAC) 40 MG capsule Take 1 capsule by mouth daily      NIFEdipine (ADALAT CC) 30 MG extended release tablet Take 1 tablet by mouth daily      furosemide (LASIX) 20 MG tablet Take 1 tablet by mouth daily      insulin glargine (LANTUS) 100 UNIT/ML injection vial Inject 40 Units into the skin nightly       No current facility-administered medications for this visit.       Past Medical History:   Diagnosis Date    Essential hypertension     GERD (gastroesophageal reflux disease)     SUDHIR (iron deficiency anemia) 04/30/2024    Mixed hyperlipidemia     Moderate episode of recurrent major depressive disorder (HCC) 2/16/2023    Osteomyelitis (HCC) 3/20/2023    Pneumonia due to COVID-19 virus 2/17/2021    Renal mass, left     Type 2 diabetes

## 2024-05-22 ENCOUNTER — HOSPITAL ENCOUNTER (OUTPATIENT)
Facility: HOSPITAL | Age: 69
Setting detail: OUTPATIENT SURGERY
Discharge: HOME | End: 2024-05-22
Attending: INTERNAL MEDICINE | Admitting: INTERNAL MEDICINE
Payer: MEDICARE

## 2024-05-22 ENCOUNTER — ANESTHESIA EVENT (OUTPATIENT)
Facility: HOSPITAL | Age: 69
End: 2024-05-22
Payer: MEDICARE

## 2024-05-22 ENCOUNTER — ANESTHESIA (OUTPATIENT)
Facility: HOSPITAL | Age: 69
End: 2024-05-22
Payer: MEDICARE

## 2024-05-22 VITALS
RESPIRATION RATE: 20 BRPM | TEMPERATURE: 98.9 F | HEART RATE: 80 BPM | HEIGHT: 68 IN | OXYGEN SATURATION: 94 % | SYSTOLIC BLOOD PRESSURE: 136 MMHG | WEIGHT: 223.5 LBS | DIASTOLIC BLOOD PRESSURE: 90 MMHG | BODY MASS INDEX: 33.87 KG/M2

## 2024-05-22 LAB
GLUCOSE BLD STRIP.AUTO-MCNC: 124 MG/DL (ref 65–100)
PERFORMED BY:: ABNORMAL

## 2024-05-22 PROCEDURE — 2709999900 HC NON-CHARGEABLE SUPPLY: Performed by: INTERNAL MEDICINE

## 2024-05-22 PROCEDURE — 82962 GLUCOSE BLOOD TEST: CPT

## 2024-05-22 PROCEDURE — 3600007512: Performed by: INTERNAL MEDICINE

## 2024-05-22 PROCEDURE — 7100000011 HC PHASE II RECOVERY - ADDTL 15 MIN: Performed by: INTERNAL MEDICINE

## 2024-05-22 PROCEDURE — 3700000000 HC ANESTHESIA ATTENDED CARE: Performed by: INTERNAL MEDICINE

## 2024-05-22 PROCEDURE — 6360000002 HC RX W HCPCS: Performed by: NURSE ANESTHETIST, CERTIFIED REGISTERED

## 2024-05-22 PROCEDURE — 7100000010 HC PHASE II RECOVERY - FIRST 15 MIN: Performed by: INTERNAL MEDICINE

## 2024-05-22 PROCEDURE — 88305 TISSUE EXAM BY PATHOLOGIST: CPT

## 2024-05-22 PROCEDURE — 6370000000 HC RX 637 (ALT 250 FOR IP): Performed by: NURSE ANESTHETIST, CERTIFIED REGISTERED

## 2024-05-22 PROCEDURE — 3600007502: Performed by: INTERNAL MEDICINE

## 2024-05-22 PROCEDURE — 3700000001 HC ADD 15 MINUTES (ANESTHESIA): Performed by: INTERNAL MEDICINE

## 2024-05-22 PROCEDURE — 2580000003 HC RX 258: Performed by: INTERNAL MEDICINE

## 2024-05-22 RX ORDER — PROPOFOL 10 MG/ML
INJECTION, EMULSION INTRAVENOUS PRN
Status: DISCONTINUED | OUTPATIENT
Start: 2024-05-22 | End: 2024-05-22 | Stop reason: SDUPTHER

## 2024-05-22 RX ORDER — GLYCOPYRROLATE 0.2 MG/ML
INJECTION INTRAMUSCULAR; INTRAVENOUS PRN
Status: DISCONTINUED | OUTPATIENT
Start: 2024-05-22 | End: 2024-05-22 | Stop reason: SDUPTHER

## 2024-05-22 RX ORDER — SODIUM CHLORIDE 9 MG/ML
25 INJECTION, SOLUTION INTRAVENOUS PRN
Status: DISCONTINUED | OUTPATIENT
Start: 2024-05-22 | End: 2024-05-22 | Stop reason: HOSPADM

## 2024-05-22 RX ADMIN — PROPOFOL 100 MG: 10 INJECTION, EMULSION INTRAVENOUS at 10:30

## 2024-05-22 RX ADMIN — GLYCOPYRROLATE 0.2 MG: 0.2 INJECTION, SOLUTION INTRAMUSCULAR; INTRAVENOUS at 10:26

## 2024-05-22 RX ADMIN — TOPICAL ANESTHETIC 1 EACH: 200 SPRAY DENTAL; PERIODONTAL at 10:39

## 2024-05-22 RX ADMIN — SODIUM CHLORIDE 25 ML: 9 INJECTION, SOLUTION INTRAVENOUS at 09:58

## 2024-05-22 ASSESSMENT — PAIN - FUNCTIONAL ASSESSMENT
PAIN_FUNCTIONAL_ASSESSMENT: 0-10
PAIN_FUNCTIONAL_ASSESSMENT: NONE - DENIES PAIN

## 2024-05-22 NOTE — OP NOTE
EGD Procedure Note        Patient: Chavez Skelton MRN: [id number]  SSN: [id number]    YOB: 1955  Age: 69 y.o.  Sex: male        Date/Time:  5/22/2024 10:42 AM         IMPRESSION:       Antral gastritis       RECOMMENDATIONS:    Check biopsy results.  Continue the pantoprazole 40 mg daily    Procedure: Esophagogastroduodenoscopy with cold biopsy    Indication: Iron deficiency anemia    Endoscopist:  Gissell Newman Jr, MD    Referring Provider:   Chely Meredith APRN - NP    History: The history and physical exam were reviewed and updated.     Endoscope: GIF H190 Olympus video endoscope    Extent of Exam: third portion of the duodenum    ASA: ASA 2 - Patient with mild systemic disease with no functional limitations    Anethesia/Sedation:  TIVA    Description of the procedure:   The procedure was discussed with the patient including risks, benefits, alternatives including risks of iv sedation, bleeding, perforation and aspiration.  A safety timeout was performed. The patient was placed in the left lateral decubitus position.  A bite block was placed.  The patient was using standard protocol.  The patients vital signs were monitored at all times including heart rate/rhythm, blood pressure and oxygen saturation.  The endoscope was then passed under direct visualization to the third portion of the duodenum.  The endoscope was then slowly withdrawn while visualizing the mucosa.  In the stomach a retroflexion was performed and gastric fundus and cardia visualized. The patient was then transferred to recovery in stable condition.                Findings:   Esophagus:The esophageal mucosa was normal with no ulceration, mass or stricture. There was no evidence of Eckert's esophagus or reflux esophagitis.  Stomach: The gastric mucosa was inflammation in the gastric antrum.  Multiple biopsies taken of there.   Duodenum: The duodenum mucosa was normal with no ulceration, mass, stricture and no

## 2024-05-22 NOTE — ANESTHESIA POSTPROCEDURE EVALUATION
Department of Anesthesiology  Postprocedure Note    Patient: Chavez Skelton  MRN: [id number]  YOB: 1955  Date of evaluation: 5/22/2024    Procedure Summary       Date: 05/22/24 Room / Location: [facility] ENDO 01 / [facility] ENDOSCOPY    Anesthesia Start: 1023 Anesthesia Stop: 1047    Procedure: ESOPHAGOGASTRODUODENOSCOPY BIOPSY (Mouth) Diagnosis:       Iron deficiency anemia, unspecified      (Iron deficiency anemia, unspecified [D50.9])    Surgeons: Gissell Newman Jr., MD Responsible Provider: Hayes Silva APRN - CRNA    Anesthesia Type: MAC ASA Status: 3            Anesthesia Type: MAC    Kal Phase I: Kal Score: 10    Kal Phase II:      Anesthesia Post Evaluation    Patient location during evaluation: bedside  Patient participation: complete - patient participated  Level of consciousness: sleepy but conscious  Pain score: 0  Airway patency: patent  Nausea & Vomiting: no vomiting and no nausea  Cardiovascular status: blood pressure returned to baseline  Respiratory status: room air  Hydration status: stable    No notable events documented.

## 2024-05-22 NOTE — ANESTHESIA PRE PROCEDURE
Department of Anesthesiology  Preprocedure Note       Name:  Chavez Skelton   Age:  69 y.o.  :  1955                                          MRN:  [id number]         Date:  2024      Surgeon: Surgeon(s):  Gissell Newman Jr., MD    Procedure: Procedure(s):  ESOPHAGOGASTRODUODENOSCOPY    Medications prior to admission:   Prior to Admission medications    Medication Sig Start Date End Date Taking? Authorizing Provider   HYDROcodone-acetaminophen (NORCO) 5-325 MG per tablet Take 1 tablet by mouth every 6 hours as needed for Pain. prn Max Daily Amount: 4 tablets  Patient not taking: Reported on 2024    Melanie Wright MD   Semaglutide,0.25 or 0.5MG/DOS, 2 MG/3ML SOPN Inject 0.5 mg into the skin every 7 days 24   Chely Meredith APRN - NP   pantoprazole (PROTONIX) 40 MG tablet Take 1 tablet by mouth daily 4/15/24   Chely Meredith APRN - NP   JARDIANCE 25 MG tablet TAKE 1 TABLET EVERY DAY 4/15/24   Chely Meredith APRN - NP   omeprazole (PRILOSEC) 20 MG delayed release capsule Take 1 capsule by mouth 2 times daily (before meals) 3/2/24   James Oliver MD   Continuous Blood Gluc  (DEXCOM G7 ) WANDA Use to check glucose 4 times daily 10/31/23   Chely Meredith APRN - NP   atorvastatin (LIPITOR) 20 MG tablet Take 1 tablet by mouth daily 10/31/23   Chely Meredith APRN - NP   lisinopril (PRINIVIL;ZESTRIL) 20 MG tablet Take 1 tablet by mouth daily 10/31/23   Chely Meredith APRN - NP   methIMAzole (TAPAZOLE) 5 MG tablet TAKE 1 TABLET EVERY DAY 23   Chely Meredith APRN - NP   potassium chloride (KLOR-CON M) 20 MEQ extended release tablet Take 1 tablet by mouth 2 times daily 7/3/23   Racheal Hurley MD   FLUoxetine (PROZAC) 40 MG capsule Take 1 capsule by mouth daily    Melanie Wright MD   NIFEdipine (ADALAT CC) 30 MG extended release tablet Take 1 tablet by mouth daily    Melanie Wright MD   furosemide

## 2024-05-22 NOTE — DISCHARGE INSTRUCTIONS
For the next 12 hours you should not:   1. drive   2. drink alcohol   3. operate any machinery   4. engage in activities that require mental sharpness or manual dexterity such as     cooking   5. take any drugs other than those prescribed by a physician   6. make any legal or financial decisions    Call your doctor's office immediately, if there is is anything unusual:   1. increased and continuing rectal bleeding   2. fever   3. Unusual abdominal pain    Take it easy today and resume normal activity tomorrow.It is common to have gas and mild bloating for a few hours. Pain is NOT normal. You may be groggy off and on for a few hours.    Resume previous diet.        Gissell Newman Jr, MD  5/22/2024  10:47 AM

## 2024-05-22 NOTE — INTERVAL H&P NOTE
Update History & Physical    The patient's History and Physical of May 22, 2024 was reviewed with the patient and I examined the patient. There was no change. The surgical site was confirmed by the patient and me.     Plan: The risks, benefits, expected outcome, and alternative to the recommended procedure have been discussed with the patient. Patient understands and wants to proceed with the procedure.     Electronically signed by Gissell Newman Jr, MD on 5/22/2024 at 9:45 AM

## 2024-05-30 LAB
FECAL OCCULT BLOOD #2, POC: NEGATIVE
FECAL OCCULT BLOOD #3, POC: NEGATIVE
HEMOCCULT STL QL: NEGATIVE
VALID INTERNAL CONTROL: YES

## 2024-06-04 ENCOUNTER — TELEPHONE (OUTPATIENT)
Age: 69
End: 2024-06-04

## 2024-06-06 ENCOUNTER — OFFICE VISIT (OUTPATIENT)
Facility: CLINIC | Age: 69
End: 2024-06-06
Payer: MEDICARE

## 2024-06-06 VITALS
HEIGHT: 68 IN | SYSTOLIC BLOOD PRESSURE: 135 MMHG | HEART RATE: 77 BPM | OXYGEN SATURATION: 97 % | RESPIRATION RATE: 18 BRPM | TEMPERATURE: 97.7 F | BODY MASS INDEX: 34.49 KG/M2 | WEIGHT: 227.6 LBS | DIASTOLIC BLOOD PRESSURE: 82 MMHG

## 2024-06-06 DIAGNOSIS — D64.9 ANEMIA, UNSPECIFIED TYPE: ICD-10-CM

## 2024-06-06 DIAGNOSIS — F33.40 RECURRENT MAJOR DEPRESSIVE DISORDER, IN REMISSION (HCC): ICD-10-CM

## 2024-06-06 DIAGNOSIS — E87.6 HYPOKALEMIA: ICD-10-CM

## 2024-06-06 DIAGNOSIS — K29.50 ANTRAL GASTRITIS: ICD-10-CM

## 2024-06-06 DIAGNOSIS — I73.9 PERIPHERAL VASCULAR DISEASE (HCC): ICD-10-CM

## 2024-06-06 DIAGNOSIS — E11.65 TYPE 2 DIABETES MELLITUS WITH HYPERGLYCEMIA, WITHOUT LONG-TERM CURRENT USE OF INSULIN (HCC): Primary | ICD-10-CM

## 2024-06-06 DIAGNOSIS — Z00.00 MEDICARE ANNUAL WELLNESS VISIT, SUBSEQUENT: ICD-10-CM

## 2024-06-06 DIAGNOSIS — E78.2 MIXED HYPERLIPIDEMIA: ICD-10-CM

## 2024-06-06 DIAGNOSIS — I10 ESSENTIAL (PRIMARY) HYPERTENSION: ICD-10-CM

## 2024-06-06 DIAGNOSIS — E05.90 HYPERTHYROIDISM: ICD-10-CM

## 2024-06-06 PROBLEM — A49.01 MSSA (METHICILLIN SUSCEPTIBLE STAPHYLOCOCCUS AUREUS): Status: RESOLVED | Noted: 2023-05-04 | Resolved: 2024-06-06

## 2024-06-06 PROBLEM — N17.9 AKI (ACUTE KIDNEY INJURY) (HCC): Status: RESOLVED | Noted: 2023-04-22 | Resolved: 2024-06-06

## 2024-06-06 PROBLEM — K52.9 GASTROENTERITIS: Status: RESOLVED | Noted: 2023-04-22 | Resolved: 2024-06-06

## 2024-06-06 PROBLEM — L08.9 LEFT FOOT INFECTION: Status: RESOLVED | Noted: 2023-04-30 | Resolved: 2024-06-06

## 2024-06-06 LAB — HBA1C MFR BLD: 8.5 %

## 2024-06-06 PROCEDURE — 3046F HEMOGLOBIN A1C LEVEL >9.0%: CPT | Performed by: NURSE PRACTITIONER

## 2024-06-06 PROCEDURE — 3017F COLORECTAL CA SCREEN DOC REV: CPT | Performed by: NURSE PRACTITIONER

## 2024-06-06 PROCEDURE — 1123F ACP DISCUSS/DSCN MKR DOCD: CPT | Performed by: NURSE PRACTITIONER

## 2024-06-06 PROCEDURE — G8417 CALC BMI ABV UP PARAM F/U: HCPCS | Performed by: NURSE PRACTITIONER

## 2024-06-06 PROCEDURE — G8427 DOCREV CUR MEDS BY ELIG CLIN: HCPCS | Performed by: NURSE PRACTITIONER

## 2024-06-06 PROCEDURE — 83036 HEMOGLOBIN GLYCOSYLATED A1C: CPT | Performed by: NURSE PRACTITIONER

## 2024-06-06 PROCEDURE — 2022F DILAT RTA XM EVC RTNOPTHY: CPT | Performed by: NURSE PRACTITIONER

## 2024-06-06 PROCEDURE — 3075F SYST BP GE 130 - 139MM HG: CPT | Performed by: NURSE PRACTITIONER

## 2024-06-06 PROCEDURE — G0439 PPPS, SUBSEQ VISIT: HCPCS | Performed by: NURSE PRACTITIONER

## 2024-06-06 PROCEDURE — 99214 OFFICE O/P EST MOD 30 MIN: CPT | Performed by: NURSE PRACTITIONER

## 2024-06-06 PROCEDURE — 3078F DIAST BP <80 MM HG: CPT | Performed by: NURSE PRACTITIONER

## 2024-06-06 PROCEDURE — 1036F TOBACCO NON-USER: CPT | Performed by: NURSE PRACTITIONER

## 2024-06-06 RX ORDER — BUPROPION HYDROCHLORIDE 150 MG/1
150 TABLET, EXTENDED RELEASE ORAL DAILY
Qty: 90 TABLET | Refills: 1 | Status: SHIPPED | OUTPATIENT
Start: 2024-06-06

## 2024-06-06 RX ORDER — FLUOXETINE HYDROCHLORIDE 40 MG/1
40 CAPSULE ORAL DAILY
Qty: 90 CAPSULE | Refills: 1 | Status: SHIPPED | OUTPATIENT
Start: 2024-06-06

## 2024-06-06 RX ORDER — POTASSIUM CHLORIDE 20 MEQ/1
20 TABLET, EXTENDED RELEASE ORAL 2 TIMES DAILY
Qty: 90 TABLET | Refills: 1 | Status: SHIPPED | OUTPATIENT
Start: 2024-06-06

## 2024-06-06 RX ORDER — FUROSEMIDE 20 MG/1
20 TABLET ORAL DAILY
Qty: 90 TABLET | Refills: 1 | Status: SHIPPED | OUTPATIENT
Start: 2024-06-06

## 2024-06-06 RX ORDER — SEMAGLUTIDE 1.34 MG/ML
1 INJECTION, SOLUTION SUBCUTANEOUS WEEKLY
Qty: 9 ML | Refills: 1 | Status: SHIPPED | OUTPATIENT
Start: 2024-06-06

## 2024-06-06 SDOH — ECONOMIC STABILITY: INCOME INSECURITY: HOW HARD IS IT FOR YOU TO PAY FOR THE VERY BASICS LIKE FOOD, HOUSING, MEDICAL CARE, AND HEATING?: NOT HARD AT ALL

## 2024-06-06 SDOH — ECONOMIC STABILITY: HOUSING INSECURITY
IN THE LAST 12 MONTHS, WAS THERE A TIME WHEN YOU DID NOT HAVE A STEADY PLACE TO SLEEP OR SLEPT IN A SHELTER (INCLUDING NOW)?: NO

## 2024-06-06 SDOH — ECONOMIC STABILITY: FOOD INSECURITY: WITHIN THE PAST 12 MONTHS, YOU WORRIED THAT YOUR FOOD WOULD RUN OUT BEFORE YOU GOT MONEY TO BUY MORE.: SOMETIMES TRUE

## 2024-06-06 SDOH — ECONOMIC STABILITY: FOOD INSECURITY: WITHIN THE PAST 12 MONTHS, THE FOOD YOU BOUGHT JUST DIDN'T LAST AND YOU DIDN'T HAVE MONEY TO GET MORE.: SOMETIMES TRUE

## 2024-06-06 ASSESSMENT — ENCOUNTER SYMPTOMS
SHORTNESS OF BREATH: 0
EYE ITCHING: 0
SORE THROAT: 0
TROUBLE SWALLOWING: 0
STRIDOR: 0
EYE REDNESS: 0
COUGH: 0
ABDOMINAL PAIN: 0
PHOTOPHOBIA: 0
SINUS PAIN: 0
APNEA: 0
VOMITING: 0
EYE PAIN: 0
DIARRHEA: 0
BACK PAIN: 0
WHEEZING: 0
CONSTIPATION: 0
ABDOMINAL DISTENTION: 0
FACIAL SWELLING: 0
EYE DISCHARGE: 0
CHOKING: 0
NAUSEA: 0
CHEST TIGHTNESS: 0
BLOOD IN STOOL: 0
COLOR CHANGE: 0

## 2024-06-06 ASSESSMENT — COLUMBIA-SUICIDE SEVERITY RATING SCALE - C-SSRS
6. HAVE YOU EVER DONE ANYTHING, STARTED TO DO ANYTHING, OR PREPARED TO DO ANYTHING TO END YOUR LIFE?: NO
2. HAVE YOU ACTUALLY HAD ANY THOUGHTS OF KILLING YOURSELF?: NO
1. WITHIN THE PAST MONTH, HAVE YOU WISHED YOU WERE DEAD OR WISHED YOU COULD GO TO SLEEP AND NOT WAKE UP?: YES

## 2024-06-06 ASSESSMENT — PATIENT HEALTH QUESTIONNAIRE - PHQ9
SUM OF ALL RESPONSES TO PHQ QUESTIONS 1-9: 7
SUM OF ALL RESPONSES TO PHQ QUESTIONS 1-9: 6
6. FEELING BAD ABOUT YOURSELF - OR THAT YOU ARE A FAILURE OR HAVE LET YOURSELF OR YOUR FAMILY DOWN: SEVERAL DAYS
10. IF YOU CHECKED OFF ANY PROBLEMS, HOW DIFFICULT HAVE THESE PROBLEMS MADE IT FOR YOU TO DO YOUR WORK, TAKE CARE OF THINGS AT HOME, OR GET ALONG WITH OTHER PEOPLE: SOMEWHAT DIFFICULT
8. MOVING OR SPEAKING SO SLOWLY THAT OTHER PEOPLE COULD HAVE NOTICED. OR THE OPPOSITE, BEING SO FIGETY OR RESTLESS THAT YOU HAVE BEEN MOVING AROUND A LOT MORE THAN USUAL: SEVERAL DAYS
SUM OF ALL RESPONSES TO PHQ QUESTIONS 1-9: 7
7. TROUBLE CONCENTRATING ON THINGS, SUCH AS READING THE NEWSPAPER OR WATCHING TELEVISION: SEVERAL DAYS
SUM OF ALL RESPONSES TO PHQ QUESTIONS 1-9: 7
2. FEELING DOWN, DEPRESSED OR HOPELESS: SEVERAL DAYS
3. TROUBLE FALLING OR STAYING ASLEEP: NOT AT ALL
SUM OF ALL RESPONSES TO PHQ9 QUESTIONS 1 & 2: 2
5. POOR APPETITE OR OVEREATING: NOT AT ALL
4. FEELING TIRED OR HAVING LITTLE ENERGY: SEVERAL DAYS
9. THOUGHTS THAT YOU WOULD BE BETTER OFF DEAD, OR OF HURTING YOURSELF: SEVERAL DAYS
1. LITTLE INTEREST OR PLEASURE IN DOING THINGS: SEVERAL DAYS

## 2024-06-06 ASSESSMENT — LIFESTYLE VARIABLES
HOW MANY STANDARD DRINKS CONTAINING ALCOHOL DO YOU HAVE ON A TYPICAL DAY: PATIENT DOES NOT DRINK
HOW OFTEN DO YOU HAVE A DRINK CONTAINING ALCOHOL: NEVER

## 2024-06-06 NOTE — PROGRESS NOTES
Subjective  Chief Complaint   Patient presents with    Medicare AWV    Diabetes     HPI:  Chavez Skelton is a 69 y.o. male who presents for follow up of chronic conditions with PMH hypertension, hyperlipidemia, uncontrolled type 2 diabetes,CAD, depression,  pneumonia, hyperlipidemia, left kidney mass, obesity, foot wound,hypertension and hyperlipidemia. Reports taking medicines as noted in chart. Has not been checking glucose due to inability to locate meter.  Is still following with podiatry and vascular. Does report depressive symptoms which have been ongoing. States that he has been compliant with fluoxetine. Denies any suicidal or homicidal ideations. No recent stressors. Reports relationship with family.       Past Medical History:   Diagnosis Date    Essential hypertension     Gastroenteritis 04/22/2023    GERD (gastroesophageal reflux disease)     SUDHIR (iron deficiency anemia) 04/30/2024    Left foot infection 04/30/2023    Mixed hyperlipidemia     Moderate episode of recurrent major depressive disorder (HCC) 2/16/2023    MSSA (methicillin susceptible Staphylococcus aureus) 05/04/2023    Osteomyelitis (HCC) 3/20/2023    Pneumonia due to COVID-19 virus 2/17/2021    Renal mass, left     Type 2 diabetes mellitus with complication, without long-term current use of insulin (HCC)         Past Surgical History:   Procedure Laterality Date    CARDIAC CATHETERIZATION      patient stated stents placed     COLONOSCOPY      COLONOSCOPY N/A 01/06/2023    COLONOSCOPY (TIVA) performed by Gissell Newman MD at [facility] ENDOSCOPY    CORONARY ANGIOPLASTY WITH STENT PLACEMENT  2018    x1    KIDNEY REMOVAL Left 08/23/2021    robotic left partial nephrectomy,    [place] UNLISTED PROCEDURE CARDIAC SURGERY  2018    BYPASS    TOE AMPUTATION Left     UPPER GASTROINTESTINAL ENDOSCOPY N/A 5/22/2024    ESOPHAGOGASTRODUODENOSCOPY BIOPSY performed by Gissell Newman Jr., MD at [facility] ENDOSCOPY    UROLOGICAL SURGERY  08/23/2021

## 2024-06-06 NOTE — PROGRESS NOTES
Chief Complaint   Patient presents with    Medicare AWV    Diabetes     Wellness      Pt brought in meds went over list in chart, pt confirmed       \"Have you been to the ER, urgent care clinic since your last visit?  Hospitalized since your last visit?\"    NO    “Have you seen or consulted any other health care providers outside of [facility] since your last visit?”    NO            Click Here for Release of Records Request

## 2024-06-06 NOTE — PATIENT INSTRUCTIONS
instruction, always ask your healthcare professional. [employer], [employer] disclaims any warranty or liability for your use of this information.      Personalized Preventive Plan for Chavez Skelton - 6/6/2024  Medicare offers a range of preventive health benefits. Some of the tests and screenings are paid in full while other may be subject to a deductible, co-insurance, and/or copay.    Some of these benefits include a comprehensive review of your medical history including lifestyle, illnesses that may run in your family, and various assessments and screenings as appropriate.    After reviewing your medical record and screening and assessments performed today your provider may have ordered immunizations, labs, imaging, and/or referrals for you.  A list of these orders (if applicable) as well as your Preventive Care list are included within your After Visit Summary for your review.    Other Preventive Recommendations:    A preventive eye exam performed by an eye specialist is recommended every 1-2 years to screen for glaucoma; cataracts, macular degeneration, and other eye disorders.  A preventive dental visit is recommended every 6 months.  Try to get at least 150 minutes of exercise per week or 10,000 steps per day on a pedometer .  Order or download the FREE \"Exercise & Physical Activity: Your Everyday Guide\" from [employer]. Call 1-201.400.6417 or search [employer] online.  You need 5686-0070 mg of calcium and 2330-6838 IU of vitamin D per day. It is possible to meet your calcium requirement with diet alone, but a vitamin D supplement is usually necessary to meet this goal.  When exposed to the sun, use a sunscreen that protects against both UVA and UVB radiation with an SPF of 30 or greater. Reapply every 2 to 3 hours or after sweating, drying off with a towel, or swimming.  Always wear a seat belt when traveling in a car. Always wear a helmet when riding a bicycle

## 2024-08-15 ENCOUNTER — OFFICE VISIT (OUTPATIENT)
Age: 69
End: 2024-08-15
Payer: MEDICARE

## 2024-08-15 VITALS
HEIGHT: 70 IN | WEIGHT: 236 LBS | TEMPERATURE: 98.2 F | SYSTOLIC BLOOD PRESSURE: 162 MMHG | RESPIRATION RATE: 18 BRPM | BODY MASS INDEX: 33.79 KG/M2 | HEART RATE: 76 BPM | OXYGEN SATURATION: 98 % | DIASTOLIC BLOOD PRESSURE: 80 MMHG

## 2024-08-15 DIAGNOSIS — Z86.010 HISTORY OF COLON POLYPS: ICD-10-CM

## 2024-08-15 DIAGNOSIS — K57.30 DIVERTICULAR DISEASE OF COLON: ICD-10-CM

## 2024-08-15 DIAGNOSIS — D50.9 IRON DEFICIENCY ANEMIA, UNSPECIFIED IRON DEFICIENCY ANEMIA TYPE: ICD-10-CM

## 2024-08-15 DIAGNOSIS — K29.50 ANTRAL GASTRITIS: Primary | ICD-10-CM

## 2024-08-15 PROCEDURE — 1036F TOBACCO NON-USER: CPT | Performed by: INTERNAL MEDICINE

## 2024-08-15 PROCEDURE — 3077F SYST BP >= 140 MM HG: CPT | Performed by: INTERNAL MEDICINE

## 2024-08-15 PROCEDURE — 3017F COLORECTAL CA SCREEN DOC REV: CPT | Performed by: INTERNAL MEDICINE

## 2024-08-15 PROCEDURE — G8417 CALC BMI ABV UP PARAM F/U: HCPCS | Performed by: INTERNAL MEDICINE

## 2024-08-15 PROCEDURE — 3079F DIAST BP 80-89 MM HG: CPT | Performed by: INTERNAL MEDICINE

## 2024-08-15 PROCEDURE — 99214 OFFICE O/P EST MOD 30 MIN: CPT | Performed by: INTERNAL MEDICINE

## 2024-08-15 PROCEDURE — 1123F ACP DISCUSS/DSCN MKR DOCD: CPT | Performed by: INTERNAL MEDICINE

## 2024-08-15 PROCEDURE — G8427 DOCREV CUR MEDS BY ELIG CLIN: HCPCS | Performed by: INTERNAL MEDICINE

## 2024-08-15 ASSESSMENT — PATIENT HEALTH QUESTIONNAIRE - PHQ9
1. LITTLE INTEREST OR PLEASURE IN DOING THINGS: NOT AT ALL
SUM OF ALL RESPONSES TO PHQ QUESTIONS 1-9: 0
2. FEELING DOWN, DEPRESSED OR HOPELESS: NOT AT ALL
SUM OF ALL RESPONSES TO PHQ QUESTIONS 1-9: 0
SUM OF ALL RESPONSES TO PHQ QUESTIONS 1-9: 0
SUM OF ALL RESPONSES TO PHQ9 QUESTIONS 1 & 2: 0
SUM OF ALL RESPONSES TO PHQ QUESTIONS 1-9: 0

## 2024-08-15 NOTE — PROGRESS NOTES
Chief Complaint   Patient presents with    Follow-up    gastritis     \"Have you been to the ER, urgent care clinic since your last visit?  Hospitalized since your last visit?\"    NO    “Have you seen or consulted any other health care providers outside of [facility] since your last visit?”    NO            Click Here for Release of Records Request

## 2024-09-07 DIAGNOSIS — E78.2 MIXED HYPERLIPIDEMIA: ICD-10-CM

## 2024-09-08 RX ORDER — LISINOPRIL 20 MG/1
20 TABLET ORAL DAILY
Qty: 90 TABLET | Refills: 1 | Status: SHIPPED | OUTPATIENT
Start: 2024-09-08

## 2024-09-08 RX ORDER — EMPAGLIFLOZIN 25 MG/1
25 TABLET, FILM COATED ORAL DAILY
Qty: 90 TABLET | Refills: 1 | Status: SHIPPED | OUTPATIENT
Start: 2024-09-08

## 2024-09-08 RX ORDER — ATORVASTATIN CALCIUM 20 MG/1
20 TABLET, FILM COATED ORAL DAILY
Qty: 90 TABLET | Refills: 1 | Status: SHIPPED | OUTPATIENT
Start: 2024-09-08

## 2024-09-12 ENCOUNTER — OFFICE VISIT (OUTPATIENT)
Facility: CLINIC | Age: 69
End: 2024-09-12
Payer: MEDICARE

## 2024-09-12 VITALS
BODY MASS INDEX: 33.36 KG/M2 | WEIGHT: 233 LBS | OXYGEN SATURATION: 95 % | HEIGHT: 70 IN | DIASTOLIC BLOOD PRESSURE: 74 MMHG | HEART RATE: 86 BPM | TEMPERATURE: 97.9 F | RESPIRATION RATE: 20 BRPM | SYSTOLIC BLOOD PRESSURE: 162 MMHG

## 2024-09-12 DIAGNOSIS — I73.9 PERIPHERAL VASCULAR DISEASE (HCC): ICD-10-CM

## 2024-09-12 DIAGNOSIS — F33.40 RECURRENT MAJOR DEPRESSIVE DISORDER, IN REMISSION (HCC): ICD-10-CM

## 2024-09-12 DIAGNOSIS — M25.561 ACUTE PAIN OF RIGHT KNEE: ICD-10-CM

## 2024-09-12 DIAGNOSIS — Z23 NEEDS FLU SHOT: ICD-10-CM

## 2024-09-12 DIAGNOSIS — E78.2 MIXED HYPERLIPIDEMIA: ICD-10-CM

## 2024-09-12 DIAGNOSIS — I10 ESSENTIAL (PRIMARY) HYPERTENSION: ICD-10-CM

## 2024-09-12 DIAGNOSIS — E05.90 HYPERTHYROIDISM: ICD-10-CM

## 2024-09-12 DIAGNOSIS — D64.9 ANEMIA, UNSPECIFIED TYPE: ICD-10-CM

## 2024-09-12 DIAGNOSIS — E87.6 HYPOKALEMIA: ICD-10-CM

## 2024-09-12 DIAGNOSIS — K29.50 ANTRAL GASTRITIS: ICD-10-CM

## 2024-09-12 DIAGNOSIS — E11.65 TYPE 2 DIABETES MELLITUS WITH HYPERGLYCEMIA, WITHOUT LONG-TERM CURRENT USE OF INSULIN (HCC): Primary | ICD-10-CM

## 2024-09-12 LAB — HBA1C MFR BLD: 8.7 %

## 2024-09-12 PROCEDURE — 2022F DILAT RTA XM EVC RTNOPTHY: CPT | Performed by: NURSE PRACTITIONER

## 2024-09-12 PROCEDURE — 90653 IIV ADJUVANT VACCINE IM: CPT | Performed by: NURSE PRACTITIONER

## 2024-09-12 PROCEDURE — 3017F COLORECTAL CA SCREEN DOC REV: CPT | Performed by: NURSE PRACTITIONER

## 2024-09-12 PROCEDURE — 99214 OFFICE O/P EST MOD 30 MIN: CPT | Performed by: NURSE PRACTITIONER

## 2024-09-12 PROCEDURE — G8417 CALC BMI ABV UP PARAM F/U: HCPCS | Performed by: NURSE PRACTITIONER

## 2024-09-12 PROCEDURE — 83036 HEMOGLOBIN GLYCOSYLATED A1C: CPT | Performed by: NURSE PRACTITIONER

## 2024-09-12 PROCEDURE — G8427 DOCREV CUR MEDS BY ELIG CLIN: HCPCS | Performed by: NURSE PRACTITIONER

## 2024-09-12 PROCEDURE — 3077F SYST BP >= 140 MM HG: CPT | Performed by: NURSE PRACTITIONER

## 2024-09-12 PROCEDURE — 1036F TOBACCO NON-USER: CPT | Performed by: NURSE PRACTITIONER

## 2024-09-12 PROCEDURE — 3078F DIAST BP <80 MM HG: CPT | Performed by: NURSE PRACTITIONER

## 2024-09-12 PROCEDURE — 3046F HEMOGLOBIN A1C LEVEL >9.0%: CPT | Performed by: NURSE PRACTITIONER

## 2024-09-12 PROCEDURE — 1123F ACP DISCUSS/DSCN MKR DOCD: CPT | Performed by: NURSE PRACTITIONER

## 2024-09-12 PROCEDURE — G0008 ADMIN INFLUENZA VIRUS VAC: HCPCS | Performed by: NURSE PRACTITIONER

## 2024-09-12 RX ORDER — METHIMAZOLE 5 MG/1
5 TABLET ORAL DAILY
Qty: 90 TABLET | Refills: 0 | Status: SHIPPED | OUTPATIENT
Start: 2024-09-12

## 2024-09-12 RX ORDER — NIFEDIPINE 30 MG
30 TABLET, EXTENDED RELEASE ORAL DAILY
Qty: 90 TABLET | Refills: 1 | Status: SHIPPED | OUTPATIENT
Start: 2024-09-12

## 2024-09-12 RX ORDER — ACYCLOVIR 400 MG/1
TABLET ORAL
Qty: 3 EACH | Refills: 5 | Status: SHIPPED | OUTPATIENT
Start: 2024-09-12

## 2024-09-12 RX ORDER — ACYCLOVIR 400 MG/1
TABLET ORAL
Qty: 1 EACH | Refills: 0 | Status: SHIPPED | OUTPATIENT
Start: 2024-09-12

## 2024-09-12 RX ORDER — SEMAGLUTIDE 1.34 MG/ML
1 INJECTION, SOLUTION SUBCUTANEOUS WEEKLY
Qty: 9 ML | Refills: 1 | Status: SHIPPED | OUTPATIENT
Start: 2024-09-12

## 2024-09-12 ASSESSMENT — ENCOUNTER SYMPTOMS
VOMITING: 0
BACK PAIN: 0
SINUS PAIN: 0
ABDOMINAL PAIN: 0
TROUBLE SWALLOWING: 0
STRIDOR: 0
SHORTNESS OF BREATH: 0
PHOTOPHOBIA: 0
CHOKING: 0
EYE ITCHING: 0
CHEST TIGHTNESS: 0
NAUSEA: 0
COUGH: 0
COLOR CHANGE: 0
EYE PAIN: 0
EYE REDNESS: 0
EYE DISCHARGE: 0
APNEA: 0
ABDOMINAL DISTENTION: 0
WHEEZING: 0
CONSTIPATION: 0
DIARRHEA: 0
FACIAL SWELLING: 0
SORE THROAT: 0
BLOOD IN STOOL: 0

## 2024-09-13 ENCOUNTER — TELEPHONE (OUTPATIENT)
Facility: CLINIC | Age: 69
End: 2024-09-13

## 2024-09-18 RX ORDER — PANTOPRAZOLE SODIUM 40 MG/1
40 TABLET, DELAYED RELEASE ORAL DAILY
Qty: 90 TABLET | Refills: 1 | Status: SHIPPED | OUTPATIENT
Start: 2024-09-18

## 2024-10-10 DIAGNOSIS — E11.65 TYPE 2 DIABETES MELLITUS WITH HYPERGLYCEMIA, WITHOUT LONG-TERM CURRENT USE OF INSULIN (HCC): Primary | ICD-10-CM

## 2024-10-10 DIAGNOSIS — Z12.5 SCREENING PSA (PROSTATE SPECIFIC ANTIGEN): ICD-10-CM

## 2024-10-11 ENCOUNTER — OFFICE VISIT (OUTPATIENT)
Facility: CLINIC | Age: 69
End: 2024-10-11
Payer: MEDICARE

## 2024-10-11 VITALS
BODY MASS INDEX: 33.36 KG/M2 | OXYGEN SATURATION: 93 % | HEIGHT: 70 IN | WEIGHT: 233 LBS | DIASTOLIC BLOOD PRESSURE: 60 MMHG | SYSTOLIC BLOOD PRESSURE: 122 MMHG | TEMPERATURE: 97.1 F | HEART RATE: 76 BPM | RESPIRATION RATE: 18 BRPM

## 2024-10-11 DIAGNOSIS — E11.65 TYPE 2 DIABETES MELLITUS WITH HYPERGLYCEMIA, WITHOUT LONG-TERM CURRENT USE OF INSULIN (HCC): ICD-10-CM

## 2024-10-11 PROCEDURE — G8482 FLU IMMUNIZE ORDER/ADMIN: HCPCS | Performed by: NURSE PRACTITIONER

## 2024-10-11 PROCEDURE — G8427 DOCREV CUR MEDS BY ELIG CLIN: HCPCS | Performed by: NURSE PRACTITIONER

## 2024-10-11 PROCEDURE — 1036F TOBACCO NON-USER: CPT | Performed by: NURSE PRACTITIONER

## 2024-10-11 PROCEDURE — 3078F DIAST BP <80 MM HG: CPT | Performed by: NURSE PRACTITIONER

## 2024-10-11 PROCEDURE — 3046F HEMOGLOBIN A1C LEVEL >9.0%: CPT | Performed by: NURSE PRACTITIONER

## 2024-10-11 PROCEDURE — 99213 OFFICE O/P EST LOW 20 MIN: CPT | Performed by: NURSE PRACTITIONER

## 2024-10-11 PROCEDURE — 2022F DILAT RTA XM EVC RTNOPTHY: CPT | Performed by: NURSE PRACTITIONER

## 2024-10-11 PROCEDURE — 3074F SYST BP LT 130 MM HG: CPT | Performed by: NURSE PRACTITIONER

## 2024-10-11 PROCEDURE — 3017F COLORECTAL CA SCREEN DOC REV: CPT | Performed by: NURSE PRACTITIONER

## 2024-10-11 PROCEDURE — G8417 CALC BMI ABV UP PARAM F/U: HCPCS | Performed by: NURSE PRACTITIONER

## 2024-10-11 PROCEDURE — 1123F ACP DISCUSS/DSCN MKR DOCD: CPT | Performed by: NURSE PRACTITIONER

## 2024-10-11 RX ORDER — SEMAGLUTIDE 1.34 MG/ML
1 INJECTION, SOLUTION SUBCUTANEOUS WEEKLY
Qty: 9 ML | Refills: 1 | Status: SHIPPED | OUTPATIENT
Start: 2024-10-11

## 2024-10-11 RX ORDER — ACYCLOVIR 400 MG/1
TABLET ORAL
Qty: 3 EACH | Refills: 5 | Status: SHIPPED | OUTPATIENT
Start: 2024-10-11

## 2024-10-11 ASSESSMENT — ENCOUNTER SYMPTOMS
EYE REDNESS: 0
ABDOMINAL PAIN: 0
STRIDOR: 0
BLOOD IN STOOL: 0
ABDOMINAL DISTENTION: 0
FACIAL SWELLING: 0
CONSTIPATION: 0
SINUS PAIN: 0
EYE DISCHARGE: 0
WHEEZING: 0
EYE PAIN: 0
BACK PAIN: 0
DIARRHEA: 0
SHORTNESS OF BREATH: 0
NAUSEA: 0
APNEA: 0
COUGH: 0
TROUBLE SWALLOWING: 0
CHEST TIGHTNESS: 0
PHOTOPHOBIA: 0
EYE ITCHING: 0
SORE THROAT: 0
COLOR CHANGE: 0
VOMITING: 0
CHOKING: 0

## 2024-10-11 NOTE — PROGRESS NOTES
Chief Complaint   Patient presents with    Diabetes     Follow up         Pt got dexcom meter but not the sensors he has to call and make a payment and ask them to send the sensors     \"Have you been to the ER, urgent care clinic since your last visit?  Hospitalized since your last visit?\"    NO    “Have you seen or consulted any other health care providers outside our system since your last visit?”    NO             
Subjective  Chief Complaint   Patient presents with    Diabetes     HPI:  Chavez Skelton is a 69 y.o. male who presents for follow up of chronic conditions with PMH hypertension, hyperlipidemia, uncontrolled type 2 diabetes,PVD, depression,  pneumonia, hyperlipidemia, left kidney mass, obesity, foot wound (s/p amputation) ,hypertension and hyperlipidemia. Reports taking medicines but missing multiple from bag today. Is unsure if he has been on the methimazole or nifedipine. Also does not believe he has been taking the Ozempic. Has not been checking glucose due to not liking finger sticks.  Had recent visit with cardiology (Dr. Gleason) and denies any change. Having right knee pain with mild swelling over the past few months. Denies any injury. No history of gout. Uses tylenol prn to help with pain.      Past Medical History:   Diagnosis Date    Essential hypertension     Gastroenteritis 04/22/2023    GERD (gastroesophageal reflux disease)     SUDHIR (iron deficiency anemia) 04/30/2024    Left foot infection 04/30/2023    Mixed hyperlipidemia     Moderate episode of recurrent major depressive disorder (HCC) 2/16/2023    MSSA (methicillin susceptible Staphylococcus aureus) 05/04/2023    Osteomyelitis (HCC) 3/20/2023    Pneumonia due to COVID-19 virus 2/17/2021    Renal mass, left     Type 2 diabetes mellitus with complication, without long-term current use of insulin (HCC)         Past Surgical History:   Procedure Laterality Date    CARDIAC CATHETERIZATION      patient stated stents placed     COLONOSCOPY      COLONOSCOPY N/A 01/06/2023    COLONOSCOPY (TIVA) performed by Gissell Newman MD at [facility] ENDOSCOPY    CORONARY ANGIOPLASTY WITH STENT PLACEMENT  2018    x1    KIDNEY REMOVAL Left 08/23/2021    robotic left partial nephrectomy,    [place] UNLISTED PROCEDURE CARDIAC SURGERY  2018    BYPASS    TOE AMPUTATION Left     UPPER GASTROINTESTINAL ENDOSCOPY N/A 5/22/2024    ESOPHAGOGASTRODUODENOSCOPY BIOPSY performed by [facility] 
  Assessment & Plan  1. Type 2 diabetes mellitus with hyperglycemia, without long-term current use of insulin (HCC)  Hemoglobin A1C, POC   Date Value Ref Range Status   09/12/2024 8.7 % Final      Hemoglobin A1C   Date Value Ref Range Status   03/20/2023 10.2 (H) 4.0 - 5.6 % Final     Comment:     NEW METHOD  PLEASE NOTE NEW REFERENCE RANGE  (NOTE)  HbA1C Interpretive Ranges  <5.7              Normal  5.7 - 6.4         Consider Prediabetes  >6.5              Consider Diabetes        Home readings: none available.  Medication Compliance: yes  Diabetic Eye Exam Up to date:No  Complications: CAD  Current Treatment: Lantus 40 units nightly, ozempic 1mg every 7 days (not taking) and Jardiance 25mg daily  Stopped glipizide 10mg BID and Januvia per patient on his own in the past.  Not checking glucose regularly  Previous treatment: Metformin (could not tolerate)    Has had worsening in A1c due to recently stopping the Ozempic.  He will check at home as he reports not taking but pharmacy states this has been sent regularly. Cannot get new supply until 10/19/2024.  Continue lantus and Jardiance.  Encouraged to stay well hydrated and notify provider immediately if signs of  mycotic infection develops.  Check glucose at least 1-2 times daily and bring readings to next appointment. Did not receive sensors for Dexcom and have contacted pharmacy who are now sending.  Notify provider if glucose > 200 or < 70.  Encourage to follow diabetic diet and get regular exercise 3-5 times weekly for 30-45 minutes.  Has contact information as he desires to schedule  at [facility] in [place].  Continuing care with podiatry.  Update fasting labs today. Orders already in.  - Continuous Glucose Sensor (DEXCOM G7 SENSOR) MISC; Use to check glucose 4 times daily  Dispense: 3 each; Refill: 5  - Semaglutide, 1 MG/DOSE, (OZEMPIC, 1 MG/DOSE,) 4 MG/3ML SOPN sc injection; Inject 1 mg into the skin once a week  Dispense: 9 mL; Refill: 1       Aspects of this

## 2024-10-12 LAB
ALBUMIN SERPL-MCNC: 4.1 G/DL (ref 3.8–4.8)
ALP SERPL-CCNC: 130 IU/L (ref 44–121)
ALT SERPL-CCNC: 18 IU/L (ref 0–44)
AST SERPL-CCNC: 20 IU/L (ref 0–40)
BASOPHILS # BLD AUTO: 0.1 X10E3/UL (ref 0–0.2)
BASOPHILS NFR BLD AUTO: 2 %
BILIRUB SERPL-MCNC: 0.3 MG/DL (ref 0–1.2)
BUN SERPL-MCNC: 26 MG/DL (ref 8–27)
BUN/CREAT SERPL: 19 (ref 10–24)
CALCIUM SERPL-MCNC: 9.7 MG/DL (ref 8.6–10.2)
CHLORIDE SERPL-SCNC: 101 MMOL/L (ref 96–106)
CHOLEST SERPL-MCNC: 268 MG/DL (ref 100–199)
CO2 SERPL-SCNC: 21 MMOL/L (ref 20–29)
CREAT SERPL-MCNC: 1.39 MG/DL (ref 0.76–1.27)
EGFRCR SERPLBLD CKD-EPI 2021: 54 ML/MIN/1.73
EOSINOPHIL # BLD AUTO: 0.3 X10E3/UL (ref 0–0.4)
EOSINOPHIL NFR BLD AUTO: 5 %
ERYTHROCYTE [DISTWIDTH] IN BLOOD BY AUTOMATED COUNT: 12.5 % (ref 11.6–15.4)
GLOBULIN SER CALC-MCNC: 3.1 G/DL (ref 1.5–4.5)
GLUCOSE SERPL-MCNC: 290 MG/DL (ref 70–99)
HCT VFR BLD AUTO: 40.1 % (ref 37.5–51)
HDLC SERPL-MCNC: 87 MG/DL
HGB BLD-MCNC: 12.9 G/DL (ref 13–17.7)
IMM GRANULOCYTES # BLD AUTO: 0 X10E3/UL (ref 0–0.1)
IMM GRANULOCYTES NFR BLD AUTO: 0 %
LDLC SERPL CALC-MCNC: 147 MG/DL (ref 0–99)
LYMPHOCYTES # BLD AUTO: 2.3 X10E3/UL (ref 0.7–3.1)
LYMPHOCYTES NFR BLD AUTO: 38 %
MCH RBC QN AUTO: 28.7 PG (ref 26.6–33)
MCHC RBC AUTO-ENTMCNC: 32.2 G/DL (ref 31.5–35.7)
MCV RBC AUTO: 89 FL (ref 79–97)
MONOCYTES # BLD AUTO: 0.6 X10E3/UL (ref 0.1–0.9)
MONOCYTES NFR BLD AUTO: 10 %
NEUTROPHILS # BLD AUTO: 2.8 X10E3/UL (ref 1.4–7)
NEUTROPHILS NFR BLD AUTO: 45 %
PLATELET # BLD AUTO: 264 X10E3/UL (ref 150–450)
POTASSIUM SERPL-SCNC: 4.5 MMOL/L (ref 3.5–5.2)
PROT SERPL-MCNC: 7.2 G/DL (ref 6–8.5)
PSA SERPL-MCNC: 0.3 NG/ML (ref 0–4)
RBC # BLD AUTO: 4.5 X10E6/UL (ref 4.14–5.8)
REFLEX CRITERIA: NORMAL
SODIUM SERPL-SCNC: 138 MMOL/L (ref 134–144)
SPECIMEN STATUS REPORT: NORMAL
TRIGL SERPL-MCNC: 191 MG/DL (ref 0–149)
TSH SERPL DL<=0.005 MIU/L-ACNC: 1.55 UIU/ML (ref 0.45–4.5)
VLDLC SERPL CALC-MCNC: 34 MG/DL (ref 5–40)
WBC # BLD AUTO: 6.1 X10E3/UL (ref 3.4–10.8)

## 2024-10-21 ENCOUNTER — OFFICE VISIT (OUTPATIENT)
Age: 69
End: 2024-10-21

## 2024-10-21 VITALS
RESPIRATION RATE: 18 BRPM | SYSTOLIC BLOOD PRESSURE: 120 MMHG | DIASTOLIC BLOOD PRESSURE: 70 MMHG | OXYGEN SATURATION: 92 % | HEART RATE: 76 BPM | HEIGHT: 70 IN | TEMPERATURE: 97.5 F | WEIGHT: 231.5 LBS | BODY MASS INDEX: 33.14 KG/M2

## 2024-10-21 DIAGNOSIS — I73.9 PAD (PERIPHERAL ARTERY DISEASE) (HCC): Primary | ICD-10-CM

## 2024-10-21 ASSESSMENT — PATIENT HEALTH QUESTIONNAIRE - PHQ9
SUM OF ALL RESPONSES TO PHQ QUESTIONS 1-9: 13
6. FEELING BAD ABOUT YOURSELF - OR THAT YOU ARE A FAILURE OR HAVE LET YOURSELF OR YOUR FAMILY DOWN: NEARLY EVERY DAY
1. LITTLE INTEREST OR PLEASURE IN DOING THINGS: NEARLY EVERY DAY
7. TROUBLE CONCENTRATING ON THINGS, SUCH AS READING THE NEWSPAPER OR WATCHING TELEVISION: SEVERAL DAYS
2. FEELING DOWN, DEPRESSED OR HOPELESS: NEARLY EVERY DAY
SUM OF ALL RESPONSES TO PHQ QUESTIONS 1-9: 13
SUM OF ALL RESPONSES TO PHQ9 QUESTIONS 1 & 2: 6
8. MOVING OR SPEAKING SO SLOWLY THAT OTHER PEOPLE COULD HAVE NOTICED. OR THE OPPOSITE, BEING SO FIGETY OR RESTLESS THAT YOU HAVE BEEN MOVING AROUND A LOT MORE THAN USUAL: NOT AT ALL
3. TROUBLE FALLING OR STAYING ASLEEP: NOT AT ALL
SUM OF ALL RESPONSES TO PHQ QUESTIONS 1-9: 13
10. IF YOU CHECKED OFF ANY PROBLEMS, HOW DIFFICULT HAVE THESE PROBLEMS MADE IT FOR YOU TO DO YOUR WORK, TAKE CARE OF THINGS AT HOME, OR GET ALONG WITH OTHER PEOPLE: VERY DIFFICULT
4. FEELING TIRED OR HAVING LITTLE ENERGY: NEARLY EVERY DAY
9. THOUGHTS THAT YOU WOULD BE BETTER OFF DEAD, OR OF HURTING YOURSELF: NOT AT ALL
SUM OF ALL RESPONSES TO PHQ QUESTIONS 1-9: 13
5. POOR APPETITE OR OVEREATING: NOT AT ALL

## 2024-10-26 NOTE — PROGRESS NOTES
Vascular History and Physical    Patient: Chavez Skelton  MRN: [id number]    YOB: 1955  Age: 69 y.o.  Sex: male     Chief Complaint:  Chief Complaint   Patient presents with    New Patient     PVD       History of Present Illness: Chavez Skelton is a 69 y.o. very pleasant gentleman is here today for vascular assessment.  Patient complaining of a rest pain of the right leg.  Patient had a previous left leg angioplasty with great toe amputation.  Patient is also diabetic.  His blood sugar apparently at reasonable level.  Patient currently denies abdominal pain denies any chest pain or recent stroke symptoms.    Social History:  Social Connections: Unknown (2/17/2021)    [facility] from [facility]  (prior to 6/17/2023), [facility]  (prior to 6/17/2023)    Social Connection and Isolation Panel [NHANES]     Frequency of Communication with Friends and Family: Never     Frequency of Social Gatherings with Friends and Family: Not on file     Attends Mor[employer] Services: Not on file     Active Member of Clubs or Organizations: Not on file     Attends Club or Organization Meetings: Not on file     Marital Status: Not on file       Past Medical History:  Past Medical History:   Diagnosis Date    Essential hypertension     Gastroenteritis 04/22/2023    GERD (gastroesophageal reflux disease)     SUDHIR (iron deficiency anemia) 04/30/2024    Left foot infection 04/30/2023    Mixed hyperlipidemia     Moderate episode of recurrent major depressive disorder (HCC) 2/16/2023    MSSA (methicillin susceptible Staphylococcus aureus) 05/04/2023    Osteomyelitis 3/20/2023    Pneumonia due to COVID-19 virus 2/17/2021    Renal mass, left     Type 2 diabetes mellitus with complication, without long-term current use of insulin (HCC)        Surgical History:  Past Surgical History:   Procedure Laterality Date    CARDIAC CATHETERIZATION      patient stated stents placed     COLONOSCOPY      COLONOSCOPY

## 2024-11-04 ENCOUNTER — TELEPHONE (OUTPATIENT)
Age: 69
End: 2024-11-04

## 2024-11-05 DIAGNOSIS — F33.40 RECURRENT MAJOR DEPRESSIVE DISORDER, IN REMISSION (HCC): ICD-10-CM

## 2024-11-05 DIAGNOSIS — I73.9 PERIPHERAL VASCULAR DISEASE (HCC): ICD-10-CM

## 2024-11-05 RX ORDER — FUROSEMIDE 20 MG/1
20 TABLET ORAL DAILY
Qty: 90 TABLET | Refills: 3 | Status: SHIPPED | OUTPATIENT
Start: 2024-11-05

## 2024-11-05 RX ORDER — FLUOXETINE 40 MG/1
40 CAPSULE ORAL DAILY
Qty: 90 CAPSULE | Refills: 3 | Status: SHIPPED | OUTPATIENT
Start: 2024-11-05

## 2024-11-05 RX ORDER — BUPROPION HYDROCHLORIDE 150 MG/1
150 TABLET, EXTENDED RELEASE ORAL DAILY
Qty: 90 TABLET | Refills: 3 | Status: SHIPPED | OUTPATIENT
Start: 2024-11-05

## 2024-11-06 ENCOUNTER — HOSPITAL ENCOUNTER (OUTPATIENT)
Facility: HOSPITAL | Age: 69
Setting detail: SPECIMEN
Discharge: HOME | End: 2024-11-09
Payer: MEDICARE

## 2024-11-06 DIAGNOSIS — Z01.818 PRE-OP EXAM: ICD-10-CM

## 2024-11-06 LAB
ALBUMIN SERPL-MCNC: 3.5 G/DL (ref 3.5–5)
ALBUMIN/GLOB SERPL: 0.9 (ref 1.1–2.2)
ALP SERPL-CCNC: 110 U/L (ref 45–117)
ALT SERPL-CCNC: 23 U/L (ref 12–78)
ANION GAP SERPL CALC-SCNC: 11 MMOL/L (ref 2–12)
APTT PPP: 24.3 SEC (ref 21.2–34.1)
AST SERPL W P-5'-P-CCNC: 19 U/L (ref 15–37)
BILIRUB SERPL-MCNC: 0.4 MG/DL (ref 0.2–1)
BUN SERPL-MCNC: 23 MG/DL (ref 6–20)
BUN/CREAT SERPL: 13 (ref 12–20)
CA-I BLD-MCNC: 9.2 MG/DL (ref 8.5–10.1)
CHLORIDE SERPL-SCNC: 101 MMOL/L (ref 97–108)
CO2 SERPL-SCNC: 25 MMOL/L (ref 21–32)
CREAT SERPL-MCNC: 1.8 MG/DL (ref 0.7–1.3)
ERYTHROCYTE [DISTWIDTH] IN BLOOD BY AUTOMATED COUNT: 12.6 % (ref 11.5–14.5)
GLOBULIN SER CALC-MCNC: 4 G/DL (ref 2–4)
GLUCOSE SERPL-MCNC: 140 MG/DL (ref 65–100)
HCT VFR BLD AUTO: 39.1 % (ref 36.6–50.3)
HGB BLD-MCNC: 12.5 G/DL (ref 12.1–17)
INR PPP: 1 (ref 0.9–1.1)
MCH RBC QN AUTO: 28.9 PG (ref 26–34)
MCHC RBC AUTO-ENTMCNC: 32 G/DL (ref 30–36.5)
MCV RBC AUTO: 90.5 FL (ref 80–99)
NRBC # BLD: 0 K/UL (ref 0–0.01)
NRBC BLD-RTO: 0 PER 100 WBC
PLATELET # BLD AUTO: 259 K/UL (ref 150–400)
PMV BLD AUTO: 10.2 FL (ref 8.9–12.9)
POTASSIUM SERPL-SCNC: 3.6 MMOL/L (ref 3.5–5.1)
PROT SERPL-MCNC: 7.5 G/DL (ref 6.4–8.2)
PROTHROMBIN TIME: 13.3 SEC (ref 11.9–14.6)
RBC # BLD AUTO: 4.32 M/UL (ref 4.1–5.7)
SODIUM SERPL-SCNC: 137 MMOL/L (ref 136–145)
THERAPEUTIC RANGE: NORMAL SEC (ref 82–109)
WBC # BLD AUTO: 6.2 K/UL (ref 4.1–11.1)

## 2024-11-06 PROCEDURE — 85610 PROTHROMBIN TIME: CPT

## 2024-11-06 PROCEDURE — 80053 COMPREHEN METABOLIC PANEL: CPT

## 2024-11-06 PROCEDURE — 85027 COMPLETE CBC AUTOMATED: CPT

## 2024-11-06 PROCEDURE — 85730 THROMBOPLASTIN TIME PARTIAL: CPT

## 2024-11-06 PROCEDURE — 36415 COLL VENOUS BLD VENIPUNCTURE: CPT

## 2024-11-07 NOTE — PERIOP NOTE
Patient states he has no one to drive him on procedure day and will have medicaid transportation - made patient aware he needs someone with him - will call MD and make aware of above and follow up

## 2024-11-11 NOTE — PERIOP NOTE
Followed up with Dr. Ricky Harman's office regarding previous note - also to laterality of procedure -   Ghazal states she will send another message and follow up

## 2024-11-12 ENCOUNTER — HOSPITAL ENCOUNTER (OUTPATIENT)
Facility: HOSPITAL | Age: 69
Discharge: HOME | End: 2024-11-12
Attending: SURGERY | Admitting: SURGERY
Payer: MEDICARE

## 2024-11-12 VITALS
HEIGHT: 70 IN | OXYGEN SATURATION: 99 % | HEART RATE: 84 BPM | TEMPERATURE: 97.9 F | RESPIRATION RATE: 16 BRPM | DIASTOLIC BLOOD PRESSURE: 78 MMHG | WEIGHT: 230 LBS | BODY MASS INDEX: 32.93 KG/M2 | SYSTOLIC BLOOD PRESSURE: 155 MMHG

## 2024-11-12 DIAGNOSIS — I73.9 PAD (PERIPHERAL ARTERY DISEASE) (HCC): ICD-10-CM

## 2024-11-12 LAB
GLUCOSE BLD STRIP.AUTO-MCNC: 101 MG/DL (ref 65–100)
GLUCOSE BLD STRIP.AUTO-MCNC: 112 MG/DL (ref 65–100)
PERFORMED BY:: ABNORMAL
PERFORMED BY:: ABNORMAL

## 2024-11-12 PROCEDURE — 2709999900 HC NON-CHARGEABLE SUPPLY: Performed by: SURGERY

## 2024-11-12 PROCEDURE — 75710 ARTERY X-RAYS ARM/LEG: CPT | Performed by: SURGERY

## 2024-11-12 PROCEDURE — 6360000004 HC RX CONTRAST MEDICATION: Performed by: SURGERY

## 2024-11-12 PROCEDURE — 99153 MOD SED SAME PHYS/QHP EA: CPT | Performed by: SURGERY

## 2024-11-12 PROCEDURE — 7100000010 HC PHASE II RECOVERY - FIRST 15 MIN: Performed by: SURGERY

## 2024-11-12 PROCEDURE — 99152 MOD SED SAME PHYS/QHP 5/>YRS: CPT | Performed by: SURGERY

## 2024-11-12 PROCEDURE — C1894 INTRO/SHEATH, NON-LASER: HCPCS | Performed by: SURGERY

## 2024-11-12 PROCEDURE — 2580000003 HC RX 258: Performed by: SURGERY

## 2024-11-12 PROCEDURE — 6360000002 HC RX W HCPCS: Performed by: SURGERY

## 2024-11-12 PROCEDURE — C1887 CATHETER, GUIDING: HCPCS | Performed by: SURGERY

## 2024-11-12 PROCEDURE — 7100000011 HC PHASE II RECOVERY - ADDTL 15 MIN: Performed by: SURGERY

## 2024-11-12 PROCEDURE — 82962 GLUCOSE BLOOD TEST: CPT

## 2024-11-12 PROCEDURE — 36245 INS CATH ABD/L-EXT ART 1ST: CPT | Performed by: SURGERY

## 2024-11-12 PROCEDURE — 2500000003 HC RX 250 WO HCPCS: Performed by: SURGERY

## 2024-11-12 PROCEDURE — 75625 CONTRAST EXAM ABDOMINL AORTA: CPT | Performed by: SURGERY

## 2024-11-12 PROCEDURE — 7100000000 HC PACU RECOVERY - FIRST 15 MIN: Performed by: SURGERY

## 2024-11-12 PROCEDURE — C1760 CLOSURE DEV, VASC: HCPCS | Performed by: SURGERY

## 2024-11-12 PROCEDURE — C1725 CATH, TRANSLUMIN NON-LASER: HCPCS | Performed by: SURGERY

## 2024-11-12 PROCEDURE — 7100000001 HC PACU RECOVERY - ADDTL 15 MIN: Performed by: SURGERY

## 2024-11-12 PROCEDURE — 76937 US GUIDE VASCULAR ACCESS: CPT | Performed by: SURGERY

## 2024-11-12 PROCEDURE — C1769 GUIDE WIRE: HCPCS | Performed by: SURGERY

## 2024-11-12 PROCEDURE — 37228 HC PLASTY UNI TIB/PER INITIAL: CPT | Performed by: SURGERY

## 2024-11-12 RX ORDER — SODIUM CHLORIDE 9 MG/ML
INJECTION, SOLUTION INTRAVENOUS CONTINUOUS
Status: DISCONTINUED | OUTPATIENT
Start: 2024-11-12 | End: 2024-11-12 | Stop reason: HOSPADM

## 2024-11-12 RX ORDER — DIPHENHYDRAMINE HYDROCHLORIDE 50 MG/ML
INJECTION INTRAMUSCULAR; INTRAVENOUS PRN
Status: DISCONTINUED | OUTPATIENT
Start: 2024-11-12 | End: 2024-11-12 | Stop reason: HOSPADM

## 2024-11-12 RX ORDER — IOPAMIDOL 755 MG/ML
INJECTION, SOLUTION INTRAVASCULAR PRN
Status: DISCONTINUED | OUTPATIENT
Start: 2024-11-12 | End: 2024-11-12 | Stop reason: HOSPADM

## 2024-11-12 RX ORDER — HYDRALAZINE HYDROCHLORIDE 20 MG/ML
INJECTION INTRAMUSCULAR; INTRAVENOUS PRN
Status: DISCONTINUED | OUTPATIENT
Start: 2024-11-12 | End: 2024-11-12 | Stop reason: HOSPADM

## 2024-11-12 RX ORDER — SODIUM CHLORIDE 9 MG/ML
INJECTION, SOLUTION INTRAVENOUS PRN
Status: DISCONTINUED | OUTPATIENT
Start: 2024-11-12 | End: 2024-11-12 | Stop reason: HOSPADM

## 2024-11-12 RX ORDER — MIDAZOLAM HYDROCHLORIDE 1 MG/ML
INJECTION, SOLUTION INTRAMUSCULAR; INTRAVENOUS PRN
Status: DISCONTINUED | OUTPATIENT
Start: 2024-11-12 | End: 2024-11-12 | Stop reason: HOSPADM

## 2024-11-12 RX ORDER — SODIUM CHLORIDE 0.9 % (FLUSH) 0.9 %
5-40 SYRINGE (ML) INJECTION EVERY 12 HOURS SCHEDULED
Status: DISCONTINUED | OUTPATIENT
Start: 2024-11-12 | End: 2024-11-13 | Stop reason: HOSPADM

## 2024-11-12 RX ORDER — SODIUM CHLORIDE 9 MG/ML
INJECTION, SOLUTION INTRAVENOUS PRN
Status: DISCONTINUED | OUTPATIENT
Start: 2024-11-12 | End: 2024-11-13 | Stop reason: HOSPADM

## 2024-11-12 RX ORDER — HEPARIN SODIUM 200 [USP'U]/100ML
INJECTION, SOLUTION INTRAVENOUS CONTINUOUS PRN
Status: COMPLETED | OUTPATIENT
Start: 2024-11-12 | End: 2024-11-12

## 2024-11-12 RX ORDER — SODIUM CHLORIDE 0.9 % (FLUSH) 0.9 %
5-40 SYRINGE (ML) INJECTION PRN
Status: DISCONTINUED | OUTPATIENT
Start: 2024-11-12 | End: 2024-11-12 | Stop reason: HOSPADM

## 2024-11-12 RX ORDER — SODIUM CHLORIDE 0.9 % (FLUSH) 0.9 %
5-40 SYRINGE (ML) INJECTION EVERY 12 HOURS SCHEDULED
Status: DISCONTINUED | OUTPATIENT
Start: 2024-11-12 | End: 2024-11-12 | Stop reason: HOSPADM

## 2024-11-12 RX ORDER — SODIUM CHLORIDE 9 MG/ML
INJECTION, SOLUTION INTRAVENOUS CONTINUOUS
Status: DISCONTINUED | OUTPATIENT
Start: 2024-11-12 | End: 2024-11-12

## 2024-11-12 RX ORDER — LIDOCAINE HYDROCHLORIDE 20 MG/ML
INJECTION, SOLUTION INFILTRATION; PERINEURAL PRN
Status: DISCONTINUED | OUTPATIENT
Start: 2024-11-12 | End: 2024-11-12 | Stop reason: HOSPADM

## 2024-11-12 RX ORDER — NICARDIPINE HYDROCHLORIDE 2.5 MG/ML
INJECTION INTRAVENOUS PRN
Status: DISCONTINUED | OUTPATIENT
Start: 2024-11-12 | End: 2024-11-12 | Stop reason: HOSPADM

## 2024-11-12 RX ORDER — SODIUM CHLORIDE 0.9 % (FLUSH) 0.9 %
5-40 SYRINGE (ML) INJECTION PRN
Status: DISCONTINUED | OUTPATIENT
Start: 2024-11-12 | End: 2024-11-13 | Stop reason: HOSPADM

## 2024-11-12 RX ORDER — HEPARIN SODIUM 1000 [USP'U]/ML
INJECTION, SOLUTION INTRAVENOUS; SUBCUTANEOUS PRN
Status: DISCONTINUED | OUTPATIENT
Start: 2024-11-12 | End: 2024-11-12 | Stop reason: HOSPADM

## 2024-11-12 RX ORDER — FENTANYL CITRATE 50 UG/ML
INJECTION, SOLUTION INTRAMUSCULAR; INTRAVENOUS PRN
Status: DISCONTINUED | OUTPATIENT
Start: 2024-11-12 | End: 2024-11-12 | Stop reason: HOSPADM

## 2024-11-12 RX ORDER — ACETAMINOPHEN 325 MG/1
650 TABLET ORAL EVERY 4 HOURS PRN
Status: DISCONTINUED | OUTPATIENT
Start: 2024-11-12 | End: 2024-11-13 | Stop reason: HOSPADM

## 2024-11-12 RX ADMIN — SODIUM BICARBONATE: 84 INJECTION, SOLUTION INTRAVENOUS at 11:15

## 2024-11-12 ASSESSMENT — PAIN - FUNCTIONAL ASSESSMENT
PAIN_FUNCTIONAL_ASSESSMENT: 0-10
PAIN_FUNCTIONAL_ASSESSMENT: 0-10
PAIN_FUNCTIONAL_ASSESSMENT: NONE - DENIES PAIN

## 2024-11-12 NOTE — PERIOP NOTE
Patient alert and oriented x4, VS stable, no complaints of pain at this time. No one at bedside. Bed in low position, call bell within reach.    Patient states okay to review and give discharge instructions to Carissa Valentinoell, daughter.

## 2024-11-12 NOTE — H&P
Vascular History and Physical     Patient: Chavez Skelton                   MRN: [id number]          YOB: 1955          Age: 69 y.o.     Sex: male      Chief Complaint:       Chief Complaint   Patient presents with    New Patient       PVD         History of Present Illness: Chavez Skelton is a 69 y.o. very pleasant gentleman is here today for vascular assessment.  Patient complaining of a rest pain of the right leg.  Patient had a previous left leg angioplasty with great toe amputation.  Patient is also diabetic.  His blood sugar apparently at reasonable level.  Patient currently denies abdominal pain denies any chest pain or recent stroke symptoms.     Social History:       Social Connections: Unknown (2/17/2021)     [facility]  (prior to 6/17/2023), [facility]  (prior to 6/17/2023)     Social Connection and Isolation Panel [NHANES]      Frequency of Communication with Friends and Family: Never      Frequency of Social Gatherings with Friends and Family: Not on file      Attends Christian Services: Not on file      Active Member of Clubs or Organizations: Not on file      Attends Club or Organization Meetings: Not on file      Marital Status: Not on file         Past Medical History:  Past Medical History        Past Medical History:   Diagnosis Date    Essential hypertension      Gastroenteritis 04/22/2023    GERD (gastroesophageal reflux disease)      SUDHIR (iron deficiency anemia) 04/30/2024    Left foot infection 04/30/2023    Mixed hyperlipidemia      Moderate episode of recurrent major depressive disorder (HCC) 2/16/2023    MSSA (methicillin susceptible Staphylococcus aureus) 05/04/2023    Osteomyelitis 3/20/2023    Pneumonia due to COVID-19 virus 2/17/2021    Renal mass, left      Type 2 diabetes mellitus with complication, without long-term current use of insulin (HCC)              Surgical History:  Past Surgical History         Past Surgical

## 2024-11-12 NOTE — PROGRESS NOTES
[facility]. [facility] in stable condition.  Bedside report given, SBAR reviewed, sites viewed. Family notified via phone can come visit (not in waiting room) and belongings to bedside.

## 2024-11-12 NOTE — PROGRESS NOTES
Spiritual Health History and Assessment/Progress Note  [facility]    Initial Encounter, Pre-Op, Pre-Procedural, Spiritual/Emotional Needs,  ,  ,      Name: Chavez Skelton MRN: [id number]    Age: 69 y.o.     Sex: male   Language: English   Congregational: Zoroastrianism   PVD (peripheral vascular disease) (HCC)     Date: 11/12/2024            Total Time Calculated: 18 min              Spiritual Assessment began in [facility]        Referral/Consult From: Nurse   Encounter Overview/Reason: Initial Encounter, Pre-Op, Pre-Procedural, Spiritual/Emotional Needs  Service Provided For: Patient    [place], Belief, Meaning:   Patient identifies as spiritual, is connected with a [place] tradition or spiritual practice, has beliefs or practices that help with coping during difficult times, and Other: believes in God, prays  Family/Friends No family/friends present      Importance and Influence:  Patient has spiritual/personal beliefs that influence decisions regarding their health  Family/Friends No family/friends present    Community:  Patient is connected with a spiritual community and feels well-supported. Support system includes: Children and Friends  Family/Friends No family/friends present    Assessment and Plan of Care:     Patient Interventions include: Facilitated expression of thoughts and feelings, Explored spiritual coping/struggle/distress, Engaged in theological reflection, Affirmed coping skills/support systems, Facilitated life review and/ or legacy, and Other: prayer, active listening, ministry of presence  Family/Friends Interventions include: No family/friends present    Patient Plan of Care: Spiritual Care available upon further referral  Family/Friends Plan of Care: No family/friends present    Met with the Pt, Mr. Chavez Skelton, for a pre-op consultation after being paged by staff. Pt is hopeful his procedure will aid with his circulation. He is supported by a step-daughter and some friends, but he also

## 2024-11-14 PROBLEM — I70.201 POPLITEAL ARTERY STENOSIS, RIGHT (HCC): Status: ACTIVE | Noted: 2024-11-14

## 2024-11-14 PROBLEM — I70.201: Status: ACTIVE | Noted: 2024-11-14

## 2024-11-14 LAB — ECHO BSA: 2.27 M2

## 2024-12-10 ENCOUNTER — OFFICE VISIT (OUTPATIENT)
Facility: CLINIC | Age: 69
End: 2024-12-10
Payer: MEDICARE

## 2024-12-10 VITALS
WEIGHT: 228 LBS | SYSTOLIC BLOOD PRESSURE: 119 MMHG | HEART RATE: 85 BPM | OXYGEN SATURATION: 93 % | DIASTOLIC BLOOD PRESSURE: 60 MMHG | HEIGHT: 70 IN | RESPIRATION RATE: 18 BRPM | TEMPERATURE: 98.1 F | BODY MASS INDEX: 32.64 KG/M2

## 2024-12-10 DIAGNOSIS — E87.6 HYPOKALEMIA: ICD-10-CM

## 2024-12-10 DIAGNOSIS — D64.9 ANEMIA, UNSPECIFIED TYPE: ICD-10-CM

## 2024-12-10 DIAGNOSIS — E78.2 MIXED HYPERLIPIDEMIA: ICD-10-CM

## 2024-12-10 DIAGNOSIS — E11.65 TYPE 2 DIABETES MELLITUS WITH HYPERGLYCEMIA, WITHOUT LONG-TERM CURRENT USE OF INSULIN (HCC): Primary | ICD-10-CM

## 2024-12-10 DIAGNOSIS — N18.9 ACUTE RENAL FAILURE SUPERIMPOSED ON CHRONIC KIDNEY DISEASE, UNSPECIFIED ACUTE RENAL FAILURE TYPE, UNSPECIFIED CKD STAGE (HCC): ICD-10-CM

## 2024-12-10 DIAGNOSIS — K29.50 ANTRAL GASTRITIS: ICD-10-CM

## 2024-12-10 DIAGNOSIS — N17.9 ACUTE RENAL FAILURE SUPERIMPOSED ON CHRONIC KIDNEY DISEASE, UNSPECIFIED ACUTE RENAL FAILURE TYPE, UNSPECIFIED CKD STAGE (HCC): ICD-10-CM

## 2024-12-10 DIAGNOSIS — F33.40 RECURRENT MAJOR DEPRESSIVE DISORDER, IN REMISSION (HCC): ICD-10-CM

## 2024-12-10 DIAGNOSIS — I73.9 PERIPHERAL VASCULAR DISEASE (HCC): ICD-10-CM

## 2024-12-10 DIAGNOSIS — E05.90 HYPERTHYROIDISM: ICD-10-CM

## 2024-12-10 DIAGNOSIS — I10 ESSENTIAL (PRIMARY) HYPERTENSION: ICD-10-CM

## 2024-12-10 LAB — HBA1C MFR BLD: 7.5 %

## 2024-12-10 PROCEDURE — 99214 OFFICE O/P EST MOD 30 MIN: CPT | Performed by: NURSE PRACTITIONER

## 2024-12-10 PROCEDURE — 1159F MED LIST DOCD IN RCRD: CPT | Performed by: NURSE PRACTITIONER

## 2024-12-10 PROCEDURE — 1036F TOBACCO NON-USER: CPT | Performed by: NURSE PRACTITIONER

## 2024-12-10 PROCEDURE — 3046F HEMOGLOBIN A1C LEVEL >9.0%: CPT | Performed by: NURSE PRACTITIONER

## 2024-12-10 PROCEDURE — 1160F RVW MEDS BY RX/DR IN RCRD: CPT | Performed by: NURSE PRACTITIONER

## 2024-12-10 PROCEDURE — 2022F DILAT RTA XM EVC RTNOPTHY: CPT | Performed by: NURSE PRACTITIONER

## 2024-12-10 PROCEDURE — 83036 HEMOGLOBIN GLYCOSYLATED A1C: CPT | Performed by: NURSE PRACTITIONER

## 2024-12-10 PROCEDURE — G8427 DOCREV CUR MEDS BY ELIG CLIN: HCPCS | Performed by: NURSE PRACTITIONER

## 2024-12-10 PROCEDURE — G8482 FLU IMMUNIZE ORDER/ADMIN: HCPCS | Performed by: NURSE PRACTITIONER

## 2024-12-10 PROCEDURE — 3074F SYST BP LT 130 MM HG: CPT | Performed by: NURSE PRACTITIONER

## 2024-12-10 PROCEDURE — 3078F DIAST BP <80 MM HG: CPT | Performed by: NURSE PRACTITIONER

## 2024-12-10 PROCEDURE — 3017F COLORECTAL CA SCREEN DOC REV: CPT | Performed by: NURSE PRACTITIONER

## 2024-12-10 PROCEDURE — G8417 CALC BMI ABV UP PARAM F/U: HCPCS | Performed by: NURSE PRACTITIONER

## 2024-12-10 PROCEDURE — 1123F ACP DISCUSS/DSCN MKR DOCD: CPT | Performed by: NURSE PRACTITIONER

## 2024-12-10 NOTE — PROGRESS NOTES
Chief Complaint   Patient presents with    Diabetes     Follow up     Pt brought in meds went over list in chart, pt confirmed     No other concerns     \"Have you been to the ER, urgent care clinic since your last visit?  Hospitalized since your last visit?\"    NO    “Have you seen or consulted any other health care providers outside our system since your last visit?”    NO             
is soft.      Tenderness: There is no abdominal tenderness.   Musculoskeletal:      Right lower leg: No edema.      Left lower leg: No edema.   Skin:     General: Skin is warm and dry.   Neurological:      Mental Status: He is alert and oriented to person, place, and time. Mental status is at baseline.   Psychiatric:         Mood and Affect: Mood normal.         Behavior: Behavior normal.          Assessment & Plan  1. Type 2 diabetes mellitus with hyperglycemia, without long-term current use of insulin ([facility])  Hemoglobin A1C, POC   Date Value Ref Range Status   12/10/2024 7.5 % Final      Hemoglobin A1C   Date Value Ref Range Status   03/20/2023 10.2 (H) 4.0 - 5.6 % Final     Comment:     NEW METHOD  PLEASE NOTE NEW REFERENCE RANGE  (NOTE)  HbA1C Interpretive Ranges  <5.7              Normal  5.7 - 6.4         Consider Prediabetes  >6.5              Consider Diabetes       Home readings: 115-160  Medication Compliance: yes  Diabetic Eye Exam Up to date:No  Complications: CAD  Current Treatment: Lantus 40 units nightly, ozempic 1mg every 7 days and Jardiance 25mg daily  Stopped glipizide 10mg BID and Januvia per patient on his own in the past.  Previous treatment: Metformin (could not tolerate)     Continue lantus , ozempic and Jardiance.  Encouraged to stay well hydrated and notify provider immediately if signs of  mycotic infection develops.  Check glucose at least daily and bring readings to next appointment.   Notify provider if glucose > 200 or < 70.  Encourage to follow diabetic diet and get regular exercise 3-5 times weekly for 30-45 minutes.  Has contact information as he desires to schedule  at [facility] in [place].  Continuing care with podiatry.  - AMB POC HEMOGLOBIN A1C  - Comprehensive Metabolic Panel  - Urinalysis with Microscopic  - Lipid Panel    2. PVD (peripheral vascular disease) ([facility])  S/p Vascular performed angiogram on 3/22 and performed recanalization of left posterior  tibial artery with

## 2024-12-11 LAB
ALBUMIN SERPL-MCNC: 4.3 G/DL (ref 3.8–4.8)
ALP SERPL-CCNC: 115 IU/L (ref 44–121)
ALT SERPL-CCNC: 17 IU/L (ref 0–44)
AST SERPL-CCNC: 17 IU/L (ref 0–40)
BILIRUB SERPL-MCNC: 0.2 MG/DL (ref 0–1.2)
BUN SERPL-MCNC: 20 MG/DL (ref 8–27)
BUN/CREAT SERPL: 14 (ref 10–24)
CALCIUM SERPL-MCNC: 9.7 MG/DL (ref 8.6–10.2)
CHLORIDE SERPL-SCNC: 103 MMOL/L (ref 96–106)
CHOLEST SERPL-MCNC: 214 MG/DL (ref 100–199)
CO2 SERPL-SCNC: 23 MMOL/L (ref 20–29)
CREAT SERPL-MCNC: 1.45 MG/DL (ref 0.76–1.27)
EGFRCR SERPLBLD CKD-EPI 2021: 52 ML/MIN/1.73
GLOBULIN SER CALC-MCNC: 3.1 G/DL (ref 1.5–4.5)
GLUCOSE SERPL-MCNC: 94 MG/DL (ref 70–99)
HDLC SERPL-MCNC: 83 MG/DL
LDLC SERPL CALC-MCNC: 112 MG/DL (ref 0–99)
POTASSIUM SERPL-SCNC: 4.5 MMOL/L (ref 3.5–5.2)
PROT SERPL-MCNC: 7.4 G/DL (ref 6–8.5)
SODIUM SERPL-SCNC: 141 MMOL/L (ref 134–144)
SPECIMEN STATUS REPORT: NORMAL
TRIGL SERPL-MCNC: 112 MG/DL (ref 0–149)
VLDLC SERPL CALC-MCNC: 19 MG/DL (ref 5–40)

## 2024-12-12 ENCOUNTER — HOSPITAL ENCOUNTER (OUTPATIENT)
Facility: HOSPITAL | Age: 69
Discharge: HOME | End: 2024-12-15
Payer: MEDICARE

## 2024-12-12 PROCEDURE — 76770 US EXAM ABDO BACK WALL COMP: CPT

## 2025-01-03 ENCOUNTER — OFFICE VISIT (OUTPATIENT)
Age: 70
End: 2025-01-03
Payer: MEDICARE

## 2025-01-03 VITALS
HEIGHT: 70 IN | BODY MASS INDEX: 33.14 KG/M2 | DIASTOLIC BLOOD PRESSURE: 70 MMHG | TEMPERATURE: 97.7 F | RESPIRATION RATE: 18 BRPM | WEIGHT: 231.5 LBS | HEART RATE: 77 BPM | OXYGEN SATURATION: 92 % | SYSTOLIC BLOOD PRESSURE: 115 MMHG

## 2025-01-03 DIAGNOSIS — I73.9 PAD (PERIPHERAL ARTERY DISEASE) (HCC): Primary | ICD-10-CM

## 2025-01-03 PROCEDURE — G8417 CALC BMI ABV UP PARAM F/U: HCPCS | Performed by: SURGERY

## 2025-01-03 PROCEDURE — 1036F TOBACCO NON-USER: CPT | Performed by: SURGERY

## 2025-01-03 PROCEDURE — 1126F AMNT PAIN NOTED NONE PRSNT: CPT | Performed by: SURGERY

## 2025-01-03 PROCEDURE — 99212 OFFICE O/P EST SF 10 MIN: CPT | Performed by: SURGERY

## 2025-01-03 PROCEDURE — 1123F ACP DISCUSS/DSCN MKR DOCD: CPT | Performed by: SURGERY

## 2025-01-03 PROCEDURE — 3078F DIAST BP <80 MM HG: CPT | Performed by: SURGERY

## 2025-01-03 PROCEDURE — 3017F COLORECTAL CA SCREEN DOC REV: CPT | Performed by: SURGERY

## 2025-01-03 PROCEDURE — 3074F SYST BP LT 130 MM HG: CPT | Performed by: SURGERY

## 2025-01-03 PROCEDURE — G8427 DOCREV CUR MEDS BY ELIG CLIN: HCPCS | Performed by: SURGERY

## 2025-01-03 ASSESSMENT — PATIENT HEALTH QUESTIONNAIRE - PHQ9
SUM OF ALL RESPONSES TO PHQ QUESTIONS 1-9: 8
9. THOUGHTS THAT YOU WOULD BE BETTER OFF DEAD, OR OF HURTING YOURSELF: NOT AT ALL
SUM OF ALL RESPONSES TO PHQ QUESTIONS 1-9: 8
8. MOVING OR SPEAKING SO SLOWLY THAT OTHER PEOPLE COULD HAVE NOTICED. OR THE OPPOSITE, BEING SO FIGETY OR RESTLESS THAT YOU HAVE BEEN MOVING AROUND A LOT MORE THAN USUAL: SEVERAL DAYS
10. IF YOU CHECKED OFF ANY PROBLEMS, HOW DIFFICULT HAVE THESE PROBLEMS MADE IT FOR YOU TO DO YOUR WORK, TAKE CARE OF THINGS AT HOME, OR GET ALONG WITH OTHER PEOPLE: VERY DIFFICULT
6. FEELING BAD ABOUT YOURSELF - OR THAT YOU ARE A FAILURE OR HAVE LET YOURSELF OR YOUR FAMILY DOWN: SEVERAL DAYS
4. FEELING TIRED OR HAVING LITTLE ENERGY: NEARLY EVERY DAY
SUM OF ALL RESPONSES TO PHQ9 QUESTIONS 1 & 2: 3
SUM OF ALL RESPONSES TO PHQ QUESTIONS 1-9: 8
2. FEELING DOWN, DEPRESSED OR HOPELESS: NEARLY EVERY DAY
7. TROUBLE CONCENTRATING ON THINGS, SUCH AS READING THE NEWSPAPER OR WATCHING TELEVISION: NOT AT ALL
1. LITTLE INTEREST OR PLEASURE IN DOING THINGS: NOT AT ALL
SUM OF ALL RESPONSES TO PHQ QUESTIONS 1-9: 8
5. POOR APPETITE OR OVEREATING: NOT AT ALL
3. TROUBLE FALLING OR STAYING ASLEEP: NOT AT ALL

## 2025-01-03 NOTE — PROGRESS NOTES
Identified patient with two patient identifiers (name and ). Reviewed chart in preparation for visit and have obtained necessary documentation.    Chavez Skelton is a 69 y.o. male  Chief Complaint   Patient presents with    Post-Op Check     Angiography of lower extremities      BP (!) 142/74 (Site: Left Upper Arm, Position: Sitting, Cuff Size: Large Adult)   Pulse 77   Temp 97.7 °F (36.5 °C) (Oral)   Resp 18   Ht 1.778 m (5' 10\")   Wt 105 kg (231 lb 8 oz)   SpO2 92%   BMI 33.22 kg/m²     1. Have you been to the ER, urgent care clinic since your last visit?  Hospitalized since your last visit?no    2. Have you seen or consulted any other health care providers outside of the [facility] since your last visit?  Include any pap smears or colon screening. No    Patient and provider made aware of elevated BP x2. Patient asymptomatic. Patient reminded to monitor BP, continue to take BP medications if prescribed, and follow up with PCP/Cardiologist.  Patient expressed understanding and agreement.

## 2025-01-13 NOTE — PROGRESS NOTES
Vascular History and Physical    Patient: Chavez Skelton  MRN: [id number]    YOB: 1955  Age: 69 y.o.  Sex: male     Chief Complaint:  Chief Complaint   Patient presents with    Post-Op Check     Angiography of lower extremities        History of Present Illness: Chavez Skelton is a 69 y.o. very pleasant gentleman is here today for surveillance vascular examination.  Patient currently doing better.  No particular worsening pain left foot.  Denies any chest pain shortness of breath abdominal pain weight loss    Social History:  Social Connections: Unknown (2/17/2021)    [facility]  (prior to 6/17/2023), [facility]  (prior to 6/17/2023)    Social Connection and Isolation Panel [NHANES]     Frequency of Communication with Friends and Family: Never     Frequency of Social Gatherings with Friends and Family: Not on file     Attends Taoist Services: Not on file     Active Member of Clubs or Organizations: Not on file     Attends Club or Organization Meetings: Not on file     Marital Status: Not on file       Past Medical History:  Past Medical History:   Diagnosis Date    CAD (coronary artery disease)     MI 2017    Essential hypertension     Gastroenteritis 04/22/2023    GERD (gastroesophageal reflux disease)     SUDHIR (iron deficiency anemia) 04/30/2024    Left foot infection 04/30/2023    Mixed hyperlipidemia     Moderate episode of recurrent major depressive disorder (HCC) 2/16/2023    MSSA (methicillin susceptible Staphylococcus aureus) 05/04/2023    Osteomyelitis 3/20/2023    Pneumonia due to COVID-19 virus 2/17/2021    Renal mass, left     Type 2 diabetes mellitus with complication, without long-term current use of insulin (HCC)        Surgical History:  Past Surgical History:   Procedure Laterality Date    CARDIAC CATHETERIZATION      patient stated stents placed     COLONOSCOPY      COLONOSCOPY N/A 01/06/2023    COLONOSCOPY (TIVA) performed by

## 2025-03-10 ENCOUNTER — TELEPHONE (OUTPATIENT)
Facility: CLINIC | Age: 70
End: 2025-03-10

## 2025-03-10 SDOH — HEALTH STABILITY: PHYSICAL HEALTH: ON AVERAGE, HOW MANY MINUTES DO YOU ENGAGE IN EXERCISE AT THIS LEVEL?: 20 MIN

## 2025-03-10 SDOH — HEALTH STABILITY: PHYSICAL HEALTH: ON AVERAGE, HOW MANY DAYS PER WEEK DO YOU ENGAGE IN MODERATE TO STRENUOUS EXERCISE (LIKE A BRISK WALK)?: 2 DAYS

## 2025-03-10 ASSESSMENT — PATIENT HEALTH QUESTIONNAIRE - PHQ9
SUM OF ALL RESPONSES TO PHQ QUESTIONS 1-9: 0
SUM OF ALL RESPONSES TO PHQ QUESTIONS 1-9: 0
7. TROUBLE CONCENTRATING ON THINGS, SUCH AS READING THE NEWSPAPER OR WATCHING TELEVISION: NOT AT ALL
9. THOUGHTS THAT YOU WOULD BE BETTER OFF DEAD, OR OF HURTING YOURSELF: NOT AT ALL
8. MOVING OR SPEAKING SO SLOWLY THAT OTHER PEOPLE COULD HAVE NOTICED. OR THE OPPOSITE, BEING SO FIGETY OR RESTLESS THAT YOU HAVE BEEN MOVING AROUND A LOT MORE THAN USUAL: NOT AT ALL
2. FEELING DOWN, DEPRESSED OR HOPELESS: NOT AT ALL
10. IF YOU CHECKED OFF ANY PROBLEMS, HOW DIFFICULT HAVE THESE PROBLEMS MADE IT FOR YOU TO DO YOUR WORK, TAKE CARE OF THINGS AT HOME, OR GET ALONG WITH OTHER PEOPLE: NOT DIFFICULT AT ALL
3. TROUBLE FALLING OR STAYING ASLEEP: NOT AT ALL
4. FEELING TIRED OR HAVING LITTLE ENERGY: NOT AT ALL
6. FEELING BAD ABOUT YOURSELF - OR THAT YOU ARE A FAILURE OR HAVE LET YOURSELF OR YOUR FAMILY DOWN: NOT AT ALL
SUM OF ALL RESPONSES TO PHQ QUESTIONS 1-9: 0
5. POOR APPETITE OR OVEREATING: NOT AT ALL
SUM OF ALL RESPONSES TO PHQ QUESTIONS 1-9: 0
1. LITTLE INTEREST OR PLEASURE IN DOING THINGS: NOT AT ALL

## 2025-03-10 ASSESSMENT — LIFESTYLE VARIABLES
HOW OFTEN DO YOU HAVE A DRINK CONTAINING ALCOHOL: 1
HOW MANY STANDARD DRINKS CONTAINING ALCOHOL DO YOU HAVE ON A TYPICAL DAY: 0
HOW MANY STANDARD DRINKS CONTAINING ALCOHOL DO YOU HAVE ON A TYPICAL DAY: PATIENT DOES NOT DRINK
HOW OFTEN DO YOU HAVE A DRINK CONTAINING ALCOHOL: NEVER
HOW OFTEN DO YOU HAVE SIX OR MORE DRINKS ON ONE OCCASION: 1

## 2025-03-11 ENCOUNTER — OFFICE VISIT (OUTPATIENT)
Facility: CLINIC | Age: 70
End: 2025-03-11
Payer: MEDICARE

## 2025-03-11 VITALS
BODY MASS INDEX: 32.5 KG/M2 | WEIGHT: 227 LBS | HEART RATE: 85 BPM | TEMPERATURE: 97.8 F | DIASTOLIC BLOOD PRESSURE: 64 MMHG | OXYGEN SATURATION: 93 % | RESPIRATION RATE: 20 BRPM | SYSTOLIC BLOOD PRESSURE: 98 MMHG | HEIGHT: 70 IN

## 2025-03-11 DIAGNOSIS — E11.65 TYPE 2 DIABETES MELLITUS WITH HYPERGLYCEMIA, WITHOUT LONG-TERM CURRENT USE OF INSULIN (HCC): Primary | ICD-10-CM

## 2025-03-11 DIAGNOSIS — E05.90 HYPERTHYROIDISM: ICD-10-CM

## 2025-03-11 DIAGNOSIS — D64.9 ANEMIA, UNSPECIFIED TYPE: ICD-10-CM

## 2025-03-11 DIAGNOSIS — E87.6 HYPOKALEMIA: ICD-10-CM

## 2025-03-11 DIAGNOSIS — F33.40 RECURRENT MAJOR DEPRESSIVE DISORDER, IN REMISSION: ICD-10-CM

## 2025-03-11 DIAGNOSIS — I10 ESSENTIAL (PRIMARY) HYPERTENSION: ICD-10-CM

## 2025-03-11 DIAGNOSIS — Z00.00 MEDICARE ANNUAL WELLNESS VISIT, SUBSEQUENT: ICD-10-CM

## 2025-03-11 DIAGNOSIS — I73.9 PERIPHERAL VASCULAR DISEASE: ICD-10-CM

## 2025-03-11 DIAGNOSIS — E78.2 MIXED HYPERLIPIDEMIA: ICD-10-CM

## 2025-03-11 DIAGNOSIS — K29.50 ANTRAL GASTRITIS: ICD-10-CM

## 2025-03-11 DIAGNOSIS — N18.30 STAGE 3 CHRONIC KIDNEY DISEASE, UNSPECIFIED WHETHER STAGE 3A OR 3B CKD (HCC): ICD-10-CM

## 2025-03-11 LAB — HBA1C MFR BLD: 6.9 %

## 2025-03-11 PROCEDURE — 3074F SYST BP LT 130 MM HG: CPT | Performed by: NURSE PRACTITIONER

## 2025-03-11 PROCEDURE — 1123F ACP DISCUSS/DSCN MKR DOCD: CPT | Performed by: NURSE PRACTITIONER

## 2025-03-11 PROCEDURE — 83036 HEMOGLOBIN GLYCOSYLATED A1C: CPT | Performed by: NURSE PRACTITIONER

## 2025-03-11 PROCEDURE — 1160F RVW MEDS BY RX/DR IN RCRD: CPT | Performed by: NURSE PRACTITIONER

## 2025-03-11 PROCEDURE — 3044F HG A1C LEVEL LT 7.0%: CPT | Performed by: NURSE PRACTITIONER

## 2025-03-11 PROCEDURE — G0439 PPPS, SUBSEQ VISIT: HCPCS | Performed by: NURSE PRACTITIONER

## 2025-03-11 PROCEDURE — 99214 OFFICE O/P EST MOD 30 MIN: CPT | Performed by: NURSE PRACTITIONER

## 2025-03-11 PROCEDURE — 3078F DIAST BP <80 MM HG: CPT | Performed by: NURSE PRACTITIONER

## 2025-03-11 PROCEDURE — 1159F MED LIST DOCD IN RCRD: CPT | Performed by: NURSE PRACTITIONER

## 2025-03-11 RX ORDER — FLUOXETINE HYDROCHLORIDE 40 MG/1
40 CAPSULE ORAL DAILY
Qty: 90 CAPSULE | Refills: 3 | Status: SHIPPED | OUTPATIENT
Start: 2025-03-11

## 2025-03-11 RX ORDER — SEMAGLUTIDE 1.34 MG/ML
1 INJECTION, SOLUTION SUBCUTANEOUS WEEKLY
Qty: 9 ML | Refills: 1 | Status: SHIPPED | OUTPATIENT
Start: 2025-03-11

## 2025-03-11 RX ORDER — PANTOPRAZOLE SODIUM 40 MG/1
40 TABLET, DELAYED RELEASE ORAL DAILY
Qty: 90 TABLET | Refills: 1 | Status: SHIPPED | OUTPATIENT
Start: 2025-03-11

## 2025-03-11 RX ORDER — NIFEDIPINE 30 MG
30 TABLET, EXTENDED RELEASE ORAL DAILY
Qty: 90 TABLET | Refills: 1 | Status: SHIPPED | OUTPATIENT
Start: 2025-03-11

## 2025-03-11 RX ORDER — BUPROPION HYDROCHLORIDE 150 MG/1
150 TABLET, EXTENDED RELEASE ORAL DAILY
Qty: 90 TABLET | Refills: 3 | Status: SHIPPED | OUTPATIENT
Start: 2025-03-11

## 2025-03-11 RX ORDER — POTASSIUM CHLORIDE 1500 MG/1
20 TABLET, EXTENDED RELEASE ORAL 2 TIMES DAILY
Qty: 90 TABLET | Refills: 1 | Status: SHIPPED | OUTPATIENT
Start: 2025-03-11

## 2025-03-11 RX ORDER — LISINOPRIL 20 MG/1
20 TABLET ORAL DAILY
Qty: 90 TABLET | Refills: 1 | Status: SHIPPED | OUTPATIENT
Start: 2025-03-11

## 2025-03-11 RX ORDER — ATORVASTATIN CALCIUM 40 MG/1
40 TABLET, FILM COATED ORAL DAILY
Qty: 90 TABLET | Refills: 1 | Status: SHIPPED | OUTPATIENT
Start: 2025-03-11

## 2025-03-11 RX ORDER — METHIMAZOLE 5 MG/1
5 TABLET ORAL DAILY
Qty: 90 TABLET | Refills: 0 | Status: SHIPPED | OUTPATIENT
Start: 2025-03-11

## 2025-03-11 SDOH — ECONOMIC STABILITY: FOOD INSECURITY: WITHIN THE PAST 12 MONTHS, THE FOOD YOU BOUGHT JUST DIDN'T LAST AND YOU DIDN'T HAVE MONEY TO GET MORE.: SOMETIMES TRUE

## 2025-03-11 SDOH — ECONOMIC STABILITY: FOOD INSECURITY: WITHIN THE PAST 12 MONTHS, YOU WORRIED THAT YOUR FOOD WOULD RUN OUT BEFORE YOU GOT MONEY TO BUY MORE.: SOMETIMES TRUE

## 2025-03-11 NOTE — PROGRESS NOTES
Chief Complaint   Patient presents with    Medicare AW    Diabetes     Wellness    Pt brought in meds went over list in chart, pt confirmed     Pt not checking his sugars daily     \"Have you been to the ER, urgent care clinic since your last visit?  Hospitalized since your last visit?\"    NO    “Have you seen or consulted any other health care providers outside our system since your last visit?”    NO                 
Living Will?: (!) (Proxy-Rptd) No    Intervention:  has NO advanced directive - information provided                 Objective   Vitals:    03/11/25 1029   BP: 98/64   BP Site: Left Upper Arm   Patient Position: Sitting   BP Cuff Size: Large Adult   Pulse: 85   Resp: 20   Temp: 97.8 °F (36.6 °C)   TempSrc: Oral   SpO2: 93%   Weight: 103 kg (227 lb)   Height: 1.778 m (5' 10\")      Body mass index is 32.57 kg/m².               No Known Allergies  Prior to Visit Medications    Medication Sig Taking? Authorizing Provider   Semaglutide, 1 MG/DOSE, (OZEMPIC, 1 MG/DOSE,) 4 MG/3ML SOPN sc injection Inject 1 mg into the skin once a week Yes Chely Meredith APRN - NP   potassium chloride (KLOR-CON M) 20 MEQ extended release tablet Take 1 tablet by mouth 2 times daily Yes Chely Meredith APRN - NP   pantoprazole (PROTONIX) 40 MG tablet Take 1 tablet by mouth daily Yes Chely Meredith APRN - NP   NIFEdipine (ADALAT CC) 30 MG extended release tablet Take 1 tablet by mouth daily Yes Chely Meredith APRN - NP   FLUoxetine (PROZAC) 40 MG capsule Take 1 capsule by mouth daily Yes Chely Meredith APRN - NP   lisinopril (PRINIVIL;ZESTRIL) 20 MG tablet Take 1 tablet by mouth daily Yes Chely Meredith APRN - NP   methIMAzole (TAPAZOLE) 5 MG tablet Take 1 tablet by mouth daily Yes Chely Meredith APRN - NP   empagliflozin (JARDIANCE) 25 MG tablet Take 1 tablet by mouth daily Yes Chely Meredith APRN - NP   atorvastatin (LIPITOR) 40 MG tablet Take 1 tablet by mouth daily Yes Chely Meredith APRN - NP   buPROPion (WELLBUTRIN SR) 150 MG extended release tablet Take 1 tablet by mouth daily Yes Chely Meredith APRN - NP   insulin glargine (LANTUS) 100 UNIT/ML injection vial Inject 40 Units into the skin nightly Yes Provider, MD Melanie   Continuous Glucose Sensor (DEXCOM G7 SENSOR) MISC Use to check glucose 4 times daily  Chely Meredith APRN - NP       CareTeam

## 2025-03-11 NOTE — PATIENT INSTRUCTIONS
if you like to prepare meals, finding a plan that includes daily menus and recipes may be best.  Ask your doctor about other health professionals who can help you achieve your weight-loss goals.  A dietitian can help you make healthy changes in your diet.  An exercise specialist or  can help you develop a safe and effective exercise program.  A counselor or psychiatrist can help you cope with issues such as depression, anxiety, or family problems that can make it hard to focus on weight loss.  Consider joining a support group for people who are trying to lose weight. Your doctor can suggest groups in your area.  Where can you learn more?  Go to https://www.[employer].net/patientEd and enter U357 to learn more about \"Starting a Weight-Loss Plan: Care Instructions.\"  Current as of: April 30, 2024  Content Version: 14.3  © 2024 [employer].   Care instructions adapted under license by [employer]. If you have questions about a medical condition or this instruction, always ask your healthcare professional. [employer], disclaims any warranty or liability for your use of this information.         Advance Directives: Care Instructions  Overview  An advance directive is a legal way to state your wishes at the end of your life. It tells your family and your doctor what to do if you can't say what you want.  There are two main types of advance directives. You can change them any time your wishes change.  Living will.  This form tells your family and your doctor your wishes about life support and other treatment. The form is also called a declaration.  Medical power of .  This form lets you name a person to make treatment decisions for you when you can't speak for yourself. This person is called a health care agent (health care proxy, health care surrogate). The form is also called a durable power of  for health care.  If you do not have an advance directive, decisions

## 2025-03-12 LAB
ALBUMIN SERPL-MCNC: 4.2 G/DL (ref 3.8–4.8)
BASOPHILS # BLD AUTO: 0.1 X10E3/UL (ref 0–0.2)
BASOPHILS NFR BLD AUTO: 1 %
BUN SERPL-MCNC: 23 MG/DL (ref 8–27)
BUN/CREAT SERPL: 16 (ref 10–24)
CALCIUM SERPL-MCNC: 9.6 MG/DL (ref 8.6–10.2)
CHLORIDE SERPL-SCNC: 105 MMOL/L (ref 96–106)
CO2 SERPL-SCNC: 18 MMOL/L (ref 20–29)
CREAT SERPL-MCNC: 1.45 MG/DL (ref 0.76–1.27)
EGFRCR SERPLBLD CKD-EPI 2021: 52 ML/MIN/1.73
EOSINOPHIL # BLD AUTO: 0.3 X10E3/UL (ref 0–0.4)
EOSINOPHIL NFR BLD AUTO: 5 %
ERYTHROCYTE [DISTWIDTH] IN BLOOD BY AUTOMATED COUNT: 12.2 % (ref 11.6–15.4)
GLUCOSE SERPL-MCNC: 126 MG/DL (ref 70–99)
HCT VFR BLD AUTO: 40 % (ref 37.5–51)
HGB BLD-MCNC: 12.8 G/DL (ref 13–17.7)
IMM GRANULOCYTES # BLD AUTO: 0 X10E3/UL (ref 0–0.1)
IMM GRANULOCYTES NFR BLD AUTO: 0 %
LYMPHOCYTES # BLD AUTO: 2.2 X10E3/UL (ref 0.7–3.1)
LYMPHOCYTES NFR BLD AUTO: 33 %
MCH RBC QN AUTO: 28.7 PG (ref 26.6–33)
MCHC RBC AUTO-ENTMCNC: 32 G/DL (ref 31.5–35.7)
MCV RBC AUTO: 90 FL (ref 79–97)
MONOCYTES # BLD AUTO: 0.5 X10E3/UL (ref 0.1–0.9)
MONOCYTES NFR BLD AUTO: 8 %
NEUTROPHILS # BLD AUTO: 3.4 X10E3/UL (ref 1.4–7)
NEUTROPHILS NFR BLD AUTO: 53 %
PHOSPHATE SERPL-MCNC: 4.1 MG/DL (ref 2.8–4.1)
PLATELET # BLD AUTO: 283 X10E3/UL (ref 150–450)
POTASSIUM SERPL-SCNC: 4 MMOL/L (ref 3.5–5.2)
RBC # BLD AUTO: 4.46 X10E6/UL (ref 4.14–5.8)
SODIUM SERPL-SCNC: 140 MMOL/L (ref 134–144)
TSH SERPL DL<=0.005 MIU/L-ACNC: 0.98 UIU/ML (ref 0.45–4.5)
WBC # BLD AUTO: 6.5 X10E3/UL (ref 3.4–10.8)

## 2025-03-18 ENCOUNTER — TELEPHONE (OUTPATIENT)
Facility: CLINIC | Age: 70
End: 2025-03-18

## 2025-03-18 NOTE — TELEPHONE ENCOUNTER
Brijesh, [employer], left message wanting to know if we had received paperwork.  He would like for you to give him a call if you have received it.  [phone]

## 2025-03-24 ENCOUNTER — OFFICE VISIT (OUTPATIENT)
Age: 70
End: 2025-03-24
Payer: MEDICARE

## 2025-03-24 VITALS
HEART RATE: 75 BPM | HEIGHT: 70 IN | DIASTOLIC BLOOD PRESSURE: 79 MMHG | SYSTOLIC BLOOD PRESSURE: 144 MMHG | RESPIRATION RATE: 16 BRPM | BODY MASS INDEX: 32.93 KG/M2 | OXYGEN SATURATION: 98 % | WEIGHT: 230 LBS | TEMPERATURE: 97.6 F

## 2025-03-24 DIAGNOSIS — K57.30 DIVERTICULAR DISEASE OF COLON: ICD-10-CM

## 2025-03-24 DIAGNOSIS — Z86.0100 HISTORY OF COLON POLYPS: ICD-10-CM

## 2025-03-24 DIAGNOSIS — D50.0 IRON DEFICIENCY ANEMIA DUE TO CHRONIC BLOOD LOSS: Primary | ICD-10-CM

## 2025-03-24 PROCEDURE — 3078F DIAST BP <80 MM HG: CPT | Performed by: INTERNAL MEDICINE

## 2025-03-24 PROCEDURE — 3077F SYST BP >= 140 MM HG: CPT | Performed by: INTERNAL MEDICINE

## 2025-03-24 PROCEDURE — 1159F MED LIST DOCD IN RCRD: CPT | Performed by: INTERNAL MEDICINE

## 2025-03-24 PROCEDURE — 1123F ACP DISCUSS/DSCN MKR DOCD: CPT | Performed by: INTERNAL MEDICINE

## 2025-03-24 PROCEDURE — 1160F RVW MEDS BY RX/DR IN RCRD: CPT | Performed by: INTERNAL MEDICINE

## 2025-03-24 PROCEDURE — 99213 OFFICE O/P EST LOW 20 MIN: CPT | Performed by: INTERNAL MEDICINE

## 2025-03-24 ASSESSMENT — ENCOUNTER SYMPTOMS
DIARRHEA: 0
ANAL BLEEDING: 0
BLOOD IN STOOL: 0
RESPIRATORY NEGATIVE: 1
VOMITING: 0
CONSTIPATION: 0
NAUSEA: 0
ALLERGIC/IMMUNOLOGIC NEGATIVE: 1
RECTAL PAIN: 0
ABDOMINAL PAIN: 0
ABDOMINAL DISTENTION: 0

## 2025-03-24 NOTE — PROGRESS NOTES
Chief Complaint   Patient presents with    Follow-up     6 month     \"Have you been to the ER, urgent care clinic since your last visit?  Hospitalized since your last visit?\"    NO    “Have you seen or consulted any other health care providers outside our system since your last visit?”    NO            
colon polyps, and diverticular disease who comes in for follow-up visit for iron deficiency anemia history of colon polyps and diverticular disease.  Patient last had an EGD on 5/22/2024 which showed antral gastritis.  He last had a colonoscopy on 1/6/2023 which showed generalized diverticulosis he is a repeat colonoscopy in 5 years from that time.  Patient states that his abdomen is doing good.  He is has a good appetite, his bowel movements are regular and formed.          Review of Systems   Constitutional: Negative.    HENT:  Negative for nosebleeds.    Respiratory: Negative.     Cardiovascular: Negative.    Gastrointestinal:  Negative for abdominal distention, abdominal pain, anal bleeding, blood in stool, constipation, diarrhea, nausea, rectal pain and vomiting.   Genitourinary: Negative.    Musculoskeletal:  Positive for arthralgias.   Skin: Negative.    Allergic/Immunologic: Negative.    Neurological: Negative.    Hematological: Negative.    Psychiatric/Behavioral: Negative.     All other systems reviewed and are negative.          BP (!) 144/79 (BP Site: Left Upper Arm, Patient Position: Sitting, BP Cuff Size: Large Adult)   Pulse 75   Temp 97.6 °F (36.4 °C) (Temporal)   Resp 16   Ht 1.778 m (5' 10\")   Wt 104.3 kg (230 lb)   SpO2 98%   BMI 33.00 kg/m²     Physical Exam  Vitals and nursing note reviewed.   Constitutional:       Appearance: Normal appearance. He is obese.   HENT:      Head: Normocephalic and atraumatic.      Nose: Nose normal.   Eyes:      General: No scleral icterus.  Cardiovascular:      Rate and Rhythm: Normal rate and regular rhythm.      Pulses: Normal pulses.      Heart sounds: Normal heart sounds.   Pulmonary:      Effort: Pulmonary effort is normal.      Breath sounds: Normal breath sounds.   Abdominal:      General: Abdomen is flat. There is no distension.      Palpations: Abdomen is soft. There is no mass.      Tenderness: There is no abdominal tenderness. There is no right

## 2025-04-07 ENCOUNTER — RESULTS FOLLOW-UP (OUTPATIENT)
Facility: CLINIC | Age: 70
End: 2025-04-07

## 2025-04-26 ENCOUNTER — APPOINTMENT (OUTPATIENT)
Facility: HOSPITAL | Age: 70
End: 2025-04-26
Attending: EMERGENCY MEDICINE
Payer: MEDICARE

## 2025-04-26 ENCOUNTER — HOSPITAL ENCOUNTER (EMERGENCY)
Facility: HOSPITAL | Age: 70
Discharge: HOME | End: 2025-04-26
Attending: EMERGENCY MEDICINE
Payer: MEDICARE

## 2025-04-26 VITALS
RESPIRATION RATE: 17 BRPM | SYSTOLIC BLOOD PRESSURE: 192 MMHG | BODY MASS INDEX: 32.93 KG/M2 | HEART RATE: 76 BPM | WEIGHT: 230 LBS | OXYGEN SATURATION: 100 % | DIASTOLIC BLOOD PRESSURE: 115 MMHG | HEIGHT: 70 IN | TEMPERATURE: 98 F

## 2025-04-26 DIAGNOSIS — S80.01XA CONTUSION OF RIGHT KNEE, INITIAL ENCOUNTER: ICD-10-CM

## 2025-04-26 DIAGNOSIS — M25.461 EFFUSION, RIGHT KNEE: ICD-10-CM

## 2025-04-26 DIAGNOSIS — M25.561 ACUTE PAIN OF RIGHT KNEE: ICD-10-CM
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
## 2025-04-26 DIAGNOSIS — S09.90XA CLOSED HEAD INJURY, INITIAL ENCOUNTER: Primary | ICD-10-CM

## 2025-04-26 PROCEDURE — 70450 CT HEAD/BRAIN W/O DYE: CPT

## 2025-04-26 PROCEDURE — 6370000000 HC RX 637 (ALT 250 FOR IP): Performed by: EMERGENCY MEDICINE

## 2025-04-26 PROCEDURE — 99284 EMERGENCY DEPT VISIT MOD MDM: CPT

## 2025-04-26 PROCEDURE — 73562 X-RAY EXAM OF KNEE 3: CPT

## 2025-04-26 RX ORDER — ACETAMINOPHEN 500 MG
1000 TABLET ORAL
Status: COMPLETED | OUTPATIENT
Start: 2025-04-26 | End: 2025-04-26

## 2025-04-26 RX ADMIN — ACETAMINOPHEN 1000 MG: 500 TABLET ORAL at 12:26

## 2025-04-26 ASSESSMENT — PAIN DESCRIPTION - LOCATION: LOCATION: HEAD

## 2025-04-26 ASSESSMENT — PAIN DESCRIPTION - FREQUENCY: FREQUENCY: CONTINUOUS

## 2025-04-26 ASSESSMENT — PAIN SCALES - GENERAL: PAINLEVEL_OUTOF10: 6

## 2025-04-26 ASSESSMENT — PAIN - FUNCTIONAL ASSESSMENT: PAIN_FUNCTIONAL_ASSESSMENT: 0-10

## 2025-04-26 ASSESSMENT — PAIN DESCRIPTION - ONSET: ONSET: PROGRESSIVE

## 2025-04-26 ASSESSMENT — PAIN DESCRIPTION - DESCRIPTORS: DESCRIPTORS: ACHING

## 2025-04-26 ASSESSMENT — PAIN DESCRIPTION - ORIENTATION: ORIENTATION: POSTERIOR

## 2025-04-26 ASSESSMENT — PAIN DESCRIPTION - PAIN TYPE: TYPE: ACUTE PAIN

## 2025-04-26 NOTE — ED TRIAGE NOTES
Pt reports he fell on his right side after slipping on wet paint. Pt reports pain to right knee and back of the right side of his head. PT Denies LOC, unsure if he takes a blood thinner.

## 2025-04-26 NOTE — DISCHARGE INSTRUCTIONS
Thank you for choosing our Emergency Department for your care.  It is our privilege to care for you in your time of need.  In the next several days, you may receive a survey via email or mailed to your home about your experience with our team.  We would greatly appreciate you taking a few minutes to complete the survey, as we use this information to learn what we have done well and what we could be doing better. Thank you for trusting us with your care!    Below you will find a list of your tests from today's visit.   Labs and Radiology Studies  No results found for this or any previous visit (from the past 12 hours).  CT HEAD WO CONTRAST  Result Date: 4/26/2025  EXAM: CT HEAD WO CONTRAST INDICATION: slipped and fell backwards, hitting occiput, no true LOC, HA reports being on blood thinner but unclear COMPARISON: None. CONTRAST: None. TECHNIQUE: Unenhanced CT of the head was performed using 5 mm images. Brain and bone windows were generated. Coronal and sagittal reformats. CT dose reduction was achieved through use of a standardized protocol tailored for this examination and automatic exposure control for dose modulation.  FINDINGS: The ventricles and sulci are normal in size, shape and configuration. White matter hypodensities may reflect chronic microangiopathic change. Bilateral basal ganglia calcifications. There is no intracranial hemorrhage, extra-axial collection, or mass effect. The basilar cisterns are open. No CT evidence of acute infarct. The bone windows demonstrate no abnormalities. Right maxillary sinus mucous retention cyst. Mastoid air cells are clear. Bilateral lens replacements.     No acute abnormality. Electronically signed by SUZANNA MARIN    XR KNEE RIGHT (3 VIEWS)  Result Date: 4/26/2025  EXAM: XR KNEE RIGHT (3 VIEWS) INDICATION: fell, hit anterior right knee. COMPARISON: None. FINDINGS: Three views of the right knee demonstrate no fracture or other acute osseous or articular abnormality.

## 2025-04-26 NOTE — ED PROVIDER NOTES
[facility] EMERGENCY DEPT  EMERGENCY DEPARTMENT HISTORY AND PHYSICAL EXAM      Date of evaluation: 4/26/2025  Patient Name: Chavez Skelton  Birthdate 1955  MRN: [id number]  ED Provider: Yary Taylor MD   Note Started: 11:58 AM EDT 4/26/25    HISTORY OF PRESENT ILLNESS     Chief Complaint   Patient presents with    Fall       History Provided By: Patient, only     HPI: Chavez Skelton is a 69 y.o. male presents via EMS after slipping on a newly painted sidewalk falling backwards hitting the back of his head without LOS but felt dazed for a period of time but now feels back to baseline with mild headache.  Patient believes he is on blood thinners.  Patient also reports hitting his right knee and having pain but is able to bear weight.  PAST MEDICAL HISTORY   Past Medical History:  Past Medical History:   Diagnosis Date    CAD (coronary artery disease)     MI 2017    Essential hypertension     Gastroenteritis 04/22/2023    GERD (gastroesophageal reflux disease)     SUDHIR (iron deficiency anemia) 04/30/2024    Left foot infection 04/30/2023    Mixed hyperlipidemia     Moderate episode of recurrent major depressive disorder (HCC) 2/16/2023    MSSA (methicillin susceptible Staphylococcus aureus) 05/04/2023    Osteomyelitis (HCC) 3/20/2023    Pneumonia due to COVID-19 virus 2/17/2021    Renal mass, left     Type 2 diabetes mellitus with complication, without long-term current use of insulin ([facility])        Past Surgical History:  Past Surgical History:   Procedure Laterality Date    CARDIAC CATHETERIZATION      patient stated stents placed     COLONOSCOPY      COLONOSCOPY N/A 01/06/2023    COLONOSCOPY (TIVA) performed by Gissell Newman MD at [facility] ENDOSCOPY    CORONARY ANGIOPLASTY WITH STENT PLACEMENT  2018    x1    INVASIVE VASCULAR N/A 11/12/2024    Angiography lower ext right performed by Dony García MD at [facility] ELECTROPHYSIOLOGY    INVASIVE VASCULAR N/A 11/12/2024    Ultrasound guided vascular access performed by Ricky  Dony Rowland MD at [facility] ELECTROPHYSIOLOGY    INVASIVE VASCULAR N/A 11/12/2024    Aortagram abdominal performed by Dony aGrcía MD at [facility] ELECTROPHYSIOLOGY    INVASIVE VASCULAR N/A 11/12/2024    Angioplasty ant tib artery performed by Dony García MD at [facility] ELECTROPHYSIOLOGY    KIDNEY REMOVAL Left 08/23/2021    robotic left partial nephrectomy,    MD UNLISTED PROCEDURE CARDIAC SURGERY  2018    BYPASS    TOE AMPUTATION Left     UPPER GASTROINTESTINAL ENDOSCOPY N/A 5/22/2024    ESOPHAGOGASTRODUODENOSCOPY BIOPSY performed by Gissell Newman Jr., MD at [facility] ENDOSCOPY    UROLOGICAL SURGERY  08/23/2021     intraoperative renal ultrasound       Family History:  Family History   Problem Relation Age of Onset    Heart Disease Father     Hypertension Father     Hypertension Mother     Diabetes Mother        Social History:  Social History     Tobacco Use    Smoking status: Never    Smokeless tobacco: Never   Vaping Use    Vaping status: Former   Substance Use Topics    Alcohol use: Not Currently    Drug use: Yes     Types: Marijuana (Weed)     Comment: yesterday       Allergies:  No Known Allergies    PCP: Chely Meredith APRN - NP    Current Meds:   Current Facility-Administered Medications   Medication Dose Route Frequency Provider Last Rate Last Admin    acetaminophen (TYLENOL) tablet 1,000 mg  1,000 mg Oral NOW Yary Taylor MD         Current Outpatient Medications   Medication Sig Dispense Refill    Semaglutide, 1 MG/DOSE, (OZEMPIC, 1 MG/DOSE,) 4 MG/3ML SOPN sc injection Inject 1 mg into the skin once a week 9 mL 1    potassium chloride (KLOR-CON M) 20 MEQ extended release tablet Take 1 tablet by mouth 2 times daily 90 tablet 1    pantoprazole (PROTONIX) 40 MG tablet Take 1 tablet by mouth daily 90 tablet 1    NIFEdipine (ADALAT CC) 30 MG extended release tablet Take 1 tablet by mouth daily 90 tablet 1    FLUoxetine (PROZAC) 40 MG capsule Take 1 capsule by mouth daily 90 capsule 3    lisinopril

## 2025-04-27 ENCOUNTER — HOSPITAL ENCOUNTER (EMERGENCY)
Facility: HOSPITAL | Age: 70
Discharge: HOME | End: 2025-04-27
Attending: EMERGENCY MEDICINE
Payer: MEDICARE

## 2025-04-27 VITALS
HEART RATE: 78 BPM | TEMPERATURE: 97.9 F | BODY MASS INDEX: 32.93 KG/M2 | WEIGHT: 230 LBS | OXYGEN SATURATION: 95 % | RESPIRATION RATE: 17 BRPM | HEIGHT: 70 IN | DIASTOLIC BLOOD PRESSURE: 79 MMHG | SYSTOLIC BLOOD PRESSURE: 170 MMHG

## 2025-04-27 DIAGNOSIS — M25.569 ACUTE KNEE PAIN, UNSPECIFIED LATERALITY: Primary | ICD-10-CM

## 2025-04-27 PROCEDURE — 99283 EMERGENCY DEPT VISIT LOW MDM: CPT

## 2025-04-27 ASSESSMENT — PAIN SCALES - GENERAL: PAINLEVEL_OUTOF10: 8

## 2025-04-27 ASSESSMENT — PAIN DESCRIPTION - ORIENTATION: ORIENTATION: RIGHT

## 2025-04-27 ASSESSMENT — PAIN - FUNCTIONAL ASSESSMENT: PAIN_FUNCTIONAL_ASSESSMENT: 0-10

## 2025-04-27 ASSESSMENT — PAIN DESCRIPTION - LOCATION: LOCATION: KNEE

## 2025-04-27 ASSESSMENT — PAIN DESCRIPTION - PAIN TYPE: TYPE: ACUTE PAIN

## 2025-04-27 ASSESSMENT — PAIN DESCRIPTION - ONSET: ONSET: ON-GOING

## 2025-04-27 ASSESSMENT — PAIN DESCRIPTION - FREQUENCY: FREQUENCY: CONTINUOUS

## 2025-04-27 NOTE — ED TRIAGE NOTES
Pt reports seen yesterday for fall. PT reports ongoing knee pain, reports he has not been taking tylenol or motrin for pain control as instructed. PT ambulatory to exam room with no difficulty.

## 2025-04-27 NOTE — ED PROVIDER NOTES
25 MG tablet Take 1 tablet by mouth daily 90 tablet 1    atorvastatin (LIPITOR) 40 MG tablet Take 1 tablet by mouth daily 90 tablet 1    buPROPion (WELLBUTRIN SR) 150 MG extended release tablet Take 1 tablet by mouth daily 90 tablet 3    insulin glargine (LANTUS) 100 UNIT/ML injection vial Inject 40 Units into the skin nightly         Social Determinants of Health:   Social Drivers of Health     Tobacco Use: Low Risk  (4/27/2025)    Patient History     Smoking Tobacco Use: Never     Smokeless Tobacco Use: Never     Passive Exposure: Not on file   Alcohol Use: Not At Risk (4/27/2025)    AUDIT-C     Frequency of Alcohol Consumption: Never     Average Number of Drinks: Patient does not drink     Frequency of Binge Drinking: Never   Financial Resource Strain: Low Risk  (6/6/2024)    Overall Financial Resource Strain (CARDIA)     Difficulty of Paying Living Expenses: Not hard at all   Food Insecurity: Food Insecurity Present (3/11/2025)    Hunger Vital Sign     Worried About Running Out of Food in the Last Year: Sometimes true     Ran Out of Food in the Last Year: Sometimes true   Transportation Needs: No Transportation Needs (3/11/2025)    PRAPARE - Transportation     Lack of Transportation (Medical): No     Lack of Transportation (Non-Medical): No   Physical Activity: Insufficiently Active (3/10/2025)    Exercise Vital Sign     Days of Exercise per Week: 2 days     Minutes of Exercise per Session: 20 min   Stress: No Stress Concern Present (2/17/2021)    Received from [facility]  (prior to 6/17/2023), [facility]  (prior to 6/17/2023)    [employer] - Occupational Stress Questionnaire     Feeling of Stress : Not at all   Social Connections: Unknown (2/17/2021)    Received from [facility]  (prior to 6/17/2023), [facility]  (prior to 6/17/2023)    Social Connection and Isolation Panel [NHANES]     Frequency of Communication

## 2025-07-01 ENCOUNTER — APPOINTMENT (OUTPATIENT)
Facility: HOSPITAL | Age: 70
End: 2025-07-01
Attending: EMERGENCY MEDICINE
Payer: MEDICARE

## 2025-07-01 ENCOUNTER — HOSPITAL ENCOUNTER (INPATIENT)
Facility: HOSPITAL | Age: 70
LOS: 8 days | Discharge: HOME | DRG: 239 | End: 2025-07-10
Attending: INTERNAL MEDICINE | Admitting: INTERNAL MEDICINE
Payer: MEDICARE

## 2025-07-01 ENCOUNTER — HOSPITAL ENCOUNTER (EMERGENCY)
Facility: HOSPITAL | Age: 70
Discharge: ANOTHER HOSPITAL | End: 2025-07-01
Attending: EMERGENCY MEDICINE
Payer: MEDICARE

## 2025-07-01 VITALS
SYSTOLIC BLOOD PRESSURE: 195 MMHG | TEMPERATURE: 98.3 F | OXYGEN SATURATION: 95 % | HEART RATE: 91 BPM | WEIGHT: 226 LBS | BODY MASS INDEX: 30.61 KG/M2 | RESPIRATION RATE: 16 BRPM | HEIGHT: 72 IN | DIASTOLIC BLOOD PRESSURE: 90 MMHG

## 2025-07-01 DIAGNOSIS — L97.509 DIABETIC FOOT ULCER WITH OSTEOMYELITIS (HCC): ICD-10-CM

## 2025-07-01 DIAGNOSIS — I96 GANGRENE (HCC): ICD-10-CM

## 2025-07-01 DIAGNOSIS — E11.621 DIABETIC FOOT ULCER WITH OSTEOMYELITIS (HCC): ICD-10-CM

## 2025-07-01 DIAGNOSIS — M86.9 DIABETIC FOOT ULCER WITH OSTEOMYELITIS (HCC): ICD-10-CM

## 2025-07-01 DIAGNOSIS — M86.171 OTHER ACUTE OSTEOMYELITIS OF RIGHT FOOT (HCC): Primary | ICD-10-CM

## 2025-07-01 DIAGNOSIS — I73.9 PVD (PERIPHERAL VASCULAR DISEASE): ICD-10-CM

## 2025-07-01 DIAGNOSIS — E11.69 DIABETIC FOOT ULCER WITH OSTEOMYELITIS (HCC): ICD-10-CM

## 2025-07-01 LAB
ALBUMIN SERPL-MCNC: 2.2 G/DL (ref 3.5–5)
ALBUMIN/GLOB SERPL: 0.4 (ref 1.1–2.2)
ALP SERPL-CCNC: 100 U/L (ref 45–117)
ALT SERPL-CCNC: 18 U/L (ref 12–78)
ANION GAP SERPL CALC-SCNC: 10 MMOL/L (ref 2–12)
AST SERPL W P-5'-P-CCNC: 15 U/L (ref 15–37)
BASOPHILS # BLD: 0.07 K/UL (ref 0–0.1)
BASOPHILS NFR BLD: 0.4 % (ref 0–1)
BILIRUB SERPL-MCNC: 0.2 MG/DL (ref 0.2–1)
BUN SERPL-MCNC: 23 MG/DL (ref 6–20)
BUN/CREAT SERPL: 13 (ref 12–20)
CA-I BLD-MCNC: 9.2 MG/DL (ref 8.5–10.1)
CHLORIDE SERPL-SCNC: 102 MMOL/L (ref 97–108)
CO2 SERPL-SCNC: 24 MMOL/L (ref 21–32)
CREAT SERPL-MCNC: 1.81 MG/DL (ref 0.7–1.3)
DIFFERENTIAL METHOD BLD: ABNORMAL
EOSINOPHIL # BLD: 0.11 K/UL (ref 0–0.4)
EOSINOPHIL NFR BLD: 0.6 % (ref 0–7)
ERYTHROCYTE [DISTWIDTH] IN BLOOD BY AUTOMATED COUNT: 13.1 % (ref 11.5–14.5)
ERYTHROCYTE [SEDIMENTATION RATE] IN BLOOD: 100 MM/HR
GLOBULIN SER CALC-MCNC: 5.6 G/DL (ref 2–4)
GLUCOSE BLD STRIP.AUTO-MCNC: 76 MG/DL (ref 65–100)
GLUCOSE SERPL-MCNC: 187 MG/DL (ref 65–100)
HCT VFR BLD AUTO: 33.2 % (ref 36.6–50.3)
HGB BLD-MCNC: 10.5 G/DL (ref 12.1–17)
IMM GRANULOCYTES # BLD AUTO: 0.21 K/UL (ref 0–0.04)
IMM GRANULOCYTES NFR BLD AUTO: 1.2 % (ref 0–0.5)
LACTATE SERPL-SCNC: 1.7 MMOL/L (ref 0.4–2)
LYMPHOCYTES # BLD: 1.86 K/UL (ref 0.8–3.5)
LYMPHOCYTES NFR BLD: 10.8 % (ref 12–49)
MCH RBC QN AUTO: 28.1 PG (ref 26–34)
MCHC RBC AUTO-ENTMCNC: 31.6 G/DL (ref 30–36.5)
MCV RBC AUTO: 88.8 FL (ref 80–99)
MONOCYTES # BLD: 1.45 K/UL (ref 0–1)
MONOCYTES NFR BLD: 8.4 % (ref 5–13)
NEUTS SEG # BLD: 13.47 K/UL (ref 1.8–8)
NEUTS SEG NFR BLD: 78.6 % (ref 32–75)
NRBC # BLD: 0 K/UL (ref 0–0.01)
NRBC BLD-RTO: 0 PER 100 WBC
PERFORMED BY:: NORMAL
PLATELET # BLD AUTO: 305 K/UL (ref 150–400)
PMV BLD AUTO: 9.6 FL (ref 8.9–12.9)
POTASSIUM SERPL-SCNC: 3.2 MMOL/L (ref 3.5–5.1)
PROT SERPL-MCNC: 7.8 G/DL (ref 6.4–8.2)
RBC # BLD AUTO: 3.74 M/UL (ref 4.1–5.7)
SODIUM SERPL-SCNC: 136 MMOL/L (ref 136–145)
WBC # BLD AUTO: 17.2 K/UL (ref 4.1–11.1)

## 2025-07-01 PROCEDURE — 99285 EMERGENCY DEPT VISIT HI MDM: CPT

## 2025-07-01 PROCEDURE — 80053 COMPREHEN METABOLIC PANEL: CPT

## 2025-07-01 PROCEDURE — 2580000003 HC RX 258: Performed by: EMERGENCY MEDICINE

## 2025-07-01 PROCEDURE — 71046 X-RAY EXAM CHEST 2 VIEWS: CPT

## 2025-07-01 PROCEDURE — 85025 COMPLETE CBC W/AUTO DIFF WBC: CPT

## 2025-07-01 PROCEDURE — 93005 ELECTROCARDIOGRAM TRACING: CPT | Performed by: EMERGENCY MEDICINE

## 2025-07-01 PROCEDURE — 96367 TX/PROPH/DG ADDL SEQ IV INF: CPT

## 2025-07-01 PROCEDURE — 93922 UPR/L XTREMITY ART 2 LEVELS: CPT

## 2025-07-01 PROCEDURE — 82962 GLUCOSE BLOOD TEST: CPT

## 2025-07-01 PROCEDURE — 73630 X-RAY EXAM OF FOOT: CPT

## 2025-07-01 PROCEDURE — 96365 THER/PROPH/DIAG IV INF INIT: CPT

## 2025-07-01 PROCEDURE — 96366 THER/PROPH/DIAG IV INF ADDON: CPT

## 2025-07-01 PROCEDURE — 6360000002 HC RX W HCPCS: Performed by: EMERGENCY MEDICINE

## 2025-07-01 PROCEDURE — 6370000000 HC RX 637 (ALT 250 FOR IP): Performed by: NURSE PRACTITIONER

## 2025-07-01 PROCEDURE — 83605 ASSAY OF LACTIC ACID: CPT

## 2025-07-01 PROCEDURE — 85652 RBC SED RATE AUTOMATED: CPT

## 2025-07-01 PROCEDURE — 87040 BLOOD CULTURE FOR BACTERIA: CPT

## 2025-07-01 PROCEDURE — G0378 HOSPITAL OBSERVATION PER HR: HCPCS

## 2025-07-01 RX ORDER — SODIUM CHLORIDE 9 MG/ML
INJECTION, SOLUTION INTRAVENOUS PRN
Status: DISCONTINUED | OUTPATIENT
Start: 2025-07-01 | End: 2025-07-10 | Stop reason: HOSPADM

## 2025-07-01 RX ORDER — GLUCAGON 1 MG/ML
1 KIT INJECTION PRN
Status: DISCONTINUED | OUTPATIENT
Start: 2025-07-01 | End: 2025-07-10 | Stop reason: HOSPADM

## 2025-07-01 RX ORDER — POLYETHYLENE GLYCOL 3350 17 G/17G
17 POWDER, FOR SOLUTION ORAL DAILY PRN
Status: DISCONTINUED | OUTPATIENT
Start: 2025-07-01 | End: 2025-07-10 | Stop reason: HOSPADM

## 2025-07-01 RX ORDER — OXYCODONE HYDROCHLORIDE 10 MG/1
10 TABLET ORAL EVERY 6 HOURS PRN
Refills: 0 | Status: DISCONTINUED | OUTPATIENT
Start: 2025-07-01 | End: 2025-07-10 | Stop reason: HOSPADM

## 2025-07-01 RX ORDER — POTASSIUM CHLORIDE 1500 MG/1
40 TABLET, EXTENDED RELEASE ORAL ONCE
Status: DISCONTINUED | OUTPATIENT
Start: 2025-07-01 | End: 2025-07-10 | Stop reason: HOSPADM

## 2025-07-01 RX ORDER — 0.9 % SODIUM CHLORIDE 0.9 %
30 INTRAVENOUS SOLUTION INTRAVENOUS ONCE
Status: COMPLETED | OUTPATIENT
Start: 2025-07-01 | End: 2025-07-01

## 2025-07-01 RX ORDER — NIFEDIPINE 30 MG/1
30 TABLET, EXTENDED RELEASE ORAL DAILY
Status: DISCONTINUED | OUTPATIENT
Start: 2025-07-02 | End: 2025-07-10 | Stop reason: HOSPADM

## 2025-07-01 RX ORDER — ATORVASTATIN CALCIUM 40 MG/1
40 TABLET, FILM COATED ORAL DAILY
Status: DISCONTINUED | OUTPATIENT
Start: 2025-07-02 | End: 2025-07-10 | Stop reason: HOSPADM

## 2025-07-01 RX ORDER — ACETAMINOPHEN 650 MG/1
650 SUPPOSITORY RECTAL EVERY 6 HOURS PRN
Status: DISCONTINUED | OUTPATIENT
Start: 2025-07-01 | End: 2025-07-10 | Stop reason: HOSPADM

## 2025-07-01 RX ORDER — INSULIN LISPRO 100 [IU]/ML
0-4 INJECTION, SOLUTION INTRAVENOUS; SUBCUTANEOUS
Status: DISCONTINUED | OUTPATIENT
Start: 2025-07-01 | End: 2025-07-10 | Stop reason: HOSPADM

## 2025-07-01 RX ORDER — ONDANSETRON 2 MG/ML
4 INJECTION INTRAMUSCULAR; INTRAVENOUS EVERY 6 HOURS PRN
Status: DISCONTINUED | OUTPATIENT
Start: 2025-07-01 | End: 2025-07-10 | Stop reason: HOSPADM

## 2025-07-01 RX ORDER — SODIUM CHLORIDE 0.9 % (FLUSH) 0.9 %
5-40 SYRINGE (ML) INJECTION PRN
Status: DISCONTINUED | OUTPATIENT
Start: 2025-07-01 | End: 2025-07-10 | Stop reason: HOSPADM

## 2025-07-01 RX ORDER — METHIMAZOLE 5 MG/1
5 TABLET ORAL DAILY
Status: DISCONTINUED | OUTPATIENT
Start: 2025-07-02 | End: 2025-07-10 | Stop reason: HOSPADM

## 2025-07-01 RX ORDER — BUPROPION HYDROCHLORIDE 150 MG/1
150 TABLET, EXTENDED RELEASE ORAL DAILY
Status: DISCONTINUED | OUTPATIENT
Start: 2025-07-02 | End: 2025-07-10 | Stop reason: HOSPADM

## 2025-07-01 RX ORDER — POTASSIUM CHLORIDE 7.45 MG/ML
10 INJECTION INTRAVENOUS PRN
Status: DISCONTINUED | OUTPATIENT
Start: 2025-07-01 | End: 2025-07-10 | Stop reason: HOSPADM

## 2025-07-01 RX ORDER — MAGNESIUM SULFATE IN WATER 40 MG/ML
2000 INJECTION, SOLUTION INTRAVENOUS PRN
Status: DISCONTINUED | OUTPATIENT
Start: 2025-07-01 | End: 2025-07-10 | Stop reason: HOSPADM

## 2025-07-01 RX ORDER — SODIUM CHLORIDE 0.9 % (FLUSH) 0.9 %
5-40 SYRINGE (ML) INJECTION EVERY 12 HOURS SCHEDULED
Status: DISCONTINUED | OUTPATIENT
Start: 2025-07-01 | End: 2025-07-10 | Stop reason: HOSPADM

## 2025-07-01 RX ORDER — ONDANSETRON 4 MG/1
4 TABLET, ORALLY DISINTEGRATING ORAL EVERY 8 HOURS PRN
Status: DISCONTINUED | OUTPATIENT
Start: 2025-07-01 | End: 2025-07-10 | Stop reason: HOSPADM

## 2025-07-01 RX ORDER — POTASSIUM CHLORIDE 1500 MG/1
40 TABLET, EXTENDED RELEASE ORAL PRN
Status: DISCONTINUED | OUTPATIENT
Start: 2025-07-01 | End: 2025-07-10 | Stop reason: HOSPADM

## 2025-07-01 RX ORDER — SODIUM CHLORIDE 9 MG/ML
INJECTION, SOLUTION INTRAVENOUS CONTINUOUS
Status: DISPENSED | OUTPATIENT
Start: 2025-07-02 | End: 2025-07-02

## 2025-07-01 RX ORDER — POTASSIUM CHLORIDE 1500 MG/1
20 TABLET, EXTENDED RELEASE ORAL 2 TIMES DAILY
Status: DISCONTINUED | OUTPATIENT
Start: 2025-07-01 | End: 2025-07-10 | Stop reason: HOSPADM

## 2025-07-01 RX ORDER — SENNA AND DOCUSATE SODIUM 50; 8.6 MG/1; MG/1
1 TABLET, FILM COATED ORAL 2 TIMES DAILY
Status: DISCONTINUED | OUTPATIENT
Start: 2025-07-01 | End: 2025-07-10 | Stop reason: HOSPADM

## 2025-07-01 RX ORDER — DEXTROSE MONOHYDRATE 100 MG/ML
INJECTION, SOLUTION INTRAVENOUS CONTINUOUS PRN
Status: DISCONTINUED | OUTPATIENT
Start: 2025-07-01 | End: 2025-07-10 | Stop reason: HOSPADM

## 2025-07-01 RX ORDER — LISINOPRIL 20 MG/1
20 TABLET ORAL DAILY
Status: DISCONTINUED | OUTPATIENT
Start: 2025-07-02 | End: 2025-07-10 | Stop reason: HOSPADM

## 2025-07-01 RX ORDER — ACETAMINOPHEN 325 MG/1
650 TABLET ORAL EVERY 6 HOURS PRN
Status: DISCONTINUED | OUTPATIENT
Start: 2025-07-01 | End: 2025-07-10 | Stop reason: HOSPADM

## 2025-07-01 RX ORDER — PANTOPRAZOLE SODIUM 40 MG/1
40 TABLET, DELAYED RELEASE ORAL DAILY
Status: DISCONTINUED | OUTPATIENT
Start: 2025-07-02 | End: 2025-07-10 | Stop reason: HOSPADM

## 2025-07-01 RX ORDER — OXYCODONE HYDROCHLORIDE 5 MG/1
5 TABLET ORAL EVERY 6 HOURS PRN
Refills: 0 | Status: DISCONTINUED | OUTPATIENT
Start: 2025-07-01 | End: 2025-07-02

## 2025-07-01 RX ORDER — INSULIN GLARGINE 100 [IU]/ML
40 INJECTION, SOLUTION SUBCUTANEOUS NIGHTLY
Status: DISCONTINUED | OUTPATIENT
Start: 2025-07-01 | End: 2025-07-10 | Stop reason: HOSPADM

## 2025-07-01 RX ADMIN — PIPERACILLIN AND TAZOBACTAM 4500 MG: 4; .5 INJECTION, POWDER, FOR SOLUTION INTRAVENOUS at 13:50

## 2025-07-01 RX ADMIN — SODIUM CHLORIDE 1250 MG: 0.9 INJECTION, SOLUTION INTRAVENOUS at 14:24

## 2025-07-01 RX ADMIN — OXYCODONE 5 MG: 5 TABLET ORAL at 23:15

## 2025-07-01 RX ADMIN — SODIUM CHLORIDE 3000 ML: 0.9 INJECTION, SOLUTION INTRAVENOUS at 13:51

## 2025-07-01 RX ADMIN — VANCOMYCIN HYDROCHLORIDE 500 MG: 500 INJECTION, POWDER, LYOPHILIZED, FOR SOLUTION INTRAVENOUS at 16:28

## 2025-07-01 ASSESSMENT — PAIN DESCRIPTION - LOCATION
LOCATION: TOE (COMMENT WHICH ONE)
LOCATION: LEG

## 2025-07-01 ASSESSMENT — PAIN - FUNCTIONAL ASSESSMENT: PAIN_FUNCTIONAL_ASSESSMENT: 0-10

## 2025-07-01 ASSESSMENT — PAIN SCALES - WONG BAKER: WONGBAKER_NUMERICALRESPONSE: HURTS A LITTLE BIT

## 2025-07-01 ASSESSMENT — PAIN DESCRIPTION - DESCRIPTORS: DESCRIPTORS: ACHING

## 2025-07-01 ASSESSMENT — PAIN SCALES - GENERAL
PAINLEVEL_OUTOF10: 8
PAINLEVEL_OUTOF10: 2
PAINLEVEL_OUTOF10: 7

## 2025-07-01 ASSESSMENT — PAIN DESCRIPTION - ORIENTATION
ORIENTATION: RIGHT
ORIENTATION: RIGHT

## 2025-07-01 NOTE — ED TRIAGE NOTES
Pt presents with rt toes 2-4 infected- discolored. 3rd toe appears black- odorous. States he has not seen his provider for several months . States he is having a hard time

## 2025-07-01 NOTE — ED PROVIDER NOTES
[facility] EMERGENCY DEPT  EMERGENCY DEPARTMENT HISTORY AND PHYSICAL EXAM      Date of evaluation: 7/1/2025  Patient Name: Chavez Skelton  Birthdate 1955  MRN: [id number]  ED Provider: Ramon Patino MD   Note Started: 6:18 PM EDT 7/1/25    HISTORY OF PRESENT ILLNESS     Chief Complaint   Patient presents with    Wound Infection       History Provided By: Patient, EMS     HPI: Chavez Skelton is a 70 y.o. male This note was generated using [employer] AI software. If any parties were recorded to generate this note outside of the software user, their consent was obtained prior to recording their voices. All portions of this note were verified by the clinician prior to inclusion in the medical record.    Chief Complaint  - Suspected infection of right toes 2 through 4, possible gangrene  - Pain in the affected leg, described as \"not no deep pain, but pain been going through it\"    History of Present Illness  Mr. Lisandro Skelton, a 70-year-old male with a history of diabetes, heart attack, and open-heart surgery, presents with suspected infection and possible gangrene of the right toes 2 through 4. The patient reports noticing the issue about a week or two ago but delayed seeking medical attention due to fear of hospitalization and potential amputation.    The patient expresses concern about losing his foot or toes, mentioning he has already had one toe amputated in the past. He reports experiencing intermittent, non-constant pain in the affected leg. Mr. Skelton lives alone and states he had no one to encourage him to seek medical care earlier. He acknowledges his diabetes as a contributing factor to his current condition.    The patient has not been checking his blood sugar levels regularly. He mentions having multiple hospital stays in the past and expresses anxiety about the possibility of further amputations. Mr. Skelton has not seen a healthcare provider for several months prior to this visit.    Review

## 2025-07-01 NOTE — ED NOTES
At time of dc- pt was attempting to use urinal- spilled some urine- became frustrated- stood at bedside to finish. Offered to help pt take off wet pants and place in gown- pt refused- offered paper scrubs pt refused. States he is staying in his clothes. [facility] at bedside. Pt agitated- but apologized later on way out

## 2025-07-02 ENCOUNTER — ANESTHESIA (OUTPATIENT)
Facility: HOSPITAL | Age: 70
End: 2025-07-02
Payer: MEDICARE

## 2025-07-02 ENCOUNTER — ANESTHESIA EVENT (OUTPATIENT)
Facility: HOSPITAL | Age: 70
End: 2025-07-02
Payer: MEDICARE

## 2025-07-02 LAB
ALBUMIN SERPL-MCNC: 2 G/DL (ref 3.5–5)
ALBUMIN/GLOB SERPL: 0.4 (ref 1.1–2.2)
ALP SERPL-CCNC: 91 U/L (ref 45–117)
ALT SERPL-CCNC: 16 U/L (ref 12–78)
ANION GAP SERPL CALC-SCNC: 7 MMOL/L (ref 2–12)
APTT PPP: 35 SEC (ref 21.2–34.1)
AST SERPL W P-5'-P-CCNC: 12 U/L (ref 15–37)
BASOPHILS # BLD: 0.06 K/UL (ref 0–0.1)
BASOPHILS NFR BLD: 0.3 % (ref 0–1)
BILIRUB SERPL-MCNC: 0.3 MG/DL (ref 0.2–1)
BUN SERPL-MCNC: 13 MG/DL (ref 6–20)
BUN/CREAT SERPL: 11 (ref 12–20)
CA-I BLD-MCNC: 8.9 MG/DL (ref 8.5–10.1)
CHLORIDE SERPL-SCNC: 112 MMOL/L (ref 97–108)
CO2 SERPL-SCNC: 20 MMOL/L (ref 21–32)
CREAT SERPL-MCNC: 1.14 MG/DL (ref 0.7–1.3)
CRP SERPL-MCNC: 18.6 MG/DL (ref 0–0.3)
DATE LAST DOSE: NORMAL
DIFFERENTIAL METHOD BLD: ABNORMAL
DOSE AMOUNT: NORMAL UNITS
EKG ATRIAL RATE: 87 BPM
EKG DIAGNOSIS: NORMAL
EKG P AXIS: 19 DEGREES
EKG P-R INTERVAL: 167 MS
EKG Q-T INTERVAL: 343 MS
EKG QRS DURATION: 106 MS
EKG QTC CALCULATION (BAZETT): 423 MS
EKG R AXIS: -6 DEGREES
EKG T AXIS: 59 DEGREES
EKG VENTRICULAR RATE: 91 BPM
EOSINOPHIL # BLD: 0.19 K/UL (ref 0–0.4)
EOSINOPHIL NFR BLD: 1.1 % (ref 0–7)
ERYTHROCYTE [DISTWIDTH] IN BLOOD BY AUTOMATED COUNT: 13.5 % (ref 11.5–14.5)
GLOBULIN SER CALC-MCNC: 5.6 G/DL (ref 2–4)
GLUCOSE BLD STRIP.AUTO-MCNC: 69 MG/DL (ref 65–100)
GLUCOSE BLD STRIP.AUTO-MCNC: 75 MG/DL (ref 65–100)
GLUCOSE BLD STRIP.AUTO-MCNC: 89 MG/DL (ref 65–100)
GLUCOSE BLD STRIP.AUTO-MCNC: 92 MG/DL (ref 65–100)
GLUCOSE SERPL-MCNC: 86 MG/DL (ref 65–100)
HCT VFR BLD AUTO: 31 % (ref 36.6–50.3)
HGB BLD-MCNC: 9.9 G/DL (ref 12.1–17)
IMM GRANULOCYTES # BLD AUTO: 0.17 K/UL (ref 0–0.04)
IMM GRANULOCYTES NFR BLD AUTO: 0.9 % (ref 0–0.5)
INR PPP: 1.3 (ref 0.9–1.1)
LYMPHOCYTES # BLD: 2.13 K/UL (ref 0.8–3.5)
LYMPHOCYTES NFR BLD: 11.9 % (ref 12–49)
MCH RBC QN AUTO: 28.2 PG (ref 26–34)
MCHC RBC AUTO-ENTMCNC: 31.9 G/DL (ref 30–36.5)
MCV RBC AUTO: 88.3 FL (ref 80–99)
MONOCYTES # BLD: 1.88 K/UL (ref 0–1)
MONOCYTES NFR BLD: 10.5 % (ref 5–13)
NEUTS SEG # BLD: 13.47 K/UL (ref 1.8–8)
NEUTS SEG NFR BLD: 75.3 % (ref 32–75)
NRBC # BLD: 0 K/UL (ref 0–0.01)
NRBC BLD-RTO: 0 PER 100 WBC
PERFORMED BY:: NORMAL
PLATELET # BLD AUTO: 312 K/UL (ref 150–400)
PMV BLD AUTO: 10.5 FL (ref 8.9–12.9)
POTASSIUM SERPL-SCNC: 3.7 MMOL/L (ref 3.5–5.1)
PROCALCITONIN SERPL-MCNC: 0.24 NG/ML
PROT SERPL-MCNC: 7.6 G/DL (ref 6.4–8.2)
PROTHROMBIN TIME: 16 SEC (ref 11.9–14.1)
RBC # BLD AUTO: 3.51 M/UL (ref 4.1–5.7)
SODIUM SERPL-SCNC: 139 MMOL/L (ref 136–145)
THERAPEUTIC RANGE: ABNORMAL SEC (ref 82–109)
VANCOMYCIN SERPL-MCNC: 6.1 UG/ML
VAS LEFT ABI: 0.82
VAS LEFT ANKLE BP: 156 MMHG
VAS LEFT ARM BP: 172 MMHG
VAS LEFT DORSALIS PEDIS BP: 126 MMHG
VAS LEFT PTA BP: 156 MMHG
VAS RIGHT ABI: 0.73
VAS RIGHT ANKLE BP: 138 MMHG
VAS RIGHT ARM BP: 190 MMHG
VAS RIGHT DORSALIS PEDIS BP: 138 MMHG
VAS RIGHT TBI: 0.38
VAS RIGHT TOE PRESSURE: 72 MMHG
WBC # BLD AUTO: 17.9 K/UL (ref 4.1–11.1)

## 2025-07-02 PROCEDURE — 96366 THER/PROPH/DIAG IV INF ADDON: CPT

## 2025-07-02 PROCEDURE — 99222 1ST HOSP IP/OBS MODERATE 55: CPT | Performed by: SURGERY

## 2025-07-02 PROCEDURE — 87147 CULTURE TYPE IMMUNOLOGIC: CPT

## 2025-07-02 PROCEDURE — 85610 PROTHROMBIN TIME: CPT

## 2025-07-02 PROCEDURE — 6360000002 HC RX W HCPCS: Performed by: ANESTHESIOLOGY

## 2025-07-02 PROCEDURE — 3600000012 HC SURGERY LEVEL 2 ADDTL 15MIN: Performed by: PODIATRIST

## 2025-07-02 PROCEDURE — 3700000000 HC ANESTHESIA ATTENDED CARE: Performed by: PODIATRIST

## 2025-07-02 PROCEDURE — 87185 SC STD ENZYME DETCJ PER NZM: CPT

## 2025-07-02 PROCEDURE — 86140 C-REACTIVE PROTEIN: CPT

## 2025-07-02 PROCEDURE — 80202 ASSAY OF VANCOMYCIN: CPT

## 2025-07-02 PROCEDURE — 3600000002 HC SURGERY LEVEL 2 BASE: Performed by: PODIATRIST

## 2025-07-02 PROCEDURE — 85730 THROMBOPLASTIN TIME PARTIAL: CPT

## 2025-07-02 PROCEDURE — 2500000003 HC RX 250 WO HCPCS: Performed by: NURSE PRACTITIONER

## 2025-07-02 PROCEDURE — 36415 COLL VENOUS BLD VENIPUNCTURE: CPT

## 2025-07-02 PROCEDURE — 85025 COMPLETE CBC W/AUTO DIFF WBC: CPT

## 2025-07-02 PROCEDURE — 2709999900 HC NON-CHARGEABLE SUPPLY: Performed by: PODIATRIST

## 2025-07-02 PROCEDURE — 87070 CULTURE OTHR SPECIMN AEROBIC: CPT

## 2025-07-02 PROCEDURE — 87205 SMEAR GRAM STAIN: CPT

## 2025-07-02 PROCEDURE — 82962 GLUCOSE BLOOD TEST: CPT

## 2025-07-02 PROCEDURE — 96365 THER/PROPH/DIAG IV INF INIT: CPT

## 2025-07-02 PROCEDURE — 88311 DECALCIFY TISSUE: CPT

## 2025-07-02 PROCEDURE — 88307 TISSUE EXAM BY PATHOLOGIST: CPT

## 2025-07-02 PROCEDURE — 0Y6M0Z0 DETACHMENT AT RIGHT FOOT, COMPLETE, OPEN APPROACH: ICD-10-PCS | Performed by: PODIATRIST

## 2025-07-02 PROCEDURE — 80053 COMPREHEN METABOLIC PANEL: CPT

## 2025-07-02 PROCEDURE — 2580000003 HC RX 258: Performed by: NURSE ANESTHETIST, CERTIFIED REGISTERED

## 2025-07-02 PROCEDURE — 2580000003 HC RX 258: Performed by: NURSE PRACTITIONER

## 2025-07-02 PROCEDURE — 1100000000 HC RM PRIVATE

## 2025-07-02 PROCEDURE — 87077 CULTURE AEROBIC IDENTIFY: CPT

## 2025-07-02 PROCEDURE — 87076 CULTURE ANAEROBE IDENT EACH: CPT

## 2025-07-02 PROCEDURE — 6360000002 HC RX W HCPCS: Performed by: INTERNAL MEDICINE

## 2025-07-02 PROCEDURE — 6370000000 HC RX 637 (ALT 250 FOR IP): Performed by: NURSE PRACTITIONER

## 2025-07-02 PROCEDURE — 7100000000 HC PACU RECOVERY - FIRST 15 MIN: Performed by: PODIATRIST

## 2025-07-02 PROCEDURE — 6360000002 HC RX W HCPCS: Performed by: NURSE PRACTITIONER

## 2025-07-02 PROCEDURE — 87186 SC STD MICRODIL/AGAR DIL: CPT

## 2025-07-02 PROCEDURE — 3700000001 HC ADD 15 MINUTES (ANESTHESIA): Performed by: PODIATRIST

## 2025-07-02 PROCEDURE — 2580000003 HC RX 258: Performed by: INTERNAL MEDICINE

## 2025-07-02 PROCEDURE — 84145 PROCALCITONIN (PCT): CPT

## 2025-07-02 PROCEDURE — 6360000002 HC RX W HCPCS: Performed by: NURSE ANESTHETIST, CERTIFIED REGISTERED

## 2025-07-02 PROCEDURE — 7100000001 HC PACU RECOVERY - ADDTL 15 MIN: Performed by: PODIATRIST

## 2025-07-02 RX ORDER — OXYCODONE HYDROCHLORIDE 5 MG/1
10 TABLET ORAL PRN
Refills: 0 | Status: CANCELLED | OUTPATIENT
Start: 2025-07-02 | End: 2025-07-02

## 2025-07-02 RX ORDER — SODIUM CHLORIDE 0.9 % (FLUSH) 0.9 %
5-40 SYRINGE (ML) INJECTION EVERY 12 HOURS SCHEDULED
Status: CANCELLED | OUTPATIENT
Start: 2025-07-02

## 2025-07-02 RX ORDER — LIDOCAINE HYDROCHLORIDE 20 MG/ML
INJECTION, SOLUTION EPIDURAL; INFILTRATION; INTRACAUDAL; PERINEURAL
Status: DISCONTINUED | OUTPATIENT
Start: 2025-07-02 | End: 2025-07-02 | Stop reason: SDUPTHER

## 2025-07-02 RX ORDER — OXYCODONE HYDROCHLORIDE 5 MG/1
5 TABLET ORAL EVERY 6 HOURS PRN
Refills: 0 | Status: DISCONTINUED | OUTPATIENT
Start: 2025-07-02 | End: 2025-07-10 | Stop reason: HOSPADM

## 2025-07-02 RX ORDER — LABETALOL HYDROCHLORIDE 5 MG/ML
10 INJECTION, SOLUTION INTRAVENOUS
Status: CANCELLED | OUTPATIENT
Start: 2025-07-02

## 2025-07-02 RX ORDER — SODIUM CHLORIDE 0.9 % (FLUSH) 0.9 %
5-40 SYRINGE (ML) INJECTION PRN
Status: CANCELLED | OUTPATIENT
Start: 2025-07-02

## 2025-07-02 RX ORDER — GLUCAGON 1 MG/ML
1 KIT INJECTION PRN
Status: CANCELLED | OUTPATIENT
Start: 2025-07-02

## 2025-07-02 RX ORDER — SODIUM CHLORIDE, SODIUM LACTATE, POTASSIUM CHLORIDE, CALCIUM CHLORIDE 600; 310; 30; 20 MG/100ML; MG/100ML; MG/100ML; MG/100ML
INJECTION, SOLUTION INTRAVENOUS ONCE
Status: CANCELLED | OUTPATIENT
Start: 2025-07-02 | End: 2025-07-02

## 2025-07-02 RX ORDER — OXYCODONE HYDROCHLORIDE 5 MG/1
5 TABLET ORAL PRN
Refills: 0 | Status: CANCELLED | OUTPATIENT
Start: 2025-07-02 | End: 2025-07-02

## 2025-07-02 RX ORDER — DEXTROSE MONOHYDRATE 100 MG/ML
INJECTION, SOLUTION INTRAVENOUS CONTINUOUS PRN
Status: CANCELLED | OUTPATIENT
Start: 2025-07-02

## 2025-07-02 RX ORDER — IPRATROPIUM BROMIDE AND ALBUTEROL SULFATE 2.5; .5 MG/3ML; MG/3ML
1 SOLUTION RESPIRATORY (INHALATION)
Status: CANCELLED | OUTPATIENT
Start: 2025-07-02

## 2025-07-02 RX ORDER — PHENYLEPHRINE HCL IN 0.9% NACL 0.4MG/10ML
SYRINGE (ML) INTRAVENOUS
Status: DISCONTINUED | OUTPATIENT
Start: 2025-07-02 | End: 2025-07-02 | Stop reason: SDUPTHER

## 2025-07-02 RX ORDER — LORAZEPAM 2 MG/ML
0.5 INJECTION INTRAMUSCULAR
Status: CANCELLED | OUTPATIENT
Start: 2025-07-02

## 2025-07-02 RX ORDER — PROPOFOL 10 MG/ML
INJECTION, EMULSION INTRAVENOUS
Status: DISCONTINUED | OUTPATIENT
Start: 2025-07-02 | End: 2025-07-02 | Stop reason: SDUPTHER

## 2025-07-02 RX ORDER — HYDRALAZINE HYDROCHLORIDE 20 MG/ML
10 INJECTION INTRAMUSCULAR; INTRAVENOUS
Status: CANCELLED | OUTPATIENT
Start: 2025-07-02

## 2025-07-02 RX ORDER — DIPHENHYDRAMINE HYDROCHLORIDE 50 MG/ML
12.5 INJECTION, SOLUTION INTRAMUSCULAR; INTRAVENOUS
Status: CANCELLED | OUTPATIENT
Start: 2025-07-02

## 2025-07-02 RX ORDER — SODIUM CHLORIDE 9 MG/ML
INJECTION, SOLUTION INTRAVENOUS PRN
Status: CANCELLED | OUTPATIENT
Start: 2025-07-02

## 2025-07-02 RX ORDER — METOCLOPRAMIDE HYDROCHLORIDE 5 MG/ML
10 INJECTION INTRAMUSCULAR; INTRAVENOUS
Status: CANCELLED | OUTPATIENT
Start: 2025-07-02

## 2025-07-02 RX ORDER — FENTANYL CITRATE 50 UG/ML
INJECTION, SOLUTION INTRAMUSCULAR; INTRAVENOUS
Status: DISCONTINUED | OUTPATIENT
Start: 2025-07-02 | End: 2025-07-02 | Stop reason: SDUPTHER

## 2025-07-02 RX ORDER — HYDROMORPHONE HYDROCHLORIDE 1 MG/ML
0.5 INJECTION, SOLUTION INTRAMUSCULAR; INTRAVENOUS; SUBCUTANEOUS EVERY 5 MIN PRN
Refills: 0 | Status: CANCELLED | OUTPATIENT
Start: 2025-07-02

## 2025-07-02 RX ORDER — HYDROMORPHONE HYDROCHLORIDE 1 MG/ML
1 INJECTION, SOLUTION INTRAMUSCULAR; INTRAVENOUS; SUBCUTANEOUS EVERY 4 HOURS PRN
Status: ACTIVE | OUTPATIENT
Start: 2025-07-02 | End: 2025-07-04

## 2025-07-02 RX ORDER — SODIUM CHLORIDE, SODIUM LACTATE, POTASSIUM CHLORIDE, CALCIUM CHLORIDE 600; 310; 30; 20 MG/100ML; MG/100ML; MG/100ML; MG/100ML
INJECTION, SOLUTION INTRAVENOUS
Status: DISCONTINUED | OUTPATIENT
Start: 2025-07-02 | End: 2025-07-02 | Stop reason: SDUPTHER

## 2025-07-02 RX ORDER — ONDANSETRON 2 MG/ML
4 INJECTION INTRAMUSCULAR; INTRAVENOUS
Status: CANCELLED | OUTPATIENT
Start: 2025-07-02

## 2025-07-02 RX ORDER — MEPERIDINE HYDROCHLORIDE 25 MG/ML
12.5 INJECTION INTRAMUSCULAR; INTRAVENOUS; SUBCUTANEOUS EVERY 5 MIN PRN
Refills: 0 | Status: CANCELLED | OUTPATIENT
Start: 2025-07-02

## 2025-07-02 RX ORDER — LIDOCAINE 4 G/G
1 PATCH TOPICAL AS NEEDED
Status: CANCELLED | OUTPATIENT
Start: 2025-07-02

## 2025-07-02 RX ORDER — FENTANYL CITRATE 0.05 MG/ML
50 INJECTION, SOLUTION INTRAMUSCULAR; INTRAVENOUS EVERY 5 MIN PRN
Refills: 0 | Status: CANCELLED | OUTPATIENT
Start: 2025-07-02

## 2025-07-02 RX ADMIN — LIDOCAINE HYDROCHLORIDE 100 MG: 20 SOLUTION INTRAVENOUS at 16:25

## 2025-07-02 RX ADMIN — VANCOMYCIN HYDROCHLORIDE 1000 MG: 1 INJECTION, POWDER, LYOPHILIZED, FOR SOLUTION INTRAVENOUS at 18:44

## 2025-07-02 RX ADMIN — FENTANYL CITRATE 50 MCG: 50 INJECTION INTRAMUSCULAR; INTRAVENOUS at 16:43

## 2025-07-02 RX ADMIN — METHIMAZOLE 5 MG: 5 TABLET ORAL at 09:42

## 2025-07-02 RX ADMIN — POTASSIUM CHLORIDE 20 MEQ: 1500 TABLET, EXTENDED RELEASE ORAL at 21:25

## 2025-07-02 RX ADMIN — PIPERACILLIN AND TAZOBACTAM 3375 MG: 3; .375 INJECTION, POWDER, LYOPHILIZED, FOR SOLUTION INTRAVENOUS at 09:47

## 2025-07-02 RX ADMIN — PROPOFOL 150 MG: 10 INJECTION, EMULSION INTRAVENOUS at 16:25

## 2025-07-02 RX ADMIN — SODIUM CHLORIDE, PRESERVATIVE FREE 10 ML: 5 INJECTION INTRAVENOUS at 21:25

## 2025-07-02 RX ADMIN — BUPROPION HYDROCHLORIDE 150 MG: 150 TABLET, FILM COATED, EXTENDED RELEASE ORAL at 09:33

## 2025-07-02 RX ADMIN — SODIUM CHLORIDE, POTASSIUM CHLORIDE, SODIUM LACTATE AND CALCIUM CHLORIDE: 600; 310; 30; 20 INJECTION, SOLUTION INTRAVENOUS at 16:19

## 2025-07-02 RX ADMIN — POTASSIUM CHLORIDE 20 MEQ: 1500 TABLET, EXTENDED RELEASE ORAL at 00:25

## 2025-07-02 RX ADMIN — PIPERACILLIN AND TAZOBACTAM 3375 MG: 3; .375 INJECTION, POWDER, LYOPHILIZED, FOR SOLUTION INTRAVENOUS at 23:48

## 2025-07-02 RX ADMIN — PANTOPRAZOLE SODIUM 40 MG: 40 TABLET, DELAYED RELEASE ORAL at 09:33

## 2025-07-02 RX ADMIN — Medication 200 MCG: at 16:51

## 2025-07-02 RX ADMIN — SENNOSIDES AND DOCUSATE SODIUM 1 TABLET: 50; 8.6 TABLET ORAL at 09:32

## 2025-07-02 RX ADMIN — FLUOXETINE HYDROCHLORIDE 40 MG: 20 CAPSULE ORAL at 09:33

## 2025-07-02 RX ADMIN — SODIUM CHLORIDE, PRESERVATIVE FREE 10 ML: 5 INJECTION INTRAVENOUS at 00:18

## 2025-07-02 RX ADMIN — PIPERACILLIN AND TAZOBACTAM 3375 MG: 3; .375 INJECTION, POWDER, LYOPHILIZED, FOR SOLUTION INTRAVENOUS at 16:19

## 2025-07-02 RX ADMIN — SENNOSIDES AND DOCUSATE SODIUM 1 TABLET: 50; 8.6 TABLET ORAL at 21:25

## 2025-07-02 RX ADMIN — FENTANYL CITRATE 50 MCG: 50 INJECTION INTRAMUSCULAR; INTRAVENOUS at 16:50

## 2025-07-02 RX ADMIN — OXYCODONE HYDROCHLORIDE 10 MG: 10 TABLET ORAL at 09:33

## 2025-07-02 RX ADMIN — POTASSIUM CHLORIDE 20 MEQ: 1500 TABLET, EXTENDED RELEASE ORAL at 09:33

## 2025-07-02 RX ADMIN — SODIUM CHLORIDE: 0.9 INJECTION, SOLUTION INTRAVENOUS at 00:35

## 2025-07-02 RX ADMIN — PROPOFOL 50 MG: 10 INJECTION, EMULSION INTRAVENOUS at 16:27

## 2025-07-02 RX ADMIN — NIFEDIPINE 30 MG: 30 TABLET, FILM COATED, EXTENDED RELEASE ORAL at 09:33

## 2025-07-02 RX ADMIN — PIPERACILLIN AND TAZOBACTAM 3375 MG: 3; .375 INJECTION, POWDER, LYOPHILIZED, FOR SOLUTION INTRAVENOUS at 00:41

## 2025-07-02 RX ADMIN — SENNOSIDES AND DOCUSATE SODIUM 1 TABLET: 50; 8.6 TABLET ORAL at 00:18

## 2025-07-02 RX ADMIN — ATORVASTATIN CALCIUM 40 MG: 40 TABLET, FILM COATED ORAL at 09:33

## 2025-07-02 ASSESSMENT — PAIN SCALES - GENERAL
PAINLEVEL_OUTOF10: 8
PAINLEVEL_OUTOF10: 0
PAINLEVEL_OUTOF10: 0

## 2025-07-02 ASSESSMENT — PAIN DESCRIPTION - ORIENTATION: ORIENTATION: RIGHT

## 2025-07-02 ASSESSMENT — PAIN DESCRIPTION - LOCATION: LOCATION: FOOT

## 2025-07-02 NOTE — PROGRESS NOTES
Hospitalist Progress Note               Daily Progress Note: 7/2/2025      Hospital Day: 2     Chief complaint: No chief complaint on file.       Subjective:   Hospital course to date:  Chavez Skelton is a 70 y.o.  male with PMHx significant for peripheral vascular disease s/p prior left great toe amputation, type 2 diabetes, hypertension, hyperlipidemia, CAD with previous MI s/p open heart surgery and stent placement (2018), CKD 3, chronic hypokalemia, anemia, hyperthyroidism, antral gastritis/GERD, obesity, depression, anxiety who presented to [facility] ED with complaints of painful right toes, foot and lower leg x 2-3 weeks.  On arrival to the [facility] ED patient was hypertensive, otherwise hemodynamically stable, afebrile, room air.  Admission labs significant for leukocytosis to 17.2, hemoglobin 10.5, potassium 3.2, creatinine 1.81 with GFR 39, glucose 187.  X-ray of right foot with suspected osteomyelitis of second middle and distal phalanges.  CXR negative for acute process.  Patient was given 3 L fluid resuscitation, 1750 mg vancomycin, 4500 mg Zosyn, blood cultures and SALVATORE obtained, podiatry consulted and recommended transfer to [place] for likely surgical intervention and further management.     Patient seen in his room resting comfortably in bed, no acute distress.  Patient is a poor historian and only able to give approximate dates, but reports his symptoms of right foot and lower leg pain, swelling, ulcerations and malodorous smell began 2-3 weeks ago.  Patient reports he has been compliant with all of his medications at home, and attempting to follow dietary modifications for diabetic, heart healthy diet.  Patient denies tobacco, EtOH or illicit substance use.  Per chart review patient follows with Dr. García, vascular surgery, PCP is Chely Meredith NP, with [facility] family medicine, Terryraphael Newman Jr. with [facility] gastroenterology.        --------  Patient is seen today for follow-up.    Patient resting in bed, no adverse events overnight.  Patient complains of 9/10 pain in right foot.  Patient unaware of surgery consult yesterday, discussed surgery consult with patient.    MD made aware of pain, hydromorphone ordered.    Morning labs show CRP 18.60, procalcitonin 0.24, albumin 2.0, leukocytosis increased to 17.9 from 17.2.    Awaiting blood cultures.    Awaiting podiatry consult.        Medications reviewed  Current Facility-Administered Medications   Medication Dose Route Frequency    Vancomycin Pharmacy Dosing   Other RX Placeholder    Vancomycin Random Level to be drawn 7-2-25 @ 1400   Other Once    oxyCODONE (ROXICODONE) immediate release tablet 5 mg  5 mg Oral Q6H PRN    sodium chloride flush 0.9 % injection 5-40 mL  5-40 mL IntraVENous 2 times per day    sodium chloride flush 0.9 % injection 5-40 mL  5-40 mL IntraVENous PRN    0.9 % sodium chloride infusion   IntraVENous PRN    potassium chloride (KLOR-CON M) extended release tablet 40 mEq  40 mEq Oral PRN    Or    potassium bicarb-citric acid (EFFER-K) effervescent tablet 40 mEq  40 mEq Oral PRN    Or    potassium chloride 10 mEq/100 mL IVPB (Peripheral Line)  10 mEq IntraVENous PRN    magnesium sulfate 2000 mg in 50 mL IVPB premix  2,000 mg IntraVENous PRN    ondansetron (ZOFRAN-ODT) disintegrating tablet 4 mg  4 mg Oral Q8H PRN    Or    ondansetron (ZOFRAN) injection 4 mg  4 mg IntraVENous Q6H PRN    polyethylene glycol (GLYCOLAX) packet 17 g  17 g Oral Daily PRN    acetaminophen (TYLENOL) tablet 650 mg  650 mg Oral Q6H PRN    Or    acetaminophen (TYLENOL) suppository 650 mg  650 mg Rectal Q6H PRN    0.9 % sodium chloride infusion   IntraVENous Continuous    sennosides-docusate sodium (SENOKOT-S) 8.6-50 MG tablet 1 tablet  1 tablet Oral BID    oxyCODONE HCl (OXY-IR) immediate release tablet 10 mg  10 mg Oral Q6H PRN    atorvastatin (LIPITOR) tablet 40 mg  40 mg Oral Daily    buPROPion

## 2025-07-02 NOTE — OP NOTE
Operative Note      Patient: Chavez Skelton  YOB: 1955  MRN: [id number]    Date of Procedure: 7/2/2025    Pre-Op Diagnosis Codes:      * Diabetic foot ulcer with osteomyelitis (HCC) [E11.621, E11.69, L97.509, M86.9]    Post-Op Diagnosis: Same       Procedure(s):  TRANSMETATARSAL AMPUTATION RIGHT FOOT  Debridement of nonviable tissue and bone right foot  Application of wound VAC right foot    Surgeon(s):  Arnaldo Nevarez DPM    Assistant:   * No surgical staff found *    Anesthesia: General    Estimated Blood Loss (mL): Minimal    Complications: None    Specimens:   ID Type Source Tests Collected by Time Destination   1 : Transmetatarsal right foot Tissue Foot SURGICAL PATHOLOGY Arnaldo Nevarez DPM 7/2/2025 1644    A : Right foot deep wound culture Tissue Foot CULTURE, TISSUE (WITH GRAM STAIN) Arnaldo Nevarez DPM 7/2/2025 1641        Implants:  * No implants in log *      Drains:   External Urinary Catheter (Active)   Site Assessment Clean,dry & intact 07/02/25 0000   Catheter Care Catheter/Wick replaced 07/02/25 0000   Perineal Care Yes 07/02/25 0000   Suction 40 mmgHg continuous 07/02/25 0000   Urine Color Yellow 07/02/25 0000   Urine Appearance Clear 07/02/25 0000       Findings:  Infection Present At Time Of Surgery (PATOS) (choose all levels that have infection present):  - Organ Space infection (below fascia) present as evidenced by abscess, pus, and osteomyelitis  Other Findings: As expected    Detailed Description of Procedure:   Patient was seen in the pre-operative holding area and all questions were answered and all concerns were addressed. The operative procedure was discussed in great detail, with all possible complications highlighted.  Patient verbalized complete understanding and the consent was signed and witnessed. The operative limb was marked and the pt was transported to the operating room. The patient was transferred to the operating table and anesthesia was administered as indicated

## 2025-07-02 NOTE — PROGRESS NOTES
SGLT-2 Review  Medication: Empagliflozin 25 mg every 24 hours  Indication: DM CKD  Plan: Change to empagliflozin 10 mg once daily per [facility] P&T Committee Protocol with respect to therapeutic substitutions. Pharmacy will continue to monitor patient daily and will make dosage adjustments based upon changing renal function.

## 2025-07-02 NOTE — CARE COORDINATION
07/02/25 1432   Service Assessment   Patient Orientation Alert and Oriented   Cognition Alert   History Provided By Patient   Primary Caregiver Self   Accompanied By/Relationship alone   Support Systems Children   Patient's Healthcare Decision Maker is: Legal Next of Kin   PCP Verified by CM Yes  (Chely Meredith last seen 2-3 months ago)   Last Visit to PCP Within last 3 months   Prior Functional Level Independent in ADLs/IADLs   Current Functional Level Independent in ADLs/IADLs   Can patient return to prior living arrangement Yes   Ability to make needs known: Good   Family able to assist with home care needs: Yes   Would you like for me to discuss the discharge plan with any other family members/significant others, and if so, who? Yes  (daughter)   Financial Resources Medicare   Community Resources None   CM/SW Referral Other (see comment)  (discharge planning)   Social/Functional History   Lives With Alone   Type of Home Trailer   Home Layout One level   Home Access Level entry   Bathroom Equipment None   Home Equipment Cane   Discharge Planning   Patient expects to be discharged to: Trailer/mobile home     CM met with pt at bedside to verify demographics on facesheet are correct.     Pt lives alone in a single story mobile home with no ERIK.   Pt is independent with ADLs at baseline.     DME: cane; no supp O2 needs, no HD.  Pt reports hx of HH, unk agency; no hx of IRF/SNF    Rx: Franco Mccatrhy    Will need to assess for dc transport needs when cleared for dc.           Advance Care Planning   Healthcare Decision Maker:    Primary Decision Maker: Carissa Bowman - Child - [phone]    Primary Decision Maker: Jo Aranda - Brother/Sister - [phone]    Click here to complete Healthcare Decision Makers including selection of the Healthcare Decision Maker Relationship (ie \"Primary\").  Today we documented Decision Maker(s) consistent with ACP documents on file.

## 2025-07-02 NOTE — PROGRESS NOTES
Vancomycin Dosing Consult  Chavez Skelton is a 70 y.o. male with Diabetic foot ulcer with suspected osteomyelitis of second middle and distal phalanges . Pharmacy was consulted by Abe Reece ACNP  to dose and monitor vancomycin. Today is day 1.    Antibiotic regimen: Vancomycin + zosyn    Temp (24hrs), Av °F (37.2 °C), Min:98.1 °F (36.7 °C), Max:99.7 °F (37.6 °C)    Recent Labs     25  1248 25  0531   WBC 17.2* 17.9*   CREATININE 1.81* 1.14   BUN 23* 13     Est CrCl: 73 mL/min  Concomitant nephrotoxic drugs: None    Cultures:    Wound:  Pending   Tissue:  Pending    MRSA Swab: Not ordered, patient already received first dose of vancomycin    Target range: AUC/-600    Recent level history:  Date/Time Dose & Interval Measured Level (mcg/mL) Associated AUC/EFE    1447 1750 mg LD () 6.1         Assessment/Plan:   Patient presents with diabetic foot ulcer and suspected osteomyelitis of second middle and distal phalanges.  Leukocytosis, afebrile, Scr has is trending towards baseline.  1750 mg Vancomycin LD was given on .  Level resulted today at 6.1.  Begin AUC  dosing 1000 mg q12h with associated AUC  of 573 at steady state.  Next level scheduled for  at 1400  Antimicrobial stop date TBD

## 2025-07-02 NOTE — ANESTHESIA POSTPROCEDURE EVALUATION
Department of Anesthesiology  Postprocedure Note    Patient: Chavez Skelton  MRN: [id number]  YOB: 1955  Date of evaluation: 7/2/2025    Procedure Summary       Date: 07/02/25 Room / Location: [facility] MAIN OR 01 / [facility] MAIN OR    Anesthesia Start: 1619 Anesthesia Stop: 1713    Procedure: TRANSMETATARSAL AMPUTATION RIGHT FOOT (Right: Foot) Diagnosis:       Diabetic foot ulcer with osteomyelitis (HCC)      (Diabetic foot ulcer with osteomyelitis (HCC) [E11.621, E11.69, L97.509, M86.9])    Surgeons: Arnaldo Nevarez DPM Responsible Provider: Hilton Wong MD    Anesthesia Type: MAC ASA Status: 3            Anesthesia Type: MAC    Kal Phase I: Kal Score: 10    Kal Phase II:      Anesthesia Post Evaluation    Patient location during evaluation: bedside  Patient participation: complete - patient participated  Level of consciousness: sleepy but conscious  Pain score: 0  Airway patency: patent  Nausea & Vomiting: no vomiting and no nausea  Cardiovascular status: blood pressure returned to baseline  Respiratory status: acceptable  Hydration status: stable  Pain management: adequate    No notable events documented.

## 2025-07-02 NOTE — PROGRESS NOTES
Piperacillin-tazobactam (Zosyn) Extended-Infusion Dosing/Monitoring  Current regimen: 2.225 g IV every 6 hours    Recent Labs     07/01/25  1248   CREATININE 1.81*   BUN 23*     Estimated CrCl: 46 mL/min    Plan: Change to 3.375 g IV over 240 minutes every 8 hours per [facility] P&T Committee Protocol with respect to extended-infusion ?-lactam antibiotics. Pharmacy will continue to monitor patient daily and will make dosage adjustments based upon changing renal function.

## 2025-07-02 NOTE — PROGRESS NOTES
Hospitalist Progress Note               Daily Progress Note: 7/2/2025      Hospital Day: 2     Chief complaint: No chief complaint on file.       Subjective:   Hospital course to date:  Chavez Skelton is a 70 y.o.  male with PMHx significant for peripheral vascular disease s/p prior left great toe amputation, type 2 diabetes, hypertension, hyperlipidemia, CAD with previous MI s/p open heart surgery and stent placement (2018), CKD 3, chronic hypokalemia, anemia, hyperthyroidism, antral gastritis/GERD, obesity, depression, anxiety who presented to [facility] ED with complaints of painful right toes, foot and lower leg x 2-3 weeks.  On arrival to the [facility] ED patient was hypertensive, otherwise hemodynamically stable, afebrile, room air.  Admission labs significant for leukocytosis to 17.2, hemoglobin 10.5, potassium 3.2, creatinine 1.81 with GFR 39, glucose 187.  X-ray of right foot with suspected osteomyelitis of second middle and distal phalanges.  CXR negative for acute process.  Patient was given 3 L fluid resuscitation, 1750 mg vancomycin, 4500 mg Zosyn, blood cultures and SALVATORE obtained, podiatry consulted and recommended transfer to [place] for likely surgical intervention and further management.     Patient seen in his room resting comfortably in bed, no acute distress.  Patient is a poor historian and only able to give approximate dates, but reports his symptoms of right foot and lower leg pain, swelling, ulcerations and malodorous smell began 2-3 weeks ago.  Patient reports he has been compliant with all of his medications at home, and attempting to follow dietary modifications for diabetic, heart healthy diet.  Patient denies tobacco, EtOH or illicit substance use.  Per chart review patient follows with Dr. García, vascular surgery, PCP is Chely Meredith NP, with [facility] family medicine, Terryraphael Newman Jr. with [facility] gastroenterology.     Patient was  Discussed management of the case with:    [] Telemetry personally reviewed and interpreted as documented above    [] Imaging personally reviewed and interpreted, includes:    [] Data Review (any 3)  [] All available Consultant notes from yesterday/today were reviewed  [] All current labs were reviewed and interpreted for clinical significance   [] Appropriate follow-up labs were ordered  [] Collateral history obtained from:               Assessment:  Diabetic foot ulcer with suspected osteomyelitis of second middle and distal phalanges  Peripheral vascular disease s/p left great toe amputation  - Leukocytosis to 17.2, afebrile, lactic acid not elevated, Pro-Everardo and CRP pending  - X-ray of right foot with suspected osteomyelitis of the second middle and distal phalanges  - CXR negative for acute process  - Blood cultures pending  - Continue to trend CBC, Pro-Everardo and CRP  - S/p 3 L fluid resuscitation and loading doses of Vanco and Zosyn at [facility] ED  - Continue renally dosed Zosyn, pharmacy consult for Vanco dosing  - Follows with Dr. García, consulted follow-up recs  - Consult podiatry, follow-up recs  - N.p.o. midnight for possible procedure  - IV fluids  - As needed analgesics     MATT on CKD 3  Chronic hypokalemia  - Admission creatinine 1.81, baseline appears to be approximately 1.4  - S/p 3 L fluid resuscitation  - IV maintenance fluids while n.p.o.  - Creatinine now normal at 1.1     Type 2 diabetes  - Hold home glargine, Jardiance and Ozempic while n.p.o.  - Sliding scale insulin  - Hypoglycemia protocol  - Accu-Cheks AC 3 times daily and nightly     Hypertension  Continue home nifedipine  Resume home lisinopril with resolution of MATT    Hyperlipidemia  Continue home statin    CAD with previous MI s/p open heart surgery and stent placement (2018)  Stable    Anemia    Hyperthyroidism  On chronic methimazole    GERD  Continue home PPI    Obesity    Depression  Anxiety          Plan:  Patient has gas gangrene on the

## 2025-07-02 NOTE — CARE COORDINATION
DCP: from home, new admission  ANTOINE: 48 hours    Pt is awaiting clinical improvement.     CM team will continue following.

## 2025-07-02 NOTE — PROGRESS NOTES
4 Eyes Skin Assessment     NAME:  Chavez Skelton  YOB: 1955  MEDICAL RECORD NUMBER:  [id number]    The patient is being assessed for  Admission    I agree that at least one RN has performed a thorough Head to Toe Skin Assessment on the patient. ALL assessment sites listed below have been assessed.      Areas assessed by both nurses:    Head, Face, Ears, Shoulders, Back, Chest, Arms, Elbows, Hands, Sacrum. Buttock, Coccyx, Ischium, Legs. Feet and Heels, Under Medical Devices , and Other          Does the Patient have a Wound? Yes wound(s) were present on assessment. LDA wound assessment was Initiated and completed by RNa diabetic wound on right foot and missin right toe on the left leg       Cj Prevention initiated by RN: Yes  Wound Care Orders initiated by RN: Yes    Pressure Injury (Stage 1,2,3,4, Unstageable, DTI, NWPT, and Complex wounds) if present, place Wound referral order by RN under : Yes    New Ostomies, if present place, Ostomy referral order under : No     Nurse 1 eSignature: Electronically signed by Aristeo Arzate RN on 7/2/25 at 5:50 AM EDT    **SHARE this note so that the co-signing nurse can place an eSignature**    Nurse 2 eSignature: Electronically signed by Andra Fitzgerald RN on 7/2/25 at 8:13 AM EDT

## 2025-07-02 NOTE — ANESTHESIA PRE PROCEDURE
Department of Anesthesiology  Preprocedure Note       Name:  Chavez Skelton   Age:  70 y.o.  :  1955                                          MRN:  [id number]         Date:  2025      Surgeon: Surgeon(s):  Arnaldo Nevarez DPM    Procedure: Procedure(s):  TRANSMETATARSAL AMPUTATION RIGHT FOOT    Medications prior to admission:   Prior to Admission medications    Medication Sig Start Date End Date Taking? Authorizing Provider   diclofenac (VOLTAREN) 50 MG EC tablet Take 1 tablet by mouth 2 times daily 25  Yes Hadley Flores MD   Semaglutide, 1 MG/DOSE, (OZEMPIC, 1 MG/DOSE,) 4 MG/3ML SOPN sc injection Inject 1 mg into the skin once a week 3/11/25  Yes Chely Meredith APRN - NP   potassium chloride (KLOR-CON M) 20 MEQ extended release tablet Take 1 tablet by mouth 2 times daily 3/11/25  Yes Chely Meredith APRN - NP   pantoprazole (PROTONIX) 40 MG tablet Take 1 tablet by mouth daily 3/11/25  Yes Chely Meredith APRN - NP   NIFEdipine (ADALAT CC) 30 MG extended release tablet Take 1 tablet by mouth daily 3/11/25  Yes Chely Meredith APRN - NP   FLUoxetine (PROZAC) 40 MG capsule Take 1 capsule by mouth daily 3/11/25  Yes Chely Meredith APRN - NP   lisinopril (PRINIVIL;ZESTRIL) 20 MG tablet Take 1 tablet by mouth daily 3/11/25  Yes Chely Meredith APRN - NP   methIMAzole (TAPAZOLE) 5 MG tablet Take 1 tablet by mouth daily 3/11/25  Yes Chely Meredith APRN - NP   empagliflozin (JARDIANCE) 25 MG tablet Take 1 tablet by mouth daily 3/11/25  Yes Chely Meredith APRN - NP   atorvastatin (LIPITOR) 40 MG tablet Take 1 tablet by mouth daily 3/11/25  Yes Chely Meredith APRN - NP   buPROPion (WELLBUTRIN SR) 150 MG extended release tablet Take 1 tablet by mouth daily 3/11/25  Yes Chely Meredith APRN - NP   insulin glargine (LANTUS) 100 UNIT/ML injection vial Inject 40 Units into the skin nightly   Yes Provider, MD Melanie       Current

## 2025-07-02 NOTE — ANESTHESIA POSTPROCEDURE EVALUATION
Department of Anesthesiology  Postprocedure Note    Patient: Chavez Skelton  MRN: [id number]  YOB: 1955  Date of evaluation: 7/2/2025    Procedure Summary       Date: 07/02/25 Room / Location: [facility] MAIN OR 01 / [facility] MAIN OR    Anesthesia Start: 1619 Anesthesia Stop: 1713    Procedure: TRANSMETATARSAL AMPUTATION RIGHT FOOT (Right: Foot) Diagnosis:       Diabetic foot ulcer with osteomyelitis (HCC)      (Diabetic foot ulcer with osteomyelitis (HCC) [E11.621, E11.69, L97.509, M86.9])    Surgeons: Arnaldo Nevarez DPM Responsible Provider: Hilton Wong MD    Anesthesia Type: MAC ASA Status: 3            Anesthesia Type: MAC    Kal Phase I: Kal Score: 10    Kal Phase II:      Anesthesia Post Evaluation    Patient location during evaluation: PACU  Patient participation: complete - patient participated  Level of consciousness: sleepy but conscious  Pain score: 0  Airway patency: patent  Nausea & Vomiting: no nausea and no vomiting  Cardiovascular status: hemodynamically stable  Respiratory status: acceptable  Hydration status: stable  Multimodal analgesia pain management approach    No notable events documented.

## 2025-07-02 NOTE — PROGRESS NOTES
Vancomycin Dosing Consult  Chavez Skelton is a 70 y.o. male with Diabetic foot ulcer with suspected osteomyelitis of second middle and distal phalanges . Pharmacy was consulted by Abe Reece ACNP  to dose and monitor vancomycin. Today is day 1.    Antibiotic regimen: Vancomycin + zosyn    Temp (24hrs), Av.8 °F (37.1 °C), Min:98.2 °F (36.8 °C), Max:99.7 °F (37.6 °C)    Recent Labs     25  1248   WBC 17.2*   CREATININE 1.81*   BUN 23*     Est CrCl: 46 mL/min  Concomitant nephrotoxic drugs: None    Cultures:   Blood x 1 - in process    MRSA Swab: Not ordered, patient already received first dose of vancomycin    Target range: Re-dose for random level <15 mcg/mL    Recent level history:  Date/Time Dose & Interval Measured Level (mcg/mL) Associated AUC/EFE              Assessment/Plan:   Patient received Vancomycin 1750 mg IV x 1 dose on 25 @ 1600  Random Vancomycin level has been ordered for 25 @ 1400  Antimicrobial stop date TBD

## 2025-07-02 NOTE — PLAN OF CARE
Problem: Skin/Tissue Integrity  Goal: Skin integrity remains intact  Description: 1.  Monitor for areas of redness and/or skin breakdown  2.  Assess vascular access sites hourly  3.  Every 4-6 hours minimum:  Change oxygen saturation probe site  4.  Every 4-6 hours:  If on nasal continuous positive airway pressure, respiratory therapy assess nares and determine need for appliance change or resting period  Outcome: Not Progressing

## 2025-07-02 NOTE — PROGRESS NOTES
General surgery history and Physical    Patient: Chavez Skelton  MRN: [id number]    YOB: 1955  Age: 70 y.o.  Sex: male     Chief Complaint:  No chief complaint on file.      History of Present Illness: Chavez Skelton is a 70 y.o. very pleasant gentleman examined at bedside this morning.  I was consulted for vascular evaluation.  Patient is well-known to me.  I treated previously patient's left leg and foot infection.  Now he has a onset of a new wound on the right foot 2 weeks ago.    Patient examined at bedside.  Patient awake and alert.  Appears mildly toxic.    Wound examination as discussed below.    Social History:  Social Connections: Unknown (2/17/2021)    Received from [facility]  (prior to 6/17/2023), [facility]  (prior to 6/17/2023)    Social Connection and Isolation Panel [NHANES]     Frequency of Communication with Friends and Family: Never     Frequency of Social Gatherings with Friends and Family: Not on file     Attends Mosque Services: Not on file     Active Member of Clubs or Organizations: Not on file     Attends Club or Organization Meetings: Not on file     Marital Status: Not on file       Past Medical History:  Past Medical History:   Diagnosis Date    CAD (coronary artery disease)     MI 2017    Essential hypertension     Gastroenteritis 04/22/2023    GERD (gastroesophageal reflux disease)     SUDHIR (iron deficiency anemia) 04/30/2024    Left foot infection 04/30/2023    Mixed hyperlipidemia     Moderate episode of recurrent major depressive disorder (HCC) 2/16/2023    MSSA (methicillin susceptible Staphylococcus aureus) 05/04/2023    Osteomyelitis (HCC) 3/20/2023    Pneumonia due to COVID-19 virus 2/17/2021    Renal mass, left     Type 2 diabetes mellitus with complication, without long-term current use of insulin (HCC)      Surgical History:  Past Surgical History:   Procedure Laterality Date    CARDIAC CATHETERIZATION      patient stated

## 2025-07-02 NOTE — H&P
Hospitalist Admission Note    NAME: Chavez Skelton   :  1955   MRN:  [id number]     Date/Time:  2025 10:32 PM    Patient PCP: Chely Meredith APRN - NP    ______________________________________________________________________  Given the patient's current clinical presentation, I have a high level of concern for decompensation if discharged from the emergency department.  Complex decision making was performed, which includes reviewing the patient's available past medical records, laboratory results, and x-ray films.       My assessment of this patient's clinical condition and my plan of care is as follows.    Assessment / Plan:  Diabetic foot ulcer with suspected osteomyelitis of second middle and distal phalanges  Peripheral vascular disease s/p left great toe amputation  - Leukocytosis to 17.2, afebrile, lactic acid not elevated, Pro-Everardo and CRP pending  - X-ray of right foot with suspected osteomyelitis of the second middle and distal phalanges  - CXR negative for acute process  - Blood cultures pending  - Continue to trend CBC, Pro-Everardo and CRP  - S/p 3 L fluid resuscitation and loading doses of Vanco and Zosyn at [facility] ED  - Continue renally dosed Zosyn, pharmacy consult for Vanco dosing  - Follows with Dr. García, consulted follow-up recs  - Consult podiatry, follow-up recs  - N.p.o. midnight for possible procedure  - IV fluids  - As needed analgesics    MATT on CKD 3  Chronic hypokalemia  - Admission creatinine 1.81, baseline appears to be approximately 1.4  - S/p 3 L fluid resuscitation  - IV maintenance fluids while n.p.o.  - Continue home potassium chloride supplement  - Continue to trend BMP, replete lites as needed    Type 2 diabetes  - Hold home glargine, Jardiance and Ozempic while n.p.o.  - Sliding scale insulin  - Hypoglycemia protocol  - Accu-Cheks AC 3 times daily and nightly    Hypertension  Hyperlipidemia  CAD with previous MI s/p open heart surgery and stent placement

## 2025-07-02 NOTE — CONSULTS
sec    INR 1.3 (H) 0.9 - 1.1     APTT    Collection Time: 07/02/25  5:31 AM   Result Value Ref Range    APTT 35.0 (H) 21.2 - 34.1 sec    Therapeutic Range   82 - 109 sec   POCT Glucose    Collection Time: 07/02/25  8:05 AM   Result Value Ref Range    POC Glucose 75 65 - 100 mg/dL    Performed by: Jameel Pulido    Vancomycin Level, Random    Collection Time: 07/02/25  2:47 PM   Result Value Ref Range    Vancomycin Rm 6.1 ug/mL    Dose Date/Time Not provided      DOSE AMOUNT Not provided Units   POCT Glucose    Collection Time: 07/02/25  4:05 PM   Result Value Ref Range    POC Glucose 69 65 - 100 mg/dL    Performed by: Allan Escalante        Results:    MRI Results (most recent):  MRI FOOT LEFT WO CONTRAST 05/02/2023    Narrative  EXAM:  MRI FOOT LT WO CONT    INDICATION:  Surgical site infection. Rule out osteomyelitis of left first  metatarsal head.    COMPARISON: Radiographs 4/30/2023    TECHNIQUE: Axial, coronal, and sagittal MRI of the left forefoot in the T1, T2,  and inversion-recovery pulse sequences with and without fat saturation .    CONTRAST: None.    FINDINGS: Bone marrow: The patient is status post amputation of the first toe to  the level of the MTP joint. There is edema and decreased T1 signal in the first  metatarsal head and sesamoid bones consistent with acute osteomyelitis. There is  edema in the second proximal phalanx. There is a thin band of decreased T1  signal in the midportion on series 401 image 15. This likely represents a  nondisplaced acute fracture.    Joint fluid:  None.    Tendons: Intact.    Muscles: Mild edema seen in the musculature the is likely reactive.    Neurovascular bundles: Within normal limits.    Articular cartilage:intact. No osteochondral lesion.    Soft tissue mass: No mass. The soft tissues are significantly thinned adjacent  to the first metatarsal head. No evidence of a focal drainable fluid collection.  Small flecks of metallic material in between the fourth and  fifth metatarsals  create metallic simply artifact.    Impression  1. Status post amputation of the first toe. The soft tissues are significantly  thinned adjacent to the first metatarsal head. Abnormal signal in the first  metatarsal head and sesamoid bones is consistent with acute osteomyelitis.  2. Acute nondisplaced fracture of the mid second phalanx.       XR Results (most recent):  XR FOOT RIGHT (MIN 3 VIEWS) 07/01/2025    Narrative  EXAM: XR FOOT RIGHT (MIN 3 VIEWS)  ACC#: [id number]    INDICATION: 2-4th toes, 3rd with gangrene. eval OM. tracking to mid forefoot..    COMPARISON: None.    TECHNIQUE: 3 view(s) of the right foot.    FINDINGS:  No acute fractures, dislocations, or radiopaque foreign bodies.  There may be erosive changes involving the second middle and distal phalanges  concerning for osteomyelitis.    Impression  Osteomyelitis of the second middle and distal phalanges is suspected.                              Electronically signed by MINO Bay       Assessment:   Gas gangrene right foot  Diabetes mellitus with neuropathy  Peripheral arterial disease  Sepsis    Plan:     Patient seen and evaluated at bedside  - Current labs personally reviewed and findings dicussed with patient  - X-rays were seen and discussed with patient.  Osteomyelitis noted to second toe.  Soft tissue emphysema noted.  -Patient was seen by vascular surgeon Dr. García.  Vascular surgeon recommends a transmetatarsal amputation for infection control.  He will undergo revascularization after infection improves.  - Discussed with patient due to infection I do recommend a transmetatarsal amputation.  All risk benefits complication were discussed patient.  Patient agreed procedure.  Consent was signed and witnessed.  Patient has been n.p.o. since midnight.    Arnaldo Nevarez DPM, CWSP, AACFAS

## 2025-07-03 LAB
ALBUMIN SERPL-MCNC: 1.8 G/DL (ref 3.5–5)
ALBUMIN/GLOB SERPL: 0.3 (ref 1.1–2.2)
ALP SERPL-CCNC: 84 U/L (ref 45–117)
ALT SERPL-CCNC: 18 U/L (ref 12–78)
ANION GAP SERPL CALC-SCNC: 7 MMOL/L (ref 2–12)
AST SERPL W P-5'-P-CCNC: 10 U/L (ref 15–37)
BASOPHILS # BLD: 0.06 K/UL (ref 0–0.1)
BASOPHILS NFR BLD: 0.3 % (ref 0–1)
BILIRUB SERPL-MCNC: 0.4 MG/DL (ref 0.2–1)
BUN SERPL-MCNC: 10 MG/DL (ref 6–20)
BUN/CREAT SERPL: 8 (ref 12–20)
CA-I BLD-MCNC: 8.8 MG/DL (ref 8.5–10.1)
CHLORIDE SERPL-SCNC: 110 MMOL/L (ref 97–108)
CO2 SERPL-SCNC: 21 MMOL/L (ref 21–32)
CREAT SERPL-MCNC: 1.25 MG/DL (ref 0.7–1.3)
CRP SERPL-MCNC: 19.7 MG/DL (ref 0–0.3)
DIFFERENTIAL METHOD BLD: ABNORMAL
EOSINOPHIL # BLD: 0.18 K/UL (ref 0–0.4)
EOSINOPHIL NFR BLD: 1 % (ref 0–7)
ERYTHROCYTE [DISTWIDTH] IN BLOOD BY AUTOMATED COUNT: 13.5 % (ref 11.5–14.5)
GLOBULIN SER CALC-MCNC: 5.7 G/DL (ref 2–4)
GLUCOSE BLD STRIP.AUTO-MCNC: 124 MG/DL (ref 65–100)
GLUCOSE BLD STRIP.AUTO-MCNC: 132 MG/DL (ref 65–100)
GLUCOSE BLD STRIP.AUTO-MCNC: 143 MG/DL (ref 65–100)
GLUCOSE BLD STRIP.AUTO-MCNC: 217 MG/DL (ref 65–100)
GLUCOSE SERPL-MCNC: 125 MG/DL (ref 65–100)
HCT VFR BLD AUTO: 31.9 % (ref 36.6–50.3)
HGB BLD-MCNC: 10 G/DL (ref 12.1–17)
IMM GRANULOCYTES # BLD AUTO: 0.17 K/UL (ref 0–0.04)
IMM GRANULOCYTES NFR BLD AUTO: 0.9 % (ref 0–0.5)
LYMPHOCYTES # BLD: 1.68 K/UL (ref 0.8–3.5)
LYMPHOCYTES NFR BLD: 9.2 % (ref 12–49)
MCH RBC QN AUTO: 27.5 PG (ref 26–34)
MCHC RBC AUTO-ENTMCNC: 31.3 G/DL (ref 30–36.5)
MCV RBC AUTO: 87.6 FL (ref 80–99)
MONOCYTES # BLD: 1.46 K/UL (ref 0–1)
MONOCYTES NFR BLD: 8 % (ref 5–13)
NEUTS SEG # BLD: 14.81 K/UL (ref 1.8–8)
NEUTS SEG NFR BLD: 80.6 % (ref 32–75)
NRBC # BLD: 0 K/UL (ref 0–0.01)
NRBC BLD-RTO: 0 PER 100 WBC
PERFORMED BY:: ABNORMAL
PLATELET # BLD AUTO: 311 K/UL (ref 150–400)
PMV BLD AUTO: 10.1 FL (ref 8.9–12.9)
POTASSIUM SERPL-SCNC: 3.9 MMOL/L (ref 3.5–5.1)
PROCALCITONIN SERPL-MCNC: 0.21 NG/ML
PROT SERPL-MCNC: 7.5 G/DL (ref 6.4–8.2)
RBC # BLD AUTO: 3.64 M/UL (ref 4.1–5.7)
SODIUM SERPL-SCNC: 138 MMOL/L (ref 136–145)
WBC # BLD AUTO: 18.4 K/UL (ref 4.1–11.1)

## 2025-07-03 PROCEDURE — 82962 GLUCOSE BLOOD TEST: CPT

## 2025-07-03 PROCEDURE — 99222 1ST HOSP IP/OBS MODERATE 55: CPT | Performed by: SURGERY

## 2025-07-03 PROCEDURE — 6370000000 HC RX 637 (ALT 250 FOR IP): Performed by: INTERNAL MEDICINE

## 2025-07-03 PROCEDURE — 80053 COMPREHEN METABOLIC PANEL: CPT

## 2025-07-03 PROCEDURE — 2580000003 HC RX 258: Performed by: INTERNAL MEDICINE

## 2025-07-03 PROCEDURE — 86140 C-REACTIVE PROTEIN: CPT

## 2025-07-03 PROCEDURE — 2500000003 HC RX 250 WO HCPCS: Performed by: NURSE PRACTITIONER

## 2025-07-03 PROCEDURE — 6370000000 HC RX 637 (ALT 250 FOR IP): Performed by: NURSE PRACTITIONER

## 2025-07-03 PROCEDURE — 6360000002 HC RX W HCPCS: Performed by: NURSE PRACTITIONER

## 2025-07-03 PROCEDURE — 1100000000 HC RM PRIVATE

## 2025-07-03 PROCEDURE — 2580000003 HC RX 258: Performed by: NURSE PRACTITIONER

## 2025-07-03 PROCEDURE — 99232 SBSQ HOSP IP/OBS MODERATE 35: CPT | Performed by: SURGERY

## 2025-07-03 PROCEDURE — 6360000002 HC RX W HCPCS: Performed by: INTERNAL MEDICINE

## 2025-07-03 PROCEDURE — 84145 PROCALCITONIN (PCT): CPT

## 2025-07-03 PROCEDURE — 85025 COMPLETE CBC W/AUTO DIFF WBC: CPT

## 2025-07-03 PROCEDURE — 36415 COLL VENOUS BLD VENIPUNCTURE: CPT

## 2025-07-03 RX ADMIN — POTASSIUM CHLORIDE 20 MEQ: 1500 TABLET, EXTENDED RELEASE ORAL at 20:23

## 2025-07-03 RX ADMIN — PIPERACILLIN AND TAZOBACTAM 3375 MG: 3; .375 INJECTION, POWDER, LYOPHILIZED, FOR SOLUTION INTRAVENOUS at 17:19

## 2025-07-03 RX ADMIN — METHIMAZOLE 5 MG: 5 TABLET ORAL at 08:18

## 2025-07-03 RX ADMIN — SENNOSIDES AND DOCUSATE SODIUM 1 TABLET: 50; 8.6 TABLET ORAL at 20:23

## 2025-07-03 RX ADMIN — EMPAGLIFLOZIN 10 MG: 10 TABLET, FILM COATED ORAL at 08:17

## 2025-07-03 RX ADMIN — FLUOXETINE HYDROCHLORIDE 40 MG: 20 CAPSULE ORAL at 08:18

## 2025-07-03 RX ADMIN — VANCOMYCIN HYDROCHLORIDE 1000 MG: 1 INJECTION, POWDER, LYOPHILIZED, FOR SOLUTION INTRAVENOUS at 05:15

## 2025-07-03 RX ADMIN — VANCOMYCIN HYDROCHLORIDE 1000 MG: 1 INJECTION, POWDER, LYOPHILIZED, FOR SOLUTION INTRAVENOUS at 16:11

## 2025-07-03 RX ADMIN — PANTOPRAZOLE SODIUM 40 MG: 40 TABLET, DELAYED RELEASE ORAL at 08:18

## 2025-07-03 RX ADMIN — SODIUM CHLORIDE, PRESERVATIVE FREE 10 ML: 5 INJECTION INTRAVENOUS at 20:23

## 2025-07-03 RX ADMIN — ATORVASTATIN CALCIUM 40 MG: 40 TABLET, FILM COATED ORAL at 08:18

## 2025-07-03 RX ADMIN — SENNOSIDES AND DOCUSATE SODIUM 1 TABLET: 50; 8.6 TABLET ORAL at 08:17

## 2025-07-03 RX ADMIN — BUPROPION HYDROCHLORIDE 150 MG: 150 TABLET, FILM COATED, EXTENDED RELEASE ORAL at 08:18

## 2025-07-03 RX ADMIN — INSULIN LISPRO 1 UNITS: 100 INJECTION, SOLUTION INTRAVENOUS; SUBCUTANEOUS at 20:30

## 2025-07-03 RX ADMIN — PIPERACILLIN AND TAZOBACTAM 3375 MG: 3; .375 INJECTION, POWDER, LYOPHILIZED, FOR SOLUTION INTRAVENOUS at 08:21

## 2025-07-03 RX ADMIN — NIFEDIPINE 30 MG: 30 TABLET, FILM COATED, EXTENDED RELEASE ORAL at 08:17

## 2025-07-03 RX ADMIN — POTASSIUM CHLORIDE 20 MEQ: 1500 TABLET, EXTENDED RELEASE ORAL at 08:17

## 2025-07-03 RX ADMIN — SODIUM CHLORIDE, PRESERVATIVE FREE 10 ML: 5 INJECTION INTRAVENOUS at 08:18

## 2025-07-03 NOTE — PROGRESS NOTES
Hospitalist Progress Note               Daily Progress Note: 7/3/2025      Hospital Day: 3     Chief complaint: No chief complaint on file.       Subjective:   Hospital course to date:  Chavez Skelton is a 70 y.o.  male with PMHx significant for peripheral vascular disease s/p prior left great toe amputation, type 2 diabetes, hypertension, hyperlipidemia, CAD with previous MI s/p open heart surgery and stent placement (2018), CKD 3, chronic hypokalemia, anemia, hyperthyroidism, antral gastritis/GERD, obesity, depression, anxiety who presented to [facility] ED with complaints of painful right toes, foot and lower leg x 2-3 weeks.  On arrival to the [facility] ED patient was hypertensive, otherwise hemodynamically stable, afebrile, room air.  Admission labs significant for leukocytosis to 17.2, hemoglobin 10.5, potassium 3.2, creatinine 1.81 with GFR 39, glucose 187.  X-ray of right foot with suspected osteomyelitis of second middle and distal phalanges.  CXR negative for acute process.  Patient was given 3 L fluid resuscitation, 1750 mg vancomycin, 4500 mg Zosyn, blood cultures and SALVATORE obtained, podiatry consulted and recommended transfer to [place] for likely surgical intervention and further management.     Patient seen in his room resting comfortably in bed, no acute distress.  Patient is a poor historian and only able to give approximate dates, but reports his symptoms of right foot and lower leg pain, swelling, ulcerations and malodorous smell began 2-3 weeks ago.  Patient reports he has been compliant with all of his medications at home, and attempting to follow dietary modifications for diabetic, heart healthy diet.  Patient denies tobacco, EtOH or illicit substance use.  Per chart review patient follows with Dr. García, vascular surgery, PCP is Chely Meredith NP, with [facility] family medicine, Terryraphael Newman Jr. with [facility] gastroenterology.     Patient was  as documented above    [] Imaging personally reviewed and interpreted, includes:    [] Data Review (any 3)  [] All available Consultant notes from yesterday/today were reviewed  [] All current labs were reviewed and interpreted for clinical significance   [] Appropriate follow-up labs were ordered  [] Collateral history obtained from:               Assessment:  Diabetic foot ulcer with suspected osteomyelitis of second middle and distal phalanges  Peripheral vascular disease s/p left great toe and right foot transmetatarsal amputation  - Leukocytosis to 18.4, afebrile, lactic acid not elevated, Pro-Everardo and CRP elevated  - X-ray of right foot with suspected osteomyelitis of the second middle and distal phalanges  - Wound culture showed 2+ gram-positive cocci in clusters  - Blood cultures negative  - Tissue cultures pending  - Continue to trend CBC, Pro-Everardo and CRP  - CXR negative for acute process  - Continue IV antibiotics (renally dosed Zosyn and Vanco according to pharmacy consult)  - Follows with Dr. García, consulted follow-up recs  - Podiatry consult appreciated, follow recommendations  - IV fluids  - As needed analgesics     MATT on CKD 3, resolved  Chronic hypokalemia, resolved  - Current creatinine 1.25,   1.81 upon admission  - Current potassium 3.9  - S/p 3 L fluid resuscitation     Type 2 diabetes  - Hold home glargine, Jardiance and Ozempic while n.p.o.  - Sliding scale insulin  - Hypoglycemia protocol  - Accu-Cheks AC 3 times daily and nightly     Hypertension  - Continue home nifedipine  - Resume home lisinopril due to resolution of MATT    Hyperlipidemia  - Continue home statin    CAD with previous MI s/p open heart surgery and stent placement (2018)  - Stable    Anemia    Hyperthyroidism  - On chronic methimazole    GERD  - Continue home PPI    Obesity    Depression  Anxiety          Plan:  Continuing IV antibiotics.    Wound VAC changes.    Right leg angiogram once infection is reasonably under

## 2025-07-03 NOTE — PROGRESS NOTES
PROGRESS NOTE      Chief Complaints:  No new complaint.  HPI and  Objective:    No fever.  Temperature 98.4.  Review of Systems:  Rest of review of system reviewed personally and they are negative.    EXAM:  BP (!) 165/91 Comment: Reported to Aristeo WEI  Pulse 71   Temp 98.4 °F (36.9 °C) (Oral)   Resp 20   SpO2 100%     Patient is awake   Head and neck atraumatic, normocephalic.  ENT: No hoarse voice, gaze appropriate.  Cardiac system regular rate rhythm.  Pulmonary no audible wheeze, no cyanosis.  Chest wall excursion normal with respiration cycle  Abdomen is soft not particularly distended.  Neurologically nonfocal.  Hematology system: No bruising noted.  Musculoskeletal system: No joint deformity noted.  Genitourinary system: No hematuria noted.  Skin is warm and moist.  Psychosocial: Cooperative.  Vascular examination as previously noted no changes.    Current Facility-Administered Medications   Medication Dose Route Frequency Provider Last Rate Last Admin    Vancomycin Pharmacy Dosing   Other RX Placeholder Javier Reecen B, ACNP        Vancomycin Random Level to be drawn 7-2-25 @ 1400   Other Once Abe Reece, ACNP        oxyCODONE (ROXICODONE) immediate release tablet 5 mg  5 mg Oral Q6H PRN Abe Reece B, ACNP        HYDROmorphone HCl PF (DILAUDID) injection 1 mg  1 mg IntraVENous Q4H PRN Brijesh Beard MD        vancomycin (VANCOCIN) 1,000 mg in sodium chloride 0.9 % 250 mL IVPB ([id number])  1,000 mg IntraVENous Q12H Brijesh Beard MD   Stopped at 07/03/25 0615    [START ON 7/4/2025] vancomycin - please draw level on 7/4 at 1400  1 each Other Once Brijesh Beard MD        sodium chloride flush 0.9 % injection 5-40 mL  5-40 mL IntraVENous 2 times per day Abe Reece B, ACNP   10 mL at 07/02/25 2125    sodium chloride flush 0.9 % injection 5-40 mL  5-40 mL IntraVENous PRN Abe Reece B, ACNP        0.9 % sodium chloride infusion   IntraVENous PRN Abe Reece B, ACNP        potassium

## 2025-07-03 NOTE — PLAN OF CARE
Problem: Chronic Conditions and Co-morbidities  Goal: Patient's chronic conditions and co-morbidity symptoms are monitored and maintained or improved  7/3/2025 1448 by Thelma Peres RN  Outcome: Progressing  7/3/2025 0240 by Aristeo Huffman RN  Outcome: Progressing     Problem: Discharge Planning  Goal: Discharge to home or other facility with appropriate resources  7/3/2025 0240 by Aristeo Huffman RN  Outcome: Progressing     Problem: Safety - Adult  Goal: Free from fall injury  7/3/2025 0240 by Aristeo Huffman RN  Outcome: Progressing     Problem: Skin/Tissue Integrity  Goal: Skin integrity remains intact  Description: 1.  Monitor for areas of redness and/or skin breakdown  2.  Assess vascular access sites hourly  3.  Every 4-6 hours minimum:  Change oxygen saturation probe site  4.  Every 4-6 hours:  If on nasal continuous positive airway pressure, respiratory therapy assess nares and determine need for appliance change or resting period  7/3/2025 1448 by Thelma Peres, RN  Outcome: Progressing  7/3/2025 0240 by Aristeo Huffman RN  Outcome: Progressing     Problem: Pain  Goal: Verbalizes/displays adequate comfort level or baseline comfort level  7/3/2025 1448 by Thelma Peres RN  Outcome: Progressing  7/3/2025 0240 by Aristeo Huffman RN  Outcome: Progressing

## 2025-07-03 NOTE — WOUND CARE
Dr. Nevarez is managing wound care for wounds to feet.  Patient underwent a TMA to right foot yesterday in the OR.  Patient currently has a wound vac for right foot.  I started the paperwork for [employer] for approval for a home wound vac kit.  Provided the paperwork to Gwen STEARNS.  Dr. Nevarez will need to sign.  Will continue to follow as needed for any changes to wound vac.  WCN will be off site tomorrow.  Informed Dr. Nevarez.

## 2025-07-03 NOTE — PROGRESS NOTES
Spiritual Health History and Assessment/Progress Note  [facility]    Initial Encounter,  ,  ,      Name: Chavez Skelton MRN: [id number]    Age: 70 y.o.     Sex: male   Language: English   Worship: Bahai   Gangrene of right foot (HCC)     Date: 7/3/2025            Total Time Calculated: 12 min              Spiritual Assessment began in [facility] ONCOLOGY        Referral/Consult From: Rounding   Encounter Overview/Reason: Initial Encounter  Service Provided For: Patient    [place], Belief, Meaning:   Patient identifies as spiritual, is connected with a [place] tradition or spiritual practice, and has beliefs or practices that help with coping during difficult times  Family/Friends No family/friends present      Importance and Influence:  Patient has spiritual/personal beliefs that influence decisions regarding their health  Family/Friends No family/friends present    Community:  Patient is connected with a spiritual community and feels well-supported. Support system includes: [place] Community  Family/Friends No family/friends present    Assessment and Plan of Care:     Patient Interventions include: Facilitated expression of thoughts and feelings, Explored spiritual coping/struggle/distress, Engaged in theological reflection, Affirmed coping skills/support systems, and Facilitated life review and/ or legacy  Family/Friends Interventions include: No family/friends present    Patient Plan of Care: Spiritual Care available upon further referral  Family/Friends Plan of Care: Spiritual Care available upon further referral     is visiting for an initial spiritual assessment with Chavez. Chavez shared about his medical concerns and recent care. He notes he has been working to accept things and move forward. His [place] in God and prayer aid him in having this attitude. He has good support from his Rastafarian in [place].  provided an active listening presence, spiritual assessment and prayer.      Electronically signed by KJ MALAGON on 7/3/2025 at 4:07 PM

## 2025-07-03 NOTE — PROGRESS NOTES
Hospitalist Progress Note               Daily Progress Note: 7/3/2025      Hospital Day: 3     Chief complaint: No chief complaint on file.       Subjective:   Hospital course to date:  Chavez Skelton is a 70 y.o.  male with PMHx significant for peripheral vascular disease s/p prior left great toe amputation, type 2 diabetes, hypertension, hyperlipidemia, CAD with previous MI s/p open heart surgery and stent placement (2018), CKD 3, chronic hypokalemia, anemia, hyperthyroidism, antral gastritis/GERD, obesity, depression, anxiety who presented to [facility] ED with complaints of painful right toes, foot and lower leg x 2-3 weeks.  On arrival to the [facility] ED patient was hypertensive, otherwise hemodynamically stable, afebrile, room air.  Admission labs significant for leukocytosis to 17.2, hemoglobin 10.5, potassium 3.2, creatinine 1.81 with GFR 39, glucose 187.  X-ray of right foot with suspected osteomyelitis of second middle and distal phalanges.  CXR negative for acute process.  Patient was given 3 L fluid resuscitation, 1750 mg vancomycin, 4500 mg Zosyn, blood cultures and SALVATORE obtained, podiatry consulted and recommended transfer to [place] for likely surgical intervention and further management.     Patient seen in his room resting comfortably in bed, no acute distress.  Patient is a poor historian and only able to give approximate dates, but reports his symptoms of right foot and lower leg pain, swelling, ulcerations and malodorous smell began 2-3 weeks ago.  Patient reports he has been compliant with all of his medications at home, and attempting to follow dietary modifications for diabetic, heart healthy diet.  Patient denies tobacco, EtOH or illicit substance use.  Per chart review patient follows with Dr. Garcaí, vascular surgery, PCP is Chely Meredith NP, with [facility] family medicine, Terryraphael Newman Jr. with [facility] gastroenterology.     Patient was  of health: none      Estimated discharge date//time frame/disposition:    Barriers to discharge:           Brijesh Dixon MD

## 2025-07-03 NOTE — CARE COORDINATION
0848:  Podiatrist informed CM that patient will need wound vac when discharged from the hospital.     0847:  CM reviewed clinical chart. Patient is from home alone, independent at baseline.  DME: [facility].  Patient had TRANSMETATARSAL AMPUTATION RIGHT FOOT on 7.2.25 with Application of wound vac.  CM messaged Podiatrist this morning to confirm if patient will need a wound vac on discharge.  CM awaiting response.  Vascular surgery is planning right leg angiogram once infection is reasonably under control,  Unclear if angiogram will be inpatient or outpatient.  Patient failed egress.  PT/OT orders are present.  Patient is on IV Zosyn and IV Vanc, No ID consult at this time.      CM team will continue to follow the recommendations of the interdisciplinary team.

## 2025-07-03 NOTE — WOUND CARE
Podiatry Progress Note    Date:7/3/2025       Room:[facility]  Patient Name:Chavez Skelton     YOB: 1955     Age:70 y.o.    Subjective:     Patient is a 70 y.o. male who is being seen at bedside status post transmetatarsal amputation with debridement of nonviable tissue and application of wound VAC.      Objective:     Vitals Last 24 Hours:  TEMPERATURE:  Temp  Av.8 °F (37.1 °C)  Min: 98.2 °F (36.8 °C)  Max: 99.6 °F (37.6 °C)  RESPIRATIONS RANGE: Resp  Av.6  Min: 18  Max: 31  PULSE OXIMETRY RANGE: SpO2  Av.6 %  Min: 94 %  Max: 100 %  PULSE RANGE: Pulse  Av  Min: 71  Max: 90  BLOOD PRESSURE RANGE: Systolic (24hrs), Av , Min:103 , Max:178        Diastolic (24hrs), Av, Min:63, Max:109        I/O (24Hr):    Intake/Output Summary (Last 24 hours) at 7/3/2025 1325  Last data filed at 7/3/2025 0959  Gross per 24 hour   Intake 704 ml   Output 855 ml   Net -151 ml       Physical Exam:    GEN: Pt is AAOx4 and in NAD.   DERM: Crepitus noted to the right dorsal foot.  Gangrene noted to the second toe right foot  VASC: Pedal pulses (DP/PT) diminished to B/L LE. CFT<3sec to all digits of B/L LE.  No pedal hair growth noted to the level of the digits for B/L LE. Skin temp is warm to warm from proximal to distal for B/L LE. Neg homans/yoon signs to B/L LE. No varicosities or telangectasias noted to B/L LE.  NEURO: Protective and epicritic sensations grossly absent to B/L LE  MSK: Pain on palpation right foot.  Left hallux amputation left foot. Good muscle tone and bulk noted to B/L LE.  PSYCH: Cooperative with normal mood and affect       Labs/Imaging/Diagnostics:     Labs:  CBC:  Recent Labs     25  1248 25  0531 25  0503   WBC 17.2* 17.9* 18.4*   RBC 3.74* 3.51* 3.64*   HGB 10.5* 9.9* 10.0*   HCT 33.2* 31.0* 31.9*   MCV 88.8 88.3 87.6   RDW 13.1 13.5 13.5    312 311     CHEMISTRIES:  Recent Labs     25  1248 25  0531 25  0503    139  138   K 3.2* 3.7 3.9    112* 110*   CO2 24 20* 21   BUN 23* 13 10   CREATININE 1.81* 1.14 1.25   GLUCOSE 187* 86 125*   CALCIUM 9.2 8.9 8.8     PT/INR:  Recent Labs     07/02/25  0531   PROTIME 16.0*   INR 1.3*     APTT:  Recent Labs     07/02/25  0531   APTT 35.0*     LIVER PROFILE:  Recent Labs     07/01/25  1248 07/02/25  0531 07/03/25  0503   AST 15 12* 10*   ALT 18 16 18   BILITOT 0.2 0.3 0.4   ALKPHOS 100 91 84     Lab Results   Component Value Date/Time    ALT 18 07/03/2025 05:03 AM    AST 10 07/03/2025 05:03 AM    ALKPHOS 84 07/03/2025 05:03 AM    ALKPHOS 115 12/10/2024 11:28 AM    BILITOT 0.4 07/03/2025 05:03 AM       Imaging Last 24 Hours:    Assessment:     Gas gangrene right foot  Diabetes mellitus with neuropathy  Peripheral arterial disease  Sepsis       Plan:   - Patient was seen and evaluated at bedside.  - Current labs personally reviewed and findings dicussed with patient  - Continue IV antibiotics.  Intraoperative wound cultures and pathology pending.  - Recommend continue wound VAC changes 3 times a week.  Prescription changed to be done tomorrow.  Wound to be irrigated with Dakin solution.  - Patient is pending vascular intervention by Dr. Ricky Nevarez, DPM, CWSP, AACFAS

## 2025-07-03 NOTE — CARE COORDINATION
Patient is pending PT/OT evals at this time.  Wound vac order is on chart and needing physician signature if patient is to go home.  CM met with patient at bedside to discuss discharge expectations and inform him of the different levels care to include IRF, SNF and Home Health.  Patient states he will thinks he will need some sort of rehab prior to going home.  CM discussed IRF and SNF level of care and that his insurance will require authorization.  Patient verbalized understanding and will consider rehab at time of discharge.

## 2025-07-04 LAB
ALBUMIN SERPL-MCNC: 1.8 G/DL (ref 3.5–5)
ALBUMIN/GLOB SERPL: 0.3 (ref 1.1–2.2)
ALP SERPL-CCNC: 87 U/L (ref 45–117)
ALT SERPL-CCNC: 14 U/L (ref 12–78)
ANION GAP SERPL CALC-SCNC: 9 MMOL/L (ref 2–12)
AST SERPL W P-5'-P-CCNC: 12 U/L (ref 15–37)
BACTERIA SPEC CULT: ABNORMAL
BASOPHILS # BLD: 0.06 K/UL (ref 0–0.1)
BASOPHILS NFR BLD: 0.4 % (ref 0–1)
BILIRUB SERPL-MCNC: 0.5 MG/DL (ref 0.2–1)
BUN SERPL-MCNC: 11 MG/DL (ref 6–20)
BUN/CREAT SERPL: 8 (ref 12–20)
CA-I BLD-MCNC: 9 MG/DL (ref 8.5–10.1)
CHLORIDE SERPL-SCNC: 109 MMOL/L (ref 97–108)
CO2 SERPL-SCNC: 22 MMOL/L (ref 21–32)
CREAT SERPL-MCNC: 1.33 MG/DL (ref 0.7–1.3)
CRP SERPL-MCNC: 17.8 MG/DL (ref 0–0.3)
DATE LAST DOSE: NORMAL
DIFFERENTIAL METHOD BLD: ABNORMAL
DOSE AMOUNT: NORMAL UNITS
EOSINOPHIL # BLD: 0.24 K/UL (ref 0–0.4)
EOSINOPHIL NFR BLD: 1.7 % (ref 0–7)
ERYTHROCYTE [DISTWIDTH] IN BLOOD BY AUTOMATED COUNT: 13.8 % (ref 11.5–14.5)
GLOBULIN SER CALC-MCNC: 6 G/DL (ref 2–4)
GLUCOSE BLD STRIP.AUTO-MCNC: 119 MG/DL (ref 65–100)
GLUCOSE BLD STRIP.AUTO-MCNC: 165 MG/DL (ref 65–100)
GLUCOSE BLD STRIP.AUTO-MCNC: 197 MG/DL (ref 65–100)
GLUCOSE BLD STRIP.AUTO-MCNC: 212 MG/DL (ref 65–100)
GLUCOSE SERPL-MCNC: 126 MG/DL (ref 65–100)
GRAM STN SPEC: ABNORMAL
GRAM STN SPEC: ABNORMAL
HCT VFR BLD AUTO: 31.3 % (ref 36.6–50.3)
HGB BLD-MCNC: 9.9 G/DL (ref 12.1–17)
IMM GRANULOCYTES # BLD AUTO: 0.14 K/UL (ref 0–0.04)
IMM GRANULOCYTES NFR BLD AUTO: 1 % (ref 0–0.5)
LYMPHOCYTES # BLD: 1.81 K/UL (ref 0.8–3.5)
LYMPHOCYTES NFR BLD: 12.5 % (ref 12–49)
Lab: ABNORMAL
MCH RBC QN AUTO: 27.4 PG (ref 26–34)
MCHC RBC AUTO-ENTMCNC: 31.6 G/DL (ref 30–36.5)
MCV RBC AUTO: 86.7 FL (ref 80–99)
MONOCYTES # BLD: 1.17 K/UL (ref 0–1)
MONOCYTES NFR BLD: 8.1 % (ref 5–13)
NEUTS SEG # BLD: 11.02 K/UL (ref 1.8–8)
NEUTS SEG NFR BLD: 76.3 % (ref 32–75)
NRBC # BLD: 0 K/UL (ref 0–0.01)
NRBC BLD-RTO: 0 PER 100 WBC
PERFORMED BY:: ABNORMAL
PLATELET # BLD AUTO: 317 K/UL (ref 150–400)
PMV BLD AUTO: 9.8 FL (ref 8.9–12.9)
POTASSIUM SERPL-SCNC: 3.9 MMOL/L (ref 3.5–5.1)
PROCALCITONIN SERPL-MCNC: 0.21 NG/ML
PROT SERPL-MCNC: 7.8 G/DL (ref 6.4–8.2)
RBC # BLD AUTO: 3.61 M/UL (ref 4.1–5.7)
SODIUM SERPL-SCNC: 140 MMOL/L (ref 136–145)
VANCOMYCIN SERPL-MCNC: 17.6 UG/ML
WBC # BLD AUTO: 14.4 K/UL (ref 4.1–11.1)

## 2025-07-04 PROCEDURE — 99232 SBSQ HOSP IP/OBS MODERATE 35: CPT | Performed by: SURGERY

## 2025-07-04 PROCEDURE — 97116 GAIT TRAINING THERAPY: CPT

## 2025-07-04 PROCEDURE — 80053 COMPREHEN METABOLIC PANEL: CPT

## 2025-07-04 PROCEDURE — 6370000000 HC RX 637 (ALT 250 FOR IP): Performed by: NURSE PRACTITIONER

## 2025-07-04 PROCEDURE — 85025 COMPLETE CBC W/AUTO DIFF WBC: CPT

## 2025-07-04 PROCEDURE — 6360000002 HC RX W HCPCS: Performed by: NURSE PRACTITIONER

## 2025-07-04 PROCEDURE — 82962 GLUCOSE BLOOD TEST: CPT

## 2025-07-04 PROCEDURE — 2580000003 HC RX 258: Performed by: NURSE PRACTITIONER

## 2025-07-04 PROCEDURE — 6370000000 HC RX 637 (ALT 250 FOR IP): Performed by: INTERNAL MEDICINE

## 2025-07-04 PROCEDURE — 99222 1ST HOSP IP/OBS MODERATE 55: CPT | Performed by: SURGERY

## 2025-07-04 PROCEDURE — 1100000000 HC RM PRIVATE

## 2025-07-04 PROCEDURE — 2580000003 HC RX 258: Performed by: INTERNAL MEDICINE

## 2025-07-04 PROCEDURE — 2500000003 HC RX 250 WO HCPCS: Performed by: NURSE PRACTITIONER

## 2025-07-04 PROCEDURE — 86140 C-REACTIVE PROTEIN: CPT

## 2025-07-04 PROCEDURE — 36415 COLL VENOUS BLD VENIPUNCTURE: CPT

## 2025-07-04 PROCEDURE — 6360000002 HC RX W HCPCS: Performed by: INTERNAL MEDICINE

## 2025-07-04 PROCEDURE — 84145 PROCALCITONIN (PCT): CPT

## 2025-07-04 PROCEDURE — 97161 PT EVAL LOW COMPLEX 20 MIN: CPT

## 2025-07-04 PROCEDURE — 80202 ASSAY OF VANCOMYCIN: CPT

## 2025-07-04 RX ADMIN — SENNOSIDES AND DOCUSATE SODIUM 1 TABLET: 50; 8.6 TABLET ORAL at 09:19

## 2025-07-04 RX ADMIN — VANCOMYCIN HYDROCHLORIDE 750 MG: 750 INJECTION, POWDER, LYOPHILIZED, FOR SOLUTION INTRAVENOUS at 04:43

## 2025-07-04 RX ADMIN — LISINOPRIL 20 MG: 20 TABLET ORAL at 09:19

## 2025-07-04 RX ADMIN — ATORVASTATIN CALCIUM 40 MG: 40 TABLET, FILM COATED ORAL at 09:20

## 2025-07-04 RX ADMIN — EMPAGLIFLOZIN 10 MG: 10 TABLET, FILM COATED ORAL at 09:19

## 2025-07-04 RX ADMIN — POTASSIUM CHLORIDE 20 MEQ: 1500 TABLET, EXTENDED RELEASE ORAL at 22:02

## 2025-07-04 RX ADMIN — FLUOXETINE HYDROCHLORIDE 40 MG: 20 CAPSULE ORAL at 09:20

## 2025-07-04 RX ADMIN — BUPROPION HYDROCHLORIDE 150 MG: 150 TABLET, FILM COATED, EXTENDED RELEASE ORAL at 09:19

## 2025-07-04 RX ADMIN — PANTOPRAZOLE SODIUM 40 MG: 40 TABLET, DELAYED RELEASE ORAL at 09:19

## 2025-07-04 RX ADMIN — SENNOSIDES AND DOCUSATE SODIUM 1 TABLET: 50; 8.6 TABLET ORAL at 22:02

## 2025-07-04 RX ADMIN — POTASSIUM CHLORIDE 20 MEQ: 1500 TABLET, EXTENDED RELEASE ORAL at 09:20

## 2025-07-04 RX ADMIN — PIPERACILLIN AND TAZOBACTAM 3375 MG: 3; .375 INJECTION, POWDER, LYOPHILIZED, FOR SOLUTION INTRAVENOUS at 01:11

## 2025-07-04 RX ADMIN — SODIUM CHLORIDE, PRESERVATIVE FREE 10 ML: 5 INJECTION INTRAVENOUS at 09:20

## 2025-07-04 RX ADMIN — NIFEDIPINE 30 MG: 30 TABLET, FILM COATED, EXTENDED RELEASE ORAL at 09:20

## 2025-07-04 RX ADMIN — INSULIN LISPRO 1 UNITS: 100 INJECTION, SOLUTION INTRAVENOUS; SUBCUTANEOUS at 17:18

## 2025-07-04 RX ADMIN — SODIUM CHLORIDE, PRESERVATIVE FREE 10 ML: 5 INJECTION INTRAVENOUS at 22:02

## 2025-07-04 RX ADMIN — PIPERACILLIN AND TAZOBACTAM 3375 MG: 3; .375 INJECTION, POWDER, LYOPHILIZED, FOR SOLUTION INTRAVENOUS at 17:11

## 2025-07-04 RX ADMIN — PIPERACILLIN AND TAZOBACTAM 3375 MG: 3; .375 INJECTION, POWDER, LYOPHILIZED, FOR SOLUTION INTRAVENOUS at 09:26

## 2025-07-04 RX ADMIN — METHIMAZOLE 5 MG: 5 TABLET ORAL at 09:20

## 2025-07-04 RX ADMIN — INSULIN LISPRO 1 UNITS: 100 INJECTION, SOLUTION INTRAVENOUS; SUBCUTANEOUS at 22:02

## 2025-07-04 RX ADMIN — VANCOMYCIN HYDROCHLORIDE 750 MG: 750 INJECTION, POWDER, LYOPHILIZED, FOR SOLUTION INTRAVENOUS at 17:14

## 2025-07-04 ASSESSMENT — PAIN SCALES - GENERAL
PAINLEVEL_OUTOF10: 0

## 2025-07-04 NOTE — PROGRESS NOTES
Vancomycin Dosing Consult  Chavez Skelton is a 70 y.o. male with Diabetic foot ulcer with suspected osteomyelitis of second middle and distal phalanges . Pharmacy was consulted by Abe Reece ACNP  to dose and monitor vancomycin. Today is day 3.    Antibiotic regimen: Vancomycin + zosyn    Temp (24hrs), Av.5 °F (36.9 °C), Min:98.1 °F (36.7 °C), Max:99.5 °F (37.5 °C)    Recent Labs     25  0531 25  0503 25  0656   WBC 17.9* 18.4* 14.4*   CREATININE 1.14 1.25 1.33*   BUN 13 10 11     Est CrCl: ~62 mL/min  Concomitant nephrotoxic drugs: None    Cultures:    Blood: NGTD, preliminary   Wound:  Heavy MSSA, moderate beta-hemolytic streptococci, light mixed GNRs, heavy mixed skin rafi, final   Tissue:  Heavy possible Staphylococcus aureus, preliminary    MRSA Swab: Not ordered, patient already received first dose of vancomycin    Target range: AUC/-600    Recent level history:  Date/Time Dose & Interval Measured Level (mcg/mL) Associated AUC/EFE    1447 1750 mg LD () 6.1    7/4 1447 1000 mg q12h 17.6 510        Assessment/Plan:   Patient presents with diabetic foot ulcer and suspected osteomyelitis of second middle and distal phalanges  SCr stable; continue AUC-based dosing  Extrapolated AUC of 510 is within the therapeutic range, continue current regimen  Next level scheduled for  at 1400  De-escalation possible dependent on final tissue culture  Antimicrobial stop date TBD

## 2025-07-04 NOTE — PROGRESS NOTES
PROGRESS NOTE      Chief Complaints:  No new complaint.  HPI and  Objective:    No fever.  Review of Systems:  Rest of review of system reviewed personally and they are negative.    EXAM:  BP (!) 171/75   Pulse 74   Temp 98.4 °F (36.9 °C) (Oral)   Resp 18   SpO2 95%     Patient is awake   Head and neck atraumatic, normocephalic.  ENT: No hoarse voice, gaze appropriate.  Cardiac system regular rate rhythm.  Pulmonary no audible wheeze, no cyanosis.  Chest wall excursion normal with respiration cycle  Abdomen is soft not particularly distended.  Neurologically nonfocal.  Hematology system: No bruising noted.  Musculoskeletal system: No joint deformity noted.  Genitourinary system: No hematuria noted.  Skin is warm and moist.  Psychosocial: Cooperative.  Vascular examination as previously noted no changes.    Current Facility-Administered Medications   Medication Dose Route Frequency Provider Last Rate Last Admin    Sodium Hypochlorite (DAKINS) 0.25 % half strength external soln   Irrigation Daily Nevarez, Arnaldo, DPM        vancomycin (VANCOCIN) 750 mg in sodium chloride 0.9 % 250 mL IVPB ([id number])  750 mg IntraVENous Q12H Brijesh Beard MD   Stopped at 07/04/25 0556    Vancomycin Pharmacy Dosing   Other RX Placeholder Abe Reece ACNP        oxyCODONE (ROXICODONE) immediate release tablet 5 mg  5 mg Oral Q6H PRN Abe Reece ACNP        HYDROmorphone HCl PF (DILAUDID) injection 1 mg  1 mg IntraVENous Q4H PRN Brijesh Beard MD        vancomycin - please draw level on 7/4 at 1400  1 each Other Once Brijesh Beard MD        sodium chloride flush 0.9 % injection 5-40 mL  5-40 mL IntraVENous 2 times per day Abe Reece ACNP   10 mL at 07/04/25 0920    sodium chloride flush 0.9 % injection 5-40 mL  5-40 mL IntraVENous PRN Abe Reece ACNP        0.9 % sodium chloride infusion   IntraVENous PRN Abe Reece ACNP        potassium chloride (KLOR-CON M) extended release tablet 40 mEq  40

## 2025-07-04 NOTE — PLAN OF CARE
PHYSICAL THERAPY EVALUATION  Patient: Chavez Skelton (70 y.o. male)  Date: 7/4/2025  Primary Diagnosis: Gangrene of right foot (HCC) [I96]  Procedure(s) (LRB):  TRANSMETATARSAL AMPUTATION RIGHT FOOT (Right) 2 Days Post-Op   Precautions: Fall Risk, Weight Bearing Right Lower Extremity Weight Bearing: Heel Weight Bearing                    Recommendations for nursing mobility: AD and gt belt for bed to chair  and heel WB on R    In place during session: Peripheral IV and Wound vac R foot    ASSESSMENT  Pt is a 70 y.o. male admitted on 7/1/2025 for gangrene of R foot and now s/p transmetatarsal amp R foot POD2. Pt semi supine in bed upon PT arrival, agreeable to evaluation. Pt A&O x 4.  Pt is currently heel wb-ing on R foot, PT provided pt with post op shoe to be able to amb and keep dressing intact.      Based on the objective data described below, the patient currently presents with impaired functional mobility, decreased ROM, impaired strength, decreased activity tolerance, impaired balance, and increased pain levels. (See below for objective details and assist levels).     Overall pt tolerated session fair today with overall SBA with bed mob and CGA for OOB transfers.  Pt used RW for amb approx 6ft with CGA from bed to chair.  Amb distance limited due to IV lines and wound vac lines.  Pt appeared to be able to maintain heel wb-ing on R foot with UE support on RW.  No LOB during ambulation.  Pt was provided with education for donning/doffing post op shoe and importance of maintaining weight bearing precautions.  Pt with verbal understanding.  Pt will benefit from continued skilled PT to address above deficits and return to PLOF. Current PT DC recommendation Intermittent physical therapy up to 2-3x/week in previous living setting once medically appropriate.      GOALS:  FUNCTIONAL STATUS PRIOR TO ADMISSION: Patient was modified independent using a single point cane for functional mobility.    HOME SUPPORT PRIOR TO

## 2025-07-04 NOTE — PROGRESS NOTES
Hospitalist Progress Note               Daily Progress Note: 7/4/2025      Hospital Day: 4     Chief complaint: No chief complaint on file.       Subjective:   Hospital course to date:  Chavez kSelton is a 70 y.o.  male with PMHx significant for peripheral vascular disease s/p prior left great toe amputation, type 2 diabetes, hypertension, hyperlipidemia, CAD with previous MI s/p open heart surgery and stent placement (2018), CKD 3, chronic hypokalemia, anemia, hyperthyroidism, antral gastritis/GERD, obesity, depression, anxiety who presented to [facility] ED with complaints of painful right toes, foot and lower leg x 2-3 weeks.  On arrival to the [facility] ED patient was hypertensive, otherwise hemodynamically stable, afebrile, room air.  Admission labs significant for leukocytosis to 17.2, hemoglobin 10.5, potassium 3.2, creatinine 1.81 with GFR 39, glucose 187.  X-ray of right foot with suspected osteomyelitis of second middle and distal phalanges.  CXR negative for acute process.  Patient was given 3 L fluid resuscitation, 1750 mg vancomycin, 4500 mg Zosyn, blood cultures and SALVATORE obtained, podiatry consulted and recommended transfer to [place] for likely surgical intervention and further management.     Patient seen in his room resting comfortably in bed, no acute distress.  Patient is a poor historian and only able to give approximate dates, but reports his symptoms of right foot and lower leg pain, swelling, ulcerations and malodorous smell began 2-3 weeks ago.  Patient reports he has been compliant with all of his medications at home, and attempting to follow dietary modifications for diabetic, heart healthy diet.  Patient denies tobacco, EtOH or illicit substance use.  Per chart review patient follows with Dr. García, vascular surgery, PCP is Chely Meredith NP, with [facility] family medicine, Terryraphael Newman Jr. with [facility] gastroenterology.     Patient was  negative  - Tissue Gram stain reveals rare gram-positive cocci in pairs, culture pending.  - Continue to trend CBC, Pro-Everardo and CRP  - CXR negative for acute process  - Continue IV antibiotics (renally dosed Zosyn and Vanco according to pharmacy consult)  - Follows with Dr. García, consulted follow-up recs  - Podiatry consult appreciated, follow recommendations  - IV fluids  - As needed analgesics     MATT on CKD 3, resolved  Chronic hypokalemia, resolved  - Current creatinine 1.33,   1.81 upon admission  - Current potassium 3.9  - S/p 3 L fluid resuscitation     Type 2 diabetes  - Hold home glargine, Jardiance and Ozempic while n.p.o.  - Sliding scale insulin  - Hypoglycemia protocol  - Accu-Cheks AC 3 times daily and nightly     Hypertension  - Continue home nifedipine  - Resume home lisinopril due to resolution of MATT    Hyperlipidemia  - Continue home statin    CAD with previous MI s/p open heart surgery and stent placement (2018)  - Stable    Anemia    Hyperthyroidism  - On chronic methimazole    GERD  - Continue home PPI    Obesity    Depression  Anxiety          Plan:  Continuing IV antibiotics.    Awaiting PT consult.    Wound VAC changes.    Right leg angiogram once infection is reasonably under control per vascular, can be done as outpatient after discussion with vascular surgeon.    SALVATORE from outside report 0.73 on the right ankle.    Resume home lisinopril due to resolution of MATT.    Podiatry consult appreciated, follow recommendations.                  Code status: full code    Social determinants of health: none      Estimated discharge date//time frame/disposition:    Barriers to discharge:           Anshul Carranza

## 2025-07-04 NOTE — PROGRESS NOTES
Hospitalist Progress Note               Daily Progress Note: 7/4/2025      Hospital Day: 4     Chief complaint: No chief complaint on file.       Subjective:   Hospital course to date:  Chavez Skelton is a 70 y.o.  male with PMHx significant for peripheral vascular disease s/p prior left great toe amputation, type 2 diabetes, hypertension, hyperlipidemia, CAD with previous MI s/p open heart surgery and stent placement (2018), CKD 3, chronic hypokalemia, anemia, hyperthyroidism, antral gastritis/GERD, obesity, depression, anxiety who presented to [facility] ED with complaints of painful right toes, foot and lower leg x 2-3 weeks.  On arrival to the [facility] ED patient was hypertensive, otherwise hemodynamically stable, afebrile, room air.  Admission labs significant for leukocytosis to 17.2, hemoglobin 10.5, potassium 3.2, creatinine 1.81 with GFR 39, glucose 187.  X-ray of right foot with suspected osteomyelitis of second middle and distal phalanges.  CXR negative for acute process.  Patient was given 3 L fluid resuscitation, 1750 mg vancomycin, 4500 mg Zosyn, blood cultures and SALVATORE obtained, podiatry consulted and recommended transfer to [place] for likely surgical intervention and further management.     Patient seen in his room resting comfortably in bed, no acute distress.  Patient is a poor historian and only able to give approximate dates, but reports his symptoms of right foot and lower leg pain, swelling, ulcerations and malodorous smell began 2-3 weeks ago.  Patient reports he has been compliant with all of his medications at home, and attempting to follow dietary modifications for diabetic, heart healthy diet.  Patient denies tobacco, EtOH or illicit substance use.  Per chart review patient follows with Dr. García, vascular surgery, PCP is Chely Meredith NP, with [facility] family medicine, Terryraphael Newman Jr. with [facility] gastroenterology.     Patient was    CREATININE 1.14 1.25 1.33*   CALCIUM 8.9 8.8 9.0   BILITOT 0.3 0.4 0.5   AST 12* 10* 12*   ALT 16 18 14   INR 1.3*  --   --      No results for input(s): \"PHART\", \"DYV6QNJ\", \"PO2ART\", \"WLL9UGC\", \"BEART\", \"MRN4TOQY\", \"HGBART\", \"EL6CONJOR\", \"FIO2A\", \"N8OVHONE\", \"OXYHEM\", \"CARBOXHGBART\", \"METHGBART\", \"W6YQOLLUN\", \"PHCORART\", \"TEMP\" in the last 72 hours.    Invalid input(s): \"PJF5YHAXW\"    24 Hour Results:  Recent Results (from the past 24 hours)   POCT Glucose    Collection Time: 07/03/25 11:11 AM   Result Value Ref Range    POC Glucose 143 (H) 65 - 100 mg/dL    Performed by: Hilton Jose    POCT Glucose    Collection Time: 07/03/25  4:48 PM   Result Value Ref Range    POC Glucose 132 (H) 65 - 100 mg/dL    Performed by: Savannah Cunningham    POCT Glucose    Collection Time: 07/03/25  8:26 PM   Result Value Ref Range    POC Glucose 217 (H) 65 - 100 mg/dL    Performed by: Savannah Cunningham    Comprehensive Metabolic Panel    Collection Time: 07/04/25  6:56 AM   Result Value Ref Range    Sodium 140 136 - 145 mmol/L    Potassium 3.9 3.5 - 5.1 mmol/L    Chloride 109 (H) 97 - 108 mmol/L    CO2 22 21 - 32 mmol/L    Anion Gap 9 2 - 12 mmol/L    Glucose 126 (H) 65 - 100 mg/dL    BUN 11 6 - 20 mg/dL    Creatinine 1.33 (H) 0.70 - 1.30 mg/dL    BUN/Creatinine Ratio 8 (L) 12 - 20      Est, Glom Filt Rate 57 (L) >60 ml/min/1.73m2    Calcium 9.0 8.5 - 10.1 mg/dL    Total Bilirubin 0.5 0.2 - 1.0 mg/dL    AST 12 (L) 15 - 37 U/L    ALT 14 12 - 78 U/L    Alk Phosphatase 87 45 - 117 U/L    Total Protein 7.8 6.4 - 8.2 g/dL    Albumin 1.8 (L) 3.5 - 5.0 g/dL    Globulin 6.0 (H) 2.0 - 4.0 g/dL    Albumin/Globulin Ratio 0.3 (L) 1.1 - 2.2     CBC with Auto Differential    Collection Time: 07/04/25  6:56 AM   Result Value Ref Range    WBC 14.4 (H) 4.1 - 11.1 K/uL    RBC 3.61 (L) 4.10 - 5.70 M/uL    Hemoglobin 9.9 (L) 12.1 - 17.0 g/dL    Hematocrit 31.3 (L) 36.6 - 50.3 %    MCV 86.7 80.0 - 99.0 FL    MCH 27.4 26.0 - 34.0 PG    MCHC 31.6 30.0 - 36.5 g/dL

## 2025-07-05 LAB
ALBUMIN SERPL-MCNC: 1.9 G/DL (ref 3.5–5)
ALBUMIN/GLOB SERPL: 0.3 (ref 1.1–2.2)
ALP SERPL-CCNC: 80 U/L (ref 45–117)
ALT SERPL-CCNC: 10 U/L (ref 12–78)
ANION GAP SERPL CALC-SCNC: 8 MMOL/L (ref 2–12)
AST SERPL W P-5'-P-CCNC: 12 U/L (ref 15–37)
BACTERIA SPEC CULT: ABNORMAL
BACTERIA SPEC CULT: ABNORMAL
BASOPHILS # BLD: 0.05 K/UL (ref 0–0.1)
BASOPHILS NFR BLD: 0.4 % (ref 0–1)
BILIRUB SERPL-MCNC: 0.3 MG/DL (ref 0.2–1)
BUN SERPL-MCNC: 11 MG/DL (ref 6–20)
BUN/CREAT SERPL: 8 (ref 12–20)
CA-I BLD-MCNC: 9 MG/DL (ref 8.5–10.1)
CHLORIDE SERPL-SCNC: 110 MMOL/L (ref 97–108)
CO2 SERPL-SCNC: 22 MMOL/L (ref 21–32)
CREAT SERPL-MCNC: 1.32 MG/DL (ref 0.7–1.3)
CRP SERPL-MCNC: 14.4 MG/DL (ref 0–0.3)
DIFFERENTIAL METHOD BLD: ABNORMAL
EOSINOPHIL # BLD: 0.35 K/UL (ref 0–0.4)
EOSINOPHIL NFR BLD: 2.9 % (ref 0–7)
ERYTHROCYTE [DISTWIDTH] IN BLOOD BY AUTOMATED COUNT: 14 % (ref 11.5–14.5)
GLOBULIN SER CALC-MCNC: 6.3 G/DL (ref 2–4)
GLUCOSE BLD STRIP.AUTO-MCNC: 140 MG/DL (ref 65–100)
GLUCOSE BLD STRIP.AUTO-MCNC: 152 MG/DL (ref 65–100)
GLUCOSE BLD STRIP.AUTO-MCNC: 200 MG/DL (ref 65–100)
GLUCOSE BLD STRIP.AUTO-MCNC: 212 MG/DL (ref 65–100)
GLUCOSE SERPL-MCNC: 136 MG/DL (ref 65–100)
GRAM STN SPEC: ABNORMAL
GRAM STN SPEC: ABNORMAL
HCT VFR BLD AUTO: 30.8 % (ref 36.6–50.3)
HGB BLD-MCNC: 9.6 G/DL (ref 12.1–17)
IMM GRANULOCYTES # BLD AUTO: 0.13 K/UL (ref 0–0.04)
IMM GRANULOCYTES NFR BLD AUTO: 1.1 % (ref 0–0.5)
LYMPHOCYTES # BLD: 1.77 K/UL (ref 0.8–3.5)
LYMPHOCYTES NFR BLD: 14.9 % (ref 12–49)
Lab: ABNORMAL
MCH RBC QN AUTO: 27.4 PG (ref 26–34)
MCHC RBC AUTO-ENTMCNC: 31.2 G/DL (ref 30–36.5)
MCV RBC AUTO: 87.7 FL (ref 80–99)
MONOCYTES # BLD: 1.16 K/UL (ref 0–1)
MONOCYTES NFR BLD: 9.8 % (ref 5–13)
NEUTS SEG # BLD: 8.41 K/UL (ref 1.8–8)
NEUTS SEG NFR BLD: 70.9 % (ref 32–75)
NRBC # BLD: 0 K/UL (ref 0–0.01)
NRBC BLD-RTO: 0 PER 100 WBC
PERFORMED BY:: ABNORMAL
PLATELET # BLD AUTO: 339 K/UL (ref 150–400)
PMV BLD AUTO: 10 FL (ref 8.9–12.9)
POTASSIUM SERPL-SCNC: 3.9 MMOL/L (ref 3.5–5.1)
PROCALCITONIN SERPL-MCNC: 0.12 NG/ML
PROT SERPL-MCNC: 8.2 G/DL (ref 6.4–8.2)
RBC # BLD AUTO: 3.51 M/UL (ref 4.1–5.7)
SODIUM SERPL-SCNC: 140 MMOL/L (ref 136–145)
WBC # BLD AUTO: 11.9 K/UL (ref 4.1–11.1)

## 2025-07-05 PROCEDURE — 1100000000 HC RM PRIVATE

## 2025-07-05 PROCEDURE — 99232 SBSQ HOSP IP/OBS MODERATE 35: CPT | Performed by: SURGERY

## 2025-07-05 PROCEDURE — 6360000002 HC RX W HCPCS: Performed by: NURSE PRACTITIONER

## 2025-07-05 PROCEDURE — 80053 COMPREHEN METABOLIC PANEL: CPT

## 2025-07-05 PROCEDURE — 86140 C-REACTIVE PROTEIN: CPT

## 2025-07-05 PROCEDURE — 6360000002 HC RX W HCPCS: Performed by: INTERNAL MEDICINE

## 2025-07-05 PROCEDURE — 85025 COMPLETE CBC W/AUTO DIFF WBC: CPT

## 2025-07-05 PROCEDURE — 84145 PROCALCITONIN (PCT): CPT

## 2025-07-05 PROCEDURE — 36415 COLL VENOUS BLD VENIPUNCTURE: CPT

## 2025-07-05 PROCEDURE — 2580000003 HC RX 258: Performed by: INTERNAL MEDICINE

## 2025-07-05 PROCEDURE — 2580000003 HC RX 258: Performed by: NURSE PRACTITIONER

## 2025-07-05 PROCEDURE — 82962 GLUCOSE BLOOD TEST: CPT

## 2025-07-05 PROCEDURE — 99222 1ST HOSP IP/OBS MODERATE 55: CPT | Performed by: SURGERY

## 2025-07-05 PROCEDURE — 6370000000 HC RX 637 (ALT 250 FOR IP): Performed by: INTERNAL MEDICINE

## 2025-07-05 PROCEDURE — 2500000003 HC RX 250 WO HCPCS: Performed by: NURSE PRACTITIONER

## 2025-07-05 PROCEDURE — 6370000000 HC RX 637 (ALT 250 FOR IP): Performed by: NURSE PRACTITIONER

## 2025-07-05 RX ADMIN — METHIMAZOLE 5 MG: 5 TABLET ORAL at 10:49

## 2025-07-05 RX ADMIN — SENNOSIDES AND DOCUSATE SODIUM 1 TABLET: 50; 8.6 TABLET ORAL at 21:37

## 2025-07-05 RX ADMIN — POTASSIUM CHLORIDE 20 MEQ: 1500 TABLET, EXTENDED RELEASE ORAL at 21:38

## 2025-07-05 RX ADMIN — POTASSIUM CHLORIDE 20 MEQ: 1500 TABLET, EXTENDED RELEASE ORAL at 10:43

## 2025-07-05 RX ADMIN — INSULIN LISPRO 1 UNITS: 100 INJECTION, SOLUTION INTRAVENOUS; SUBCUTANEOUS at 21:36

## 2025-07-05 RX ADMIN — LISINOPRIL 20 MG: 20 TABLET ORAL at 10:42

## 2025-07-05 RX ADMIN — NIFEDIPINE 30 MG: 30 TABLET, FILM COATED, EXTENDED RELEASE ORAL at 10:43

## 2025-07-05 RX ADMIN — SODIUM CHLORIDE, PRESERVATIVE FREE 10 ML: 5 INJECTION INTRAVENOUS at 10:44

## 2025-07-05 RX ADMIN — VANCOMYCIN HYDROCHLORIDE 750 MG: 750 INJECTION, POWDER, LYOPHILIZED, FOR SOLUTION INTRAVENOUS at 05:17

## 2025-07-05 RX ADMIN — PANTOPRAZOLE SODIUM 40 MG: 40 TABLET, DELAYED RELEASE ORAL at 10:43

## 2025-07-05 RX ADMIN — PIPERACILLIN AND TAZOBACTAM 3375 MG: 3; .375 INJECTION, POWDER, LYOPHILIZED, FOR SOLUTION INTRAVENOUS at 01:12

## 2025-07-05 RX ADMIN — INSULIN LISPRO 1 UNITS: 100 INJECTION, SOLUTION INTRAVENOUS; SUBCUTANEOUS at 18:15

## 2025-07-05 RX ADMIN — PIPERACILLIN AND TAZOBACTAM 3375 MG: 3; .375 INJECTION, POWDER, LYOPHILIZED, FOR SOLUTION INTRAVENOUS at 18:18

## 2025-07-05 RX ADMIN — PIPERACILLIN AND TAZOBACTAM 3375 MG: 3; .375 INJECTION, POWDER, LYOPHILIZED, FOR SOLUTION INTRAVENOUS at 10:42

## 2025-07-05 RX ADMIN — VANCOMYCIN HYDROCHLORIDE 750 MG: 750 INJECTION, POWDER, LYOPHILIZED, FOR SOLUTION INTRAVENOUS at 16:41

## 2025-07-05 RX ADMIN — ATORVASTATIN CALCIUM 40 MG: 40 TABLET, FILM COATED ORAL at 10:42

## 2025-07-05 RX ADMIN — BUPROPION HYDROCHLORIDE 150 MG: 150 TABLET, FILM COATED, EXTENDED RELEASE ORAL at 10:42

## 2025-07-05 RX ADMIN — FLUOXETINE HYDROCHLORIDE 40 MG: 20 CAPSULE ORAL at 10:43

## 2025-07-05 RX ADMIN — EMPAGLIFLOZIN 10 MG: 10 TABLET, FILM COATED ORAL at 10:43

## 2025-07-05 NOTE — PROGRESS NOTES
PROGRESS NOTE      Chief Complaints:  No new issues.  HPI and  Objective:    No fever.  Temperature 98.2.  No nausea or chest pain.  Review of Systems:  Rest of review of system reviewed personally and they are negative.    EXAM:  BP (!) 146/74   Pulse 73   Temp 98.2 °F (36.8 °C) (Oral)   Resp 18   SpO2 97%     Patient is awake   Head and neck atraumatic, normocephalic.  ENT: No hoarse voice, gaze appropriate.  Cardiac system regular rate rhythm.  Pulmonary no audible wheeze, no cyanosis.  Chest wall excursion normal with respiration cycle  Abdomen is soft not particularly distended.  Neurologically nonfocal.  Hematology system: No bruising noted.  Musculoskeletal system: No joint deformity noted.  Genitourinary system: No hematuria noted.  Skin is warm and moist.  Psychosocial: Cooperative.  Vascular examination as previously noted no changes.    Current Facility-Administered Medications   Medication Dose Route Frequency Provider Last Rate Last Admin    [START ON 7/7/2025] Vancomycin - Please draw a trough prior to dose 7/7 @ 1600, thanks!   Other Once Brijesh Beard MD        Sodium Hypochlorite (DAKINS) 0.25 % half strength external soln   Irrigation Daily Arnaldo Nevarez, DPM        vancomycin (VANCOCIN) 750 mg in sodium chloride 0.9 % 250 mL IVPB ([id number])  750 mg IntraVENous Q12H Brijesh Beard MD   Stopped at 07/05/25 0617    Vancomycin Pharmacy Dosing   Other RX Placeholder Abe Reece, ACNP        oxyCODONE (ROXICODONE) immediate release tablet 5 mg  5 mg Oral Q6H PRN Abe Reece, ACNP        vancomycin - please draw level on 7/4 at 1400  1 each Other Once Brijesh Beard MD        sodium chloride flush 0.9 % injection 5-40 mL  5-40 mL IntraVENous 2 times per day Abe Reece, ACNP   10 mL at 07/04/25 2202    sodium chloride flush 0.9 % injection 5-40 mL  5-40 mL IntraVENous PRN Abe Reece, ACNP        0.9 % sodium chloride infusion   IntraVENous PRN Abe Reece, ACNP

## 2025-07-05 NOTE — PROGRESS NOTES
[] Major surgery/procedure with associated risk factors:    ----------------------------------------------------------------------  C. Data (any 2)  [] Discussed current management and discharge planning options with Case Management.  [] Discussed management of the case with:    [] Telemetry personally reviewed and interpreted as documented above    [] Imaging personally reviewed and interpreted, includes:    [] Data Review (any 3)  [] All available Consultant notes from yesterday/today were reviewed  [] All current labs were reviewed and interpreted for clinical significance   [] Appropriate follow-up labs were ordered  [] Collateral history obtained from:               Assessment:  Diabetic foot ulcer with suspected osteomyelitis of second middle and distal phalanges  Peripheral vascular disease s/p left great toe and right foot transmetatarsal amputation  - Leukocytosis improved to 14.4, afebrile, lactic acid not elevated, Pro-Everardo and CRP elevated  - X-ray of right foot with suspected osteomyelitis of the second middle and distal phalanges  - Wound culture showed 2+ gram-positive cocci in clusters, Culture reveals heavy Staph aureus, moderate streptococci, light mixed gram-negative rods.  Staph aureus only shows resistance to tetracycline.  - Blood cultures negative  - Tissue Gram stain reveals rare gram-positive cocci in pairs, culture reveals heavy possible Staph aureus.  - Continue to trend CBC, Pro-Everardo and CRP  - CXR negative for acute process  - Continue IV antibiotics (renally dosed Zosyn and Vanco according to pharmacy consult)  - Follows with Dr. García, consulted follow-up recs  - Podiatry consult appreciated, follow recommendations  - IV fluids  - As needed analgesics     MATT on CKD 3, resolved  Chronic hypokalemia, resolved  - Current creatinine 1.33,   1.81 upon admission  - Current potassium 3.9  - S/p 3 L fluid resuscitation     Type 2 diabetes  - Hold home glargine, Jardiance and Ozempic while  n.p.o.  - Sliding scale insulin  - Hypoglycemia protocol  - Accu-Cheks AC 3 times daily and nightly     Hypertension  - Continue home nifedipine  - Continue home lisinopril due to resolution of MATT    Hyperlipidemia  - Continue home statin    CAD with previous MI s/p open heart surgery and stent placement (2018)  - Stable    Anemia    Hyperthyroidism  - On chronic methimazole    GERD  - Continue home PPI    Obesity    Depression  Anxiety          Plan:  Awaiting morning labs.    PT consult appreciated, follow recommendations.  DC recommendation includes intermittent physical therapy up to 2-3x/week in previous living setting.  Discharge with rolling walker. Sounds like patient would decline rehab if recommended.  Patient also states he will refuse outpatient PT. States he is able to perform daily living essentials independently.    Wound VAC will need to be set up in the outpatient setting.    Patient will need angiogram.  Most likely early next week.  If patient discharged over the weekend, bring back outpatient for right leg angiogram Monday or Tuesday.  SALVATORE from outside report 0.73 on the right ankle.    Continue home lisinopril due to resolution of MATT.    Podiatry consult appreciated, follow recommendations.    Patient eager for discharge.  Case management consulted with to set up wound VAC.  Delays expected due to weekend, however process is started.    Plan to discharge on doxycycline to cover for current infections.                  Code status: full code    Social determinants of health: none      Estimated discharge date//time frame/disposition:    Barriers to discharge:           Brijesh Dixon MD

## 2025-07-05 NOTE — PROGRESS NOTES
Hospitalist Progress Note               Daily Progress Note: 7/5/2025      Hospital Day: 5     Chief complaint: No chief complaint on file.       Subjective:   Hospital course to date:  Chavez Skelton is a 70 y.o.  male with PMHx significant for peripheral vascular disease s/p prior left great toe amputation, type 2 diabetes, hypertension, hyperlipidemia, CAD with previous MI s/p open heart surgery and stent placement (2018), CKD 3, chronic hypokalemia, anemia, hyperthyroidism, antral gastritis/GERD, obesity, depression, anxiety who presented to [facility] ED with complaints of painful right toes, foot and lower leg x 2-3 weeks.  On arrival to the [facility] ED patient was hypertensive, otherwise hemodynamically stable, afebrile, room air.  Admission labs significant for leukocytosis to 17.2, hemoglobin 10.5, potassium 3.2, creatinine 1.81 with GFR 39, glucose 187.  X-ray of right foot with suspected osteomyelitis of second middle and distal phalanges.  CXR negative for acute process.  Patient was given 3 L fluid resuscitation, 1750 mg vancomycin, 4500 mg Zosyn, blood cultures and SALVATORE obtained, podiatry consulted and recommended transfer to [place] for likely surgical intervention and further management.     Patient seen in his room resting comfortably in bed, no acute distress.  Patient is a poor historian and only able to give approximate dates, but reports his symptoms of right foot and lower leg pain, swelling, ulcerations and malodorous smell began 2-3 weeks ago.  Patient reports he has been compliant with all of his medications at home, and attempting to follow dietary modifications for diabetic, heart healthy diet.  Patient denies tobacco, EtOH or illicit substance use.  Per chart review patient follows with Dr. García, vascular surgery, PCP is Chely Meredith NP, with [facility] family medicine, Terryraphael Newman Jr. with [facility] gastroenterology.     Patient was  mixed gram-negative rods.  Staph aureus only displays resistance to tetracycline.    PT consult appreciated, DC recommendation includes intermittent physical therapy up to 2-3x/week and previous living setting.  Discharge with rolling walker.    Upon exam this morning, patient declines any pain.  Remembers meeting with PT yesterday, however patient states he will refuse all PT after discharge.  States he can move just fine, able to perform all daily living essentials without PT.    Patient eager for discharge, case management consulted with to set up wound VAC at home.  Processes started, delays due to weekend expected.      Medications reviewed  Current Facility-Administered Medications   Medication Dose Route Frequency    [START ON 7/7/2025] Vancomycin - Please draw a trough prior to dose 7/7 @ 1600, thanks!   Other Once    Sodium Hypochlorite (DAKINS) 0.25 % half strength external soln   Irrigation Daily    vancomycin (VANCOCIN) 750 mg in sodium chloride 0.9 % 250 mL IVPB (Gakb4Apm)  750 mg IntraVENous Q12H    Vancomycin Pharmacy Dosing   Other RX Placeholder    oxyCODONE (ROXICODONE) immediate release tablet 5 mg  5 mg Oral Q6H PRN    vancomycin - please draw level on 7/4 at 1400  1 each Other Once    sodium chloride flush 0.9 % injection 5-40 mL  5-40 mL IntraVENous 2 times per day    sodium chloride flush 0.9 % injection 5-40 mL  5-40 mL IntraVENous PRN    0.9 % sodium chloride infusion   IntraVENous PRN    potassium chloride (KLOR-CON M) extended release tablet 40 mEq  40 mEq Oral PRN    Or    potassium bicarb-citric acid (EFFER-K) effervescent tablet 40 mEq  40 mEq Oral PRN    Or    potassium chloride 10 mEq/100 mL IVPB (Peripheral Line)  10 mEq IntraVENous PRN    magnesium sulfate 2000 mg in 50 mL IVPB premix  2,000 mg IntraVENous PRN    ondansetron (ZOFRAN-ODT) disintegrating tablet 4 mg  4 mg Oral Q8H PRN    Or    ondansetron (ZOFRAN) injection 4 mg  4 mg IntraVENous Q6H PRN    polyethylene glycol

## 2025-07-05 NOTE — CARE COORDINATION
CM notified by primary provider that patient will need wound vac for discharge.     Wound chart paperwork for [facility] in chart. CM obtained physician signature. All required clinicals and paperwork faxed to [facility] [phone]. Patient will need insurance approval for wound vac.    CM will continue to follow.

## 2025-07-06 LAB
BACTERIA SPEC CULT: NORMAL
BASOPHILS # BLD: 0.06 K/UL (ref 0–0.1)
BASOPHILS NFR BLD: 0.5 % (ref 0–1)
CRP SERPL-MCNC: 8.21 MG/DL (ref 0–0.3)
DIFFERENTIAL METHOD BLD: ABNORMAL
EOSINOPHIL # BLD: 0.38 K/UL (ref 0–0.4)
EOSINOPHIL NFR BLD: 3.5 % (ref 0–7)
ERYTHROCYTE [DISTWIDTH] IN BLOOD BY AUTOMATED COUNT: 13.9 % (ref 11.5–14.5)
GLUCOSE BLD STRIP.AUTO-MCNC: 138 MG/DL (ref 65–100)
GLUCOSE BLD STRIP.AUTO-MCNC: 148 MG/DL (ref 65–100)
GLUCOSE BLD STRIP.AUTO-MCNC: 174 MG/DL (ref 65–100)
GLUCOSE BLD STRIP.AUTO-MCNC: 182 MG/DL (ref 65–100)
GLUCOSE BLD STRIP.AUTO-MCNC: 182 MG/DL (ref 65–100)
HCT VFR BLD AUTO: 31.1 % (ref 36.6–50.3)
HGB BLD-MCNC: 9.8 G/DL (ref 12.1–17)
IMM GRANULOCYTES # BLD AUTO: 0.13 K/UL (ref 0–0.04)
IMM GRANULOCYTES NFR BLD AUTO: 1.2 % (ref 0–0.5)
LYMPHOCYTES # BLD: 1.87 K/UL (ref 0.8–3.5)
LYMPHOCYTES NFR BLD: 17 % (ref 12–49)
Lab: NORMAL
MCH RBC QN AUTO: 27.5 PG (ref 26–34)
MCHC RBC AUTO-ENTMCNC: 31.5 G/DL (ref 30–36.5)
MCV RBC AUTO: 87.4 FL (ref 80–99)
MONOCYTES # BLD: 1 K/UL (ref 0–1)
MONOCYTES NFR BLD: 9.1 % (ref 5–13)
NEUTS SEG # BLD: 7.57 K/UL (ref 1.8–8)
NEUTS SEG NFR BLD: 68.7 % (ref 32–75)
NRBC # BLD: 0 K/UL (ref 0–0.01)
NRBC BLD-RTO: 0 PER 100 WBC
PERFORMED BY:: ABNORMAL
PLATELET # BLD AUTO: 351 K/UL (ref 150–400)
PMV BLD AUTO: 9.7 FL (ref 8.9–12.9)
PROCALCITONIN SERPL-MCNC: 0.07 NG/ML
RBC # BLD AUTO: 3.56 M/UL (ref 4.1–5.7)
WBC # BLD AUTO: 11 K/UL (ref 4.1–11.1)

## 2025-07-06 PROCEDURE — 36415 COLL VENOUS BLD VENIPUNCTURE: CPT

## 2025-07-06 PROCEDURE — 2580000003 HC RX 258: Performed by: INTERNAL MEDICINE

## 2025-07-06 PROCEDURE — 85025 COMPLETE CBC W/AUTO DIFF WBC: CPT

## 2025-07-06 PROCEDURE — 2580000003 HC RX 258: Performed by: NURSE PRACTITIONER

## 2025-07-06 PROCEDURE — 6360000002 HC RX W HCPCS: Performed by: NURSE PRACTITIONER

## 2025-07-06 PROCEDURE — 82962 GLUCOSE BLOOD TEST: CPT

## 2025-07-06 PROCEDURE — 86140 C-REACTIVE PROTEIN: CPT

## 2025-07-06 PROCEDURE — 6370000000 HC RX 637 (ALT 250 FOR IP): Performed by: NURSE PRACTITIONER

## 2025-07-06 PROCEDURE — 6360000002 HC RX W HCPCS: Performed by: INTERNAL MEDICINE

## 2025-07-06 PROCEDURE — 2500000003 HC RX 250 WO HCPCS: Performed by: NURSE PRACTITIONER

## 2025-07-06 PROCEDURE — 1100000000 HC RM PRIVATE

## 2025-07-06 PROCEDURE — 6370000000 HC RX 637 (ALT 250 FOR IP): Performed by: INTERNAL MEDICINE

## 2025-07-06 PROCEDURE — 84145 PROCALCITONIN (PCT): CPT

## 2025-07-06 RX ORDER — HYDRALAZINE HYDROCHLORIDE 20 MG/ML
10 INJECTION INTRAMUSCULAR; INTRAVENOUS EVERY 6 HOURS PRN
Status: DISCONTINUED | OUTPATIENT
Start: 2025-07-06 | End: 2025-07-10 | Stop reason: HOSPADM

## 2025-07-06 RX ADMIN — BUPROPION HYDROCHLORIDE 150 MG: 150 TABLET, FILM COATED, EXTENDED RELEASE ORAL at 09:09

## 2025-07-06 RX ADMIN — VANCOMYCIN HYDROCHLORIDE 750 MG: 750 INJECTION, POWDER, LYOPHILIZED, FOR SOLUTION INTRAVENOUS at 04:08

## 2025-07-06 RX ADMIN — VANCOMYCIN HYDROCHLORIDE 750 MG: 750 INJECTION, POWDER, LYOPHILIZED, FOR SOLUTION INTRAVENOUS at 15:45

## 2025-07-06 RX ADMIN — POTASSIUM CHLORIDE 20 MEQ: 1500 TABLET, EXTENDED RELEASE ORAL at 21:40

## 2025-07-06 RX ADMIN — LISINOPRIL 20 MG: 20 TABLET ORAL at 09:08

## 2025-07-06 RX ADMIN — FLUOXETINE HYDROCHLORIDE 40 MG: 20 CAPSULE ORAL at 09:09

## 2025-07-06 RX ADMIN — PANTOPRAZOLE SODIUM 40 MG: 40 TABLET, DELAYED RELEASE ORAL at 09:08

## 2025-07-06 RX ADMIN — HYDRALAZINE HYDROCHLORIDE 10 MG: 20 INJECTION INTRAMUSCULAR; INTRAVENOUS at 20:15

## 2025-07-06 RX ADMIN — EMPAGLIFLOZIN 10 MG: 10 TABLET, FILM COATED ORAL at 09:09

## 2025-07-06 RX ADMIN — SODIUM CHLORIDE, PRESERVATIVE FREE 10 ML: 5 INJECTION INTRAVENOUS at 09:09

## 2025-07-06 RX ADMIN — PIPERACILLIN AND TAZOBACTAM 3375 MG: 3; .375 INJECTION, POWDER, LYOPHILIZED, FOR SOLUTION INTRAVENOUS at 01:24

## 2025-07-06 RX ADMIN — ATORVASTATIN CALCIUM 40 MG: 40 TABLET, FILM COATED ORAL at 09:09

## 2025-07-06 RX ADMIN — PIPERACILLIN AND TAZOBACTAM 3375 MG: 3; .375 INJECTION, POWDER, LYOPHILIZED, FOR SOLUTION INTRAVENOUS at 09:15

## 2025-07-06 RX ADMIN — PIPERACILLIN AND TAZOBACTAM 3375 MG: 3; .375 INJECTION, POWDER, LYOPHILIZED, FOR SOLUTION INTRAVENOUS at 17:06

## 2025-07-06 RX ADMIN — NIFEDIPINE 30 MG: 30 TABLET, FILM COATED, EXTENDED RELEASE ORAL at 09:09

## 2025-07-06 RX ADMIN — SODIUM CHLORIDE: 0.9 INJECTION, SOLUTION INTRAVENOUS at 01:23

## 2025-07-06 RX ADMIN — METHIMAZOLE 5 MG: 5 TABLET ORAL at 09:09

## 2025-07-06 RX ADMIN — POTASSIUM CHLORIDE 20 MEQ: 1500 TABLET, EXTENDED RELEASE ORAL at 09:08

## 2025-07-06 RX ADMIN — INSULIN LISPRO 1 UNITS: 100 INJECTION, SOLUTION INTRAVENOUS; SUBCUTANEOUS at 21:38

## 2025-07-06 NOTE — PLAN OF CARE
Problem: Chronic Conditions and Co-morbidities  Goal: Patient's chronic conditions and co-morbidity symptoms are monitored and maintained or improved  7/6/2025 1317 by Thelma Peres RN  Outcome: Progressing  Flowsheets (Taken 7/6/2025 0911 by Tia Myles RN)  Care Plan - Patient's Chronic Conditions and Co-Morbidity Symptoms are Monitored and Maintained or Improved: Monitor and assess patient's chronic conditions and comorbid symptoms for stability, deterioration, or improvement  7/5/2025 2324 by Melissa Maciel RN  Outcome: Progressing     Problem: Discharge Planning  Goal: Discharge to home or other facility with appropriate resources  7/6/2025 1317 by Thelma Peres RN  Outcome: Progressing  Flowsheets (Taken 7/6/2025 0911 by Tia Myles, ERIBERTO)  Discharge to home or other facility with appropriate resources: Arrange for needed discharge resources and transportation as appropriate  7/5/2025 2324 by Melissa Maciel RN  Outcome: Progressing     Problem: Safety - Adult  Goal: Free from fall injury  7/5/2025 2324 by Melissa Maciel RN  Outcome: Progressing     Problem: Skin/Tissue Integrity  Goal: Skin integrity remains intact  Description: 1.  Monitor for areas of redness and/or skin breakdown  2.  Assess vascular access sites hourly  3.  Every 4-6 hours minimum:  Change oxygen saturation probe site  4.  Every 4-6 hours:  If on nasal continuous positive airway pressure, respiratory therapy assess nares and determine need for appliance change or resting period  Outcome: Progressing     Problem: Pain  Goal: Verbalizes/displays adequate comfort level or baseline comfort level  7/6/2025 1317 by Thelma Peres RN  Outcome: Progressing  7/5/2025 2324 by Melissa Maciel RN  Outcome: Progressing

## 2025-07-06 NOTE — PLAN OF CARE
Problem: Discharge Planning  Goal: Discharge to home or other facility with appropriate resources  7/5/2025 2324 by Melissa Maciel RN  Outcome: Progressing  7/5/2025 1831 by Thelma Peres RN  Outcome: Progressing     Problem: Safety - Adult  Goal: Free from fall injury  7/5/2025 2324 by Melissa Maciel RN  Outcome: Progressing  7/5/2025 1831 by Thelma Peres RN  Outcome: Progressing     Problem: Pain  Goal: Verbalizes/displays adequate comfort level or baseline comfort level  7/5/2025 2324 by Melissa Maciel RN  Outcome: Progressing  7/5/2025 1831 by Thelma Peres RN  Outcome: Progressing

## 2025-07-06 NOTE — PROGRESS NOTES
Hospitalist Progress Note               Daily Progress Note: 7/6/2025      Hospital Day: 6     Chief complaint: No chief complaint on file.       Subjective:   Hospital course to date:  Chavez Skelton is a 70 y.o.  male with PMHx significant for peripheral vascular disease s/p prior left great toe amputation, type 2 diabetes, hypertension, hyperlipidemia, CAD with previous MI s/p open heart surgery and stent placement (2018), CKD 3, chronic hypokalemia, anemia, hyperthyroidism, antral gastritis/GERD, obesity, depression, anxiety who presented to [facility] ED with complaints of painful right toes, foot and lower leg x 2-3 weeks.  On arrival to the [facility] ED patient was hypertensive, otherwise hemodynamically stable, afebrile, room air.  Admission labs significant for leukocytosis to 17.2, hemoglobin 10.5, potassium 3.2, creatinine 1.81 with GFR 39, glucose 187.  X-ray of right foot with suspected osteomyelitis of second middle and distal phalanges.  CXR negative for acute process.  Patient was given 3 L fluid resuscitation, 1750 mg vancomycin, 4500 mg Zosyn, blood cultures and SALVATORE obtained, podiatry consulted and recommended transfer to [place] for likely surgical intervention and further management.     Patient seen in his room resting comfortably in bed, no acute distress.  Patient is a poor historian and only able to give approximate dates, but reports his symptoms of right foot and lower leg pain, swelling, ulcerations and malodorous smell began 2-3 weeks ago.  Patient reports he has been compliant with all of his medications at home, and attempting to follow dietary modifications for diabetic, heart healthy diet.  Patient denies tobacco, EtOH or illicit substance use.  Per chart review patient follows with Dr. García, vascular surgery, PCP is Chely Meredith NP, with [facility] family medicine, Terryraphael Newman Jr. with [facility] gastroenterology.     Patient was

## 2025-07-06 NOTE — PROGRESS NOTES
Hospitalist Progress Note               Daily Progress Note: 7/6/2025      Hospital Day: 6     Chief complaint: No chief complaint on file.       Subjective:   Hospital course to date:  Chavez Skelton is a 70 y.o.  male with PMHx significant for peripheral vascular disease s/p prior left great toe amputation, type 2 diabetes, hypertension, hyperlipidemia, CAD with previous MI s/p open heart surgery and stent placement (2018), CKD 3, chronic hypokalemia, anemia, hyperthyroidism, antral gastritis/GERD, obesity, depression, anxiety who presented to [facility] ED with complaints of painful right toes, foot and lower leg x 2-3 weeks.  On arrival to the [facility] ED patient was hypertensive, otherwise hemodynamically stable, afebrile, room air.  Admission labs significant for leukocytosis to 17.2, hemoglobin 10.5, potassium 3.2, creatinine 1.81 with GFR 39, glucose 187.  X-ray of right foot with suspected osteomyelitis of second middle and distal phalanges.  CXR negative for acute process.  Patient was given 3 L fluid resuscitation, 1750 mg vancomycin, 4500 mg Zosyn, blood cultures and SALVATORE obtained, podiatry consulted and recommended transfer to [place] for likely surgical intervention and further management.     Patient seen in his room resting comfortably in bed, no acute distress.  Patient is a poor historian and only able to give approximate dates, but reports his symptoms of right foot and lower leg pain, swelling, ulcerations and malodorous smell began 2-3 weeks ago.  Patient reports he has been compliant with all of his medications at home, and attempting to follow dietary modifications for diabetic, heart healthy diet.  Patient denies tobacco, EtOH or illicit substance use.  Per chart review patient follows with Dr. García, vascular surgery, PCP is Chely Meredith NP, with [facility] family medicine, Terryraphael Newman Jr. with [facility] gastroenterology.     Patient was  cannula    Temp (24hrs), Av.1 °F (36.7 °C), Min:97.9 °F (36.6 °C), Max:98.4 °F (36.9 °C)    701 - 1900  In: -   Out: 600 [Urine:600]   1901 - 700  In: 350 [P.O.:350]  Out: 550 [Urine:550]    Mode Rate TV Press PEEP FiO2 PIP Min. Vent                              General:   Awake and alert   Lungs:   Clear to auscultation bilaterally.   Chest wall:  No tenderness or deformity.   Heart:  Regular rate and rhythm, S1, S2 normal, no murmur, click, rub or gallop.   Abdomen:   Soft, non-tender. Bowel sounds normal. No masses,  No organomegaly.   Extremities: Right foot status post transmetatarsal amputation.  Bulky dressing in place   Pulses: 2+ and symmetric all extremities.   Skin: Skin color, texture, turgor normal. No rashes or lesions             Data Review:       Recent Days:  Recent Labs     25  0656 25  0715 25  0727   WBC 14.4* 11.9* 11.0   HGB 9.9* 9.6* 9.8*   HCT 31.3* 30.8* 31.1*    339 351     Recent Labs     25  0656 25  0715    140   K 3.9 3.9   * 110*   CO2 22 22   GLUCOSE 126* 136*   BUN 11 11   CREATININE 1.33* 1.32*   CALCIUM 9.0 9.0   BILITOT 0.5 0.3   AST 12* 12*   ALT 14 10*     No results for input(s): \"PHART\", \"AKK8JWM\", \"PO2ART\", \"PVJ5FGL\", \"BEART\", \"WYU6CMCP\", \"HGBART\", \"PY9VHFILP\", \"FIO2A\", \"D5GCPOBK\", \"OXYHEM\", \"CARBOXHGBART\", \"METHGBART\", \"W9MWOTEXU\", \"PHCORART\", \"TEMP\" in the last 72 hours.    Invalid input(s): \"MTP8VFRTB\"    24 Hour Results:  Recent Results (from the past 24 hours)   POCT Glucose    Collection Time: 25 12:03 PM   Result Value Ref Range    POC Glucose 140 (H) 65 - 100 mg/dL    Performed by: Jameel Pulido    POCT Glucose    Collection Time: 25  5:14 PM   Result Value Ref Range    POC Glucose 200 (H) 65 - 100 mg/dL    Performed by: Sal Watt    POCT Glucose    Collection Time: 25  8:16 PM   Result Value Ref Range    POC Glucose 212 (H) 65 - 100 mg/dL    Performed by: LENNOX

## 2025-07-07 LAB
ALBUMIN SERPL-MCNC: 2.1 G/DL (ref 3.5–5)
ANION GAP SERPL CALC-SCNC: 8 MMOL/L (ref 2–12)
BACTERIA SPEC CULT: NORMAL
BUN SERPL-MCNC: 12 MG/DL (ref 6–20)
BUN/CREAT SERPL: 9 (ref 12–20)
CA-I BLD-MCNC: 9.1 MG/DL (ref 8.5–10.1)
CHLORIDE SERPL-SCNC: 111 MMOL/L (ref 97–108)
CO2 SERPL-SCNC: 21 MMOL/L (ref 21–32)
CREAT SERPL-MCNC: 1.28 MG/DL (ref 0.7–1.3)
CRP SERPL-MCNC: 5.58 MG/DL (ref 0–0.3)
DATE LAST DOSE: NORMAL
DOSE AMOUNT: NORMAL UNITS
GLUCOSE BLD STRIP.AUTO-MCNC: 132 MG/DL (ref 65–100)
GLUCOSE BLD STRIP.AUTO-MCNC: 154 MG/DL (ref 65–100)
GLUCOSE BLD STRIP.AUTO-MCNC: 168 MG/DL (ref 65–100)
GLUCOSE BLD STRIP.AUTO-MCNC: 270 MG/DL (ref 65–100)
GLUCOSE SERPL-MCNC: 139 MG/DL (ref 65–100)
Lab: NORMAL
PERFORMED BY:: ABNORMAL
PHOSPHATE SERPL-MCNC: 3.3 MG/DL (ref 2.6–4.7)
POTASSIUM SERPL-SCNC: 3.7 MMOL/L (ref 3.5–5.1)
PROCALCITONIN SERPL-MCNC: 0.07 NG/ML
SODIUM SERPL-SCNC: 140 MMOL/L (ref 136–145)
VANCOMYCIN SERPL-MCNC: 19.9 UG/ML

## 2025-07-07 PROCEDURE — 86140 C-REACTIVE PROTEIN: CPT

## 2025-07-07 PROCEDURE — 2500000003 HC RX 250 WO HCPCS: Performed by: NURSE PRACTITIONER

## 2025-07-07 PROCEDURE — 6360000002 HC RX W HCPCS: Performed by: INTERNAL MEDICINE

## 2025-07-07 PROCEDURE — 1100000000 HC RM PRIVATE

## 2025-07-07 PROCEDURE — 80069 RENAL FUNCTION PANEL: CPT

## 2025-07-07 PROCEDURE — 36415 COLL VENOUS BLD VENIPUNCTURE: CPT

## 2025-07-07 PROCEDURE — 6370000000 HC RX 637 (ALT 250 FOR IP): Performed by: INTERNAL MEDICINE

## 2025-07-07 PROCEDURE — 2580000003 HC RX 258: Performed by: INTERNAL MEDICINE

## 2025-07-07 PROCEDURE — 99222 1ST HOSP IP/OBS MODERATE 55: CPT | Performed by: SURGERY

## 2025-07-07 PROCEDURE — 2580000003 HC RX 258: Performed by: NURSE PRACTITIONER

## 2025-07-07 PROCEDURE — 84145 PROCALCITONIN (PCT): CPT

## 2025-07-07 PROCEDURE — 82962 GLUCOSE BLOOD TEST: CPT

## 2025-07-07 PROCEDURE — 80202 ASSAY OF VANCOMYCIN: CPT

## 2025-07-07 PROCEDURE — 6360000002 HC RX W HCPCS: Performed by: NURSE PRACTITIONER

## 2025-07-07 PROCEDURE — 6370000000 HC RX 637 (ALT 250 FOR IP): Performed by: NURSE PRACTITIONER

## 2025-07-07 RX ORDER — DOXYCYCLINE HYCLATE 100 MG
100 TABLET ORAL 2 TIMES DAILY
Qty: 14 TABLET | Refills: 0 | Status: SHIPPED | OUTPATIENT
Start: 2025-07-07 | End: 2025-07-14

## 2025-07-07 RX ADMIN — METHIMAZOLE 5 MG: 5 TABLET ORAL at 09:41

## 2025-07-07 RX ADMIN — BUPROPION HYDROCHLORIDE 150 MG: 150 TABLET, FILM COATED, EXTENDED RELEASE ORAL at 09:40

## 2025-07-07 RX ADMIN — PIPERACILLIN AND TAZOBACTAM 3375 MG: 3; .375 INJECTION, POWDER, LYOPHILIZED, FOR SOLUTION INTRAVENOUS at 01:19

## 2025-07-07 RX ADMIN — HYDRALAZINE HYDROCHLORIDE 10 MG: 20 INJECTION INTRAMUSCULAR; INTRAVENOUS at 04:00

## 2025-07-07 RX ADMIN — HYDRALAZINE HYDROCHLORIDE 10 MG: 20 INJECTION INTRAMUSCULAR; INTRAVENOUS at 12:42

## 2025-07-07 RX ADMIN — SODIUM CHLORIDE: 0.9 INJECTION, SOLUTION INTRAVENOUS at 01:18

## 2025-07-07 RX ADMIN — INSULIN LISPRO 2 UNITS: 100 INJECTION, SOLUTION INTRAVENOUS; SUBCUTANEOUS at 22:52

## 2025-07-07 RX ADMIN — FLUOXETINE HYDROCHLORIDE 40 MG: 20 CAPSULE ORAL at 09:41

## 2025-07-07 RX ADMIN — SODIUM CHLORIDE, PRESERVATIVE FREE 10 ML: 5 INJECTION INTRAVENOUS at 09:44

## 2025-07-07 RX ADMIN — EMPAGLIFLOZIN 10 MG: 10 TABLET, FILM COATED ORAL at 09:41

## 2025-07-07 RX ADMIN — VANCOMYCIN HYDROCHLORIDE 750 MG: 750 INJECTION, POWDER, LYOPHILIZED, FOR SOLUTION INTRAVENOUS at 03:49

## 2025-07-07 RX ADMIN — PIPERACILLIN AND TAZOBACTAM 3375 MG: 3; .375 INJECTION, POWDER, LYOPHILIZED, FOR SOLUTION INTRAVENOUS at 17:20

## 2025-07-07 RX ADMIN — SODIUM CHLORIDE 1250 MG: 0.9 INJECTION, SOLUTION INTRAVENOUS at 21:47

## 2025-07-07 RX ADMIN — POTASSIUM CHLORIDE 20 MEQ: 1500 TABLET, EXTENDED RELEASE ORAL at 20:12

## 2025-07-07 RX ADMIN — LISINOPRIL 20 MG: 20 TABLET ORAL at 09:41

## 2025-07-07 RX ADMIN — PANTOPRAZOLE SODIUM 40 MG: 40 TABLET, DELAYED RELEASE ORAL at 09:41

## 2025-07-07 RX ADMIN — NIFEDIPINE 30 MG: 30 TABLET, FILM COATED, EXTENDED RELEASE ORAL at 09:41

## 2025-07-07 RX ADMIN — ATORVASTATIN CALCIUM 40 MG: 40 TABLET, FILM COATED ORAL at 09:41

## 2025-07-07 RX ADMIN — PIPERACILLIN AND TAZOBACTAM 3375 MG: 3; .375 INJECTION, POWDER, LYOPHILIZED, FOR SOLUTION INTRAVENOUS at 09:47

## 2025-07-07 RX ADMIN — POTASSIUM CHLORIDE 20 MEQ: 1500 TABLET, EXTENDED RELEASE ORAL at 09:40

## 2025-07-07 NOTE — PROGRESS NOTES
PROGRESS NOTE      Chief Complaints:  No new complaints.  HPI and  Objective:    No fever.  Review of Systems:  Rest of review of system reviewed personally and they are negative.    EXAM:  BP (!) 173/83   Pulse 80   Temp 98.6 °F (37 °C) (Oral)   Resp 17   SpO2 96%     Patient is awake   Head and neck atraumatic, normocephalic.  ENT: No hoarse voice, gaze appropriate.  Cardiac system regular rate rhythm.  Pulmonary no audible wheeze, no cyanosis.  Chest wall excursion normal with respiration cycle  Abdomen is soft not particularly distended.  Neurologically nonfocal.  Hematology system: No bruising noted.  Musculoskeletal system: No joint deformity noted.  Genitourinary system: No hematuria noted.  Skin is warm and moist.  Psychosocial: Cooperative.  Vascular examination as previously noted no changes.    Current Facility-Administered Medications   Medication Dose Route Frequency Provider Last Rate Last Admin    hydrALAZINE (APRESOLINE) injection 10 mg  10 mg IntraVENous Q6H PRN Chevy, Abe B, ACNP   10 mg at 07/07/25 1242    Vancomycin - Please draw a trough prior to dose 7/7 @ 1600, thanks!   Other Once Brijesh Beard MD        Sodium Hypochlorite (DAKINS) 0.25 % half strength external soln   Irrigation Daily Nevarez, Arnaldo, DPM        vancomycin (VANCOCIN) 750 mg in sodium chloride 0.9 % 250 mL IVPB (Zumt4Rqd)  750 mg IntraVENous Q12H Brijesh Beard MD   Stopped at 07/07/25 0459    Vancomycin Pharmacy Dosing   Other RX Placeholder Chevy, Abe B, ACNP        oxyCODONE (ROXICODONE) immediate release tablet 5 mg  5 mg Oral Q6H PRN Chevy, Abe B, ACNP        sodium chloride flush 0.9 % injection 5-40 mL  5-40 mL IntraVENous 2 times per day Chevy, Abe B, ACNP   10 mL at 07/07/25 0944    sodium chloride flush 0.9 % injection 5-40 mL  5-40 mL IntraVENous PRN Chevy, Abe B, ACNP        0.9 % sodium chloride infusion   IntraVENous PRN Chevy, Abe B, ACNP 5 mL/hr at 07/07/25 0118 New Bag at 07/07/25  mg at 07/07/25 0941    NIFEdipine (PROCARDIA XL) extended release tablet 30 mg  30 mg Oral Daily Chevy, Abe B, ACNP   30 mg at 07/07/25 0941    pantoprazole (PROTONIX) tablet 40 mg  40 mg Oral Daily Chevy, Abe B, ACNP   40 mg at 07/07/25 0941    potassium chloride (KLOR-CON M) extended release tablet 20 mEq  20 mEq Oral BID Chevy, Abe B, ACNP   20 mEq at 07/07/25 0940    glucose chewable tablet 16 g  4 tablet Oral PRN Chevy, Abe B, ACNP        dextrose bolus 10% 125 mL  125 mL IntraVENous PRN Chevy, Abe B, ACNP        Or    dextrose bolus 10% 250 mL  250 mL IntraVENous PRN Chevy, Abe B, ACNP        glucagon injection 1 mg  1 mg SubCUTAneous PRN Chevy, Abe B, ACNP        dextrose 10 % infusion   IntraVENous Continuous PRN Chevy, Abe B, ACNP        insulin lispro (HUMALOG,ADMELOG) injection vial 0-4 Units  0-4 Units SubCUTAneous 4x Daily AC & HS Chevy, Abe B, ACNP   1 Units at 07/06/25 2138    piperacillin-tazobactam (ZOSYN) 3,375 mg in sodium chloride 0.9 % 50 mL IVPB (addEASE)  3,375 mg IntraVENous q8h Chevy, Abe B, ACNP 12.5 mL/hr at 07/07/25 0947 3,375 mg at 07/07/25 0947    potassium chloride (KLOR-CON M) extended release tablet 40 mEq  40 mEq Oral Once Chevy, Abe B, ACNP               Recent Results (from the past 24 hours)   POCT Glucose    Collection Time: 07/06/25  4:32 PM   Result Value Ref Range    POC Glucose 148 (H) 65 - 100 mg/dL    Performed by: Jameel Pulido    POCT Glucose    Collection Time: 07/06/25  8:02 PM   Result Value Ref Range    POC Glucose 182 (H) 65 - 100 mg/dL    Performed by: Jami Rivera    Procalcitonin    Collection Time: 07/07/25  4:34 AM   Result Value Ref Range    Procalcitonin 0.07 (H) 0 ng/mL   C-Reactive Protein    Collection Time: 07/07/25  4:34 AM   Result Value Ref Range    CRP 5.58 (H) 0.00 - 0.30 mg/dL   Renal Function Panel    Collection Time: 07/07/25  4:34 AM   Result Value Ref Range    Sodium 140 136 - 145 mmol/L    Potassium 3.7 3.5 - 5.1

## 2025-07-07 NOTE — PROGRESS NOTES
Vancomycin Dosing Consult  Chavez Skelton is a 70 y.o. male with Diabetic foot ulcer with suspected osteomyelitis of second middle and distal phalanges . Pharmacy was consulted by Abe Reece ACNP  to dose and monitor vancomycin. Today is day 6.    Antibiotic regimen: Vancomycin + zosyn    Temp (24hrs), Av °F (36.7 °C), Min:97.5 °F (36.4 °C), Max:98.6 °F (37 °C)    Recent Labs     25  0715 25  0727 25  0434   WBC 11.9* 11.0  --    CREATININE 1.32*  --  1.28   BUN 11  --  12     Est CrCl: ~65 mL/min  Concomitant nephrotoxic drugs: None    Cultures:    Blood: NGTD x 6 days, final   Wound:  Heavy MSSA, moderate beta-hemolytic streptococci, light mixed GNRs, heavy mixed skin rafi, final   Tissue:  Heavy Staphylococcus aureus, final    MRSA Swab: Not ordered, patient already received first dose of vancomycin    Target range: AUC/-600    Recent level history:  Date/Time Dose & Interval Measured Level (mcg/mL) Associated AUC/EFE    1447 1750 mg LD () 6.1    7 1447 1000 mg q12h 17.6 510    1411 750 mg q12h 19.9 526        Assessment/Plan:   SCr stable; continue AUC-based dosing  Level resulted at 19.9, will decrease Vancomycin IV dose to 1250 mg q24h, predicted   Next level scheduled  for  @1800  Labs ordered through   Antimicrobial stop date TBD

## 2025-07-07 NOTE — WOUND CARE
MISHEL informed Dr. Nevarez via [employer] that Podiatry will need to change wound vac dressing today if it is due to be changed.  Dr. Nevarez confirmed he is out of town this week but will inform Dr. Mai.

## 2025-07-07 NOTE — PROGRESS NOTES
Hospitalist Progress Note               Daily Progress Note: 7/7/2025      Hospital Day: 7     Chief complaint: No chief complaint on file.       Subjective:   Hospital course to date:  Chavez Skelton is a 70 y.o.  male with PMHx significant for peripheral vascular disease s/p prior left great toe amputation, type 2 diabetes, hypertension, hyperlipidemia, CAD with previous MI s/p open heart surgery and stent placement (2018), CKD 3, chronic hypokalemia, anemia, hyperthyroidism, antral gastritis/GERD, obesity, depression, anxiety who presented to [facility] ED with complaints of painful right toes, foot and lower leg x 2-3 weeks.  On arrival to the [facility] ED patient was hypertensive, otherwise hemodynamically stable, afebrile, room air.  Admission labs significant for leukocytosis to 17.2, hemoglobin 10.5, potassium 3.2, creatinine 1.81 with GFR 39, glucose 187.  X-ray of right foot with suspected osteomyelitis of second middle and distal phalanges.  CXR negative for acute process.  Patient was given 3 L fluid resuscitation, 1750 mg vancomycin, 4500 mg Zosyn, blood cultures and SALVATORE obtained, podiatry consulted and recommended transfer to [place] for likely surgical intervention and further management.     Patient seen in his room resting comfortably in bed, no acute distress.  Patient is a poor historian and only able to give approximate dates, but reports his symptoms of right foot and lower leg pain, swelling, ulcerations and malodorous smell began 2-3 weeks ago.  Patient reports he has been compliant with all of his medications at home, and attempting to follow dietary modifications for diabetic, heart healthy diet.  Patient denies tobacco, EtOH or illicit substance use.  Per chart review patient follows with Dr. García, vascular surgery, PCP is Chely Meredith NP, with [facility] family medicine, Terryraphael Newman Jr. with [facility] gastroenterology.     Patient was

## 2025-07-07 NOTE — PLAN OF CARE
Problem: Chronic Conditions and Co-morbidities  Goal: Patient's chronic conditions and co-morbidity symptoms are monitored and maintained or improved  7/6/2025 2256 by Melissa Maciel RN  Outcome: Progressing  7/6/2025 1317 by Thelma Peres RN  Outcome: Progressing  Flowsheets (Taken 7/6/2025 0911 by Tia Myles RN)  Care Plan - Patient's Chronic Conditions and Co-Morbidity Symptoms are Monitored and Maintained or Improved: Monitor and assess patient's chronic conditions and comorbid symptoms for stability, deterioration, or improvement     Problem: Discharge Planning  Goal: Discharge to home or other facility with appropriate resources  7/6/2025 2256 by Melissa Maciel RN  Outcome: Progressing  7/6/2025 1317 by Thelma Peres RN  Outcome: Progressing  Flowsheets (Taken 7/6/2025 0911 by Tia Myles, ERIBERTO)  Discharge to home or other facility with appropriate resources: Arrange for needed discharge resources and transportation as appropriate     Problem: Safety - Adult  Goal: Free from fall injury  Outcome: Progressing     Problem: Pain  Goal: Verbalizes/displays adequate comfort level or baseline comfort level  7/6/2025 2256 by Melissa Maciel RN  Outcome: Progressing  7/6/2025 1317 by Thelma Peres RN  Outcome: Progressing

## 2025-07-07 NOTE — PROGRESS NOTES
Progress Note    Date:2025       Room:[facility]  Patient Name:Chavez Skelton     YOB: 1955     Age:70 y.o.    Subjective    Subjective   Patient seen at bedside.  Denies any new complaints.  Per nursing, no acute overnight events      Review of Systems  Unremarkable outside of HPI      Objective         Vitals Last 24 Hours:  TEMPERATURE:  Temp  Av °F (36.7 °C)  Min: 97.5 °F (36.4 °C)  Max: 98.6 °F (37 °C)  RESPIRATIONS RANGE: Resp  Av.3  Min: 17  Max: 19  PULSE OXIMETRY RANGE: SpO2  Av.3 %  Min: 93 %  Max: 97 %  PULSE RANGE: Pulse  Av.3  Min: 64  Max: 80  BLOOD PRESSURE RANGE: Systolic (24hrs), Av , Min:148 , Max:182   ; Diastolic (24hrs), Av, Min:76, Max:85    I/O (24Hr):    Intake/Output Summary (Last 24 hours) at 2025 1243  Last data filed at 2025 1014  Gross per 24 hour   Intake 1909.32 ml   Output 1600 ml   Net 309.32 ml     Objective  GEN: Pt is AAOx4 and in NAD. Wound vac noted to the right foot. No family noted at BS  DERM: TMA noted with staples. Wound to the dorsal foot with fibrogranular base. No purulence, malodor or proximal lymphatic streaking noted. Wound area to the dorsal right foot measures 10cmsq  VASC: Pedal pulses (DP/PT) diminished to B/L LE. CFT<3sec to all remaining digits of B/L LE. No pedal hair growth noted to the level of the digits for B/L LE. Skin temp is warm to warm from proximal to distal for B/L LE. Neg homans/yoon signs to B/L LE. No varicosities or telangectasias noted to B/L LE.  NEURO: Protective and epicritic sensations grossly absent to B/L LE  MSK: Pain on palpation right foot. Left hallux amputation left foot. Good muscle tone and bulk noted to B/L LE.  PSYCH: Cooperative with normal mood and affect       Labs/Imaging/Diagnostics    Labs:  CBC:  Recent Labs     25  0715 25  0727   WBC 11.9* 11.0   RBC 3.51* 3.56*   HGB 9.6* 9.8*   HCT 30.8* 31.1*   MCV 87.7 87.4   RDW 14.0 13.9    351

## 2025-07-07 NOTE — CARE COORDINATION
DCP: home with HH and wound vac  ANTOINE: 24 hours    1129: CM met with pt at bedside to discuss dispo. CM discussed HH and SNF options. Pt would like to dc home with HH, RW, and wound vac. Pt received call from pt daughter while CM was in the room, and CM was able to speak with daughter. Daughter is also agreeable to pt discharging home with HH and will be working on getting pt moved to [place] soon to be closer to her. CM reports understanding.     CM team will continue following.

## 2025-07-07 NOTE — PROGRESS NOTES
OT eval order received and acknowledged. OT eval attempted however pt unavailable, with other staff. Will continue to follow patient and attempt OT eval at a later time. Thank you.     Kenyatta Mooney OTR/L

## 2025-07-07 NOTE — PROGRESS NOTES
Hospitalist Progress Note               Daily Progress Note: 7/7/2025      Hospital Day: 7     Chief complaint: No chief complaint on file.       Subjective:   Hospital course to date:  Chavez Skelton is a 70 y.o.  male with PMHx significant for peripheral vascular disease s/p prior left great toe amputation, type 2 diabetes, hypertension, hyperlipidemia, CAD with previous MI s/p open heart surgery and stent placement (2018), CKD 3, chronic hypokalemia, anemia, hyperthyroidism, antral gastritis/GERD, obesity, depression, anxiety who presented to [facility] ED with complaints of painful right toes, foot and lower leg x 2-3 weeks.  On arrival to the [facility] ED patient was hypertensive, otherwise hemodynamically stable, afebrile, room air.  Admission labs significant for leukocytosis to 17.2, hemoglobin 10.5, potassium 3.2, creatinine 1.81 with GFR 39, glucose 187.  X-ray of right foot with suspected osteomyelitis of second middle and distal phalanges.  CXR negative for acute process.  Patient was given 3 L fluid resuscitation, 1750 mg vancomycin, 4500 mg Zosyn, blood cultures and SALVATORE obtained, podiatry consulted and recommended transfer to [place] for likely surgical intervention and further management.     Patient seen in his room resting comfortably in bed, no acute distress.  Patient is a poor historian and only able to give approximate dates, but reports his symptoms of right foot and lower leg pain, swelling, ulcerations and malodorous smell began 2-3 weeks ago.  Patient reports he has been compliant with all of his medications at home, and attempting to follow dietary modifications for diabetic, heart healthy diet.  Patient denies tobacco, EtOH or illicit substance use.  Per chart review patient follows with Dr. García, vascular surgery, PCP is Chely Meredith NP, with [facility] family medicine, Terryraphael Newman Jr. with [facility] gastroenterology.     Patient was  Daily AC & HS    piperacillin-tazobactam (ZOSYN) 3,375 mg in sodium chloride 0.9 % 50 mL IVPB (addEASE)  3,375 mg IntraVENous q8h    potassium chloride (KLOR-CON M) extended release tablet 40 mEq  40 mEq Oral Once       Review of Systems:   Pertinent items are noted in HPI.    Objective:   Physical Exam:     BP (!) 175/78   Pulse 73   Temp 98.1 °F (36.7 °C) (Oral)   Resp 19   SpO2 95%    O2 Device: None (Room air)    Temp (24hrs), Av.9 °F (36.6 °C), Min:97.5 °F (36.4 °C), Max:98.4 °F (36.9 °C)    No intake/output data recorded.    190 -  0700  In: 2259.3 [P.O.:680; I.V.:0.2]  Out:  [Urine:]    Mode Rate TV Press PEEP FiO2 PIP Min. Vent                              General:   Awake and alert   Lungs:   Clear to auscultation bilaterally.   Chest wall:  No tenderness or deformity.   Heart:  Regular rate and rhythm, S1, S2 normal, no murmur, click, rub or gallop.   Abdomen:   Soft, non-tender. Bowel sounds normal. No masses,  No organomegaly.   Extremities: Right foot status post transmetatarsal amputation.  Bulky dressing in place   Pulses: 2+ and symmetric all extremities.   Skin: Skin color, texture, turgor normal. No rashes or lesions             Data Review:       Recent Days:  Recent Labs     25  0715 25  0727   WBC 11.9* 11.0   HGB 9.6* 9.8*   HCT 30.8* 31.1*    351     Recent Labs     25  0715 25  0434    140   K 3.9 3.7   * 111*   CO2 22 21   GLUCOSE 136* 139*   BUN 11 12   CREATININE 1.32* 1.28   CALCIUM 9.0 9.1   PHOS  --  3.3   BILITOT 0.3  --    AST 12*  --    ALT 10*  --      No results for input(s): \"PHART\", \"YZV6VRG\", \"PO2ART\", \"MWB1VOW\", \"BEART\", \"GYC6DZKQ\", \"HGBART\", \"TO9KEDTKZ\", \"FIO2A\", \"T3TPHJYB\", \"OXYHEM\", \"CARBOXHGBART\", \"METHGBART\", \"U3VAYLZTO\", \"PHCORART\", \"TEMP\" in the last 72 hours.    Invalid input(s): \"AYU2SYUXH\"    24 Hour Results:  Recent Results (from the past 24 hours)   POCT Glucose    Collection Time: 25  8:58

## 2025-07-08 LAB
ALBUMIN SERPL-MCNC: 2.2 G/DL (ref 3.5–5)
ANION GAP SERPL CALC-SCNC: 8 MMOL/L (ref 2–12)
BUN SERPL-MCNC: 15 MG/DL (ref 6–20)
BUN/CREAT SERPL: 11 (ref 12–20)
CA-I BLD-MCNC: 9 MG/DL (ref 8.5–10.1)
CHLORIDE SERPL-SCNC: 111 MMOL/L (ref 97–108)
CO2 SERPL-SCNC: 21 MMOL/L (ref 21–32)
CREAT SERPL-MCNC: 1.41 MG/DL (ref 0.7–1.3)
CRP SERPL-MCNC: 3.96 MG/DL (ref 0–0.3)
GLUCOSE BLD STRIP.AUTO-MCNC: 106 MG/DL (ref 65–100)
GLUCOSE BLD STRIP.AUTO-MCNC: 141 MG/DL (ref 65–100)
GLUCOSE BLD STRIP.AUTO-MCNC: 152 MG/DL (ref 65–100)
GLUCOSE BLD STRIP.AUTO-MCNC: 157 MG/DL (ref 65–100)
GLUCOSE BLD STRIP.AUTO-MCNC: 172 MG/DL (ref 65–100)
GLUCOSE SERPL-MCNC: 135 MG/DL (ref 65–100)
PERFORMED BY:: ABNORMAL
PHOSPHATE SERPL-MCNC: 3.6 MG/DL (ref 2.6–4.7)
POTASSIUM SERPL-SCNC: 3.7 MMOL/L (ref 3.5–5.1)
PROCALCITONIN SERPL-MCNC: 0.07 NG/ML
SODIUM SERPL-SCNC: 140 MMOL/L (ref 136–145)

## 2025-07-08 PROCEDURE — 2580000003 HC RX 258: Performed by: INTERNAL MEDICINE

## 2025-07-08 PROCEDURE — 37232 PR REVSC OPN/PRQ TIB/PERO W/ANGIOPLASTY UNI EA VSL: CPT | Performed by: SURGERY

## 2025-07-08 PROCEDURE — 76937 US GUIDE VASCULAR ACCESS: CPT | Performed by: SURGERY

## 2025-07-08 PROCEDURE — 7100000001 HC PACU RECOVERY - ADDTL 15 MIN: Performed by: SURGERY

## 2025-07-08 PROCEDURE — 75710 ARTERY X-RAYS ARM/LEG: CPT | Performed by: SURGERY

## 2025-07-08 PROCEDURE — 99152 MOD SED SAME PHYS/QHP 5/>YRS: CPT | Performed by: SURGERY

## 2025-07-08 PROCEDURE — C1769 GUIDE WIRE: HCPCS | Performed by: SURGERY

## 2025-07-08 PROCEDURE — 6360000002 HC RX W HCPCS: Performed by: NURSE PRACTITIONER

## 2025-07-08 PROCEDURE — 37228 HC TIB PER TERRITORY PLASTY: CPT | Performed by: SURGERY

## 2025-07-08 PROCEDURE — 2500000003 HC RX 250 WO HCPCS: Performed by: NURSE PRACTITIONER

## 2025-07-08 PROCEDURE — 6370000000 HC RX 637 (ALT 250 FOR IP): Performed by: NURSE PRACTITIONER

## 2025-07-08 PROCEDURE — 37228 PR REVSC OPN/PRQ TIB/PERO W/ANGIOPLASTY UNI: CPT | Performed by: SURGERY

## 2025-07-08 PROCEDURE — 37224 HC PLASTY UNI FEMPOP: CPT | Performed by: SURGERY

## 2025-07-08 PROCEDURE — 6360000002 HC RX W HCPCS: Performed by: INTERNAL MEDICINE

## 2025-07-08 PROCEDURE — 84145 PROCALCITONIN (PCT): CPT

## 2025-07-08 PROCEDURE — C1894 INTRO/SHEATH, NON-LASER: HCPCS | Performed by: SURGERY

## 2025-07-08 PROCEDURE — 75625 CONTRAST EXAM ABDOMINL AORTA: CPT | Performed by: SURGERY

## 2025-07-08 PROCEDURE — 6370000000 HC RX 637 (ALT 250 FOR IP): Performed by: SURGERY

## 2025-07-08 PROCEDURE — 82962 GLUCOSE BLOOD TEST: CPT

## 2025-07-08 PROCEDURE — 2580000003 HC RX 258: Performed by: NURSE PRACTITIONER

## 2025-07-08 PROCEDURE — C1725 CATH, TRANSLUMIN NON-LASER: HCPCS | Performed by: SURGERY

## 2025-07-08 PROCEDURE — 2709999900 HC NON-CHARGEABLE SUPPLY: Performed by: SURGERY

## 2025-07-08 PROCEDURE — 36245 INS CATH ABD/L-EXT ART 1ST: CPT | Performed by: SURGERY

## 2025-07-08 PROCEDURE — C1760 CLOSURE DEV, VASC: HCPCS | Performed by: SURGERY

## 2025-07-08 PROCEDURE — 99153 MOD SED SAME PHYS/QHP EA: CPT | Performed by: SURGERY

## 2025-07-08 PROCEDURE — 86140 C-REACTIVE PROTEIN: CPT

## 2025-07-08 PROCEDURE — 7100000000 HC PACU RECOVERY - FIRST 15 MIN: Performed by: SURGERY

## 2025-07-08 PROCEDURE — C1887 CATHETER, GUIDING: HCPCS | Performed by: SURGERY

## 2025-07-08 PROCEDURE — 36415 COLL VENOUS BLD VENIPUNCTURE: CPT

## 2025-07-08 PROCEDURE — 6360000004 HC RX CONTRAST MEDICATION: Performed by: SURGERY

## 2025-07-08 PROCEDURE — 2060000000 HC ICU INTERMEDIATE R&B

## 2025-07-08 PROCEDURE — 80069 RENAL FUNCTION PANEL: CPT

## 2025-07-08 PROCEDURE — 99222 1ST HOSP IP/OBS MODERATE 55: CPT | Performed by: SURGERY

## 2025-07-08 PROCEDURE — 6360000002 HC RX W HCPCS: Performed by: SURGERY

## 2025-07-08 RX ORDER — HEPARIN SODIUM 200 [USP'U]/100ML
INJECTION, SOLUTION INTRAVENOUS CONTINUOUS PRN
Status: COMPLETED | OUTPATIENT
Start: 2025-07-08 | End: 2025-07-08

## 2025-07-08 RX ORDER — HEPARIN SODIUM 1000 [USP'U]/ML
INJECTION, SOLUTION INTRAVENOUS; SUBCUTANEOUS PRN
Status: DISCONTINUED | OUTPATIENT
Start: 2025-07-08 | End: 2025-07-08 | Stop reason: HOSPADM

## 2025-07-08 RX ORDER — HYDRALAZINE HYDROCHLORIDE 20 MG/ML
INJECTION INTRAMUSCULAR; INTRAVENOUS PRN
Status: DISCONTINUED | OUTPATIENT
Start: 2025-07-08 | End: 2025-07-08 | Stop reason: HOSPADM

## 2025-07-08 RX ORDER — LIDOCAINE HYDROCHLORIDE 20 MG/ML
INJECTION, SOLUTION INFILTRATION; PERINEURAL PRN
Status: DISCONTINUED | OUTPATIENT
Start: 2025-07-08 | End: 2025-07-08 | Stop reason: HOSPADM

## 2025-07-08 RX ORDER — OXYCODONE AND ACETAMINOPHEN 10; 325 MG/1; MG/1
1 TABLET ORAL ONCE
Refills: 0 | Status: COMPLETED | OUTPATIENT
Start: 2025-07-08 | End: 2025-07-08

## 2025-07-08 RX ORDER — METOPROLOL TARTRATE 1 MG/ML
INJECTION, SOLUTION INTRAVENOUS PRN
Status: DISCONTINUED | OUTPATIENT
Start: 2025-07-08 | End: 2025-07-08 | Stop reason: HOSPADM

## 2025-07-08 RX ORDER — IOPAMIDOL 755 MG/ML
INJECTION, SOLUTION INTRAVASCULAR PRN
Status: DISCONTINUED | OUTPATIENT
Start: 2025-07-08 | End: 2025-07-08 | Stop reason: HOSPADM

## 2025-07-08 RX ORDER — MIDAZOLAM HYDROCHLORIDE 1 MG/ML
INJECTION, SOLUTION INTRAMUSCULAR; INTRAVENOUS PRN
Status: DISCONTINUED | OUTPATIENT
Start: 2025-07-08 | End: 2025-07-08 | Stop reason: HOSPADM

## 2025-07-08 RX ORDER — FENTANYL CITRATE 50 UG/ML
INJECTION, SOLUTION INTRAMUSCULAR; INTRAVENOUS PRN
Status: DISCONTINUED | OUTPATIENT
Start: 2025-07-08 | End: 2025-07-08 | Stop reason: HOSPADM

## 2025-07-08 RX ADMIN — PIPERACILLIN AND TAZOBACTAM 3375 MG: 3; .375 INJECTION, POWDER, LYOPHILIZED, FOR SOLUTION INTRAVENOUS at 17:51

## 2025-07-08 RX ADMIN — SODIUM CHLORIDE, PRESERVATIVE FREE 10 ML: 5 INJECTION INTRAVENOUS at 20:54

## 2025-07-08 RX ADMIN — OXYCODONE HYDROCHLORIDE AND ACETAMINOPHEN 1 TABLET: 10; 325 TABLET ORAL at 14:08

## 2025-07-08 RX ADMIN — SODIUM CHLORIDE 1250 MG: 0.9 INJECTION, SOLUTION INTRAVENOUS at 20:51

## 2025-07-08 RX ADMIN — PIPERACILLIN AND TAZOBACTAM 3375 MG: 3; .375 INJECTION, POWDER, LYOPHILIZED, FOR SOLUTION INTRAVENOUS at 02:20

## 2025-07-08 RX ADMIN — POTASSIUM CHLORIDE 20 MEQ: 1500 TABLET, EXTENDED RELEASE ORAL at 20:51

## 2025-07-08 RX ADMIN — PIPERACILLIN AND TAZOBACTAM 3375 MG: 3; .375 INJECTION, POWDER, LYOPHILIZED, FOR SOLUTION INTRAVENOUS at 11:33

## 2025-07-08 RX ADMIN — SODIUM CHLORIDE, PRESERVATIVE FREE 10 ML: 5 INJECTION INTRAVENOUS at 11:31

## 2025-07-08 RX ADMIN — HYDRALAZINE HYDROCHLORIDE 10 MG: 20 INJECTION INTRAMUSCULAR; INTRAVENOUS at 03:50

## 2025-07-08 ASSESSMENT — PAIN SCALES - GENERAL
PAINLEVEL_OUTOF10: 0
PAINLEVEL_OUTOF10: 0

## 2025-07-08 NOTE — PERIOP NOTE
Dr. García aware of patient systolic BP 180s, states to give percocet. No further orders at this time.

## 2025-07-08 NOTE — PROGRESS NOTES
PROGRESS NOTE      Chief Complaints:  No new complaint.  HPI and  Objective:    No fever.  Review of Systems:  Rest of review of system reviewed personally and they are negative.    EXAM:  BP (!) 174/78 Comment: notified nurse Andra.  Pulse 70   Temp 98.4 °F (36.9 °C) (Oral)   Resp 14   SpO2 99%     Patient is awake   Head and neck atraumatic, normocephalic.  ENT: No hoarse voice, gaze appropriate.  Cardiac system regular rate rhythm.  Pulmonary no audible wheeze, no cyanosis.  Chest wall excursion normal with respiration cycle  Abdomen is soft not particularly distended.  Neurologically nonfocal.  Hematology system: No bruising noted.  Musculoskeletal system: No joint deformity noted.  Genitourinary system: No hematuria noted.  Skin is warm and moist.  Psychosocial: Cooperative.  Vascular examination as previously noted no changes.    Current Facility-Administered Medications   Medication Dose Route Frequency Provider Last Rate Last Admin    vancomycin (VANCOCIN) 1,250 mg in sodium chloride 0.9 % 250 mL IVPB ([id number])  1,250 mg IntraVENous Q24H Brijesh Beard MD   Stopped at 07/07/25 7585    [START ON 7/9/2025] Vancomycin Level- Please draw level on 7/9 @1800   Other Once Brijesh Beard MD        hydrALAZINE (APRESOLINE) injection 10 mg  10 mg IntraVENous Q6H PRN Abe Reece, ACNP   10 mg at 07/08/25 0350    Sodium Hypochlorite (DAKINS) 0.25 % half strength external soln   Irrigation Daily Arnaldo Nevarez, RINA        Vancomycin Pharmacy Dosing   Other RX Placeholder Javier Reecen B, ACNP        oxyCODONE (ROXICODONE) immediate release tablet 5 mg  5 mg Oral Q6H PRN ChevyJaviern B, ACNP        sodium chloride flush 0.9 % injection 5-40 mL  5-40 mL IntraVENous 2 times per day ChevyJaviern B, ACNP   10 mL at 07/07/25 0944    sodium chloride flush 0.9 % injection 5-40 mL  5-40 mL IntraVENous PRN ChevyAbe B, ACNP        0.9 % sodium chloride infusion   IntraVENous PRN ChevyAbe B, ACNP   Stopped

## 2025-07-08 NOTE — PROGRESS NOTES
PT treatment session attempted at 1058, however pt declined tx session reporting he will walk around when he gets out of here and does not want to walk right now. Pt reports being frustrated that he is still in the hospital and feels as though he keeps being told lies. PTA talked with Case Management about barriers to discharge for pt, and answer was relayed back to pt. 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 PTA spoke with RN about situation and RN reports she will address situation with pt. Will continue to check on pt and see them for treatment session at a later time. Thank you.

## 2025-07-08 NOTE — PROGRESS NOTES
OT eval order received and acknowledged. OT eval attempted at 1323 however pt off floor for right leg angiogram. Will continue to follow patient and attempt OT eval at a later time. Thank you.

## 2025-07-08 NOTE — PROGRESS NOTES
4 Eyes Skin Assessment     NAME:  Chavez Skelton  YOB: 1955  MEDICAL RECORD NUMBER:  [id number]    The patient is being assessed for  Transfer to New Unit    I agree that at least one RN has performed a thorough Head to Toe Skin Assessment on the patient. ALL assessment sites listed below have been assessed.      Areas assessed by both nurses:    Head, Face, Ears, Shoulders, Back, Chest, Arms, Elbows, Hands, Sacrum. Buttock, Coccyx, Ischium, Legs. Feet and Heels, and Under Medical Devices         Does the Patient have a Wound? Yes wound(s) were present on assessment. LDA wound assessment was Initiated and completed by RN     Wound vacuum in R foot     Puncture site in L groin from angiogram    Cj Prevention initiated by RN: Yes  Wound Care Orders initiated by RN: Yes    Pressure Injury (Stage 1,2,3,4, Unstageable, DTI, NWPT, and Complex wounds) if present, place Wound referral order by RN under : No    New Ostomies, if present place, Ostomy referral order under : No     Nurse 1 eSignature: Electronically signed by Nona Herrera RN on 7/8/25 at 3:10 PM EDT    **SHARE this note so that the co-signing nurse can place an eSignature**    Nurse 2 eSignature: {Esignature:[id number]}

## 2025-07-08 NOTE — DISCHARGE SUMMARY
Hospitalist Discharge Summary     Patient ID:  Chavez Skelton  [id number]  70 y.o.  1955 7/1/2025    PCP on record: Chely Meredith APRN - [facility]    Admit date: 7/1/2025  Discharge date and time: 7/8/2025    DISCHARGE DIAGNOSIS:    Diabetic foot ulcer with suspected osteomyelitis of second middle and distal phalanges  Peripheral vascular disease s/p left great toe and right foot transmetatarsal amputation  MATT on CKD 3, resolved  Chronic hypokalemia, resolved  Hyperglycemia type 2 diabetes  Hypertension  Hyperlipidemia  CAD status post stent  Status post CABG  Anemia  Hypothyroidism  Gastroesophageal reflux disease  Obesity  Depression  Anxiety    CONSULTATIONS:  IP CONSULT TO PHARMACY  IP CONSULT TO PODIATRY  IP CONSULT TO VASCULAR SURGERY    Excerpted HPI from H&P of Brijesh Beard MD:  CHIEF COMPLAINT: Right foot pain     HISTORY OF PRESENT ILLNESS:     Chavez Skelton is a 70 y.o.  male with PMHx significant for peripheral vascular disease s/p prior left great toe amputation, type 2 diabetes, hypertension, hyperlipidemia, CAD with previous MI s/p open heart surgery and stent placement (2018), CKD 3, chronic hypokalemia, anemia, hyperthyroidism, antral gastritis/GERD, obesity, depression, anxiety [facility] of painful right toes, foot and lower leg x 2-3 weeks.  [facility] patient was hypertensive, otherwise hemodynamically stable, afebrile, room air.  Admission labs significant for leukocytosis to 17.2, hemoglobin 10.5, potassium 3.2, creatinine 1.81 with GFR 39, glucose 187.  X-ray of right foot with suspected osteomyelitis of second middle and distal phalanges.  CXR negative for acute process.  Patient was given 3 L fluid resuscitation, 1750 mg vancomycin, 4500 mg Zosyn, blood cultures and SALVATORE obtained, podiatry consulted and recommended transfer to [place] for likely surgical intervention and further  management.     Patient seen in his room resting comfortably in bed, no acute distress.  Patient is a poor historian and only able to give approximate dates, but reports his symptoms of right foot and lower leg pain, swelling, ulcerations and malodorous smell began 2-3 weeks ago.  Patient reports he has been compliant with all of his medications at home, and attempting to follow dietary modifications for diabetic, heart healthy diet.  Patient denies tobacco, EtOH or illicit substance use.  Per chart review patient follows with Dr. García, vascular surgery, PCP is Chely Meredith NP, with [facility] family medicine, Terryraphael Newman Jr. with [facility] gastroenterology.    ______________________________________________________________________  DISCHARGE SUMMARY/HOSPITAL COURSE:  for full details see H&P, daily progress notes, labs, consult notes.     Chavez Skelton is a 70 y.o.  male with PMHx significant for peripheral vascular disease s/p prior left great toe amputation, type 2 diabetes, hypertension, hyperlipidemia, CAD with previous MI s/p open heart surgery and stent placement (2018), CKD 3, chronic hypokalemia, anemia, hyperthyroidism, antral gastritis/GERD, obesity, depression, anxiety who presented to [facility] ED with complaints of painful right toes, foot and lower leg x 2-3 weeks.  On arrival to the [facility] ED patient was hypertensive, otherwise hemodynamically stable, afebrile, room air.  Admission labs significant for leukocytosis to 17.2, hemoglobin 10.5, potassium 3.2, creatinine 1.81 with GFR 39, glucose 187.  X-ray of right foot with suspected osteomyelitis of second middle and distal phalanges.  CXR negative for acute process.  Patient was given 3 L fluid resuscitation, 1750 mg vancomycin, 4500 mg Zosyn, blood cultures and SALVATORE obtained, podiatry consulted and recommended transfer to [place] for likely surgical intervention and further management.     Patient seen

## 2025-07-08 NOTE — CARE COORDINATION
DCP: home with HH and home wound vac provided by [facility]  ANTOINE: 24 hours    DC order to home with HH pending angiogram and vascular surgery clearance.     1021: CM received call from [facility] rep informing CM that pt's home wound vac to be delivered today to bedside.     CM team will continue following.

## 2025-07-09 LAB
ALBUMIN SERPL-MCNC: 2.2 G/DL (ref 3.5–5)
ANION GAP SERPL CALC-SCNC: 7 MMOL/L (ref 2–12)
BUN SERPL-MCNC: 18 MG/DL (ref 6–20)
BUN/CREAT SERPL: 12 (ref 12–20)
CA-I BLD-MCNC: 8.8 MG/DL (ref 8.5–10.1)
CHLORIDE SERPL-SCNC: 112 MMOL/L (ref 97–108)
CO2 SERPL-SCNC: 20 MMOL/L (ref 21–32)
CREAT SERPL-MCNC: 1.52 MG/DL (ref 0.7–1.3)
DATE LAST DOSE: NORMAL
DOSE AMOUNT: NORMAL UNITS
GLUCOSE BLD STRIP.AUTO-MCNC: 154 MG/DL (ref 65–100)
GLUCOSE BLD STRIP.AUTO-MCNC: 162 MG/DL (ref 65–100)
GLUCOSE BLD STRIP.AUTO-MCNC: 168 MG/DL (ref 65–100)
GLUCOSE BLD STRIP.AUTO-MCNC: 211 MG/DL (ref 65–100)
GLUCOSE SERPL-MCNC: 190 MG/DL (ref 65–100)
PERFORMED BY:: ABNORMAL
PHOSPHATE SERPL-MCNC: 3.3 MG/DL (ref 2.6–4.7)
POTASSIUM SERPL-SCNC: 3.9 MMOL/L (ref 3.5–5.1)
SODIUM SERPL-SCNC: 139 MMOL/L (ref 136–145)
VANCOMYCIN SERPL-MCNC: 18.5 UG/ML

## 2025-07-09 PROCEDURE — 6370000000 HC RX 637 (ALT 250 FOR IP): Performed by: NURSE PRACTITIONER

## 2025-07-09 PROCEDURE — 80069 RENAL FUNCTION PANEL: CPT

## 2025-07-09 PROCEDURE — 82962 GLUCOSE BLOOD TEST: CPT

## 2025-07-09 PROCEDURE — 2580000003 HC RX 258: Performed by: NURSE PRACTITIONER

## 2025-07-09 PROCEDURE — 36415 COLL VENOUS BLD VENIPUNCTURE: CPT

## 2025-07-09 PROCEDURE — 6370000000 HC RX 637 (ALT 250 FOR IP): Performed by: INTERNAL MEDICINE

## 2025-07-09 PROCEDURE — 2500000003 HC RX 250 WO HCPCS: Performed by: NURSE PRACTITIONER

## 2025-07-09 PROCEDURE — 2060000000 HC ICU INTERMEDIATE R&B

## 2025-07-09 PROCEDURE — 80202 ASSAY OF VANCOMYCIN: CPT

## 2025-07-09 PROCEDURE — 6360000002 HC RX W HCPCS: Performed by: NURSE PRACTITIONER

## 2025-07-09 RX ADMIN — INSULIN LISPRO 1 UNITS: 100 INJECTION, SOLUTION INTRAVENOUS; SUBCUTANEOUS at 17:15

## 2025-07-09 RX ADMIN — SODIUM CHLORIDE, PRESERVATIVE FREE 10 ML: 5 INJECTION INTRAVENOUS at 09:59

## 2025-07-09 RX ADMIN — HYDRALAZINE HYDROCHLORIDE 10 MG: 20 INJECTION INTRAMUSCULAR; INTRAVENOUS at 01:54

## 2025-07-09 RX ADMIN — BUPROPION HYDROCHLORIDE 150 MG: 150 TABLET, FILM COATED, EXTENDED RELEASE ORAL at 09:59

## 2025-07-09 RX ADMIN — METHIMAZOLE 5 MG: 5 TABLET ORAL at 10:04

## 2025-07-09 RX ADMIN — PIPERACILLIN AND TAZOBACTAM 3375 MG: 3; .375 INJECTION, POWDER, LYOPHILIZED, FOR SOLUTION INTRAVENOUS at 18:05

## 2025-07-09 RX ADMIN — FLUOXETINE HYDROCHLORIDE 40 MG: 20 CAPSULE ORAL at 09:59

## 2025-07-09 RX ADMIN — NIFEDIPINE 30 MG: 30 TABLET, FILM COATED, EXTENDED RELEASE ORAL at 09:58

## 2025-07-09 RX ADMIN — PIPERACILLIN AND TAZOBACTAM 3375 MG: 3; .375 INJECTION, POWDER, LYOPHILIZED, FOR SOLUTION INTRAVENOUS at 01:55

## 2025-07-09 RX ADMIN — HYDRALAZINE HYDROCHLORIDE 10 MG: 20 INJECTION INTRAMUSCULAR; INTRAVENOUS at 14:11

## 2025-07-09 RX ADMIN — LISINOPRIL 20 MG: 20 TABLET ORAL at 09:59

## 2025-07-09 RX ADMIN — EMPAGLIFLOZIN 10 MG: 10 TABLET, FILM COATED ORAL at 09:59

## 2025-07-09 RX ADMIN — PIPERACILLIN AND TAZOBACTAM 3375 MG: 3; .375 INJECTION, POWDER, LYOPHILIZED, FOR SOLUTION INTRAVENOUS at 10:09

## 2025-07-09 RX ADMIN — POTASSIUM CHLORIDE 20 MEQ: 1500 TABLET, EXTENDED RELEASE ORAL at 09:59

## 2025-07-09 RX ADMIN — PANTOPRAZOLE SODIUM 40 MG: 40 TABLET, DELAYED RELEASE ORAL at 09:59

## 2025-07-09 RX ADMIN — ATORVASTATIN CALCIUM 40 MG: 40 TABLET, FILM COATED ORAL at 09:59

## 2025-07-09 ASSESSMENT — PAIN SCALES - GENERAL: PAINLEVEL_OUTOF10: 0

## 2025-07-09 ASSESSMENT — PAIN SCALES - WONG BAKER: WONGBAKER_NUMERICALRESPONSE: NO HURT

## 2025-07-09 NOTE — PROGRESS NOTES
Comprehensive Nutrition Assessment    Type and Reason for Visit:  Initial, LOS    Nutrition Recommendations/Plan:   Continue Current Diet  Encourage PO intakes >50%  Monitor/document wt, BM, PO% in I/O     Malnutrition Assessment:  Malnutrition Status:  No malnutrition (07/09/25 1312)    Context:  Acute Illness     Findings of the 6 clinical characteristics of malnutrition:  Energy Intake:  No decrease in energy intake  Weight Loss:  No weight loss     Body Fat Loss:  No body fat loss     Muscle Mass Loss:  No muscle mass loss    Fluid Accumulation:  Mild     Strength:  Not Performed    Nutrition Assessment:    pt p/w: complaints of painful right toes, foot and lower leg x 2-3 weeks; 7/2 surgery amputation TMA right foot; 7/8 scheduled for right leg angiogram, no documentation of this; wound vac has been delivered bedside, d/c orders pending; visited pt today for LOS; NFPE no acute findings; pt reported good appetite and good intake, consistent with 51-75% po intake documentation; pt reported no n/v/d; no nutritional concerns at this time; labs and meds reviewed, Glucose elevated; pt on statin, protonix    Nutrition Related Findings:    NFPE no acute findings; No edema; Surgial incision from amputation; ; RLE trace edema Wound Type: Surgical Incision       Current Nutrition Intake & Therapies:    Average Meal Intake: 51-75%  Average Supplements Intake: None Ordered  ADULT DIET; Regular; 4 carb choices (60 gm/meal); Low Fat/Low Chol/High Fiber/2 gm Na    Anthropometric Measures:  Height: 182.9 cm (6' 0.01\")  Ideal Body Weight (IBW): 178 lbs (81 kg)       Current Body Weight: 104.7 kg (230 lb 13.2 oz), 129.7 % IBW. Weight Source: Bed scale  Current BMI (kg/m2): 31.3                             BMI Categories: Obese Class 1 (BMI 30.0-34.9)    Estimated Daily Nutrient Needs:  Energy Requirements Based On: Kcal/kg     Energy (kcal/day): 1,885-2,303 (18-22kcals/kg)     Protein (g/day): 84  Method Used for Fluid

## 2025-07-09 NOTE — PLAN OF CARE
Problem: Nutrition Deficit:  Goal: Optimize nutritional status  Flowsheets (Taken 7/9/2025 1325)  Nutrient intake appropriate for improving, restoring, or maintaining nutritional needs:   Assess nutritional status and recommend course of action   Recommend appropriate diets, oral nutritional supplements, and vitamin/mineral supplements   Monitor oral intake, labs, and treatment plans

## 2025-07-09 NOTE — PROGRESS NOTES
Pt refused PT this afternoon, appeared irritated and fixated on d/c planning. Patient very adamant to not work with therapy today and stated he \"doesn't see the point\". Education and encouragement provided and pt cont to decline.

## 2025-07-09 NOTE — PROGRESS NOTES
The Antimicrobial Stewardship Team has reviewed current therapy.  Patient is on Zosyn and Vancomycin for MSSA in R foot.  Patient has no history of MRSA and no MRSA resulted on C&S.  Consider d/c vancomycin, as it is associated with an increased risk of developing C.difficile, along with developing resistance.

## 2025-07-09 NOTE — DISCHARGE SUMMARY
Hospitalist Discharge Summary     Patient ID:  Chavez Skelton  [id number]  70 y.o.  1955 7/1/2025    PCP on record: Chely Meredith APRN - [facility]    Admit date: 7/1/2025  Discharge date and time: 7/9/2025    DISCHARGE DIAGNOSIS:    Diabetic foot ulcer with suspected osteomyelitis of second middle and distal phalanges  Peripheral vascular disease s/p left great toe and right foot transmetatarsal amputation  MATT on CKD 3, resolved  Chronic hypokalemia, resolved  Hyperglycemia type 2 diabetes  Hypertension  Hyperlipidemia  CAD status post stent  Status post CABG  Anemia  Hypothyroidism  Gastroesophageal reflux disease  Obesity  Depression  Anxiety    CONSULTATIONS:  IP CONSULT TO PHARMACY  IP CONSULT TO PODIATRY  IP CONSULT TO VASCULAR SURGERY    Excerpted HPI from H&P of Brijesh Beard MD:  CHIEF COMPLAINT: Right foot pain     HISTORY OF PRESENT ILLNESS:     Chavez Skelton is a 70 y.o.  male with PMHx significant for peripheral vascular disease s/p prior left great toe amputation, type 2 diabetes, hypertension, hyperlipidemia, CAD with previous MI s/p open heart surgery and stent placement (2018), CKD 3, chronic hypokalemia, anemia, hyperthyroidism, antral gastritis/GERD, obesity, depression, anxiety who presented to [facility] ED with complaints of painful right toes, foot and lower leg x 2-3 weeks.  On arrival to the [facility] ED patient was hypertensive, otherwise hemodynamically stable, afebrile, room air.  Admission labs significant for leukocytosis to 17.2, hemoglobin 10.5, potassium 3.2, creatinine 1.81 with GFR 39, glucose 187.  X-ray of right foot with suspected osteomyelitis of second middle and distal phalanges.  CXR negative for acute process.  Patient was given 3 L fluid resuscitation, 1750 mg vancomycin, 4500 mg Zosyn, blood cultures and SALVATORE obtained, podiatry consulted and recommended transfer to [place] for likely surgical intervention and further  clearance by vascular surgery patient was discharged with close outpatient follow-up.    7/9 Patient seen and evaluated at bedside, overnight events reviewed, patient had LE angiogram, remains stable for discharge once cleared by Case Management    _______________________________________________________________________  Patient seen and examined by me on discharge day.  Pertinent Findings:  Gen:    Not in distress  Chest: Clear lungs  CVS:   Regular rhythm, s1/s2 no m/r/g  No edema  Abd:  Soft, not distended, not tender  Neuro:  Alert, oriented at baseline  _______________________________________________________________________  DISCHARGE MEDICATIONS:      Medication List        START taking these medications      doxycycline hyclate 100 MG tablet  Commonly known as: VIBRA-TABS  Take 1 tablet by mouth 2 times daily for 7 days            CONTINUE taking these medications      atorvastatin 40 MG tablet  Commonly known as: LIPITOR  Take 1 tablet by mouth daily     buPROPion 150 MG extended release tablet  Commonly known as: WELLBUTRIN SR  Take 1 tablet by mouth daily     diclofenac 50 MG EC tablet  Commonly known as: VOLTAREN  Take 1 tablet by mouth 2 times daily     empagliflozin 25 MG tablet  Commonly known as: Jardiance  Take 1 tablet by mouth daily     FLUoxetine 40 MG capsule  Commonly known as: PROzac  Take 1 capsule by mouth daily     insulin glargine 100 UNIT/ML injection vial  Commonly known as: LANTUS     lisinopril 20 MG tablet  Commonly known as: PRINIVIL;ZESTRIL  Take 1 tablet by mouth daily     methIMAzole 5 MG tablet  Commonly known as: TAPAZOLE  Take 1 tablet by mouth daily     NIFEdipine 30 MG extended release tablet  Commonly known as: ADALAT CC  Take 1 tablet by mouth daily     Ozempic (1 MG/DOSE) 4 MG/3ML Sopn sc injection  Generic drug: Semaglutide (1 MG/DOSE)  Inject 1 mg into the skin once a week     pantoprazole 40 MG tablet  Commonly known as: PROTONIX  Take 1 tablet by mouth daily

## 2025-07-09 NOTE — CARE COORDINATION
Patient has discharge orders but needs home health arranged first. His wound vac was delivered to the bedside, however, there is no OP service or HH set up yet. JINNY is currently working to arrange this.     CM unable to find an accepting home health agency to see the patient. Spoke with Dr. Miller who is willing to see patient in his office for would vac dressing changes twice a week. Patient initially stated he did not have any transportation. CM asked patient if he had medicaid and he thought he did but has no medicaid card. CM explained unable to assist with transportation due to no transportation benefit. He thinks his medicaid lapsed because he has not used transport in over a year.    Due to barriers CM inquired about SNF or placement. Patient declined adamantly.     After patient made some calls he informed CM he is able to find a ride to [place] 2x week to have wound vac changed.     JINNY is now attempting to schedule appointments with Dr. Connors[facility] for twice a week. First available at the [place] [facility] is a week from today, July 16 @ 10am. Patient cannot wait one week to have the wound vac applied as he has no one to provide the wound care at home.     JINNY has reached out to Dr. Miller to see what other options there are. Awaiting response at this time.       -CM also reached out to [facility] at [phone] to see if they can service the patient. Currently they are not contracted with [facility].     -Also, JINNY has attempted to call the wound care clinic approximately 20 times with no answer to see if the patient can be seen at their clinic twice a week.     CM received message back from Dr. Miller. He will apply the patient's wound vac tomorrow morning and then patient can wait until his follow up 7/16 to have it changed. CM informed patient of this and provided him with a print out of his follow up appointment. Patient acknowledged understanding.

## 2025-07-09 NOTE — PLAN OF CARE
Problem: Chronic Conditions and Co-morbidities  Goal: Patient's chronic conditions and co-morbidity symptoms are monitored and maintained or improved  7/8/2025 2102 by Frank Kaba RN  Outcome: Progressing  7/8/2025 1636 by Emily Dalton RN  Outcome: Progressing     Problem: Discharge Planning  Goal: Discharge to home or other facility with appropriate resources  7/8/2025 1636 by Emily Dalton RN  Outcome: Progressing     Problem: Safety - Adult  Goal: Free from fall injury  7/8/2025 1636 by Emily Dalton RN  Outcome: Progressing     Problem: Skin/Tissue Integrity  Goal: Skin integrity remains intact  Description: 1.  Monitor for areas of redness and/or skin breakdown  2.  Assess vascular access sites hourly  3.  Every 4-6 hours minimum:  Change oxygen saturation probe site  4.  Every 4-6 hours:  If on nasal continuous positive airway pressure, respiratory therapy assess nares and determine need for appliance change or resting period  7/8/2025 1636 by Emily Dalton RN  Outcome: Progressing     Problem: Pain  Goal: Verbalizes/displays adequate comfort level or baseline comfort level  7/8/2025 1636 by Emily Dalton RN  Outcome: Progressing

## 2025-07-09 NOTE — PROGRESS NOTES
Progress Note    Date:2025       Room:[facility]  Patient Name:Chavez Skelton     YOB: 1955     Age:70 y.o.    Subjective    Subjective   Patient seen at bedside. Denies any new complaints. Per nursing, no acute overnight events       Objective         Vitals Last 24 Hours:  TEMPERATURE:  Temp  Av.8 °F (36.6 °C)  Min: 97.5 °F (36.4 °C)  Max: 97.9 °F (36.6 °C)  RESPIRATIONS RANGE: Resp  Av.2  Min: 18  Max: 19  PULSE OXIMETRY RANGE: SpO2  Av.6 %  Min: 94 %  Max: 99 %  PULSE RANGE: Pulse  Av.6  Min: 71  Max: 87  BLOOD PRESSURE RANGE: Systolic (24hrs), Av , Min:143 , Max:195   ; Diastolic (24hrs), Av, Min:62, Max:88    I/O (24Hr):    Intake/Output Summary (Last 24 hours) at 2025 1734  Last data filed at 2025 1011  Gross per 24 hour   Intake 200 ml   Output 420 ml   Net -220 ml     Objective  GEN: Pt is AAOx4 and in NAD. Wound vac noted to the right foot. No family noted at BS  DERM: TMA noted with staples.   VASC: Pedal pulses (DP/PT) diminished to B/L LE. CFT<3sec to all remaining digits of B/L LE. No pedal hair growth noted to the level of the digits for B/L LE. Skin temp is warm to warm from proximal to distal for B/L LE. Neg homans/yoon signs to B/L LE. No varicosities or telangectasias noted to B/L LE.  NEURO: Protective and epicritic sensations grossly absent to B/L LE  MSK: Pain on palpation right foot. Left hallux amputation left foot. Good muscle tone and bulk noted to B/L LE.  PSYCH: Cooperative with normal mood and affect       Labs/Imaging/Diagnostics    Labs:  CBC:No results for input(s): \"WBC\", \"RBC\", \"HGB\", \"HCT\", \"MCV\", \"RDW\", \"PLT\" in the last 72 hours.  CHEMISTRIES:  Recent Labs     25  0434 25  0446 25  0556    140 139   K 3.7 3.7 3.9   * 111* 112*   CO2 21 21 20*   BUN 12 15 18   CREATININE 1.28 1.41* 1.52*   GLUCOSE 139* 135* 190*   PHOS 3.3 3.6 3.3     PT/INR:No results for input(s): \"PROTIME\", \"INR\" in the last

## 2025-07-09 NOTE — PLAN OF CARE
Problem: Chronic Conditions and Co-morbidities  Goal: Patient's chronic conditions and co-morbidity symptoms are monitored and maintained or improved  Outcome: Progressing     Problem: Discharge Planning  Goal: Discharge to home or other facility with appropriate resources  Outcome: Progressing     Problem: Safety - Adult  Goal: Free from fall injury  Outcome: Progressing     Problem: Skin/Tissue Integrity  Goal: Skin integrity remains intact  Description: 1.  Monitor for areas of redness and/or skin breakdown  2.  Assess vascular access sites hourly  3.  Every 4-6 hours minimum:  Change oxygen saturation probe site  4.  Every 4-6 hours:  If on nasal continuous positive airway pressure, respiratory therapy assess nares and determine need for appliance change or resting period  Outcome: Progressing     Problem: Pain  Goal: Verbalizes/displays adequate comfort level or baseline comfort level  Outcome: Progressing     Problem: Pain  Goal: Verbalizes/displays adequate comfort level or baseline comfort level  Outcome: Progressing     Problem: Skin/Tissue Integrity  Goal: Skin integrity remains intact  Description: 1.  Monitor for areas of redness and/or skin breakdown  2.  Assess vascular access sites hourly  3.  Every 4-6 hours minimum:  Change oxygen saturation probe site  4.  Every 4-6 hours:  If on nasal continuous positive airway pressure, respiratory therapy assess nares and determine need for appliance change or resting period  Outcome: Progressing

## 2025-07-10 VITALS
RESPIRATION RATE: 18 BRPM | TEMPERATURE: 97.8 F | WEIGHT: 229.5 LBS | HEART RATE: 71 BPM | SYSTOLIC BLOOD PRESSURE: 171 MMHG | OXYGEN SATURATION: 93 % | BODY MASS INDEX: 31.08 KG/M2 | DIASTOLIC BLOOD PRESSURE: 78 MMHG | HEIGHT: 72 IN

## 2025-07-10 LAB
GLUCOSE BLD STRIP.AUTO-MCNC: 177 MG/DL (ref 65–100)
GLUCOSE BLD STRIP.AUTO-MCNC: 239 MG/DL (ref 65–100)
PERFORMED BY:: ABNORMAL
PERFORMED BY:: ABNORMAL

## 2025-07-10 PROCEDURE — 82962 GLUCOSE BLOOD TEST: CPT

## 2025-07-10 PROCEDURE — 2580000003 HC RX 258: Performed by: NURSE PRACTITIONER

## 2025-07-10 PROCEDURE — 6370000000 HC RX 637 (ALT 250 FOR IP): Performed by: NURSE PRACTITIONER

## 2025-07-10 PROCEDURE — 6370000000 HC RX 637 (ALT 250 FOR IP): Performed by: INTERNAL MEDICINE

## 2025-07-10 PROCEDURE — 6360000002 HC RX W HCPCS: Performed by: NURSE PRACTITIONER

## 2025-07-10 PROCEDURE — 97530 THERAPEUTIC ACTIVITIES: CPT

## 2025-07-10 PROCEDURE — 2500000003 HC RX 250 WO HCPCS: Performed by: NURSE PRACTITIONER

## 2025-07-10 RX ADMIN — FLUOXETINE HYDROCHLORIDE 40 MG: 20 CAPSULE ORAL at 10:40

## 2025-07-10 RX ADMIN — METHIMAZOLE 5 MG: 5 TABLET ORAL at 10:40

## 2025-07-10 RX ADMIN — LISINOPRIL 20 MG: 20 TABLET ORAL at 10:40

## 2025-07-10 RX ADMIN — PIPERACILLIN AND TAZOBACTAM 3375 MG: 3; .375 INJECTION, POWDER, LYOPHILIZED, FOR SOLUTION INTRAVENOUS at 10:46

## 2025-07-10 RX ADMIN — PANTOPRAZOLE SODIUM 40 MG: 40 TABLET, DELAYED RELEASE ORAL at 10:40

## 2025-07-10 RX ADMIN — SODIUM CHLORIDE, PRESERVATIVE FREE 10 ML: 5 INJECTION INTRAVENOUS at 10:41

## 2025-07-10 RX ADMIN — ATORVASTATIN CALCIUM 40 MG: 40 TABLET, FILM COATED ORAL at 10:40

## 2025-07-10 RX ADMIN — EMPAGLIFLOZIN 10 MG: 10 TABLET, FILM COATED ORAL at 10:40

## 2025-07-10 RX ADMIN — NIFEDIPINE 30 MG: 30 TABLET, FILM COATED, EXTENDED RELEASE ORAL at 10:40

## 2025-07-10 RX ADMIN — BUPROPION HYDROCHLORIDE 150 MG: 150 TABLET, FILM COATED, EXTENDED RELEASE ORAL at 10:40

## 2025-07-10 RX ADMIN — PIPERACILLIN AND TAZOBACTAM 3375 MG: 3; .375 INJECTION, POWDER, LYOPHILIZED, FOR SOLUTION INTRAVENOUS at 02:44

## 2025-07-10 RX ADMIN — POTASSIUM CHLORIDE 20 MEQ: 1500 TABLET, EXTENDED RELEASE ORAL at 10:40

## 2025-07-10 ASSESSMENT — PAIN SCALES - GENERAL
PAINLEVEL_OUTOF10: 0

## 2025-07-10 NOTE — DISCHARGE SUMMARY
Hospitalist Discharge Summary     Patient ID:  Chavez Skelton  [id number]  70 y.o.  1955 7/1/2025    PCP on record: Chely Meredith APRN - [facility]    Admit date: 7/1/2025  Discharge date and time: 7/10/2025    DISCHARGE DIAGNOSIS:    Diabetic foot ulcer with suspected osteomyelitis of second middle and distal phalanges  Peripheral vascular disease s/p left great toe and right foot transmetatarsal amputation  MATT on CKD 3, resolved  Chronic hypokalemia, resolved  Hyperglycemia type 2 diabetes  Hypertension  Hyperlipidemia  CAD status post stent  Status post CABG  Anemia  Hypothyroidism  Gastroesophageal reflux disease  Obesity  Depression  Anxiety    CONSULTATIONS:  IP CONSULT TO PHARMACY  IP CONSULT TO PODIATRY  IP CONSULT TO VASCULAR SURGERY    Excerpted HPI from H&P of Brijesh Beard MD:  CHIEF COMPLAINT: Right foot pain     HISTORY OF PRESENT ILLNESS:     Chavez Skelton is a 70 y.o.  male with PMHx significant for peripheral vascular disease s/p prior left great toe amputation, type 2 diabetes, hypertension, hyperlipidemia, CAD with previous MI s/p open heart surgery and stent placement (2018), CKD 3, chronic hypokalemia, anemia, hyperthyroidism, antral gastritis/GERD, obesity, depression, anxiety who presented to [facility] ED with complaints of painful right toes, foot and lower leg x 2-3 weeks.  On arrival to the [facility] ED patient was hypertensive, otherwise hemodynamically stable, afebrile, room air.  Admission labs significant for leukocytosis to 17.2, hemoglobin 10.5, potassium 3.2, creatinine 1.81 with GFR 39, glucose 187.  X-ray of right foot with suspected osteomyelitis of second middle and distal phalanges.  CXR negative for acute process.  Patient was given 3 L fluid resuscitation, 1750 mg vancomycin, 4500 mg Zosyn, blood cultures and SALVATORE obtained, podiatry consulted and recommended transfer to [place] for likely surgical intervention and further  injection  Generic drug: Semaglutide (1 MG/DOSE)  Inject 1 mg into the skin once a week     pantoprazole 40 MG tablet  Commonly known as: PROTONIX  Take 1 tablet by mouth daily     potassium chloride 20 MEQ extended release tablet  Commonly known as: KLOR-CON M  Take 1 tablet by mouth 2 times daily               Where to Get Your Medications        These medications were sent to [facility] - [place], [place] - [address] - P [phone] - F [phone] [address] [facility] [facility] [address]      Phone: [phone]   doxycycline hyclate 100 MG tablet           Patient Follow Up Instructions:   Activity: PT/OT eval and treat  Diet: cardiac diet  Wound Care: as directed    Follow-up with PCP/Vascular Surgery/Podiatry  Follow-up Information       Follow up With Specialties Details Why Contact Info    Arnaldo Nevarez DPM Foot and Ankle Surgery, Wound Care and Hyperbaric Medicine Follow up in 1 week(s) For wound re-check [address] [id number] [phone]      Chely Meredith APRN - NP Family Medicine, Nurse Practitioner, Family Follow up in 2 week(s)  [address] [place] [address] [phone]      Ricky, Dony Rowland MD General Surgery Go on 7/28/2025 @ 1:30pm. [address] [id number] [phone]            ________________________________________________________________    Risk of deterioration: Low    Condition at Discharge:  Stable  __________________________________________________________________    Disposition  Home with family and home health services    ____________________________________________________________________    Code Status: Full Code  ___________________________________________________________________      Total time in minutes spent coordinating this discharge (includes going over instructions, follow-up, prescriptions, and preparing report for sign off to her PCP) :  45

## 2025-07-10 NOTE — CARE COORDINATION
Patient clear to d/c from  to home self once medically stable and wound vac placed, patient not able to get HH due to insurance and location, plan for patient to follow up with podiatry, first appt 7/16, patient and doctor aware, transport to [facility] is patient's responsibility, patient aware, states he has transport 2x's week to [place].    Transition of Care Plan:    RUR: 14%  Prior Level of Functioning: independent  Disposition: home  ANTOINE: 7/10/25  If SNF or IPR: Date FOC offered: na  Date FOC received: na  Accepting facility: na  Date authorization started with reference number: na  Date authorization received and expires: na  Follow up appointments: yes  DME needed: na  Transportation at discharge: patient arranged  IM/IMM Medicare/ letter given: previously given  Is patient a [place] and [facility] with [facility]? na   If yes, was [place] transfer form completed and [facility] notified? na  Caregiver Contact: patient  Discharge Caregiver contacted prior to discharge? Patient aware  Care Conference needed? na  Barriers to discharge: na

## 2025-07-10 NOTE — PLAN OF CARE
Problem: Chronic Conditions and Co-morbidities  Goal: Patient's chronic conditions and co-morbidity symptoms are monitored and maintained or improved  7/10/2025 0502 by Brooke Ariza RN  Outcome: Progressing  7/9/2025 1717 by Deisy Allred RN  Outcome: Progressing     Problem: Safety - Adult  Goal: Free from fall injury  7/10/2025 0502 by Brooke Ariza RN  Outcome: Progressing  7/9/2025 1717 by Deisy Allred RN  Outcome: Progressing     Problem: Skin/Tissue Integrity  Goal: Skin integrity remains intact  Description: 1.  Monitor for areas of redness and/or skin breakdown  2.  Assess vascular access sites hourly  3.  Every 4-6 hours minimum:  Change oxygen saturation probe site  4.  Every 4-6 hours:  If on nasal continuous positive airway pressure, respiratory therapy assess nares and determine need for appliance change or resting period  7/10/2025 0502 by Brooke Ariza RN  Outcome: Progressing  Flowsheets (Taken 7/9/2025 2020)  Skin Integrity Remains Intact: Monitor for areas of redness and/or skin breakdown  7/9/2025 1717 by Deisy Allred RN  Outcome: Progressing

## 2025-07-10 NOTE — PLAN OF CARE
PHYSICAL THERAPY REEVALUATION  Patient: Chavez Skelton (70 y.o. male)  Date: 7/10/2025  Primary Diagnosis: Gangrene of right foot (HCC) [I96]  Procedure(s) (LRB):  Angiography lower ext right (N/A)  Ultrasound guided vascular access (N/A)  Aortagram abdominal (N/A)  Angioplasty profunda femoral artery (N/A)  Angioplasty post tib artery (N/A) 2 Days Post-Op   Precautions: Fall Risk, Weight Bearing Right Lower Extremity Weight Bearing: Heel Weight Bearing                    Recommendations for nursing mobility: Out of bed to chair for meals, Encourage HEP in prep for ADLs/mobility; see handout for details, AD and gt belt for bed to chair , Amb to bathroom with AD and gait belt, Assist x1, and heel touch WB on RLE    In place during session: Peripheral IV, Wound vac X1 RLE, and EKG/telemetry   Chart, physical therapy assessment, plan of care, and goals were reviewed.      ASSESSMENT  Patient initially seen for PT evaluation 7/4/2025 and 1 skilled PT sessions since evalution. Patient seen today for PT reevaluation s/t LOS. Patient A&O x4. Pt semi supine upon arrival, agreeable to session.      Based on the objective data described, the patient currently presents with impaired functional mobility, decreased independence in ADLs, impaired ability to perform high-level IADLs, impaired strength, decreased activity tolerance, and impaired balance. (See below for objective details and assist levels).    Overall pt tolerated session well today, currently with no c/o pain throughout session. Pt demos increased independence with all functional mobility; completed bed mobility with Anita from elevated HOB, and all functional transfers with SBA for safety and primarily for line mgmt. Pt amb 10' to bathroom with RW, gait belt and SBA; demos slow varghese, appears to demo good attempt to maintain heeltouch wbing RLE with post-op shoe in place. Pt stood at sink x2-3min with occasional UE support on RW/sink however no LOB or buckling

## 2025-07-10 NOTE — PROGRESS NOTES
Pt discharged with an understanding of instructions and all belongings. Pt left via transport to home. IV removed, no tele to remove.

## 2025-07-10 NOTE — PROGRESS NOTES
Progress Note  Date:7/10/2025       Room:[facility]  Patient Name:Chavez Skelton     YOB: 1955     Age:70 y.o.        Subjective    Subjective   Review of Systems  Objective         Vitals Last 24 Hours:  TEMPERATURE:  Temp  Av.1 °F (36.7 °C)  Min: 97.7 °F (36.5 °C)  Max: 98.4 °F (36.9 °C)  RESPIRATIONS RANGE: Resp  Av.8  Min: 18  Max: 22  PULSE OXIMETRY RANGE: SpO2  Av.8 %  Min: 91 %  Max: 96 %  PULSE RANGE: Pulse  Av.7  Min: 66  Max: 84  BLOOD PRESSURE RANGE: Systolic (24hrs), Av , Min:134 , Max:185   ; Diastolic (24hrs), Av, Min:65, Max:84    I/O (24Hr):    Intake/Output Summary (Last 24 hours) at 7/10/2025 1400  Last data filed at 7/10/2025 0455  Gross per 24 hour   Intake 480 ml   Output 800 ml   Net -320 ml     Objective  Labs/Imaging/Diagnostics    Labs:  CBC:No results for input(s): \"WBC\", \"RBC\", \"HGB\", \"HCT\", \"MCV\", \"RDW\", \"PLT\" in the last 72 hours.  CHEMISTRIES:  Recent Labs     25  0446 25  0556    139   K 3.7 3.9   * 112*   CO2 21 20*   BUN 15 18   CREATININE 1.41* 1.52*   GLUCOSE 135* 190*   PHOS 3.6 3.3     PT/INR:No results for input(s): \"PROTIME\", \"INR\" in the last 72 hours.  APTT:No results for input(s): \"APTT\" in the last 72 hours.  LIVER PROFILE:No results for input(s): \"AST\", \"ALT\", \"BILIDIR\", \"BILITOT\", \"ALKPHOS\" in the last 72 hours.    Imaging Last 24 Hours:  No results found.  Assessment//Plan           Hospital Problems           Last Modified POA    * (Principal) Gangrene of right foot (HCC) 2025 Yes     Assessment & Plan    Electronically signed by Otis Mai DPM on 7/10/25 at 2:00 PM EDT

## 2025-07-10 NOTE — PROGRESS NOTES
Contacted daughter and sister twice each to update them on status of patient and discharge 7/10. No response from either.

## 2025-07-14 NOTE — PROGRESS NOTES
Physician Progress Note      PATIENT:               YAMILKA LI  CSN #:                  [id number]  :                       1955  ADMIT DATE:       2025 7:58 PM  DISCH DATE:        7/10/2025 3:56 PM  RESPONDING  PROVIDER #:        Parth Mckeon MD          QUERY TEXT:    Sepsis is documented in the medical record in  Podiatry Consult note,    Wound Care Note and Podiatry Progress Notes dated  and .   Please provide additional clinical indicators supportive of your   documentation. Or please document if the diagnosis of sepsis has been ruled   out after study.    The clinical indicators include:  from  ED Provider Note: \"SEPSIS REASSESSMENT: Patient does NOT meet   Sepsis criteria after ED workup\"    from  Podiatry Consult Note,  Wound Care Progress Note: \"Assessment:   Gas gangrene right foot Diabetes mellitus with neuropathy Peripheral arterial   disease Sepsis\"    Podiatry Progress Notes dated  and : \"Assessment & Plan Diabetic   ulcer with gas gangrene, right foot Sepsis 2/2 above Diabetes mellitus type 2   with neuropathy PAD Anemia\"    25 12:48 WBC: 17.2, 25 05:31 WBC: 17.9, 25 05:03 WBC: 18.4,   25 06:56 WBC: 14.4, 25 07:15 WBC: 11.9, 25 07:27  WBC: 11.0     right foot Xray: Osteomyelitis of the second middle and distal phalanges   is suspected.     Wound/Tissue cultures: Staphylococcus aureus    25 12:48 Lactic Acid, Sepsis: 1.7    25 05:31 Procalcitonin: 0.24, 25 05:03 Procalcitonin: 0.21,   25 06:56 Procalcitonin: 0.21, 25 07:15 Procalcitonin: 0.12,   25 07:27 Procalcitonin: 0.07, 25 04:34 Procalcitonin: 0.07,   25 04:46 Procalcitonin: 0.07     VS 13:01 Oral temp 98.3, HR 91, RR 16, /60     VS 20:46 Oral temp 99.7, HR 73, RR 18, /68          Labs, wound/tissue cultures, right foot Xray, VS per unit protcol, Podiatry   consult,

## 2025-07-18 ENCOUNTER — TELEPHONE (OUTPATIENT)
Age: 70
End: 2025-07-18

## 2025-07-18 NOTE — TELEPHONE ENCOUNTER
This MA attempted to reach pt to verify if he received the link and begin the rooming processing. This MA left message for pt advising this MA will attempt at a later time to reach pt.

## 2025-07-19 NOTE — TELEPHONE ENCOUNTER
Hello,     We attempted to evaluate patient for the scheduled HFU on 7/18/25 at 7:00pm but were unable to connect due to no response from the patient.       Please contact patient and advise.         Thank you,

## (undated) DEVICE — CONTAINER,SPECIMEN,PNEU TUBE,4OZ,OR STRL: Brand: MEDLINE

## (undated) DEVICE — TOWEL,OR,DSP,ST,BLUE,DLX,6/PK,12PK/CS: Brand: MEDLINE

## (undated) DEVICE — GLOVE ORANGE PI 7 1/2   MSG9075

## (undated) DEVICE — MINOR EXTREMITY PACK: Brand: MEDLINE INDUSTRIES, INC.

## (undated) DEVICE — Device

## (undated) DEVICE — SYR LR LCK 1ML GRAD NSAF 30ML --

## (undated) DEVICE — GUIDEWIRE VASC L80CM OD0.018IN TIP L2CM NIT COR TUNGSTEN

## (undated) DEVICE — LOWER EXTREMITY: Brand: MEDLINE INDUSTRIES, INC.

## (undated) DEVICE — PROBE US DEB DISP SONICVAC

## (undated) DEVICE — 3M™ TEGADERM™ TRANSPARENT FILM DRESSING FIRST AID STYLE, 1621, 4 IN X 4-3/4 IN, 50 BAGS/CARTON, 4 CARTONS/CASE: Brand: 3M™ TEGADERM™

## (undated) DEVICE — PAD,PREPPING,CUFFED,24X48,7",NONSTERILE: Brand: MEDLINE

## (undated) DEVICE — RADIFOCUS GLIDEWIRE: Brand: GLIDEWIRE

## (undated) DEVICE — COVER MPLR TIP CRV SCIS ACC DA VINCI

## (undated) DEVICE — REM POLYHESIVE ADULT PATIENT RETURN ELECTRODE: Brand: VALLEYLAB

## (undated) DEVICE — SOUTHSIDE TURNOVER: Brand: MEDLINE INDUSTRIES, INC.

## (undated) DEVICE — 2, DISPOSABLE SUCTION/IRRIGATOR WITHOUT DISPOSABLE TIP: Brand: STRYKEFLOW

## (undated) DEVICE — MASK POM PROCEDURE OXY W/ HI CONCENTRATION CO2 MONITOR

## (undated) DEVICE — BASIC SINGLE BASIN-LF: Brand: MEDLINE INDUSTRIES, INC.

## (undated) DEVICE — GUIDEWIRE VASC STR 3 CM 0.014 INX300 CM HI TORQ FE MAN

## (undated) DEVICE — DRAPE,REIN 53X77,STERILE: Brand: MEDLINE

## (undated) DEVICE — SUTURE PROL SZ 2-0 L30IN NONABSORBABLE BLU L26MM SH 1/2 CIR 8833H

## (undated) DEVICE — NEPTUNE E-SEP SMOKE EVACUATION PENCIL, COATED, 70MM BLADE, PUSH BUTTON SWITCH: Brand: NEPTUNE E-SEP

## (undated) DEVICE — JELLY,LUBE,STERILE,FLIP TOP,TUBE,4-OZ: Brand: MEDLINE

## (undated) DEVICE — DRESSING,GAUZE,OIL EMULSION,CURAD,3"X3": Brand: CURAD

## (undated) DEVICE — PREP PAD BNS: Brand: CONVERTORS

## (undated) DEVICE — DEVICE EMBOLC PROTCT 7MMX320CM -- SPIDERFX
Type: IMPLANTABLE DEVICE | Status: NON-FUNCTIONAL
Removed: 2023-03-21

## (undated) DEVICE — TUBING, SUCTION, 9/32" X 10', STRAIGHT: Brand: MEDLINE

## (undated) DEVICE — GAUZE,SPONGE,4"X4",16PLY,STRL,LF,10/TRAY: Brand: MEDLINE

## (undated) DEVICE — SPONGE LAP SFT 18X18 IN X RAY DETECTABLE

## (undated) DEVICE — DRSG GAUZE OIL EMUL 3X16IN -- CURAD

## (undated) DEVICE — STARCLOSE SE™ VASCULAR CLOSURE SYSTEM: Brand: STARCLOSE SE™

## (undated) DEVICE — DERMABOND SKIN ADH 0.7ML -- DERMABOND ADVANCED 12/BX

## (undated) DEVICE — HT WINN  80 GUIDE WIRE .014" X 190 CM: Brand: HI-TORQUE WINN

## (undated) DEVICE — HI-TORQUE BALANCE MIDDLEWEIGHT UNIVERSAL GUIDE WIRE .014 J TIP 3.0 CM X 300 CM: Brand: HI-TORQUE BALANCE MIDDLEWEIGHT UNIVERSAL

## (undated) DEVICE — 96"PROBE COVER (US)10PK: Brand: SITE-RITE

## (undated) DEVICE — BNDG ELAS HK LOOP 6X5YD NS -- MATRIX

## (undated) DEVICE — DEVICE INFL 20ML BRL POLYCARB ANALG LUMINESCENT DISPLAY ANG

## (undated) DEVICE — GUIDEWIRE VASC J 3 CM 0.014 INX300 CM MIDWT UNIV 1009661J

## (undated) DEVICE — NAVICROSS SUPPORT CATHETER: Brand: NAVICROSS

## (undated) DEVICE — SUT VCRL + 2-0 27IN SH UD --

## (undated) DEVICE — GLOW 'N TELL 55CM TAPE (20 STRIPS): Brand: VASCUTAPE RADIOPAQUE TAPE

## (undated) DEVICE — DRAPE,U/ SHT,SPLIT,PLAS,STERIL: Brand: MEDLINE

## (undated) DEVICE — Z INACTIVE USE 2854267 SPONGE GZ W4XL4IN COT 12 PLY TYP VII WVN C FLD DSGN

## (undated) DEVICE — LAPAROSCOPIC CHOLE PACK: Brand: MEDLINE INDUSTRIES, INC.

## (undated) DEVICE — BANDAGE,GAUZE,BULKEE II,4.5"X4.1YD,STRL: Brand: MEDLINE

## (undated) DEVICE — GLOVE SURG SZ 8 L12IN FNGR THK79MIL GRN LTX FREE

## (undated) DEVICE — TROCAR: Brand: KII FIOS FIRST ENTRY

## (undated) DEVICE — 1200CC GUARDIAN II: Brand: GUARDIAN

## (undated) DEVICE — PINNACLE INTRODUCER SHEATH: Brand: PINNACLE

## (undated) DEVICE — T-DRAPE,EXTREMITY, ULTRAGARD: Brand: MEDLINE

## (undated) DEVICE — IRRIGATION TUBESET: Brand: BONESCALPEL

## (undated) DEVICE — SUTURE CHROMIC GUT SZ 2-0 L27IN ABSRB BRN L26MM SH 1/2 CIR G123H

## (undated) DEVICE — DESTINATION CAROTID GUIDING SHEATH: Brand: DESTINATION

## (undated) DEVICE — ARM DRAPE

## (undated) DEVICE — CANISTER NEG PRSS 500ML WND THER W/ TBNG NO PRSS RANG W/

## (undated) DEVICE — PADDING CAST 4 INX5 YD STRL

## (undated) DEVICE — CATHETER ATHRCTMY 6FR L135CM TIP L5.9CM GWIRE 0.014IN

## (undated) DEVICE — SPONGE GZ W4XL4IN COT 12 PLY TYP VII WVN C FLD DSGN STERILE

## (undated) DEVICE — LAMINECTOMY ARM CRADLE FOAM POSITIONER: Brand: CARDINAL HEALTH

## (undated) DEVICE — TRAY SURG CUST CRD CATH 3 PRT SSR

## (undated) DEVICE — TUBING, SUCTION, 1/4" X 12', STRAIGHT: Brand: MEDLINE

## (undated) DEVICE — CATHETER MARINER BRD RIM 5F X 65 CM .035 IN DIAG CATHETERS --

## (undated) DEVICE — DRESSING NEG PRSS L W15XH3.3XL26CM BLK POLYUR DRP PD

## (undated) DEVICE — GAUZE,SPONGE,4"X4",12PLY,STRL,LF,10/TRAY: Brand: MEDLINE

## (undated) DEVICE — SEAL UNIV 5-8MM DISP BX/10 -- DA VINCI XI - SNGL USE

## (undated) DEVICE — HT WINN 40 GUIDE WIRE .014" X 300 CM: Brand: HI-TORQUE WINN

## (undated) DEVICE — TOWEL SURG W17XL27IN STD BLU COT NONFENESTRATED PREWASHED

## (undated) DEVICE — IMPLANTABLE DEVICE
Type: IMPLANTABLE DEVICE | Status: NON-FUNCTIONAL
Removed: 2023-03-21

## (undated) DEVICE — CATHETER MARINER BRAID RIM 5FX65 CM .035 IN.DIAGNOSTIC

## (undated) DEVICE — TISSUE RETRIEVAL SYSTEM: Brand: INZII RETRIEVAL SYSTEM

## (undated) DEVICE — SOLUTION IRRIG 500ML 0.9% SOD CHLO USP POUR PLAS BTL

## (undated) DEVICE — SUTURE VICRYL + SZ 2-0 L27IN ABSRB UD CT-2 L26MM 1/2 CIR TAPR VCP269H

## (undated) DEVICE — BLADELESS OBTURATOR: Brand: WECK VISTA

## (undated) DEVICE — DESTINATION PERIPHERAL GUIDING SHEATH: Brand: DESTINATION

## (undated) DEVICE — INTENDED FOR TISSUE SEPARATION, AND OTHER PROCEDURES THAT REQUIRE A SHARP SURGICAL BLADE TO PUNCTURE OR CUT.: Brand: BARD-PARKER ® CARBON RIB-BACK BLADES

## (undated) DEVICE — COVER LIGHTHANDLE PACK STRL --

## (undated) DEVICE — WASH CLOTH INCONT LO LINT PREM 12X13 IN LF DISP

## (undated) DEVICE — SOLUTION IRRIG 1000ML STRL H2O USP PLAS POUR BTL

## (undated) DEVICE — TAPE,CLOTH/SILK,CURAD,3"X10YD,LF,40/CS: Brand: CURAD

## (undated) DEVICE — BANDAGE COMPR W4INXL5YD WHT BGE POLY COT M E WRP WV HK AND

## (undated) DEVICE — PAD,ABDOMINAL,8"X7.5",STERILE,LF,1/PK: Brand: MEDLINE

## (undated) DEVICE — ZIMMER® STERILE DISPOSABLE TOURNIQUET CUFF WITH PLC, DUAL PORT, SINGLE BLADDER, 18 IN. (46 CM)

## (undated) DEVICE — GARMENT,MEDLINE,DVT,INT,CALF,MED, GEN2: Brand: MEDLINE

## (undated) DEVICE — Device: Brand: MIRACLEBROS 12

## (undated) DEVICE — GLOVE SURG SZ 7.5 L11.73IN FNGR THK9.8MIL STRW LTX POLYMER

## (undated) DEVICE — CATHETER PTCA L150CM BLLN L120MM DIA2.5MM INTRO SHTH DIA4FR

## (undated) DEVICE — INSUFFLATION NEEDLE TO ESTABLISH PNEUMOPERITONEUM.: Brand: INSUFFLATION NEEDLE

## (undated) DEVICE — SPONGE LAP 18X18IN STRL -- 5/PK

## (undated) DEVICE — FORCEPS BX L240CM WRK CHN 2.8MM STD CAP W/ NDL MIC MESH

## (undated) DEVICE — STAPLER SKIN H3.9MM WIRE DIA0.58MM CRWN 6.9MM 35 STPL ROT

## (undated) DEVICE — COVER PRB INTOP 96X6 IN LF

## (undated) DEVICE — BANDAGE,GAUZE,CONFORMING,3"X75",STRL,LF: Brand: MEDLINE

## (undated) DEVICE — DEVICE EMBOLC PROTCT 5MMX320CM -- SPIDERFX

## (undated) DEVICE — PREP SKN CHLRAPRP APL 26ML STR --

## (undated) DEVICE — VISUALIZATION SYSTEM: Brand: CLEARIFY

## (undated) DEVICE — BNDG ELAS HK LOOP 4X5YD NS -- MATRIX

## (undated) DEVICE — MICROPUNCTURE INTRODUCER SET SILHOUETTE TRANSITIONLESS PUSH-PLUS DESIGN - STIFFENED CANNULA WITH STAINLESS STEEL WIRE GUIDE: Brand: MICROPUNCTURE

## (undated) DEVICE — GENERAL PURPOSE TRAY STERILE: Brand: MEDLINE INDUSTRIES, INC.

## (undated) DEVICE — COLUMN DRAPE

## (undated) DEVICE — ROCKER SWITCH PENCIL BLADE ELECTRODE, HOLSTER: Brand: EDGE

## (undated) DEVICE — GLOVE SURG SZ 75 L12IN FNGR THK79MIL GRN LTX FREE

## (undated) DEVICE — SUTURE ABSORBABLE MONOFILAMENT 2-0 SH 27 IN VIO MONOCRYL + MCP317H

## (undated) DEVICE — CATH BLLN PTA 5X40MM -- CHOCOLATE

## (undated) DEVICE — SUTURE MONOCRYL + ABSORBABLE MONOFILAMENT 2-0 SH 27 IN VIO  MCP317H

## (undated) DEVICE — SOL INJ SOD CL 0.9% 1000ML BG --

## (undated) DEVICE — TRAY URIN CATH PED 16FR BLLN 5CC INDWL STR TIP INF CTRL

## (undated) DEVICE — RUNTHROUGH NS EXTRA FLOPPY PTCA GUIDEWIRE: Brand: RUNTHROUGH

## (undated) DEVICE — Device: Brand: JELCO

## (undated) DEVICE — SUTURE VICRYL SZ 2-0 L27IN ABSRB UD L26MM CT-2 1/2 CIR J269H

## (undated) DEVICE — Device: Brand: QUICK-CROSS SUPPORT CATHETER

## (undated) DEVICE — INTENDED FOR TISSUE SEPARATION, AND OTHER PROCEDURES THAT REQUIRE A SHARP SURGICAL BLADE TO PUNCTURE OR CUT.: Brand: BARD-PARKER SAFETY BLADES SIZE 10, STERILE

## (undated) DEVICE — DRSG GZ OIL EMUL CURAD 3X3 --

## (undated) DEVICE — SOL INJ SOD CL 0.9% 250ML BG --

## (undated) DEVICE — COVER,MAYO STAND,STERILE: Brand: MEDLINE

## (undated) DEVICE — SUT MONOCRYL PLUS UD 4-0 --

## (undated) DEVICE — 1010 S-DRAPE TOWEL DRAPE 10/BX: Brand: STERI-DRAPE™

## (undated) DEVICE — LINE SAMPLING AD NSL O2 TBNG MICROSTREAM ADV FILTERLINE MVAO

## (undated) DEVICE — DEVICE INFL SYR BLLN ENDO 30 -- INTELLI

## (undated) DEVICE — [HIGH FLOW INSUFFLATOR,  DO NOT USE IF PACKAGE IS DAMAGED,  KEEP DRY,  KEEP AWAY FROM SUNLIGHT,  PROTECT FROM HEAT AND RADIOACTIVE SOURCES.]: Brand: PNEUMOSURE

## (undated) DEVICE — BANDAGE E SELF CLSR 4INX5YD VELC SWIFTWRAP

## (undated) DEVICE — 450 ML BOTTLE OF 0.05% CHLORHEXIDINE GLUCONATE IN 99.95% STERILE WATER FOR IRRIGATION, USP AND APPLICATOR.: Brand: IRRISEPT ANTIMICROBIAL WOUND LAVAGE

## (undated) DEVICE — PADDING CST 4IN STERILE --

## (undated) DEVICE — CLIP SUT ENDOSCP F/2-0/3-0/4-0 -- LAPRA-TY

## (undated) DEVICE — SOLUTION IRRIG 500ML 0.9% SOD CHL USP POUR PLAS BTL

## (undated) DEVICE — SURGICAL PROCEDURE TRAY CRD CATH 3 PRT

## (undated) DEVICE — EP SPD2-US-060-320 SPIDER FX V04
Type: IMPLANTABLE DEVICE | Status: NON-FUNCTIONAL
Brand: SPIDERFX™
Removed: 2023-03-21

## (undated) DEVICE — YANKAUER,BULB TIP,W/O VENT,RIGID,STERILE: Brand: MEDLINE